# Patient Record
Sex: MALE | Race: WHITE | Employment: OTHER | URBAN - METROPOLITAN AREA
[De-identification: names, ages, dates, MRNs, and addresses within clinical notes are randomized per-mention and may not be internally consistent; named-entity substitution may affect disease eponyms.]

---

## 2017-01-19 RX ORDER — ATORVASTATIN CALCIUM 20 MG/1
TABLET, FILM COATED ORAL
Qty: 90 TABLET | Refills: 3 | Status: SHIPPED | OUTPATIENT
Start: 2017-01-19 | End: 2018-05-16 | Stop reason: SDUPTHER

## 2017-01-23 RX ORDER — METOPROLOL SUCCINATE 100 MG/1
100 TABLET, EXTENDED RELEASE ORAL DAILY
Qty: 90 TABLET | Refills: 3 | Status: SHIPPED | OUTPATIENT
Start: 2017-01-23 | End: 2017-02-16 | Stop reason: SDUPTHER

## 2017-01-23 RX ORDER — METOPROLOL SUCCINATE 100 MG/1
TABLET, EXTENDED RELEASE ORAL
Qty: 90 TABLET | Refills: 1 | Status: SHIPPED | OUTPATIENT
Start: 2017-01-23 | End: 2017-07-26 | Stop reason: SDUPTHER

## 2017-02-13 RX ORDER — MONTELUKAST SODIUM 10 MG/1
TABLET ORAL
Qty: 30 TABLET | Refills: 5 | Status: SHIPPED | OUTPATIENT
Start: 2017-02-13 | End: 2017-07-26 | Stop reason: SDUPTHER

## 2017-02-16 ENCOUNTER — OFFICE VISIT (OUTPATIENT)
Dept: CARDIOLOGY CLINIC | Age: 72
End: 2017-02-16

## 2017-02-16 VITALS
OXYGEN SATURATION: 92 % | BODY MASS INDEX: 28.17 KG/M2 | WEIGHT: 208 LBS | HEIGHT: 72 IN | HEART RATE: 74 BPM | SYSTOLIC BLOOD PRESSURE: 134 MMHG | DIASTOLIC BLOOD PRESSURE: 72 MMHG

## 2017-02-16 DIAGNOSIS — Z95.1 S/P CABG X 2: Primary | ICD-10-CM

## 2017-02-16 DIAGNOSIS — E78.2 MIXED HYPERLIPIDEMIA: ICD-10-CM

## 2017-02-16 DIAGNOSIS — I25.810 CORONARY ARTERY DISEASE INVOLVING CORONARY BYPASS GRAFT OF NATIVE HEART WITHOUT ANGINA PECTORIS: ICD-10-CM

## 2017-02-16 DIAGNOSIS — I10 ESSENTIAL HYPERTENSION: ICD-10-CM

## 2017-02-16 PROCEDURE — 99214 OFFICE O/P EST MOD 30 MIN: CPT | Performed by: INTERNAL MEDICINE

## 2017-02-16 RX ORDER — LOSARTAN POTASSIUM 50 MG/1
50 TABLET ORAL DAILY
Qty: 90 TABLET | Refills: 3 | Status: SHIPPED | OUTPATIENT
Start: 2017-02-16 | End: 2018-02-27 | Stop reason: SDUPTHER

## 2017-02-16 RX ORDER — ATORVASTATIN CALCIUM 20 MG/1
20 TABLET, FILM COATED ORAL DAILY
Qty: 90 TABLET | Refills: 3 | Status: SHIPPED | OUTPATIENT
Start: 2017-02-16 | End: 2017-03-24

## 2017-03-24 ENCOUNTER — OFFICE VISIT (OUTPATIENT)
Dept: FAMILY MEDICINE CLINIC | Age: 72
End: 2017-03-24

## 2017-03-24 VITALS
BODY MASS INDEX: 26.95 KG/M2 | HEART RATE: 76 BPM | DIASTOLIC BLOOD PRESSURE: 80 MMHG | WEIGHT: 199 LBS | HEIGHT: 72 IN | OXYGEN SATURATION: 96 % | RESPIRATION RATE: 16 BRPM | SYSTOLIC BLOOD PRESSURE: 122 MMHG | TEMPERATURE: 98 F

## 2017-03-24 DIAGNOSIS — R59.0 POSTERIOR CERVICAL ADENOPATHY: Primary | ICD-10-CM

## 2017-03-24 PROCEDURE — 99213 OFFICE O/P EST LOW 20 MIN: CPT | Performed by: FAMILY MEDICINE

## 2017-03-24 RX ORDER — CEPHALEXIN 500 MG/1
500 CAPSULE ORAL 4 TIMES DAILY
Qty: 40 CAPSULE | Refills: 0 | Status: SHIPPED | OUTPATIENT
Start: 2017-03-24 | End: 2017-07-26 | Stop reason: ALTCHOICE

## 2017-03-24 RX ORDER — METHYLPREDNISOLONE 4 MG/1
TABLET ORAL
Qty: 21 TABLET | Refills: 0 | Status: SHIPPED | OUTPATIENT
Start: 2017-03-24 | End: 2017-03-30

## 2017-07-26 ENCOUNTER — OFFICE VISIT (OUTPATIENT)
Dept: FAMILY MEDICINE CLINIC | Age: 72
End: 2017-07-26

## 2017-07-26 VITALS
SYSTOLIC BLOOD PRESSURE: 116 MMHG | OXYGEN SATURATION: 97 % | TEMPERATURE: 98.4 F | RESPIRATION RATE: 16 BRPM | HEIGHT: 72 IN | DIASTOLIC BLOOD PRESSURE: 74 MMHG | WEIGHT: 204 LBS | BODY MASS INDEX: 27.63 KG/M2 | HEART RATE: 74 BPM

## 2017-07-26 DIAGNOSIS — I10 ESSENTIAL HYPERTENSION: Primary | ICD-10-CM

## 2017-07-26 DIAGNOSIS — H65.492 CHRONIC MIDDLE EAR EFFUSION, LEFT: ICD-10-CM

## 2017-07-26 DIAGNOSIS — I25.10 CORONARY ARTERY DISEASE INVOLVING NATIVE CORONARY ARTERY OF NATIVE HEART, ANGINA PRESENCE UNSPECIFIED: ICD-10-CM

## 2017-07-26 DIAGNOSIS — J30.9 ALLERGIC RHINITIS, UNSPECIFIED ALLERGIC RHINITIS TRIGGER, UNSPECIFIED RHINITIS SEASONALITY: ICD-10-CM

## 2017-07-26 DIAGNOSIS — E78.2 MIXED HYPERLIPIDEMIA: ICD-10-CM

## 2017-07-26 LAB
BILIRUBIN, POC: NORMAL
BLOOD URINE, POC: NORMAL
CLARITY, POC: CLEAR
COLOR, POC: YELLOW
GLUCOSE URINE, POC: NORMAL
KETONES, POC: NORMAL
LEUKOCYTE EST, POC: NORMAL
NITRITE, POC: NORMAL
PH, POC: 6
PROTEIN, POC: NORMAL
SPECIFIC GRAVITY, POC: 1.02
UROBILINOGEN, POC: 0.2

## 2017-07-26 PROCEDURE — 93000 ELECTROCARDIOGRAM COMPLETE: CPT | Performed by: FAMILY MEDICINE

## 2017-07-26 PROCEDURE — 81002 URINALYSIS NONAUTO W/O SCOPE: CPT | Performed by: FAMILY MEDICINE

## 2017-07-26 PROCEDURE — 99214 OFFICE O/P EST MOD 30 MIN: CPT | Performed by: FAMILY MEDICINE

## 2017-07-26 RX ORDER — METOPROLOL SUCCINATE 100 MG/1
TABLET, EXTENDED RELEASE ORAL
Qty: 90 TABLET | Refills: 3 | Status: SHIPPED | OUTPATIENT
Start: 2017-07-26 | End: 2018-01-09 | Stop reason: SDUPTHER

## 2017-07-26 RX ORDER — MONTELUKAST SODIUM 10 MG/1
TABLET ORAL
Qty: 90 TABLET | Refills: 3 | Status: SHIPPED | OUTPATIENT
Start: 2017-07-26 | End: 2018-11-09 | Stop reason: SDUPTHER

## 2017-07-26 ASSESSMENT — PATIENT HEALTH QUESTIONNAIRE - PHQ9
SUM OF ALL RESPONSES TO PHQ9 QUESTIONS 1 & 2: 0
2. FEELING DOWN, DEPRESSED OR HOPELESS: 0
1. LITTLE INTEREST OR PLEASURE IN DOING THINGS: 0
SUM OF ALL RESPONSES TO PHQ QUESTIONS 1-9: 0

## 2017-09-12 ENCOUNTER — OFFICE VISIT (OUTPATIENT)
Dept: CARDIOLOGY CLINIC | Age: 72
End: 2017-09-12

## 2017-09-12 VITALS
HEIGHT: 72 IN | OXYGEN SATURATION: 97 % | BODY MASS INDEX: 27.77 KG/M2 | SYSTOLIC BLOOD PRESSURE: 126 MMHG | WEIGHT: 205 LBS | DIASTOLIC BLOOD PRESSURE: 72 MMHG | HEART RATE: 64 BPM

## 2017-09-12 DIAGNOSIS — R00.2 PALPITATIONS: ICD-10-CM

## 2017-09-12 DIAGNOSIS — I10 ESSENTIAL HYPERTENSION: ICD-10-CM

## 2017-09-12 DIAGNOSIS — E78.2 MIXED HYPERLIPIDEMIA: ICD-10-CM

## 2017-09-12 DIAGNOSIS — I25.810 CORONARY ARTERY DISEASE INVOLVING CORONARY BYPASS GRAFT OF NATIVE HEART WITHOUT ANGINA PECTORIS: Primary | ICD-10-CM

## 2017-09-12 PROCEDURE — 99214 OFFICE O/P EST MOD 30 MIN: CPT | Performed by: INTERNAL MEDICINE

## 2017-09-18 ENCOUNTER — HOSPITAL ENCOUNTER (OUTPATIENT)
Dept: OTHER | Age: 72
Discharge: HOME | End: 2017-09-18
Attending: INTERNAL MEDICINE | Admitting: INTERNAL MEDICINE

## 2017-09-18 DIAGNOSIS — R00.2 PALPITATIONS: ICD-10-CM

## 2017-09-19 LAB
ACQUISITION DURATION: NORMAL S
AVERAGE HEART RATE: 68 BPM
EKG DIAGNOSIS: NORMAL
HOLTER MAX HEART RATE: 102 BPM
HOOKUP DATE: NORMAL
HOOKUP TIME: NORMAL
Lab: NORMAL
MAX HEART RATE TIME/DATE: NORMAL
MIN HEART RATE TIME/DATE: NORMAL
MIN HEART RATE: 50 BPM
NUMBER OF QRS COMPLEXES: NORMAL
NUMBER OF SUPRAVENTRICULAR BEATS IN RUNS: 0
NUMBER OF SUPRAVENTRICULAR COUPLETS: 0
NUMBER OF SUPRAVENTRICULAR ECTOPICS: 42
NUMBER OF SUPRAVENTRICULAR ISOLATED BEATS: 42
NUMBER OF SUPRAVENTRICULAR RUNS: 0
NUMBER OF VENTRICULAR BEATS IN RUNS: 0
NUMBER OF VENTRICULAR BIGEMINAL CYCLES: 198
NUMBER OF VENTRICULAR COUPLETS: 29
NUMBER OF VENTRICULAR ECTOPICS: 2232
NUMBER OF VENTRICULAR ISOLATED BEATS: 2174
NUMBER OF VENTRICULAR RUNS: 0

## 2017-11-01 ENCOUNTER — OFFICE VISIT (OUTPATIENT)
Dept: FAMILY MEDICINE CLINIC | Age: 72
End: 2017-11-01

## 2017-11-01 VITALS
WEIGHT: 204.8 LBS | HEIGHT: 72 IN | SYSTOLIC BLOOD PRESSURE: 138 MMHG | DIASTOLIC BLOOD PRESSURE: 86 MMHG | OXYGEN SATURATION: 99 % | HEART RATE: 57 BPM | RESPIRATION RATE: 16 BRPM | BODY MASS INDEX: 27.74 KG/M2

## 2017-11-01 DIAGNOSIS — Z23 IMMUNIZATION DUE: ICD-10-CM

## 2017-11-01 DIAGNOSIS — G89.29 CHRONIC BILATERAL LOW BACK PAIN WITHOUT SCIATICA: ICD-10-CM

## 2017-11-01 DIAGNOSIS — M54.50 CHRONIC BILATERAL LOW BACK PAIN WITHOUT SCIATICA: ICD-10-CM

## 2017-11-01 DIAGNOSIS — L30.9 DERMATITIS: ICD-10-CM

## 2017-11-01 DIAGNOSIS — H66.002 ACUTE EXUDATIVE OTITIS MEDIA OF LEFT EAR: Primary | ICD-10-CM

## 2017-11-01 PROCEDURE — G0008 ADMIN INFLUENZA VIRUS VAC: HCPCS | Performed by: FAMILY MEDICINE

## 2017-11-01 PROCEDURE — 99213 OFFICE O/P EST LOW 20 MIN: CPT | Performed by: FAMILY MEDICINE

## 2017-11-01 PROCEDURE — 90688 IIV4 VACCINE SPLT 0.5 ML IM: CPT | Performed by: FAMILY MEDICINE

## 2017-11-01 RX ORDER — AMOXICILLIN 500 MG/1
500 CAPSULE ORAL 3 TIMES DAILY
Qty: 30 CAPSULE | Refills: 0 | Status: SHIPPED | OUTPATIENT
Start: 2017-11-01 | End: 2017-11-11

## 2017-11-01 RX ORDER — CALCIPOTRIENE 50 UG/G
CREAM TOPICAL
Qty: 1 TUBE | Refills: 5 | Status: SHIPPED | OUTPATIENT
Start: 2017-11-01 | End: 2018-12-10 | Stop reason: SDUPTHER

## 2017-11-01 NOTE — PROGRESS NOTES
Vaccine Information Sheet, \"Influenza - Inactivated\"  given to Mely Man, or parent/legal guardian of  Mely Man and verbalized understanding. Patient responses:    Have you ever had a reaction to a flu vaccine? No  Are you able to eat eggs without adverse effects? Yes  Do you have any current illness? No  Have you ever had Guillian [place]shoe Syndrome? No    Flu vaccine given per order. Please see immunization tab.
Plan:      Orders Placed This Encounter   Procedures    INFLUENZA, QUADV, 3 YRS AND OLDER, IM, MDV, 0.5ML (FLUZONE QUADV)       Orders Placed This Encounter   Medications    halobetasol (ULTRAVATE) 0.05 % cream     Sig: APPLY TO AFFECTED AREA(S) TWO TIMES A DAY AS NEEDED     Dispense:  1 Tube     Refill:  5    calcipotriene (DOVONEX) 0.005 % cream     Sig: APPLY TO AFFECTED AREA(S) TWO TIMES A DAY AS NEEDED     Dispense:  1 Tube     Refill:  5    amoxicillin (AMOXIL) 500 MG capsule     Sig: Take 1 capsule by mouth 3 times daily for 10 days     Dispense:  30 capsule     Refill:  0   Tylenol PRN for pain. discussed diet--avoid acidic and spicy foods, NSAID. OTC Zantac PRN.

## 2017-11-02 ENCOUNTER — TELEPHONE (OUTPATIENT)
Dept: FAMILY MEDICINE CLINIC | Age: 72
End: 2017-11-02

## 2017-11-06 ENCOUNTER — HOSPITAL ENCOUNTER (OUTPATIENT)
Dept: OTHER | Age: 72
Discharge: HOME | End: 2017-11-06
Attending: INTERNAL MEDICINE | Admitting: INTERNAL MEDICINE

## 2017-11-06 DIAGNOSIS — E78.2 MIXED HYPERLIPIDEMIA: ICD-10-CM

## 2017-11-06 DIAGNOSIS — I25.810 CORONARY ARTERY DISEASE INVOLVING CORONARY BYPASS GRAFT OF NATIVE HEART WITHOUT ANGINA PECTORIS: ICD-10-CM

## 2017-11-06 DIAGNOSIS — I10 ESSENTIAL HYPERTENSION: ICD-10-CM

## 2017-11-06 LAB
A/G RATIO: 1.4 (ref 1.1–2.2)
ALBUMIN SERPL-MCNC: 4.6 G/DL (ref 3.4–5)
ALP BLD-CCNC: 68 U/L (ref 40–129)
ALT SERPL-CCNC: 38 U/L (ref 10–40)
ANION GAP SERPL CALCULATED.3IONS-SCNC: 13 MMOL/L (ref 3–16)
AST SERPL-CCNC: 25 U/L (ref 15–37)
BILIRUB SERPL-MCNC: 0.5 MG/DL (ref 0–1)
BUN BLDV-MCNC: 13 MG/DL (ref 7–20)
CALCIUM SERPL-MCNC: 10 MG/DL (ref 8.3–10.6)
CHLORIDE BLD-SCNC: 101 MMOL/L (ref 99–110)
CHOLESTEROL, TOTAL: 123 MG/DL (ref 0–199)
CO2: 28 MMOL/L (ref 21–32)
CREAT SERPL-MCNC: 0.7 MG/DL (ref 0.8–1.3)
GFR AFRICAN AMERICAN: >60
GFR NON-AFRICAN AMERICAN: >60
GLOBULIN: 3.2 G/DL
GLUCOSE BLD-MCNC: 92 MG/DL (ref 70–99)
HDLC SERPL-MCNC: 37 MG/DL (ref 40–60)
LDL CHOLESTEROL CALCULATED: 60 MG/DL
POTASSIUM SERPL-SCNC: 4.2 MMOL/L (ref 3.5–5.1)
SODIUM BLD-SCNC: 142 MMOL/L (ref 136–145)
TOTAL PROTEIN: 7.8 G/DL (ref 6.4–8.2)
TRIGL SERPL-MCNC: 128 MG/DL (ref 0–150)
VLDLC SERPL CALC-MCNC: 26 MG/DL

## 2017-11-07 ENCOUNTER — TELEPHONE (OUTPATIENT)
Dept: CARDIOLOGY CLINIC | Age: 72
End: 2017-11-07

## 2017-11-07 NOTE — TELEPHONE ENCOUNTER
----- Message from Pillo Gomez MD sent at 11/7/2017  4:59 AM EST -----  Blood test is good please call patient.

## 2017-11-07 NOTE — TELEPHONE ENCOUNTER
Created telephone encounter. Per Pt HIPAA from can leave results on machine. LMOM relaying message per RKG regarding labs. Pt to call the office with any concerns.

## 2018-01-09 DIAGNOSIS — I10 ESSENTIAL HYPERTENSION: ICD-10-CM

## 2018-01-09 RX ORDER — METOPROLOL SUCCINATE 100 MG/1
TABLET, EXTENDED RELEASE ORAL
Qty: 90 TABLET | Refills: 3 | Status: SHIPPED | OUTPATIENT
Start: 2018-01-09 | End: 2018-12-17 | Stop reason: SDUPTHER

## 2018-02-28 RX ORDER — LOSARTAN POTASSIUM 50 MG/1
50 TABLET ORAL DAILY
Qty: 90 TABLET | Refills: 3 | Status: SHIPPED | OUTPATIENT
Start: 2018-02-28 | End: 2018-12-17 | Stop reason: SDUPTHER

## 2018-05-16 RX ORDER — ATORVASTATIN CALCIUM 20 MG/1
TABLET, FILM COATED ORAL
Qty: 90 TABLET | Refills: 0 | Status: SHIPPED | OUTPATIENT
Start: 2018-05-16 | End: 2018-06-26 | Stop reason: SDUPTHER

## 2018-06-26 ENCOUNTER — OFFICE VISIT (OUTPATIENT)
Dept: CARDIOLOGY CLINIC | Age: 73
End: 2018-06-26

## 2018-06-26 VITALS
OXYGEN SATURATION: 95 % | DIASTOLIC BLOOD PRESSURE: 82 MMHG | SYSTOLIC BLOOD PRESSURE: 134 MMHG | BODY MASS INDEX: 27.77 KG/M2 | HEART RATE: 70 BPM | WEIGHT: 205 LBS | HEIGHT: 72 IN

## 2018-06-26 DIAGNOSIS — I10 ESSENTIAL HYPERTENSION: ICD-10-CM

## 2018-06-26 DIAGNOSIS — E78.2 MIXED HYPERLIPIDEMIA: ICD-10-CM

## 2018-06-26 DIAGNOSIS — I25.810 CORONARY ARTERY DISEASE INVOLVING CORONARY BYPASS GRAFT OF NATIVE HEART WITHOUT ANGINA PECTORIS: Primary | ICD-10-CM

## 2018-06-26 PROCEDURE — 99214 OFFICE O/P EST MOD 30 MIN: CPT | Performed by: INTERNAL MEDICINE

## 2018-06-26 RX ORDER — ATORVASTATIN CALCIUM 20 MG/1
TABLET, FILM COATED ORAL
Qty: 90 TABLET | Refills: 3 | Status: SHIPPED | OUTPATIENT
Start: 2018-06-26 | End: 2019-08-26 | Stop reason: SDUPTHER

## 2018-08-19 ENCOUNTER — HOSPITAL ENCOUNTER (EMERGENCY)
Age: 73
Discharge: HOME | End: 2018-08-19
Attending: EMERGENCY MEDICINE
Payer: COMMERCIAL

## 2018-08-19 ENCOUNTER — APPOINTMENT (OUTPATIENT)
Dept: CT IMAGING | Age: 73
End: 2018-08-19
Payer: COMMERCIAL

## 2018-08-19 VITALS
RESPIRATION RATE: 18 BRPM | WEIGHT: 199 LBS | TEMPERATURE: 96.4 F | DIASTOLIC BLOOD PRESSURE: 82 MMHG | HEART RATE: 60 BPM | HEIGHT: 72 IN | OXYGEN SATURATION: 98 % | SYSTOLIC BLOOD PRESSURE: 134 MMHG | BODY MASS INDEX: 26.95 KG/M2

## 2018-08-19 DIAGNOSIS — N23 URETERAL COLIC: Primary | ICD-10-CM

## 2018-08-19 DIAGNOSIS — N20.1 LEFT URETERAL CALCULUS: ICD-10-CM

## 2018-08-19 LAB
A/G RATIO: 1.4 (ref 1.1–2.2)
ALBUMIN SERPL-MCNC: 4.2 G/DL (ref 3.4–5)
ALP BLD-CCNC: 54 U/L (ref 40–129)
ALT SERPL-CCNC: 42 U/L (ref 10–40)
ANION GAP SERPL CALCULATED.3IONS-SCNC: 16 MMOL/L (ref 3–16)
AST SERPL-CCNC: 28 U/L (ref 15–37)
BASOPHILS ABSOLUTE: 0.1 K/UL (ref 0–0.2)
BASOPHILS RELATIVE PERCENT: 2.1 %
BILIRUB SERPL-MCNC: 0.5 MG/DL (ref 0–1)
BUN BLDV-MCNC: 17 MG/DL (ref 7–20)
CALCIUM SERPL-MCNC: 9.4 MG/DL (ref 8.3–10.6)
CHLORIDE BLD-SCNC: 96 MMOL/L (ref 99–110)
CO2: 23 MMOL/L (ref 21–32)
CREAT SERPL-MCNC: 0.8 MG/DL (ref 0.8–1.3)
EOSINOPHILS ABSOLUTE: 0.2 K/UL (ref 0–0.6)
EOSINOPHILS RELATIVE PERCENT: 2.8 %
GFR AFRICAN AMERICAN: >60
GFR NON-AFRICAN AMERICAN: >60
GLOBULIN: 3.1 G/DL
GLUCOSE BLD-MCNC: 140 MG/DL (ref 70–99)
HCT VFR BLD CALC: 45.8 % (ref 40.5–52.5)
HEMOGLOBIN: 15.5 G/DL (ref 13.5–17.5)
LIPASE: 22 U/L (ref 13–60)
LYMPHOCYTES ABSOLUTE: 2.1 K/UL (ref 1–5.1)
LYMPHOCYTES RELATIVE PERCENT: 32.7 %
MCH RBC QN AUTO: 30.9 PG (ref 26–34)
MCHC RBC AUTO-ENTMCNC: 33.9 G/DL (ref 31–36)
MCV RBC AUTO: 91.1 FL (ref 80–100)
MONOCYTES ABSOLUTE: 0.8 K/UL (ref 0–1.3)
MONOCYTES RELATIVE PERCENT: 11.7 %
NEUTROPHILS ABSOLUTE: 3.3 K/UL (ref 1.7–7.7)
NEUTROPHILS RELATIVE PERCENT: 50.7 %
PDW BLD-RTO: 13.8 % (ref 12.4–15.4)
PLATELET # BLD: 152 K/UL (ref 135–450)
PMV BLD AUTO: 9.4 FL (ref 5–10.5)
POTASSIUM REFLEX MAGNESIUM: 3.7 MMOL/L (ref 3.5–5.1)
RBC # BLD: 5.03 M/UL (ref 4.2–5.9)
SODIUM BLD-SCNC: 135 MMOL/L (ref 136–145)
TOTAL PROTEIN: 7.3 G/DL (ref 6.4–8.2)
WBC # BLD: 6.5 K/UL (ref 4–11)

## 2018-08-19 PROCEDURE — 96361 HYDRATE IV INFUSION ADD-ON: CPT

## 2018-08-19 PROCEDURE — 74176 CT ABD & PELVIS W/O CONTRAST: CPT

## 2018-08-19 PROCEDURE — 96374 THER/PROPH/DIAG INJ IV PUSH: CPT

## 2018-08-19 PROCEDURE — 80053 COMPREHEN METABOLIC PANEL: CPT

## 2018-08-19 PROCEDURE — 85025 COMPLETE CBC W/AUTO DIFF WBC: CPT

## 2018-08-19 PROCEDURE — 2580000003 HC RX 258: Performed by: EMERGENCY MEDICINE

## 2018-08-19 PROCEDURE — 99284 EMERGENCY DEPT VISIT MOD MDM: CPT

## 2018-08-19 PROCEDURE — 6370000000 HC RX 637 (ALT 250 FOR IP): Performed by: EMERGENCY MEDICINE

## 2018-08-19 PROCEDURE — 83690 ASSAY OF LIPASE: CPT

## 2018-08-19 PROCEDURE — 96375 TX/PRO/DX INJ NEW DRUG ADDON: CPT

## 2018-08-19 PROCEDURE — 6360000002 HC RX W HCPCS: Performed by: EMERGENCY MEDICINE

## 2018-08-19 RX ORDER — OXYCODONE HYDROCHLORIDE AND ACETAMINOPHEN 5; 325 MG/1; MG/1
1-2 TABLET ORAL EVERY 6 HOURS PRN
Qty: 20 TABLET | Refills: 0 | Status: SHIPPED | OUTPATIENT
Start: 2018-08-19 | End: 2018-08-26

## 2018-08-19 RX ORDER — TAMSULOSIN HYDROCHLORIDE 0.4 MG/1
0.4 CAPSULE ORAL DAILY
Qty: 10 CAPSULE | Refills: 0 | Status: SHIPPED | OUTPATIENT
Start: 2018-08-19 | End: 2019-08-20 | Stop reason: ALTCHOICE

## 2018-08-19 RX ORDER — ONDANSETRON 4 MG/1
4 TABLET, ORALLY DISINTEGRATING ORAL EVERY 8 HOURS PRN
Qty: 20 TABLET | Refills: 0 | Status: ON HOLD | OUTPATIENT
Start: 2018-08-19 | End: 2019-01-04 | Stop reason: HOSPADM

## 2018-08-19 RX ORDER — FENTANYL CITRATE 50 UG/ML
25 INJECTION, SOLUTION INTRAMUSCULAR; INTRAVENOUS ONCE
Status: COMPLETED | OUTPATIENT
Start: 2018-08-19 | End: 2018-08-19

## 2018-08-19 RX ORDER — 0.9 % SODIUM CHLORIDE 0.9 %
1000 INTRAVENOUS SOLUTION INTRAVENOUS ONCE
Status: COMPLETED | OUTPATIENT
Start: 2018-08-19 | End: 2018-08-19

## 2018-08-19 RX ORDER — TAMSULOSIN HYDROCHLORIDE 0.4 MG/1
0.4 CAPSULE ORAL ONCE
Status: COMPLETED | OUTPATIENT
Start: 2018-08-19 | End: 2018-08-19

## 2018-08-19 RX ORDER — OXYCODONE HYDROCHLORIDE AND ACETAMINOPHEN 5; 325 MG/1; MG/1
2 TABLET ORAL ONCE
Status: COMPLETED | OUTPATIENT
Start: 2018-08-19 | End: 2018-08-19

## 2018-08-19 RX ORDER — NAPROXEN 500 MG/1
500 TABLET ORAL 2 TIMES DAILY
Qty: 20 TABLET | Refills: 0 | Status: ON HOLD | OUTPATIENT
Start: 2018-08-19 | End: 2019-01-04 | Stop reason: HOSPADM

## 2018-08-19 RX ORDER — ONDANSETRON 2 MG/ML
4 INJECTION INTRAMUSCULAR; INTRAVENOUS ONCE
Status: COMPLETED | OUTPATIENT
Start: 2018-08-19 | End: 2018-08-19

## 2018-08-19 RX ORDER — KETOROLAC TROMETHAMINE 30 MG/ML
30 INJECTION, SOLUTION INTRAMUSCULAR; INTRAVENOUS ONCE
Status: COMPLETED | OUTPATIENT
Start: 2018-08-19 | End: 2018-08-19

## 2018-08-19 RX ADMIN — KETOROLAC TROMETHAMINE 30 MG: 30 INJECTION, SOLUTION INTRAMUSCULAR at 09:52

## 2018-08-19 RX ADMIN — FENTANYL CITRATE 25 MCG: 50 INJECTION, SOLUTION INTRAMUSCULAR; INTRAVENOUS at 10:37

## 2018-08-19 RX ADMIN — OXYCODONE HYDROCHLORIDE AND ACETAMINOPHEN 2 TABLET: 5; 325 TABLET ORAL at 11:30

## 2018-08-19 RX ADMIN — HYDROMORPHONE HYDROCHLORIDE 1 MG: 1 INJECTION, SOLUTION INTRAMUSCULAR; INTRAVENOUS; SUBCUTANEOUS at 09:52

## 2018-08-19 RX ADMIN — ONDANSETRON 4 MG: 2 INJECTION INTRAMUSCULAR; INTRAVENOUS at 09:52

## 2018-08-19 RX ADMIN — SODIUM CHLORIDE 1000 ML: 9 INJECTION, SOLUTION INTRAVENOUS at 09:52

## 2018-08-19 RX ADMIN — TAMSULOSIN HYDROCHLORIDE 0.4 MG: 0.4 CAPSULE ORAL at 11:22

## 2018-08-19 ASSESSMENT — PAIN SCALES - GENERAL
PAINLEVEL_OUTOF10: 1
PAINLEVEL_OUTOF10: 9
PAINLEVEL_OUTOF10: 10
PAINLEVEL_OUTOF10: 9
PAINLEVEL_OUTOF10: 10
PAINLEVEL_OUTOF10: 9

## 2018-08-19 ASSESSMENT — PAIN DESCRIPTION - LOCATION: LOCATION: ABDOMEN

## 2018-08-19 NOTE — ED PROVIDER NOTES
except as marked. Positives and Pertinent negatives as per HPI. Except as noted above in the ROS, all other systems were reviewed and negative. PAST MEDICAL HISTORY     Past Medical History:   Diagnosis Date    CAD (coronary artery disease)     Hyperlipidemia     Hypertension          SURGICAL HISTORY       Past Surgical History:   Procedure Laterality Date    CORONARY ANGIOPLASTY  01/08/2011    emergency PCI: vein to RCA    CORONARY ARTERY BYPASS GRAFT      in 1990 CABG x4 and in 2001 CABG x2    VASECTOMY  April 2010         CURRENT MEDICATIONS       Previous Medications    ASCORBIC ACID (VITAMIN C) 500 MG TABLET    Take 500 mg by mouth daily. ASPIRIN 81 MG TABLET    Take 81 mg by mouth 2 times daily    ATORVASTATIN (LIPITOR) 20 MG TABLET    TAKE 1 TABLET DAILY    CALCIPOTRIENE (DOVONEX) 0.005 % CREAM    APPLY TO AFFECTED AREA(S) TWO TIMES A DAY AS NEEDED    FAMOTIDINE (PEPCID) 20 MG TABLET    Take 1 tablet by mouth 2 times daily    HALOBETASOL (ULTRAVATE) 0.05 % CREAM    APPLY TO AFFECTED AREA(S) TWO TIMES A DAY AS NEEDED    LOSARTAN (COZAAR) 50 MG TABLET    Take 1 tablet by mouth daily    METOPROLOL SUCCINATE (TOPROL XL) 100 MG EXTENDED RELEASE TABLET    TAKE 1 TABLET DAILY    MONTELUKAST (SINGULAIR) 10 MG TABLET    TAKE ONE TABLET BY MOUTH DAILY    MULTIPLE VITAMINS-MINERALS (CENTRUM SILVER) TABS    Take 1 tablet by mouth daily. NITROGLYCERIN (NITROSTAT) 0.4 MG SL TABLET    Place 0.4 mg under the tongue every 5 minutes as needed. OMEGA-3 FATTY ACIDS (FISH OIL) 1200 MG CAPS    Take 1 capsule by mouth daily. ALLERGIES     Ciprofloxacin and Plavix [clopidogrel bisulfate]    FAMILY HISTORY     History reviewed. No pertinent family history.        SOCIAL HISTORY       Social History     Social History    Marital status:      Spouse name: Denis Olivares Number of children: 2    Years of education: 8     Occupational History     Self Employed     [employer] Social History Main Topics    Smoking status: Former Smoker     Types: Cigarettes     Quit date: 1/9/1991    Smokeless tobacco: Never Used    Alcohol use No    Drug use: No    Sexual activity: Yes     Partners: Female     Other Topics Concern    None     Social History Narrative    None       SCREENINGS             PHYSICAL EXAM    (up to 7 for level 4, 8 or more for level 5)     ED Triage Vitals [08/19/18 0957]   BP Temp Temp Source Pulse Resp SpO2 Height Weight   (!) 156/97 96.4 °F (35.8 °C) Oral 78 22 100 % 6' (1.829 m) 199 lb (90.3 kg)           PHYSICAL EXAM    VITAL SIGNS: /82   Pulse 60   Temp 96.4 °F (35.8 °C) (Oral)   Resp 18   Ht 6' (1.829 m)   Wt 199 lb (90.3 kg)   SpO2 98%   BMI 26.99 kg/m²    Constitutional:  Well developed, Well nourished, No acute distress, Non-toxic appearance. HENT:  Normocephalic, Atraumatic, Bilateral external ears normal, Oropharynx moist, No oral exudates, Nose normal.   Neck: Normal range of motion, No tenderness, Supple, No stridor. Eyes:   Conjunctiva normal, No discharge. Respiratory:  Normal breath sounds, No respiratory distress, No wheezing, No chest tenderness. Cardiovascular:  Normal heart rate, Normal rhythm, No murmurs, No rubs, No gallops. GI:  Bowel sounds normal, Soft, No tenderness, No masses, No pulsatile masses. Musculoskeletal:  Intact distal pulses, No edema, No tenderness, No cyanosis, No clubbing. Good range of motion in all major joints. No tenderness to palpation or major deformities noted. Back: No tenderness. Integument:  Warm, Dry, No erythema, No rash. Lymphatic:  No lymphadenopathy noted. Neurologic:  Alert & oriented x 3, Normal motor function, Normal sensory function, No focal deficits noted.    Psychiatric:  Affect normal, Judgment normal, Mood normal.       DIAGNOSTIC RESULTS   LABS:    Results for orders placed or performed during the hospital encounter of 08/19/18   CBC Auto Differential   Result Value administration of intravenous contrast. Multiplanar reformatted images are provided for review. Dose modulation, iterative reconstruction, and/or weight based adjustment of the mA/kV was utilized to reduce the radiation dose to as low as reasonably achievable. COMPARISON: None. HISTORY: ORDERING SYSTEM PROVIDED HISTORY: LEFT FLANK PAIN TECHNOLOGIST PROVIDED HISTORY: Additional Contrast?->None Ordering Physician Provided Reason for Exam: left flank pain x's this morning; no other sx Acuity: Acute Type of Exam: Initial FINDINGS: Lung bases:  Visualized lung bases are well aerated without focal airspace consolidation, lung nodule or lung mass. No pleural or pericardial effusion. Heart size appears within normal limits. Organs: The liver has normal size and contours. No suspicious intrahepatic mass lesion identified. No extrahepatic biliary ductal dilatation. The gallbladder is present. The spleen, pancreas and adrenal glands have a normal noncontrast CT appearance. The kidneys are symmetric in size and noncontrast appearance. No suspicious renal lesions identified. There is a 2 mm distal left ureteral calculus. This causes mild proximal hydroureteronephrosis. No renal, right ureteral or intravesicular calculi are identified. No right obstructive uropathy. GI/bowel:  No dilated loops of bowel, or findings to suggest obstruction. No mural thickening or adjacent inflammatory changes identified. The appendix is normal and nondilated. Peritoneum/retroperitoneum:  No lymphadenopathy, free fluid or free air identified in the abdomen or pelvis. There are moderate calcific atherosclerotic changes of the abdominal aorta, which is ectatic, but not aneurysmal. Pelvis: Prostate and seminal vesicles have normal size and noncontrast CT appearance. . The urinary bladder is unremarkable. Bones/soft tissues: There is multilevel degenerative disc disease and hypertrophic degenerative changes of the spine and both hip joints.

## 2018-08-28 ENCOUNTER — OFFICE VISIT (OUTPATIENT)
Dept: FAMILY MEDICINE CLINIC | Age: 73
End: 2018-08-28

## 2018-08-28 VITALS
HEART RATE: 68 BPM | SYSTOLIC BLOOD PRESSURE: 138 MMHG | HEIGHT: 72 IN | BODY MASS INDEX: 27.66 KG/M2 | OXYGEN SATURATION: 97 % | WEIGHT: 204.2 LBS | DIASTOLIC BLOOD PRESSURE: 76 MMHG

## 2018-08-28 DIAGNOSIS — I25.810 CORONARY ARTERY DISEASE INVOLVING CORONARY BYPASS GRAFT OF NATIVE HEART WITHOUT ANGINA PECTORIS: ICD-10-CM

## 2018-08-28 DIAGNOSIS — N20.0 KIDNEY STONE: Primary | ICD-10-CM

## 2018-08-28 DIAGNOSIS — Z23 NEED FOR VACCINATION FOR STREP PNEUMONIAE: ICD-10-CM

## 2018-08-28 DIAGNOSIS — Z12.5 SCREENING PSA (PROSTATE SPECIFIC ANTIGEN): ICD-10-CM

## 2018-08-28 DIAGNOSIS — E78.2 MIXED HYPERLIPIDEMIA: ICD-10-CM

## 2018-08-28 DIAGNOSIS — I10 ESSENTIAL HYPERTENSION: ICD-10-CM

## 2018-08-28 LAB — PROSTATE SPECIFIC ANTIGEN: 1.13 NG/ML (ref 0–4)

## 2018-08-28 PROCEDURE — G0009 ADMIN PNEUMOCOCCAL VACCINE: HCPCS | Performed by: FAMILY MEDICINE

## 2018-08-28 PROCEDURE — 90732 PPSV23 VACC 2 YRS+ SUBQ/IM: CPT | Performed by: FAMILY MEDICINE

## 2018-08-28 PROCEDURE — 99214 OFFICE O/P EST MOD 30 MIN: CPT | Performed by: FAMILY MEDICINE

## 2018-08-28 PROCEDURE — 36415 COLL VENOUS BLD VENIPUNCTURE: CPT | Performed by: FAMILY MEDICINE

## 2018-08-28 RX ORDER — ASPIRIN 325 MG
325 TABLET ORAL DAILY
Status: ON HOLD | COMMUNITY
End: 2019-01-04 | Stop reason: HOSPADM

## 2018-08-28 ASSESSMENT — ENCOUNTER SYMPTOMS
CONSTIPATION: 0
EYE PAIN: 0
SINUS PRESSURE: 0
DIARRHEA: 0
RHINORRHEA: 0
SHORTNESS OF BREATH: 0
COLOR CHANGE: 0
CHEST TIGHTNESS: 0
EYE DISCHARGE: 0
ABDOMINAL PAIN: 0
VOMITING: 0
BLOOD IN STOOL: 0
COUGH: 0
WHEEZING: 0

## 2018-08-28 ASSESSMENT — PATIENT HEALTH QUESTIONNAIRE - PHQ9
1. LITTLE INTEREST OR PLEASURE IN DOING THINGS: 0
SUM OF ALL RESPONSES TO PHQ QUESTIONS 1-9: 0
SUM OF ALL RESPONSES TO PHQ QUESTIONS 1-9: 0
SUM OF ALL RESPONSES TO PHQ9 QUESTIONS 1 & 2: 0
2. FEELING DOWN, DEPRESSED OR HOPELESS: 0

## 2018-08-28 NOTE — PROGRESS NOTES
TABLET DAILY 90 tablet 3    losartan (COZAAR) 50 MG tablet Take 1 tablet by mouth daily 90 tablet 3    metoprolol succinate (TOPROL XL) 100 MG extended release tablet TAKE 1 TABLET DAILY 90 tablet 3    halobetasol (ULTRAVATE) 0.05 % cream APPLY TO AFFECTED AREA(S) TWO TIMES A DAY AS NEEDED 1 Tube 5    calcipotriene (DOVONEX) 0.005 % cream APPLY TO AFFECTED AREA(S) TWO TIMES A DAY AS NEEDED 1 Tube 5    montelukast (SINGULAIR) 10 MG tablet TAKE ONE TABLET BY MOUTH DAILY 90 tablet 3    Omega-3 Fatty Acids (FISH OIL) 1200 MG CAPS Take 1 capsule by mouth daily. 30 capsule 11    nitroGLYCERIN (NITROSTAT) 0.4 MG SL tablet Place 0.4 mg under the tongue every 5 minutes as needed.  Ascorbic Acid (VITAMIN C) 500 MG tablet Take 500 mg by mouth daily.  Multiple Vitamins-Minerals (CENTRUM SILVER) TABS Take 1 tablet by mouth daily. No current facility-administered medications for this visit.       Allergies: Ciprofloxacin and Plavix [clopidogrel bisulfate]  Past Medical History:   Diagnosis Date    Arthritis of hand     arthritis in hands and thumbs    CAD (coronary artery disease)     Hyperlipidemia     Hypertension     Tinnitus      Past Surgical History:   Procedure Laterality Date    CORONARY ANGIOPLASTY  01/08/2011    emergency PCI: vein to RCA    CORONARY ARTERY BYPASS GRAFT      in 1990 CABG x4 and in 2001 CABG x2    VASECTOMY  April 2010     Family History   Problem Relation Age of Onset    Cancer Mother         skin cancer    Diabetes Mother     High Blood Pressure Father     Heart Disease Father     No Known Problems Brother     Heart Disease Paternal Uncle     Heart Disease Paternal Cousin     No Known Problems Brother      Social History   Substance Use Topics    Smoking status: Former Smoker     Packs/day: 1.00     Years: 20.00     Types: Cigarettes     Quit date: 1/9/1991    Smokeless tobacco: Never Used    Alcohol use No     Vitals:    08/28/18 1016   BP: 138/76   Site: disturbance. Respiratory: Negative for cough, chest tightness, shortness of breath and wheezing. Cardiovascular: Negative for chest pain, palpitations and leg swelling. Gastrointestinal: Negative for abdominal pain, blood in stool, constipation, diarrhea and vomiting. Genitourinary: Negative for difficulty urinating, dysuria and hematuria. Musculoskeletal: Negative for arthralgias and neck pain. Skin: Negative for color change and rash. Neurological: Negative for dizziness, seizures, syncope and headaches. Hematological: Negative for adenopathy. Does not bruise/bleed easily. Psychiatric/Behavioral: Negative for dysphoric mood and sleep disturbance. The patient is not nervous/anxious. Objective:   Physical Exam   Constitutional: He is oriented to person, place, and time. He appears well-developed and well-nourished. No distress. HENT:   Head: Normocephalic. Right Ear: External ear normal.   Left Ear: External ear normal.   Nose: Nose normal.   Mouth/Throat: Oropharynx is clear and moist. No oropharyngeal exudate. Eyes: Pupils are equal, round, and reactive to light. EOM are normal.   Neck: Neck supple. No thyromegaly present. Cardiovascular: Normal rate, regular rhythm, normal heart sounds and intact distal pulses. No murmur heard. Pulmonary/Chest: Effort normal and breath sounds normal. He has no wheezes. Abdominal: Soft. Bowel sounds are normal. He exhibits no distension. There is no tenderness. There is no rebound and no guarding. Musculoskeletal: Normal range of motion. Lymphadenopathy:     He has no cervical adenopathy. Neurological: He is alert and oriented to person, place, and time. No cranial nerve deficit. Coordination normal.   Skin: Skin is warm and dry. Psychiatric: He has a normal mood and affect.  His behavior is normal. Judgment and thought content normal.       Assessment:      Kidney stone- still with pain, not passed, see urology  htn- stable  hld- on statin  CAD- s/p CABG.  Sees cardiology      Plan:      Reviewed [facility] records  reconcilled meds  Orders Placed This Encounter   Procedures    PNEUMOVAX 23 subcutaneous/IM (Pneumococcal polysaccharide vaccine 23-valent >= 3yo)    Psa screening             ALMA Mendoza [address],

## 2018-08-28 NOTE — PATIENT INSTRUCTIONS

## 2018-11-08 ENCOUNTER — HOSPITAL ENCOUNTER (OUTPATIENT)
Age: 73
Discharge: HOME | End: 2018-11-08
Payer: COMMERCIAL

## 2018-11-08 DIAGNOSIS — E78.2 MIXED HYPERLIPIDEMIA: ICD-10-CM

## 2018-11-08 LAB
CHOLESTEROL, TOTAL: 127 MG/DL (ref 0–199)
HDLC SERPL-MCNC: 36 MG/DL (ref 40–60)
LDL CHOLESTEROL CALCULATED: 62 MG/DL
TRIGL SERPL-MCNC: 144 MG/DL (ref 0–150)
VLDLC SERPL CALC-MCNC: 29 MG/DL

## 2018-11-08 PROCEDURE — 36415 COLL VENOUS BLD VENIPUNCTURE: CPT

## 2018-11-08 PROCEDURE — 80061 LIPID PANEL: CPT

## 2018-11-09 ENCOUNTER — TELEPHONE (OUTPATIENT)
Dept: CARDIOLOGY CLINIC | Age: 73
End: 2018-11-09

## 2018-11-09 NOTE — TELEPHONE ENCOUNTER
----- Message from Kris Tobar MD sent at 11/9/2018  4:55 PM EST -----  Blood cholesterol test is normal

## 2018-11-10 RX ORDER — MONTELUKAST SODIUM 10 MG/1
TABLET ORAL
Qty: 90 TABLET | Refills: 1 | Status: SHIPPED | OUTPATIENT
Start: 2018-11-10 | End: 2019-05-09 | Stop reason: SDUPTHER

## 2018-12-10 DIAGNOSIS — L30.9 DERMATITIS: ICD-10-CM

## 2018-12-11 RX ORDER — CALCIPOTRIENE 50 UG/G
CREAM TOPICAL
Qty: 1 TUBE | Refills: 0 | Status: SHIPPED | OUTPATIENT
Start: 2018-12-11 | End: 2019-08-20 | Stop reason: SDUPTHER

## 2018-12-17 ENCOUNTER — OFFICE VISIT (OUTPATIENT)
Dept: CARDIOLOGY CLINIC | Age: 73
End: 2018-12-17
Payer: COMMERCIAL

## 2018-12-17 VITALS
BODY MASS INDEX: 27.5 KG/M2 | WEIGHT: 203 LBS | OXYGEN SATURATION: 97 % | HEIGHT: 72 IN | HEART RATE: 69 BPM | DIASTOLIC BLOOD PRESSURE: 64 MMHG | SYSTOLIC BLOOD PRESSURE: 108 MMHG

## 2018-12-17 DIAGNOSIS — E78.2 MIXED HYPERLIPIDEMIA: ICD-10-CM

## 2018-12-17 DIAGNOSIS — I25.810 CORONARY ARTERY DISEASE INVOLVING CORONARY BYPASS GRAFT OF NATIVE HEART WITHOUT ANGINA PECTORIS: Primary | ICD-10-CM

## 2018-12-17 DIAGNOSIS — I10 ESSENTIAL HYPERTENSION: ICD-10-CM

## 2018-12-17 PROCEDURE — 99214 OFFICE O/P EST MOD 30 MIN: CPT | Performed by: INTERNAL MEDICINE

## 2018-12-17 RX ORDER — METOPROLOL SUCCINATE 100 MG/1
TABLET, EXTENDED RELEASE ORAL
Qty: 90 TABLET | Refills: 3 | Status: SHIPPED | OUTPATIENT
Start: 2018-12-17 | End: 2019-10-22 | Stop reason: SDUPTHER

## 2018-12-17 RX ORDER — LOSARTAN POTASSIUM 50 MG/1
50 TABLET ORAL DAILY
Qty: 90 TABLET | Refills: 3 | Status: SHIPPED | OUTPATIENT
Start: 2018-12-17 | End: 2019-12-10 | Stop reason: SDUPTHER

## 2018-12-17 NOTE — PATIENT INSTRUCTIONS
inflammation  Musculoskeletal:  negative  Skin:  Warm and dry, red rash noted around neck and upper chest dry scaly seborrheic patches noted  Neck:  Negative for JVD and Carotid Bruits. Chest:  Crackles LLL otherwise Clear to auscultation  Cardiovascular:  RRR, occasional ectopy   S1S2 normal, no murmur, no rub or thrill. Abdomen:  Soft normal liver and spleen  Extremities:   No edema, clubbing, cyanosis,old trauma left lower leg bone. Pulses:pedal pulses are normal.  Neuro: intact    Medications:   Outpatient Encounter Prescriptions as of 12/17/2018   Medication Sig Dispense Refill    metoprolol succinate (TOPROL XL) 100 MG extended release tablet TAKE 1 TABLET DAILY 90 tablet 3    losartan (COZAAR) 50 MG tablet Take 1 tablet by mouth daily 90 tablet 3    calcipotriene (DOVONEX) 0.005 % cream APPLY TO AFFECTED AREA(S) TWO TIMES A DAY AS NEEDED 1 Tube 0    montelukast (SINGULAIR) 10 MG tablet TAKE ONE TABLET BY MOUTH DAILY 90 tablet 1    aspirin 325 MG tablet Take 325 mg by mouth daily      ondansetron (ZOFRAN ODT) 4 MG disintegrating tablet Take 1 tablet by mouth every 8 hours as needed for Nausea 20 tablet 0    naproxen (NAPROSYN) 500 MG tablet Take 1 tablet by mouth 2 times daily for 20 doses (Patient taking differently: Take 500 mg by mouth daily as needed ) 20 tablet 0    tamsulosin (FLOMAX) 0.4 MG capsule Take 1 capsule by mouth daily for 10 days 10 capsule 0    atorvastatin (LIPITOR) 20 MG tablet TAKE 1 TABLET DAILY 90 tablet 3    halobetasol (ULTRAVATE) 0.05 % cream APPLY TO AFFECTED AREA(S) TWO TIMES A DAY AS NEEDED 1 Tube 5    Omega-3 Fatty Acids (FISH OIL) 1200 MG CAPS Take 1 capsule by mouth daily. 30 capsule 11    nitroGLYCERIN (NITROSTAT) 0.4 MG SL tablet Place 0.4 mg under the tongue every 5 minutes as needed.  Ascorbic Acid (VITAMIN C) 500 MG tablet Take 500 mg by mouth daily.  Multiple Vitamins-Minerals (CENTRUM SILVER) TABS Take 1 tablet by mouth daily.        

## 2018-12-21 DIAGNOSIS — L30.9 DERMATITIS: ICD-10-CM

## 2019-01-03 ENCOUNTER — APPOINTMENT (OUTPATIENT)
Dept: GENERAL RADIOLOGY | Age: 74
DRG: 246 | End: 2019-01-03
Payer: COMMERCIAL

## 2019-01-03 ENCOUNTER — HOSPITAL ENCOUNTER (INPATIENT)
Age: 74
LOS: 1 days | Discharge: HOME | DRG: 246 | End: 2019-01-04
Attending: EMERGENCY MEDICINE | Admitting: INTERNAL MEDICINE
Payer: COMMERCIAL

## 2019-01-03 DIAGNOSIS — I21.3 ST ELEVATION MYOCARDIAL INFARCTION (STEMI), UNSPECIFIED ARTERY (HCC): Primary | ICD-10-CM

## 2019-01-03 PROBLEM — I50.9 CHF (CONGESTIVE HEART FAILURE) (HCC): Status: ACTIVE | Noted: 2019-01-03

## 2019-01-03 LAB
A/G RATIO: 1.4 (ref 1.1–2.2)
ABO/RH: NORMAL
ALBUMIN SERPL-MCNC: 4 G/DL (ref 3.4–5)
ALP BLD-CCNC: 47 U/L (ref 40–129)
ALT SERPL-CCNC: 34 U/L (ref 10–40)
ANION GAP SERPL CALCULATED.3IONS-SCNC: 13 MMOL/L (ref 3–16)
ANTIBODY SCREEN: NORMAL
AST SERPL-CCNC: 27 U/L (ref 15–37)
BASE EXCESS ARTERIAL: -2 (ref -3–3)
BASOPHILS ABSOLUTE: 0.1 K/UL (ref 0–0.2)
BASOPHILS ABSOLUTE: 0.1 K/UL (ref 0–0.2)
BASOPHILS RELATIVE PERCENT: 0.7 %
BASOPHILS RELATIVE PERCENT: 1.2 %
BILIRUB SERPL-MCNC: 0.4 MG/DL (ref 0–1)
BUN BLDV-MCNC: 21 MG/DL (ref 7–20)
CALCIUM IONIZED: 1.11 MMOL/L (ref 1.12–1.32)
CALCIUM SERPL-MCNC: 9.4 MG/DL (ref 8.3–10.6)
CHLORIDE BLD-SCNC: 100 MMOL/L (ref 99–110)
CO2: 23 MMOL/L (ref 21–32)
CREAT SERPL-MCNC: 0.8 MG/DL (ref 0.8–1.3)
EOSINOPHILS ABSOLUTE: 0.1 K/UL (ref 0–0.6)
EOSINOPHILS ABSOLUTE: 0.2 K/UL (ref 0–0.6)
EOSINOPHILS RELATIVE PERCENT: 1.7 %
EOSINOPHILS RELATIVE PERCENT: 2.5 %
GFR AFRICAN AMERICAN: >60
GFR AFRICAN AMERICAN: >60
GFR NON-AFRICAN AMERICAN: >60
GFR NON-AFRICAN AMERICAN: >60
GLOBULIN: 2.8 G/DL
GLUCOSE BLD-MCNC: 129 MG/DL (ref 70–99)
GLUCOSE BLD-MCNC: 131 MG/DL (ref 70–99)
HCO3 ARTERIAL: 24.5 MMOL/L (ref 21–29)
HCT VFR BLD CALC: 38.8 % (ref 40.5–52.5)
HCT VFR BLD CALC: 44 % (ref 40.5–52.5)
HEMOGLOBIN: 12.1 GM/DL (ref 13.5–17.5)
HEMOGLOBIN: 13.4 G/DL (ref 13.5–17.5)
HEMOGLOBIN: 14.8 G/DL (ref 13.5–17.5)
INR BLD: 1.15 (ref 0.86–1.14)
LYMPHOCYTES ABSOLUTE: 1.5 K/UL (ref 1–5.1)
LYMPHOCYTES ABSOLUTE: 2.1 K/UL (ref 1–5.1)
LYMPHOCYTES RELATIVE PERCENT: 19.9 %
LYMPHOCYTES RELATIVE PERCENT: 30.1 %
MCH RBC QN AUTO: 31.6 PG (ref 26–34)
MCH RBC QN AUTO: 31.7 PG (ref 26–34)
MCHC RBC AUTO-ENTMCNC: 33.5 G/DL (ref 31–36)
MCHC RBC AUTO-ENTMCNC: 34.4 G/DL (ref 31–36)
MCV RBC AUTO: 92 FL (ref 80–100)
MCV RBC AUTO: 94.2 FL (ref 80–100)
MONOCYTES ABSOLUTE: 0.5 K/UL (ref 0–1.3)
MONOCYTES ABSOLUTE: 0.8 K/UL (ref 0–1.3)
MONOCYTES RELATIVE PERCENT: 11 %
MONOCYTES RELATIVE PERCENT: 6.3 %
NEUTROPHILS ABSOLUTE: 3.8 K/UL (ref 1.7–7.7)
NEUTROPHILS ABSOLUTE: 5.2 K/UL (ref 1.7–7.7)
NEUTROPHILS RELATIVE PERCENT: 55.2 %
NEUTROPHILS RELATIVE PERCENT: 71.4 %
O2 SAT, ARTERIAL: 95 % (ref 93–100)
PCO2 ARTERIAL: 46.3 MM HG (ref 35–45)
PDW BLD-RTO: 13.7 % (ref 12.4–15.4)
PDW BLD-RTO: 14.2 % (ref 12.4–15.4)
PERFORMED ON: ABNORMAL
PH ARTERIAL: 7.33 (ref 7.35–7.45)
PLATELET # BLD: 126 K/UL (ref 135–450)
PLATELET # BLD: 132 K/UL (ref 135–450)
PMV BLD AUTO: 9.4 FL (ref 5–10.5)
PMV BLD AUTO: 9.7 FL (ref 5–10.5)
PO2 ARTERIAL: 80.4 MM HG (ref 75–108)
POC CHLORIDE: 97 MMOL/L (ref 99–110)
POC CREATININE: 0.6 MG/DL (ref 0.8–1.3)
POC HEMATOCRIT: 36 % (ref 41–53)
POC POTASSIUM: 3.3 MMOL/L (ref 3.5–5.1)
POC SAMPLE TYPE: ABNORMAL
POC SODIUM: 129 MMOL/L (ref 136–145)
POTASSIUM SERPL-SCNC: 4.1 MMOL/L (ref 3.5–5.1)
PROTHROMBIN TIME: 13.1 SEC (ref 9.8–13)
RBC # BLD: 4.22 M/UL (ref 4.2–5.9)
RBC # BLD: 4.67 M/UL (ref 4.2–5.9)
SODIUM BLD-SCNC: 136 MMOL/L (ref 136–145)
SPECIMEN STATUS: NORMAL
TCO2 ARTERIAL: 26 MMOL/L
TOTAL PROTEIN: 6.8 G/DL (ref 6.4–8.2)
TROPONIN: <0.01 NG/ML
WBC # BLD: 6.9 K/UL (ref 4–11)
WBC # BLD: 7.3 K/UL (ref 4–11)

## 2019-01-03 PROCEDURE — 99285 EMERGENCY DEPT VISIT HI MDM: CPT

## 2019-01-03 PROCEDURE — 92941 PRQ TRLML REVSC TOT OCCL AMI: CPT | Performed by: INTERNAL MEDICINE

## 2019-01-03 PROCEDURE — 82330 ASSAY OF CALCIUM: CPT

## 2019-01-03 PROCEDURE — B2131ZZ FLUOROSCOPY OF MULTIPLE CORONARY ARTERY BYPASS GRAFTS USING LOW OSMOLAR CONTRAST: ICD-10-PCS | Performed by: INTERNAL MEDICINE

## 2019-01-03 PROCEDURE — G0378 HOSPITAL OBSERVATION PER HR: HCPCS

## 2019-01-03 PROCEDURE — 93459 L HRT ART/GRFT ANGIO: CPT | Performed by: INTERNAL MEDICINE

## 2019-01-03 PROCEDURE — B2111ZZ FLUOROSCOPY OF MULTIPLE CORONARY ARTERIES USING LOW OSMOLAR CONTRAST: ICD-10-PCS | Performed by: INTERNAL MEDICINE

## 2019-01-03 PROCEDURE — 84484 ASSAY OF TROPONIN QUANT: CPT

## 2019-01-03 PROCEDURE — 027034Z DILATION OF CORONARY ARTERY, ONE ARTERY WITH DRUG-ELUTING INTRALUMINAL DEVICE, PERCUTANEOUS APPROACH: ICD-10-PCS | Performed by: INTERNAL MEDICINE

## 2019-01-03 PROCEDURE — C1725 CATH, TRANSLUMIN NON-LASER: HCPCS

## 2019-01-03 PROCEDURE — 84132 ASSAY OF SERUM POTASSIUM: CPT

## 2019-01-03 PROCEDURE — 85347 COAGULATION TIME ACTIVATED: CPT

## 2019-01-03 PROCEDURE — 71045 X-RAY EXAM CHEST 1 VIEW: CPT

## 2019-01-03 PROCEDURE — 36415 COLL VENOUS BLD VENIPUNCTURE: CPT

## 2019-01-03 PROCEDURE — 82947 ASSAY GLUCOSE BLOOD QUANT: CPT

## 2019-01-03 PROCEDURE — 6360000002 HC RX W HCPCS

## 2019-01-03 PROCEDURE — 85025 COMPLETE CBC W/AUTO DIFF WBC: CPT

## 2019-01-03 PROCEDURE — 82565 ASSAY OF CREATININE: CPT

## 2019-01-03 PROCEDURE — 2700000000 HC OXYGEN THERAPY PER DAY

## 2019-01-03 PROCEDURE — 6370000000 HC RX 637 (ALT 250 FOR IP): Performed by: INTERNAL MEDICINE

## 2019-01-03 PROCEDURE — 85014 HEMATOCRIT: CPT

## 2019-01-03 PROCEDURE — 2500000003 HC RX 250 WO HCPCS

## 2019-01-03 PROCEDURE — 80053 COMPREHEN METABOLIC PANEL: CPT

## 2019-01-03 PROCEDURE — 99153 MOD SED SAME PHYS/QHP EA: CPT | Performed by: INTERNAL MEDICINE

## 2019-01-03 PROCEDURE — 99152 MOD SED SAME PHYS/QHP 5/>YRS: CPT | Performed by: INTERNAL MEDICINE

## 2019-01-03 PROCEDURE — 2580000003 HC RX 258

## 2019-01-03 PROCEDURE — 86901 BLOOD TYPING SEROLOGIC RH(D): CPT

## 2019-01-03 PROCEDURE — 2000000000 HC ICU R&B

## 2019-01-03 PROCEDURE — 94761 N-INVAS EAR/PLS OXIMETRY MLT: CPT

## 2019-01-03 PROCEDURE — 6370000000 HC RX 637 (ALT 250 FOR IP)

## 2019-01-03 PROCEDURE — 4A023N7 MEASUREMENT OF CARDIAC SAMPLING AND PRESSURE, LEFT HEART, PERCUTANEOUS APPROACH: ICD-10-PCS | Performed by: INTERNAL MEDICINE

## 2019-01-03 PROCEDURE — 85610 PROTHROMBIN TIME: CPT

## 2019-01-03 PROCEDURE — 84295 ASSAY OF SERUM SODIUM: CPT

## 2019-01-03 PROCEDURE — C1874 STENT, COATED/COV W/DEL SYS: HCPCS

## 2019-01-03 PROCEDURE — 6360000002 HC RX W HCPCS: Performed by: INTERNAL MEDICINE

## 2019-01-03 PROCEDURE — 93005 ELECTROCARDIOGRAM TRACING: CPT | Performed by: INTERNAL MEDICINE

## 2019-01-03 PROCEDURE — 93005 ELECTROCARDIOGRAM TRACING: CPT | Performed by: EMERGENCY MEDICINE

## 2019-01-03 PROCEDURE — 82435 ASSAY OF BLOOD CHLORIDE: CPT

## 2019-01-03 PROCEDURE — 86900 BLOOD TYPING SEROLOGIC ABO: CPT

## 2019-01-03 PROCEDURE — 99223 1ST HOSP IP/OBS HIGH 75: CPT | Performed by: INTERNAL MEDICINE

## 2019-01-03 PROCEDURE — C1769 GUIDE WIRE: HCPCS

## 2019-01-03 PROCEDURE — 2709999900 HC NON-CHARGEABLE SUPPLY

## 2019-01-03 PROCEDURE — 82803 BLOOD GASES ANY COMBINATION: CPT

## 2019-01-03 PROCEDURE — C1887 CATHETER, GUIDING: HCPCS

## 2019-01-03 PROCEDURE — 86850 RBC ANTIBODY SCREEN: CPT

## 2019-01-03 RX ORDER — MONTELUKAST SODIUM 10 MG/1
10 TABLET ORAL DAILY
Status: DISCONTINUED | OUTPATIENT
Start: 2019-01-03 | End: 2019-01-04 | Stop reason: HOSPADM

## 2019-01-03 RX ORDER — ASPIRIN 325 MG
325 TABLET ORAL DAILY
Status: DISCONTINUED | OUTPATIENT
Start: 2019-01-03 | End: 2019-01-03

## 2019-01-03 RX ORDER — EPTIFIBATIDE 0.75 MG/ML
2 INJECTION, SOLUTION INTRAVENOUS CONTINUOUS
Status: DISPENSED | OUTPATIENT
Start: 2019-01-03 | End: 2019-01-04

## 2019-01-03 RX ORDER — TAMSULOSIN HYDROCHLORIDE 0.4 MG/1
0.4 CAPSULE ORAL DAILY
Status: DISCONTINUED | OUTPATIENT
Start: 2019-01-03 | End: 2019-01-04

## 2019-01-03 RX ORDER — METOPROLOL SUCCINATE 25 MG/1
100 TABLET, EXTENDED RELEASE ORAL DAILY
Status: DISCONTINUED | OUTPATIENT
Start: 2019-01-03 | End: 2019-01-04 | Stop reason: HOSPADM

## 2019-01-03 RX ORDER — ONDANSETRON 2 MG/ML
4 INJECTION INTRAMUSCULAR; INTRAVENOUS EVERY 6 HOURS PRN
Status: DISCONTINUED | OUTPATIENT
Start: 2019-01-03 | End: 2019-01-04 | Stop reason: HOSPADM

## 2019-01-03 RX ORDER — SODIUM CHLORIDE 0.9 % (FLUSH) 0.9 %
10 SYRINGE (ML) INJECTION PRN
Status: DISCONTINUED | OUTPATIENT
Start: 2019-01-03 | End: 2019-01-04 | Stop reason: HOSPADM

## 2019-01-03 RX ORDER — SODIUM CHLORIDE 0.9 % (FLUSH) 0.9 %
10 SYRINGE (ML) INJECTION EVERY 12 HOURS SCHEDULED
Status: DISCONTINUED | OUTPATIENT
Start: 2019-01-03 | End: 2019-01-04 | Stop reason: HOSPADM

## 2019-01-03 RX ORDER — ASPIRIN 81 MG/1
81 TABLET, CHEWABLE ORAL DAILY
Status: DISCONTINUED | OUTPATIENT
Start: 2019-01-04 | End: 2019-01-04 | Stop reason: HOSPADM

## 2019-01-03 RX ORDER — ATORVASTATIN CALCIUM 80 MG/1
80 TABLET, FILM COATED ORAL NIGHTLY
Status: DISCONTINUED | OUTPATIENT
Start: 2019-01-03 | End: 2019-01-04 | Stop reason: HOSPADM

## 2019-01-03 RX ORDER — FLUTICASONE PROPIONATE 50 MCG
1 SPRAY, SUSPENSION (ML) NASAL DAILY
Status: DISCONTINUED | OUTPATIENT
Start: 2019-01-03 | End: 2019-01-04 | Stop reason: HOSPADM

## 2019-01-03 RX ORDER — LOSARTAN POTASSIUM 25 MG/1
50 TABLET ORAL DAILY
Status: DISCONTINUED | OUTPATIENT
Start: 2019-01-03 | End: 2019-01-04 | Stop reason: HOSPADM

## 2019-01-03 RX ORDER — NITROGLYCERIN 0.4 MG/1
0.4 TABLET SUBLINGUAL EVERY 5 MIN PRN
Status: DISCONTINUED | OUTPATIENT
Start: 2019-01-03 | End: 2019-01-04 | Stop reason: HOSPADM

## 2019-01-03 RX ADMIN — ATORVASTATIN CALCIUM 80 MG: 80 TABLET, FILM COATED ORAL at 20:49

## 2019-01-03 RX ADMIN — MONTELUKAST SODIUM 10 MG: 10 TABLET, FILM COATED ORAL at 20:49

## 2019-01-03 RX ADMIN — EPTIFIBATIDE 2 MCG/KG/MIN: 0.75 INJECTION, SOLUTION INTRAVENOUS at 15:54

## 2019-01-03 RX ADMIN — EPTIFIBATIDE 2 MCG/KG/MIN: 0.75 INJECTION, SOLUTION INTRAVENOUS at 23:34

## 2019-01-03 RX ADMIN — FLUTICASONE PROPIONATE 1 SPRAY: 50 SPRAY, METERED NASAL at 23:30

## 2019-01-03 ASSESSMENT — PAIN SCALES - GENERAL: PAINLEVEL_OUTOF10: 0

## 2019-01-04 VITALS
HEIGHT: 72 IN | SYSTOLIC BLOOD PRESSURE: 149 MMHG | RESPIRATION RATE: 16 BRPM | TEMPERATURE: 98 F | OXYGEN SATURATION: 98 % | WEIGHT: 201.5 LBS | BODY MASS INDEX: 27.29 KG/M2 | HEART RATE: 82 BPM | DIASTOLIC BLOOD PRESSURE: 85 MMHG

## 2019-01-04 LAB
ANION GAP SERPL CALCULATED.3IONS-SCNC: 12 MMOL/L (ref 3–16)
BUN BLDV-MCNC: 15 MG/DL (ref 7–20)
CALCIUM SERPL-MCNC: 9.3 MG/DL (ref 8.3–10.6)
CHLORIDE BLD-SCNC: 102 MMOL/L (ref 99–110)
CHOLESTEROL, TOTAL: 120 MG/DL (ref 0–199)
CO2: 24 MMOL/L (ref 21–32)
CREAT SERPL-MCNC: 0.6 MG/DL (ref 0.8–1.3)
EKG ATRIAL RATE: 66 BPM
EKG ATRIAL RATE: 87 BPM
EKG DIAGNOSIS: NORMAL
EKG DIAGNOSIS: NORMAL
EKG P AXIS: 14 DEGREES
EKG P AXIS: 55 DEGREES
EKG P-R INTERVAL: 244 MS
EKG P-R INTERVAL: 248 MS
EKG Q-T INTERVAL: 372 MS
EKG Q-T INTERVAL: 390 MS
EKG QRS DURATION: 106 MS
EKG QRS DURATION: 122 MS
EKG QTC CALCULATION (BAZETT): 408 MS
EKG QTC CALCULATION (BAZETT): 447 MS
EKG R AXIS: -17 DEGREES
EKG R AXIS: -2 DEGREES
EKG T AXIS: 104 DEGREES
EKG T AXIS: 81 DEGREES
EKG VENTRICULAR RATE: 66 BPM
EKG VENTRICULAR RATE: 87 BPM
ESTIMATED AVERAGE GLUCOSE: 119.8 MG/DL
GFR AFRICAN AMERICAN: >60
GFR NON-AFRICAN AMERICAN: >60
GLUCOSE BLD-MCNC: 105 MG/DL (ref 70–99)
HBA1C MFR BLD: 5.8 %
HCT VFR BLD CALC: 42.6 % (ref 40.5–52.5)
HDLC SERPL-MCNC: 28 MG/DL (ref 40–60)
HEMOGLOBIN: 14.7 G/DL (ref 13.5–17.5)
LDL CHOLESTEROL CALCULATED: 62 MG/DL
LV EF: 43 %
LVEF MODALITY: NORMAL
MAGNESIUM: 1.9 MG/DL (ref 1.8–2.4)
MCH RBC QN AUTO: 31.7 PG (ref 26–34)
MCHC RBC AUTO-ENTMCNC: 34.5 G/DL (ref 31–36)
MCV RBC AUTO: 92.1 FL (ref 80–100)
PDW BLD-RTO: 13.9 % (ref 12.4–15.4)
PLATELET # BLD: 126 K/UL (ref 135–450)
PMV BLD AUTO: 9.5 FL (ref 5–10.5)
POC ACT LR: 147 SEC
POC ACT LR: 220 SEC
POC ACT LR: 376 SEC
POTASSIUM SERPL-SCNC: 3.8 MMOL/L (ref 3.5–5.1)
RBC # BLD: 4.63 M/UL (ref 4.2–5.9)
SODIUM BLD-SCNC: 138 MMOL/L (ref 136–145)
TRIGL SERPL-MCNC: 152 MG/DL (ref 0–150)
TROPONIN: 0.6 NG/ML
VLDLC SERPL CALC-MCNC: 30 MG/DL
WBC # BLD: 7.8 K/UL (ref 4–11)

## 2019-01-04 PROCEDURE — 6370000000 HC RX 637 (ALT 250 FOR IP): Performed by: INTERNAL MEDICINE

## 2019-01-04 PROCEDURE — 80061 LIPID PANEL: CPT

## 2019-01-04 PROCEDURE — 36415 COLL VENOUS BLD VENIPUNCTURE: CPT

## 2019-01-04 PROCEDURE — 2580000003 HC RX 258: Performed by: INTERNAL MEDICINE

## 2019-01-04 PROCEDURE — 85027 COMPLETE CBC AUTOMATED: CPT

## 2019-01-04 PROCEDURE — C8929 TTE W OR WO FOL WCON,DOPPLER: HCPCS

## 2019-01-04 PROCEDURE — 96365 THER/PROPH/DIAG IV INF INIT: CPT

## 2019-01-04 PROCEDURE — 99233 SBSQ HOSP IP/OBS HIGH 50: CPT | Performed by: INTERNAL MEDICINE

## 2019-01-04 PROCEDURE — 93010 ELECTROCARDIOGRAM REPORT: CPT | Performed by: INTERNAL MEDICINE

## 2019-01-04 PROCEDURE — 83735 ASSAY OF MAGNESIUM: CPT

## 2019-01-04 PROCEDURE — G0378 HOSPITAL OBSERVATION PER HR: HCPCS

## 2019-01-04 PROCEDURE — 80048 BASIC METABOLIC PNL TOTAL CA: CPT

## 2019-01-04 PROCEDURE — 96372 THER/PROPH/DIAG INJ SC/IM: CPT

## 2019-01-04 PROCEDURE — 84484 ASSAY OF TROPONIN QUANT: CPT

## 2019-01-04 PROCEDURE — 83036 HEMOGLOBIN GLYCOSYLATED A1C: CPT

## 2019-01-04 PROCEDURE — 6360000002 HC RX W HCPCS: Performed by: INTERNAL MEDICINE

## 2019-01-04 RX ORDER — ASPIRIN 81 MG/1
81 TABLET, CHEWABLE ORAL DAILY
Qty: 30 TABLET | Refills: 11 | Status: SHIPPED | OUTPATIENT
Start: 2019-01-05 | End: 2021-06-04

## 2019-01-04 RX ORDER — NITROGLYCERIN 0.4 MG/1
TABLET SUBLINGUAL
Qty: 25 TABLET | Refills: 3 | Status: SHIPPED | OUTPATIENT
Start: 2019-01-04 | End: 2019-01-18 | Stop reason: SDUPTHER

## 2019-01-04 RX ADMIN — ENOXAPARIN SODIUM 40 MG: 40 INJECTION SUBCUTANEOUS at 10:35

## 2019-01-04 RX ADMIN — MONTELUKAST SODIUM 10 MG: 10 TABLET, FILM COATED ORAL at 10:35

## 2019-01-04 RX ADMIN — LOSARTAN POTASSIUM 50 MG: 25 TABLET, FILM COATED ORAL at 10:35

## 2019-01-04 RX ADMIN — Medication 10 ML: at 10:37

## 2019-01-04 RX ADMIN — METOPROLOL SUCCINATE 100 MG: 25 TABLET, EXTENDED RELEASE ORAL at 10:35

## 2019-01-04 RX ADMIN — SODIUM CHLORIDE, PRESERVATIVE FREE 10 ML: 5 INJECTION INTRAVENOUS at 10:36

## 2019-01-04 RX ADMIN — FLUTICASONE PROPIONATE 1 SPRAY: 50 SPRAY, METERED NASAL at 10:38

## 2019-01-04 RX ADMIN — TICAGRELOR 90 MG: 90 TABLET ORAL at 05:13

## 2019-01-04 RX ADMIN — ASPIRIN 81 MG 81 MG: 81 TABLET ORAL at 10:35

## 2019-01-18 ENCOUNTER — OFFICE VISIT (OUTPATIENT)
Dept: CARDIOLOGY CLINIC | Age: 74
End: 2019-01-18
Payer: COMMERCIAL

## 2019-01-18 VITALS
SYSTOLIC BLOOD PRESSURE: 136 MMHG | DIASTOLIC BLOOD PRESSURE: 84 MMHG | BODY MASS INDEX: 27.5 KG/M2 | OXYGEN SATURATION: 97 % | HEART RATE: 84 BPM | WEIGHT: 203 LBS | HEIGHT: 72 IN

## 2019-01-18 DIAGNOSIS — I10 ESSENTIAL HYPERTENSION: ICD-10-CM

## 2019-01-18 DIAGNOSIS — I25.810 CORONARY ARTERY DISEASE INVOLVING CORONARY BYPASS GRAFT OF NATIVE HEART WITHOUT ANGINA PECTORIS: Primary | ICD-10-CM

## 2019-01-18 DIAGNOSIS — Z95.1 S/P CABG X 2: ICD-10-CM

## 2019-01-18 DIAGNOSIS — E78.2 MIXED HYPERLIPIDEMIA: ICD-10-CM

## 2019-01-18 PROCEDURE — 99213 OFFICE O/P EST LOW 20 MIN: CPT | Performed by: NURSE PRACTITIONER

## 2019-03-04 ENCOUNTER — TELEPHONE (OUTPATIENT)
Dept: CARDIOLOGY CLINIC | Age: 74
End: 2019-03-04

## 2019-04-16 ENCOUNTER — OFFICE VISIT (OUTPATIENT)
Dept: CARDIOLOGY CLINIC | Age: 74
End: 2019-04-16
Payer: COMMERCIAL

## 2019-04-16 VITALS
DIASTOLIC BLOOD PRESSURE: 76 MMHG | HEIGHT: 72 IN | WEIGHT: 203 LBS | SYSTOLIC BLOOD PRESSURE: 124 MMHG | BODY MASS INDEX: 27.5 KG/M2 | OXYGEN SATURATION: 96 % | HEART RATE: 77 BPM

## 2019-04-16 DIAGNOSIS — E78.2 MIXED HYPERLIPIDEMIA: ICD-10-CM

## 2019-04-16 DIAGNOSIS — I10 ESSENTIAL HYPERTENSION: ICD-10-CM

## 2019-04-16 DIAGNOSIS — I25.10 CORONARY ARTERY DISEASE INVOLVING NATIVE CORONARY ARTERY OF NATIVE HEART WITHOUT ANGINA PECTORIS: Primary | ICD-10-CM

## 2019-04-16 PROCEDURE — 99214 OFFICE O/P EST MOD 30 MIN: CPT | Performed by: INTERNAL MEDICINE

## 2019-04-16 NOTE — PATIENT INSTRUCTIONS
[address] Office Note  4/16/2019     Subjective:  Mr. Cleo Marie is here for follow up of CAD, HTN, HLD    [place]: Today he reports occasional random  Palpitations. He states taking his  Brilinta every morning and every other evening because is causes jitteriness and inability to sleep. He denies chest pain, shortness of breath, edema, dizziness, and syncope. PMH: Mr Richard Odonnell has PMH CAD, HTN and hyperlipidemia. Recently admitted to [facility] (1/3/19) for CP EKG showed possible acute MI. Subsequently he underwent LHC 1/4/19 with  Successful PCi to distal SVG-RCA using 1 drug stent. Review of Systems:  12 point ROS negative in all areas as listed below except as in [place]  Constitutional, EENT, Cardiovascular, pulmonary, GI, , Musculoskeletal, skin, neurological, hematological, endocrine, Psychiatric      Reviewed past medical history, social, and family history. Does not smoke no alcohol, FH +ve for CAD  Past Medical History:   Diagnosis Date    Arthritis of hand     arthritis in hands and thumbs    CAD (coronary artery disease)     Hyperlipidemia     Hypertension     Ischemic cardiomyopathy 01/2019    STEMI (ST elevation myocardial infarction) ([facility] [address] 01/03/2019    Tinnitus      Past Surgical History:   Procedure Laterality Date    CORONARY ANGIOPLASTY  01/08/2011    emergency PCI: vein to RCA    CORONARY ANGIOPLASTY WITH STENT PLACEMENT  01/03/2019    PCI to distal SVG-RCA with ELIZABETH    CORONARY ARTERY BYPASS GRAFT      in 1990 CABG x4 and in 2001 CABG x2    DIAGNOSTIC CARDIAC CATH LAB PROCEDURE      VASECTOMY  April 2010       Objective:   /76   Pulse 77   Ht 6' (1.829 m)   Wt 203 lb (92.1 kg)   SpO2 96%   BMI 27.53 kg/m²      Wt Readings from Last 3 Encounters:   04/16/19 203 lb (92.1 kg)   01/18/19 203 lb (92.1 kg)   01/04/19 201 lb 8 oz (91.4 kg)       Physical Exam:  General: No Respiratory distress, appears well developed and well nourished.    Eyes:  Sclera nonicteric  Nose/Sinuses:  negative findings: nose shows no deformity, asymmetry, or inflammation, nasal mucosa normal, septum midline with no perforation or bleeding  Back:  no pain to palpation  Joint:  no active joint inflammation  Musculoskeletal:  negative  Skin:  Warm and dry,  Neck:  Negative for JVD and Carotid Bruits. Chest:  Crackles LLL otherwise Clear to auscultation  Cardiovascular:  RRR, occasional ectopy   S1S2 normal, no murmur, no rub or thrill. Abdomen:  Soft normal liver and spleen  Extremities:   No edema, clubbing, cyanosis,old trauma left lower leg bone. Pulses:pedal pulses are normal.  Neuro: intact    Medications:   Outpatient Encounter Medications as of 4/16/2019   Medication Sig Dispense Refill    ticagrelor (BRILINTA) 90 MG TABS tablet Take 1 tablet by mouth 2 times daily 180 tablet 3    aspirin 81 MG chewable tablet Take 1 tablet by mouth daily 30 tablet 11    halobetasol (ULTRAVATE) 0.05 % cream APPLY TO AFFECTED AREA(S) TWO TIMES A DAY AS NEEDED 1 Tube 0    metoprolol succinate (TOPROL XL) 100 MG extended release tablet TAKE 1 TABLET DAILY 90 tablet 3    losartan (COZAAR) 50 MG tablet Take 1 tablet by mouth daily 90 tablet 3    calcipotriene (DOVONEX) 0.005 % cream APPLY TO AFFECTED AREA(S) TWO TIMES A DAY AS NEEDED 1 Tube 0    montelukast (SINGULAIR) 10 MG tablet TAKE ONE TABLET BY MOUTH DAILY 90 tablet 1    tamsulosin (FLOMAX) 0.4 MG capsule Take 1 capsule by mouth daily for 10 days 10 capsule 0    atorvastatin (LIPITOR) 20 MG tablet TAKE 1 TABLET DAILY 90 tablet 3    Omega-3 Fatty Acids (FISH OIL) 1200 MG CAPS Take 1 capsule by mouth daily. 30 capsule 11    nitroGLYCERIN (NITROSTAT) 0.4 MG SL tablet Place 0.4 mg under the tongue every 5 minutes as needed.  Ascorbic Acid (VITAMIN C) 500 MG tablet Take 500 mg by mouth daily.  Multiple Vitamins-Minerals (CENTRUM SILVER) TABS Take 1 tablet by mouth daily.          No facility-administered encounter medications on file as of 4/16/2019. Lab Data:  CBC:   No results for input(s): WBC, HGB, HCT, MCV, PLT in the last 72 hours. BMP:   No results for input(s): NA, K, CL, CO2, PHOS, BUN, CREATININE in the last 72 hours. Invalid input(s): CA  LIVER PROFILE:   No results for input(s): AST, ALT, LIPASE, BILIDIR, BILITOT, ALKPHOS in the last 72 hours. Invalid input(s): AMYLASE,  ALB    Lab Results   Component Value Date    TRIG 152 (H) 01/04/2019    TRIG 144 11/08/2018    TRIG 128 11/06/2017     Lab Results   Component Value Date    HDL 28 (L) 01/04/2019    HDL 36 (L) 11/08/2018    HDL 37 (L) 11/06/2017     Lab Results   Component Value Date    LDLCALC 62 01/04/2019    LDLCALC 62 11/08/2018    LDLCALC 60 11/06/2017     Lab Results   Component Value Date    LABVLDL 30 01/04/2019    LABVLDL 29 11/08/2018    LABVLDL 26 11/06/2017     PT/INR: No results for input(s): PROTIME, INR in the last 72 hours. A1C:   Lab Results   Component Value Date    LABA1C 5.8 01/04/2019     BNP:  No results for input(s): BNP in the last 72 hours. IMAGING:     [facility] 1/4/19  LEFT HEART CATH  LM: luminals  LAD: proximal 90%  LCX: Luminals into small LCx                OM1- occluded  RCA: dominant, 100% proximal occlusion  1. Successful PCi to distal SVG-RCA using 1 drug stent       ECHO 1/4/19  Summary   The left ventricular systolic function is mildly reduced with an ejection   fraction of 40-45 %.   There is hypokinesis of the basal inferior and inferolateral walls.   There is mild concentric left ventricular hypertrophy.   Grade I diastolic dysfunction with normal left ventricular filling pressure.   The right ventricle is mildly enlarged.   The right atrium is mildly dilated.   Mild mitral regurgitation.   Systolic pulmonary artery pressure (SPAP) is normal estimated at 26 mmHg   (Right atrial pressure of 8 mmHg).   EKG 1/3/19   Sinus rhythm with 1st degree A-V block with Premature atrial complexesInferior infarct , possibly acuteLateral ECG   When compared with ECG of 08-DEC-2012 14:57, NM interval has increased Inferior infarct is now Present Nonspecific T wave abnormality now evident in Lateral leads Confirmed by Peter Vaca ([id number]) on 2/13/2018 10:30:13 PM     CT abdomen/pelvis 2/13/18  Air-fluid levels throughout the colon consistent with underlying diarrheal illness. Mild fusiform aneurysmal dilatation of the infrarenal abdominal aorta measuring up to 26 mm. EKG 7/26/17  Sinus  Rhythm -Nonspecific QRS widening. -Old inferolateral infarct. -  Nonspecific T-abnormality    EKG 12/12/16  SR 1st degree AV block Old inferior wall MI  EKG 11/11/15  SR old inferior wall MI    Myoview stress test 5/28/15  Summary   There is a moderate sized fixed defect within the inferior and basal lateral   walls consistent with prior infarction. There is mild hypokinesis in these   segments. There is no ischemia,   The left ventricular function is overall normal at 58%   The LV is mildly dilated. Stress Myoview 8/2013  Small-moderate fixed defect consistent with previous infarction        XR CHEST STANDARD TWO VW      COMPARISON: 12/8/2012   CLINICAL INDICATION: Cough   FINDINGS: Elevation of the right hemidiaphragm. Status post median   sternotomy. No focal pulmonary opacities to suggest acute airspace   disease. No evidence of pleural effusion or pneumothorax. Cardiac   and mediastinal silhouettes are unchanged. Calcification of aorta. IMPRESSION:    1. No evidence of acute cardiopulmonary disease. ECHO 1/10/2011  CONCLUSION- Echocardiogram with a Doppler shows slightly enlarged  right ventricle with normal contractility. There is no segmental  wall motion abnormality. Ejection fraction is 55%. No valvular  stenosis or regurgitation. There is diastolic dysfunction of left  Ventricle. Assessment:  Encounter Diagnoses   Name Primary?     Coronary artery disease involving native coronary artery of native heart without angina pectoris Yes    Mixed hyperlipidemia     Essential hypertension            Plan:  1. No med changes, refilled meds as warranted  2. Take morning dose of Brilinta when you wake. Take evening dose earlier in evening to see if this will aid in better sleep. 3. Follow up with me in 6 months  4  Healthy lifestyle education reviewed including nutrition, exercise and activity        QUALITY MEASURES  1. Tobacco Cessation Counseling: NA  2. Retake of BP if >140/90:   NA  3. Documentation to PCP/referring for new patient:  Sent to PCP at close of office visit  4. CAD patient on anti-platelet: Yes  5. CAD patient on STATIN therapy:  Yes  6. Patient with CHF and aFib on anticoagulation:  NA       This note was scribed in the presence of Sallie Leon MD by Porsha Gamble RN    I, Dr. Bandar Hilario, personally performed the services described in this documentation, as scribed by the above signed scribe in my presence. It is both accurate and complete to my knowledge. I agree with the details independently gathered by the clinical support staff, while the remaining scribed note accurately describes my personal service to the patient.     Bandar Hilario MD 4/16/2019 2:27 PM

## 2019-04-16 NOTE — PROGRESS NOTES
[facility] Office Note  4/16/2019     Subjective:  Mr. Jaxon Conklin is here for follow up of CAD, HTN, HLD    [place]: Today he reports occasional random  Palpitations. He states taking his  Brilinta every morning and every other evening because is causes jitteriness and inability to sleep. He denies chest pain, shortness of breath, edema, dizziness, and syncope. PMH: Mr Danna Saavedra has PMH CAD, HTN and hyperlipidemia. Recently admitted to [facility] (1/3/19) for CP EKG showed possible acute MI. Subsequently he underwent LHC 1/4/19 with  Successful PCi to distal SVG-RCA using 1 drug stent. Review of Systems:  12 point ROS negative in all areas as listed below except as in [place]  Constitutional, EENT, Cardiovascular, pulmonary, GI, , Musculoskeletal, skin, neurological, hematological, endocrine, Psychiatric      Reviewed past medical history, social, and family history. Does not smoke no alcohol, FH +ve for CAD  Past Medical History:   Diagnosis Date    Arthritis of hand     arthritis in hands and thumbs    CAD (coronary artery disease)     Hyperlipidemia     Hypertension     Ischemic cardiomyopathy 01/2019    STEMI (ST elevation myocardial infarction) ([address] 01/03/2019    Tinnitus      Past Surgical History:   Procedure Laterality Date    CORONARY ANGIOPLASTY  01/08/2011    emergency PCI: vein to RCA    CORONARY ANGIOPLASTY WITH STENT PLACEMENT  01/03/2019    PCI to distal SVG-RCA with ELIZABETH    CORONARY ARTERY BYPASS GRAFT      in 1990 CABG x4 and in 2001 CABG x2    DIAGNOSTIC CARDIAC CATH LAB PROCEDURE      VASECTOMY  April 2010       Objective:   /76   Pulse 77   Ht 6' (1.829 m)   Wt 203 lb (92.1 kg)   SpO2 96%   BMI 27.53 kg/m²     Wt Readings from Last 3 Encounters:   04/16/19 203 lb (92.1 kg)   01/18/19 203 lb (92.1 kg)   01/04/19 201 lb 8 oz (91.4 kg)       Physical Exam:  General: No Respiratory distress, appears well developed and well nourished.    Eyes:  Sclera nonicteric  Nose/Sinuses:  negative findings: nose shows no deformity, asymmetry, or inflammation, nasal mucosa normal, septum midline with no perforation or bleeding  Back:  no pain to palpation  Joint:  no active joint inflammation  Musculoskeletal:  negative  Skin:  Warm and dry,  Neck:  Negative for JVD and Carotid Bruits. Chest:  Crackles LLL otherwise Clear to auscultation  Cardiovascular:  RRR, occasional ectopy   S1S2 normal, no murmur, no rub or thrill. Abdomen:  Soft normal liver and spleen  Extremities:   No edema, clubbing, cyanosis,old trauma left lower leg bone. Pulses:pedal pulses are normal.  Neuro: intact    Medications:   Outpatient Encounter Medications as of 4/16/2019   Medication Sig Dispense Refill    ticagrelor (BRILINTA) 90 MG TABS tablet Take 1 tablet by mouth 2 times daily 180 tablet 3    aspirin 81 MG chewable tablet Take 1 tablet by mouth daily 30 tablet 11    halobetasol (ULTRAVATE) 0.05 % cream APPLY TO AFFECTED AREA(S) TWO TIMES A DAY AS NEEDED 1 Tube 0    metoprolol succinate (TOPROL XL) 100 MG extended release tablet TAKE 1 TABLET DAILY 90 tablet 3    losartan (COZAAR) 50 MG tablet Take 1 tablet by mouth daily 90 tablet 3    calcipotriene (DOVONEX) 0.005 % cream APPLY TO AFFECTED AREA(S) TWO TIMES A DAY AS NEEDED 1 Tube 0    montelukast (SINGULAIR) 10 MG tablet TAKE ONE TABLET BY MOUTH DAILY 90 tablet 1    tamsulosin (FLOMAX) 0.4 MG capsule Take 1 capsule by mouth daily for 10 days 10 capsule 0    atorvastatin (LIPITOR) 20 MG tablet TAKE 1 TABLET DAILY 90 tablet 3    Omega-3 Fatty Acids (FISH OIL) 1200 MG CAPS Take 1 capsule by mouth daily. 30 capsule 11    nitroGLYCERIN (NITROSTAT) 0.4 MG SL tablet Place 0.4 mg under the tongue every 5 minutes as needed.  Ascorbic Acid (VITAMIN C) 500 MG tablet Take 500 mg by mouth daily.  Multiple Vitamins-Minerals (CENTRUM SILVER) TABS Take 1 tablet by mouth daily.          No facility-administered encounter medications on file as of 4/16/2019. Lab Data:  CBC:   No results for input(s): WBC, HGB, HCT, MCV, PLT in the last 72 hours. BMP:   No results for input(s): NA, K, CL, CO2, PHOS, BUN, CREATININE in the last 72 hours. Invalid input(s): CA  LIVER PROFILE:   No results for input(s): AST, ALT, LIPASE, BILIDIR, BILITOT, ALKPHOS in the last 72 hours. Invalid input(s): AMYLASE,  ALB    Lab Results   Component Value Date    TRIG 152 (H) 01/04/2019    TRIG 144 11/08/2018    TRIG 128 11/06/2017     Lab Results   Component Value Date    HDL 28 (L) 01/04/2019    HDL 36 (L) 11/08/2018    HDL 37 (L) 11/06/2017     Lab Results   Component Value Date    LDLCALC 62 01/04/2019    LDLCALC 62 11/08/2018    LDLCALC 60 11/06/2017     Lab Results   Component Value Date    LABVLDL 30 01/04/2019    LABVLDL 29 11/08/2018    LABVLDL 26 11/06/2017     PT/INR: No results for input(s): PROTIME, INR in the last 72 hours. A1C:   Lab Results   Component Value Date    LABA1C 5.8 01/04/2019     BNP:  No results for input(s): BNP in the last 72 hours. IMAGING:     [facility] 1/4/19  LEFT HEART CATH  LM: luminals  LAD: proximal 90%  LCX: Luminals into small LCx                OM1- occluded  RCA: dominant, 100% proximal occlusion  1. Successful PCi to distal SVG-RCA using 1 drug stent       ECHO 1/4/19  Summary   The left ventricular systolic function is mildly reduced with an ejection   fraction of 40-45 %.   There is hypokinesis of the basal inferior and inferolateral walls.   There is mild concentric left ventricular hypertrophy.   Grade I diastolic dysfunction with normal left ventricular filling pressure.   The right ventricle is mildly enlarged.   The right atrium is mildly dilated.   Mild mitral regurgitation.   Systolic pulmonary artery pressure (SPAP) is normal estimated at 26 mmHg   (Right atrial pressure of 8 mmHg).   EKG 1/3/19   Sinus rhythm with 1st degree A-V block with Premature atrial complexesInferior infarct , possibly acuteLateral injury pattern ACUTE MI  Consider right ventricular involvement in acute inferior infarctAbnormal ECGWhen compared with ECG of 02-JAN-2019 18:09,Significant changes have occurredConfirmed by [facility] VERITO HENRIQUEZ, Marsha German ([id number]) on 1/4/2019 4:59:14 PM    CTA abdomen 8/19/18  FINDINGS: Lung bases:  Visualized lung bases are well aerated without focal airspace consolidation, lung nodule or lung mass. No pleural or pericardial effusion. Heart size appears within normal limits. Organs: The liver has normal size and contours. No suspicious intrahepatic mass lesion identified. No extrahepatic biliary ductal dilatation. The gallbladder is present. The spleen, pancreas and adrenal glands have a normal noncontrast CT appearance. The kidneys are symmetric in size and noncontrast appearance. No suspicious renal lesions identified. There is a 2 mm distal left ureteral calculus. This causes mild proximal hydroureteronephrosis. No renal, right ureteral or intravesicular calculi are identified. No right obstructive uropathy. GI/bowel: No dilated loops of bowel, or findings to suggest obstruction. No mural thickening or adjacent inflammatory changes identified. The appendix is normal and nondilated. Peritoneum/retroperitoneum: No lymphadenopathy, free fluid or free air identified in the abdomen or pelvis. There are moderate calcific atherosclerotic changes of the abdominal aorta, which is ectatic, but not aneurysmal.   Pelvis: Prostate and seminal vesicles have normal size and noncontrast CT appearance. . The urinary bladder is unremarkable. Bones/soft tissues: There is multilevel degenerative disc disease and hypertrophic degenerative changes of the spine and both hip joints. No suspicious osseous lesions are identified.       EKG 2/13/18  Sinus rhythm with 1st degree A-V block RSR' or QR pattern in V1 suggests right ventricular conduction delay Possible Left atrial enlargement Inferior infarct , age undetermined Abnormal Mixed hyperlipidemia     Essential hypertension            Plan:  1. No med changes, refilled meds as warranted  2. Take morning dose of Brilinta when you wake. Take evening dose earlier in evening to see if this will aid in better sleep. 3. Follow up with me in 6 months  4  Healthy lifestyle education reviewed including nutrition, exercise and activity        QUALITY MEASURES  1. Tobacco Cessation Counseling: NA  2. Retake of BP if >140/90:   NA  3. Documentation to PCP/referring for new patient:  Sent to PCP at close of office visit  4. CAD patient on anti-platelet: Yes  5. CAD patient on STATIN therapy:  Yes  6. Patient with CHF and aFib on anticoagulation:  NA       This note was scribed in the presence of Ariella Hernandez MD by Hannah Kate RN    I, Dr. Duncan Pacheco, personally performed the services described in this documentation, as scribed by the above signed scribe in my presence. It is both accurate and complete to my knowledge. I agree with the details independently gathered by the clinical support staff, while the remaining scribed note accurately describes my personal service to the patient.     Duncan Pacheco MD 4/16/2019 2:27 PM

## 2019-05-09 NOTE — TELEPHONE ENCOUNTER
LM for the pt to contact the office, pt was due for a 6 month f/u in February but did not schedule.     Kandis Mai is requesting refill(s) singulair  Last OV 8/28/18 (pertaining to medication)  LR 11/10/18 (per medication requested)  Next office visit scheduled or attempted No   If no, reason:  LM for the pt to schedule

## 2019-05-10 RX ORDER — MONTELUKAST SODIUM 10 MG/1
TABLET ORAL
Qty: 90 TABLET | Refills: 0 | Status: SHIPPED | OUTPATIENT
Start: 2019-05-10 | End: 2019-08-15 | Stop reason: SDUPTHER

## 2019-08-07 RX ORDER — MONTELUKAST SODIUM 10 MG/1
TABLET ORAL
Qty: 90 TABLET | Refills: 0 | OUTPATIENT
Start: 2019-08-07

## 2019-08-16 RX ORDER — MONTELUKAST SODIUM 10 MG/1
10 TABLET ORAL NIGHTLY
Qty: 90 TABLET | Refills: 1 | Status: SHIPPED | OUTPATIENT
Start: 2019-08-16 | End: 2020-02-11

## 2019-08-20 ENCOUNTER — OFFICE VISIT (OUTPATIENT)
Dept: FAMILY MEDICINE CLINIC | Age: 74
End: 2019-08-20
Payer: COMMERCIAL

## 2019-08-20 VITALS
DIASTOLIC BLOOD PRESSURE: 80 MMHG | WEIGHT: 202.2 LBS | HEIGHT: 72 IN | SYSTOLIC BLOOD PRESSURE: 124 MMHG | HEART RATE: 70 BPM | OXYGEN SATURATION: 99 % | BODY MASS INDEX: 27.39 KG/M2

## 2019-08-20 DIAGNOSIS — I71.43 ANEURYSM OF INFRARENAL ABDOMINAL AORTA: ICD-10-CM

## 2019-08-20 DIAGNOSIS — R04.0 EPISTAXIS: ICD-10-CM

## 2019-08-20 DIAGNOSIS — E78.2 MIXED HYPERLIPIDEMIA: ICD-10-CM

## 2019-08-20 DIAGNOSIS — D69.6 THROMBOCYTOPENIA (HCC): ICD-10-CM

## 2019-08-20 DIAGNOSIS — L40.9 PSORIASIS: ICD-10-CM

## 2019-08-20 DIAGNOSIS — R73.03 PREDIABETES: ICD-10-CM

## 2019-08-20 DIAGNOSIS — I10 ESSENTIAL HYPERTENSION: Primary | ICD-10-CM

## 2019-08-20 LAB
A/G RATIO: 2 (ref 1.1–2.2)
ALBUMIN SERPL-MCNC: 4.9 G/DL (ref 3.4–5)
ALP BLD-CCNC: 64 U/L (ref 40–129)
ALT SERPL-CCNC: 40 U/L (ref 10–40)
ANION GAP SERPL CALCULATED.3IONS-SCNC: 17 MMOL/L (ref 3–16)
AST SERPL-CCNC: 28 U/L (ref 15–37)
BASOPHILS ABSOLUTE: 0.1 K/UL (ref 0–0.2)
BASOPHILS RELATIVE PERCENT: 0.8 %
BILIRUB SERPL-MCNC: 0.7 MG/DL (ref 0–1)
BUN BLDV-MCNC: 18 MG/DL (ref 7–20)
CALCIUM SERPL-MCNC: 10 MG/DL (ref 8.3–10.6)
CHLORIDE BLD-SCNC: 101 MMOL/L (ref 99–110)
CHOLESTEROL, TOTAL: 115 MG/DL (ref 0–199)
CO2: 23 MMOL/L (ref 21–32)
CREAT SERPL-MCNC: 0.8 MG/DL (ref 0.8–1.3)
EOSINOPHILS ABSOLUTE: 0.1 K/UL (ref 0–0.6)
EOSINOPHILS RELATIVE PERCENT: 1.8 %
GFR AFRICAN AMERICAN: >60
GFR NON-AFRICAN AMERICAN: >60
GLOBULIN: 2.4 G/DL
GLUCOSE BLD-MCNC: 122 MG/DL (ref 70–99)
HCT VFR BLD CALC: 45.2 % (ref 40.5–52.5)
HDLC SERPL-MCNC: 40 MG/DL (ref 40–60)
HEMOGLOBIN: 15.5 G/DL (ref 13.5–17.5)
LDL CHOLESTEROL CALCULATED: 43 MG/DL
LYMPHOCYTES ABSOLUTE: 1.6 K/UL (ref 1–5.1)
LYMPHOCYTES RELATIVE PERCENT: 25.1 %
MCH RBC QN AUTO: 32.4 PG (ref 26–34)
MCHC RBC AUTO-ENTMCNC: 34.2 G/DL (ref 31–36)
MCV RBC AUTO: 94.6 FL (ref 80–100)
MONOCYTES ABSOLUTE: 0.5 K/UL (ref 0–1.3)
MONOCYTES RELATIVE PERCENT: 7.7 %
NEUTROPHILS ABSOLUTE: 4.1 K/UL (ref 1.7–7.7)
NEUTROPHILS RELATIVE PERCENT: 64.6 %
PDW BLD-RTO: 14 % (ref 12.4–15.4)
PLATELET # BLD: 132 K/UL (ref 135–450)
PMV BLD AUTO: 9.9 FL (ref 5–10.5)
POTASSIUM SERPL-SCNC: 4.6 MMOL/L (ref 3.5–5.1)
RBC # BLD: 4.78 M/UL (ref 4.2–5.9)
SODIUM BLD-SCNC: 141 MMOL/L (ref 136–145)
TOTAL PROTEIN: 7.3 G/DL (ref 6.4–8.2)
TRIGL SERPL-MCNC: 158 MG/DL (ref 0–150)
VLDLC SERPL CALC-MCNC: 32 MG/DL
WBC # BLD: 6.4 K/UL (ref 4–11)

## 2019-08-20 PROCEDURE — 99214 OFFICE O/P EST MOD 30 MIN: CPT | Performed by: FAMILY MEDICINE

## 2019-08-20 PROCEDURE — 36415 COLL VENOUS BLD VENIPUNCTURE: CPT | Performed by: FAMILY MEDICINE

## 2019-08-20 RX ORDER — CALCIPOTRIENE 50 UG/G
CREAM TOPICAL
Qty: 1 TUBE | Refills: 1 | Status: SHIPPED | OUTPATIENT
Start: 2019-08-20 | End: 2021-06-04

## 2019-08-20 ASSESSMENT — ENCOUNTER SYMPTOMS
CHEST TIGHTNESS: 0
RHINORRHEA: 0
DIARRHEA: 0
EYE PAIN: 0
VOMITING: 0
COLOR CHANGE: 0
BLOOD IN STOOL: 0
CONSTIPATION: 0
ABDOMINAL PAIN: 0
SINUS PRESSURE: 0
WHEEZING: 0
SHORTNESS OF BREATH: 0
EYE DISCHARGE: 0
COUGH: 0

## 2019-08-20 ASSESSMENT — PATIENT HEALTH QUESTIONNAIRE - PHQ9
1. LITTLE INTEREST OR PLEASURE IN DOING THINGS: 0
SUM OF ALL RESPONSES TO PHQ QUESTIONS 1-9: 0
SUM OF ALL RESPONSES TO PHQ QUESTIONS 1-9: 0
2. FEELING DOWN, DEPRESSED OR HOPELESS: 0
SUM OF ALL RESPONSES TO PHQ9 QUESTIONS 1 & 2: 0

## 2019-08-20 NOTE — PROGRESS NOTES
Sodium (mmol/L)   Date Value   01/04/2019 138    BUN (mg/dL)   Date Value   01/04/2019 15    Glucose (mg/dL)   Date Value   01/04/2019 105 (H)      Potassium (mmol/L)   Date Value   01/04/2019 3.8     Potassium reflex Magnesium (mmol/L)   Date Value   08/19/2018 3.7    CREATININE (mg/dL)   Date Value   01/04/2019 0.6 (L)           BP Readings from Last 2 Encounters:   04/16/19 124/76   01/18/19 136/84       Is patient currently taking any antihypertensive medications? Yes   If yes, see med list as above    Is the patient reporting any side effects of antihypertensive medications? No    Is the patient taking any over the counter medications? Yes   If yes, see med list as above    Is the patient taking a daily aspirin? Yes    No components found for: CHLPL  Lab Results   Component Value Date    TRIG 152 (H) 01/04/2019     Lab Results   Component Value Date    HDL 28 (L) 01/04/2019     Lab Results   Component Value Date    LDLCALC 62 01/04/2019     Lab Results   Component Value Date    LABVLDL 30 01/04/2019       Lab Results   Component Value Date    ALT 34 01/03/2019    AST 27 01/03/2019    ALKPHOS 47 01/03/2019    BILITOT 0.4 01/03/2019           Is patient currently taking any cholesterol medications? Yes   If yes, see med list as above    Is the patient reporting any side effects of cholesterol medications? No    Is the patient taking any over the counter medications? Yes   If yes, see med list as above    Is the patient taking a daily aspirin?     Yes

## 2019-08-20 NOTE — PROGRESS NOTES
Paternal Uncle     Heart Disease Paternal Cousin     No Known Problems Brother      Social History     Tobacco Use    Smoking status: Former Smoker     Packs/day: 1.00     Years: 20.00     Pack years: 20.00     Types: Cigarettes     Last attempt to quit: 1991     Years since quittin.6    Smokeless tobacco: Never Used   Substance Use Topics    Alcohol use: No     Vitals:    19 0836   BP: 124/80   Pulse: 70   SpO2: 99%   Weight: 202 lb 3.2 oz (91.7 kg)   Height: 6' (1.829 m)     Body mass index is 27.42 kg/m². Wt Readings from Last 3 Encounters:   19 202 lb 3.2 oz (91.7 kg)   19 203 lb (92.1 kg)   19 203 lb (92.1 kg)     BP Readings from Last 3 Encounters:   19 124/80   19 124/76   19 136/84      Air-fluid levels throughout the colon consistent with underlying diarrheal   illness.       Mild fusiform aneurysmal dilatation of the infrarenal abdominal aorta   measuring up to 26 mm.  Recommend follow-up guidelines detailed below.       RECOMMENDATIONS:   Managing Abdominal Aortic Aneurysms       2.6-2.9 cm: Every 5 years*       3.0-3.4 cm: Every 3 years.       3.5-3.9 cm: Every 1 year.       4.0-4.4 cm: Every 1 year. Recommend vascular consultation.       4.5-5.4 cm: Every 6 months. Recommend vascular consultation.       Greater than or equal to 5.5 cm: Referral to vascular surgeon.       *For abdominal aortas with maximum diameter of 2.6-2.9 cm meeting criteria   for AAA (>50% of proximal normal segment).       Reference:       J Vasc Surg. 2009 Oct;50(4 Suppl):S2-49         Lab Review- see in chart       Review of Systems   Constitutional: Negative for fatigue, fever and unexpected weight change. HENT: Positive for nosebleeds. Negative for congestion, ear discharge, ear pain, hearing loss, rhinorrhea and sinus pressure. Eyes: Negative for pain, discharge and visual disturbance.    Respiratory: Negative for cough, chest tightness, shortness of breath and wheezing. Cardiovascular: Negative for chest pain, palpitations and leg swelling. Gastrointestinal: Negative for abdominal pain, blood in stool, constipation, diarrhea and vomiting. Genitourinary: Negative for difficulty urinating, dysuria and hematuria. Musculoskeletal: Negative for arthralgias and joint swelling. Skin: Negative for color change and rash. Neurological: Negative for dizziness, seizures, syncope and headaches. Hematological: Negative for adenopathy. Does not bruise/bleed easily. Psychiatric/Behavioral: Negative for dysphoric mood and sleep disturbance. The patient is not nervous/anxious. Objective:   Physical Exam   Constitutional: He is oriented to person, place, and time. He appears well-developed and well-nourished. No distress. HENT:   Head: Normocephalic. Right Ear: External ear normal.   Left Ear: External ear normal.   Nose: Nose normal.   Mouth/Throat: Oropharynx is clear and moist. No oropharyngeal exudate. Eyes: Pupils are equal, round, and reactive to light. Conjunctivae and EOM are normal. Right eye exhibits no discharge. Left eye exhibits no discharge. No scleral icterus. Neck: Neck supple. No thyromegaly present. Cardiovascular: Normal rate, regular rhythm, normal heart sounds and intact distal pulses. No murmur heard. Pulmonary/Chest: Effort normal and breath sounds normal. He has no wheezes. Abdominal: Soft. Bowel sounds are normal. He exhibits no distension. There is no tenderness. There is no rebound and no guarding. Musculoskeletal: Normal range of motion. He exhibits no edema, tenderness or deformity. Lymphadenopathy:     He has no cervical adenopathy. Neurological: He is alert and oriented to person, place, and time. He displays normal reflexes. No cranial nerve deficit. Coordination normal.   Skin: Skin is warm. No rash noted. No erythema. No pallor. Psychiatric: He has a normal mood and affect.  His behavior is normal. Judgment and

## 2019-08-20 NOTE — PATIENT INSTRUCTIONS

## 2019-08-21 LAB
ESTIMATED AVERAGE GLUCOSE: 122.6 MG/DL
HBA1C MFR BLD: 5.9 %

## 2019-08-26 RX ORDER — ATORVASTATIN CALCIUM 20 MG/1
TABLET, FILM COATED ORAL
Qty: 90 TABLET | Refills: 3 | Status: SHIPPED | OUTPATIENT
Start: 2019-08-26 | End: 2020-08-12

## 2019-08-27 ENCOUNTER — TELEPHONE (OUTPATIENT)
Dept: PAIN MANAGEMENT | Age: 74
End: 2019-08-27

## 2019-09-10 ENCOUNTER — TELEPHONE (OUTPATIENT)
Dept: FAMILY MEDICINE CLINIC | Age: 74
End: 2019-09-10

## 2019-09-10 NOTE — TELEPHONE ENCOUNTER
Patient's wife, Ani Fernández, returned call regarding lab results. (on HIPPA). I read th physician notes, she understood. She would like to know if patient should remain on two blood thinners?

## 2019-10-21 PROBLEM — I21.3 STEMI (ST ELEVATION MYOCARDIAL INFARCTION) (HCC): Status: RESOLVED | Noted: 2019-01-03 | Resolved: 2019-10-21

## 2019-10-22 ENCOUNTER — OFFICE VISIT (OUTPATIENT)
Dept: CARDIOLOGY CLINIC | Age: 74
End: 2019-10-22
Payer: COMMERCIAL

## 2019-10-22 VITALS
HEART RATE: 81 BPM | BODY MASS INDEX: 27.77 KG/M2 | HEIGHT: 72 IN | DIASTOLIC BLOOD PRESSURE: 80 MMHG | OXYGEN SATURATION: 97 % | WEIGHT: 205 LBS | SYSTOLIC BLOOD PRESSURE: 140 MMHG

## 2019-10-22 DIAGNOSIS — I25.810 CORONARY ARTERY DISEASE INVOLVING CORONARY BYPASS GRAFT OF NATIVE HEART WITHOUT ANGINA PECTORIS: Primary | ICD-10-CM

## 2019-10-22 DIAGNOSIS — E78.2 MIXED HYPERLIPIDEMIA: ICD-10-CM

## 2019-10-22 DIAGNOSIS — I25.5 ISCHEMIC CARDIOMYOPATHY: ICD-10-CM

## 2019-10-22 DIAGNOSIS — I10 ESSENTIAL HYPERTENSION: ICD-10-CM

## 2019-10-22 PROCEDURE — 99214 OFFICE O/P EST MOD 30 MIN: CPT | Performed by: INTERNAL MEDICINE

## 2019-10-22 RX ORDER — METOPROLOL SUCCINATE 100 MG/1
TABLET, EXTENDED RELEASE ORAL
Qty: 90 TABLET | Refills: 3 | Status: SHIPPED | OUTPATIENT
Start: 2019-10-22 | End: 2020-10-28

## 2019-12-10 RX ORDER — LOSARTAN POTASSIUM 50 MG/1
TABLET ORAL
Qty: 90 TABLET | Refills: 3 | Status: SHIPPED | OUTPATIENT
Start: 2019-12-10 | End: 2020-03-02

## 2019-12-12 ENCOUNTER — TELEPHONE (OUTPATIENT)
Dept: CARDIOLOGY CLINIC | Age: 74
End: 2019-12-12

## 2019-12-20 ENCOUNTER — TELEPHONE (OUTPATIENT)
Dept: CARDIOLOGY | Age: 74
End: 2019-12-20

## 2020-02-11 RX ORDER — MONTELUKAST SODIUM 10 MG/1
TABLET ORAL
Qty: 90 TABLET | Refills: 1 | Status: SHIPPED | OUTPATIENT
Start: 2020-02-11 | End: 2020-07-30

## 2020-02-11 NOTE — TELEPHONE ENCOUNTER
[facility] [phone] (home)    is requesting refill(s) of medication Montelukast to preferred pharmacy [id number] 8/20/19 (pertaining to medication)   Last refill 8/16/19 (per medication requested)  Next office visit scheduled or attempted Yes  Date 3/2/20  If No, reason

## 2020-03-02 ENCOUNTER — OFFICE VISIT (OUTPATIENT)
Dept: FAMILY MEDICINE CLINIC | Age: 75
End: 2020-03-02
Payer: MEDICARE

## 2020-03-02 VITALS
WEIGHT: 204.4 LBS | OXYGEN SATURATION: 99 % | SYSTOLIC BLOOD PRESSURE: 150 MMHG | DIASTOLIC BLOOD PRESSURE: 76 MMHG | HEIGHT: 72 IN | BODY MASS INDEX: 27.68 KG/M2 | HEART RATE: 68 BPM

## 2020-03-02 PROBLEM — R04.0 EPISTAXIS: Status: RESOLVED | Noted: 2019-08-20 | Resolved: 2020-03-02

## 2020-03-02 LAB
A/G RATIO: 1.5 (ref 1.1–2.2)
ALBUMIN SERPL-MCNC: 4.4 G/DL (ref 3.4–5)
ALP BLD-CCNC: 66 U/L (ref 40–129)
ALT SERPL-CCNC: 42 U/L (ref 10–40)
ANION GAP SERPL CALCULATED.3IONS-SCNC: 16 MMOL/L (ref 3–16)
AST SERPL-CCNC: 27 U/L (ref 15–37)
BILIRUB SERPL-MCNC: 0.6 MG/DL (ref 0–1)
BUN BLDV-MCNC: 14 MG/DL (ref 7–20)
CALCIUM SERPL-MCNC: 9.5 MG/DL (ref 8.3–10.6)
CHLORIDE BLD-SCNC: 101 MMOL/L (ref 99–110)
CHOLESTEROL, TOTAL: 112 MG/DL (ref 0–199)
CO2: 23 MMOL/L (ref 21–32)
CREAT SERPL-MCNC: 0.7 MG/DL (ref 0.8–1.3)
GFR AFRICAN AMERICAN: >60
GFR NON-AFRICAN AMERICAN: >60
GLOBULIN: 3 G/DL
GLUCOSE BLD-MCNC: 130 MG/DL (ref 70–99)
HDLC SERPL-MCNC: 36 MG/DL (ref 40–60)
LDL CHOLESTEROL CALCULATED: 52 MG/DL
POTASSIUM SERPL-SCNC: 4.3 MMOL/L (ref 3.5–5.1)
PROSTATE SPECIFIC ANTIGEN: 0.8 NG/ML (ref 0–4)
SODIUM BLD-SCNC: 140 MMOL/L (ref 136–145)
TOTAL PROTEIN: 7.4 G/DL (ref 6.4–8.2)
TRIGL SERPL-MCNC: 120 MG/DL (ref 0–150)
VLDLC SERPL CALC-MCNC: 24 MG/DL

## 2020-03-02 PROCEDURE — 99214 OFFICE O/P EST MOD 30 MIN: CPT | Performed by: FAMILY MEDICINE

## 2020-03-02 PROCEDURE — 36415 COLL VENOUS BLD VENIPUNCTURE: CPT | Performed by: FAMILY MEDICINE

## 2020-03-02 RX ORDER — LOSARTAN POTASSIUM 100 MG/1
TABLET ORAL
Qty: 90 TABLET | Refills: 1 | Status: SHIPPED | OUTPATIENT
Start: 2020-03-02 | End: 2020-08-26

## 2020-03-02 ASSESSMENT — ENCOUNTER SYMPTOMS
VOMITING: 0
COUGH: 0
WHEEZING: 0
SINUS PRESSURE: 0
SHORTNESS OF BREATH: 0
CONSTIPATION: 0
CHEST TIGHTNESS: 0
BLOOD IN STOOL: 0
DIARRHEA: 0
EYE PAIN: 0
ABDOMINAL PAIN: 0
EYE DISCHARGE: 0
RHINORRHEA: 0

## 2020-03-02 ASSESSMENT — PATIENT HEALTH QUESTIONNAIRE - PHQ9
2. FEELING DOWN, DEPRESSED OR HOPELESS: 0
SUM OF ALL RESPONSES TO PHQ QUESTIONS 1-9: 0
SUM OF ALL RESPONSES TO PHQ QUESTIONS 1-9: 0
1. LITTLE INTEREST OR PLEASURE IN DOING THINGS: 0
SUM OF ALL RESPONSES TO PHQ9 QUESTIONS 1 & 2: 0

## 2020-03-02 NOTE — PROGRESS NOTES
Subjective:      Patient ID: Rk Webber is a 69 y.o. male. HPI  Chief Complaint   Patient presents with    Hypertension     pt states that he is here for a 6 month f/u, is fasting for bw     Hyperlipidemia     pt is due for a colonoscopy but he would like to do the Cologuard instead. Here for checkup on hypertension, hyperlipidemia and pre DM. States has been doing well since his last visit. Is compliant with all of his medications per patient. Denies any problems with his blood pressure medicine such as cough, dizziness or swelling. Denies any muscle pain or weakness or rash from his statin. Is still working. 6 days a week. Races for hobby Patient states he smoked in his youth for short period of time. Denies any chest pain, shortness of breath, dizziness or syncopal episodes  Last colonscopy 2010- had polyp.  Does not want to get another  Rk Webber is a 69 y.o. male with the following history as recorded in [facility]:  Patient Active Problem List    Diagnosis Date Noted    Acute myocardial infarction of inferior wall, initial episode of care ([facility] 01/10/2011     Priority: High    Psoriasis 08/20/2019    Aneurysm of infrarenal abdominal aorta ([facility] 08/20/2019    Epistaxis 08/20/2019    Thrombocytopenia ([facility] 08/20/2019    Prediabetes 08/20/2019    Ischemic cardiomyopathy     [facility] 01/03/2019    HTN (hypertension) 01/08/2011    Hyperlipemia 01/08/2011    CAD (coronary artery disease) 01/08/2011    S/P CABG (coronary artery bypass graft) 01/08/2011    Lumbar pain 01/08/2011     Current Outpatient Medications   Medication Sig Dispense Refill    montelukast (SINGULAIR) 10 MG tablet TAKE ONE TABLET BY MOUTH ONCE NIGHTLY 90 tablet 1    losartan (COZAAR) 50 MG tablet TAKE ONE TABLET BY MOUTH DAILY 90 tablet 3    metoprolol succinate (TOPROL XL) 100 MG extended release tablet TAKE 1 TABLET DAILY 90 tablet 3    atorvastatin (LIPITOR) 20 MG tablet TAKE 1 TABLET DAILY 90 tablet 3    halobetasol (ULTRAVATE) 0.05 % cream APPLY TO AFFECTED AREA(S) TWO TIMES A DAY AS NEEDED 1 Tube 1    calcipotriene (DOVONEX) 0.005 % cream APPLY TO AFFECTED AREA(S) TWO TIMES A DAY AS NEEDED 1 Tube 1    aspirin 81 MG chewable tablet Take 1 tablet by mouth daily 30 tablet 11    Omega-3 Fatty Acids (FISH OIL) 1200 MG CAPS Take 1 capsule by mouth daily. 30 capsule 11    nitroGLYCERIN (NITROSTAT) 0.4 MG SL tablet Place 0.4 mg under the tongue every 5 minutes as needed.  Ascorbic Acid (VITAMIN C) 500 MG tablet Take 500 mg by mouth daily.  Multiple Vitamins-Minerals (CENTRUM SILVER) TABS Take 1 tablet by mouth daily. No current facility-administered medications for this visit.       Allergies: Ciprofloxacin and Plavix [clopidogrel bisulfate]  Past Medical History:   Diagnosis Date    Arthritis of hand     arthritis in hands and thumbs    CAD (coronary artery disease)     Hyperlipidemia     Hypertension     Ischemic cardiomyopathy 01/2019    STEMI (ST elevation myocardial infarction) ([facility] 01/03/2019    Tinnitus      Past Surgical History:   Procedure Laterality Date    CORONARY ANGIOPLASTY  01/08/2011    emergency PCI: vein to RCA    CORONARY ANGIOPLASTY WITH STENT PLACEMENT  01/03/2019    PCI to distal SVG-RCA with ELIZABETH    CORONARY ARTERY BYPASS GRAFT      in 1990 CABG x4 and in 2001 CABG x2    DIAGNOSTIC CARDIAC CATH LAB PROCEDURE      VASECTOMY  April 2010     Family History   Problem Relation Age of Onset    Cancer Mother         skin cancer    Diabetes Mother     High Blood Pressure Father     Heart Disease Father     No Known Problems Brother     Heart Disease Paternal Uncle     Heart Disease Paternal Cousin     No Known Problems Brother      Social History     Tobacco Use    Smoking status: Former Smoker     Packs/day: 1.00     Years: 20.00     Pack years: 20.00     Types: Cigarettes     Last attempt to quit: 1/9/1991     Years since Normal breath sounds. No wheezing. Abdominal:      General: Bowel sounds are normal. There is no distension. Palpations: Abdomen is soft. Tenderness: There is no abdominal tenderness. There is no guarding or rebound. Musculoskeletal: Normal range of motion. Lymphadenopathy:      Cervical: No cervical adenopathy. Skin:     General: Skin is warm. Neurological:      Mental Status: He is alert and oriented to person, place, and time. Psychiatric:         Behavior: Behavior normal.         Thought Content:  Thought content normal.         Judgment: Judgment normal.         Assessment:      htn- worse, inc losartan  hld- on statin, check labs  preDM  Colon  Polyps- discussed needs colonscopy as last one showed polyps      Plan:      Orders Placed This Encounter   Procedures    LIPID PANEL     Order Specific Question:   Is Patient Fasting?/# of Hours     Answer:   12    COMPREHENSIVE METABOLIC PANEL    HEMOGLOBIN A1C    Psa screening     Orders Placed This Encounter   Medications    losartan (COZAAR) 100 MG tablet     Sig: TAKE ONE TABLET BY MOUTH DAILY     Dispense:  90 tablet     Refill:  1     rto Marcelina. Lizett Mercer [address] [facility], DO

## 2020-03-03 LAB
ESTIMATED AVERAGE GLUCOSE: 122.6 MG/DL
HBA1C MFR BLD: 5.9 %

## 2020-05-04 NOTE — PROGRESS NOTES
input(s): AST, ALT, LIPASE, BILIDIR, BILITOT, ALKPHOS in the last 72 hours. Invalid input(s): AMYLASE,  ALB    Lab Results   Component Value Date    TRIG 120 03/02/2020    TRIG 158 (H) 08/20/2019    TRIG 152 (H) 01/04/2019     Lab Results   Component Value Date    HDL 36 (L) 03/02/2020    HDL 40 08/20/2019    HDL 28 (L) 01/04/2019     Lab Results   Component Value Date    LDLCALC 52 03/02/2020    LDLCALC 43 08/20/2019    LDLCALC 62 01/04/2019     Lab Results   Component Value Date    LABVLDL 24 03/02/2020    LABVLDL 32 08/20/2019    LABVLDL 30 01/04/2019     PT/INR: No results for input(s): PROTIME, INR in the last 72 hours. A1C:   Lab Results   Component Value Date    LABA1C 5.9 03/02/2020     BNP:  No results for input(s): BNP in the last 72 hours. IMAGING:     [facility] 1/4/19  LEFT HEART CATH  LM: luminals  LAD: proximal 90%  LCX: Luminals into small LCx                OM1- occluded  RCA: dominant, 100% proximal occlusion  1. Successful PCi to distal SVG-RCA using 1 drug eluting stent   please see full report in epic  2. Jolanta graft to LAD open with excellent flow    ECHO 1/4/19  Summary   The left ventricular systolic function is mildly reduced with an ejection   fraction of 40-45 %.   There is hypokinesis of the basal inferior and inferolateral walls.   There is mild concentric left ventricular hypertrophy.   Grade I diastolic dysfunction with normal left ventricular filling pressure.   The right ventricle is mildly enlarged.   The right atrium is mildly dilated.   Mild mitral regurgitation.   Systolic pulmonary artery pressure (SPAP) is normal estimated at 26 mmHg   (Right atrial pressure of 8 mmHg).     EKG 1/3/19   Sinus rhythm with 1st degree A-V block with Premature atrial complexesInferior infarct , possibly acuteLateral injury pattern ACUTE MI  Consider right ventricular involvement in acute inferior infarctAbnormal ECGWhen compared with ECG of 02-JAN-2019 18:09,Significant changes have occurredConfirmed by [facility] VERITO HENRIQUEZ, Adam Garcia ([id number]) on 1/4/2019 4:59:14 PM    CTA abdomen 8/19/18  FINDINGS: Lung bases:  Visualized lung bases are well aerated without focal airspace consolidation, lung nodule or lung mass. No pleural or pericardial effusion. Heart size appears within normal limits. Organs: The liver has normal size and contours. No suspicious intrahepatic mass lesion identified. No extrahepatic biliary ductal dilatation. The gallbladder is present. The spleen, pancreas and adrenal glands have a normal noncontrast CT appearance. The kidneys are symmetric in size and noncontrast appearance. No suspicious renal lesions identified. There is a 2 mm distal left ureteral calculus. This causes mild proximal hydroureteronephrosis. No renal, right ureteral or intravesicular calculi are identified. No right obstructive uropathy. GI/bowel: No dilated loops of bowel, or findings to suggest obstruction. No mural thickening or adjacent inflammatory changes identified. The appendix is normal and nondilated. Peritoneum/retroperitoneum: No lymphadenopathy, free fluid or free air identified in the abdomen or pelvis. There are moderate calcific atherosclerotic changes of the abdominal aorta, which is ectatic, but not aneurysmal.   Pelvis: Prostate and seminal vesicles have normal size and noncontrast CT appearance. . The urinary bladder is unremarkable. Bones/soft tissues: There is multilevel degenerative disc disease and hypertrophic degenerative changes of the spine and both hip joints. No suspicious osseous lesions are identified.       EKG 2/13/18  Sinus rhythm with 1st degree A-V block RSR' or QR pattern in V1 suggests right ventricular conduction delay Possible Left atrial enlargement Inferior infarct , age undetermined Abnormal ECG   When compared with ECG of 08-DEC-2012 14:57, UT interval has increased Inferior infarct is now Present Nonspecific T wave abnormality now evident in Lateral leads

## 2020-05-05 ENCOUNTER — OFFICE VISIT (OUTPATIENT)
Dept: CARDIOLOGY CLINIC | Age: 75
End: 2020-05-05
Payer: MEDICARE

## 2020-05-05 VITALS
OXYGEN SATURATION: 98 % | SYSTOLIC BLOOD PRESSURE: 146 MMHG | WEIGHT: 204 LBS | DIASTOLIC BLOOD PRESSURE: 86 MMHG | HEIGHT: 72 IN | HEART RATE: 68 BPM | BODY MASS INDEX: 27.63 KG/M2

## 2020-05-05 PROCEDURE — 99214 OFFICE O/P EST MOD 30 MIN: CPT | Performed by: INTERNAL MEDICINE

## 2020-05-05 NOTE — LETTER
PCI to distal SVG-RCA with ELIZABETH    CORONARY ARTERY BYPASS GRAFT      in 1990 CABG x4 and in 2001 CABG x2    DIAGNOSTIC CARDIAC CATH LAB PROCEDURE      VASECTOMY  April 2010       Objective:   BP (!) 146/86   Pulse 68   Ht 6' (1.829 m)   Wt 204 lb (92.5 kg)   SpO2 98%   BMI 27.67 kg/m²      Wt Readings from Last 3 Encounters:   05/05/20 204 lb (92.5 kg)   03/02/20 204 lb 6.4 oz (92.7 kg)   10/22/19 205 lb (93 kg)       Physical Exam:  General: No Respiratory distress, appears well developed and well nourished. Eyes:  Sclera nonicteric  Nose/Sinuses:  negative findings: nose shows no deformity, asymmetry, or inflammation, nasal mucosa normal, septum midline with no perforation or bleeding  Back:  no pain to palpation  Joint:  no active joint inflammation  Musculoskeletal:  negative  Skin:  Warm and dry,  Neck:  Negative for JVD and Carotid Bruits. Chest:  Crackles LLL chronic otherwise Clear to auscultation  Cardiovascular:  RRR, occasional ectopy   S1S2 normal, no murmur, no rub or thrill. Extremities:   No edema, clubbing, cyanosis,old trauma left lower leg bone. Neuro: intact    Medications:   Outpatient Encounter Medications as of 5/5/2020   Medication Sig Dispense Refill    losartan (COZAAR) 100 MG tablet TAKE ONE TABLET BY MOUTH DAILY 90 tablet 1    montelukast (SINGULAIR) 10 MG tablet TAKE ONE TABLET BY MOUTH ONCE NIGHTLY 90 tablet 1    metoprolol succinate (TOPROL XL) 100 MG extended release tablet TAKE 1 TABLET DAILY 90 tablet 3    atorvastatin (LIPITOR) 20 MG tablet TAKE 1 TABLET DAILY 90 tablet 3    halobetasol (ULTRAVATE) 0.05 % cream APPLY TO AFFECTED AREA(S) TWO TIMES A DAY AS NEEDED 1 Tube 1    calcipotriene (DOVONEX) 0.005 % cream APPLY TO AFFECTED AREA(S) TWO TIMES A DAY AS NEEDED 1 Tube 1    aspirin 81 MG chewable tablet Take 1 tablet by mouth daily 30 tablet 11    Omega-3 Fatty Acids (FISH OIL) 1200 MG CAPS Take 1 capsule by mouth daily.  30 capsule 11  Grade I diastolic dysfunction with normal left ventricular filling pressure.   The right ventricle is mildly enlarged.   The right atrium is mildly dilated.   Mild mitral regurgitation.   Systolic pulmonary artery pressure (SPAP) is normal estimated at 26 mmHg   (Right atrial pressure of 8 mmHg). EKG 1/3/19   Sinus rhythm with 1st degree A-V block with Premature atrial complexesInferior infarct , possibly acuteLateral injury pattern ACUTE MI  Consider right ventricular involvement in acute inferior infarctAbnormal ECGWhen compared with ECG of 02-JAN-2019 18:09,Significant changes have occurredConfirmed by [facility] Bryce SELLERS MD ([id number]) on 1/4/2019 4:59:14 PM    CTA abdomen 8/19/18  FINDINGS: Lung bases:  Visualized lung bases are well aerated without focal airspace consolidation, lung nodule or lung mass. No pleural or pericardial effusion. Heart size appears within normal limits. Organs: The liver has normal size and contours. No suspicious intrahepatic mass lesion identified. No extrahepatic biliary ductal dilatation. The gallbladder is present. The spleen, pancreas and adrenal glands have a normal noncontrast CT appearance. The kidneys are symmetric in size and noncontrast appearance. No suspicious renal lesions identified. There is a 2 mm distal left ureteral calculus. This causes mild proximal hydroureteronephrosis. No renal, right ureteral or intravesicular calculi are identified. No right obstructive uropathy. GI/bowel: No dilated loops of bowel, or findings to suggest obstruction. No mural thickening or adjacent inflammatory changes identified. The appendix is normal and nondilated. Peritoneum/retroperitoneum: No lymphadenopathy, free fluid or free air identified in the abdomen or pelvis.  There are moderate calcific atherosclerotic changes of the abdominal aorta, which is ectatic, but not aneurysmal.   Pelvis: Prostate and seminal vesicles have normal

## 2020-06-09 RX ORDER — TICAGRELOR 90 MG/1
TABLET ORAL
Qty: 90 TABLET | Refills: 3 | OUTPATIENT
Start: 2020-06-09

## 2020-06-22 ENCOUNTER — TELEPHONE (OUTPATIENT)
Dept: CARDIOLOGY CLINIC | Age: 75
End: 2020-06-22

## 2020-06-22 NOTE — TELEPHONE ENCOUNTER
Pt wife called and asked for pt Brilinta 60mg 1 tablet once daily to be sent to [facility] (Was sent to a different pharmacy last month, wife states she doesn't get Rx from there)    [address], [address] [place] [address] Drive - F [phone]

## 2020-07-30 RX ORDER — MONTELUKAST SODIUM 10 MG/1
TABLET ORAL
Qty: 90 TABLET | Refills: 1 | Status: SHIPPED | OUTPATIENT
Start: 2020-07-30 | End: 2021-01-26

## 2020-07-30 NOTE — TELEPHONE ENCOUNTER
[employer] [facility] [employer] [phone] (home)    is requesting refill(s) of medication Montelukast to preferred [facility] 3/2/20 (pertaining to medication)   Last refill 2/11/20 (per medication requested)  Next office visit scheduled or attempted Yes  Date 9/2/20  If No, reason

## 2020-08-12 RX ORDER — ATORVASTATIN CALCIUM 20 MG/1
TABLET, FILM COATED ORAL
Qty: 90 TABLET | Refills: 2 | Status: SHIPPED | OUTPATIENT
Start: 2020-08-12 | End: 2021-06-15 | Stop reason: SDUPTHER

## 2020-08-26 RX ORDER — LOSARTAN POTASSIUM 100 MG/1
TABLET ORAL
Qty: 90 TABLET | Refills: 1 | Status: SHIPPED | OUTPATIENT
Start: 2020-08-26 | End: 2021-02-22

## 2020-08-26 NOTE — TELEPHONE ENCOUNTER
Dheeraj Gill [phone] (home)    is requesting refill(s) of medication [facility] 3/2/20 (pertaining to medication)   Last refill 3/2/20 (per medication requested)  Next office visit scheduled or attempted Yes  Date 9/2/20  If No, reason

## 2020-09-02 ENCOUNTER — OFFICE VISIT (OUTPATIENT)
Dept: FAMILY MEDICINE CLINIC | Age: 75
End: 2020-09-02
Payer: MEDICARE

## 2020-09-02 VITALS
WEIGHT: 207.8 LBS | HEIGHT: 72 IN | OXYGEN SATURATION: 100 % | SYSTOLIC BLOOD PRESSURE: 138 MMHG | TEMPERATURE: 98.1 F | BODY MASS INDEX: 28.15 KG/M2 | DIASTOLIC BLOOD PRESSURE: 70 MMHG | HEART RATE: 88 BPM

## 2020-09-02 LAB
A/G RATIO: 1.8 (ref 1.1–2.2)
ALBUMIN SERPL-MCNC: 4.7 G/DL (ref 3.4–5)
ALP BLD-CCNC: 62 U/L (ref 40–129)
ALT SERPL-CCNC: 40 U/L (ref 10–40)
ANION GAP SERPL CALCULATED.3IONS-SCNC: 10 MMOL/L (ref 3–16)
AST SERPL-CCNC: 27 U/L (ref 15–37)
BILIRUB SERPL-MCNC: 0.5 MG/DL (ref 0–1)
BUN BLDV-MCNC: 18 MG/DL (ref 7–20)
CALCIUM SERPL-MCNC: 10.1 MG/DL (ref 8.3–10.6)
CHLORIDE BLD-SCNC: 99 MMOL/L (ref 99–110)
CHOLESTEROL, TOTAL: 135 MG/DL (ref 0–199)
CO2: 29 MMOL/L (ref 21–32)
CREAT SERPL-MCNC: 0.8 MG/DL (ref 0.8–1.3)
GFR AFRICAN AMERICAN: >60
GFR NON-AFRICAN AMERICAN: >60
GLOBULIN: 2.6 G/DL
GLUCOSE BLD-MCNC: 156 MG/DL (ref 70–99)
HDLC SERPL-MCNC: 40 MG/DL (ref 40–60)
LDL CHOLESTEROL CALCULATED: 55 MG/DL
POTASSIUM SERPL-SCNC: 4.7 MMOL/L (ref 3.5–5.1)
SODIUM BLD-SCNC: 138 MMOL/L (ref 136–145)
TOTAL PROTEIN: 7.3 G/DL (ref 6.4–8.2)
TRIGL SERPL-MCNC: 198 MG/DL (ref 0–150)
VLDLC SERPL CALC-MCNC: 40 MG/DL

## 2020-09-02 PROCEDURE — G0438 PPPS, INITIAL VISIT: HCPCS | Performed by: FAMILY MEDICINE

## 2020-09-02 PROCEDURE — 36415 COLL VENOUS BLD VENIPUNCTURE: CPT | Performed by: FAMILY MEDICINE

## 2020-09-02 ASSESSMENT — PATIENT HEALTH QUESTIONNAIRE - PHQ9
SUM OF ALL RESPONSES TO PHQ QUESTIONS 1-9: 0
SUM OF ALL RESPONSES TO PHQ QUESTIONS 1-9: 0

## 2020-09-02 ASSESSMENT — LIFESTYLE VARIABLES: HOW OFTEN DO YOU HAVE A DRINK CONTAINING ALCOHOL: 0

## 2020-09-02 NOTE — PATIENT INSTRUCTIONS
Please call your pharmacy if you need any refills of your medication(s). Please call our office at [phone]  if you don't hear from us about your test results. Please be sure to call our office if your illness/problem has been treated for but has not completely resolved. Bring an accurate list of your medications with you at every appointment to ensure that we have the correct information. Our office hours are: Monday - Friday 7 am- 5 pm    Personalized Preventive Plan for Soren Pisano - 9/2/2020  Medicare offers a range of preventive health benefits. Some of the tests and screenings are paid in full while other may be subject to a deductible, co-insurance, and/or copay. Some of these benefits include a comprehensive review of your medical history including lifestyle, illnesses that may run in your family, and various assessments and screenings as appropriate. After reviewing your medical record and screening and assessments performed today your provider may have ordered immunizations, labs, imaging, and/or referrals for you. A list of these orders (if applicable) as well as your Preventive Care list are included within your After Visit Summary for your review. Other Preventive Recommendations:    · A preventive eye exam performed by an eye specialist is recommended every 1-2 years to screen for glaucoma; cataracts, macular degeneration, and other eye disorders. · A preventive dental visit is recommended every 6 months. · Try to get at least 150 minutes of exercise per week or 10,000 steps per day on a pedometer . · Order or download the FREE \"Exercise & Physical Activity: Your Everyday Guide\" from [employer]. Call 2-604.593.8543 or search [employer] online. · You need 3153-5827 mg of calcium and 9525-1450 IU of vitamin D per day.  It is possible to meet your calcium requirement with diet alone, but a vitamin D supplement is usually necessary to meet this goal.  · When exposed to the sun, use a sunscreen that protects against both UVA and UVB radiation with an SPF of 30 or greater. Reapply every 2 to 3 hours or after sweating, drying off with a towel, or swimming. · Always wear a seat belt when traveling in a car. Always wear a helmet when riding a bicycle or motorcycle.

## 2020-09-02 NOTE — PROGRESS NOTES
Medicare Annual Wellness Visit  Name: Yasmin Verma Date: 2020   MRN: <[id number]> Sex: Male   Age: 77 y.o. Ethnicity: Non-/Non    : 1945 Race: Leyda Hooper is here for Medicare AWV (Patient is here for a Medicare Annual Wellness Visit.) and Hypertension (He is fasting for blood work.)    Screenings for behavioral, psychosocial and functional/safety risks, and cognitive dysfunction are all negative except as indicated below. These results, as well as other patient data from the [address] form, are documented in Flowsheets linked to this Encounter. Is fasting for labs- no new compliants. Saw cardiology- doing well  Allergies   Allergen Reactions    Ciprofloxacin      diarrhea    Plavix [Clopidogrel Bisulfate]        Prior to Visit Medications    Medication Sig Taking? Authorizing Provider   losartan (COZAAR) 100 MG tablet TAKE ONE TABLET BY MOUTH DAILY Yes Zhane Paul DO   atorvastatin (LIPITOR) 20 MG tablet TAKE ONE TABLET BY MOUTH DAILY Yes Franco Lockhart MD   montelukast (SINGULAIR) 10 MG tablet TAKE ONE TABLET BY MOUTH ONCE NIGHTLY Yes Zhane Paul DO   ticagrelor (BRILINTA) 60 MG TABS tablet Take 1 tablet by mouth daily Yes Franco Lockhart MD   metoprolol succinate (TOPROL XL) 100 MG extended release tablet TAKE 1 TABLET DAILY Yes Franco Lockhart MD   halobetasol (ULTRAVATE) 0.05 % cream APPLY TO AFFECTED AREA(S) TWO TIMES A DAY AS NEEDED Yes Zhane Paul DO   calcipotriene (DOVONEX) 0.005 % cream APPLY TO AFFECTED AREA(S) TWO TIMES A DAY AS NEEDED Yes Zhane Paul DO   aspirin 81 MG chewable tablet Take 1 tablet by mouth daily Yes Meghana Corley MD   Omega-3 Fatty Acids (FISH OIL) 1200 MG CAPS Take 1 capsule by mouth daily. Yes Franco Lockhart MD   nitroGLYCERIN (NITROSTAT) 0.4 MG SL tablet Place 0.4 mg under the tongue every 5 minutes as needed.    Yes Historical Provider, MD   Ascorbic Acid (VITAMIN C) 500 MG tablet Take 500 mg by mouth daily. Yes Historical Provider, MD   Multiple Vitamins-Minerals (CENTRUM SILVER) TABS Take 1 tablet by mouth daily. Yes Historical Provider, MD       Past Medical History:   Diagnosis Date    Arthritis of hand     arthritis in hands and thumbs    CAD (coronary artery disease)     Hyperlipidemia     Hypertension     Ischemic cardiomyopathy 01/2019    STEMI (ST elevation myocardial infarction) ([facility] [address] 01/03/2019    Tinnitus        Past Surgical History:   Procedure Laterality Date    CORONARY ANGIOPLASTY  01/08/2011    emergency PCI: vein to RCA    CORONARY ANGIOPLASTY WITH STENT PLACEMENT  01/03/2019    PCI to distal SVG-RCA with ELIZABETH    CORONARY ARTERY BYPASS GRAFT      in 1990 CABG x4 and in 2001 CABG x2    DIAGNOSTIC CARDIAC CATH LAB PROCEDURE      VASECTOMY  April 2010       Family History   Problem Relation Age of Onset    Cancer Mother         skin cancer    Diabetes Mother     High Blood Pressure Father     Heart Disease Father     No Known Problems Brother     Heart Disease Paternal Uncle     Heart Disease Paternal Cousin     No Known Problems Brother        CareTeam (Including outside providers/suppliers regularly involved in providing care):   Patient Care Team:  Lissa Landa DO as PCP - General (Family Medicine)  Lissa Landa DO as PCP - [address] [place] [address] Dr Mclaughlinled Provider  Marisol Reynoso MD as Consulting Physician (Cardiology)    Wt Readings from Last 3 Encounters:   09/02/20 207 lb 12.8 oz (94.3 kg)   05/05/20 204 lb (92.5 kg)   03/02/20 204 lb 6.4 oz (92.7 kg)     Vitals:    09/02/20 0805   BP: 138/70   Pulse: 88   Temp: 98.1 °F (36.7 °C)   TempSrc: Temporal   SpO2: 100%   Weight: 207 lb 12.8 oz (94.3 kg)   Height: 6' (1.829 m)     Body mass index is 28.18 kg/m². Based upon direct observation of the patient, evaluation of cognition reveals recent and remote memory intact.     General Appearance: alert and oriented to person, place and time, well developed and well- nourished, in no acute distress  Skin: warm and dry, no rash or erythema  Head: normocephalic and atraumatic  Eyes: pupils equal, round, and reactive to light, extraocular eye movements intact, conjunctivae normal  ENT: tympanic membrane, external ear and ear canal normal bilaterally, nose without deformity, nasal mucosa and turbinates normal without polyps  Neck: supple and non-tender without mass, no thyromegaly or thyroid nodules, no cervical lymphadenopathy  Pulmonary/Chest: clear to auscultation bilaterally- no wheezes, rales or rhonchi, normal air movement, no respiratory distress  Cardiovascular: normal rate, regular rhythm, normal S1 and S2, no murmurs, rubs, clicks, or gallops, distal pulses intact, no carotid bruits  Abdomen: soft, non-tender, non-distended, normal bowel sounds, no masses or organomegaly  Extremities: no cyanosis, clubbing or edema  Musculoskeletal: normal range of motion, no joint swelling, deformity or tenderness  Neurologic: reflexes normal and symmetric, no cranial nerve deficit, gait, coordination and speech normal    Patient's complete Health Risk Assessment and screening values have been reviewed and are found in Flowsheets. The following problems were reviewed today and where indicated follow up appointments were made and/or referrals ordered. Positive Risk Factor Screenings with Interventions:     Health Habits/Nutrition:  Health Habits/Nutrition  Do you exercise for at least 20 minutes 2-3 times per week?: Yes  Have you lost any weight without trying in the past 3 months?: No  Do you eat fewer than 2 meals per day?: No  Have you seen a dentist within the past year?: (!) No  Body mass index is 28.18 kg/m².   Health Habits/Nutrition Interventions:  · Dental exam overdue:  patient encouraged to make appointment with his/her dentist    Safety:  Safety  Do you have working smoke detectors?: Yes  Have all throw rugs been removed or fastened?: Yes  Do you have non-slip mats or surfaces in all bathtubs/showers?: (!) No  Do all of your stairways have a railing or banister?: Yes  Are your doorways, halls and stairs free of clutter?: Yes  Do you always fasten your seatbelt when you are in a car?: Yes  Safety Interventions:  · Home safety tips provided    Personalized Preventive Plan   Current Health Maintenance Status  Immunization History   Administered Date(s) Administered    DTaP/IPV (Quadracel, Kinrix) 11/05/2014    Influenza Virus Vaccine 10/18/2011, 10/16/2014    Influenza Whole 09/27/2012    Influenza, High Dose (Fluzone 65 yrs and older) 10/27/2015, 11/26/2016, 11/03/2019    Influenza, [place], IM, (6 mo and older Fluzone, Flulaval, Fluarix and 3 yrs and older Afluria) 11/01/2017    Influenza, Triv, inactivated, subunit, adjuvanted, IM (Fluad 65 yrs and older) 10/29/2018    Pneumococcal Conjugate 13-valent ([id number]) 11/11/2015    Pneumococcal Polysaccharide ([id number]) 08/28/2018        Health Maintenance   Topic Date Due    Shingles Vaccine (1 of 2) 09/01/1996    Annual Wellness Visit (AWV)  06/19/2019    Colon cancer screen colonoscopy  02/22/2020    Flu vaccine (1) 09/01/2020    A1C test (Diabetic or Prediabetic)  03/02/2021    Lipid screen  03/02/2021    Potassium monitoring  03/02/2021    Creatinine monitoring  03/02/2021    DTaP/Tdap/Td vaccine (2 - Tdap) 11/05/2024    Pneumococcal 65+ years Vaccine  Completed    Hepatitis C screen  Completed    Hepatitis A vaccine  Aged Out    Hepatitis B vaccine  Aged Out    Hib vaccine  Aged Out    Meningococcal (ACWY) vaccine  Aged Out     Recommendations for [employer] Due: see orders and patient instructions/AVS.  . Recommended screening schedule for the next 5-10 years is provided to the patient in written form: see Patient Kiki Lopez was seen today for medicare awv and hypertension.     Diagnoses and all orders for this visit:    Screen for colon cancer  -     Cologuard (For External Results Only);  Future    Essential hypertension  -     LIPID PANEL  -     COMPREHENSIVE METABOLIC PANEL  -     HEMOGLOBIN A1C    Mixed hyperlipidemia  -     LIPID PANEL  -     COMPREHENSIVE METABOLIC PANEL  -     HEMOGLOBIN A1C    Prediabetes  -     LIPID PANEL  -     COMPREHENSIVE METABOLIC PANEL  -     HEMOGLOBIN A1C    Routine general medical examination at a health care facility

## 2020-09-03 LAB
ESTIMATED AVERAGE GLUCOSE: 157.1 MG/DL
HBA1C MFR BLD: 7.1 %

## 2020-09-17 ENCOUNTER — TELEPHONE (OUTPATIENT)
Dept: FAMILY MEDICINE CLINIC | Age: 75
End: 2020-09-17

## 2020-09-17 DIAGNOSIS — Z12.11 SCREEN FOR COLON CANCER: ICD-10-CM

## 2020-09-17 NOTE — TELEPHONE ENCOUNTER
Begin a diet that is more plant based and whole grain. Example: avoid white bread, white potatoes and instead eat whole grain ( not whole wheat) breads and sweet potato instead of white potatoes. . Avoid any cereals and deserts  made with raw sugar and  instead eat fresh fruits or those made with artificial sweetener.

## 2020-09-17 NOTE — TELEPHONE ENCOUNTER
Pt's wife called for him, wanting advice on lifestyle changes due to recent lab work. Please advise.

## 2020-10-28 RX ORDER — METOPROLOL SUCCINATE 100 MG/1
TABLET, EXTENDED RELEASE ORAL
Qty: 90 TABLET | Refills: 2 | Status: SHIPPED | OUTPATIENT
Start: 2020-10-28 | End: 2021-06-04

## 2020-10-28 NOTE — TELEPHONE ENCOUNTER
JAIRON pt. He is OOT   LOV 5/5/20   Plan:  1. No med changes,1 refill warranted  2. Will stay on  Brilinta 60 mg per day indefinitely  He can only take 1 per day at reduced dose due to excessive nose bleeds  3.  Follow up with me in 6 months  4  Healthy lifestyle education reviewed including nutrition, exercise and activity

## 2020-11-11 NOTE — PROGRESS NOTES
[address] Office Note  11/13/2020     Subjective:  Mr. Melanie Diaz is here for follow up of CAD, HTN, HLD; no complaints today    [place]: Today he reports doing great. He just came back from [employer] ride from [place]. He rode  along [employer].  He walked about 4 miles today while deer hunting. He tolerated that well. Patient denies chest pain, sob, palpitations, dizziness or syncope. He is taking his medications as prescribed. He is tolerating them well. PMH: Mr Juancarlos Cole has PMH CAD, HTN and hyperlipidemia. Admitted to [address] [place], [place] [address] (1[facility]3/19) for CP. EKG showed possible acute MI. Subsequently he underwent [facility] 1/4/19 with  Successful PCi to distal SVG-RCA using 1 drug stent. Review of Systems:  12 point ROS negative in all areas as listed below except as in [place]  Constitutional, EENT,  pulmonary, GI, , Musculoskeletal, skin, neurological, hematological, endocrine, Psychiatric    Reviewed past medical history, social, and family history.    Does not smoke no alcohol, FH +ve for CAD  Past Medical History:   Diagnosis Date    Arthritis of hand     arthritis in hands and thumbs    CAD (coronary artery disease)     Hyperlipidemia     Hypertension     Ischemic cardiomyopathy 01/2019    STEMI (ST elevation myocardial infarction) ([facility] [address] 01/03/2019    Tinnitus      Past Surgical History:   Procedure Laterality Date    CORONARY ANGIOPLASTY  01/08/2011    emergency PCI: vein to RCA    CORONARY ANGIOPLASTY WITH STENT PLACEMENT  01/03/2019    PCI to distal SVG-RCA with ELIZABETH    CORONARY ARTERY BYPASS GRAFT      in 1990 CABG x4 and in 2001 CABG x2    DIAGNOSTIC CARDIAC CATH LAB PROCEDURE      VASECTOMY  April 2010       Objective:   /74   Pulse 66   Ht 6' (1.829 m)   Wt 200 lb (90.7 kg)   SpO2 99%   BMI 27.12 kg/m²     Wt Readings from Last 3 Encounters:   11/13/20 200 lb (90.7 kg)   09/02/20 207 lb 12.8 oz (94.3 kg)   05/05/20 204 lb (92.5 kg)       Physical Exam:  General: No Respiratory distress, appears well developed and well nourished. Eyes:  Sclera nonicteric  Nose/Sinuses:  negative findings: nose shows no deformity, asymmetry, or inflammation, nasal mucosa normal, septum midline with no perforation or bleeding  Back:  no pain to palpation  Joint:  no active joint inflammation  Musculoskeletal:  negative  Skin:  Warm and dry,  Neck:  Negative for JVD and Carotid Bruits. Chest:  Crackles LLL chronic otherwise Clear to auscultation  Cardiovascular:  RRR, occasional ectopy   S1S2 normal, no murmur, no rub or thrill. Abdomen non tender no mass no abnormal pulsation   Extremities:   No edema, clubbing, cyanosis,old trauma left lower leg bone. Neuro: intact    Medications:   Outpatient Encounter Medications as of 11/13/2020   Medication Sig Dispense Refill    metoprolol succinate (TOPROL XL) 100 MG extended release tablet TAKE ONE TABLET BY MOUTH DAILY 90 tablet 2    losartan (COZAAR) 100 MG tablet TAKE ONE TABLET BY MOUTH DAILY 90 tablet 1    atorvastatin (LIPITOR) 20 MG tablet TAKE ONE TABLET BY MOUTH DAILY 90 tablet 2    montelukast (SINGULAIR) 10 MG tablet TAKE ONE TABLET BY MOUTH ONCE NIGHTLY 90 tablet 1    ticagrelor (BRILINTA) 60 MG TABS tablet Take 1 tablet by mouth daily 90 tablet 3    halobetasol (ULTRAVATE) 0.05 % cream APPLY TO AFFECTED AREA(S) TWO TIMES A DAY AS NEEDED 1 Tube 1    calcipotriene (DOVONEX) 0.005 % cream APPLY TO AFFECTED AREA(S) TWO TIMES A DAY AS NEEDED 1 Tube 1    aspirin 81 MG chewable tablet Take 1 tablet by mouth daily 30 tablet 11    Omega-3 Fatty Acids (FISH OIL) 1200 MG CAPS Take 1 capsule by mouth daily. 30 capsule 11    nitroGLYCERIN (NITROSTAT) 0.4 MG SL tablet Place 0.4 mg under the tongue every 5 minutes as needed.  Ascorbic Acid (VITAMIN C) 500 MG tablet Take 500 mg by mouth daily.  Multiple Vitamins-Minerals (CENTRUM SILVER) TABS Take 1 tablet by mouth daily.          No facility-administered encounter medications on file as of 11/13/2020. Lab Data:  CBC:   No results for input(s): WBC, HGB, HCT, MCV, PLT in the last 72 hours. BMP:   No results for input(s): NA, K, CL, CO2, PHOS, BUN, CREATININE in the last 72 hours. Invalid input(s): CA  LIVER PROFILE:   No results for input(s): AST, ALT, LIPASE, BILIDIR, BILITOT, ALKPHOS in the last 72 hours. Invalid input(s): AMYLASE,  ALB    Lab Results   Component Value Date    TRIG 198 (H) 09/02/2020    TRIG 120 03/02/2020    TRIG 158 (H) 08/20/2019     Lab Results   Component Value Date    HDL 40 09/02/2020    HDL 36 (L) 03/02/2020    HDL 40 08/20/2019     Lab Results   Component Value Date    LDLCALC 55 09/02/2020    LDLCALC 52 03/02/2020    LDLCALC 43 08/20/2019     Lab Results   Component Value Date    LABVLDL 40 09/02/2020    LABVLDL 24 03/02/2020    LABVLDL 32 08/20/2019     PT/INR: No results for input(s): PROTIME, INR in the last 72 hours. A1C:   Lab Results   Component Value Date    LABA1C 7.1 09/02/2020     BNP:  No results for input(s): BNP in the last 72 hours. IMAGING:     [facility] 1/4/19  LEFT HEART CATH  LM: luminals  LAD: proximal 90%  LCX: Luminals into small LCx                OM1- occluded  RCA: dominant, 100% proximal occlusion  1. Successful PCi to distal SVG-RCA using 1 drug eluting stent   please see full report in epic  2. Jolanta graft to LAD open with excellent flow    ECHO 1/4/19  Summary   The left ventricular systolic function is mildly reduced with an ejection   fraction of 40-45 %.   There is hypokinesis of the basal inferior and inferolateral walls.   There is mild concentric left ventricular hypertrophy.   Grade I diastolic dysfunction with normal left ventricular filling pressure.   The right ventricle is mildly enlarged.   The right atrium is mildly dilated.   Mild mitral regurgitation.   Systolic pulmonary artery pressure (SPAP) is normal estimated at 26 mmHg   (Right atrial pressure of 8 mmHg).     EKG 1/3/19   Sinus rhythm with 1st degree A-V block with Premature atrial complexesInferior infarct , possibly acuteLateral injury pattern ACUTE MI  Consider right ventricular involvement in acute inferior infarctAbnormal ECGWhen compared with ECG of 02-JAN-2019 18:09,Significant changes have occurredConfirmed by [facility] VERITO HENRIQUEZ, Destinee Tillman ([id number]) on 1/4/2019 4:59:14 PM    CTA abdomen 8/19/18  FINDINGS: Lung bases:  Visualized lung bases are well aerated without focal airspace consolidation, lung nodule or lung mass. No pleural or pericardial effusion. Heart size appears within normal limits. Organs: The liver has normal size and contours. No suspicious intrahepatic mass lesion identified. No extrahepatic biliary ductal dilatation. The gallbladder is present. The spleen, pancreas and adrenal glands have a normal noncontrast CT appearance. The kidneys are symmetric in size and noncontrast appearance. No suspicious renal lesions identified. There is a 2 mm distal left ureteral calculus. This causes mild proximal hydroureteronephrosis. No renal, right ureteral or intravesicular calculi are identified. No right obstructive uropathy. GI/bowel: No dilated loops of bowel, or findings to suggest obstruction. No mural thickening or adjacent inflammatory changes identified. The appendix is normal and nondilated. Peritoneum/retroperitoneum: No lymphadenopathy, free fluid or free air identified in the abdomen or pelvis. There are moderate calcific atherosclerotic changes of the abdominal aorta, which is ectatic, but not aneurysmal.   Pelvis: Prostate and seminal vesicles have normal size and noncontrast CT appearance. . The urinary bladder is unremarkable. Bones/soft tissues: There is multilevel degenerative disc disease and hypertrophic degenerative changes of the spine and both hip joints. No suspicious osseous lesions are identified.       EKG 2/13/18  Sinus rhythm with 1st degree A-V block RSR' or QR pattern in V1 suggests right ventricular conduction delay Possible Left atrial enlargement Inferior infarct , age undetermined Abnormal ECG   When compared with ECG of 08-DEC-2012 14:57, [place] interval has increased Inferior infarct is now Present Nonspecific T wave abnormality now evident in Lateral leads Confirmed by Annabel Murdock ([id number]) on 2/13/2018 10:30:13 PM     CT abdomen/pelvis 2/13/18  Air-fluid levels throughout the colon consistent with underlying diarrheal illness. Mild fusiform aneurysmal dilatation of the infrarenal abdominal aorta measuring up to 26 mm. EKG 7/26/17  Sinus  Rhythm -Nonspecific QRS widening. -Old inferolateral infarct. -  Nonspecific T-abnormality    EKG 12/12/16  SR 1st degree AV block Old inferior wall MI  EKG 11/11/15  SR old inferior wall MI    Myoview stress test 5/28/15  There is a moderate sized fixed defect within the inferior and basal lateral   walls consistent with prior infarction. There is mild hypokinesis in these   segments. There is no ischemia,   The left ventricular function is overall normal at 58%   The LV is mildly dilated. Stress Myoview 8/2013  Small-moderate fixed defect consistent with previous infarction    XR CHEST STANDARD TWO VW      COMPARISON: 12/8/2012   CLINICAL INDICATION: Cough   FINDINGS: Elevation of the right hemidiaphragm. Status post median   sternotomy. No focal pulmonary opacities to suggest acute airspace   disease. No evidence of pleural effusion or pneumothorax. Cardiac   and mediastinal silhouettes are unchanged. Calcification of aorta. IMPRESSION:    1. No evidence of acute cardiopulmonary disease. ECHO 1/10/2011  CONCLUSION- Echocardiogram with a Doppler shows slightly enlarged  right ventricle with normal contractility. There is no segmental  wall motion abnormality. Ejection fraction is 55%. No valvular  stenosis or regurgitation. There is diastolic dysfunction of left  Ventricle. Assessment:  Encounter Diagnoses   Name Primary?     Chronic combined systolic and diastolic congestive heart failure ([facility] [address]     Essential hypertension     Ischemic cardiomyopathy     Mixed hyperlipidemia     Coronary artery disease involving coronary bypass graft of native heart without angina pectoris Yes    S/P CABG (coronary artery bypass graft)        1. HTN  2. HLD  3. CAD~s/p CABG 2011~PCI to distal SVG RCA 1/2019  4. ICM    PLAN:  1. Medications reviewed. No refills warranted at this time  2. Continue current course  3. Follow up in 6 months    QUALITY MEASURES  1. Tobacco Cessation Counseling: NA  2. Retake of BP if >140/90:  yes  3. Documentation to PCP/referring for new patient:  Sent to PCP at close of office visit  4. CAD patient on anti-platelet: Yes  5. CAD patient on STATIN therapy:  Yes  6. Patient with CHF and aFib on anticoagulation:  NA       This note was scribed in the presence of Yolanda Rollins MD by Kyle Weinberg RN. I, Dr. Yolanda Rollins, personally performed the services described in this documentation, as scribed by the above signed scribe in my presence. It is both accurate and complete to my knowledge. I agree with the details independently gathered by the clinical support staff, while the remaining scribed note accurately describes my personal service to the patient.         Lisa Smith MD  [facility]  [phone] [facility] office  [phone] [facility]

## 2020-11-13 ENCOUNTER — OFFICE VISIT (OUTPATIENT)
Dept: CARDIOLOGY CLINIC | Age: 75
End: 2020-11-13
Payer: MEDICARE

## 2020-11-13 VITALS
SYSTOLIC BLOOD PRESSURE: 120 MMHG | OXYGEN SATURATION: 99 % | BODY MASS INDEX: 27.09 KG/M2 | DIASTOLIC BLOOD PRESSURE: 74 MMHG | WEIGHT: 200 LBS | HEART RATE: 66 BPM | HEIGHT: 72 IN

## 2020-11-13 PROCEDURE — 99214 OFFICE O/P EST MOD 30 MIN: CPT | Performed by: INTERNAL MEDICINE

## 2020-11-13 NOTE — LETTER
[address] [place] [address] [address] [address]  Phone: [phone]  Fax: [phone]     Haroldo Nicholson MD     11/13/2020     Codi Rm GuyDO yung   [address] [facility] [address]     Patient: Andree Melendrez   MR Number: <[id number]>   YOB: 1945   Date of Visit: 11/13/2020     Dear Dr. Serjio Avilez     Today I saw our mutual patient named above. Below are the relevant portions of my assessment and plan of care. If you have questions, please do not hesitate to call me. I look forward to following Francisco How along with you. [facility] Office Note  11/13/2020     Subjective:  Mr. Aby Steward is here for follow up of CAD, HTN, HLD; no complaints today    [place]: Today he reports doing great. He just came back from [employer] ride from [place]. He rode  along [employer].  He walked about 4 miles today while deer hunting. He tolerated that well. Patient denies chest pain, sob, palpitations, dizziness or syncope. He is taking his medications as prescribed. He is tolerating them well. PMH: Mr Cali Lepe has PMH CAD, HTN and hyperlipidemia. Admitted to [facility] (1/3/19) for CP. EKG showed possible acute MI. Subsequently he underwent [facility] 1/4/19 with  Successful PCi to distal SVG-RCA using 1 drug stent. Review of Systems:  12 point ROS negative in all areas as listed below except as in [place]  Constitutional, EENT,  pulmonary, GI, , Musculoskeletal, skin, neurological, hematological, endocrine, Psychiatric    Reviewed past medical history, social, and family history.    Does not smoke no alcohol, FH +ve for CAD  Past Medical History:   Diagnosis Date    Arthritis of hand     arthritis in hands and thumbs    CAD (coronary artery disease)     Hyperlipidemia     Hypertension     Ischemic cardiomyopathy 01/2019    STEMI (ST elevation myocardial infarction) ([facility] [address] 01/03/2019    Tinnitus      Past Surgical History:   Procedure Laterality Date  CORONARY ANGIOPLASTY  01/08/2011    emergency PCI: vein to RCA    CORONARY ANGIOPLASTY WITH STENT PLACEMENT  01/03/2019    PCI to distal SVG-RCA with ELIZABETH    CORONARY ARTERY BYPASS GRAFT      in 1990 CABG x4 and in 2001 CABG x2    DIAGNOSTIC CARDIAC CATH LAB PROCEDURE      VASECTOMY  April 2010       Objective:   /74   Pulse 66   Ht 6' (1.829 m)   Wt 200 lb (90.7 kg)   SpO2 99%   BMI 27.12 kg/m²     Wt Readings from Last 3 Encounters:   11/13/20 200 lb (90.7 kg)   09/02/20 207 lb 12.8 oz (94.3 kg)   05/05/20 204 lb (92.5 kg)       Physical Exam:  General: No Respiratory distress, appears well developed and well nourished. Eyes:  Sclera nonicteric  Nose/Sinuses:  negative findings: nose shows no deformity, asymmetry, or inflammation, nasal mucosa normal, septum midline with no perforation or bleeding  Back:  no pain to palpation  Joint:  no active joint inflammation  Musculoskeletal:  negative  Skin:  Warm and dry,  Neck:  Negative for JVD and Carotid Bruits. Chest:  Crackles LLL chronic otherwise Clear to auscultation  Cardiovascular:  RRR, occasional ectopy   S1S2 normal, no murmur, no rub or thrill. Abdomen non tender no mass no abnormal pulsation   Extremities:   No edema, clubbing, cyanosis,old trauma left lower leg bone.   Neuro: intact    Medications:   Outpatient Encounter Medications as of 11/13/2020   Medication Sig Dispense Refill    metoprolol succinate (TOPROL XL) 100 MG extended release tablet TAKE ONE TABLET BY MOUTH DAILY 90 tablet 2    losartan (COZAAR) 100 MG tablet TAKE ONE TABLET BY MOUTH DAILY 90 tablet 1    atorvastatin (LIPITOR) 20 MG tablet TAKE ONE TABLET BY MOUTH DAILY 90 tablet 2    montelukast (SINGULAIR) 10 MG tablet TAKE ONE TABLET BY MOUTH ONCE NIGHTLY 90 tablet 1    ticagrelor (BRILINTA) 60 MG TABS tablet Take 1 tablet by mouth daily 90 tablet 3    halobetasol (ULTRAVATE) 0.05 % cream APPLY TO AFFECTED AREA(S) TWO TIMES A DAY AS NEEDED 1 Tube 1  calcipotriene (DOVONEX) 0.005 % cream APPLY TO AFFECTED AREA(S) TWO TIMES A DAY AS NEEDED 1 Tube 1    aspirin 81 MG chewable tablet Take 1 tablet by mouth daily 30 tablet 11    Omega-3 Fatty Acids (FISH OIL) 1200 MG CAPS Take 1 capsule by mouth daily. 30 capsule 11    nitroGLYCERIN (NITROSTAT) 0.4 MG SL tablet Place 0.4 mg under the tongue every 5 minutes as needed.  Ascorbic Acid (VITAMIN C) 500 MG tablet Take 500 mg by mouth daily.  Multiple Vitamins-Minerals (CENTRUM SILVER) TABS Take 1 tablet by mouth daily. No facility-administered encounter medications on file as of 11/13/2020. Lab Data:  CBC:   No results for input(s): WBC, HGB, HCT, MCV, PLT in the last 72 hours. BMP:   No results for input(s): NA, K, CL, CO2, PHOS, BUN, CREATININE in the last 72 hours. Invalid input(s): CA  LIVER PROFILE:   No results for input(s): AST, ALT, LIPASE, BILIDIR, BILITOT, ALKPHOS in the last 72 hours. Invalid input(s): AMYLASE,  ALB    Lab Results   Component Value Date    TRIG 198 (H) 09/02/2020    TRIG 120 03/02/2020    TRIG 158 (H) 08/20/2019     Lab Results   Component Value Date    HDL 40 09/02/2020    HDL 36 (L) 03/02/2020    HDL 40 08/20/2019     Lab Results   Component Value Date    LDLCALC 55 09/02/2020    LDLCALC 52 03/02/2020    LDLCALC 43 08/20/2019     Lab Results   Component Value Date    LABVLDL 40 09/02/2020    LABVLDL 24 03/02/2020    LABVLDL 32 08/20/2019     PT/INR: No results for input(s): PROTIME, INR in the last 72 hours. A1C:   Lab Results   Component Value Date    LABA1C 7.1 09/02/2020     BNP:  No results for input(s): BNP in the last 72 hours. IMAGING:     [facility] 1/4/19  LEFT HEART CATH  LM: luminals  LAD: proximal 90%  LCX: Luminals into small LCx                OM1- occluded  RCA: dominant, 100% proximal occlusion  1. Successful PCi to distal SVG-RCA using 1 drug eluting stent   please see full report in epic  2.  Jolanta graft to LAD open with excellent flow ECHO 1/4/19  Summary   The left ventricular systolic function is mildly reduced with an ejection   fraction of 40-45 %.   There is hypokinesis of the basal inferior and inferolateral walls.   There is mild concentric left ventricular hypertrophy.   Grade I diastolic dysfunction with normal left ventricular filling pressure.   The right ventricle is mildly enlarged.   The right atrium is mildly dilated.   Mild mitral regurgitation.   Systolic pulmonary artery pressure (SPAP) is normal estimated at 26 mmHg   (Right atrial pressure of 8 mmHg). EKG 1/3/19   Sinus rhythm with 1st degree A-V block with Premature atrial complexesInferior infarct , possibly acuteLateral injury pattern ACUTE MI  Consider right ventricular involvement in acute inferior infarctAbnormal ECGWhen compared with ECG of 02-JAN-2019 18:09,Significant changes have occurredConfirmed by [facility] VERITO HENRIQUEZ, Goldy Phillips ([id number]) on 1/4/2019 4:59:14 PM    CTA abdomen 8/19/18  FINDINGS: Lung bases:  Visualized lung bases are well aerated without focal airspace consolidation, lung nodule or lung mass. No pleural or pericardial effusion. Heart size appears within normal limits. Organs: The liver has normal size and contours. No suspicious intrahepatic mass lesion identified. No extrahepatic biliary ductal dilatation. The gallbladder is present. The spleen, pancreas and adrenal glands have a normal noncontrast CT appearance. The kidneys are symmetric in size and noncontrast appearance. No suspicious renal lesions identified. There is a 2 mm distal left ureteral calculus. This causes mild proximal hydroureteronephrosis. No renal, right ureteral or intravesicular calculi are identified. No right obstructive uropathy. GI/bowel: No dilated loops of bowel, or findings to suggest obstruction. No mural thickening or adjacent inflammatory changes identified. The appendix is normal and nondilated.    Peritoneum/retroperitoneum: No lymphadenopathy, free fluid or free air identified in the abdomen or pelvis. There are moderate calcific atherosclerotic changes of the abdominal aorta, which is ectatic, but not aneurysmal.   Pelvis: Prostate and seminal vesicles have normal size and noncontrast CT appearance. . The urinary bladder is unremarkable. Bones/soft tissues: There is multilevel degenerative disc disease and hypertrophic degenerative changes of the spine and both hip joints. No suspicious osseous lesions are identified. EKG 2/13/18  Sinus rhythm with 1st degree A-V block RSR' or QR pattern in V1 suggests right ventricular conduction delay Possible Left atrial enlargement Inferior infarct , age undetermined Abnormal ECG   When compared with ECG of 08-DEC-2012 14:57, [place] interval has increased Inferior infarct is now Present Nonspecific T wave abnormality now evident in Lateral leads Confirmed by Tania Tracy ([id number]) on 2/13/2018 10:30:13 PM     CT abdomen/pelvis 2/13/18  Air-fluid levels throughout the colon consistent with underlying diarrheal illness. Mild fusiform aneurysmal dilatation of the infrarenal abdominal aorta measuring up to 26 mm. EKG 7/26/17  Sinus  Rhythm -Nonspecific QRS widening. -Old inferolateral infarct. -  Nonspecific T-abnormality    EKG 12/12/16  SR 1st degree AV block Old inferior wall MI  EKG 11/11/15  SR old inferior wall MI    Myoview stress test 5/28/15  There is a moderate sized fixed defect within the inferior and basal lateral   walls consistent with prior infarction. There is mild hypokinesis in these   segments. There is no ischemia,   The left ventricular function is overall normal at 58%   The LV is mildly dilated. Stress Myoview 8/2013  Small-moderate fixed defect consistent with previous infarction    XR CHEST STANDARD TWO VW      COMPARISON: 12/8/2012   CLINICAL INDICATION: Cough   FINDINGS: Elevation of the right hemidiaphragm.  Status post median

## 2020-11-13 NOTE — PATIENT INSTRUCTIONS
1. Medications reviewed. No refills warranted at this time  2. Continue current course  3.  Follow up in 6 months

## 2020-12-04 ENCOUNTER — OFFICE VISIT (OUTPATIENT)
Dept: FAMILY MEDICINE CLINIC | Age: 75
End: 2020-12-04
Payer: MEDICARE

## 2020-12-04 VITALS
HEIGHT: 72 IN | BODY MASS INDEX: 27.85 KG/M2 | WEIGHT: 205.6 LBS | DIASTOLIC BLOOD PRESSURE: 76 MMHG | TEMPERATURE: 98.2 F | SYSTOLIC BLOOD PRESSURE: 138 MMHG | HEART RATE: 64 BPM | OXYGEN SATURATION: 99 %

## 2020-12-04 LAB — HBA1C MFR BLD: 6.3 %

## 2020-12-04 PROCEDURE — 83036 HEMOGLOBIN GLYCOSYLATED A1C: CPT | Performed by: FAMILY MEDICINE

## 2020-12-04 PROCEDURE — 99213 OFFICE O/P EST LOW 20 MIN: CPT | Performed by: FAMILY MEDICINE

## 2020-12-04 PROCEDURE — G8510 SCR DEP NEG, NO PLAN REQD: HCPCS | Performed by: FAMILY MEDICINE

## 2020-12-04 ASSESSMENT — ENCOUNTER SYMPTOMS
SHORTNESS OF BREATH: 0
ABDOMINAL PAIN: 0
NAUSEA: 0
VOMITING: 0
CHEST TIGHTNESS: 0

## 2020-12-04 ASSESSMENT — PATIENT HEALTH QUESTIONNAIRE - PHQ9
SUM OF ALL RESPONSES TO PHQ QUESTIONS 1-9: 0
SUM OF ALL RESPONSES TO PHQ9 QUESTIONS 1 & 2: 0
1. LITTLE INTEREST OR PLEASURE IN DOING THINGS: 0
2. FEELING DOWN, DEPRESSED OR HOPELESS: 0

## 2020-12-04 NOTE — PROGRESS NOTES
Subjective:      Patient ID: Chandrika Caicedo is a 77 y.o. male. HPI    Chief Complaint   Patient presents with    Hyperglycemia     Patient is here for a 3 month follow up, he is fasting for blood work. Here for checkup on hyperglycemia and pre DM.  States has been doing well since his last visit. Stopped eating donuts. Has been hunting a lot  BPs are running good. Denies CP,SOB or syncope     Omari Carbajal is a 77 y.o. male with the following history as recorded in [facility]:  Patient Active Problem List    Diagnosis Date Noted    Psoriasis 08/20/2019    Aneurysm of infrarenal abdominal aorta (HCC) 08/20/2019    Thrombocytopenia (HCC) 08/20/2019    Prediabetes 08/20/2019    Ischemic cardiomyopathy     CHF (congestive heart failure) ([address] 01/03/2019    HTN (hypertension) 01/08/2011    Hyperlipemia 01/08/2011    CAD (coronary artery disease) 01/08/2011    S/P CABG (coronary artery bypass graft) 01/08/2011    Lumbar pain 01/08/2011     Current Outpatient Medications   Medication Sig Dispense Refill    Zinc 15 MG CAPS Take 1 capsule by mouth daily      metoprolol succinate (TOPROL XL) 100 MG extended release tablet TAKE ONE TABLET BY MOUTH DAILY 90 tablet 2    losartan (COZAAR) 100 MG tablet TAKE ONE TABLET BY MOUTH DAILY 90 tablet 1    atorvastatin (LIPITOR) 20 MG tablet TAKE ONE TABLET BY MOUTH DAILY 90 tablet 2    montelukast (SINGULAIR) 10 MG tablet TAKE ONE TABLET BY MOUTH ONCE NIGHTLY 90 tablet 1    ticagrelor (BRILINTA) 60 MG TABS tablet Take 1 tablet by mouth daily 90 tablet 3    halobetasol (ULTRAVATE) 0.05 % cream APPLY TO AFFECTED AREA(S) TWO TIMES A DAY AS NEEDED 1 Tube 1    calcipotriene (DOVONEX) 0.005 % cream APPLY TO AFFECTED AREA(S) TWO TIMES A DAY AS NEEDED 1 Tube 1    aspirin 81 MG chewable tablet Take 1 tablet by mouth daily 30 tablet 11    Omega-3 Fatty Acids (FISH OIL) 1200 MG CAPS Take 1 capsule by mouth daily.  30 capsule 11    nitroGLYCERIN (NITROSTAT) 0.4 MG SL tablet Place 0.4 mg under the tongue every 5 minutes as needed.  Ascorbic Acid (VITAMIN C) 500 MG tablet Take 500 mg by mouth daily.  Multiple Vitamins-Minerals (CENTRUM SILVER) TABS Take 1 tablet by mouth daily. No current facility-administered medications for this visit. Allergies: Ciprofloxacin and Plavix [clopidogrel bisulfate]  Past Medical History:   Diagnosis Date    Arthritis of hand     arthritis in hands and thumbs    CAD (coronary artery disease)     Hyperlipidemia     Hypertension     Ischemic cardiomyopathy 2019    STEMI (ST elevation myocardial infarction) ([address] 2019    Tinnitus      Past Surgical History:   Procedure Laterality Date    CORONARY ANGIOPLASTY  2011    emergency PCI: vein to RCA    CORONARY ANGIOPLASTY WITH STENT PLACEMENT  2019    PCI to distal SVG-RCA with ELIZABETH    CORONARY ARTERY BYPASS GRAFT      in  CABG x4 and in  CABG x2    DIAGNOSTIC CARDIAC CATH LAB PROCEDURE      VASECTOMY  2010     Family History   Problem Relation Age of Onset    Cancer Mother         skin cancer    Diabetes Mother     High Blood Pressure Father     Heart Disease Father     No Known Problems Brother     Heart Disease Paternal Uncle     Heart Disease Paternal Cousin     No Known Problems Brother      Social History     Tobacco Use    Smoking status: Former Smoker     Packs/day: 1.00     Years: 20.00     Pack years: 20.00     Types: Cigarettes     Last attempt to quit: 1991     Years since quittin.9    Smokeless tobacco: Never Used   Substance Use Topics    Alcohol use: No     Vitals:    20 0825   BP: 138/76   Pulse: 64   Temp: 98.2 °F (36.8 °C)   TempSrc: Temporal   SpO2: 99%   Weight: 205 lb 9.6 oz (93.3 kg)   Height: 6' (1.829 m)     Body mass index is 27.88 kg/m².      Wt Readings from Last 3 Encounters:   20 205 lb 9.6 oz (93.3 kg)   20 200 lb (90.7 kg)   20 207 lb 12.8 oz (94.3 kg)     BP

## 2020-12-04 NOTE — PATIENT INSTRUCTIONS

## 2021-01-26 RX ORDER — MONTELUKAST SODIUM 10 MG/1
TABLET ORAL
Qty: 90 TABLET | Refills: 1 | Status: SHIPPED | OUTPATIENT
Start: 2021-01-26 | End: 2021-06-04

## 2021-01-26 NOTE — TELEPHONE ENCOUNTER
Milli Sterling is requesting refill(s) singulair  Last OV 12/4/20 (pertaining to medication)  LR 7/30/20 (per medication requested)  Next office visit scheduled or attempted Yes   If no, reason:  3/15/21

## 2021-02-22 NOTE — TELEPHONE ENCOUNTER
Curry Jeff [phone] (home)    is requesting refill(s) of medication Losartan to preferred pharmacy [id number] 12/4/20 (pertaining to medication)   Last refill 8/26/20 (per medication requested)  Next office visit scheduled or attempted Yes  Date 3/15/21  If No, reason

## 2021-03-02 RX ORDER — LOSARTAN POTASSIUM 100 MG/1
TABLET ORAL
Qty: 90 TABLET | Refills: 0 | Status: SHIPPED | OUTPATIENT
Start: 2021-03-02 | End: 2021-06-04 | Stop reason: ALTCHOICE

## 2021-04-16 ENCOUNTER — TELEPHONE (OUTPATIENT)
Dept: FAMILY MEDICINE CLINIC | Age: 76
End: 2021-04-16

## 2021-04-16 NOTE — TELEPHONE ENCOUNTER
Ashley Gabriel from [employer] as a courtesy to inform office of the patients admission to their facility.

## 2021-06-04 ENCOUNTER — OFFICE VISIT (OUTPATIENT)
Dept: FAMILY MEDICINE CLINIC | Age: 76
End: 2021-06-04
Payer: MEDICARE

## 2021-06-04 VITALS
BODY MASS INDEX: 22.62 KG/M2 | OXYGEN SATURATION: 94 % | WEIGHT: 167 LBS | DIASTOLIC BLOOD PRESSURE: 58 MMHG | SYSTOLIC BLOOD PRESSURE: 116 MMHG | HEART RATE: 85 BPM | HEIGHT: 72 IN

## 2021-06-04 DIAGNOSIS — E78.2 MIXED HYPERLIPIDEMIA: ICD-10-CM

## 2021-06-04 DIAGNOSIS — J84.10 PULMONARY FIBROSIS (HCC): ICD-10-CM

## 2021-06-04 DIAGNOSIS — R73.03 PREDIABETES: ICD-10-CM

## 2021-06-04 DIAGNOSIS — I10 ESSENTIAL HYPERTENSION: ICD-10-CM

## 2021-06-04 DIAGNOSIS — S57.82XS CRUSHING INJURY OF LEFT FOREARM, SEQUELA: ICD-10-CM

## 2021-06-04 DIAGNOSIS — R23.8 COVID TOES: ICD-10-CM

## 2021-06-04 DIAGNOSIS — R49.0 VOICE HOARSENESS: ICD-10-CM

## 2021-06-04 DIAGNOSIS — J96.11 CHRONIC RESPIRATORY FAILURE WITH HYPOXIA (HCC): ICD-10-CM

## 2021-06-04 DIAGNOSIS — G72.81 CRITICAL ILLNESS MYOPATHY: ICD-10-CM

## 2021-06-04 DIAGNOSIS — U07.1 COVID-19: ICD-10-CM

## 2021-06-04 DIAGNOSIS — D64.9 ANEMIA, UNSPECIFIED TYPE: ICD-10-CM

## 2021-06-04 DIAGNOSIS — U07.1 COVID TOES: ICD-10-CM

## 2021-06-04 DIAGNOSIS — R53.1 GENERAL WEAKNESS: Primary | ICD-10-CM

## 2021-06-04 PROBLEM — S57.82XA: Status: ACTIVE | Noted: 2021-06-04

## 2021-06-04 PROCEDURE — 99214 OFFICE O/P EST MOD 30 MIN: CPT | Performed by: FAMILY MEDICINE

## 2021-06-04 RX ORDER — PROMETHAZINE HYDROCHLORIDE 25 MG/1
1 TABLET ORAL DAILY
COMMUNITY

## 2021-06-04 RX ORDER — CLONAZEPAM 0.5 MG/1
1 TABLET ORAL 2 TIMES DAILY
COMMUNITY
Start: 2021-05-27 | End: 2021-06-22 | Stop reason: SDUPTHER

## 2021-06-04 RX ORDER — FLUTICASONE PROPIONATE 50 MCG
1 SPRAY, SUSPENSION (ML) NASAL 2 TIMES DAILY
COMMUNITY
Start: 2021-05-27 | End: 2021-06-04 | Stop reason: ALTCHOICE

## 2021-06-04 RX ORDER — GABAPENTIN 300 MG/1
1 CAPSULE ORAL EVERY 8 HOURS
COMMUNITY
Start: 2021-05-27 | End: 2021-06-22 | Stop reason: SDUPTHER

## 2021-06-04 RX ORDER — LANOLIN ALCOHOL/MO/W.PET/CERES
100 CREAM (GRAM) TOPICAL DAILY
COMMUNITY
Start: 2021-03-13 | End: 2021-06-22 | Stop reason: SDUPTHER

## 2021-06-04 RX ORDER — FUROSEMIDE 20 MG/1
10 TABLET ORAL DAILY
COMMUNITY
Start: 2021-05-27 | End: 2021-06-15 | Stop reason: ALTCHOICE

## 2021-06-04 RX ORDER — FLUOXETINE HYDROCHLORIDE 20 MG/1
1 CAPSULE ORAL DAILY
COMMUNITY
Start: 2021-05-27 | End: 2021-06-22 | Stop reason: SDUPTHER

## 2021-06-04 RX ORDER — DOXAZOSIN MESYLATE 1 MG/1
1 TABLET ORAL NIGHTLY
COMMUNITY
Start: 2021-05-27 | End: 2021-06-22 | Stop reason: SDUPTHER

## 2021-06-04 RX ORDER — LEVOTHYROXINE SODIUM 0.03 MG/1
1 TABLET ORAL DAILY
COMMUNITY
Start: 2021-05-27 | End: 2021-06-23

## 2021-06-04 RX ORDER — PANTOPRAZOLE SODIUM 40 MG/1
1 TABLET, DELAYED RELEASE ORAL DAILY
COMMUNITY
Start: 2021-05-27 | End: 2021-06-22 | Stop reason: SDUPTHER

## 2021-06-04 RX ORDER — BENZONATATE 100 MG/1
100 CAPSULE ORAL 2 TIMES DAILY
Status: ON HOLD | COMMUNITY
End: 2021-08-12

## 2021-06-04 RX ORDER — BUDESONIDE 0.5 MG/2ML
0.5 INHALANT ORAL 2 TIMES DAILY PRN
COMMUNITY
Start: 2021-05-27 | End: 2021-10-27 | Stop reason: ALTCHOICE

## 2021-06-04 RX ORDER — IPRATROPIUM BROMIDE AND ALBUTEROL SULFATE 2.5; .5 MG/3ML; MG/3ML
1 SOLUTION RESPIRATORY (INHALATION) 4 TIMES DAILY
COMMUNITY
End: 2021-10-27 | Stop reason: ALTCHOICE

## 2021-06-04 SDOH — ECONOMIC STABILITY: FOOD INSECURITY: WITHIN THE PAST 12 MONTHS, YOU WORRIED THAT YOUR FOOD WOULD RUN OUT BEFORE YOU GOT MONEY TO BUY MORE.: NEVER TRUE

## 2021-06-04 SDOH — ECONOMIC STABILITY: FOOD INSECURITY: WITHIN THE PAST 12 MONTHS, THE FOOD YOU BOUGHT JUST DIDN'T LAST AND YOU DIDN'T HAVE MONEY TO GET MORE.: NEVER TRUE

## 2021-06-04 ASSESSMENT — ENCOUNTER SYMPTOMS
WHEEZING: 0
ABDOMINAL PAIN: 0
CONSTIPATION: 0
COUGH: 0
EYE DISCHARGE: 0
SINUS PRESSURE: 0
EYE PAIN: 0
CHEST TIGHTNESS: 0
DIARRHEA: 0
RHINORRHEA: 0
BLOOD IN STOOL: 0
VOMITING: 0

## 2021-06-04 ASSESSMENT — SOCIAL DETERMINANTS OF HEALTH (SDOH): HOW HARD IS IT FOR YOU TO PAY FOR THE VERY BASICS LIKE FOOD, HOUSING, MEDICAL CARE, AND HEATING?: NOT HARD AT ALL

## 2021-06-04 NOTE — LETTER
[address] [place] [address]  Phone: [phone]  Fax: [phone]    [facility]         June 4, 2021     Patient: Shannan Keita   YOB: 1945   Date of Visit: 6/4/2021       To Whom It May Concern: It is my medical opinion that Shannan Keita requires a disability parking placard for the following reasons:  He cannot walk without assistance from another person or the use of an assistance device (cane, crutch, prosthetic device, wheelchair, etc.), uses portable oxygen. Duration of need: 5 years    If you have any questions or concerns, please don't hesitate to call.     Sincerely,        [facility] DO GEORGETTE
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
## 2021-06-06 ASSESSMENT — ENCOUNTER SYMPTOMS
SHORTNESS OF BREATH: 1
COLOR CHANGE: 0
VOICE CHANGE: 1

## 2021-06-07 ENCOUNTER — TELEPHONE (OUTPATIENT)
Dept: FAMILY MEDICINE CLINIC | Age: 76
End: 2021-06-07

## 2021-06-08 NOTE — PATIENT INSTRUCTIONS
Please call your pharmacy if you need any refills of your medication(s). Please call our office at [phone]  if you don't hear from us about your test results. Please be sure to call our office if your illness/problem has been treated for but has not completely resolved. Bring an accurate list of your medications with you at every appointment to ensure that we have the correct information.     Our office hours are: Monday - Friday 7 am- 5 pm

## 2021-06-09 ENCOUNTER — TELEPHONE (OUTPATIENT)
Dept: FAMILY MEDICINE CLINIC | Age: 76
End: 2021-06-09

## 2021-06-09 NOTE — TELEPHONE ENCOUNTER
Fredricka Mcardle asking for voice therapy orders - 2X a week for one week and then 1X a week for 3 weeks. Hosp f/u was on 6/4/21.

## 2021-06-10 ENCOUNTER — OFFICE VISIT (OUTPATIENT)
Dept: ENT CLINIC | Age: 76
End: 2021-06-10
Payer: MEDICARE

## 2021-06-10 VITALS
DIASTOLIC BLOOD PRESSURE: 66 MMHG | BODY MASS INDEX: 22.35 KG/M2 | HEIGHT: 72 IN | HEART RATE: 84 BPM | TEMPERATURE: 97.3 F | SYSTOLIC BLOOD PRESSURE: 106 MMHG | WEIGHT: 165 LBS

## 2021-06-10 DIAGNOSIS — R49.0 DYSPHONIA: Primary | ICD-10-CM

## 2021-06-10 DIAGNOSIS — R04.0 EPISTAXIS: ICD-10-CM

## 2021-06-10 PROCEDURE — 99203 OFFICE O/P NEW LOW 30 MIN: CPT | Performed by: OTOLARYNGOLOGY

## 2021-06-10 PROCEDURE — 31575 DIAGNOSTIC LARYNGOSCOPY: CPT | Performed by: OTOLARYNGOLOGY

## 2021-06-10 RX ORDER — ECHINACEA PURPUREA EXTRACT 125 MG
1 TABLET ORAL
Qty: 2 BOTTLE | Refills: 5 | Status: SHIPPED | OUTPATIENT
Start: 2021-06-10 | End: 2021-09-07

## 2021-06-10 NOTE — PATIENT INSTRUCTIONS
Nosebleed Instructions:  -Patient instructed to gently place a dime sized amount of ointment on the nasal sill nightly.   -Nasal saline sprays 4-5 times daily for nasal moisturization  -Avoid digital nasal trauma   -Avoid nose blowing for the next 4-5 days to encourage mucosal healing   -Room humidification recommended

## 2021-06-10 NOTE — PROGRESS NOTES
[address] [facility] SURGERY  NEW PATIENT HISTORY AND PHYSICAL NOTE      Patient Name: Price Savage  Medical Record Number:  [id number]  Primary Care Physician:  Jose Celeste DO    ChiefComplaint     Chief Complaint   Patient presents with    Hoarse     Symptoms started several months ago following ventilation and trach removal. He denies any pain. History of Present Illness     Price Savage is an 77 y.o. male with history of Covid-19 infection resulting in hypoxic respiratory failure, intubation and prior tracheostomy. This occurred in [place] around 11/2020, he has since been decannulated however continues to have significant dyspnea, an oxygen requirement (on 2 L nasal cannula) and dysphonia. He denies any dysphagia/odynophagia, states postnasal drip and thick nasal secretions. Denies chest burning, waterbrash. Drinks 1 cup of coffee daily. Denies tobacco use, no EtOH use.   No prior history of pulmonary disease, however extensive history of cardiovascular disease (CAD, prior CABG and stents)    Past Medical History     Past Medical History:   Diagnosis Date    Arthritis of hand     arthritis in hands and thumbs    CAD (coronary artery disease)     Hyperlipidemia     Hypertension     Ischemic cardiomyopathy 01/2019    STEMI (ST elevation myocardial infarction) ([facility] [address] 01/03/2019    Tinnitus        Past Surgical History     Past Surgical History:   Procedure Laterality Date    CORONARY ANGIOPLASTY  01/08/2011    emergency PCI: vein to RCA    CORONARY ANGIOPLASTY WITH STENT PLACEMENT  01/03/2019    PCI to distal SVG-RCA with ELIZABETH    CORONARY ARTERY BYPASS GRAFT      in 1990 CABG x4 and in 2001 CABG x2    DIAGNOSTIC CARDIAC CATH LAB PROCEDURE      VASECTOMY  April 2010       Family History     Family History   Problem Relation Age of Onset    Cancer Mother         skin cancer    Diabetes Mother     High Blood Pressure Father     Heart Disease Father     No Known Problems Brother     Heart Disease Paternal Uncle     Heart Disease Paternal Cousin     No Known Problems Brother        Social History     Social History     Tobacco Use    Smoking status: Former Smoker     Packs/day: 1.00     Years: 20.00     Pack years: 20.00     Types: Cigarettes     Quit date: 1991     Years since quittin.4    Smokeless tobacco: Never Used   Vaping Use    Vaping Use: Never used   Substance Use Topics    Alcohol use: No    Drug use: No        Allergies     Allergies   Allergen Reactions    Ciprofloxacin      diarrhea    Plavix [Clopidogrel Bisulfate]        Medications     Current Outpatient Medications   Medication Sig Dispense Refill    sodium chloride (ALTAMIST SPRAY) 0.65 % nasal spray 1 spray by Nasal route every 2 hours (while awake) 2 Bottle 5    budesonide (PULMICORT) 0.5 MG/2ML nebulizer suspension Take 0.5 mg by nebulization 2 times daily      clonazePAM (KLONOPIN) 0.5 MG tablet Take 1 tablet by mouth 2 times daily.  doxazosin (CARDURA) 1 MG tablet Take 1 mg by mouth nightly      FLUoxetine (PROZAC) 20 MG capsule Take 1 capsule by mouth daily      furosemide (LASIX) 20 MG tablet Take 10 mg by mouth daily      gabapentin (NEURONTIN) 300 MG capsule Take 1 capsule by mouth every 8 hours.       metoprolol tartrate (LOPRESSOR) 25 MG tablet Take 12.5 mg by mouth 2 times daily      Multiple Vitamins-Iron TABS Take 1 tablet by mouth daily      levothyroxine (SYNTHROID) 25 MCG tablet Take 1 tablet by mouth daily      pantoprazole (PROTONIX) 40 MG tablet Take 1 tablet by mouth daily      thiamine 100 MG tablet Take 100 mg by mouth daily      Omega-3 Fatty Acids (OMEGA-3 FISH OIL PO) Take 2,000 mg by mouth daily      benzonatate (TESSALON) 100 MG capsule Take 100 mg by mouth 2 times daily      ipratropium-albuterol (DUONEB) 0.5-2.5 (3) MG/3ML SOLN nebulizer solution Inhale 1 vial into the lungs 4 times daily      atorvastatin (LIPITOR) 20 MG tablet TAKE ONE TABLET BY MOUTH DAILY 90 tablet 2    nitroGLYCERIN (NITROSTAT) 0.4 MG SL tablet Place 0.4 mg under the tongue every 5 minutes as needed. No current facility-administered medications for this visit. Review of Systems     REVIEW OF SYSTEMS  The following systems were reviewed and revealed the following in addition to any already discussed in the HPI:    CONSTITUTIONAL: + Weight loss, no fever, no night sweats, no chills  EYES: no vision changes, no blurry vision  EARS: no changes in hearing, no otalgia  NOSE: + Epistaxis, no rhinorrhea  RESPIRATORY: + Difficulty breathing, + shortness of breath  CV: no chest pain, no peripheral vascular disease  HEME: No coagulation disorder, no bleeding disorder  NEURO: No TIA or stroke-like symptoms  SKIN: No new rashes in the head and neck, no recent skin cancers  MOUTH: No new ulcers, no recent teeth infections  GASTROINTESTINAL: No diarrhea, + stomach pain  PSYCH: No anxiety, no depression    PhysicalExam     Vitals:    06/10/21 0926   BP: 106/66   Site: Right Upper Arm   Position: Sitting   Pulse: 84   Temp: 97.3 °F (36.3 °C)   Weight: 165 lb (74.8 kg)   Height: 6' (1.829 m)       PHYSICAL EXAM  /66 (Site: Right Upper Arm, Position: Sitting)   Pulse 84   Temp 97.3 °F (36.3 °C)   Ht 6' (1.829 m)   Wt 165 lb (74.8 kg)   BMI 22.38 kg/m²     GENERAL: No Acute Distress, Alert and Oriented, significant hoarseness with asthenia and severe strain; maximum phonatory time 7 seconds  EYES: EOMI, Anti-icteric  NOSE: On anterior rhinoscopy there is no epistaxis (however bloody crusting is noted over the left Kiesselbach's plexus), nasal mucosa within normal limits, no purulent drainage. Surgicel was placed over the left Kiesselbach's plexus.   EARS: Normal external appearance; on portable otomicroscopy:  -Right ear: External auditory canal without stenosis, tympanic membrane clear, no middle ear effusions or retractions  -Left ear: External laryngeal ventricle  Hypopharynx Mucosa: No masses or inflammation of the piriform sinuses or postcricoid area  Arytenoids: Slightly edematous interarytenoid mucosa, no dislocation appreciated     * Patient tolerated the procedure well with no complications   * Patient was instructed not to eat for 30 minutes following procedure. * Patient was instructed that they may notice minor bleeding. Attestation:   I was present for the entire viewing, including introduction and removal of the scope. Reflux Finding Score:  Subglottic edema 0?=?absent  2? =?present    Ventricular obl. 2?=?partial  4? =?complete    Erythema/hyperemia 2? =?arytenoids only  4? =?diffuse    Vocal fold edema 1? =?mild  2? =?moderate  3? =?severe  4? =?polypoid    Diffuse laryngeal  1? =?mild  edema   2? =?moderate  3? =?severe  4?=?obstructing    Posterior   1? =?mild  commissure   2? =?moderate  hypertrophy  3? =?severe  4?=?obstructing    Granuloma/  0?=?absent  granulation tissue 2? =?present    Thick endolaryngeal  0?=?absent  mucus   2? =?present  Abbreviation: RFS, Reflux Finding Score. RSF?>?7? =? Laryngo Pharyngeal Reflux (LPR). Total Score: 4      Assessment and Plan     1. Dysphonia  76-year-old male with prior history of acute hypoxic respiratory failure due to COVID-19, necessitating mechanical ventilation and tracheostomy-he has since been decannulated, however continues to have dysphonia. Laryngoscopy with signs of muscle tension dysphonia, no vocal cord paralysis, arytenoid joint subluxation or masses appreciated. We will refer him to speech therapy for video stroboscopy and voice therapy, and see him back on an as-needed basis following their recommendations  - [address], Debora Bearden, SLP - Speech Therapy [facility]    2. Epistaxis  Patient with intermittent epistaxis, especially from the left side. This is likely due to his use of nasal cannula for oxygen-he uses a humidifier at home however this does not appear to be helping.   We have asked that he use nasal saline sprays every 2-4 hours, Vaseline to the nose nightly, room humidification. We will see him back on an as-needed basis for this  - sodium chloride (ALTAMIST SPRAY) 0.65 % nasal spray; 1 spray by Nasal route every 2 hours (while awake)  Dispense: 2 Bottle; Refill: 5      Return if symptoms worsen or fail to improve. Anshul Arredondo MD  [facility]  Department of Otolaryngology/Head and Neck Surgery  6/10/21    I have performed a head and neck physical exam personally or was physically present during the key or critical portions of the service. Medical Decision Making: The following items were considered in medical decision making:  Independent review of images  Review / order clinical lab tests  Review / order radiology tests  Decision to obtain old records  Review and summation of old records as accessed through [facility] (a summary of my findings in these old records: None)     Portions of this note were dictated using Dragon.  There may be linguistic errors secondary to the use of this program.

## 2021-06-14 PROBLEM — I71.43 ANEURYSM OF INFRARENAL ABDOMINAL AORTA: Status: RESOLVED | Noted: 2019-08-20 | Resolved: 2021-06-14

## 2021-06-15 ENCOUNTER — OFFICE VISIT (OUTPATIENT)
Dept: CARDIOLOGY CLINIC | Age: 76
End: 2021-06-15
Payer: MEDICARE

## 2021-06-15 VITALS
SYSTOLIC BLOOD PRESSURE: 90 MMHG | HEIGHT: 72 IN | WEIGHT: 175 LBS | HEART RATE: 73 BPM | DIASTOLIC BLOOD PRESSURE: 60 MMHG | BODY MASS INDEX: 23.7 KG/M2 | OXYGEN SATURATION: 93 %

## 2021-06-15 DIAGNOSIS — I25.810 CORONARY ARTERY DISEASE INVOLVING CORONARY BYPASS GRAFT OF NATIVE HEART WITHOUT ANGINA PECTORIS: Primary | ICD-10-CM

## 2021-06-15 DIAGNOSIS — I10 ESSENTIAL HYPERTENSION: ICD-10-CM

## 2021-06-15 DIAGNOSIS — J84.10 PULMONARY FIBROSIS (HCC): ICD-10-CM

## 2021-06-15 DIAGNOSIS — I50.42 CHRONIC COMBINED SYSTOLIC AND DIASTOLIC CONGESTIVE HEART FAILURE (HCC): ICD-10-CM

## 2021-06-15 DIAGNOSIS — E78.2 MIXED HYPERLIPIDEMIA: ICD-10-CM

## 2021-06-15 DIAGNOSIS — I25.5 ISCHEMIC CARDIOMYOPATHY: ICD-10-CM

## 2021-06-15 PROCEDURE — 99214 OFFICE O/P EST MOD 30 MIN: CPT | Performed by: INTERNAL MEDICINE

## 2021-06-15 RX ORDER — ATORVASTATIN CALCIUM 20 MG/1
TABLET, FILM COATED ORAL
Qty: 90 TABLET | Refills: 2 | Status: SHIPPED | OUTPATIENT
Start: 2021-06-15 | End: 2022-03-14 | Stop reason: SDUPTHER

## 2021-06-15 RX ORDER — GUAIFENESIN 1200 MG/1
TABLET, EXTENDED RELEASE ORAL 2 TIMES DAILY
COMMUNITY

## 2021-06-22 DIAGNOSIS — F41.9 ANXIETY: Primary | ICD-10-CM

## 2021-06-22 NOTE — TELEPHONE ENCOUNTER
Obed Hylton [phone] (home)    is requesting refill(s) of medication clonazePAM (KLONOPIN) 0.5 MG tablet; doxazosin (CARDURA) 1 MG tablet; FLUoxetine (PROZAC) 20 MG capsule; gabapentin (NEURONTIN) 300 MG capsule; metoprolol tartrate (LOPRESSOR) 25 MG tablet; pantoprazole (PROTONIX) 40 MG tablet; thiamine 100 MG tablet   to [facility] [address] [place] [address],[address] [facility], [facility] ABHISHEK Eastman 6/4/21 (pertaining to medication)   Last refill N/A (per medication requested)  Next office visit scheduled or attempted No  Date N/A  If No, reason N/A

## 2021-06-23 ENCOUNTER — OFFICE VISIT (OUTPATIENT)
Dept: PULMONOLOGY | Age: 76
End: 2021-06-23
Payer: MEDICARE

## 2021-06-23 VITALS
DIASTOLIC BLOOD PRESSURE: 73 MMHG | HEIGHT: 72 IN | BODY MASS INDEX: 21.26 KG/M2 | OXYGEN SATURATION: 96 % | HEART RATE: 75 BPM | TEMPERATURE: 97.6 F | SYSTOLIC BLOOD PRESSURE: 119 MMHG | WEIGHT: 157 LBS

## 2021-06-23 DIAGNOSIS — Z87.891 FORMER SMOKER: ICD-10-CM

## 2021-06-23 DIAGNOSIS — J84.9 ILD (INTERSTITIAL LUNG DISEASE) (HCC): Primary | ICD-10-CM

## 2021-06-23 DIAGNOSIS — U07.1 COVID-19: ICD-10-CM

## 2021-06-23 DIAGNOSIS — Z98.890 H/O TRACHEOSTOMY: ICD-10-CM

## 2021-06-23 DIAGNOSIS — J96.01 ACUTE RESPIRATORY FAILURE WITH HYPOXIA (HCC): ICD-10-CM

## 2021-06-23 DIAGNOSIS — R49.0 HOARSE VOICE QUALITY: ICD-10-CM

## 2021-06-23 PROCEDURE — 99204 OFFICE O/P NEW MOD 45 MIN: CPT | Performed by: INTERNAL MEDICINE

## 2021-06-23 NOTE — PROGRESS NOTES
Pulmonary Outpatient Note   Helena Fowler MD       6/23/2021    1. ILD (interstitial lung disease) ([facility] [address]    2. Acute respiratory failure with hypoxia ([facility] [address]    3. COVID-19    4. Hoarse voice quality    5. H/O tracheostomy    6. Former smoker          ASSESSMENT/PLAN:  ILD, severe COVID-19 infection with likely pneumonia/ARDS. Suspect he may have Covid/ARDS related pulmonary fibrosis leading to exertional dyspnea, hypoxemia. Recommend CT chest.  Will obtain to get his discharge summary and possibly images from [facility]. Will check PFT  Acute respiratory failure with hypoxia. Continue with oxygen to keep SaO2 >90%. Will check overnight oximetry on room air  Hoarse voice, history of tracheostomy. Potential for tracheal stenosis/granulation tissue. Await results of CT. May need ENT evaluation. Former smoker. Was not a heavy smoker. COPD appears unlikely. Prophylaxis. Has received his immunizations including 1 dose of the COVID-19 vaccine. RTC with testing    Orders Placed This Encounter   Procedures    CT CHEST WO CONTRAST    Pulse oximetry, overnight    Full PFT Study With Bronchodilator       No follow-ups on file. Chief Complaint:   New Patient (Pulm fibrosis history covid R/B Dr. Gautam Roe)       HPI: Mera Caldwell is a 77y.o. year old male here for evaluation of shortness of breath. His PCP is Dr. Mikhail Simpson, cardiologist Dr. Gilda Glez. Rome Lamar is a pleasant 81-year-old male, who has had a very complicated recent medical history. The patient is accompanied by his wife Fabian Allen, who provides most of the history. The patient smoked less than a pack per day from age [age]. He worked in/owned a motorcycle shop, did painting of stripes. The patient has a history of hypertension, hyperlipidemia, CAD, CABG, CHF due to ischemic cardiomyopathy, prediabetes. The patient was returning from [place] on 1/14, was not feeling very good.   He decided to return back with a box truck carrying estimated at 26 mmHg   (Right atrial pressure of 8 mmHg).     Past Medical History:   Diagnosis Date    Arthritis of hand     arthritis in hands and thumbs    CAD (coronary artery disease)     Hyperlipidemia     Hypertension     Ischemic cardiomyopathy 2019    STEMI (ST elevation myocardial infarction) ([facility] [address] 2019    Tinnitus        Past Surgical History:   Procedure Laterality Date    BICEPS TENDON REPAIR      CORONARY ANGIOPLASTY  2011    emergency PCI: vein to RCA    CORONARY ANGIOPLASTY WITH STENT PLACEMENT  2019    PCI to distal SVG-RCA with ELIZABETH    CORONARY ARTERY BYPASS GRAFT      in  CABG x4 and in  CABG x2    DIAGNOSTIC CARDIAC CATH LAB PROCEDURE         Social History     Tobacco Use    Smoking status: Former Smoker     Packs/day: 1.00     Years: 20.00     Pack years: 20.00     Types: Cigarettes     Start date: 1961     Quit date: 1991     Years since quittin.4    Smokeless tobacco: Never Used   Substance Use Topics    Alcohol use: No       Family History   Problem Relation Age of Onset    Cancer Mother         skin cancer    Diabetes Mother     High Blood Pressure Father     Heart Disease Father     No Known Problems Brother     No Known Problems Brother     Heart Disease Paternal Uncle     Heart Disease Paternal Cousin          Current Outpatient Medications:     guaiFENesin 1200 MG TB12, Take by mouth, Disp: , Rfl:     atorvastatin (LIPITOR) 20 MG tablet, TAKE ONE TABLET BY MOUTH DAILY, Disp: 90 tablet, Rfl: 2    sodium chloride (ALTAMIST SPRAY) 0.65 % nasal spray, 1 spray by Nasal route every 2 hours (while awake), Disp: 2 Bottle, Rfl: 5    budesonide (PULMICORT) 0.5 MG/2ML nebulizer suspension, Take 0.5 mg by nebulization 2 times daily, Disp: , Rfl:     Multiple Vitamins-Iron TABS, Take 1 tablet by mouth daily, Disp: , Rfl:     Omega-3 Fatty Acids (OMEGA-3 FISH OIL PO), Take 2,000 mg by mouth daily, Disp: , Rfl:     benzonatate (TESSALON) 100 MG capsule, Take 100 mg by mouth 2 times daily, Disp: , Rfl:     ipratropium-albuterol (DUONEB) 0.5-2.5 (3) MG/3ML SOLN nebulizer solution, Inhale 1 vial into the lungs 4 times daily, Disp: , Rfl:     nitroGLYCERIN (NITROSTAT) 0.4 MG SL tablet, Place 0.4 mg under the tongue every 5 minutes as needed. , Disp: , Rfl:     clonazePAM (KLONOPIN) 0.5 MG tablet, Take 1 tablet by mouth daily for 30 days. , Disp: 30 tablet, Rfl: 2    doxazosin (CARDURA) 1 MG tablet, Take 1 tablet by mouth nightly, Disp: 90 tablet, Rfl: 1    FLUoxetine (PROZAC) 20 MG capsule, Take 1 capsule by mouth daily, Disp: 90 capsule, Rfl: 1    gabapentin (NEURONTIN) 300 MG capsule, Take 1 capsule by mouth every 8 hours for 90 days. , Disp: 270 capsule, Rfl: 2    metoprolol tartrate (LOPRESSOR) 25 MG tablet, Take 0.5 tablets by mouth 2 times daily, Disp: 180 tablet, Rfl: 1    pantoprazole (PROTONIX) 40 MG tablet, Take 1 tablet by mouth daily, Disp: 90 tablet, Rfl: 1    thiamine 100 MG tablet, Take 1 tablet by mouth daily, Disp: 90 tablet, Rfl: 1    Ciprofloxacin and Plavix [clopidogrel bisulfate]    Vitals:    06/23/21 1039   BP: 119/73   Pulse: 75   Temp: 97.6 °F (36.4 °C)   TempSrc: Oral   SpO2: 96%   Weight: 157 lb (71.2 kg)   Height: 6' (1.829 m)       Review of Systems   HENT: Positive for voice change. Respiratory: Positive for cough and shortness of breath. Neurological: Positive for weakness. Psychiatric/Behavioral: Positive for sleep disturbance. All other systems reviewed and are negative. Physical Exam  Vitals reviewed. Constitutional:       General: He is not in acute distress. Appearance: He is well-developed. He is not diaphoretic. Comments: Underweight appearing   HENT:      Head: Normocephalic and atraumatic. Nose: Nose normal. No congestion or rhinorrhea. Mouth/Throat:      Mouth: Mucous membranes are moist.      Pharynx: Oropharynx is clear.  No oropharyngeal exudate or posterior oropharyngeal erythema. Comments: Class II airway, missing lower teeth  Eyes:      General: No scleral icterus. Right eye: No discharge. Left eye: No discharge. Extraocular Movements: Extraocular movements intact. Conjunctiva/sclera: Conjunctivae normal.      Pupils: Pupils are equal, round, and reactive to light. Neck:      Thyroid: No thyromegaly. Vascular: No JVD. Trachea: No tracheal deviation. Comments: Tracheostomy scar, well-healed  Cardiovascular:      Rate and Rhythm: Normal rate and regular rhythm. Pulses: Normal pulses. Heart sounds: Normal heart sounds. No murmur heard. No friction rub. No gallop. Pulmonary:      Effort: Pulmonary effort is normal. No respiratory distress. Breath sounds: No stridor. Rales (Bilateral scattered crackles) present. No wheezing. Abdominal:      Palpations: Abdomen is soft. There is no mass. Tenderness: There is no abdominal tenderness. There is no guarding. Comments: Well-healed PEG tube scar   Musculoskeletal:      Right lower leg: No edema. Left lower leg: No edema. Comments: Reduced movement of left forearm   Lymphadenopathy:      Cervical: No cervical adenopathy. Skin:     General: Skin is warm and dry. Coloration: Skin is not jaundiced or pale. Findings: No bruising, erythema, lesion or rash. Neurological:      Mental Status: He is alert and oriented to person, place, and time. Motor: Weakness present. Gait: Gait normal.      Comments:  On left hand can only move his fingers   Psychiatric:         Mood and Affect: Mood normal.         Behavior: Behavior normal.

## 2021-06-24 RX ORDER — GABAPENTIN 300 MG/1
300 CAPSULE ORAL EVERY 8 HOURS
Qty: 270 CAPSULE | Refills: 2 | Status: SHIPPED | OUTPATIENT
Start: 2021-06-24 | End: 2022-03-21 | Stop reason: SDUPTHER

## 2021-06-24 RX ORDER — PANTOPRAZOLE SODIUM 40 MG/1
40 TABLET, DELAYED RELEASE ORAL DAILY
Qty: 90 TABLET | Refills: 1 | Status: ON HOLD | OUTPATIENT
Start: 2021-06-24 | End: 2021-08-12

## 2021-06-24 RX ORDER — DOXAZOSIN MESYLATE 1 MG/1
1 TABLET ORAL NIGHTLY
Qty: 90 TABLET | Refills: 1 | Status: SHIPPED | OUTPATIENT
Start: 2021-06-24 | End: 2021-12-07

## 2021-06-24 RX ORDER — CLONAZEPAM 0.5 MG/1
0.5 TABLET ORAL DAILY
Qty: 30 TABLET | Refills: 2 | Status: SHIPPED | OUTPATIENT
Start: 2021-06-24 | End: 2021-09-07 | Stop reason: ALTCHOICE

## 2021-06-24 RX ORDER — LANOLIN ALCOHOL/MO/W.PET/CERES
100 CREAM (GRAM) TOPICAL DAILY
Qty: 90 TABLET | Refills: 1 | Status: SHIPPED | OUTPATIENT
Start: 2021-06-24 | End: 2022-03-31 | Stop reason: DRUGHIGH

## 2021-06-24 RX ORDER — FLUOXETINE HYDROCHLORIDE 20 MG/1
20 CAPSULE ORAL DAILY
Qty: 90 CAPSULE | Refills: 1 | Status: ON HOLD | OUTPATIENT
Start: 2021-06-24 | End: 2021-08-12

## 2021-06-24 NOTE — TELEPHONE ENCOUNTER
try him on only 1 clonezapam daily instead 2 daily to start and see how his anxiety is and we will wean gradually

## 2021-06-24 NOTE — TELEPHONE ENCOUNTER
Patient's wife was advised and states she did not know anything about the clonazepam being a temporary script, they never really explained the medications to her, she is ok with weaning him off of this and would like to know how to adjust the dosing on this.

## 2021-06-24 NOTE — TELEPHONE ENCOUNTER
Patient's wife was advised and understood, she states she will need these scripts sent in as he only has enough to last one more day.

## 2021-06-25 ENCOUNTER — TELEPHONE (OUTPATIENT)
Dept: ADMINISTRATIVE | Age: 76
End: 2021-06-25

## 2021-06-27 ASSESSMENT — ENCOUNTER SYMPTOMS
SHORTNESS OF BREATH: 1
VOICE CHANGE: 1
COUGH: 1

## 2021-06-29 ENCOUNTER — TELEPHONE (OUTPATIENT)
Dept: PULMONOLOGY | Age: 76
End: 2021-06-29

## 2021-07-02 ENCOUNTER — NURSE ONLY (OUTPATIENT)
Dept: FAMILY MEDICINE CLINIC | Age: 76
End: 2021-07-02
Payer: MEDICARE

## 2021-07-02 DIAGNOSIS — R73.03 PREDIABETES: ICD-10-CM

## 2021-07-02 DIAGNOSIS — D64.9 ANEMIA, UNSPECIFIED TYPE: ICD-10-CM

## 2021-07-02 LAB
BASOPHILS ABSOLUTE: 0.1 K/UL (ref 0–0.2)
BASOPHILS RELATIVE PERCENT: 0.8 %
EOSINOPHILS ABSOLUTE: 0.2 K/UL (ref 0–0.6)
EOSINOPHILS RELATIVE PERCENT: 2.1 %
HCT VFR BLD CALC: 40.5 % (ref 40.5–52.5)
HEMOGLOBIN: 13.3 G/DL (ref 13.5–17.5)
LYMPHOCYTES ABSOLUTE: 0.7 K/UL (ref 1–5.1)
LYMPHOCYTES RELATIVE PERCENT: 9.4 %
MCH RBC QN AUTO: 27.7 PG (ref 26–34)
MCHC RBC AUTO-ENTMCNC: 32.7 G/DL (ref 31–36)
MCV RBC AUTO: 84.6 FL (ref 80–100)
MONOCYTES ABSOLUTE: 0.6 K/UL (ref 0–1.3)
MONOCYTES RELATIVE PERCENT: 8 %
NEUTROPHILS ABSOLUTE: 6.3 K/UL (ref 1.7–7.7)
NEUTROPHILS RELATIVE PERCENT: 79.7 %
PDW BLD-RTO: 18.5 % (ref 12.4–15.4)
PLATELET # BLD: 166 K/UL (ref 135–450)
PMV BLD AUTO: 9.1 FL (ref 5–10.5)
RBC # BLD: 4.79 M/UL (ref 4.2–5.9)
TSH REFLEX: 2.42 UIU/ML (ref 0.27–4.2)
WBC # BLD: 7.9 K/UL (ref 4–11)

## 2021-07-02 PROCEDURE — 36415 COLL VENOUS BLD VENIPUNCTURE: CPT | Performed by: FAMILY MEDICINE

## 2021-07-03 LAB
ESTIMATED AVERAGE GLUCOSE: 119.8 MG/DL
HBA1C MFR BLD: 5.8 %

## 2021-07-08 ENCOUNTER — TELEPHONE (OUTPATIENT)
Dept: FAMILY MEDICINE CLINIC | Age: 76
End: 2021-07-08

## 2021-07-08 NOTE — TELEPHONE ENCOUNTER
----- Message from Frank Shearer sent at 7/8/2021 10:04 AM EDT -----  Subject: Message to Provider    QUESTIONS  Information for Provider? Spouse calling to schedule a follow up visit but   before she does she wants to know why he was not scheduled to follow up   when he was there for labs on 7/2. Please call to advise  ---------------------------------------------------------------------------  --------------  CALL BACK INFO  What is the best way for the office to contact you? OK to leave message on   voicemail  Preferred Call Back Phone Number? [phone]  ---------------------------------------------------------------------------  --------------  SCRIPT ANSWERS  Relationship to Patient?  Self

## 2021-07-08 NOTE — TELEPHONE ENCOUNTER
He will need an AWV on the phone after September 3, 2021 with me. He will need his regular 6-month checkup in thinning of January 2022.   If something/ problem comes up in the interim call to schedule an appointment

## 2021-07-08 NOTE — TELEPHONE ENCOUNTER
I do not see any notes where you said when you wanted to see patient back or any where we tried to call them and schedule anything, when did you want him back?

## 2021-07-14 ENCOUNTER — HOSPITAL ENCOUNTER (OUTPATIENT)
Dept: PHYSICAL THERAPY | Age: 76
Setting detail: THERAPIES SERIES
Discharge: HOME | End: 2021-07-14
Payer: MEDICARE

## 2021-07-14 ENCOUNTER — HOSPITAL ENCOUNTER (OUTPATIENT)
Dept: OCCUPATIONAL THERAPY | Age: 76
Setting detail: THERAPIES SERIES
Discharge: HOME | End: 2021-07-14
Payer: MEDICARE

## 2021-07-14 PROCEDURE — 97166 OT EVAL MOD COMPLEX 45 MIN: CPT

## 2021-07-14 PROCEDURE — 97162 PT EVAL MOD COMPLEX 30 MIN: CPT

## 2021-07-14 PROCEDURE — 97530 THERAPEUTIC ACTIVITIES: CPT

## 2021-07-14 PROCEDURE — 97110 THERAPEUTIC EXERCISES: CPT

## 2021-07-14 NOTE — PROGRESS NOTES
WALLS BALANCE SCALE 14-Item Long Form Original Version     Patient Name: Mikhail Esposito   Date: 7/14/2021    1. SITTING TO STANDING   INSTRUCTIONS: Please stand up. Try not to use your hands for support. (4) able to stand without using hands and stabilize independently   (3) able to stand independently using hands   (2) able to stand using hands after several tries   (1) needs minimal aid to stand or to stabilize   (0) needs moderate or maximal assist to stand   Score: 3    2. STANDING UNSUPPORTED   INSTRUCTIONS: Please stand for two minutes without holding. (4) able to stand safely 2 minutes   (3) able to stand 2 minutes with supervision   (2) able to stand 30 seconds unsupported   (1) needs several tries to stand 30 seconds unsupported   (0) unable to stand 30 seconds unassisted If a subject is able to stand 2   minutes unsupported, score full points for sitting unsupported. Proceed to   item #4. Score: 4     3. SITTING WITH BACK UNSUPPORTED BUT FEET SUPPORTED   ON FLOOR OR ON A STOOL   INSTRUCTIONS: Please sit with arms folded for 2 minutes. (4) able to sit safely and securely 2 minutes   (3) able to sit 2 minutes under supervision   (2) able to sit 30 seconds   (1) able to sit 10 seconds   (0) unable to sit without support 10 seconds   Score: 4    4. STANDING TO SITTING   INSTRUCTIONS: Please sit down. (4) sits safely with minimal use of hands   (3) controls descent by using hands   (2) uses back of legs against chair to control descent   (1) sits independently but has uncontrolled descent   (0) needs assistance to sit   Score: 4    5. TRANSFERS   INSTRUCTIONS: Arrange chairs(s) for a pivot transfer. Ask subject to   transfer one way toward a seat with armrests and one way toward a seat   without armrests. You may use two chairs (one with and one without   armrests) or a bed and a chair.    (4) able to transfer safely with minor use of hands   (3) able to transfer safely definite need of hands   (2) able to transfer with verbal cueing and/or supervision   (1) needs one person to assist   (0) needs two people to assist or supervise to be safe   Score: 4    6. STANDING UNSUPPORTED WITH EYES CLOSED   INSTRUCTIONS: Please close your eyes and stand still for 10 seconds. (4) able to stand 10 seconds safely   (3) able to stand 10 seconds with supervision   (2) able to stand 3 seconds   (1) unable to keep eyes closed 3 seconds but stays steady   (0) needs help to keep from falling   Score: 4    7. STANDING UNSUPPORTED WITH FEET TOGETHER   INSTRUCTIONS: Place your feet together and stand without holding. (4) able to place feet together independently and stand 1 minute safely   (3) able to place feet together independently and stand for 1 minute with   supervision   (2) able to place feet together independently but unable to hold for 30 seconds   (1) needs help to attain position but able to stand 15 seconds feet together   (0) needs help to attain position and unable to hold for 15 seconds   Score: 4    8. REACHING FORWARD WITH OUTSTRETCHED ARM WHILE   STANDING   INSTRUCTIONS: Lift arm to 90 degrees. Stretch out your fingers and reach   forward as far as you can. (Examiner places a ruler at end of fingertips when   arm is at 90 degrees. Fingers should not touch the ruler while reaching   forward. The recorded measure is the distance forward that the finger reaches   while the subject is in the most forward lean position. When possible, ask   subject to use both arms when reaching to avoid rotation of the trunk.). (4) can reach forward confidently >25 cm (10 inches)   (3) can reach forward >12 cm safely (5 inches)   (2) can reach forward >5 cm safely (2 inches)   (1) reaches forward but needs supervision   (0) loses balance while trying/requires external support   Score: 2    9.  OBJECT FROM FLOOR FROM A STANDING POSITION   INSTRUCTIONS:  shoe/slipper which is placed in front of your feet.    (4) able to  slipper safely and easily   (3) able to  slipper but needs supervision   (2) unable to  but reaches 2-5cm (1-2 inches) from slipper and keeps   balance independently   (1) unable to  and needs supervision while trying   (0) unable to try/needs assist to keep from losing balance or falling   Score: 3    10. TURNING TO LOOK BEHIND OVER LEFT AND RIGHT   SHOULDERS WHILE STANDING   INSTRUCTIONS: Turn to look directly behind you over toward left shoulder. Repeat to the right. Examiner may pick an object to look at directly behind the   subject to encourage a better twist turn. (4) looks behind from both sides and weight shifts well   (3) looks behind one side only other side shows less weight shift   (2) turns sideways only but maintains balance   (1) needs supervision when turning   (0) needs assist to keep from losing balance or falling   Score: 4    11. TURN 360 DEGREES   INSTRUCTIONS: Turn completely around in a full Jackson. Pause. Then turn a   full Jackson in the other direction. (4) able to turn 360 degrees safely in 4 seconds or less   (3) able to turn 360 degrees safely one side only in 4 seconds or less   (2) able to turn 360 degrees safely but slowly   (1) needs close supervision or verbal cueing   (0) needs assistance while turning   Score: 1    12. PLACING ALTERNATE FOOT ON STEP OR STOOL WHILE   STANDING UNSUPPORTED   INSTRUCTIONS: Place each foot alternately on the step/stool. Continue until   each foot has touched the step/stool four times. (4) able to stand independently and safely and complete 8 steps in 20 seconds   (3) able to stand independently and complete 8 steps >20 seconds   (2) able to complete 4 steps without aid with supervision   (1) able to complete >2 steps needs minimal assist   (0) needs assistance to keep from falling/unable to try   Score: 1    13.  STANDING UNSUPPORTED ONE FOOT IN FRONT   INSTRUCTIONS: (DEMONSTRATE TO SUBJECT) Place one foot

## 2021-07-14 NOTE — PROGRESS NOTES
goals  Time Frame for Short term goals: 6 weeks  Short term goal 1: pt will be indep in HEP  Short term goal 2: pt will increase WALLS Balance score to >50 in order to decrease risk or falls  Short term goal 3: pt will ambulate without device and steady sequence in order to return to community ambulation  Short term goal 4: pt will increase LLE strength equal to that of RLE  Short term goal 5: attempt 6 min walk test nv and write goal       Therapy Time   Individual Concurrent Group Co-treatment   Time In 1405         Time Out 1445         Minutes 40         Timed Code Treatment Minutes: [address] [facility], [address] [facility] [address]
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
## 2021-07-14 NOTE — FLOWSHEET NOTE
Liz  80. and Therapy, [facility], [address] [facility], [address] [place] [address]  Phone: [phone]  Fax [phone]    Physical Therapy Daily Treatment Note    Date:  2021    Patient Name:  Ivan Heart    :  1945  MRN: [id number]  Restrictions/Precautions:    Medical/Treatment Diagnosis Information:  · Diagnosis: gait disturbance  Insurance/Certification information:  PT Insurance Information: [address]  Physician Information:  Referring Practitioner: Jaye Granados MD  Plan of care signed (Y/N):  N  Visit# / total visits:       G-Code (if applicable):          LEFS     Time in:   2:05      Timed Treatment: 5  Total Treatment Time:  40  ________________________________________________________________________________________    Pain Level:    /10  SUBJECTIVE:  See eval    OBJECTIVE:     Exercise/Equipment Resistance/Repetitions Other comments                                                                                                                                             Other Therapeutic Activities:  Pt educated no results of WALLS and TUG.     Manual Treatments:         Modalities:      Test/Measurements:  See eval       ASSESSMENT: see eval        Treatment/Activity Tolerance:   [x]Patient tolerated treatment well [] Patient limited by fatique  []Patient limited by pain [] Patient limited by other medical complications  [] Other:     Goals:    Short term goals  Time Frame for Short term goals: 6 weeks  Short term goal 1: pt will be indep in HEP  Short term goal 2: pt will increase WALLS Balance score to >50 in order to decrease risk or falls  Short term goal 3: pt will ambulate without device and steady sequence in order to return to community ambulation  Short term goal 4: pt will increase LLE strength equal to that of RLE  Short term goal 5: attempt 6 min walk test nv and write goal           Plan: [x] Continue per plan of care [] Alter current plan (see comments)   [x] Plan of care initiated [] Hold pending MD visit [] Discharge      Plan for Next Session:  Biomechanical correction of problems as they present. Facilitate correct muscle firing patterns and activation with appropriate activities. Progress patient as tolerated.      Re-Certification Due Date:         Signature:  Itz Sprague, PT

## 2021-07-16 ENCOUNTER — PROCEDURE VISIT (OUTPATIENT)
Dept: SPEECH THERAPY | Age: 76
End: 2021-07-16
Payer: MEDICARE

## 2021-07-16 ENCOUNTER — HOSPITAL ENCOUNTER (OUTPATIENT)
Dept: OCCUPATIONAL THERAPY | Age: 76
Setting detail: THERAPIES SERIES
Discharge: HOME | End: 2021-07-16
Payer: MEDICARE

## 2021-07-16 ENCOUNTER — HOSPITAL ENCOUNTER (OUTPATIENT)
Dept: PHYSICAL THERAPY | Age: 76
Setting detail: THERAPIES SERIES
Discharge: HOME | End: 2021-07-16
Payer: MEDICARE

## 2021-07-16 DIAGNOSIS — R49.0 DYSPHONIA: ICD-10-CM

## 2021-07-16 PROCEDURE — 92507 TX SP LANG VOICE COMM INDIV: CPT | Performed by: SPEECH-LANGUAGE PATHOLOGIST

## 2021-07-16 PROCEDURE — 97110 THERAPEUTIC EXERCISES: CPT

## 2021-07-16 PROCEDURE — 97112 NEUROMUSCULAR REEDUCATION: CPT

## 2021-07-16 PROCEDURE — 97140 MANUAL THERAPY 1/> REGIONS: CPT

## 2021-07-16 PROCEDURE — 97116 GAIT TRAINING THERAPY: CPT

## 2021-07-16 PROCEDURE — 92524 BEHAVRAL QUALIT ANALYS VOICE: CPT | Performed by: SPEECH-LANGUAGE PATHOLOGIST

## 2021-07-16 PROCEDURE — 31579 LARYNGOSCOPY TELESCOPIC: CPT | Performed by: SPEECH-LANGUAGE PATHOLOGIST

## 2021-07-16 PROCEDURE — 92520 LARYNGEAL FUNCTION STUDIES: CPT | Performed by: SPEECH-LANGUAGE PATHOLOGIST

## 2021-07-16 NOTE — Clinical Note
Perico Douglass! I am going to send this patient to you (they said you've met) because they would like to stay at the [facility] for all therapies! Let me know if you need anything moving forward! Thank you!

## 2021-07-16 NOTE — PROGRESS NOTES
passed along the floor of the right naris to the level of the larynx. There was no evidence of concerning masses or lesions of the base of tongue, vallecula, epiglottis, aryepiglottic folds, arytenoids, false vocal folds, true vocal folds, or pyriform sinuses. The scope was removed. The patient tolerated the procedure without difficulty. There were no complications. Pertinent findings: See Assessment and Plan of Care       Abduction             Abduction; LTVF sulcus vs scarring     Abduction; frothy secretions            Adduction    Adduction; false fold phonation    ASSESSMENT AND PLAN OF CARE:   77 y.o. male w/ hx of dysphonia post-hospitalization for COVID. VLS revealed mild edematous changes of the pharynx and larynx, including copious frothy secretions. Noted to have increased erythema of valleculae bilaterally. TVFs smooth & straight bilaterally w/ questionable sulcus vs scarring on medial edge of LTVF. 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 Unable to assess closure, mucosal wave, or periodicity d/t inability to visualize VFs during phonation; unable to be cued for reduced tension. Moderate interarytenoid pachydermia and posterior edema, erythema and thick mucus were observed, indicative of laryngopharyngeal reflux. Subglottis was patent without evidence of narrowing; unable to visualize further into proximal trachea d/t labored respirations and inability to maintain neutral positioning. No mass lesions, paresis or paralysis was noted. EDUCATION AND THERAPY:  Reviewed VLS w/ pt and spouse, including patent airway but significant supraglottic compression resulting in false fold phonation. Educated to onset d/t trauma (illness, intubation, trach, etc) and need to re-establish muscle patterns to decrease dysphonia.  Educated to importance of coordination of respiration and phonation and how progress may be limited d/t significance of dyspnea; again expressed understanding. Introduced to [employer] to begin coordinating subsystems for speech; required mod cues t/o for increased airflow to maintain bubbles. Ongoing pressed phonation, but mildly improved w/ ability to complete ascending/descending glides (though range limited). Pt endorsed feeling decrease in tension when completing, and intermittent episodes of full phonation noted. PATIENT GOALS:  Pt will adhere to vocal hygiene protocol during daily life activities w/ 80% acc given mod cues  Pt will perform SOVT techniques during various voicing tasks w/ 80% acc given mod cues  Pt will perform resistive/abdominal breathing exercises at rest and during phonation w/ 80% acc given mod cues  Pt will complete laryngeal/general relaxation stretches/exercises w/ 80% acc given mod cues    SLP recommended: Yes  Barriers to treatment: Dyspnea; generalized weakness  Potential benefits from rehab include: Improved vocal quality; improved dyspnea  Frequency: 1x/wk for 6-8 weeks  Prognosis is: Fair    RECOMMENDATIONS:   1. Dr. Beni Cano was present and reviewed recorded evaluation to assist in diagnosis and provide assessment and plan for treatment. 2. Follow good vocal hygiene behaviors and precautions, including increasing oral hydration. 3. Voice therapy to focus on re-strengthening and balancing the laryngeal musculature, to promote to open oral front focus, to promote using adequate diaphragmatic breath support to sustain conversational speech. 4. Pt is a good candidate for further medical evaluation/intervention at the discretion of the treating physician. 5. Pt to follow up with ENT/Dr. Beni Cano.       CPT Code Units Billed Time Billed Today Date of POC Start Re-Certification Date Referring Provider   [id number], [id number], 77444, 35797 4 Unit Time in: 1300  Time out: 1340  Total time: 40 min 7/16/2021 60 days Dr. Beni Cano       Thank you,    Chinyere Double) [place], [place], [address] [facility]; [phone]  Voice Specialized Speech-Language Pathologist

## 2021-07-16 NOTE — PROGRESS NOTES
Received prior authorization from [place] through [facility] for 7 OT visits from 7/14/2021-9/11/2021.  Order ID: [id number], Tracking #[id number]

## 2021-07-16 NOTE — FLOWSHEET NOTE
[address] [facility], [facility] [address] [place]   Phone: [phone]  Fax [phone]    Physical Therapy Daily Treatment Note    Date:  2021    Patient Name:  Shikha Santa    :  1945  MRN: [id number]  Restrictions/Precautions:    Medical/Treatment Diagnosis Information:  · Diagnosis: gait disturbance  Insurance/Certification information:  PT Insurance Information: [address]  Physician Information:  Referring Practitioner: Kavita Anthony MD  Plan of care signed (Y/N):  N  Visit# / total visits:       G-Code (if applicable):          LEFS     Time in:   2:05      Timed Treatment: 40  Total Treatment Time:  40  ________________________________________________________________________________________    Pain Level:    /10  SUBJECTIVE:  Pt reports that he is doing okay. Bhakti More to begin. OBJECTIVE: [id number]    Exercise/Equipment Resistance/Repetitions Other comments   NuStep 5 min    Bridge x10    LTR x10 B    SLR x10 B    Supine clams  SL hip ER with satbilization x15  x10 B    Hook-lying ball squeeze with HF x10                                                                                    6MWT 152m 2LO2, SPC, 2 seated rest breaks                   Other Therapeutic Activities:  Pt educated no results of WALLS and TUG. Manual Treatments:         Modalities:      Test/Measurements:  See eval       ASSESSMENT: Pt tolerated session fair. Limtied due to HERNANDEZ and decreased endurance. Pt completed with 6 minute walk test with a distance of 152 m, placing him below his lower limit of normal (431m). Initiated HEP this session.      Treatment/Activity Tolerance:   [x]Patient tolerated treatment well [] Patient limited by fatique  []Patient limited by pain [] Patient limited by other medical complications  [] Other:     Goals:    Short term goals  Time Frame for Short term goals: 6 weeks  Short term goal 1: pt will be indep in HEP  Short term goal 2: pt will increase WALLS Balance score to >50 in order to decrease risk or falls  Short term goal 3: pt will ambulate without device and steady sequence in order to return to community ambulation  Short term goal 4: pt will increase LLE strength equal to that of RLE  Short term goal 5: attempt 6 min walk test nv and write goal           Plan: [x] Continue per plan of care [] Alter current plan (see comments)   [] Plan of care initiated [] Hold pending MD visit [] Discharge      Plan for Next Session:  Biomechanical correction of problems as they present. Facilitate correct muscle firing patterns and activation with appropriate activities. Progress patient as tolerated.      Re-Certification Due Date:         Signature:  Leigha Pisano PT

## 2021-07-16 NOTE — FLOWSHEET NOTE
[address] [facility], [facility], [address] [place] [facility], [address]  Phone: [phone]  Fax [phone]        Outpatient Occupational Therapy     [x] Daily Treatment Note   [] Progress Note   [] Discharge Note      Date:  7/16/2021    Patient Name:  Rachel Justice                               YOB: 1945     Medical Diagnosis and ICD 10:  S49. 92XA Unspecified injury of left shoulder and upper arm, initial encounter, gait disturbance     Treatment Diagnosis:       Onset Date:  1/19/2021     Referring Physician and Referral Date: Dr. Dinorah Velazco, 7/2/21               Visits Allowed/Insurance/Certification information: [employer] Medicare Pre-Cert Required     Restrictions: cardiopulmonary limitations due to COVID-19     Precautions/Contraindications:   C-SSRS Triggered by Intake Questionnaire (Past 2 week assessment):  [x]? No, Questionnaire did not trigger screening  []? Yes, Patient intake triggered further evaluation              []?  C-SSRS Screening completed              []?  PCP notified via Plan of Care  []? Emergency services notified     Latex Allergy: [x]? No              []?  Yes   Preferred language for Healthcare:    [x]? English      []?   Other:_________________________________________________________________________________________________________________    Plan of care sent to provider:    [x]Faxed  [x]Co-signature    (attempts: 1[] 2 []3[])        Plan of care signed:    [x]Yes date: Dr. Dinorah Velazco 7/16/21 and Dr. Oracio Carl 7/15/21           []No           Next Progress Note due:   8/27/21 or at 7 visits     Visit# / total visits:  1/7 approved, 12 requested    Plan for Next Session:  Elbow flexion, supination    Home Exercise Program: holding arm in forward position at wall with dycem, elbow flexion at high table, soft tissue mobilization to upper arm    Subjective: Patient reports that he is doing well, has been working on gripping his sponge and using his finger curling brace at home. Patient states he would like to get a more constructive sling. Patient also reports that he has been having a little depression and plans to follow-up with his MD.     Pain level: 0/10 at rest     Objective:      strength LUE: 3#  Shoulder flexion LUE after tapin degrees  Shoulder extension LUE after tapin degrees  Shoulder abduction LUE after tapin degrees    Exercises:    Exercises in bold performed in department today. Items not bolded are carried forward from prior visits for continuity of the record.   Exercise/Equipment Resistance/Repetitions Skilled cues Added to HEP Comments      ADL/IADL TRAINING (ADL OR TA)        []  Tactile cues   []   Verbal cues []  Yes       []  Tactile cues   []   Verbal cues []  Yes      []  Tactile cues   []   Verbal cues []  Yes       []  Tactile cues   []   Verbal cues []  Yes       []  Tactile cues   []   Verbal cues []  Yes         []  Tactile cues   []   Verbal cues []  Yes         []  Tactile cues   []   Verbal cues []  Yes         []  Tactile cues   []   Verbal cues []  Yes         []  Tactile cues   []   Verbal cues []  Yes        NEUROMUSCULAR RE-EDU and THERAPEUTIC ACTIVITY (NEURO RE-ED or TA)      Taping to GHJ LUE for subluxation and scapular retraction Patient verbalizes understanding to remove in the event that his skin has breakdown or pain    Also provided information to patient and family on [employer] sling for home for improving patient alignment, arm swing, scapular retraction    10 minutes total []  Tactile cues   [x]   Verbal cues [x]  Yes  10 minutes with heat placed on LUE at the same time   Elbow flexion/extension LUE At tabletop with tapping and NMES for neuro re-ed for 15 minutes with parameters below for elbow flexion [x]  Tactile cues   [x]   Verbal cues [x]  Yes  Cues for pursed lip breathing   Weightbearing On LUE with elbow extension and min A - 7 minutes [x]  Tactile cues   [x]   Verbal cues []  Yes  Cues for pursed lip breathing   Shoulder flexion isometric exercise Closed chain with dycem at wall, with hold for 20 seconds, for a total of 7 minutes [x]  Tactile cues   [x]   Verbal cues [x]  Yes     Review of tasks for working toward LTG of riding a motorcycle Getting dressed, painting son's helmet, working on breathing [x]  Tactile cues   [x]   Verbal cues []  Yes         []  Tactile cues   []   Verbal cues []  Yes         []  Tactile cues   []   Verbal cues []  Yes         []  Tactile cues   []   Verbal cues []  Yes          THERAPEUTIC EXERCISE and MANUAL TECHNIQUES   (TE OR MT)      Scapular mobilizations to LUE Completed with improved ROM after mobilizations completed in all directions, also completed 40 second hold in each direction x3 - 10 minutes total [x]  Tactile cues   [x]   Verbal cues []  Yes     Soft tissue mobilization to LUE hand, forearm, upper arm 15 minutes total with education on bracing to LUE using a thumb OA brace, pictures provided to family for improving stabilization of LUE thumb [x]  Tactile cues   [x]   Verbal cues []  Yes       []  Tactile cues   []   Verbal cues []  Yes       []  Tactile cues   []   Verbal cues []  Yes       []  Tactile cues   []   Verbal cues []  Yes          []  Tactile cues   []   Verbal cues []  Yes         []  Tactile cues   []   Verbal cues []  Yes         []  Tactile cues   []   Verbal cues []  Yes        MODALITIES      Electrical stimulation parameters for wrist/finger extension with functional movement  Ramp UP/Ramp Down   2 second each  Intensity up to 17  Symmetrical Bipasic via EMPI  No marks from pads on skin after removal of ESTIM pads     []  Yes       Heat to LUE hand/forearm for 10 minutes during scap mobs   []  Yes        SPLINTING          []  Tactile cues   []   Verbal cues []  Yes   []  See separate note regarding splint precautions/contraindications      []  Tactile cues   []   Verbal cues []  Yes  []  See separate note regarding splint precautions/contraindications      Functional Outcome Measure:   [x]NA    Treatment/Activity Tolerance:    Patients response to treatment:   [x]Patient tolerated treatment well []Patient limited by fatigue   []Patient limited by pain  []Patient limited by other medical complications   []Other:     Assessment: Patient demonstrates improved shoulder ROM after taping. Patient also able to complete shoulder flexion closed chain hold at wall with minimal cueing and use of RUE and hold for 20 seconds at a time. Patient requires NMES for retraining elbow flexion and demonstrates fatigue requiring rest breaks for pursed lip breathing. Patient and spouse verbalize understanding of patient increasing participation in meaningful activity in home environment for progressing toward long term goal of being able to ride a motorcycle. Goals:     POC: 2x/week for 8 weeks  STG Goals - to be achieved in 4    weeks by date of 8/11/21 LTG Goals to be achieved in 8 weeks by date of 9/10/21   1). Patient and caregiver will be I with UE HEP 2x/day. Patient and caregiver will be I with managing LUE subluxation PRN. 2). Patient will be able to oppose thumb to pinky of LUE for picking up a small item. Patient will demonstrate improved  strength for LUE for maximizing potential to return to PLOF. 3). Patient will demonstrate ability to complete against gravity elbow movements for improving ability to incorporate LUE into ADLs. Patient will demonstrate improved LUE strength for being able to  and place an item. 4).     5).     6).        Prognosis: [x]Good   []Fair   []Poor    Patient Requires Follow-up:  [x]Yes  []No    Plan: []Plan of care initiated     [x]Continue per plan of care 2x/week for 6 weeks    [] Alter current plan (see comments)    []Hold pending MD visit []Discharge    Time in: 3486  Time out: 1050    Timed Code Treatment Minutes:  70    Total Treatment Minutes:  75    Medicare Cap total YTD:   [x]N/A    Workers Comp Time Stamp  [x]N/A   Time In:   Time Out:    Electronically signed by:  Logan Reyez OTR/L

## 2021-07-19 ENCOUNTER — HOSPITAL ENCOUNTER (OUTPATIENT)
Dept: PULMONOLOGY | Age: 76
Discharge: HOME | End: 2021-07-19
Payer: MEDICARE

## 2021-07-19 ENCOUNTER — HOSPITAL ENCOUNTER (OUTPATIENT)
Dept: CT IMAGING | Age: 76
Discharge: HOME | End: 2021-07-19
Payer: MEDICARE

## 2021-07-19 DIAGNOSIS — U07.1 COVID-19: ICD-10-CM

## 2021-07-19 DIAGNOSIS — J84.9 ILD (INTERSTITIAL LUNG DISEASE) (HCC): ICD-10-CM

## 2021-07-19 LAB
DLCO %PRED: 36 %
DLCO PRED: NORMAL
DLCO/VA %PRED: NORMAL
DLCO/VA PRED: NORMAL
DLCO/VA: NORMAL
DLCO: NORMAL
EXPIRATORY TIME-POST: NORMAL
EXPIRATORY TIME: NORMAL
FEF 25-75% %CHNG: NORMAL
FEF 25-75% %PRED-POST: NORMAL
FEF 25-75% %PRED-PRE: NORMAL
FEF 25-75% PRED: NORMAL
FEF 25-75%-POST: NORMAL
FEF 25-75%-PRE: NORMAL
FEV1 %PRED-POST: 54 %
FEV1 %PRED-PRE: 52 %
FEV1 PRED: NORMAL
FEV1-POST: NORMAL
FEV1-PRE: NORMAL
FEV1/FVC %PRED-POST: NORMAL
FEV1/FVC %PRED-PRE: NORMAL
FEV1/FVC PRED: NORMAL
FEV1/FVC-POST: 91 %
FEV1/FVC-PRE: 89 %
FVC %PRED-POST: NORMAL
FVC %PRED-PRE: NORMAL
FVC PRED: NORMAL
FVC-POST: NORMAL
FVC-PRE: NORMAL
GAW %PRED: NORMAL
GAW PRED: NORMAL
GAW: NORMAL
IC %PRED: NORMAL
IC PRED: NORMAL
IC: NORMAL
MEP: NORMAL
MIP: NORMAL
MVV %PRED-PRE: NORMAL
MVV PRED: NORMAL
MVV-PRE: NORMAL
PEF %PRED-POST: NORMAL
PEF %PRED-PRE: NORMAL
PEF PRED: NORMAL
PEF%CHNG: NORMAL
PEF-POST: NORMAL
PEF-PRE: NORMAL
RAW %PRED: NORMAL
RAW PRED: NORMAL
RAW: NORMAL
RV %PRED: NORMAL
RV PRED: NORMAL
RV: NORMAL
SVC %PRED: NORMAL
SVC PRED: NORMAL
SVC: NORMAL
TLC %PRED: 44 %
TLC PRED: NORMAL
TLC: NORMAL
VA %PRED: NORMAL
VA PRED: NORMAL
VA: NORMAL
VTG %PRED: NORMAL
VTG PRED: NORMAL
VTG: NORMAL

## 2021-07-19 PROCEDURE — 94060 EVALUATION OF WHEEZING: CPT

## 2021-07-19 PROCEDURE — 94726 PLETHYSMOGRAPHY LUNG VOLUMES: CPT

## 2021-07-19 PROCEDURE — 94729 DIFFUSING CAPACITY: CPT

## 2021-07-19 PROCEDURE — 6370000000 HC RX 637 (ALT 250 FOR IP): Performed by: INTERNAL MEDICINE

## 2021-07-19 PROCEDURE — 94760 N-INVAS EAR/PLS OXIMETRY 1: CPT

## 2021-07-19 PROCEDURE — 71250 CT THORAX DX C-: CPT

## 2021-07-19 RX ORDER — ALBUTEROL SULFATE 90 UG/1
4 AEROSOL, METERED RESPIRATORY (INHALATION) ONCE
Status: COMPLETED | OUTPATIENT
Start: 2021-07-19 | End: 2021-07-19

## 2021-07-19 RX ADMIN — Medication 4 PUFF: at 15:18

## 2021-07-19 ASSESSMENT — PULMONARY FUNCTION TESTS
FEV1/FVC_POST: 91
FEV1_PERCENT_PREDICTED_PRE: 52
FEV1_PERCENT_PREDICTED_POST: 54
FEV1/FVC_PRE: 89

## 2021-07-19 NOTE — TELEPHONE ENCOUNTER
Patient's wife states she received a message from the pharmacy indicating the risks associated with patient's medications, fluoxetine and clonazepam.  They indicated that based on his age these might cause serious side effects like dizziness which she says he does have sometimes. She says he has a lot of anxiety right now and does not know if the clonazepam and fluoxetine are helping all that much. Is there another medication that may be more beneficial for him and not a narcotic or controlled substance. She would like to know what  would recommend.

## 2021-07-19 NOTE — TELEPHONE ENCOUNTER
Can try to increase prozac for anxiety and decrease the clonazepam as we discussed before. Go to only one pill daily   Does he have enough prozac to try 2 pills daily?

## 2021-07-21 ENCOUNTER — TELEPHONE (OUTPATIENT)
Dept: ENT CLINIC | Age: 76
End: 2021-07-21

## 2021-07-21 ENCOUNTER — HOSPITAL ENCOUNTER (OUTPATIENT)
Dept: OCCUPATIONAL THERAPY | Age: 76
Setting detail: THERAPIES SERIES
Discharge: HOME | End: 2021-07-21
Payer: MEDICARE

## 2021-07-21 ENCOUNTER — HOSPITAL ENCOUNTER (OUTPATIENT)
Dept: PHYSICAL THERAPY | Age: 76
Setting detail: THERAPIES SERIES
Discharge: HOME | End: 2021-07-21
Payer: MEDICARE

## 2021-07-21 PROCEDURE — 97530 THERAPEUTIC ACTIVITIES: CPT

## 2021-07-21 PROCEDURE — 97112 NEUROMUSCULAR REEDUCATION: CPT

## 2021-07-21 PROCEDURE — 97110 THERAPEUTIC EXERCISES: CPT

## 2021-07-21 PROCEDURE — 97140 MANUAL THERAPY 1/> REGIONS: CPT

## 2021-07-21 NOTE — TELEPHONE ENCOUNTER
Patient would like a referral to voice therapy for [facility] instead of seeing Urmila Mandel in the [place].

## 2021-07-21 NOTE — FLOWSHEET NOTE
[address] [place] [address] [facility] [address] [place]   Phone: [phone]  Fax [phone]    Physical Therapy Daily Treatment Note    Date:  2021    Patient Name:  Joey Reed    :  1945  MRN: [id number]  Restrictions/Precautions:    Medical/Treatment Diagnosis Information:  · Diagnosis: gait disturbance  Insurance/Certification information:  PT Insurance Information: Kristin Garrison  Physician Information:  Referring Practitioner: Mariajose Walls MD  Plan of care signed (Y/N):  N  Visit# / total visits:  3/8     G-Code (if applicable):          LEFS     Time in:   11:00     Timed Treatment: 45  Total Treatment Time:  45  ________________________________________________________________________________________    Pain Level:    /10  SUBJECTIVE:  Pt reports that he is doing okay. Notes that he is a little fatigued after finishing OT. Has been doing the exercises at home. OBJECTIVE: [id number]    Exercise/Equipment Resistance/Repetitions Other comments   NuStep 5 min    Bridge    LTR    SLR    Supine clams  SL hip ER with satbilization    Hook-lying ball squeeze with HF           Standing marches 1 min    Squats x15    Standing abduction x10 B    Toe taps to step 1 min    FSU  LSU x10 B  x10 B    Lateral walking 3 laps // bars    Calf raises x10    Airex balance 1 min  1 min feet together, EO and EC    Forward/backward walking 4 laps With resistance          6MWT  2LO2, SPC, 2 seated rest breaks                   Other Therapeutic Activities:  Pt educated no results of WALLS and TUG. Manual Treatments:         Modalities:      Test/Measurements:  See eval       ASSESSMENT: Pt tolerated session fair. Limtied due to HERNANDEZ and decreased endurance. Multiple seated rest breaks throughout. O2 sat dipping as low as 80%, but rebounding after a few minutes rest. Pt with no supplemental O2 this session.      Treatment/Activity Tolerance:   [x]Patient tolerated treatment well [] Patient limited by fatique  []Patient limited by pain [] Patient limited by other medical complications  [] Other:     Goals:    Short term goals  Time Frame for Short term goals: 6 weeks  Short term goal 1: pt will be indep in HEP  Short term goal 2: pt will increase WALLS Balance score to >50 in order to decrease risk or falls  Short term goal 3: pt will ambulate without device and steady sequence in order to return to community ambulation  Short term goal 4: pt will increase LLE strength equal to that of RLE  Short term goal 5: attempt 6 min walk test nv and write goal           Plan: [x] Continue per plan of care [] Alter current plan (see comments)   [] Plan of care initiated [] Hold pending MD visit [] Discharge      Plan for Next Session:  Biomechanical correction of problems as they present. Facilitate correct muscle firing patterns and activation with appropriate activities. Progress patient as tolerated.      Re-Certification Due Date:         Signature:  Mitchell Tovar PT

## 2021-07-21 NOTE — TELEPHONE ENCOUNTER
Patient's wife was advised by physician to increase the prozac to two pills daily and decrease the clonazepam to one pill daily. She will call back with an update on patient's condition.

## 2021-07-21 NOTE — FLOWSHEET NOTE
[facility], [address]   Phone: [phone]  Fax [phone]        Outpatient Occupational Therapy     [x] Daily Treatment Note   [] Progress Note   [] Discharge Note      Date:  7/21/2021    Patient Name:  Yaneli Saenz                               YOB: 1945     Medical Diagnosis and ICD 10:  S49. 92XA Unspecified injury of left shoulder and upper arm, initial encounter, gait disturbance     Treatment Diagnosis:       Onset Date:  1/19/2021     Referring Physician and Referral Date: Dr. Cordelia Juarez, 7/2/21               Visits Allowed/Insurance/Certification information: [place] Medicare Pre-Cert Required     Restrictions: cardiopulmonary limitations due to COVID-19     Precautions/Contraindications:   C-SSRS Triggered by Intake Questionnaire (Past 2 week assessment):  [x]? No, Questionnaire did not trigger screening  []? Yes, Patient intake triggered further evaluation              []?  C-SSRS Screening completed              []?  PCP notified via Plan of Care  []? Emergency services notified     Latex Allergy: [x]? No              []?  Yes   Preferred language for Healthcare:    [x]? English      []?   Other:_________________________________________________________________________________________________________________    Plan of care sent to provider:    [x]Faxed  [x]Co-signature    (attempts: 1[] 2 []3[])        Plan of care signed:    [x]Yes date: Dr. Cordelia Juarez 7/16/21 and Dr. Babak Lewis 7/15/21           []No           Next Progress Note due:   8/27/21 or at 7 visits     Visit# / total visits:  2/7 approved, 12 requested    Plan for Next Session:  Elbow flexion, supination    Home Exercise Program: holding arm in forward position at wall with dycem, elbow flexion at high table, soft tissue mobilization to upper arm    Subjective: Patient reports that he has been working on his elbow flexion/extension exercise at tabletop at home. Patient reports using heat and soft tissue mobilization at home. Spouse reports patient is starting to incorporate LUE into grabbing onto pants for LE dressing. Pain level: 0/10 at rest     Objective:         Exercises:    Exercises in bold performed in department today. Items not bolded are carried forward from prior visits for continuity of the record.   Exercise/Equipment Resistance/Repetitions Skilled cues Added to HEP Comments      ADL/IADL TRAINING (ADL OR TA)        []  Tactile cues   []   Verbal cues []  Yes       []  Tactile cues   []   Verbal cues []  Yes      []  Tactile cues   []   Verbal cues []  Yes       []  Tactile cues   []   Verbal cues []  Yes       []  Tactile cues   []   Verbal cues []  Yes         []  Tactile cues   []   Verbal cues []  Yes         []  Tactile cues   []   Verbal cues []  Yes         []  Tactile cues   []   Verbal cues []  Yes         []  Tactile cues   []   Verbal cues []  Yes        NEUROMUSCULAR RE-EDU and THERAPEUTIC ACTIVITY (NEURO RE-ED or TA)      Taping to GHJ LUE for subluxation and scapular retraction Patient verbalizes understanding to remove in the event that his skin has breakdown or pain    Also provided information to patient and family on Giv Israel sling for home for improving patient alignment, arm swing, scapular retraction    10 minutes total []  Tactile cues   [x]   Verbal cues [x]  Yes  10 minutes with heat placed on LUE at the same time   Elbow flexion/extension LUE At tabletop with tapping and NMES for neuro re-ed for 15 minutes with parameters below for elbow flexion    BUE with ball 30x [x]  Tactile cues   [x]   Verbal cues [x]  Yes  Cues for pursed lip breathing   Weightbearing On LUE with elbow extension and min A - 7 minutes [x]  Tactile cues   [x]   Verbal cues []  Yes  Cues for pursed lip breathing   Shoulder flexion isometric exercise Closed chain with dycem at wall, with hold for 20 seconds, for a total of 7 minutes [x]  Tactile cues   [x]   Verbal cues [x]  Yes     Review of tasks for working toward LTG of riding a motorcycle Getting dressed, painting son's helmet, working on breathing [x]  Tactile cues   [x]   Verbal cues []  Yes     Opening/closing a pill bottle 10x with increased time, cues for activation of LUE, and to not use LUE as a stabilizer []  Tactile cues   [x]   Verbal cues []  Yes       Velcro rollers 3 jaw 10 turns up, 10 turns down, very minimal velcro resistance []  Tactile cues   [x]   Verbal cues []  Yes         []  Tactile cues   []   Verbal cues []  Yes          THERAPEUTIC EXERCISE and MANUAL TECHNIQUES   (TE OR MT)      Scapular mobilizations to LUE Completed with improved ROM after mobilizations completed in all directions, also completed 40 second hold in each direction x3 - 10 minutes total [x]  Tactile cues   [x]   Verbal cues []  Yes     Soft tissue mobilization to LUE hand, forearm, upper arm 15 minutes total with education on bracing to LUE using a thumb OA brace, pictures provided to family for improving stabilization of LUE thumb [x]  Tactile cues   [x]   Verbal cues []  Yes     Supination wheel 20x, to less than full range []  Tactile cues   []   Verbal cues []  Yes       []  Tactile cues   []   Verbal cues []  Yes       []  Tactile cues   []   Verbal cues []  Yes          []  Tactile cues   []   Verbal cues []  Yes         []  Tactile cues   []   Verbal cues []  Yes         []  Tactile cues   []   Verbal cues []  Yes        MODALITIES      Electrical stimulation parameters for wrist/finger extension with functional movement  Ramp UP/Ramp Down   2 second each  Intensity up to 17  Symmetrical Bipasic via EMPI  No marks from pads on skin after removal of ESTIM pads   5 minutes total  []  Yes       Heat to LUE hand/forearm for 10 minutes during scap mobs   []  Yes        SPLINTING          []  Tactile cues   []   Verbal cues []  Yes   []  See separate note regarding splint 55    Total Treatment Minutes:  55    Medicare Cap total YTD:   [x]N/A    Workers Comp Time Stamp  [x]N/A   Time In:   Time Out:    Electronically signed by:  LAKEISHA Wray/MARINA

## 2021-07-22 ENCOUNTER — TELEPHONE (OUTPATIENT)
Dept: FAMILY MEDICINE CLINIC | Age: 76
End: 2021-07-22

## 2021-07-22 RX ORDER — CALCIPOTRIENE 50 UG/G
CREAM TOPICAL
Qty: 1 TUBE | Refills: 1 | Status: SHIPPED | OUTPATIENT
Start: 2021-07-22 | End: 2022-07-06 | Stop reason: SDUPTHER

## 2021-07-22 NOTE — PROCEDURES
[facility]                 [facility]                               PULMONARY FUNCTION    PATIENT NAME: Alicia Swann                    :        1945  MED REC NO:   [id number]                          ROOM:  ACCOUNT NO:   [de-identified]                           ADMIT DATE: 2021  PROVIDER:     Jaci Padilla MD    DATE OF PROCEDURE:  2021    Full PFT done. FEV1 1.75, 52% predicted, FVC 1.95, 42% predicted, FEV1  to FVC ratio of 89% showing a new obstructive lung defect but suggesting  a restrictive lung defect. There is no bronchodilator effect. Lung  volumes do show a severe restrictive lung defect. Diffusion is low even  with adjustment for the ventilation. IMPRESSION:  Severe restrictive lung defect with low DLCO. Flow-volume  loops are consistent with restrictive lung disease. Effort was good.         Sally Flores MD    D: 2021 8:02:29       T: 2021 10:02:19     [email or web address]  Job#: [id number]     Doc#: [id number]    CC:

## 2021-07-22 NOTE — TELEPHONE ENCOUNTER
Patient's wife was advised of scripts and understood, she says they are really having a hard time getting in with any of the dermatologists they have called, I did give her additional options and offered her the number for [facility], she will try them
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
## 2021-07-22 NOTE — TELEPHONE ENCOUNTER
Scripts have been loaded and are pending if physician is ok with filling in the interim until patient can be seen by dermatologist

## 2021-07-23 ENCOUNTER — HOSPITAL ENCOUNTER (OUTPATIENT)
Dept: OCCUPATIONAL THERAPY | Age: 76
Setting detail: THERAPIES SERIES
Discharge: HOME | End: 2021-07-23
Payer: MEDICARE

## 2021-07-23 ENCOUNTER — HOSPITAL ENCOUNTER (OUTPATIENT)
Dept: PHYSICAL THERAPY | Age: 76
Setting detail: THERAPIES SERIES
Discharge: HOME | End: 2021-07-23
Payer: MEDICARE

## 2021-07-23 ENCOUNTER — TELEPHONE (OUTPATIENT)
Dept: ENT CLINIC | Age: 76
End: 2021-07-23

## 2021-07-23 PROCEDURE — 94729 DIFFUSING CAPACITY: CPT | Performed by: INTERNAL MEDICINE

## 2021-07-23 PROCEDURE — 97112 NEUROMUSCULAR REEDUCATION: CPT

## 2021-07-23 PROCEDURE — 94060 EVALUATION OF WHEEZING: CPT | Performed by: INTERNAL MEDICINE

## 2021-07-23 PROCEDURE — 94726 PLETHYSMOGRAPHY LUNG VOLUMES: CPT | Performed by: INTERNAL MEDICINE

## 2021-07-23 PROCEDURE — 97140 MANUAL THERAPY 1/> REGIONS: CPT

## 2021-07-23 PROCEDURE — 97110 THERAPEUTIC EXERCISES: CPT

## 2021-07-23 NOTE — TELEPHONE ENCOUNTER
Aleshia Christianson from [place] [address] called stating that Charley Dumont would like to do his speech therapy there. There is a referral to Aleshia Tomas on 06/10/2021. Would he need a new referral put in? Please advise.

## 2021-07-23 NOTE — FLOWSHEET NOTE
[facility], [facility], [address]   Phone: [phone]  Fax [phone]        Outpatient Occupational Therapy     [x] Daily Treatment Note   [] Progress Note   [] Discharge Note      Date:  7/23/2021    Patient Name:  Ana Bruno                               YOB: 1945     Medical Diagnosis and ICD 10:  S49. 92XA Unspecified injury of left shoulder and upper arm, initial encounter, gait disturbance     Treatment Diagnosis:       Onset Date:  1/19/2021     Referring Physician and Referral Date: Dr. Karyn Broussard, 7/2/21               Visits Allowed/Insurance/Certification information: [place] Medicare Pre-Cert Required, Received prior authorization from [place] through [facility] for 7 OT visits from 7/14/2021-9/11/2021. Order ID: [id number], Tracking #[id number]     Restrictions: cardiopulmonary limitations due to COVID-19     Precautions/Contraindications:   C-SSRS Triggered by Intake Questionnaire (Past 2 week assessment):  [x]? No, Questionnaire did not trigger screening  []? Yes, Patient intake triggered further evaluation              []?  C-SSRS Screening completed              []?  PCP notified via Plan of Care  []? Emergency services notified     Latex Allergy: [x]? No              []?  Yes   Preferred language for Healthcare:    [x]? English      []?   Other:_________________________________________________________________________________________________________________    Plan of care sent to provider:    [x]Faxed  [x]Co-signature    (attempts: 1[] 2 []3[])        Plan of care signed:    [x]Yes date: Dr. Karyn Broussard 7/16/21 and Dr. Larisa Demarco 7/15/21           []No           Next Progress Note due:   8/27/21 or at 7 visits      Visit# / total visits:  3/7 approved, 12 requested    Plan for Next Session:  Elbow flexion, supination    Home Exercise Program: holding arm in forward position at wall with dycem elbow from therapist or Leilani Curiel 10x, min A    With shoulder flexion/extension overhead 10x min A [x]  Tactile cues   [x]   Verbal cues [x]  Yes  Cues for pursed lip breathing    Increased education on breathing throughout exercises, O2 at 86%, but quickly improved to 96% on 3.0 L, improved exercise tolerance with O2 on this date via nasal cannula   Weightbearing On LUE with elbow extension and min A - 7 minutes [x]  Tactile cues   [x]   Verbal cues []  Yes  Cues for pursed lip breathing   Shoulder flexion isometric exercise Closed chain with dycem at wall, with hold for 20 seconds, for a total of 7 minutes [x]  Tactile cues   [x]   Verbal cues [x]  Yes     Review of tasks for working toward LTG of riding a motorcycle Getting dressed, painting son's helmet, working on breathing [x]  Tactile cues   [x]   Verbal cues []  Yes     Opening/closing a pill bottle 10x with increased time, cues for activation of LUE, and to not use LUE as a stabilizer []  Tactile cues   [x]   Verbal cues []  Yes       Velcro rollers 3 jaw 10 turns up, 10 turns down, very minimal velcro resistance []  Tactile cues   [x]   Verbal cues []  Yes         []  Tactile cues   []   Verbal cues []  Yes          THERAPEUTIC EXERCISE and MANUAL TECHNIQUES   (TE OR MT)      Scapular mobilizations to LUE Completed with improved ROM after mobilizations completed in all directions, also completed 40 second hold in each direction x3 - 10 minutes total [x]  Tactile cues   [x]   Verbal cues []  Yes     Soft tissue mobilization to LUE hand, forearm, upper arm 15 minutes total with education on bracing to LUE using a thumb OA brace, pictures provided to family for improving stabilization of LUE thumb [x]  Tactile cues   [x]   Verbal cues []  Yes     Supination wheel 20x, to less than full range []  Tactile cues   []   Verbal cues []  Yes     Supination With PROM from therapist at wrist and elbow to full range, 40 second hold x3 []  Tactile cues   []   Verbal cues []  Yes Elbow flexion mobilization With distraction at elbow joint and elbow flexion PROM to 3/4 range, 40 second hold x3 []  Tactile cues   []   Verbal cues []  Yes          []  Tactile cues   []   Verbal cues []  Yes         []  Tactile cues   []   Verbal cues []  Yes         []  Tactile cues   []   Verbal cues []  Yes        MODALITIES      Electrical stimulation parameters for wrist/finger extension with functional movement  Ramp UP/Ramp Down   2 second each  Intensity up to 17  Symmetrical Bipasic via EMPI  No marks from pads on skin after removal of ESTIM pads   5 minutes total  []  Yes       Heat to LUE hand/forearm for 10 minutes during scap mobs   []  Yes        SPLINTING          []  Tactile cues   []   Verbal cues []  Yes   []  See separate note regarding splint precautions/contraindications      []  Tactile cues   []   Verbal cues []  Yes  []  See separate note regarding splint precautions/contraindications      Functional Outcome Measure:   [x]NA    Treatment/Activity Tolerance:    Patients response to treatment:   [x]Patient tolerated treatment well []Patient limited by fatigue   []Patient limited by pain  []Patient limited by other medical complications   []Other:     Assessment: Patient demonstrating activation of against gravity elbow flexion this date. Patient progressing in HEP from last session for gravity eliminated elbow extension/flexion. Patient requires manual mobilization of elbow and forearm joint to maximize PROM to flexors/extensors. Patient demonstrates effective breathing techniques using pursed lip breathing. Goals:     POC: 2x/week for 8 weeks  STG Goals - to be achieved in 4    weeks by date of 8/11/21 LTG Goals to be achieved in 8 weeks by date of 9/10/21   1). Patient and caregiver will be I with UE HEP 2x/day. Patient and caregiver will be I with managing LUE subluxation PRN. 2). Patient will be able to oppose thumb to pinky of LUE for picking up a small item.  Patient will demonstrate improved  strength for LUE for maximizing potential to return to PLOF. 3). Patient will demonstrate ability to complete against gravity elbow movements for improving ability to incorporate LUE into ADLs. Patient will demonstrate improved LUE strength for being able to  and place an item. 4).     5).     6).        Prognosis: [x]Good   []Fair   []Poor    Patient Requires Follow-up:  [x]Yes  []No    Plan: []Plan of care initiated     [x]Continue per plan of care 2x/week for 6 weeks    [] Alter current plan (see comments)    []Hold pending MD visit []Discharge    Time in: 0740 Time out: 0840    Timed Code Treatment Minutes:  55    Total Treatment Minutes: 60    Medicare Cap total YTD:   [x]N/A    Workers Comp Time Stamp  [x]N/A   Time In:   Time Out:    Electronically signed by:  LAKEISHA Wray/L

## 2021-07-23 NOTE — FLOWSHEET NOTE
Treatment/Activity Tolerance:   [x]Patient tolerated treatment well [] Patient limited by fatique  []Patient limited by pain [] Patient limited by other medical complications  [] Other:     Goals:    Short term goals  Time Frame for Short term goals: 6 weeks  Short term goal 1: pt will be indep in HEP  Short term goal 2: pt will increase WALLS Balance score to >50 in order to decrease risk or falls  Short term goal 3: pt will ambulate without device and steady sequence in order to return to community ambulation  Short term goal 4: pt will increase LLE strength equal to that of RLE  Short term goal 5: attempt 6 min walk test nv and write goal           Plan: [x] Continue per plan of care [] Alter current plan (see comments)   [] Plan of care initiated [] Hold pending MD visit [] Discharge      Plan for Next Session:  Biomechanical correction of problems as they present. Facilitate correct muscle firing patterns and activation with appropriate activities. Progress patient as tolerated.      Re-Certification Due Date:         Signature:  Itz Sprague PT

## 2021-07-27 DIAGNOSIS — R13.10 DYSPHAGIA, UNSPECIFIED TYPE: Primary | ICD-10-CM

## 2021-07-27 NOTE — TELEPHONE ENCOUNTER
A referral for [address] [place] [address],Sixth Floor at [facility] has been placed - I think the patient can follow up with them at the outpatient location. Please call the patient and give him their number.

## 2021-07-27 NOTE — TELEPHONE ENCOUNTER
Call placed to patient to let them know that a referral has been placed for [facility]. I gave them their number ([phone]).

## 2021-07-28 ENCOUNTER — HOSPITAL ENCOUNTER (OUTPATIENT)
Dept: OCCUPATIONAL THERAPY | Age: 76
Setting detail: THERAPIES SERIES
Discharge: HOME | End: 2021-07-28
Payer: MEDICARE

## 2021-07-28 ENCOUNTER — HOSPITAL ENCOUNTER (OUTPATIENT)
Dept: PHYSICAL THERAPY | Age: 76
Setting detail: THERAPIES SERIES
Discharge: HOME | End: 2021-07-28
Payer: MEDICARE

## 2021-07-28 NOTE — PROGRESS NOTES
Physical Therapy  Cancellation/No-show Note  Patient Name:  Chava Sullivan  :  1945   Date:  2021  Cancels to date: 1  No-shows to date: 0    For today's appointment patient:  [x] Cancelled  [] Rescheduled appointment  [] No-show     Reason given by patient:  [] Patient ill  [] Conflicting appointment  [] No transportation    [] Conflict with work  [] No reason given  [x] Other:  Pt called in and stated that he had \"some infected toes\" and was having difficulty getting around.    Comments:      Electronically signed by:  Demetrio Flores, PT, PT

## 2021-07-28 NOTE — PROGRESS NOTES
[facility], [facility],  Jillian Seymour  [place], [address]   Phone: [phone]  Fax [phone]        Patient Name:  Cameron Villarreal  :  1945   Date:  2021  Cancels to Date: 1  No-shows to Date: 0    For today's appointment patient:  [x] Cancelled  [] Rescheduled appointment  [] No-show     Reason given by patient:  [] Patient ill  [] Conflicting appointment  [] No transportation    [] Conflict with work  [] No reason given  [x] Other:     Comments:  OT called patient to cancel Friday's OT appointment on 21 due to this therapist having a family health obligation and to offer Thursday appointment with LAKEISHA Burroughs/L. Patient's spouse answered and confirmed that they will work on HEP this week and return next week for OT with therapist Yon Dills, OTR/L. This OT confirmed that patient will still have Friday PT appointment at [phone].     [] Phone call from patient to clinic later  [] Phone call or communication made to patient later    Electronically signed by:  LAKEISHA Krueger/MARINA

## 2021-07-28 NOTE — PROGRESS NOTES
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     [] Phone call from patient to clinic later  [] Phone call or communication made to patient later    Electronically signed by:  LAKEISHA Johnson/MARINA

## 2021-07-29 ENCOUNTER — APPOINTMENT (OUTPATIENT)
Dept: GENERAL RADIOLOGY | Age: 76
End: 2021-07-29
Payer: MEDICARE

## 2021-07-29 ENCOUNTER — HOSPITAL ENCOUNTER (EMERGENCY)
Age: 76
Discharge: HOME | End: 2021-07-29
Attending: EMERGENCY MEDICINE
Payer: MEDICARE

## 2021-07-29 VITALS
DIASTOLIC BLOOD PRESSURE: 66 MMHG | SYSTOLIC BLOOD PRESSURE: 98 MMHG | RESPIRATION RATE: 18 BRPM | HEART RATE: 78 BPM | TEMPERATURE: 98.4 F | OXYGEN SATURATION: 94 %

## 2021-07-29 DIAGNOSIS — L08.9 TOE INFECTION: Primary | ICD-10-CM

## 2021-07-29 LAB
ANION GAP SERPL CALCULATED.3IONS-SCNC: 8 MMOL/L (ref 3–16)
BASOPHILS ABSOLUTE: 0.1 K/UL (ref 0–0.2)
BASOPHILS RELATIVE PERCENT: 0.8 %
BUN BLDV-MCNC: 19 MG/DL (ref 7–20)
C-REACTIVE PROTEIN: 9.3 MG/L (ref 0–5.1)
C-REACTIVE PROTEIN: NORMAL MG/L (ref 0–5.1)
CALCIUM SERPL-MCNC: 9.9 MG/DL (ref 8.3–10.6)
CHLORIDE BLD-SCNC: 97 MMOL/L (ref 99–110)
CO2: 29 MMOL/L (ref 21–32)
CREAT SERPL-MCNC: 0.6 MG/DL (ref 0.8–1.3)
EOSINOPHILS ABSOLUTE: 0.3 K/UL (ref 0–0.6)
EOSINOPHILS RELATIVE PERCENT: 3.3 %
GFR AFRICAN AMERICAN: >60
GFR NON-AFRICAN AMERICAN: >60
GLUCOSE BLD-MCNC: 98 MG/DL (ref 70–99)
HCT VFR BLD CALC: 38.8 % (ref 40.5–52.5)
HEMOGLOBIN: 12.9 G/DL (ref 13.5–17.5)
LYMPHOCYTES ABSOLUTE: 1.3 K/UL (ref 1–5.1)
LYMPHOCYTES RELATIVE PERCENT: 14.3 %
MCH RBC QN AUTO: 28.3 PG (ref 26–34)
MCHC RBC AUTO-ENTMCNC: 33.2 G/DL (ref 31–36)
MCV RBC AUTO: 85.2 FL (ref 80–100)
MONOCYTES ABSOLUTE: 0.8 K/UL (ref 0–1.3)
MONOCYTES RELATIVE PERCENT: 9 %
NEUTROPHILS ABSOLUTE: 6.6 K/UL (ref 1.7–7.7)
NEUTROPHILS RELATIVE PERCENT: 72.6 %
PDW BLD-RTO: 18.8 % (ref 12.4–15.4)
PLATELET # BLD: 203 K/UL (ref 135–450)
PMV BLD AUTO: 8.2 FL (ref 5–10.5)
POTASSIUM REFLEX MAGNESIUM: 4.3 MMOL/L (ref 3.5–5.1)
RBC # BLD: 4.55 M/UL (ref 4.2–5.9)
SEDIMENTATION RATE, ERYTHROCYTE: 50 MM/HR (ref 0–20)
SODIUM BLD-SCNC: 134 MMOL/L (ref 136–145)
WBC # BLD: 9.1 K/UL (ref 4–11)

## 2021-07-29 PROCEDURE — 87077 CULTURE AEROBIC IDENTIFY: CPT

## 2021-07-29 PROCEDURE — 85652 RBC SED RATE AUTOMATED: CPT

## 2021-07-29 PROCEDURE — 87205 SMEAR GRAM STAIN: CPT

## 2021-07-29 PROCEDURE — 86140 C-REACTIVE PROTEIN: CPT

## 2021-07-29 PROCEDURE — 6370000000 HC RX 637 (ALT 250 FOR IP): Performed by: PHYSICIAN ASSISTANT

## 2021-07-29 PROCEDURE — 6360000002 HC RX W HCPCS: Performed by: PODIATRIST

## 2021-07-29 PROCEDURE — 87070 CULTURE OTHR SPECIMN AEROBIC: CPT

## 2021-07-29 PROCEDURE — 99283 EMERGENCY DEPT VISIT LOW MDM: CPT

## 2021-07-29 PROCEDURE — 80048 BASIC METABOLIC PNL TOTAL CA: CPT

## 2021-07-29 PROCEDURE — 96374 THER/PROPH/DIAG INJ IV PUSH: CPT

## 2021-07-29 PROCEDURE — 73630 X-RAY EXAM OF FOOT: CPT

## 2021-07-29 PROCEDURE — 87186 SC STD MICRODIL/AGAR DIL: CPT

## 2021-07-29 PROCEDURE — 85025 COMPLETE CBC W/AUTO DIFF WBC: CPT

## 2021-07-29 RX ORDER — CIPROFLOXACIN 500 MG/1
500 TABLET, FILM COATED ORAL 2 TIMES DAILY
Qty: 14 TABLET | Refills: 0 | Status: SHIPPED | OUTPATIENT
Start: 2021-07-29 | End: 2021-08-05

## 2021-07-29 RX ORDER — MORPHINE SULFATE 2 MG/ML
2 INJECTION, SOLUTION INTRAMUSCULAR; INTRAVENOUS ONCE
Status: COMPLETED | OUTPATIENT
Start: 2021-07-29 | End: 2021-07-29

## 2021-07-29 RX ORDER — SULFAMETHOXAZOLE AND TRIMETHOPRIM 800; 160 MG/1; MG/1
1 TABLET ORAL 2 TIMES DAILY
Qty: 14 TABLET | Refills: 0 | Status: SHIPPED | OUTPATIENT
Start: 2021-07-29 | End: 2021-08-05

## 2021-07-29 RX ORDER — SULFAMETHOXAZOLE AND TRIMETHOPRIM 800; 160 MG/1; MG/1
2 TABLET ORAL ONCE
Status: COMPLETED | OUTPATIENT
Start: 2021-07-29 | End: 2021-07-29

## 2021-07-29 RX ADMIN — MORPHINE SULFATE 2 MG: 2 INJECTION, SOLUTION INTRAMUSCULAR; INTRAVENOUS at 16:58

## 2021-07-29 RX ADMIN — SULFAMETHOXAZOLE AND TRIMETHOPRIM 2 TABLET: 800; 160 TABLET ORAL at 18:31

## 2021-07-29 ASSESSMENT — ENCOUNTER SYMPTOMS
ABDOMINAL PAIN: 0
COLOR CHANGE: 1
VOMITING: 0
NAUSEA: 0
SHORTNESS OF BREATH: 0

## 2021-07-29 ASSESSMENT — PAIN SCALES - GENERAL
PAINLEVEL_OUTOF10: 9
PAINLEVEL_OUTOF10: 5

## 2021-07-29 ASSESSMENT — PAIN DESCRIPTION - PAIN TYPE: TYPE: ACUTE PAIN

## 2021-07-29 ASSESSMENT — PAIN DESCRIPTION - ORIENTATION: ORIENTATION: LEFT

## 2021-07-29 ASSESSMENT — PAIN DESCRIPTION - LOCATION: LOCATION: TOE (COMMENT WHICH ONE)

## 2021-07-29 NOTE — ED PROVIDER NOTES
[address] [facility] [address] ENCOUNTER          PHYSICIAN ASSISTANT NOTE     Date of evaluation: 7/29/2021    ADDENDUM:      Care of this patient was assumed from [facility]. The patient was seen for Other (infected toe sent by podiatrist, states he has \"covid toe\" )  . The patient's initial evaluation and plan have been discussed with the prior provider who initially evaluated the patient. Nursing Notes, Past Medical Hx, Past Surgical Hx, Social Hx, Allergies, and Family Hx were all reviewed. Diagnostic Results     RADIOLOGY:  XR FOOT LEFT (MIN 3 VIEWS)   Final Result      1. No definite evidence of acute osteomyelitis at this time. If there is high clinical concern consider follow-up progress 10 days or MRI.           LABS:   Results for orders placed or performed during the hospital encounter of 07/29/21   CBC Auto Differential   Result Value Ref Range    WBC 9.1 4.0 - 11.0 K/uL    RBC 4.55 4.20 - 5.90 M/uL    Hemoglobin 12.9 (L) 13.5 - 17.5 g/dL    Hematocrit 38.8 (L) 40.5 - 52.5 %    MCV 85.2 80.0 - 100.0 fL    MCH 28.3 26.0 - 34.0 pg    MCHC 33.2 31.0 - 36.0 g/dL    RDW 18.8 (H) 12.4 - 15.4 %    Platelets 587 457 - 275 K/uL    MPV 8.2 5.0 - 10.5 fL    Neutrophils % 72.6 %    Lymphocytes % 14.3 %    Monocytes % 9.0 %    Eosinophils % 3.3 %    Basophils % 0.8 %    Neutrophils Absolute 6.6 1.7 - 7.7 K/uL    Lymphocytes Absolute 1.3 1.0 - 5.1 K/uL    Monocytes Absolute 0.8 0.0 - 1.3 K/uL    Eosinophils Absolute 0.3 0.0 - 0.6 K/uL    Basophils Absolute 0.1 0.0 - 0.2 K/uL   CRP   Result Value Ref Range    CRP see below 0.0 - 5.1 mg/L   Sedimentation Rate   Result Value Ref Range    Sed Rate 50 (H) 0 - 20 mm/Hr   Basic Metabolic Panel w/ Reflex to MG   Result Value Ref Range    Sodium 134 (L) 136 - 145 mmol/L    Potassium reflex Magnesium 4.3 3.5 - 5.1 mmol/L    Chloride 97 (L) 99 - 110 mmol/L    CO2 29 21 - 32 mmol/L    Anion Gap 8 3 - 16    Glucose 98 70 - 99 mg/dL    BUN 19 7 - 20 mg/dL CREATININE 0.6 (L) 0.8 - 1.3 mg/dL    GFR Non-African American >60 >60    GFR African American >60 >60    Calcium 9.9 8.3 - 10.6 mg/dL   CRP   Result Value Ref Range    CRP 9.3 (H) 0.0 - 5.1 mg/L       RECENT VITALS:  BP: 118/75, Temp: 98.4 °F (36.9 °C), Pulse: 78, Resp: 18, SpO2: 95 %     Procedures       ED Course     The patient was given the following medications:  Orders Placed This Encounter   Medications    morphine (PF) injection 2 mg       CONSULTS:  IP CONSULT TO [address] / ASSESSMENT / Karlene Lee is a 77 y.o. male presenting with concern for left middle toe infection. At time of signout, all lab work has been performed and currently awaiting podiatry team recommendations. Podiatry team outlined area of erythema and spoke with their attending who will stated they could either admit patient for amputation or patient could trial another outpatient antibiotic regimen. Patient and wife opted to trial outpatient antibiotic regimen, so patient was switched from Augmentin to Bactrim and to continue Cipro. Given the time in the evening, patient will be given 1 dose of Bactrim in the emergency department as he will be unable to fill this medication until tomorrow. Patient is to follow-up with podiatrist in the next few days and outpatient amputation will be scheduled, unless symptoms worsen, patient develops fever, or erythema extends past the markings, in which case patient should call podiatrist and be evaluated immediately by podiatry or in the emergency department. Patient and wife were agreeable with this plan and patient was discharged in stable condition with normal vitals. This patient was also evaluated by the attending physician. All care plans were discussed and agreed upon. Clinical Impression     1. Toe infection        Disposition     PATIENT REFERRED TO:  No follow-up provider specified.     DISCHARGE MEDICATIONS:  New Prescriptions    No medications on file       [address] [facility] BRENDA Curry  07/30/21 0002

## 2021-07-29 NOTE — ED NOTES
Discharge instructions and prescriptions reviewed with patient. Pt verbalized understanding. IV d/c. Pt tolerated well. Pt discharged home with family in a wheelchair.          Pura Dejesus RN  07/29/21 2470

## 2021-07-29 NOTE — CONSULTS
Department of Podiatry Consult Note  Resident       Reason for Consult:  Infected toe, left foot  Requesting Physician:  Elmer Brower MD    CHIEF COMPLAINT:  Painful toe with drainage    HISTORY OF PRESENT ILLNESS:                The patient is a 77 y.o. male with significant past medical history as listed below. Podiatry was consulted for infected toe. Patient has history of Covid toes. Patient reports necrotic toes to left 2nd digit and right 3rd and 4th digits, reports this has been occurring for the last few months. Reports the left 2nd toe recently got worse. Patient saw Dr. Boy Campos, who prescribed Augmentin and Cipro. Patient reports he did not tolerate Cipro so he has not been taking it. Patient reports increased redness and pain with slight drainage even though he has been taking the antibiotic since Monday. Patient denies fever, chills, nausea, vomiting, shortness of breath, chest pain. Patient has no other pedal complaints at this time. Past Medical History:        Diagnosis Date    Arthritis of hand     arthritis in hands and thumbs    CAD (coronary artery disease)     Hyperlipidemia     Hypertension     Ischemic cardiomyopathy 01/2019    STEMI (ST elevation myocardial infarction) ([facility] [address] 01/03/2019    Tinnitus        Past Surgical History:        Procedure Laterality Date    BICEPS TENDON REPAIR      CORONARY ANGIOPLASTY  01/08/2011    emergency PCI: vein to RCA    CORONARY ANGIOPLASTY WITH STENT PLACEMENT  01/03/2019    PCI to distal SVG-RCA with ELIZABETH    CORONARY ARTERY BYPASS GRAFT      in 1990 CABG x4 and in 2001 CABG x2    DIAGNOSTIC CARDIAC CATH LAB PROCEDURE         Allergies:   Ciprofloxacin and Plavix [clopidogrel bisulfate]    Medications:   Home Meds  No current facility-administered medications on file prior to encounter.      Current Outpatient Medications on File Prior to Encounter   Medication Sig Dispense Refill    halobetasol (ULTRAVATE) 0.05 % cream APPLY TO AFFECTED AREA(S) TWO TIMES A DAY AS NEEDED 1 Tube 1    calcipotriene (DOVONEX) 0.005 % cream APPLY TO AFFECTED AREA(S) TWO TIMES A DAY AS NEEDED 1 Tube 1    clonazePAM (KLONOPIN) 0.5 MG tablet Take 1 tablet by mouth daily for 30 days. 30 tablet 2    doxazosin (CARDURA) 1 MG tablet Take 1 tablet by mouth nightly 90 tablet 1    FLUoxetine (PROZAC) 20 MG capsule Take 1 capsule by mouth daily (Patient taking differently: Take 40 mg by mouth daily Take two capsules daily) 90 capsule 1    gabapentin (NEURONTIN) 300 MG capsule Take 1 capsule by mouth every 8 hours for 90 days. 270 capsule 2    metoprolol tartrate (LOPRESSOR) 25 MG tablet Take 0.5 tablets by mouth 2 times daily 180 tablet 1    pantoprazole (PROTONIX) 40 MG tablet Take 1 tablet by mouth daily 90 tablet 1    thiamine 100 MG tablet Take 1 tablet by mouth daily 90 tablet 1    guaiFENesin 1200 MG TB12 Take by mouth      atorvastatin (LIPITOR) 20 MG tablet TAKE ONE TABLET BY MOUTH DAILY 90 tablet 2    sodium chloride (ALTAMIST SPRAY) 0.65 % nasal spray 1 spray by Nasal route every 2 hours (while awake) 2 Bottle 5    budesonide (PULMICORT) 0.5 MG/2ML nebulizer suspension Take 0.5 mg by nebulization 2 times daily      Multiple Vitamins-Iron TABS Take 1 tablet by mouth daily      Omega-3 Fatty Acids (OMEGA-3 FISH OIL PO) Take 2,000 mg by mouth daily      benzonatate (TESSALON) 100 MG capsule Take 100 mg by mouth 2 times daily      ipratropium-albuterol (DUONEB) 0.5-2.5 (3) MG/3ML SOLN nebulizer solution Inhale 1 vial into the lungs 4 times daily      nitroGLYCERIN (NITROSTAT) 0.4 MG SL tablet Place 0.4 mg under the tongue every 5 minutes as needed. Current Meds  No current facility-administered medications for this encounter.       Family History:   Family History   Problem Relation Age of Onset   [facility] Cancer Mother         skin cancer    Diabetes Mother     High Blood Pressure Father     Heart Disease Father     No Known Problems Brother     No Known Problems Brother     Heart Disease Paternal Uncle     Heart Disease Paternal Cousin        Social History:   TOBACCO:   reports that he quit smoking about 30 years ago. His smoking use included cigarettes. He started smoking about 59 years ago. He has a 20.00 pack-year smoking history. He has never used smokeless tobacco.  ETOH:   reports no history of alcohol use. DRUGS:   reports no history of drug use. REVIEW OF SYSTEMS:    As above. PHYSICAL EXAM:      Vitals:    BP 98/66   Pulse 78   Temp 98.4 °F (36.9 °C) (Oral)   Resp 18   SpO2 94%     LABS:   Recent Labs     07/29/21  1316   WBC 9.1   HGB 12.9*   HCT 38.8*        Recent Labs     07/29/21  1543   *   K 4.3   CL 97*   CO2 29   BUN 19   CREATININE 0.6*     No results for input(s): PROT, INR, APTT in the last 72 hours. VASCULAR: DP and PT pulses palpable 2/4 bilateral. CFT is brisk to digits 1,3,4,5 left foot and 2 digits 1,2,5 right foot. Skin temperature is warm to cool from proximal to distal with no focal calor noted. Focal edema noted left second digit. No pain with calf compression b/l. NEUROLOGIC: Gross and epicritic sensation is intact b/l. Protective sensation is intact at all pedal sites b/l. DERMATOLOGIC:   Left lower extremity:  Ischemic changes noted to left 2nd digit distal aspect with necrotic tissue encompassing distal 1/3 of 2nd digit. Rubor noted to 2nd digit from base of digit to necrotic distal aspect. Less than 1 cc fibrotic/caseous/purulent drainage noted. No erythema, malodor, fluctuance, or crepitus noted. Patient provided verbal consent for today's parents. Right lower extremity:  Ischemic and necrotic changes noted to distal aspects right 3rd and 4th digits. No erythema, malodor, fluctuance, crepitus, or drainage noted. Wounds do not probe to bone, tunnel, or track.   Left 2nd digit distal aspect and nail has previously auto amputated, no current wound to 2nd digit.            MUSCULOSKELETAL: Muscle strength is 5/5 for all pedal groups tested. Tenderness with palpation of the left 2nd digit. Ankle joint ROM is decreased in dorsiflexion with the knee extended. IMAGING:  Xr left foot, 7/29/2021  Narrative   LEFT FOOT RADIOGRAPHS 3 VIEWS.       INDICATION: Wound.       COMPARISON: None.       FINDINGS:        Severe narrowing of the first metatarsophalangeal joint with marginal osteophyte formation and subchondral cystic change. Degenerative change between the first metatarsal head and hallucal sesamoids. Radiolucency of the distal tibial shaft with sclerotic    rim. No acute fracture. Ulceration soft tissues superficial to the distal phalanx second digit.           Impression       1. No definite evidence of acute osteomyelitis at this time. If there is high clinical concern consider follow-up progress 10 days or MRI. IMPRESSION/RECOMMENDATIONS:      -Necrotic digits, left second digit and right third and fourth digits  -Localized infection, left second digit  -Covid toes    -Patient examined and evaluated at the bedside   -VSS. No leukocytosis noted. -ESR 50, CRP 9.3  -X-rays reviewed and impression noted above  -Wound culture taken of skin drainage of left second digit  -Betadine applied to left second digit and right third and fourth digit  -Heel weightbearing bilateral lower extremity  -Lengthy discussion with patient regarding surgical intervention versus conservative care at this time. Patient elects for continued conservative care with possible outpatient surgery in the coming weeks. -Prescriptions for Bactrim and ciprofloxacin sent to patient's pharmacy. Instructed patient to take ciprofloxacin with meal to avoid GI upset. -Instructed patient and wife to watch for signs of worsening infection including but not limited to redness extending past the marked line, purulent drainage, feeling sick, fever, chills, increased pain to digit.   Instructed

## 2021-07-29 NOTE — ED PROVIDER NOTES
[address] [facility] [address] ENCOUNTER          PHYSICIAN ASSISTANT NOTE       Date of evaluation: 7/29/2021    Chief Complaint     Other (infected toe sent by podiatrist, states he has \"covid toe\" )      History of Present Illness     Ish Bowen is a 77 y.o. male with PMH of CAD s/p CABG, CHF, HTN, HLD who presents with Toe pain. Per wife at bedside, patient was diagnosed with severe COVID-19 back in January had an extended, complicated course. For the last couple of months, she reports that he has had necrosis of several toes on his right foot and on the second toe of the left foot. In the past 10 days or so, they have noted that the second left toe has become erythematous and painful. Patient was evaluated by podiatry several days ago and placed on Augmentin and Cipro. He has been compliant with the medication but in the last few days, the toe has become more swollen and erythema has spread down the base of the toe. No fevers or chills, nausea or vomiting, other systemic symptoms. Review of Systems     Review of Systems   Constitutional: Negative for chills and fever. Respiratory: Negative for shortness of breath. Cardiovascular: Negative for chest pain. Gastrointestinal: Negative for abdominal pain, nausea and vomiting. Skin: Positive for color change and wound. Psychiatric/Behavioral: The patient is not nervous/anxious. All other systems reviewed and are negative. Past Medical, Surgical, Family, and Social History     He has a past medical history of Arthritis of hand, CAD (coronary artery disease), Hyperlipidemia, Hypertension, Ischemic cardiomyopathy, STEMI (ST elevation myocardial infarction) ([address] and Tinnitus. He has a past surgical history that includes Coronary artery bypass graft; Coronary angioplasty (01/08/2011); Diagnostic Cardiac Cath Lab Procedure; Coronary angioplasty with stent (01/03/2019); and Biceps tendon repair.   His family history includes Cancer in his mother; Diabetes in his mother; Heart Disease in his father, paternal cousin, and paternal uncle; High Blood Pressure in his father; No Known Problems in his brother and brother. He reports that he quit smoking about 30 years ago. His smoking use included cigarettes. He started smoking about 59 years ago. He has a 20.00 pack-year smoking history. He has never used smokeless tobacco. He reports that he does not drink alcohol and does not use drugs. Medications     Previous Medications    ATORVASTATIN (LIPITOR) 20 MG TABLET    TAKE ONE TABLET BY MOUTH DAILY    BENZONATATE (TESSALON) 100 MG CAPSULE    Take 100 mg by mouth 2 times daily    BUDESONIDE (PULMICORT) 0.5 MG/2ML NEBULIZER SUSPENSION    Take 0.5 mg by nebulization 2 times daily    CALCIPOTRIENE (DOVONEX) 0.005 % CREAM    APPLY TO AFFECTED AREA(S) TWO TIMES A DAY AS NEEDED    CLONAZEPAM (KLONOPIN) 0.5 MG TABLET    Take 1 tablet by mouth daily for 30 days. DOXAZOSIN (CARDURA) 1 MG TABLET    Take 1 tablet by mouth nightly    FLUOXETINE (PROZAC) 20 MG CAPSULE    Take 1 capsule by mouth daily    GABAPENTIN (NEURONTIN) 300 MG CAPSULE    Take 1 capsule by mouth every 8 hours for 90 days. GUAIFENESIN 1200 MG TB12    Take by mouth    HALOBETASOL (ULTRAVATE) 0.05 % CREAM    APPLY TO AFFECTED AREA(S) TWO TIMES A DAY AS NEEDED    IPRATROPIUM-ALBUTEROL (DUONEB) 0.5-2.5 (3) MG/3ML SOLN NEBULIZER SOLUTION    Inhale 1 vial into the lungs 4 times daily    METOPROLOL TARTRATE (LOPRESSOR) 25 MG TABLET    Take 0.5 tablets by mouth 2 times daily    MULTIPLE VITAMINS-IRON TABS    Take 1 tablet by mouth daily    NITROGLYCERIN (NITROSTAT) 0.4 MG SL TABLET    Place 0.4 mg under the tongue every 5 minutes as needed.       OMEGA-3 FATTY ACIDS (OMEGA-3 FISH OIL PO)    Take 2,000 mg by mouth daily    PANTOPRAZOLE (PROTONIX) 40 MG TABLET    Take 1 tablet by mouth daily    SODIUM CHLORIDE (ALTAMIST SPRAY) 0.65 % NASAL SPRAY    1 spray by Nasal route every 2 hours (while awake)    THIAMINE 100 MG TABLET    Take 1 tablet by mouth daily       Allergies     He is allergic to ciprofloxacin and plavix [clopidogrel bisulfate]. Physical Exam     INITIAL VITALS: BP: 118/75,Temp: 98.4 °F (36.9 °C), Pulse: 78, Resp: 18, SpO2: 95 %   Physical Exam  Vitals and nursing note reviewed. Constitutional:       General: He is not in acute distress. HENT:      Head: Normocephalic and atraumatic. Eyes:      Extraocular Movements: Extraocular movements intact. Conjunctiva/sclera: Conjunctivae normal.   Cardiovascular:      Rate and Rhythm: Normal rate and regular rhythm. Pulmonary:      Effort: Pulmonary effort is normal. No respiratory distress. Breath sounds: Normal breath sounds. No wheezing, rhonchi or rales. Abdominal:      General: Bowel sounds are normal. There is no distension. Palpations: Abdomen is soft. Tenderness: There is no abdominal tenderness. There is no guarding or rebound. Musculoskeletal:         General: No deformity. Cervical back: Neck supple. Comments: The most distal portion of the left second toe is necrotic. Proximal to this, the toe is swollen and erythematous. It is tender to palpation. Additionally the most distal portion of the right third and fourth toes are also necrotic but nonerythematous and nontender. 2+ distal pulses bilaterally. Extremities warm. Skin:     General: Skin is warm and dry. Neurological:      Mental Status: He is alert and oriented to person, place, and time. Psychiatric:         Mood and Affect: Mood normal.         Behavior: Behavior normal.                   Diagnostic Results     RADIOLOGY:  XR FOOT LEFT (MIN 3 VIEWS)   Final Result      1. No definite evidence of acute osteomyelitis at this time. If there is high clinical concern consider follow-up progress 10 days or MRI.           LABS:   Results for orders placed or performed during the hospital encounter of 07/29/21   CBC Auto Differential   Result Value Ref Range    WBC 9.1 4.0 - 11.0 K/uL    RBC 4.55 4.20 - 5.90 M/uL    Hemoglobin 12.9 (L) 13.5 - 17.5 g/dL    Hematocrit 38.8 (L) 40.5 - 52.5 %    MCV 85.2 80.0 - 100.0 fL    MCH 28.3 26.0 - 34.0 pg    MCHC 33.2 31.0 - 36.0 g/dL    RDW 18.8 (H) 12.4 - 15.4 %    Platelets 587 242 - 434 K/uL    MPV 8.2 5.0 - 10.5 fL    Neutrophils % 72.6 %    Lymphocytes % 14.3 %    Monocytes % 9.0 %    Eosinophils % 3.3 %    Basophils % 0.8 %    Neutrophils Absolute 6.6 1.7 - 7.7 K/uL    Lymphocytes Absolute 1.3 1.0 - 5.1 K/uL    Monocytes Absolute 0.8 0.0 - 1.3 K/uL    Eosinophils Absolute 0.3 0.0 - 0.6 K/uL    Basophils Absolute 0.1 0.0 - 0.2 K/uL   CRP   Result Value Ref Range    CRP see below 0.0 - 5.1 mg/L       RECENT VITALS:  BP: 118/75, Temp: 98.4 °F (36.9 °C), Pulse: 78,Resp: 18, SpO2: 95 %     Procedures         ED Course     Nursing Notes, Past Medical Hx, Past Surgical Hx, Social Hx, Allergies, and Family Hx were reviewed. The patient was given the followingmedications:  No orders of the defined types were placed in this encounter. CONSULTS:  [id number] [address] Myrick / ASSESSMENT / PLAN     Mc Abernathy is a 77 y.o. male with PMH of CAD s/p CABG, CHF, HTN, HLD who presents with Toe pain. Several of patient's toes have been necrotic for the last couple of months per his wife. In the past 10 days, the left second toe has become erythematous and painful. Despite taking antibiotics prescribed by podiatry, the erythema has spread. Patient denies any fevers or systemic symptoms. On exam, he is afebrile with normal vital signs. The most distal portion of the left second toe is necrotic with erythema and swelling proximal to the area of necrosis. There is also necrosis of the distal portion of the right third and fourth toes without erythema or tenderness. Distal pulses are palpable.     X-ray of the left foot revealed no definite evidence of acute osteomyelitis. Labs revealed white blood cell count of 9.1, hgb 12.9, creatinine 0.6, ESR 50, CRP 9.3. Podiatry was paged. At this time I am going off service and will be signing out care of the patient to my colleague MILAN Verde for further care. Podiatry recommendations is/are still pending. Oncoming provider responsibilities include following up on pending labs/tests, reassessing patient, and appropriate disposition. Please see medical record for further management and medical decision making. This patient was also evaluated by the attending physician. All care plans were discussed and agreed upon. Clinical Impression     1.  Toe infection         Pascual Dumont PA-C  07/29/21 [id number]

## 2021-07-30 ENCOUNTER — HOSPITAL ENCOUNTER (OUTPATIENT)
Dept: OCCUPATIONAL THERAPY | Age: 76
Setting detail: THERAPIES SERIES
Discharge: HOME | End: 2021-07-30
Payer: MEDICARE

## 2021-07-30 ENCOUNTER — APPOINTMENT (OUTPATIENT)
Dept: PHYSICAL THERAPY | Age: 76
End: 2021-07-30
Payer: MEDICARE

## 2021-07-31 LAB
GRAM STAIN RESULT: ABNORMAL
ORGANISM: ABNORMAL
WOUND/ABSCESS: ABNORMAL

## 2021-08-02 ENCOUNTER — HOSPITAL ENCOUNTER (OUTPATIENT)
Dept: PHYSICAL THERAPY | Age: 76
Setting detail: THERAPIES SERIES
Discharge: HOME | End: 2021-08-02
Payer: MEDICARE

## 2021-08-02 NOTE — PROGRESS NOTES
Physical Therapy  Cancellation/No-show Note  Patient Name:  Yaneli Saenz  :  1945   Date:  2021  Cancels to date: 1  No-shows to date: 0    For today's appointment patient:  [x] Cancelled  [] Rescheduled appointment  [] No-show     Reason given by patient:  [] Patient ill  [] Conflicting appointment  [] No transportation    [] Conflict with work  [] No reason given  [x] Other:  Pt has toes removed on Friday. Cancelling all visits for this week.     Comments:      Electronically signed by:  Leigha Pisano PT, PT

## 2021-08-03 ENCOUNTER — OFFICE VISIT (OUTPATIENT)
Dept: PULMONOLOGY | Age: 76
End: 2021-08-03
Payer: MEDICARE

## 2021-08-03 VITALS
DIASTOLIC BLOOD PRESSURE: 68 MMHG | SYSTOLIC BLOOD PRESSURE: 109 MMHG | HEART RATE: 76 BPM | HEIGHT: 72 IN | TEMPERATURE: 98.2 F | RESPIRATION RATE: 16 BRPM | BODY MASS INDEX: 23.3 KG/M2 | WEIGHT: 172 LBS | OXYGEN SATURATION: 94 %

## 2021-08-03 DIAGNOSIS — J84.9 ILD (INTERSTITIAL LUNG DISEASE) (HCC): Primary | ICD-10-CM

## 2021-08-03 DIAGNOSIS — U07.1 COVID-19: ICD-10-CM

## 2021-08-03 DIAGNOSIS — Z98.890 H/O TRACHEOSTOMY: ICD-10-CM

## 2021-08-03 DIAGNOSIS — R49.0 HOARSE VOICE QUALITY: ICD-10-CM

## 2021-08-03 DIAGNOSIS — J96.11 CHRONIC RESPIRATORY FAILURE WITH HYPOXIA (HCC): ICD-10-CM

## 2021-08-03 DIAGNOSIS — Z87.891 FORMER SMOKER: ICD-10-CM

## 2021-08-03 PROCEDURE — 99213 OFFICE O/P EST LOW 20 MIN: CPT | Performed by: INTERNAL MEDICINE

## 2021-08-03 RX ORDER — AMOXICILLIN AND CLAVULANATE POTASSIUM 875; 125 MG/1; MG/1
TABLET, FILM COATED ORAL
Status: ON HOLD | COMMUNITY
Start: 2021-07-27 | End: 2021-08-12

## 2021-08-03 ASSESSMENT — ENCOUNTER SYMPTOMS
VOICE CHANGE: 1
SHORTNESS OF BREATH: 1
COUGH: 1

## 2021-08-03 NOTE — PATIENT INSTRUCTIONS
Remember to bring a list of pulmonary medications and any CPAP or BiPAP machines to your next appointment with the office. Please keep all of your future appointments scheduled by [facility]. Out of respect for other patients and providers, you may be asked to reschedule your appointment if you arrive later than your scheduled appointment time. Appointments cancelled less than 24hrs in advance will be considered a no show. Patients with three missed appointments within 1 year or four missed appointments within 2 years can be dismissed from the practice. Please be aware that our physicians are required to work in the Intensive Care Unit at [facility].  Your appointment may need to be rescheduled if they are designated to work during your appointment time. You may receive a survey regarding the care you received during your visit. Your input is valuable to us. We encourage you to complete and return your survey. We hope you will choose us in the future for your healthcare needs. Pt instructed of all future appointment dates & times, including radiology, labs, procedures & referrals. If procedures were scheduled preparation instructions provided. Instructions on future appointments with [facility] were given.
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
## 2021-08-04 ENCOUNTER — TELEPHONE (OUTPATIENT)
Dept: PULMONOLOGY | Age: 76
End: 2021-08-04

## 2021-08-04 NOTE — TELEPHONE ENCOUNTER
We are waiting for paperwork from [facility]. Please call wife when we receive. It. She called and they mail it 6/30 and they will mail it again.

## 2021-08-09 ENCOUNTER — HOSPITAL ENCOUNTER (OUTPATIENT)
Dept: PHYSICAL THERAPY | Age: 76
Setting detail: THERAPIES SERIES
Discharge: HOME | End: 2021-08-09
Payer: MEDICARE

## 2021-08-09 ENCOUNTER — HOSPITAL ENCOUNTER (OUTPATIENT)
Dept: OCCUPATIONAL THERAPY | Age: 76
Setting detail: THERAPIES SERIES
Discharge: HOME | End: 2021-08-09
Payer: MEDICARE

## 2021-08-09 NOTE — PROGRESS NOTES
[facility], [facility], [address] [place] [address]  Phone: [phone]  Fax [phone]        Patient Name:  Brandi Jackson  :  1945   Date:  2021  Cancels to Date: 3  No-shows to Date: 0    For today's appointment patient:  [x] Cancelled  [] Rescheduled appointment  [] No-show     Reason given by patient:  [] Patient ill  [] Conflicting appointment  [] No transportation    [] Conflict with work  [] No reason given  [x] Other:     Comments:  Patient had infected toe removed per spouse report. Miscommunication with  that he would be off schedule until futher notice. They are in agreement with plan to call on Friday to   Confirm if he will be in attendance for next week.     [] Phone call from patient to clinic later  [] Phone call or communication made to patient later    Electronically signed by:  Everett Ormond, OTR/MARINA

## 2021-08-12 ENCOUNTER — APPOINTMENT (OUTPATIENT)
Dept: GENERAL RADIOLOGY | Age: 76
DRG: 256 | End: 2021-08-12
Payer: MEDICARE

## 2021-08-12 ENCOUNTER — HOSPITAL ENCOUNTER (INPATIENT)
Age: 76
LOS: 3 days | Discharge: HOME HEALTH | DRG: 256 | End: 2021-08-15
Attending: EMERGENCY MEDICINE | Admitting: INTERNAL MEDICINE
Payer: MEDICARE

## 2021-08-12 ENCOUNTER — APPOINTMENT (OUTPATIENT)
Dept: VASCULAR LAB | Age: 76
DRG: 256 | End: 2021-08-12
Payer: MEDICARE

## 2021-08-12 DIAGNOSIS — G89.18 POST-OP PAIN: ICD-10-CM

## 2021-08-12 DIAGNOSIS — I96 GANGRENE (HCC): ICD-10-CM

## 2021-08-12 DIAGNOSIS — G72.81 CRITICAL ILLNESS MYOPATHY: ICD-10-CM

## 2021-08-12 DIAGNOSIS — L08.9 TOE INFECTION: Primary | ICD-10-CM

## 2021-08-12 LAB
ANION GAP SERPL CALCULATED.3IONS-SCNC: 7 MMOL/L (ref 3–16)
BASOPHILS ABSOLUTE: 0.1 K/UL (ref 0–0.2)
BASOPHILS RELATIVE PERCENT: 1 %
BUN BLDV-MCNC: 18 MG/DL (ref 7–20)
C-REACTIVE PROTEIN: <3 MG/L (ref 0–5.1)
CALCIUM SERPL-MCNC: 10.2 MG/DL (ref 8.3–10.6)
CHLORIDE BLD-SCNC: 99 MMOL/L (ref 99–110)
CO2: 27 MMOL/L (ref 21–32)
CREAT SERPL-MCNC: 0.7 MG/DL (ref 0.8–1.3)
EOSINOPHILS ABSOLUTE: 0.3 K/UL (ref 0–0.6)
EOSINOPHILS RELATIVE PERCENT: 2.8 %
GFR AFRICAN AMERICAN: >60
GFR NON-AFRICAN AMERICAN: >60
GLUCOSE BLD-MCNC: 114 MG/DL (ref 70–99)
HCT VFR BLD CALC: 39.3 % (ref 40.5–52.5)
HEMOGLOBIN: 13.1 G/DL (ref 13.5–17.5)
LYMPHOCYTES ABSOLUTE: 1.5 K/UL (ref 1–5.1)
LYMPHOCYTES RELATIVE PERCENT: 16.8 %
MCH RBC QN AUTO: 28.3 PG (ref 26–34)
MCHC RBC AUTO-ENTMCNC: 33.2 G/DL (ref 31–36)
MCV RBC AUTO: 85.1 FL (ref 80–100)
MONOCYTES ABSOLUTE: 0.6 K/UL (ref 0–1.3)
MONOCYTES RELATIVE PERCENT: 6.7 %
NEUTROPHILS ABSOLUTE: 6.6 K/UL (ref 1.7–7.7)
NEUTROPHILS RELATIVE PERCENT: 72.7 %
PDW BLD-RTO: 19.1 % (ref 12.4–15.4)
PLATELET # BLD: 197 K/UL (ref 135–450)
PMV BLD AUTO: 7.9 FL (ref 5–10.5)
POTASSIUM REFLEX MAGNESIUM: 4.2 MMOL/L (ref 3.5–5.1)
RBC # BLD: 4.62 M/UL (ref 4.2–5.9)
SEDIMENTATION RATE, ERYTHROCYTE: 35 MM/HR (ref 0–20)
SODIUM BLD-SCNC: 133 MMOL/L (ref 136–145)
WBC # BLD: 9.1 K/UL (ref 4–11)

## 2021-08-12 PROCEDURE — 71046 X-RAY EXAM CHEST 2 VIEWS: CPT

## 2021-08-12 PROCEDURE — 6370000000 HC RX 637 (ALT 250 FOR IP): Performed by: INTERNAL MEDICINE

## 2021-08-12 PROCEDURE — 94640 AIRWAY INHALATION TREATMENT: CPT

## 2021-08-12 PROCEDURE — 86140 C-REACTIVE PROTEIN: CPT

## 2021-08-12 PROCEDURE — 87205 SMEAR GRAM STAIN: CPT

## 2021-08-12 PROCEDURE — 96375 TX/PRO/DX INJ NEW DRUG ADDON: CPT

## 2021-08-12 PROCEDURE — 99283 EMERGENCY DEPT VISIT LOW MDM: CPT

## 2021-08-12 PROCEDURE — 93925 LOWER EXTREMITY STUDY: CPT

## 2021-08-12 PROCEDURE — 93005 ELECTROCARDIOGRAM TRACING: CPT | Performed by: PODIATRIST

## 2021-08-12 PROCEDURE — 80048 BASIC METABOLIC PNL TOTAL CA: CPT

## 2021-08-12 PROCEDURE — 2500000003 HC RX 250 WO HCPCS: Performed by: PODIATRIST

## 2021-08-12 PROCEDURE — 6360000002 HC RX W HCPCS: Performed by: INTERNAL MEDICINE

## 2021-08-12 PROCEDURE — 94150 VITAL CAPACITY TEST: CPT

## 2021-08-12 PROCEDURE — 6360000002 HC RX W HCPCS: Performed by: NURSE PRACTITIONER

## 2021-08-12 PROCEDURE — 85652 RBC SED RATE AUTOMATED: CPT

## 2021-08-12 PROCEDURE — 1200000000 HC SEMI PRIVATE

## 2021-08-12 PROCEDURE — 96365 THER/PROPH/DIAG IV INF INIT: CPT

## 2021-08-12 PROCEDURE — 87070 CULTURE OTHR SPECIMN AEROBIC: CPT

## 2021-08-12 PROCEDURE — 2580000003 HC RX 258: Performed by: INTERNAL MEDICINE

## 2021-08-12 PROCEDURE — 73630 X-RAY EXAM OF FOOT: CPT

## 2021-08-12 PROCEDURE — 94669 MECHANICAL CHEST WALL OSCILL: CPT

## 2021-08-12 PROCEDURE — 94664 DEMO&/EVAL PT USE INHALER: CPT

## 2021-08-12 PROCEDURE — 85025 COMPLETE CBC W/AUTO DIFF WBC: CPT

## 2021-08-12 PROCEDURE — 36415 COLL VENOUS BLD VENIPUNCTURE: CPT

## 2021-08-12 RX ORDER — FLUOXETINE HYDROCHLORIDE 20 MG/1
20 CAPSULE ORAL DAILY
COMMUNITY
End: 2021-12-07

## 2021-08-12 RX ORDER — ACETAMINOPHEN 650 MG/1
650 SUPPOSITORY RECTAL EVERY 6 HOURS PRN
Status: DISCONTINUED | OUTPATIENT
Start: 2021-08-12 | End: 2021-08-15 | Stop reason: HOSPADM

## 2021-08-12 RX ORDER — HYDROCODONE BITARTRATE AND ACETAMINOPHEN 5; 325 MG/1; MG/1
2 TABLET ORAL EVERY 4 HOURS PRN
Status: DISCONTINUED | OUTPATIENT
Start: 2021-08-12 | End: 2021-08-15 | Stop reason: HOSPADM

## 2021-08-12 RX ORDER — LANOLIN ALCOHOL/MO/W.PET/CERES
100 CREAM (GRAM) TOPICAL DAILY
Status: DISCONTINUED | OUTPATIENT
Start: 2021-08-13 | End: 2021-08-15 | Stop reason: HOSPADM

## 2021-08-12 RX ORDER — ONDANSETRON 4 MG/1
4 TABLET, ORALLY DISINTEGRATING ORAL EVERY 8 HOURS PRN
Status: DISCONTINUED | OUTPATIENT
Start: 2021-08-12 | End: 2021-08-15 | Stop reason: HOSPADM

## 2021-08-12 RX ORDER — SULFAMETHOXAZOLE AND TRIMETHOPRIM 800; 160 MG/1; MG/1
1 TABLET ORAL 2 TIMES DAILY
Status: ON HOLD | COMMUNITY
End: 2021-08-15 | Stop reason: HOSPADM

## 2021-08-12 RX ORDER — SODIUM CHLORIDE 0.9 % (FLUSH) 0.9 %
10 SYRINGE (ML) INJECTION PRN
Status: DISCONTINUED | OUTPATIENT
Start: 2021-08-12 | End: 2021-08-15 | Stop reason: HOSPADM

## 2021-08-12 RX ORDER — OXYCODONE HYDROCHLORIDE AND ACETAMINOPHEN 5; 325 MG/1; MG/1
1 TABLET ORAL EVERY 4 HOURS PRN
Status: ON HOLD | COMMUNITY
End: 2021-08-14 | Stop reason: HOSPADM

## 2021-08-12 RX ORDER — MORPHINE SULFATE 2 MG/ML
1 INJECTION, SOLUTION INTRAMUSCULAR; INTRAVENOUS EVERY 4 HOURS PRN
Status: DISCONTINUED | OUTPATIENT
Start: 2021-08-12 | End: 2021-08-14

## 2021-08-12 RX ORDER — BUDESONIDE 0.5 MG/2ML
0.5 INHALANT ORAL 2 TIMES DAILY
Status: DISCONTINUED | OUTPATIENT
Start: 2021-08-12 | End: 2021-08-15 | Stop reason: HOSPADM

## 2021-08-12 RX ORDER — CLINDAMYCIN PHOSPHATE 600 MG/50ML
600 INJECTION INTRAVENOUS ONCE
Status: DISCONTINUED | OUTPATIENT
Start: 2021-08-12 | End: 2021-08-12

## 2021-08-12 RX ORDER — SODIUM CHLORIDE 9 MG/ML
25 INJECTION, SOLUTION INTRAVENOUS PRN
Status: DISCONTINUED | OUTPATIENT
Start: 2021-08-12 | End: 2021-08-15 | Stop reason: HOSPADM

## 2021-08-12 RX ORDER — IPRATROPIUM BROMIDE AND ALBUTEROL SULFATE 2.5; .5 MG/3ML; MG/3ML
1 SOLUTION RESPIRATORY (INHALATION) 4 TIMES DAILY
Status: DISCONTINUED | OUTPATIENT
Start: 2021-08-12 | End: 2021-08-13

## 2021-08-12 RX ORDER — ATORVASTATIN CALCIUM 20 MG/1
20 TABLET, FILM COATED ORAL DAILY
Status: DISCONTINUED | OUTPATIENT
Start: 2021-08-12 | End: 2021-08-13

## 2021-08-12 RX ORDER — CLINDAMYCIN PHOSPHATE 300 MG/50ML
300 INJECTION INTRAVENOUS EVERY 8 HOURS
Status: DISCONTINUED | OUTPATIENT
Start: 2021-08-12 | End: 2021-08-15

## 2021-08-12 RX ORDER — POTASSIUM CHLORIDE 7.45 MG/ML
10 INJECTION INTRAVENOUS PRN
Status: DISCONTINUED | OUTPATIENT
Start: 2021-08-12 | End: 2021-08-15 | Stop reason: HOSPADM

## 2021-08-12 RX ORDER — HYDROCODONE BITARTRATE AND ACETAMINOPHEN 5; 325 MG/1; MG/1
1 TABLET ORAL EVERY 4 HOURS PRN
Status: DISCONTINUED | OUTPATIENT
Start: 2021-08-12 | End: 2021-08-15 | Stop reason: HOSPADM

## 2021-08-12 RX ORDER — FLUOXETINE HYDROCHLORIDE 20 MG/1
40 CAPSULE ORAL DAILY
Status: DISCONTINUED | OUTPATIENT
Start: 2021-08-12 | End: 2021-08-15 | Stop reason: HOSPADM

## 2021-08-12 RX ORDER — CIPROFLOXACIN 500 MG/1
500 TABLET, FILM COATED ORAL 2 TIMES DAILY
Status: ON HOLD | COMMUNITY
End: 2021-08-15 | Stop reason: HOSPADM

## 2021-08-12 RX ORDER — MAGNESIUM SULFATE IN WATER 40 MG/ML
2000 INJECTION, SOLUTION INTRAVENOUS PRN
Status: DISCONTINUED | OUTPATIENT
Start: 2021-08-12 | End: 2021-08-15 | Stop reason: HOSPADM

## 2021-08-12 RX ORDER — ACETAMINOPHEN 325 MG/1
650 TABLET ORAL EVERY 6 HOURS PRN
Status: DISCONTINUED | OUTPATIENT
Start: 2021-08-12 | End: 2021-08-15 | Stop reason: HOSPADM

## 2021-08-12 RX ORDER — NITROGLYCERIN 0.4 MG/1
0.4 TABLET SUBLINGUAL EVERY 5 MIN PRN
Status: DISCONTINUED | OUTPATIENT
Start: 2021-08-12 | End: 2021-08-15 | Stop reason: HOSPADM

## 2021-08-12 RX ORDER — GABAPENTIN 300 MG/1
300 CAPSULE ORAL 3 TIMES DAILY
Status: DISCONTINUED | OUTPATIENT
Start: 2021-08-12 | End: 2021-08-15 | Stop reason: HOSPADM

## 2021-08-12 RX ORDER — ONDANSETRON 2 MG/ML
4 INJECTION INTRAMUSCULAR; INTRAVENOUS EVERY 6 HOURS PRN
Status: DISCONTINUED | OUTPATIENT
Start: 2021-08-12 | End: 2021-08-15 | Stop reason: HOSPADM

## 2021-08-12 RX ORDER — MORPHINE SULFATE 2 MG/ML
1 INJECTION, SOLUTION INTRAMUSCULAR; INTRAVENOUS EVERY 4 HOURS PRN
Status: DISCONTINUED | OUTPATIENT
Start: 2021-08-12 | End: 2021-08-12 | Stop reason: SDUPTHER

## 2021-08-12 RX ORDER — SODIUM CHLORIDE 0.9 % (FLUSH) 0.9 %
10 SYRINGE (ML) INJECTION EVERY 12 HOURS SCHEDULED
Status: DISCONTINUED | OUTPATIENT
Start: 2021-08-12 | End: 2021-08-15 | Stop reason: HOSPADM

## 2021-08-12 RX ORDER — DOXAZOSIN 2 MG/1
1 TABLET ORAL NIGHTLY
Status: DISCONTINUED | OUTPATIENT
Start: 2021-08-12 | End: 2021-08-15 | Stop reason: HOSPADM

## 2021-08-12 RX ORDER — PANTOPRAZOLE SODIUM 40 MG/1
40 TABLET, DELAYED RELEASE ORAL DAILY
Status: DISCONTINUED | OUTPATIENT
Start: 2021-08-12 | End: 2021-08-12

## 2021-08-12 RX ORDER — ONDANSETRON 2 MG/ML
4 INJECTION INTRAMUSCULAR; INTRAVENOUS ONCE
Status: COMPLETED | OUTPATIENT
Start: 2021-08-12 | End: 2021-08-12

## 2021-08-12 RX ORDER — BENZONATATE 100 MG/1
100 CAPSULE ORAL 2 TIMES DAILY
Status: DISCONTINUED | OUTPATIENT
Start: 2021-08-12 | End: 2021-08-12

## 2021-08-12 RX ORDER — MORPHINE SULFATE 4 MG/ML
4 INJECTION, SOLUTION INTRAMUSCULAR; INTRAVENOUS ONCE
Status: COMPLETED | OUTPATIENT
Start: 2021-08-12 | End: 2021-08-12

## 2021-08-12 RX ADMIN — BUDESONIDE 500 MCG: 0.5 SUSPENSION RESPIRATORY (INHALATION) at 21:55

## 2021-08-12 RX ADMIN — CLINDAMYCIN IN 5 PERCENT DEXTROSE 300 MG: 6 INJECTION, SOLUTION INTRAVENOUS at 16:38

## 2021-08-12 RX ADMIN — IPRATROPIUM BROMIDE AND ALBUTEROL SULFATE 3 ML: .5; 3 SOLUTION RESPIRATORY (INHALATION) at 21:52

## 2021-08-12 RX ADMIN — Medication 10 ML: at 21:40

## 2021-08-12 RX ADMIN — ONDANSETRON 4 MG: 2 INJECTION INTRAMUSCULAR; INTRAVENOUS at 16:38

## 2021-08-12 RX ADMIN — MORPHINE SULFATE 4 MG: 4 INJECTION INTRAVENOUS at 16:39

## 2021-08-12 RX ADMIN — DOXAZOSIN 1 MG: 2 TABLET ORAL at 21:39

## 2021-08-12 RX ADMIN — FLUOXETINE 40 MG: 20 CAPSULE ORAL at 21:40

## 2021-08-12 RX ADMIN — GABAPENTIN 300 MG: 300 CAPSULE ORAL at 21:39

## 2021-08-12 RX ADMIN — METOPROLOL TARTRATE 12.5 MG: 25 TABLET, FILM COATED ORAL at 21:39

## 2021-08-12 RX ADMIN — ATORVASTATIN CALCIUM 20 MG: 20 TABLET, FILM COATED ORAL at 21:39

## 2021-08-12 ASSESSMENT — PAIN SCALES - GENERAL
PAINLEVEL_OUTOF10: 8
PAINLEVEL_OUTOF10: 0
PAINLEVEL_OUTOF10: 2

## 2021-08-12 ASSESSMENT — ENCOUNTER SYMPTOMS
EYES NEGATIVE: 1
GASTROINTESTINAL NEGATIVE: 1
RESPIRATORY NEGATIVE: 1

## 2021-08-12 NOTE — ED PROVIDER NOTES
ED Attending Attestation Note     Date of evaluation: 8/12/2021    This patient was seen by the advance practice provider. I have seen and examined the patient, agree with the workup, evaluation, management and diagnosis. The care plan has been discussed. My assessment reveals chronically ill-appearing gentleman with necrotic right second and third toes. Failed outpatient antibiotics. Will be admitted with IV antibiotics and for likely surgical amputation.      Paco Wise MD  08/12/21 [id number]

## 2021-08-12 NOTE — H&P
Hospital Medicine History & Physical      PCP: Wendy PALOMINO DO    Date of Admission: 8/12/2021    Chief Complaint: Left foot toe gangrene    History Of Present Illness:  Patient is a 62-year-old male with past medical history of ischemic cardiomyopathy, COVID-19 infection January/2021, CAD hypertension hyperlipidemia who presented to hospital at the request of podiatry for left foot toe amputation. According to the patient he had been bedridden and has been having gangrene of his toes since COVID-19 infection, he had a prolonged course of hospitalization during COVID-19. Patient also mentions he feels uncomfortable due to pain in his both legs, otherwise denied chest pain shortness of breath nausea vomiting diarrhea constipation dysuria. Past Medical History:          Diagnosis Date    Arthritis of hand     arthritis in hands and thumbs    CAD (coronary artery disease)     Hyperlipidemia     Hypertension     Ischemic cardiomyopathy 01/2019    STEMI (ST elevation myocardial infarction) ([address] 01/03/2019    Tinnitus        Past Surgical History:          Procedure Laterality Date    BICEPS TENDON REPAIR      CORONARY ANGIOPLASTY  01/08/2011    emergency PCI: vein to RCA    CORONARY ANGIOPLASTY WITH STENT PLACEMENT  01/03/2019    PCI to distal SVG-RCA with ELIZABETH    CORONARY ARTERY BYPASS GRAFT      in 1990 CABG x4 and in 2001 CABG x2    DIAGNOSTIC CARDIAC CATH LAB PROCEDURE         Medications Prior to Admission:      Prior to Admission medications    Medication Sig Start Date End Date Taking?  Authorizing Provider   amoxicillin-clavulanate (AUGMENTIN) 875-125 MG per tablet  7/27/21   Historical Provider, MD   halobetasol (ULTRAVATE) 0.05 % cream APPLY TO AFFECTED AREA(S) TWO TIMES A DAY AS NEEDED 7/22/21   Zhane Palomino DO   calcipotriene (DOVONEX) 0.005 % cream APPLY TO AFFECTED AREA(S) TWO TIMES A DAY AS NEEDED 7/22/21   Zhane Palomino DO   clonazePAM (KLONOPIN) 0.5 MG tablet ago. His smoking use included cigarettes. He started smoking about 59 years ago. He has a 20.00 pack-year smoking history. He has never used smokeless tobacco.  ETOH:   reports no history of alcohol use. Family History:       Reviewed in detail and non contributory          Problem Relation Age of Onset    Cancer Mother         skin cancer    Diabetes Mother     High Blood Pressure Father     Heart Disease Father     No Known Problems Brother     No Known Problems Brother     Heart Disease Paternal Uncle     Heart Disease Paternal Cousin        REVIEW OF SYSTEMS:   Pertinent positives as noted in the HPI. All other systems reviewed and negative. PHYSICAL EXAM PERFORMED:    /67   Pulse 84   Temp 98 °F (36.7 °C) (Oral)   Resp 16   SpO2 92%     General appearance:  No apparent distress, cooperative. HEENT:  Normal cephalic, atraumatic without obvious deformity. Conjunctivae/corneas clear. Neck: Supple, with full range of motion. No cervical lymphadenopathy  Respiratory:  Normal respiratory effort. Clear to auscultation, bilaterally without Rales/Wheezes/Rhonchi. Cardiovascular:  Regular rate and rhythm with normal S1/S2 without murmurs, rubs or gallops. Abdomen: Soft, non-tender, non-distended, normal bowel sounds. Musculoskeletal: Left foot to gangrenous, no signs of surrounding cellulitis, no edema noted bilaterally. tenderness on palpation noted  Skin: no rash visible  Neurologic:  Neurologically intact without any focal sensory/motor deficits. grossly non-focal.  Psychiatric:  Alert and oriented, normal mood  Peripheral Pulses: +2 palpable, equal bilaterally       Labs:     Recent Labs     08/12/21  1428   WBC 9.1   HGB 13.1*   HCT 39.3*        Recent Labs     08/12/21  1428   *   K 4.2   CL 99   CO2 27   BUN 18   CREATININE 0.7*   CALCIUM 10.2     No results for input(s): AST, ALT, BILIDIR, BILITOT, ALKPHOS in the last 72 hours.   No results for input(s): INR in the last 72 hours. No results for input(s): Isabelle Nair in the last 72 hours. Urinalysis:      Lab Results   Component Value Date    BLOODU trace-intact 07/26/2017    SPECGRAV 1.020 07/26/2017    GLUCOSEU neg 07/26/2017       Radiology:       VL DUP LOWER EXTREMITY ARTERIES BILATERAL         XR FOOT LEFT (MIN 3 VIEWS)   Final Result      Right foot: 3 views demonstrate mild degenerative changes in the first MTP joint. There is soft tissue ulceration of the distal aspects of the third and fourth digits. There is no evidence of underlying osseous destruction. Left foot: 3 views demonstrate deformity of the distal fibula internally evaluated but likely due to old trauma and unchanged since 7/29/2021. There are severe arthritic changes at the first MTP joint. There is an erosion in the base of the first    proximal phalanx. There are findings compatible with amputation of the second distal phalanx. No convincing evidence of underlying osseous destruction. If more sensitive imaging is indicated then MRI should be obtained. XR FOOT RIGHT (MIN 3 VIEWS)   Final Result      Right foot: 3 views demonstrate mild degenerative changes in the first MTP joint. There is soft tissue ulceration of the distal aspects of the third and fourth digits. There is no evidence of underlying osseous destruction. Left foot: 3 views demonstrate deformity of the distal fibula internally evaluated but likely due to old trauma and unchanged since 7/29/2021. There are severe arthritic changes at the first MTP joint. There is an erosion in the base of the first    proximal phalanx. There are findings compatible with amputation of the second distal phalanx. No convincing evidence of underlying osseous destruction. If more sensitive imaging is indicated then MRI should be obtained.               Active Hospital Problems    Diagnosis Date Noted    [facility]       Patient is a 67-year-old male with past medical history of ischemic cardiomyopathy, COVID-19 infection January/2021, CAD hypertension hyperlipidemia who presented to hospital at the request of podiatry for left foot toe amputation. According to the patient he had been bedridden and has been having gangrene of his toes since COVID-19 infection, he had a prolonged course of hospitalization during COVID-19. Patient also mentions he feels uncomfortable due to pain in his both legs, otherwise denied chest pain shortness of breath nausea vomiting diarrhea constipation dysuria. Assessment  Dry gangrene left third and fourth digits  Bilateral foot pain  Full-thickness ulceration right second digit  CAD  Hypertension  Hyperlipidemia  Ischemic cardiomyopathy    Plan  Start IV clindamycin, arterial duplex, ESR CRP, pain control, podiatry was consulted from ED, plan for n.p.o. after midnight, possible surgery in the a.m. patient may need amputation of left second third and right fourth digit amputation per podiatry  Consult cardiology for cardiac preoperative evaluation for procedure  Resume home medications  DVT prophylaxis-SCDs only per recommendation by podiatry  Diet: No diet orders on file  Code Status: Prior    PT/OT Eval Status: ordered    Dispo - pending clinical improvement       Erik Mc MD    The note was completed using EMR and Dragon dictation system. Every effort was made to ensure accuracy; however, inadvertent computerized transcription errors may be present. Thank you Jordon Lopez DO for the opportunity to be involved in this patient's care. If you have any questions or concerns please feel free to contact me at [phone].     Erik Mc MD

## 2021-08-12 NOTE — CONSULTS
Department of Podiatry Consult Note  Resident        Reason for Consult:  infected toe  Requesting Physician:  Ed Winchester CNP    CHIEF COMPLAINT:  Necrotic toes    HISTORY OF PRESENT ILLNESS:                The patient is a 77 y.o. male with significant past medical history of CAD, HTN, HLD, ischemic cardiomyopathy, and previous complicated COVID admission who is consulted for infected toe. Patient presented to the hospital secondary to worsening left second digit ulcer and possible surgical intervention per his podiatrist. The patient follows with Dr. Maribel Spivey in his private podiatry clinic. The patient is seen frequently for local wound care as well as management of necrotic digits to bilateral lower extremities. The patient states that recently the left second digit has become wet in appearance and he and his wife became concerned for worsening infection. Patient states multiple digits began to turn black after he was hospitalized with Covid infection for 135 days. He has come to the realization that he will likely require amputation of multiple digits due to the gangrene. Patient states at this time he feels well, aside from significant pain to bilateral lower extremities. Patient denies nausea, vomiting, fever, chills, shortness of breath, chest pain, or calf pain at this time. Patient is nondiabetic with a last HbA1c of 5.8. Patient is not on anticoagulation.     Past Medical History:        Diagnosis Date    Arthritis of hand     arthritis in hands and thumbs    CAD (coronary artery disease)     Hyperlipidemia     Hypertension     Ischemic cardiomyopathy 01/2019    STEMI (ST elevation myocardial infarction) ([facility] [address] 01/03/2019    Tinnitus        Past Surgical History:        Procedure Laterality Date    BICEPS TENDON REPAIR      CORONARY ANGIOPLASTY  01/08/2011    emergency PCI: vein to RCA    CORONARY ANGIOPLASTY WITH STENT PLACEMENT  01/03/2019    PCI to distal SVG-RCA with ELIZABETH    CORONARY ARTERY BYPASS GRAFT      in 1990 CABG x4 and in 2001 CABG x2    DIAGNOSTIC CARDIAC CATH LAB PROCEDURE         Allergies:   Ciprofloxacin and Plavix [clopidogrel bisulfate]    Medications:   Home Meds  No current facility-administered medications on file prior to encounter. Current Outpatient Medications on File Prior to Encounter   Medication Sig Dispense Refill    amoxicillin-clavulanate (AUGMENTIN) 875-125 MG per tablet       halobetasol (ULTRAVATE) 0.05 % cream APPLY TO AFFECTED AREA(S) TWO TIMES A DAY AS NEEDED 1 Tube 1    calcipotriene (DOVONEX) 0.005 % cream APPLY TO AFFECTED AREA(S) TWO TIMES A DAY AS NEEDED 1 Tube 1    clonazePAM (KLONOPIN) 0.5 MG tablet Take 1 tablet by mouth daily for 30 days. 30 tablet 2    doxazosin (CARDURA) 1 MG tablet Take 1 tablet by mouth nightly 90 tablet 1    FLUoxetine (PROZAC) 20 MG capsule Take 1 capsule by mouth daily (Patient taking differently: Take 40 mg by mouth daily Take two capsules daily) 90 capsule 1    gabapentin (NEURONTIN) 300 MG capsule Take 1 capsule by mouth every 8 hours for 90 days.  270 capsule 2    metoprolol tartrate (LOPRESSOR) 25 MG tablet Take 0.5 tablets by mouth 2 times daily 180 tablet 1    pantoprazole (PROTONIX) 40 MG tablet Take 1 tablet by mouth daily 90 tablet 1    thiamine 100 MG tablet Take 1 tablet by mouth daily 90 tablet 1    guaiFENesin 1200 MG TB12 Take by mouth      atorvastatin (LIPITOR) 20 MG tablet TAKE ONE TABLET BY MOUTH DAILY 90 tablet 2    sodium chloride (ALTAMIST SPRAY) 0.65 % nasal spray 1 spray by Nasal route every 2 hours (while awake) 2 Bottle 5    budesonide (PULMICORT) 0.5 MG/2ML nebulizer suspension Take 0.5 mg by nebulization 2 times daily      Multiple Vitamins-Iron TABS Take 1 tablet by mouth daily      Omega-3 Fatty Acids (OMEGA-3 FISH OIL PO) Take 2,000 mg by mouth daily      benzonatate (TESSALON) 100 MG capsule Take 100 mg by mouth 2 times daily      ipratropium-albuterol (DUONEB) 0.5-2.5 (3) MG/3ML SOLN nebulizer solution Inhale 1 vial into the lungs 4 times daily      nitroGLYCERIN (NITROSTAT) 0.4 MG SL tablet Place 0.4 mg under the tongue every 5 minutes as needed. Current Meds  No current facility-administered medications for this encounter. Family History:   Family History   Problem Relation Age of Onset   [facility] Cancer Mother         skin cancer    Diabetes Mother     High Blood Pressure Father     Heart Disease Father     No Known Problems Brother     No Known Problems Brother     Heart Disease Paternal Uncle     Heart Disease Paternal Cousin        Social History:   TOBACCO:   reports that he quit smoking about 30 years ago. His smoking use included cigarettes. He started smoking about 59 years ago. He has a 20.00 pack-year smoking history. He has never used smokeless tobacco.  ETOH:   reports no history of alcohol use. DRUGS:   reports no history of drug use. REVIEW OF SYSTEMS:    A 10 point review of systems was conducted, significant findings as noted in HPI. All other systems negative. PHYSICAL EXAM:      Vitals:    /69   Pulse 84   Temp 98 °F (36.7 °C) (Oral)   Resp 18   SpO2 94%     LABS:   Recent Labs     08/12/21  1428   WBC 9.1   HGB 13.1*   HCT 39.3*        Recent Labs     08/12/21  1428   *   K 4.2   CL 99   CO2 27   BUN 18   CREATININE 0.7*     No results for input(s): PROT, INR, APTT in the last 72 hours. GENERAL: Patient is alert and oriented x3. No signs of acute distress noted. LOWER EXTREMITY EXAMINATION:  VASCULAR: DP and PT pulses palpable 2/4 bilateral LE. CFT is brisk to digits 1,3,4,5 left foot and digits 1,2,5 right foot. Skin temperature is cool to cool from proximal to distal with no focal calor noted. Focal edema noted left second digit. No pain with calf compression b/l. NEUROLOGIC: Gross and epicritic sensation is intact b/l.  Protective sensation is intact at all pedal sites b/l.    DERMATOLOGIC:   Patient provided verbal consent for photos to be obtained today, 8/12/21. Full thickness ulceration noted to the left second distal most aspect of the digit, measuring approximately 1 cm in diameter. The wound base is fibrotic in nature with a macerated and xerotic periwound. The wound does probe to bone. No purulent drainage, tracking, tunneling, crepitus, or fluctuance noted. Focal erythema noted to the left second digit and right third and fourth digits, otherwise no erythema noted to the b/l LE. Blackened and necrotic in appearance tissue noted to the right third and fourth digits. Betadine stains noted to the left 2nd digit and right 3rd and 4th digit. Webspaces 1-4 b/l are clean, dry, and intact. No hyperkeratosis noted. No subcutaneous nodules, rashes, or other skin lesions noted. MUSCULOSKELETAL: Muscle strength is 5/5 for all pedal groups tested. Moderate pain with palpation of the foot or ankle b/l. Ankle joint ROM is decreased in dorsiflexion with the knee extended. No obvious biomechanical abnormalities. Partial 2nd digit amputation noted to the left LE. IMAGING:  Impression       Right foot: 3 views demonstrate mild degenerative changes in the first MTP joint. There is soft tissue ulceration of the distal aspects of the third and fourth digits. There is no evidence of underlying osseous destruction. Left foot: 3 views demonstrate deformity of the distal fibula internally evaluated but likely due to old trauma and unchanged since 7/29/2021. There are severe arthritic changes at the first MTP joint. There is an erosion in the base of the first    proximal phalanx. There are findings compatible with amputation of the second distal phalanx. No convincing evidence of underlying osseous destruction. If more sensitive imaging is indicated then MRI should be obtained.      Arterial duplex  Tech Comments   Right   Brachial blood pressure taken only on the right arm due to fracture on the   left. The ankle/brachial index is 1.02 (,  mmHg). Normal multiphasic flow in the common femoral artery indicates no significant   inflow disease. Atherosclerotic plaque in the femoral-popliteal segment is consistent with   <50% stenosis. The tibial arteries are patent. Left   The ankle/brachial index is 1.15 (,  mmHg). Normal multiphasic flow in the common femoral artery indicates no significant   inflow disease. Atherosclerotic plaque in the femoral-popliteal segment is consistent with   <50% stenosis. The anterior tibial artery is poorly seen at the ankle, possibly occluded more   proximally, and appears to have retrograde flow just distal to the ankle. There are no previous studies at this facility for comparison. (Patient had a   limited arterial duplex at an outside facility as an inpatient with Covid   protocol that was within normal limits bilaterally). ASSESSMENT/PLAN:  1. Full thickness ulceration, right 2nd digit (POA)  2. Dry gangrene, left 3rd and 4th digits (POA)  3.   Foot pain b/l LE      -Patient seen and examined at the bedside this PM  -VSS, no leukocytosis noted (WBC 9.1)  -ESR and CRP pending  -XR images reviewed, impression noted above  -Arterial duplex reviewed, preliminary impression noted above  -Wound culture obtained, will f/u results  -Recommend IV Clindamycin based on previous sensitivities  -b/l LE dressed with betadine, dsd, and tetranet  -Weightbearing as tolerated to b/l LE  -Recommend admission to the hospital for IV antibiotics and possible surgical intervention  -Lengthy discussion with patient regarding gangrenous digits and the need for surgical intervention, will plan for Left 2nd digit, Right 3rd digit, and Right partial 4th digit amputation tomorrow with Dr. Sahara Espinoza, exact time tbd   -NPO at midnight   -Consent obtained   -Hold anticoagulation   -PT/INR pending   -Clearance per IM      DISPO: Full thickness ulceration Left 2nd digit with gangrenous Right 3rd and 4th digits; recommend admission to the hospital, IV antibiotics, and surgical intervention tomorrow      Thank you for the opportunity to take part in the patient's care.  The patient assessment and plan was discussed with Dr. Ladonna Biswas, DPM  Podiatric Resident PGY3  Pager [phone] or [place]  8/12/2021, 3:56 PM

## 2021-08-12 NOTE — ED PROVIDER NOTES
[facility] ENCOUNTER          NURSE PRACTITIONER NOTE       Date of evaluation: 8/12/2021    Chief Complaint     Other (pt states, \"I have Covid toe. Pt states mt doctor told me to come here to get my toe off. \" )      History of Present Illness     Marin Escalera is a 77 y.o. male with a significant past medical history of Covid with  Covid toes, CAD, HTN, HLD, cardiomyopathy, STEMI; who presents to the ED with a complaint of \"covid toes. \" Patient states he was instructed by his podiatrist to come to the emergency department for admission for removal of his left second toe. Patient has been fighting an infection in this toe for quite a while now, states antibiotics are not helping, and that he was instructed by his podiatrist that this will need to be amputated. He did have a small necrotic portion of the distal aspect recently amputated in the office, this did not improve his swelling and pain. Patient currently rates his pain a 6 out of 10, worse with ambulation, has been heel walking to prevent putting pressure on the toes. Also has 2 necrotic toes on his right foot which he states have not become infected. He denies systemic signs of infection, denies other issues currently. Review of Systems     Review of Systems   Constitutional: Negative. Eyes: Negative. Respiratory: Negative. Cardiovascular: Negative. Gastrointestinal: Negative. Musculoskeletal:        Left toe/foot pain   Skin: Positive for wound. Allergic/Immunologic: Negative for immunocompromised state. Neurological: Negative. Hematological: Does not bruise/bleed easily. Psychiatric/Behavioral: Negative. Past Medical, Surgical, Family, and Social History     He has a past medical history of Arthritis of hand, CAD (coronary artery disease), Hyperlipidemia, Hypertension, Ischemic cardiomyopathy, STEMI (ST elevation myocardial infarction) ([facility] [address] and Tinnitus.   He has a past surgical history that includes Coronary artery bypass graft; Coronary angioplasty (01/08/2011); Diagnostic Cardiac Cath Lab Procedure; Coronary angioplasty with stent (01/03/2019); and Biceps tendon repair. His family history includes Cancer in his mother; Diabetes in his mother; Heart Disease in his father, paternal cousin, and paternal uncle; High Blood Pressure in his father; No Known Problems in his brother and brother. He reports that he quit smoking about 30 years ago. His smoking use included cigarettes. He started smoking about 59 years ago. He has a 20.00 pack-year smoking history. He has never used smokeless tobacco. He reports that he does not drink alcohol and does not use drugs. Medications     Previous Medications    AMOXICILLIN-CLAVULANATE (AUGMENTIN) 875-125 MG PER TABLET        ATORVASTATIN (LIPITOR) 20 MG TABLET    TAKE ONE TABLET BY MOUTH DAILY    BENZONATATE (TESSALON) 100 MG CAPSULE    Take 100 mg by mouth 2 times daily    BUDESONIDE (PULMICORT) 0.5 MG/2ML NEBULIZER SUSPENSION    Take 0.5 mg by nebulization 2 times daily    CALCIPOTRIENE (DOVONEX) 0.005 % CREAM    APPLY TO AFFECTED AREA(S) TWO TIMES A DAY AS NEEDED    CLONAZEPAM (KLONOPIN) 0.5 MG TABLET    Take 1 tablet by mouth daily for 30 days. DOXAZOSIN (CARDURA) 1 MG TABLET    Take 1 tablet by mouth nightly    FLUOXETINE (PROZAC) 20 MG CAPSULE    Take 1 capsule by mouth daily    GABAPENTIN (NEURONTIN) 300 MG CAPSULE    Take 1 capsule by mouth every 8 hours for 90 days.     GUAIFENESIN 1200 MG TB12    Take by mouth    HALOBETASOL (ULTRAVATE) 0.05 % CREAM    APPLY TO AFFECTED AREA(S) TWO TIMES A DAY AS NEEDED    IPRATROPIUM-ALBUTEROL (DUONEB) 0.5-2.5 (3) MG/3ML SOLN NEBULIZER SOLUTION    Inhale 1 vial into the lungs 4 times daily    METOPROLOL TARTRATE (LOPRESSOR) 25 MG TABLET    Take 0.5 tablets by mouth 2 times daily    MULTIPLE VITAMINS-IRON TABS    Take 1 tablet by mouth daily    NITROGLYCERIN (NITROSTAT) 0.4 MG SL TABLET    Place 0.4 mg trauma and unchanged since 7/29/2021. There are severe arthritic changes at the first MTP joint. There is an erosion in the base of the first    proximal phalanx. There are findings compatible with amputation of the second distal phalanx. No convincing evidence of underlying osseous destruction. If more sensitive imaging is indicated then MRI should be obtained. XR FOOT RIGHT (MIN 3 VIEWS)   Final Result      Right foot: 3 views demonstrate mild degenerative changes in the first MTP joint. There is soft tissue ulceration of the distal aspects of the third and fourth digits. There is no evidence of underlying osseous destruction. Left foot: 3 views demonstrate deformity of the distal fibula internally evaluated but likely due to old trauma and unchanged since 7/29/2021. There are severe arthritic changes at the first MTP joint. There is an erosion in the base of the first    proximal phalanx. There are findings compatible with amputation of the second distal phalanx. No convincing evidence of underlying osseous destruction. If more sensitive imaging is indicated then MRI should be obtained.       VL DUP LOWER EXTREMITY ARTERIES BILATERAL    (Results Pending)       LABS:   Results for orders placed or performed during the hospital encounter of 08/12/21   CBC Auto Differential   Result Value Ref Range    WBC 9.1 4.0 - 11.0 K/uL    RBC 4.62 4.20 - 5.90 M/uL    Hemoglobin 13.1 (L) 13.5 - 17.5 g/dL    Hematocrit 39.3 (L) 40.5 - 52.5 %    MCV 85.1 80.0 - 100.0 fL    MCH 28.3 26.0 - 34.0 pg    MCHC 33.2 31.0 - 36.0 g/dL    RDW 19.1 (H) 12.4 - 15.4 %    Platelets 622 595 - 932 K/uL    MPV 7.9 5.0 - 10.5 fL    Neutrophils % 72.7 %    Lymphocytes % 16.8 %    Monocytes % 6.7 %    Eosinophils % 2.8 %    Basophils % 1.0 %    Neutrophils Absolute 6.6 1.7 - 7.7 K/uL    Lymphocytes Absolute 1.5 1.0 - 5.1 K/uL    Monocytes Absolute 0.6 0.0 - 1.3 K/uL    Eosinophils Absolute 0.3 0.0 - 0.6 K/uL    Basophils Absolute 0.1 0.0 - 0.2 K/uL   Basic Metabolic Panel w/ Reflex to MG   Result Value Ref Range    Sodium 133 (L) 136 - 145 mmol/L    Potassium reflex Magnesium 4.2 3.5 - 5.1 mmol/L    Chloride 99 99 - 110 mmol/L    CO2 27 21 - 32 mmol/L    Anion Gap 7 3 - 16    Glucose 114 (H) 70 - 99 mg/dL    BUN 18 7 - 20 mg/dL    CREATININE 0.7 (L) 0.8 - 1.3 mg/dL    GFR Non-African American >60 >60    GFR African American >60 >60    Calcium 10.2 8.3 - 10.6 mg/dL   Sedimentation Rate   Result Value Ref Range    Sed Rate 35 (H) 0 - 20 mm/Hr   C-Reactive Protein   Result Value Ref Range    CRP <3.0 0.0 - 5.1 mg/L         RECENT VITALS:  BP: 117/69, Temp: 98 °F (36.7 °C), Pulse: 84, Resp: 18, SpO2: 94 %       ED Course     Nursing Notes, Past Medical Hx, Past Surgical Hx, Social Hx, Allergies, and Family Hx were reviewed. The patient was given the following medications:  Orders Placed This Encounter   Medications    morphine injection 4 mg    ondansetron (ZOFRAN) injection 4 mg    clindamycin (CLEOCIN) IVPB 300 mg     Order Specific Question:   Antimicrobial Indications     Answer:   Skin and Soft Tissue Infection    DISCONTD: clindamycin (CLEOCIN) 600 mg in dextrose 5 % 50 mL IVPB     Order Specific Question:   Antimicrobial Indications     Answer:   Skin and Soft Tissue Infection    OR Linked Order Group     HYDROcodone-acetaminophen (NORCO) 5-325 MG per tablet 1 tablet     HYDROcodone-acetaminophen (NORCO) 5-325 MG per tablet 2 tablet            CONSULTS:  IP CONSULT TO PODIATRY  IP CONSULT TO HOSPITALIST    MEDICAL DECISION MAKING / ASSESSMENT / Neeraj Olivier is a 77 y.o. male who presents with complaints as noted in HPI. Patient sent to the hospital by his podiatrist for evaluation and admission for a left toe amputation due to recurrent infections. On presentation patient is complaining mainly of pain in his left second toe/foot, and recurrent infections in this toe.   He also has noted necrotic distal portions of his right third and fourth toes. He is otherwise systemically well, afebrile and overall well-appearing. Laboratory evaluation including CBC, BMP, ESR, CRP was ordered. CBC without leukocytosis, H&H 13.1/39.3. BMP stable. ESR and CRP downtrending from last month; 35 and <3.0. X-rays of the right foot show soft tissue ulceration at the distal aspect of the third and fourth digits without evidence of underlying osseous destruction. Left foot shows severe arthritic changes of the first MTP joint with erosion in the base of the first proximal phalanx and findings compatible with amputation of the second distal phalanx without findings for underlying osseous destruction. Venous duplex of the lower extremities pending. Patient started on clindamycin IV for treatment of left toe infection. Patient admitted to the hospital as per podiatry recommendation for ongoing evaluation and management of recurrent toe infection and need for amputation. Case discussed with the hospitalist who accepted the patient for admission. This patient was also evaluated by the attending physician. All care plans were discussed and agreed upon. Clinical Impression     1.  Toe infection        Disposition       DISPOSITION  Admitted in stable condition        MARCELL Garcia CNP  08/13/21 0900

## 2021-08-13 ENCOUNTER — APPOINTMENT (OUTPATIENT)
Dept: GENERAL RADIOLOGY | Age: 76
DRG: 256 | End: 2021-08-13
Payer: MEDICARE

## 2021-08-13 ENCOUNTER — ANESTHESIA (OUTPATIENT)
Dept: OPERATING ROOM | Age: 76
DRG: 256 | End: 2021-08-13
Payer: MEDICARE

## 2021-08-13 ENCOUNTER — ANESTHESIA EVENT (OUTPATIENT)
Dept: OPERATING ROOM | Age: 76
DRG: 256 | End: 2021-08-13
Payer: MEDICARE

## 2021-08-13 VITALS — DIASTOLIC BLOOD PRESSURE: 53 MMHG | OXYGEN SATURATION: 99 % | TEMPERATURE: 97.5 F | SYSTOLIC BLOOD PRESSURE: 92 MMHG

## 2021-08-13 LAB
ANION GAP SERPL CALCULATED.3IONS-SCNC: 10 MMOL/L (ref 3–16)
BUN BLDV-MCNC: 17 MG/DL (ref 7–20)
CALCIUM SERPL-MCNC: 9.2 MG/DL (ref 8.3–10.6)
CHLORIDE BLD-SCNC: 100 MMOL/L (ref 99–110)
CO2: 25 MMOL/L (ref 21–32)
CREAT SERPL-MCNC: 0.7 MG/DL (ref 0.8–1.3)
GFR AFRICAN AMERICAN: >60
GFR NON-AFRICAN AMERICAN: >60
GLUCOSE BLD-MCNC: 102 MG/DL (ref 70–99)
HCT VFR BLD CALC: 37.9 % (ref 40.5–52.5)
HEMOGLOBIN: 12.6 G/DL (ref 13.5–17.5)
INR BLD: 1 (ref 0.88–1.12)
MCH RBC QN AUTO: 28.3 PG (ref 26–34)
MCHC RBC AUTO-ENTMCNC: 33.2 G/DL (ref 31–36)
MCV RBC AUTO: 85.4 FL (ref 80–100)
PDW BLD-RTO: 18.7 % (ref 12.4–15.4)
PLATELET # BLD: 172 K/UL (ref 135–450)
PMV BLD AUTO: 7.8 FL (ref 5–10.5)
POTASSIUM REFLEX MAGNESIUM: 4.2 MMOL/L (ref 3.5–5.1)
PROTHROMBIN TIME: 11.3 SEC (ref 9.9–12.7)
RBC # BLD: 4.44 M/UL (ref 4.2–5.9)
SODIUM BLD-SCNC: 135 MMOL/L (ref 136–145)
WBC # BLD: 7.4 K/UL (ref 4–11)

## 2021-08-13 PROCEDURE — 2580000003 HC RX 258: Performed by: PODIATRIST

## 2021-08-13 PROCEDURE — 6370000000 HC RX 637 (ALT 250 FOR IP): Performed by: PODIATRIST

## 2021-08-13 PROCEDURE — 73630 X-RAY EXAM OF FOOT: CPT

## 2021-08-13 PROCEDURE — 80048 BASIC METABOLIC PNL TOTAL CA: CPT

## 2021-08-13 PROCEDURE — 85027 COMPLETE CBC AUTOMATED: CPT

## 2021-08-13 PROCEDURE — 3600000004 HC SURGERY LEVEL 4 BASE: Performed by: PODIATRIST

## 2021-08-13 PROCEDURE — 36415 COLL VENOUS BLD VENIPUNCTURE: CPT

## 2021-08-13 PROCEDURE — 6370000000 HC RX 637 (ALT 250 FOR IP): Performed by: INTERNAL MEDICINE

## 2021-08-13 PROCEDURE — 2500000003 HC RX 250 WO HCPCS: Performed by: PODIATRIST

## 2021-08-13 PROCEDURE — 2580000003 HC RX 258: Performed by: INTERNAL MEDICINE

## 2021-08-13 PROCEDURE — 2580000003 HC RX 258: Performed by: NURSE ANESTHETIST, CERTIFIED REGISTERED

## 2021-08-13 PROCEDURE — 6360000002 HC RX W HCPCS: Performed by: PODIATRIST

## 2021-08-13 PROCEDURE — 2500000003 HC RX 250 WO HCPCS: Performed by: NURSE ANESTHETIST, CERTIFIED REGISTERED

## 2021-08-13 PROCEDURE — 3600000014 HC SURGERY LEVEL 4 ADDTL 15MIN: Performed by: PODIATRIST

## 2021-08-13 PROCEDURE — 3700000001 HC ADD 15 MINUTES (ANESTHESIA): Performed by: PODIATRIST

## 2021-08-13 PROCEDURE — 85610 PROTHROMBIN TIME: CPT

## 2021-08-13 PROCEDURE — 3700000000 HC ANESTHESIA ATTENDED CARE: Performed by: PODIATRIST

## 2021-08-13 PROCEDURE — 7100000001 HC PACU RECOVERY - ADDTL 15 MIN: Performed by: PODIATRIST

## 2021-08-13 PROCEDURE — 0Y6S0Z0 DETACHMENT AT LEFT 2ND TOE, COMPLETE, OPEN APPROACH: ICD-10-PCS | Performed by: PODIATRIST

## 2021-08-13 PROCEDURE — 99222 1ST HOSP IP/OBS MODERATE 55: CPT | Performed by: INTERNAL MEDICINE

## 2021-08-13 PROCEDURE — 2709999900 HC NON-CHARGEABLE SUPPLY: Performed by: PODIATRIST

## 2021-08-13 PROCEDURE — 88305 TISSUE EXAM BY PATHOLOGIST: CPT

## 2021-08-13 PROCEDURE — 1200000000 HC SEMI PRIVATE

## 2021-08-13 PROCEDURE — 0Y6V0Z0 DETACHMENT AT RIGHT 4TH TOE, COMPLETE, OPEN APPROACH: ICD-10-PCS | Performed by: PODIATRIST

## 2021-08-13 PROCEDURE — 94761 N-INVAS EAR/PLS OXIMETRY MLT: CPT

## 2021-08-13 PROCEDURE — 94640 AIRWAY INHALATION TREATMENT: CPT

## 2021-08-13 PROCEDURE — 7100000000 HC PACU RECOVERY - FIRST 15 MIN: Performed by: PODIATRIST

## 2021-08-13 PROCEDURE — 6360000002 HC RX W HCPCS: Performed by: NURSE ANESTHETIST, CERTIFIED REGISTERED

## 2021-08-13 PROCEDURE — 0Y6T0Z0 DETACHMENT AT RIGHT 3RD TOE, COMPLETE, OPEN APPROACH: ICD-10-PCS | Performed by: PODIATRIST

## 2021-08-13 RX ORDER — DIPHENHYDRAMINE HYDROCHLORIDE 50 MG/ML
12.5 INJECTION INTRAMUSCULAR; INTRAVENOUS
Status: DISCONTINUED | OUTPATIENT
Start: 2021-08-13 | End: 2021-08-13 | Stop reason: HOSPADM

## 2021-08-13 RX ORDER — PROPOFOL 10 MG/ML
INJECTION, EMULSION INTRAVENOUS PRN
Status: DISCONTINUED | OUTPATIENT
Start: 2021-08-13 | End: 2021-08-13 | Stop reason: SDUPTHER

## 2021-08-13 RX ORDER — LANOLIN ALCOHOL/MO/W.PET/CERES
3 CREAM (GRAM) TOPICAL NIGHTLY PRN
Status: DISCONTINUED | OUTPATIENT
Start: 2021-08-13 | End: 2021-08-15 | Stop reason: HOSPADM

## 2021-08-13 RX ORDER — FENTANYL CITRATE 50 UG/ML
50 INJECTION, SOLUTION INTRAMUSCULAR; INTRAVENOUS EVERY 5 MIN PRN
Status: DISCONTINUED | OUTPATIENT
Start: 2021-08-13 | End: 2021-08-13 | Stop reason: HOSPADM

## 2021-08-13 RX ORDER — SODIUM CHLORIDE, SODIUM LACTATE, POTASSIUM CHLORIDE, CALCIUM CHLORIDE 600; 310; 30; 20 MG/100ML; MG/100ML; MG/100ML; MG/100ML
INJECTION, SOLUTION INTRAVENOUS CONTINUOUS PRN
Status: DISCONTINUED | OUTPATIENT
Start: 2021-08-13 | End: 2021-08-13 | Stop reason: SDUPTHER

## 2021-08-13 RX ORDER — ATORVASTATIN CALCIUM 40 MG/1
40 TABLET, FILM COATED ORAL DAILY
Status: DISCONTINUED | OUTPATIENT
Start: 2021-08-14 | End: 2021-08-15 | Stop reason: HOSPADM

## 2021-08-13 RX ORDER — IPRATROPIUM BROMIDE AND ALBUTEROL SULFATE 2.5; .5 MG/3ML; MG/3ML
1 SOLUTION RESPIRATORY (INHALATION) 2 TIMES DAILY
Status: DISCONTINUED | OUTPATIENT
Start: 2021-08-13 | End: 2021-08-15 | Stop reason: HOSPADM

## 2021-08-13 RX ORDER — ASPIRIN 81 MG/1
81 TABLET ORAL ONCE
Status: COMPLETED | OUTPATIENT
Start: 2021-08-14 | End: 2021-08-14

## 2021-08-13 RX ORDER — FENTANYL CITRATE 50 UG/ML
25 INJECTION, SOLUTION INTRAMUSCULAR; INTRAVENOUS EVERY 5 MIN PRN
Status: DISCONTINUED | OUTPATIENT
Start: 2021-08-13 | End: 2021-08-13 | Stop reason: HOSPADM

## 2021-08-13 RX ORDER — BUPIVACAINE HYDROCHLORIDE 5 MG/ML
INJECTION, SOLUTION EPIDURAL; INTRACAUDAL PRN
Status: DISCONTINUED | OUTPATIENT
Start: 2021-08-13 | End: 2021-08-13 | Stop reason: HOSPADM

## 2021-08-13 RX ORDER — CLOPIDOGREL BISULFATE 75 MG/1
75 TABLET ORAL DAILY
Status: DISCONTINUED | OUTPATIENT
Start: 2021-08-14 | End: 2021-08-14

## 2021-08-13 RX ORDER — ONDANSETRON 2 MG/ML
4 INJECTION INTRAMUSCULAR; INTRAVENOUS
Status: DISCONTINUED | OUTPATIENT
Start: 2021-08-13 | End: 2021-08-13 | Stop reason: HOSPADM

## 2021-08-13 RX ORDER — LABETALOL HYDROCHLORIDE 5 MG/ML
5 INJECTION, SOLUTION INTRAVENOUS EVERY 10 MIN PRN
Status: DISCONTINUED | OUTPATIENT
Start: 2021-08-13 | End: 2021-08-13 | Stop reason: HOSPADM

## 2021-08-13 RX ORDER — MEPERIDINE HYDROCHLORIDE 25 MG/ML
12.5 INJECTION INTRAMUSCULAR; INTRAVENOUS; SUBCUTANEOUS EVERY 5 MIN PRN
Status: DISCONTINUED | OUTPATIENT
Start: 2021-08-13 | End: 2021-08-13 | Stop reason: HOSPADM

## 2021-08-13 RX ORDER — HYDRALAZINE HYDROCHLORIDE 20 MG/ML
5 INJECTION INTRAMUSCULAR; INTRAVENOUS EVERY 10 MIN PRN
Status: DISCONTINUED | OUTPATIENT
Start: 2021-08-13 | End: 2021-08-13 | Stop reason: HOSPADM

## 2021-08-13 RX ORDER — ALBUTEROL SULFATE 2.5 MG/3ML
2.5 SOLUTION RESPIRATORY (INHALATION) PRN
Status: DISCONTINUED | OUTPATIENT
Start: 2021-08-13 | End: 2021-08-15 | Stop reason: HOSPADM

## 2021-08-13 RX ORDER — LIDOCAINE HYDROCHLORIDE 10 MG/ML
INJECTION, SOLUTION EPIDURAL; INFILTRATION; INTRACAUDAL; PERINEURAL PRN
Status: DISCONTINUED | OUTPATIENT
Start: 2021-08-13 | End: 2021-08-13 | Stop reason: HOSPADM

## 2021-08-13 RX ORDER — OXYCODONE HYDROCHLORIDE AND ACETAMINOPHEN 5; 325 MG/1; MG/1
2 TABLET ORAL PRN
Status: DISCONTINUED | OUTPATIENT
Start: 2021-08-13 | End: 2021-08-13 | Stop reason: HOSPADM

## 2021-08-13 RX ORDER — LIDOCAINE HCL/PF 100 MG/5ML
SYRINGE (ML) INJECTION PRN
Status: DISCONTINUED | OUTPATIENT
Start: 2021-08-13 | End: 2021-08-13 | Stop reason: SDUPTHER

## 2021-08-13 RX ORDER — PROCHLORPERAZINE EDISYLATE 5 MG/ML
5 INJECTION INTRAMUSCULAR; INTRAVENOUS
Status: DISCONTINUED | OUTPATIENT
Start: 2021-08-13 | End: 2021-08-13 | Stop reason: HOSPADM

## 2021-08-13 RX ORDER — OXYCODONE HYDROCHLORIDE AND ACETAMINOPHEN 5; 325 MG/1; MG/1
1 TABLET ORAL PRN
Status: DISCONTINUED | OUTPATIENT
Start: 2021-08-13 | End: 2021-08-13 | Stop reason: HOSPADM

## 2021-08-13 RX ORDER — MORPHINE SULFATE 2 MG/ML
2 INJECTION, SOLUTION INTRAMUSCULAR; INTRAVENOUS ONCE
Status: DISCONTINUED | OUTPATIENT
Start: 2021-08-13 | End: 2021-08-14

## 2021-08-13 RX ADMIN — GABAPENTIN 300 MG: 300 CAPSULE ORAL at 20:28

## 2021-08-13 RX ADMIN — METOPROLOL TARTRATE 12.5 MG: 25 TABLET, FILM COATED ORAL at 09:07

## 2021-08-13 RX ADMIN — ATORVASTATIN CALCIUM 20 MG: 20 TABLET, FILM COATED ORAL at 08:38

## 2021-08-13 RX ADMIN — GABAPENTIN 300 MG: 300 CAPSULE ORAL at 08:38

## 2021-08-13 RX ADMIN — FLUOXETINE 40 MG: 20 CAPSULE ORAL at 08:37

## 2021-08-13 RX ADMIN — CLINDAMYCIN IN 5 PERCENT DEXTROSE 300 MG: 6 INJECTION, SOLUTION INTRAVENOUS at 00:15

## 2021-08-13 RX ADMIN — ENOXAPARIN SODIUM 40 MG: 40 INJECTION SUBCUTANEOUS at 16:44

## 2021-08-13 RX ADMIN — DOXAZOSIN 1 MG: 2 TABLET ORAL at 20:28

## 2021-08-13 RX ADMIN — Medication 10 ML: at 20:28

## 2021-08-13 RX ADMIN — Medication 10 ML: at 09:33

## 2021-08-13 RX ADMIN — GABAPENTIN 300 MG: 300 CAPSULE ORAL at 14:31

## 2021-08-13 RX ADMIN — MORPHINE SULFATE 1 MG: 2 INJECTION, SOLUTION INTRAMUSCULAR; INTRAVENOUS at 21:26

## 2021-08-13 RX ADMIN — SODIUM CHLORIDE 25 ML: 9 INJECTION, SOLUTION INTRAVENOUS at 00:18

## 2021-08-13 RX ADMIN — Medication 50 MG: at 09:57

## 2021-08-13 RX ADMIN — PROPOFOL 120 MG: 10 INJECTION, EMULSION INTRAVENOUS at 09:57

## 2021-08-13 RX ADMIN — SODIUM CHLORIDE, SODIUM LACTATE, POTASSIUM CHLORIDE, AND CALCIUM CHLORIDE: .6; .31; .03; .02 INJECTION, SOLUTION INTRAVENOUS at 09:29

## 2021-08-13 RX ADMIN — CLINDAMYCIN IN 5 PERCENT DEXTROSE 300 MG: 6 INJECTION, SOLUTION INTRAVENOUS at 16:44

## 2021-08-13 RX ADMIN — CLINDAMYCIN IN 5 PERCENT DEXTROSE 300 MG: 6 INJECTION, SOLUTION INTRAVENOUS at 08:39

## 2021-08-13 RX ADMIN — HYDROCODONE BITARTRATE AND ACETAMINOPHEN 2 TABLET: 5; 325 TABLET ORAL at 19:03

## 2021-08-13 ASSESSMENT — PULMONARY FUNCTION TESTS
PIF_VALUE: 9
PIF_VALUE: 15
PIF_VALUE: 14
PIF_VALUE: 5
PIF_VALUE: 11
PIF_VALUE: 14
PIF_VALUE: 9
PIF_VALUE: 9
PIF_VALUE: 14
PIF_VALUE: 9
PIF_VALUE: 3
PIF_VALUE: 10
PIF_VALUE: 5
PIF_VALUE: 10
PIF_VALUE: 15
PIF_VALUE: 14
PIF_VALUE: 9
PIF_VALUE: 13
PIF_VALUE: 9
PIF_VALUE: 11
PIF_VALUE: 14
PIF_VALUE: 14
PIF_VALUE: 5
PIF_VALUE: 9
PIF_VALUE: 15
PIF_VALUE: 14
PIF_VALUE: 1
PIF_VALUE: 1
PIF_VALUE: 9
PIF_VALUE: 9
PIF_VALUE: 1
PIF_VALUE: 11
PIF_VALUE: 0
PIF_VALUE: 9
PIF_VALUE: 1
PIF_VALUE: 15
PIF_VALUE: 20
PIF_VALUE: 9
PIF_VALUE: 15
PIF_VALUE: 9
PIF_VALUE: 9

## 2021-08-13 ASSESSMENT — ENCOUNTER SYMPTOMS: SHORTNESS OF BREATH: 1

## 2021-08-13 ASSESSMENT — PAIN SCALES - GENERAL
PAINLEVEL_OUTOF10: 6
PAINLEVEL_OUTOF10: 0
PAINLEVEL_OUTOF10: 4
PAINLEVEL_OUTOF10: 9
PAINLEVEL_OUTOF10: 8
PAINLEVEL_OUTOF10: 4

## 2021-08-13 ASSESSMENT — LIFESTYLE VARIABLES: SMOKING_STATUS: 0

## 2021-08-13 ASSESSMENT — PAIN DESCRIPTION - LOCATION: LOCATION: FOOT

## 2021-08-13 ASSESSMENT — PAIN DESCRIPTION - PAIN TYPE: TYPE: ACUTE PAIN

## 2021-08-13 NOTE — PROGRESS NOTES
Patient alert and oriented. Dressing site clean dry intact. Pt refused to take pain medication. IV fluid running. Patient instructed to practice Deep breathing, coughing and using IS, Pt has occasional moist coughs with moderate clear sputum. O2 saturation goes up after deep breathing. Pt tolerating diet. No nausea noted. Patient using the urinal with adequate urine output. Bladder palpated. No sign of distension noted. Bowel sounds present. No BM noted today. Patient in bed. Call light within reach. Patient using the call light properly. Will continue to monitor.      BP: 96/59   Pulse: 73   Resp: 18   Temp: 98.2 (36.8)   SpO2: 91   On RA

## 2021-08-13 NOTE — PROGRESS NOTES
Patient to pacu 11 pre op. Verified NPO status with working IV. Consent signed and on chart. 2L applied to patient.  spo2 down to 88%

## 2021-08-13 NOTE — RT PROTOCOL NOTE
RT Nebulizer Bronchodilator Protocol Note    There is a bronchodilator order in the chart from a provider indicating to follow the RT Bronchodilator Protocol and there is an Initiate RT Bronchodilator Protocol order as well (see protocol at bottom of note). The findings from the last RT Protocol Assessment were as follows:  Smoking: >15 Pack years  Surgical Status: No surgery  Xray: One lobe infiltrates/atelectasis/pleural effusion/edema  Respiratory Pattern: RR 12-20  Mental Status: Alert and Oriented  Breath Sounds: Diminished and/or crackles  Cough: Strong, spontaneous, non-productive  Activity Level: Mostly sedentary, minimal walking  Oxygen Requirement: Room Air - 2LNC/28% or home setting  Indication for Bronchodilator Therapy: On home bronchodilators  Bronchodilator Assessment Score: 5    Aerosolized bronchodilator medication orders have been revised according to the RT Bronchodilator Protocol. Note:Pt uses Pulmicort and Duoneb BID at home; will continue these and add albuterol PRN/dyspnea. RT Bronchodilator Protocol:    Respiratory Therapist to perform RT Therapy Protocol Assessment then follow the protocol. No Indications - adjust the frequency to every 6 hours PRN wheezing or bronchospasm, if no treatments needed after 48 hours then discontinue using Per Protocol order mode. If indication present, adjust the RT bronchodilator orders based on the Bronchodilator Assessment Score as follows:    0-6 - enter or revise RT Bronchodilator order to Albuterol Nebulizer order with frequency of every 2 hours PRN for wheezing or increased work of breathing using Per Protocol order mode. Repeat RT Therapy Protocol Assessment as needed.     7-10 - discontinue any other Inpatient aerosolized bronchodilator medication orders and enter or revise two Albuterol Nebulizer orders, one with BID frequency and one with frequency of every 2 hours PRN wheezing or increased work of breathing using Per Protocol order mode.  Repeat RT Therapy Protocol Assessment with second treatment then BID and as needed. 11-13 - discontinue any other Inpatient aerosolized bronchodilator medication orders and enter DuoNeb Nebulizer order with QID frequency and an Albuterol Nebulizer order with frequency of every 2 hours PRN wheezing or increased work of breathing using Per Protocol order mode. Repeat RT Therapy Protocol Assessment with second treatment then QID and as needed. Greater than 13 - discontinue any other Inpatient aerosolized bronchodilator medication orders and enter a DuoNeb Nebulizer order with every 4 hours frequency and an Albuterol Nebulizer order with frequency of every 2 hours PRN wheezing or increased work of breathing using Per Protocol order mode. Repeat RT Therapy Protocol Assessment with second treatment then every 4 hours and as needed. RT to enter RT Home Evaluation for COPD & MDI Assessment order using Per Protocol order mode.     Electronically signed by Gabino West RCP on 8/13/2021 at 1:53 AM

## 2021-08-13 NOTE — CONSULTS
[facility]  Cardiology Inpatient Consult Service                                                                                          Pt Name: Marin Escalera  Age: 77 y.o. Sex: male  : 1945  Location: OR/NONE    Referring Physician: Trina Oropeza MD      Reason for Consult:       Reason for Consultation/Chief Complaint: Pre-op clearance    HPI:      Marin Escalera is a 77 y.o. male with a past medical history of ischemic cardiomyopathy, CAD s/p PCI to distal SVG-RCA using 1 drug stent, HTN, complicated [id number] admission who presented to the hospital due to the request of podiatry for toe amputation. He denies chest pain, chest pressure/discomfort, fatigue, lower extremity edema, near-syncope, palpitations, syncope and tachypnea. Histories     Past Medical History:   has a past medical history of Arthritis of hand, CAD (coronary artery disease), Hyperlipidemia, Hypertension, Ischemic cardiomyopathy, STEMI (ST elevation myocardial infarction) ([facility] [address] and Tinnitus. Surgical History:   has a past surgical history that includes Coronary artery bypass graft; Coronary angioplasty (2011); Diagnostic Cardiac Cath Lab Procedure; Coronary angioplasty with stent (2019); and Biceps tendon repair. Social History:   reports that he quit smoking about 30 years ago. His smoking use included cigarettes. He started smoking about 59 years ago. He has a 20.00 pack-year smoking history. He has never used smokeless tobacco. He reports that he does not drink alcohol and does not use drugs. Family History:  No evidence for sudden cardiac death or premature CAD      Medications:       Home Medications  Were reviewed and are listed in nursing record. and/or listed below  Prior to Admission medications    Medication Sig Start Date End Date Taking?  Authorizing Provider   FLUoxetine (PROZAC) 20 MG capsule Take 20 mg by mouth daily   Yes Historical Provider, MD ciprofloxacin (CIPRO) 500 MG tablet Take 500 mg by mouth 2 times daily   Yes Historical Provider, MD   sulfamethoxazole-trimethoprim (BACTRIM DS;SEPTRA DS) 800-160 MG per tablet Take 1 tablet by mouth 2 times daily   Yes Historical Provider, MD   oxyCODONE-acetaminophen (PERCOCET) 5-325 MG per tablet Take 1 tablet by mouth every 4 hours as needed for Pain. Yes Historical Provider, MD   halobetasol (ULTRAVATE) 0.05 % cream APPLY TO AFFECTED AREA(S) TWO TIMES A DAY AS NEEDED 7/22/21  Yes Zhane Paul DO   calcipotriene (DOVONEX) 0.005 % cream APPLY TO AFFECTED AREA(S) TWO TIMES A DAY AS NEEDED 7/22/21  Yes Zhane Paul DO   clonazePAM (KLONOPIN) 0.5 MG tablet Take 1 tablet by mouth daily for 30 days. 6/24/21 8/12/21 Yes Zhane [facility], DO   doxazosin (CARDURA) 1 MG tablet Take 1 tablet by mouth nightly 6/24/21  Yes Zhane Paul DO   gabapentin (NEURONTIN) 300 MG capsule Take 1 capsule by mouth every 8 hours for 90 days.  6/24/21 9/22/21 Yes Zhane [facility], DO   metoprolol tartrate (LOPRESSOR) 25 MG tablet Take 0.5 tablets by mouth 2 times daily 6/24/21  Yes Zhane [facility] DO   thiamine 100 MG tablet Take 1 tablet by mouth daily 6/24/21  Yes Zhane Paul DO   guaiFENesin 1200 MG TB12 Take by mouth 2 times daily    Yes Historical Provider, MD   atorvastatin (LIPITOR) 20 MG tablet TAKE ONE TABLET BY MOUTH DAILY 6/15/21  Yes Car Hairston MD   sodium chloride (ALTAMIST SPRAY) 0.65 % nasal spray 1 spray by Nasal route every 2 hours (while awake) 6/10/21 8/12/21 Yes Pk Mccain MD   budesonide (PULMICORT) 0.5 MG/2ML nebulizer suspension Take 0.5 mg by nebulization 2 times daily 5/27/21  Yes Historical Provider, MD   Multiple Vitamins-Iron TABS Take 1 tablet by mouth daily   Yes Historical Provider, MD   Omega-3 Fatty Acids (OMEGA-3 FISH OIL PO) Take 1,000 mg by mouth 2 times daily    Yes Historical Provider, MD   ipratropium-albuterol (DUONEB) 0.5-2.5 (3) MG/3ML SOLN nebulizer solution Inhale 1 vial into the lungs 4 times daily   Yes Historical Provider, MD   nitroGLYCERIN (NITROSTAT) 0.4 MG SL tablet Place 0.4 mg under the tongue every 5 minutes as needed. Historical Provider, MD          Inpatient Medications:   ipratropium-albuterol  1 vial Inhalation BID    clindamycin (CLEOCIN) IV  300 mg Intravenous Q8H    sodium chloride flush  10 mL Intravenous 2 times per day    atorvastatin  20 mg Oral Daily    budesonide  0.5 mg Nebulization BID    doxazosin  1 mg Oral Nightly    FLUoxetine  40 mg Oral Daily    gabapentin  300 mg Oral TID    metoprolol tartrate  12.5 mg Oral BID    thiamine  100 mg Oral Daily       IV drips:   sodium chloride 25 mL (08/13/21 0018)       PRN:  albuterol, meperidine, fentanNYL, fentanNYL, HYDROmorphone, HYDROmorphone, oxyCODONE-acetaminophen **OR** oxyCODONE-acetaminophen, prochlorperazine, ondansetron, diphenhydrAMINE, labetalol, hydrALAZINE, HYDROcodone 5 mg - acetaminophen **OR** HYDROcodone 5 mg - acetaminophen, sodium chloride flush, sodium chloride, potassium chloride, magnesium sulfate, ondansetron **OR** ondansetron, magnesium hydroxide, acetaminophen **OR** acetaminophen, morphine, nitroGLYCERIN    Allergy:     Ciprofloxacin and Plavix [clopidogrel bisulfate]       Review of Systems:     All 12 point review of symptoms completed. Pertinent positives identified in the HPI, all other review of symptoms negative as below. CONSTITUTIONAL: Nounanticipated weight loss. No change in energy level, sleep pattern, or activity level. SKIN: No rash or pruritis. EYES: No visual changes or diplopia. No scleral icterus. ENT: No Headaches, hearing loss or vertigo. No mouth sores or sore throat. CARDIOVASCULAR: No chest pain/chest pressure/chest discomfort. No palpitations. RESPIRATORY: No cough or wheezing, no sputum production. No hematemesis. GASTROINTESTINAL: No N/V/D.  No abdominal pain, appetite loss, blood in stools. GENITOURINARY: No dysuria, trouble voiding, or hematuria. MUSCULOSKELETAL:  No gait disturbance, weakness or joint complaints. NEUROLOGICAL: No headache, diplopia, change in muscle strength, numbness or tingling. No change in gait, balance, coordination, mood, affect, memory, mentation, behavior. PSHYCH: No anxiety, loss of interest, change in sexual behavior, feelings of self-harm, or confusion. ENDOCRINE: No malaise, fatigue or temperature intolerance. No excessive thirst, fluid intake, or urination. No tremor. HEMATOLOGIC: No abnormal bruising or bleeding. ALLERGY: No nasal congestion or hives.       Physical Examination:     Vitals:    08/13/21 0850 08/13/21 0906 08/13/21 0921 08/13/21 0938   BP:  138/75 (!) 112/49    Pulse:  77 78    Resp: 16  18    Temp:   98.5 °F (36.9 °C)    TempSrc:   Temporal    SpO2: 91%  92% (!) 88%   Weight:       Height:           Wt Readings from Last 3 Encounters:   08/13/21 165 lb 5.5 oz (75 kg)   08/03/21 172 lb (78 kg)   06/23/21 157 lb (71.2 kg)       Objective      General Appearance:  Alert, cooperative, no distress, appears stated age Appropriate weight   Head:  Normocephalic, without obvious abnormality, atraumatic   Eyes:  PERRL, conjunctiva/corneas clear EOM intact  Ears normal   Throat no lesions       Nose: Nares normal, no drainage or sinus tenderness   Throat: Lips, mucosa, and tongue normal   Neck: Supple, symmetrical, trachea midline, no adenopathy, thyroid: not enlarged, symmetric, no tenderness/mass/nodules, no carotid bruit or JVD       Lungs:   Clear to auscultation bilaterally, respirations unlabored   Chest Wall:  No tenderness or deformity   Heart:  Regular rate and rhythm, S1, S2 normal, no murmur, rub or gallop PMI intact   Abdomen:   Soft, non-tender, bowel sounds active all four quadrants,  no masses, no organomegaly       Extremities: Extremities normal, atraumatic, no cyanosis or edema   Pulses: 2+ and symmetric   Skin: Skin color, texture, turgor normal, no rashes or lesions   Pysch: Normal mood and affect   Neurologic: Normal gross motor and sensory exam.  Cranial nerves intact        Labs:     Recent Labs     21  1428 21  0601   * 135*   K 4.2 4.2   BUN 18 17   CREATININE 0.7* 0.7*   CL 99 100   CO2 27 25   GLUCOSE 114* 102*   CALCIUM 10.2 9.2     Recent Labs     21  1428 21  1428 21  0600   WBC 9.1  --  7.4   HGB 13.1*  --  12.6*   HCT 39.3*  --  37.9*     --  172   MCV 85.1   < > 85.4    < > = values in this interval not displayed. No results for input(s): CHOLTOT, TRIG, HDL in the last 72 hours. Invalid input(s): LIPIDCOMM, CHOLHDL, VLDCHOL, LDL  Recent Labs     21  0600   INR 1.00     No results for input(s): CKTOTAL, CKMB, CKMBINDEX, TROPONINI in the last 72 hours. No results for input(s): BNP in the last 72 hours. No results for input(s): TSH in the last 72 hours. No results for input(s): CHOL, HDL, LDLCALC, TRIG in the last 72 hours.]    Lab Results   Component Value Date    TROPONINI 0.60 ([facility]) 2019         Imaging:     I personally reviewed imaging studies including CXR, Stress test, TTE/MALATHI. Last ECG (if available) - EKG:  I have reviewed EKG with the following interpretation  21 Sinus rhythm with 1st degree A-V block. Possible Left atrial enlargement  Telemetry:  NSR  Last Stress (if available):   Last TTE/MALATHI(if available): 19 EF:40-45% There is hypokinesis of the basal inferior and inferolateral walls. There is mild concentric left ventricular hypertrophy. Last Cath (if available):  PCi to distal SVG-RCA using 1 drug stent    Last CMR  (if available):N/A      Assessment / Plan:   -Patient denies chest pain,palpitations, dizziness. 2019 STEMI s/p PCi to distal SVG-RCA using 1 drug stent  -EK21 Sinus rhythm with 1st degree A-V block.  Possible Left atrial enlargement.  -Echo:EF:40-45% There is hypokinesis of the basal inferior and inferolateral walls. There is mild concentric left ventricular hypertrophy. Given patient being asymptomatic (No CP, palpitation), EKG result. He is cleared from the cardiology standpoint and could proceed with surgery. Tobacco use was discussed with the patient and educated on the negative effects. I have asked the patient to not utilize these agents. This patient was staffed with the attending physician, Dr. Stevenson Mccarthy MD.    [facility] you for allowing to us to participate in the care of Fernando Lundy. Please call our service with questions.     Linwood Comment MS4  Deb Russell MD  PGY-2

## 2021-08-13 NOTE — ANESTHESIA PRE PROCEDURE
Department of Anesthesiology  Preprocedure Note       Name:  Mary Johnson   Age:  77 y.o.  :  1945                                          MRN:  [id number]         Date:  2021      Surgeon: Uche Hagan):  Manny Comer DPM    Procedure: Procedure(s):  AMPUTATION OF LEFT 2ND DIGIT, RIGHT 3RD DIGIT,  RIGHT PARTIAL 4TH DIGIT    Medications prior to admission:   Prior to Admission medications    Medication Sig Start Date End Date Taking? Authorizing Provider   FLUoxetine (PROZAC) 20 MG capsule Take 20 mg by mouth daily   Yes Historical Provider, MD   ciprofloxacin (CIPRO) 500 MG tablet Take 500 mg by mouth 2 times daily   Yes Historical Provider, MD   sulfamethoxazole-trimethoprim (BACTRIM DS;SEPTRA DS) 800-160 MG per tablet Take 1 tablet by mouth 2 times daily   Yes Historical Provider, MD   oxyCODONE-acetaminophen (PERCOCET) 5-325 MG per tablet Take 1 tablet by mouth every 4 hours as needed for Pain. Yes Historical Provider, MD   halobetasol (ULTRAVATE) 0.05 % cream APPLY TO AFFECTED AREA(S) TWO TIMES A DAY AS NEEDED 21  Yes Zhane Paul DO   calcipotriene (DOVONEX) 0.005 % cream APPLY TO AFFECTED AREA(S) TWO TIMES A DAY AS NEEDED 21  Yes Zhane Paul DO   clonazePAM (KLONOPIN) 0.5 MG tablet Take 1 tablet by mouth daily for 30 days. 21 Yes Zhane Fernandez DO   doxazosin (CARDURA) 1 MG tablet Take 1 tablet by mouth nightly 21  Yes Zhane Paul DO   gabapentin (NEURONTIN) 300 MG capsule Take 1 capsule by mouth every 8 hours for 90 days.  21 Yes Zhane Fernandez DO   metoprolol tartrate (LOPRESSOR) 25 MG tablet Take 0.5 tablets by mouth 2 times daily 21  Yes Zhane Fernandez DO   thiamine 100 MG tablet Take 1 tablet by mouth daily 21  Yes Zhane Paul DO   guaiFENesin 1200 MG TB12 Take by mouth 2 times daily    Yes Historical Provider, MD   atorvastatin (LIPITOR) 20 MG tablet TAKE ONE TABLET BY MOUTH DAILY 6/15/21  Yes Tyrone Hernandez MD   sodium chloride (ALTAMIST SPRAY) 0.65 % nasal spray 1 spray by Nasal route every 2 hours (while awake) 6/10/21 8/12/21 Yes Raymond Spencer MD   budesonide (PULMICORT) 0.5 MG/2ML nebulizer suspension Take 0.5 mg by nebulization 2 times daily 5/27/21  Yes Historical Provider, MD   Multiple Vitamins-Iron TABS Take 1 tablet by mouth daily   Yes Historical Provider, MD   Omega-3 Fatty Acids (OMEGA-3 FISH OIL PO) Take 1,000 mg by mouth 2 times daily    Yes Historical Provider, MD   ipratropium-albuterol (DUONEB) 0.5-2.5 (3) MG/3ML SOLN nebulizer solution Inhale 1 vial into the lungs 4 times daily   Yes Historical Provider, MD   nitroGLYCERIN (NITROSTAT) 0.4 MG SL tablet Place 0.4 mg under the tongue every 5 minutes as needed.       Historical Provider, MD       Current medications:    Current Facility-Administered Medications   Medication Dose Route Frequency Provider Last Rate Last Admin    ipratropium-albuterol (DUONEB) nebulizer solution 3 mL  1 vial Inhalation BID John Shields MD        albuterol (PROVENTIL) nebulizer solution 2.5 mg  2.5 mg Nebulization PRN John Shields MD        clindamycin (CLEOCIN) IVPB 300 mg  300 mg Intravenous Q8H [place],  mL/hr at 08/13/21 0839 300 mg at 08/13/21 0839    HYDROcodone-acetaminophen (NORCO) 5-325 MG per tablet 1 tablet  1 tablet Oral Q4H PRN [place], DPM        Or    HYDROcodone-acetaminophen (NORCO) 5-325 MG per tablet 2 tablet  2 tablet Oral Q4H PRN [place], DPM        sodium chloride flush 0.9 % injection 10 mL  10 mL Intravenous 2 times per day John Shields MD   10 mL at 08/12/21 2140    sodium chloride flush 0.9 % injection 10 mL  10 mL Intravenous PRN John Shields MD        0.9 % sodium chloride infusion  25 mL Intravenous PRN John Shields  mL/hr at 08/13/21 0018 25 mL at 08/13/21 0018    potassium chloride 10 mEq/100 mL IVPB (Peripheral Line)  10 mEq Intravenous PRN John Shields MD       [facility] Prediabetes R73.03    Crushing injury of left forearm S57. 80XA    Critical illness myopathy G72.81    Toe gangrene (HCC) [facility].Starring       Past Medical History:        Diagnosis Date    Arthritis of hand     arthritis in hands and thumbs    CAD (coronary artery disease)     Hyperlipidemia     Hypertension     Ischemic cardiomyopathy 2019    STEMI (ST elevation myocardial infarction) ([facility] [address] 2019    Tinnitus        Past Surgical History:        Procedure Laterality Date    BICEPS TENDON REPAIR      CORONARY ANGIOPLASTY  2011    emergency PCI: vein to RCA    CORONARY ANGIOPLASTY WITH STENT PLACEMENT  2019    PCI to distal SVG-RCA with ELIZABETH    CORONARY ARTERY BYPASS GRAFT      in  CABG x4 and in  CABG x2    DIAGNOSTIC CARDIAC CATH LAB PROCEDURE         Social History:    Social History     Tobacco Use    Smoking status: Former Smoker     Packs/day: 1.00     Years: 20.00     Pack years: 20.00     Types: Cigarettes     Start date: 1961     Quit date: 1991     Years since quittin.6    Smokeless tobacco: Never Used   Substance Use Topics    Alcohol use: No                                Counseling given: No      Vital Signs (Current):   Vitals:    21 0445 21 0810 21 0850 21 0906   BP:  (!) 106/57  138/75   Pulse:  77  77   Resp:  16 16    Temp:  98 °F (36.7 °C)     TempSrc:  Oral     SpO2:  91% 91%    Weight: 165 lb 5.5 oz (75 kg)      Height: 6' (1.829 m)                                                 BP Readings from Last 3 Encounters:   21 138/75   21 109/68   21 98/66       NPO Status:                                                                                 BMI:   Wt Readings from Last 3 Encounters:   21 165 lb 5.5 oz (75 kg)   21 172 lb (78 kg)   21 157 lb (71.2 kg)     Body mass index is 22.42 kg/m².     CBC:   Lab Results   Component Value Date    WBC 7.4 2021    RBC 4.44 2021    HGB 12.6 08/13/2021    HCT 37.9 08/13/2021    MCV 85.4 08/13/2021    RDW 18.7 08/13/2021     08/13/2021       CMP:   Lab Results   Component Value Date     08/13/2021    K 4.2 08/13/2021     08/13/2021    CO2 25 08/13/2021    BUN 17 08/13/2021    CREATININE 0.7 08/13/2021    GFRAA >60 08/13/2021    GFRAA >60 12/08/2012    AGRATIO 1.8 09/02/2020    LABGLOM >60 08/13/2021    GLUCOSE 102 08/13/2021    PROT 7.3 09/02/2020    PROT 6.7 12/08/2012    CALCIUM 9.2 08/13/2021    BILITOT 0.5 09/02/2020    ALKPHOS 62 09/02/2020    AST 27 09/02/2020    ALT 40 09/02/2020       POC Tests: No results for input(s): POCGLU, POCNA, POCK, POCCL, POCBUN, POCHEMO, POCHCT in the last 72 hours. Coags:   Lab Results   Component Value Date    PROTIME 11.3 08/13/2021    INR 1.00 08/13/2021    APTT 23.7 01/10/2011       HCG (If Applicable): No results found for: PREGTESTUR, PREGSERUM, HCG, HCGQUANT     ABGs:   Lab Results   Component Value Date    PHART 7.331 01/03/2019    PO2ART 80.4 01/03/2019    PNE0CWF 46.3 01/03/2019    LEY2ZIS 24.5 01/03/2019    BEART -2 01/03/2019    B8ORNKKO 95 01/03/2019        Type & Screen (If Applicable):  No results found for: LABABO, LABRH    Drug/Infectious Status (If Applicable):  No results found for: HIV, HEPCAB    COVID-19 Screening (If Applicable): No results found for: COVID19        Anesthesia Evaluation    Airway: Mallampati: II  TM distance: >3 FB   Neck ROM: full  Mouth opening: > = 3 FB Dental:    (+) partials and upper dentures      Pulmonary:   (+) shortness of breath:      (-) asthma and not a current smoker                           Cardiovascular:  Exercise tolerance: poor (<4 METS),   (+) hypertension:, past MI:, CAD:, CABG/stent:, CHF:,             Echocardiogram reviewed                  Neuro/Psych:      (-) seizures and CVA           GI/Hepatic/Renal:   (+) GERD:,           Endo/Other:        (-) diabetes mellitus               Abdominal:             Vascular:           Other

## 2021-08-13 NOTE — PROGRESS NOTES
PACU Transfer Note    Vitals:    08/13/21 1130   BP: 101/61   Pulse: 70   Resp: 24   Temp: 97.1 °F (36.2 °C)   SpO2: 98   On RA       In: 400 [I.V.:400]  Out: 5     Pain assessment:   Pain Level: 0    Report given to Receiving unit RN.    8/13/2021 11:42 AM

## 2021-08-13 NOTE — OP NOTE
Operative Note      Patient: Kennedi Jhaveri  YOB: 1945  MRN: [id number]    Date of Procedure: 8/13/2021    Pre-Op Diagnosis: Gangrene ([address] Efrnando Ponds    Post-Op Diagnosis: Same       Procedure(s):  AMPUTATION OF LEFT 2ND DIGIT LEFT FOOT, RIGHT 3RD DIGIT,  RIGHT PARTIAL 4TH DIGIT RIGHT FOOT    Surgeon(s):  John Islas DPM    Assistant:  Resident: Meryle Christen, DPM  Student: Fili Bermudez MS4    Anesthesia: Choice    Injectables: Preop 20 mill 1% lidocaine plain, postop 10 mL 0.5% Marcaine plain    Hemostasis: Application of bilateral pneumatic ankle tourniquets-never inflated    Materials: 3-0 Vicryl, 3-0 Nylon    Estimated Blood Loss: minimal    Complications: None    Specimens:   ID Type Source Tests Collected by Time Destination   A : LEFT 2ND TOE Tissue Tissue SURGICAL PATHOLOGY oJhn Islas DPM 8/13/2021 1016    B : RIGHT 3RD TOE Tissue Tissue SURGICAL PATHOLOGY John Islas DPM 8/13/2021 1017    C : RIGHT 4TH TOE Tissue Tissue SURGICAL PATHOLOGY John Islas DPM 8/13/2021 1017        Implants:  * No implants in log *      Drains: * No LDAs found *    Findings: Intra-Op left lower extremity second digit full-thickness ulceration at the distal tuft and after removal of necrotic tissue noted scant bleeding down to bone. Right lower extremity third and fourth digit necrotic tissue at the distal tips afterward full-thickness incision down to bone scant bleeding encountered. INDICATIONS FOR PROCEDURE: This patient has signs and symptoms clinically  consistent with the above mentioned preoperative diagnosis. Having failed conservative treatment, it was determined that the patient would benefit from surgical intervention. All potential risks, benefits, and complications were discussed with the patient prior to the scheduling of surgery. All the patient's questions were answered and no guarantees were given.  The patient wished to proceed with surgery, and informed written consent was obtained. Detail of Procedure: The patient was brought from the preoperative area and placed on the operating table in the supine position. Following induction of IV anesthesia a local block consisting of a total of 20 mL of a 1:1 mixture of 1% lidocaine plain to anesthetize the patients surgical limb. Next, applied a well padded pneumatic ankle tourniquet to the patients B/L lower extremity. The patients bilateral lower extremity was then scrubbed, prepped and draped in the usual sterile manner. A time-out was performed. The patient, procedure and operative site were confirmed and the following procedure was then performed. Detail of procedure #1 second digit amputation, left foot: Details of Procedure #1: Attention was then directed to the second digit on the right foot. At the distal tuft full-thickness ulceration with exposed middle phalanx measuring 1.0 cm circumferentially. Next, a towel clamp was then clamped around the distal aspect of the second digit and used to stabilize the digit. A #15 surgical blade was used to make a full thickness incision through the skin to have a more plantar skin to flap over. The incision was then deepened through the subcutaneous tissue to the level of bone. Next, the proximal interphalangeal joint was identified and attention was then directed to this location. A #15 blade was then used to release the extensor tendon as well as the medial and lateral collateral ligaments present at the PIP joint. The flexor tendon was then severed planarly at the level of the PIP joint and digit removed distally and passed from the operative field and placed on the back table to be sent off to pathology for sensitivities. Throughout the entire case scant bleeding noted with no pulsatile bleeding noted.     Attention was then directed to the head of the second metatarsal. The cartilage was noted to white, shiny, pearly and hard, all which are consistent with healthy cartilage. It was determined that no further resection was necessary. At this time, the incision site was flushed using copious amounts of normal saline. The subcutaneous tissue was reapproximated using 3-0 Vicryl. The incision site was closed using 3 -0 nylon. Details of Procedure #2 third digit amputation, right foot: Attention was then directed to the third digit on the right foot. Where noted at the distal tuft of the third digit necrotic tissue. A towel clamp was then clamped around the distal aspect of the third digit and used to stabilize the digit. A #15 surgical blade was used to make a full thickness incision through the skin to have a more plantar skin flap. The incision was then deepened through the subcutaneous tissue to the level of bone. Next, the proximal interphalangeal joint was identified and attention was then directed to this location. A #15 blade was then used to release the extensor tendon as well as the medial and lateral collateral ligaments present at the PIP joint. The flexor tendon was then severed planarly at the level of the PIP joint and digit removed distally and passed from the operative field and placed on the back table to be sent off to pathology for further cultures and sensitivities. Throughout the entire case scant bleeding noted with no pulsatile bleeding noted. Attention was then directed to the head of the third metatarsal. The cartilage was noted to white, shiny, pearly and hard, all which are consistent with healthy cartilage. It was determined that no further resection was necessary. At this time, the incision site was flushed using copious amounts of normal saline. The incision site was closed using 3 -0 nylon. Details of Procedure #3 partial fourth digit amputation, right foot: Attention was then directed to the distal aspect of the fourth digit on the right foot.   Next, using a curved hemostat to remove the necrotic tissue at the distal tip noted bone exposure of the distal phalanx. Next, a towel clamp was then clamped around the distal aspect of the fourth digit and used to stabilize the digit. A #15 surgical blade was used to make a modified [place] full thickness incision through the skin. The incision was then deepened through the subcutaneous tissue to the level of bone. Next, the distal interphalangeal joint was identified and attention was then directed to this location. A #15 blade was then used to release the extensor tendon as well as the medial and lateral collateral ligaments present at the DIP joint. The flexor tendon was then severed planarly at the level of the DIP joint and digit removed distally and passed from the operative field and placed on the back table to be sent off to pathology for further culture and sensitivities. Attention was then directed to the head of the middle phalanx. The cartilage was noted to white, shiny, pearly and hard, all which are consistent with healthy cartilage. It was determined that no further resection was necessary. At this time, the incision site was flushed using copious amounts of normal saline. The incision site was closed using 3 -0 nylon. End of Procedure: At this time, a local anesthetic was injected about the incision sites consisting of  10 mL of 0.5% Marcaine plain, for the patient's postoperative comfort. A soft sterile dressing was applied consisting of  Betadine soaked Adaptic, gauze, cast padding, and Ace bandage. The bilateral pneumatic ankle tourniquet were never inflated throughout entire case and still noted hyperemic response on all remaining aspects of the distal digits to bilateral lower extremity. The patient tolerated the procedure and anesthesia well. The patient left the OR with vital signs stable and vascular status intact to all remaining digits of the bilateral foot.  Following a period of postoperative monitoring the patient will be sent back to his in-house room where he will continue to receive IV antibiotic therapy. Procedure was felt to be definitive. Consult PT/OT, and Dr. Danny Harris at attendings request. Will follow-up on bone biopsy and Dr. Danny Harris recommendation. Will follow patient closely while in house.        Dictated on behalf of Dr. Jess Purcell, [address] [place] [address]  Podiatry resident, PGY 3  Perfect serve      Electronically signed by Mahendra Kenney DPM on 8/13/2021 at 10:57 AM

## 2021-08-13 NOTE — PROGRESS NOTES
Alert, oriented. Slept between care during the shift. Denied pain. Dressings on feet dry/intact. NPO after midnight. Patient changed into gown, removed dentures, and glasses.

## 2021-08-13 NOTE — PROGRESS NOTES
Pt admitted to pacu s/p AMPUTATION OF LEFT 2ND DIGIT LEFT FOOT, RIGHT 3RD DIGIT,  RIGHT PARTIAL 4TH DIGIT RIGHT FOOT, pt alert- PO 98% on 2 L , paramjit foot drsg CDI- ice applied, + sensation both feet, no c/o pain

## 2021-08-13 NOTE — PROGRESS NOTES
4 Eyes Admission Assessment     I agree as the admission nurse that 2 RN's have performed a thorough Head to Toe Skin Assessment on the patient. ALL assessment sites listed below have been assessed on admission. Areas assessed by both nurses:   [x]   Head, Face, and Ears   [x]   Shoulders, Back, and Chest  [x]   Arms, Elbows, and Hands   [x]   Coccyx, Sacrum, and Ischium  [x]   Legs, Feet, and Heels        Does the Patient have Skin Breakdown?   Yes a wound was noted on the Admission Assessment and an LDA was Initiated documentation include the Reena-wound, Wound Assessment, Measurements, Dressing Treatment, Drainage, and Color\",         Ranjit Prevention initiated:  Yes   Wound Care Orders initiated:  No      WOC nurse consulted for Pressure Injury (Stage 3,4, Unstageable, DTI, NWPT, and Complex wounds) or Rajnit score 18 or lower:  No      Nurse 1 eSignature: Electronically signed by Thor Valle RN on 8/13/21 at 4:27 AM EDT    **SHARE this note so that the co-signing nurse is able to place an eSignature**    Nurse 2 eSignature: Electronically signed by Dinh Valles RN on 8/13/21 at 4:31 AM EDT

## 2021-08-13 NOTE — PROGRESS NOTES
Occupational/Physical Therapy  Hold    OT/PT orders received. Chart reviewed. Noted pt is to have foot surgery on BLE today. Spoke with RN about surgery schedule and post-op shoe order. Pt did not have post-op shoes. Spoke with [facility] to order b/l post-op shoes. Will evaluate pt after surgery.     Monica Carreon S/OT  Anastasia Hill, OTR/L [address], [place], DPT  [phone]

## 2021-08-13 NOTE — BRIEF OP NOTE
Brief Postoperative Note      Patient: Mary Johnson  YOB: 1945  MRN: [id number]    Date of Procedure: 8/13/2021    Pre-Op Diagnosis: Gangrene ([address] [place] Notice    Post-Op Diagnosis: Same       Procedure(s):  AMPUTATION OF LEFT 2ND DIGIT LEFT FOOT, RIGHT 3RD DIGIT,  RIGHT PARTIAL 4TH DIGIT RIGHT FOOT    Surgeon(s):  Manny Comer DPM    Assistant:  Resident: Live Covington DPM  Student: SOFI Aldrich    Anesthesia: Choice    Injectables: Preop 20 mill 1% lidocaine plain, postop 10 mL 0.5% Marcaine plain    Hemostasis: Application of bilateral pneumatic ankle tourniquets-never inflated    Materials: 3-0 Vicryl, 3-0 Nylon    Estimated Blood Loss: minimal    Complications: None    Specimens:   ID Type Source Tests Collected by Time Destination   A : LEFT 2ND TOE Tissue Tissue SURGICAL PATHOLOGY Manny Comer DPM 8/13/2021 1016    B : RIGHT 3RD TOE Tissue Tissue SURGICAL PATHOLOGY Manny Comer DPM 8/13/2021 1017    C : RIGHT 4TH TOE Tissue Tissue SURGICAL PATHOLOGY Manny Comer DPM 8/13/2021 1017        Implants:  * No implants in log *      Drains: * No LDAs found *    Findings: Intra-Op left lower extremity second digit full-thickness ulceration at the distal tuft and after removal of necrotic tissue noted scant bleeding down to bone. Right lower extremity third and fourth digit necrotic tissue at the distal tips afterward full-thickness incision down to bone scant bleeding encountered. DISPO: S/P left foot second digit amputation, right foot third digit amputation, and partial fourth digit amputation. Procedure was felt to be definitive. Consult PT/OT, and Dr. Hermes Ferguson at attendings request. Will follow-up on bone biopsy and Dr. Hermes Ferguson recommendation. Will follow closely while in house.       Electronically signed by Live Covington DPM on 8/13/2021 at 10:52 AM

## 2021-08-13 NOTE — PROGRESS NOTES
Pre-Operative Note  Resident Note     Patient: Stephens Dandy     Procedure: Incision and drainage with debridement of all necrotic tissue and bone with left second digit amputation, right third digit amputation, right partial fourth digit amputation    Clearance for surgery: per Internal Medicine    Consent: Procedure was discussed at length with patient and all questions were answered to completion, consent obtained and placed in chart     Labs:      Recent Labs     08/12/21  1428 08/13/21  0600   WBC 9.1 7.4   HGB 13.1* 12.6*   HCT 39.3* 37.9*   MCV 85.1 85.4    172        Recent Labs     08/12/21  1428   *   K 4.2   CL 99   CO2 27   BUN 18   CREATININE 0.7*      No results for input(s): AST, ALT, ALB, BILIDIR, BILITOT, ALKPHOS in the last 72 hours. No results for input(s): LIPASE, AMYLASE in the last 72 hours. Recent Labs     08/13/21  0600   INR 1.00      No results for input(s): CKTOTAL, CKMB, CKMBINDEX, TROPONINI in the last 72 hours.     Pre-op Medications:   Current Facility-Administered Medications   Medication Dose Route Frequency Provider Last Rate Last Admin    ipratropium-albuterol (DUONEB) nebulizer solution 3 mL  1 vial Inhalation BID Nidia Marsh MD        albuterol (PROVENTIL) nebulizer solution 2.5 mg  2.5 mg Nebulization PRN Nidia Marsh MD        clindamycin (CLEOCIN) IVPB 300 mg  300 mg Intravenous Q8H Jose Luis Wright DPM   Stopped at 08/13/21 0120    HYDROcodone-acetaminophen (NORCO) 5-325 MG per tablet 1 tablet  1 tablet Oral Q4H PRN Jose Luis Wright DPM        Or    HYDROcodone-acetaminophen (NORCO) 5-325 MG per tablet 2 tablet  2 tablet Oral Q4H PRN Jose Luis Wright DPM        sodium chloride flush 0.9 % injection 10 mL  10 mL Intravenous 2 times per day Nidia Marsh MD   10 mL at 08/12/21 2140    sodium chloride flush 0.9 % injection 10 mL  10 mL Intravenous PRN Nidia Marsh MD        0.9 % sodium chloride infusion  25 mL Intravenous PRN Nidia Marsh  mL/hr at 08/13/21 0018 25 mL at 08/13/21 0018    potassium chloride 10 mEq/100 mL IVPB (Peripheral Line)  10 mEq Intravenous PRN Swapna Moss MD        magnesium sulfate 2000 mg in 50 mL IVPB premix  2,000 mg Intravenous PRN Swapna Moss MD        ondansetron (ZOFRAN-ODT) disintegrating tablet 4 mg  4 mg Oral Q8H PRN Swapna Moss MD        Or    ondansetron (ZOFRAN) injection 4 mg  4 mg Intravenous Q6H PRN Swapna Moss MD        magnesium hydroxide (MILK OF MAGNESIA) 400 MG/5ML suspension 30 mL  30 mL Oral Daily PRN Swapna Moss MD        acetaminophen (TYLENOL) tablet 650 mg  650 mg Oral Q6H PRN Swapna Moss MD        Or    acetaminophen (TYLENOL) suppository 650 mg  650 mg Rectal Q6H PRN Swapna Moss MD        morphine (PF) injection 1 mg  1 mg Intravenous Q4H PRN Swapna Moss MD        atorvastatin (LIPITOR) tablet 20 mg  20 mg Oral Daily Swapna Moss MD   20 mg at 08/12/21 2139    budesonide (PULMICORT) nebulizer suspension 500 mcg  0.5 mg Nebulization BID Swapna Moss MD   500 mcg at 08/12/21 2155    doxazosin (CARDURA) tablet 1 mg  1 mg Oral Nightly Swapna Moss MD   1 mg at 08/12/21 2139    FLUoxetine (PROZAC) capsule 40 mg  40 mg Oral Daily Swapna Moss MD   40 mg at 08/12/21 2140    gabapentin (NEURONTIN) capsule 300 mg  300 mg Oral TID Swapna Moss MD   300 mg at 08/12/21 2139    metoprolol tartrate (LOPRESSOR) tablet 12.5 mg  12.5 mg Oral BID Swapna Moss MD   12.5 mg at 08/12/21 2139    nitroGLYCERIN (NITROSTAT) SL tablet 0.4 mg  0.4 mg Sublingual Q5 Min PRN Swapna Moss MD        thiamine tablet 100 mg  100 mg Oral Daily Nima Carey MD           Antibiotics: Clindamycin    Diet: Diet NPO    CXR:   Impression       Mild left mid and basilar airspace disease, similar to the prior CT in consistent with underlying chronic interstitial lung disease.        EKG:   Sinus rhythm with 1st degree A-V blockPossible Left atrial enlargementCannot rule out Anterior infarct , age undetermined        Echocardiogram: not done    IV access/ saline lock: Yes    PT/INR: 11.3/1    Type and Screen: Not indicated    Pregnancy test: Not indicated    COVID-19: Vaccinated    Known risk factors for perioperative complications: Coronary disease  Hypertension        Understanding all of the risks, benefit, and alternatives, the patient made an informed decision to proceed with surgical intervention. Difficulty with intubation will not be anticipated. Anesthesia to see patient.          Paula Huerta DPM  Podiatric Resident PGY3  Pager [phone] or [place]  8/13/2021, 6:57 AM

## 2021-08-13 NOTE — CONSULTS
Comprehensive Nutrition Assessment    Type and Reason for Visit:  Initial, Positive Nutrition Screen    NUTRITION RECOMMENDATIONS:   1. PO Diet: Continue regular diet. 2. ONS: Start Ensure Enlive BID. NUTRITION ASSESSMENT:  Nutritional summary & status: Pt +screen for wt loss. 40.3 lb (20%) wt loss noted in the last 8 months. Unable to see pt as pt in procedure - amputation of 3 toes. RD to send standard/high calorie oral nutrition supplements to increase po intakes d/t increased needs with wound healing and wt loss. Will monitor per [facility]. Patient admitted d/t L foot toe gangrene    PMH significant for: Covid 1/21, CAD, HTN, HLD, cardiomyopathy, STEMI    MALNUTRITION ASSESSMENT  Context of Malnutrition: Acute Illness   Malnutrition Status: Insufficient data  Findings of the 6 clinical characteristics of malnutrition (Minimum of 2 out of 6 clinical characteristics is required to make the diagnosis of moderate or severe Protein Calorie Malnutrition based on AND/ASPEN Guidelines):  Energy Intake: Unable to Assess    Energy Intake Time: Unable to assess   Weight Loss %: 20% loss or greater    Weight loss Time: Greater than or equal to 6 months   Body Fat Loss: Unable to Assess   Body Fat Location: Unable to assess    Body Muscle Loss: Unable to Assess   Body Muscle Loss Location: Unable to assess    Fluid Accumulation: Unable to assess    Fluid Accumulation Location: Unable to assess     Strength: Not Performed; Not Measured       OBJECTIVE DATA: Significant to nutrition assessment  · Nutrition-Focused Physical Findings: Nutrition Related Findings: covid toes; lbm 8/12   · Labs: Reviewed; Na 135  · Meds: Reviewed; thiamin  · Wounds: Wound Type: Surgical Incision     CURRENT NUTRITION THERAPIES  ADULT DIET;  Regular  Adult Oral Nutrition Supplement; Standard High Calorie/High Protein Oral Supplement     PO Intake: None   PO Supplement Intake: None   IVF: n/a     ANTHROPOMETRICS  Current Height: 6' (182.9 cm)  Current Weight: 165 lb 5.5 oz (75 kg)    Admission weight: 165 lb 5.5 oz (75 kg)  Ideal Body Weight (lbs) (Calculated): 178 lbs (Ideal Body Weight (Kg) (Calculated): 81 kg)  Usual Bodyweight 200-207 lbs x 1 year ago per EMR   Weight Changes 40.3 lb wt loss in 8 months       BMI BMI (Calculated): 22.5    Wt Readings from Last 50 Encounters:   08/13/21 165 lb 5.5 oz (75 kg)   08/03/21 172 lb (78 kg)   06/23/21 157 lb (71.2 kg)   06/15/21 175 lb (79.4 kg)   06/10/21 165 lb (74.8 kg)   06/04/21 167 lb (75.8 kg)   12/04/20 205 lb 9.6 oz (93.3 kg)   11/13/20 200 lb (90.7 kg)   09/02/20 207 lb 12.8 oz (94.3 kg)     Nutrition Diagnosis:   · Unintended weight loss related to inadequate protein-energy intake as evidenced by weight loss    Nutrition Interventions:   Food and/or Nutrient Delivery:  Continue Current Diet, Start Oral Nutrition Supplement  Nutrition Education/Counseling:  No recommendation at this time   Coordination of Nutrition Care:  Continue to monitor while inpatient    Goals:  Pt will maintain adequate nutrition throughout admission by consuming >50% of meals and supplements.        Nutrition Monitoring and Evaluation:   Behavioral-Environmental Outcomes:  None Identified   Food/Nutrient Intake Outcomes:  Food and Nutrient Intake  Physical Signs/Symptoms Outcomes:  Biochemical Data, Weight     Winter Saxena, [address], [place] Kramer: [phone]  Office:  [phone]

## 2021-08-13 NOTE — PLAN OF CARE
Problem: Nutrition  Goal: Optimal nutrition therapy  Note: Nutrition Problem #1: Unintended weight loss  Intervention: Food and/or Nutrient Delivery: Continue Current Diet, Start Oral Nutrition Supplement  Nutritional Goals: Pt will maintain adequate nutrition throughout admission by consuming >50% of meals and supplements.

## 2021-08-14 LAB
ANION GAP SERPL CALCULATED.3IONS-SCNC: 5 MMOL/L (ref 3–16)
BUN BLDV-MCNC: 17 MG/DL (ref 7–20)
CALCIUM SERPL-MCNC: 9.4 MG/DL (ref 8.3–10.6)
CHLORIDE BLD-SCNC: 99 MMOL/L (ref 99–110)
CO2: 30 MMOL/L (ref 21–32)
CREAT SERPL-MCNC: 0.6 MG/DL (ref 0.8–1.3)
GFR AFRICAN AMERICAN: >60
GFR NON-AFRICAN AMERICAN: >60
GLUCOSE BLD-MCNC: 92 MG/DL (ref 70–99)
HCT VFR BLD CALC: 37.3 % (ref 40.5–52.5)
HEMOGLOBIN: 12.4 G/DL (ref 13.5–17.5)
MCH RBC QN AUTO: 28.3 PG (ref 26–34)
MCHC RBC AUTO-ENTMCNC: 33.2 G/DL (ref 31–36)
MCV RBC AUTO: 85.5 FL (ref 80–100)
PDW BLD-RTO: 19 % (ref 12.4–15.4)
PLATELET # BLD: 157 K/UL (ref 135–450)
PMV BLD AUTO: 8 FL (ref 5–10.5)
POTASSIUM REFLEX MAGNESIUM: 4.4 MMOL/L (ref 3.5–5.1)
RBC # BLD: 4.36 M/UL (ref 4.2–5.9)
SODIUM BLD-SCNC: 134 MMOL/L (ref 136–145)
WBC # BLD: 6.6 K/UL (ref 4–11)

## 2021-08-14 PROCEDURE — 6370000000 HC RX 637 (ALT 250 FOR IP): Performed by: INTERNAL MEDICINE

## 2021-08-14 PROCEDURE — 6360000002 HC RX W HCPCS: Performed by: INTERNAL MEDICINE

## 2021-08-14 PROCEDURE — 36415 COLL VENOUS BLD VENIPUNCTURE: CPT

## 2021-08-14 PROCEDURE — 6370000000 HC RX 637 (ALT 250 FOR IP): Performed by: PODIATRIST

## 2021-08-14 PROCEDURE — 6360000002 HC RX W HCPCS: Performed by: PODIATRIST

## 2021-08-14 PROCEDURE — 1200000000 HC SEMI PRIVATE

## 2021-08-14 PROCEDURE — 97530 THERAPEUTIC ACTIVITIES: CPT

## 2021-08-14 PROCEDURE — 2500000003 HC RX 250 WO HCPCS: Performed by: PODIATRIST

## 2021-08-14 PROCEDURE — 85027 COMPLETE CBC AUTOMATED: CPT

## 2021-08-14 PROCEDURE — 2580000003 HC RX 258: Performed by: PODIATRIST

## 2021-08-14 PROCEDURE — 94640 AIRWAY INHALATION TREATMENT: CPT

## 2021-08-14 PROCEDURE — 97161 PT EVAL LOW COMPLEX 20 MIN: CPT

## 2021-08-14 PROCEDURE — 2700000000 HC OXYGEN THERAPY PER DAY

## 2021-08-14 PROCEDURE — 97166 OT EVAL MOD COMPLEX 45 MIN: CPT

## 2021-08-14 PROCEDURE — 97535 SELF CARE MNGMENT TRAINING: CPT

## 2021-08-14 PROCEDURE — 94761 N-INVAS EAR/PLS OXIMETRY MLT: CPT

## 2021-08-14 PROCEDURE — 80048 BASIC METABOLIC PNL TOTAL CA: CPT

## 2021-08-14 RX ORDER — MORPHINE SULFATE 2 MG/ML
4 INJECTION, SOLUTION INTRAMUSCULAR; INTRAVENOUS
Status: DISCONTINUED | OUTPATIENT
Start: 2021-08-14 | End: 2021-08-15 | Stop reason: HOSPADM

## 2021-08-14 RX ORDER — OXYCODONE HYDROCHLORIDE AND ACETAMINOPHEN 5; 325 MG/1; MG/1
1 TABLET ORAL EVERY 4 HOURS PRN
Qty: 30 TABLET | Refills: 0 | Status: SHIPPED | OUTPATIENT
Start: 2021-08-14 | End: 2021-08-19

## 2021-08-14 RX ORDER — MORPHINE SULFATE 2 MG/ML
2 INJECTION, SOLUTION INTRAMUSCULAR; INTRAVENOUS
Status: DISCONTINUED | OUTPATIENT
Start: 2021-08-14 | End: 2021-08-15 | Stop reason: HOSPADM

## 2021-08-14 RX ADMIN — IPRATROPIUM BROMIDE AND ALBUTEROL SULFATE 3 ML: .5; 2.5 SOLUTION RESPIRATORY (INHALATION) at 21:29

## 2021-08-14 RX ADMIN — METOPROLOL TARTRATE 12.5 MG: 25 TABLET, FILM COATED ORAL at 09:15

## 2021-08-14 RX ADMIN — ASPIRIN 81 MG: 81 TABLET, COATED ORAL at 01:18

## 2021-08-14 RX ADMIN — CLINDAMYCIN IN 5 PERCENT DEXTROSE 300 MG: 6 INJECTION, SOLUTION INTRAVENOUS at 09:24

## 2021-08-14 RX ADMIN — MORPHINE SULFATE 1 MG: 2 INJECTION, SOLUTION INTRAMUSCULAR; INTRAVENOUS at 06:04

## 2021-08-14 RX ADMIN — CLINDAMYCIN IN 5 PERCENT DEXTROSE 300 MG: 6 INJECTION, SOLUTION INTRAVENOUS at 17:21

## 2021-08-14 RX ADMIN — HYDROCODONE BITARTRATE AND ACETAMINOPHEN 2 TABLET: 5; 325 TABLET ORAL at 03:32

## 2021-08-14 RX ADMIN — GABAPENTIN 300 MG: 300 CAPSULE ORAL at 13:33

## 2021-08-14 RX ADMIN — Medication 10 ML: at 09:21

## 2021-08-14 RX ADMIN — BUDESONIDE 500 MCG: 0.5 SUSPENSION RESPIRATORY (INHALATION) at 08:31

## 2021-08-14 RX ADMIN — DOXAZOSIN 1 MG: 2 TABLET ORAL at 20:11

## 2021-08-14 RX ADMIN — METOPROLOL TARTRATE 12.5 MG: 25 TABLET, FILM COATED ORAL at 20:12

## 2021-08-14 RX ADMIN — Medication 100 MG: at 09:21

## 2021-08-14 RX ADMIN — MORPHINE SULFATE 4 MG: 2 INJECTION, SOLUTION INTRAMUSCULAR; INTRAVENOUS at 17:15

## 2021-08-14 RX ADMIN — Medication 10 ML: at 21:00

## 2021-08-14 RX ADMIN — ATORVASTATIN CALCIUM 40 MG: 40 TABLET, FILM COATED ORAL at 09:14

## 2021-08-14 RX ADMIN — BUDESONIDE 500 MCG: 0.5 SUSPENSION RESPIRATORY (INHALATION) at 21:28

## 2021-08-14 RX ADMIN — MORPHINE SULFATE 2 MG: 2 INJECTION, SOLUTION INTRAMUSCULAR; INTRAVENOUS at 14:18

## 2021-08-14 RX ADMIN — CLOPIDOGREL BISULFATE 75 MG: 75 TABLET ORAL at 09:14

## 2021-08-14 RX ADMIN — MORPHINE SULFATE 1 MG: 2 INJECTION, SOLUTION INTRAMUSCULAR; INTRAVENOUS at 01:17

## 2021-08-14 RX ADMIN — HYDROCODONE BITARTRATE AND ACETAMINOPHEN 2 TABLET: 5; 325 TABLET ORAL at 09:14

## 2021-08-14 RX ADMIN — MORPHINE SULFATE 4 MG: 2 INJECTION, SOLUTION INTRAMUSCULAR; INTRAVENOUS at 20:13

## 2021-08-14 RX ADMIN — CLINDAMYCIN IN 5 PERCENT DEXTROSE 300 MG: 6 INJECTION, SOLUTION INTRAVENOUS at 01:23

## 2021-08-14 RX ADMIN — HYDROCODONE BITARTRATE AND ACETAMINOPHEN 2 TABLET: 5; 325 TABLET ORAL at 13:33

## 2021-08-14 RX ADMIN — FLUOXETINE 40 MG: 20 CAPSULE ORAL at 09:14

## 2021-08-14 RX ADMIN — ENOXAPARIN SODIUM 40 MG: 40 INJECTION SUBCUTANEOUS at 09:20

## 2021-08-14 RX ADMIN — IPRATROPIUM BROMIDE AND ALBUTEROL SULFATE 3 ML: .5; 2.5 SOLUTION RESPIRATORY (INHALATION) at 08:31

## 2021-08-14 RX ADMIN — GABAPENTIN 300 MG: 300 CAPSULE ORAL at 20:12

## 2021-08-14 RX ADMIN — GABAPENTIN 300 MG: 300 CAPSULE ORAL at 09:16

## 2021-08-14 ASSESSMENT — PAIN SCALES - GENERAL
PAINLEVEL_OUTOF10: 10
PAINLEVEL_OUTOF10: 8
PAINLEVEL_OUTOF10: 10
PAINLEVEL_OUTOF10: 9
PAINLEVEL_OUTOF10: 8
PAINLEVEL_OUTOF10: 8
PAINLEVEL_OUTOF10: 4
PAINLEVEL_OUTOF10: 9
PAINLEVEL_OUTOF10: 8
PAINLEVEL_OUTOF10: 9

## 2021-08-14 NOTE — PROGRESS NOTES
Patient received prn pain meds for toe amputation pain. Pt was educated about pain medicines. Pt stated concern for constipation with narcotics.

## 2021-08-14 NOTE — PLAN OF CARE
Hold DC as patient in 10/10 pain  Received plavix this am, he has hx of hives with plavix  Change plavix to brilinta, start IV pain meds  Will monitor today and dc tomorrow likely  Full PN to follow

## 2021-08-14 NOTE — CONSULTS
Interventional Cardiology Consultation     Ivan Heart  1945      Referring Physician: Dr. Merlene Miller  Reason for Referral: CLI   Chief Complaint:   Chief Complaint   Patient presents with    Other     pt states, \"I have Covid toe. Pt states mt doctor told me to come here to get my toe off. \"        Subjective:     History of Present Illness: The patient is 77 y.o. male with a past medical history significant for CAD s/p CABG ( post CABG PCI of SVG graft), Ischemic cardiomyopathy, HTN, HL, and prior tobacco abuse who presents with the above complaint. Severe days of black toes, evaluated at the [facility] ER and required urgent to amputations. Interventional cardiology consulted for critical limb ischemia. Arterial dopplers with outflow disease. Not very active on his feet.          Past Medical History:   Diagnosis Date    Arthritis of hand     arthritis in hands and thumbs    CAD (coronary artery disease)     Hyperlipidemia     Hypertension     Ischemic cardiomyopathy 01/2019    STEMI (ST elevation myocardial infarction) ([address] 01/03/2019    Tinnitus      Past Surgical History:   Procedure Laterality Date    BICEPS TENDON REPAIR      CORONARY ANGIOPLASTY  01/08/2011    emergency PCI: vein to RCA    CORONARY ANGIOPLASTY WITH STENT PLACEMENT  01/03/2019    PCI to distal SVG-RCA with ELIZABETH    CORONARY ARTERY BYPASS GRAFT      in 1990 CABG x4 and in 2001 CABG x2    DIAGNOSTIC CARDIAC CATH LAB PROCEDURE      TOE AMPUTATION Bilateral 8/13/2021    AMPUTATION OF LEFT 2ND DIGIT LEFT FOOT, RIGHT 3RD DIGIT,  RIGHT PARTIAL 4TH DIGIT RIGHT FOOT performed by Florina Gant DPM at [facility] OR     Family History   Problem Relation Age of Onset    Cancer Mother         skin cancer    Diabetes Mother     High Blood Pressure Father     Heart Disease Father     No Known Problems Brother     No Known Problems Brother     Heart Disease Paternal Uncle     Heart Disease Paternal Cousin      Social History     Tobacco Use    Smoking status: Former Smoker     Packs/day: 1.00     Years: 20.00     Pack years: 20.00     Types: Cigarettes     Start date: 1961     Quit date: 1991     Years since quittin.6    Smokeless tobacco: Never Used   Vaping Use    Vaping Use: Never used   Substance Use Topics    Alcohol use: No    Drug use: No       Allergies   Allergen Reactions    Ciprofloxacin      diarrhea    Plavix [Clopidogrel Bisulfate]      Current Facility-Administered Medications   Medication Dose Route Frequency Provider Last Rate Last Admin    ipratropium-albuterol (DUONEB) nebulizer solution 3 mL  1 vial Inhalation BID Beverley Connors DPM        albuterol (PROVENTIL) nebulizer solution 2.5 mg  2.5 mg Nebulization PRN Beverley Connors DPM        enoxaparin (LOVENOX) injection 40 mg  40 mg Subcutaneous Daily JIN LeachM   40 mg at 21 1644    melatonin tablet 3 mg  3 mg Oral Nightly PRN Martha Chester MD        morphine (PF) injection 2 mg  2 mg Intravenous Once Edith Gonzalez MD        clindamycin (CLEOCIN) IVPB 300 mg  300 mg Intravenous Q8H Beverley Connors DPM   Stopped at 21 1714    HYDROcodone-acetaminophen (NORCO) 5-325 MG per tablet 1 tablet  1 tablet Oral Q4H PRN Beverley Connors DPM        Or    HYDROcodone-acetaminophen (NORCO) 5-325 MG per tablet 2 tablet  2 tablet Oral Q4H PRN Beverley Connors DPM   2 tablet at 21 1903    sodium chloride flush 0.9 % injection 10 mL  10 mL Intravenous 2 times per day Beverley Connors DPM   10 mL at 218    sodium chloride flush 0.9 % injection 10 mL  10 mL Intravenous PRN Beverley Connors DPM        0.9 % sodium chloride infusion  25 mL Intravenous PRN Beverley Connors  mL/hr at 21 0018 25 mL at 21 0018    potassium chloride 10 mEq/100 mL IVPB (Peripheral Line)  10 mEq Intravenous PRN Beverley Connors DPM        magnesium sulfate 2000 mg in 50 mL IVPB premix  2,000 mg Intravenous PRN Creig Nichol Lynott, DPM        ondansetron (ZOFRAN-ODT) disintegrating tablet 4 mg  4 mg Oral Q8H PRN Clayborne Tariq, DPM        Or    ondansetron (ZOFRAN) injection 4 mg  4 mg Intravenous Q6H PRN Clayborne Hanston, DPM        magnesium hydroxide (MILK OF MAGNESIA) 400 MG/5ML suspension 30 mL  30 mL Oral Daily PRN Clayborne Hanston, DPM        acetaminophen (TYLENOL) tablet 650 mg  650 mg Oral Q6H PRN Clayborne Hanston, DPM        Or    acetaminophen (TYLENOL) suppository 650 mg  650 mg Rectal Q6H PRN Clayborne Tariq, DPM        morphine (PF) injection 1 mg  1 mg Intravenous Q4H PRN Clayborne Hanston, DPM   1 mg at 08/13/21 2126    atorvastatin (LIPITOR) tablet 20 mg  20 mg Oral Daily Creig Nichol Lynott, DPM   20 mg at 08/13/21 0838    budesonide (PULMICORT) nebulizer suspension 500 mcg  0.5 mg Nebulization BID Creig Nichol Lynott, DPM   500 mcg at 08/12/21 2155    doxazosin (CARDURA) tablet 1 mg  1 mg Oral Nightly Clayborne Hanston, DPM   1 mg at 08/13/21 2028    FLUoxetine (PROZAC) capsule 40 mg  40 mg Oral Daily Creig Nichol Lynott, DPM   40 mg at 08/13/21 4599    gabapentin (NEURONTIN) capsule 300 mg  300 mg Oral TID Clayborne Hanston, DPM   300 mg at 08/13/21 2028    metoprolol tartrate (LOPRESSOR) tablet 12.5 mg  12.5 mg Oral BID Creig Nichol Lynott, DPM   12.5 mg at 08/13/21 1191    nitroGLYCERIN (NITROSTAT) SL tablet 0.4 mg  0.4 mg Sublingual Q5 Min PRN Clayishmael Tariq, DPM        thiamine tablet 100 mg  100 mg Oral Daily Clayborne Hanston, DPM           Review of Systems:  · Constitutional: No unanticipated weight loss. There's been no change in energy level, sleep pattern, or activity level. No fevers, chills. · Eyes: No visual changes or diplopia. No scleral icterus. · ENT: No Headaches, hearing loss or vertigo. No mouth sores or sore throat. · Cardiovascular: as reviewed in HPI  · Respiratory: No cough or wheezing, no sputum production. No hemoptysis.      · Gastrointestinal: No abdominal pain, appetite loss, blood in stools. No change in bowel or bladder habits. · Genitourinary: No dysuria, trouble voiding, or hematuria. · Musculoskeletal:  No gait disturbance, no joint complaints. · Integumentary: No rash or pruritis. · Neurological: No headache, diplopia, change in muscle strength, numbness or tingling. · Psychiatric: No anxiety or depression. · Endocrine: No temperature intolerance. No excessive thirst, fluid intake, or urination. No tremor. · Hematologic/Lymphatic: No abnormal bruising or bleeding, blood clots or swollen lymph nodes. · Allergic/Immunologic: No nasal congestion or hives. Physical Exam:   BP 95/63   Pulse 82   Temp 97.5 °F (36.4 °C) (Oral)   Resp 18   Ht 6' (1.829 m)   Wt 165 lb 5.5 oz (75 kg)   SpO2 96%   BMI 22.42 kg/m²   Wt Readings from Last 3 Encounters:   08/13/21 165 lb 5.5 oz (75 kg)   08/03/21 172 lb (78 kg)   06/23/21 157 lb (71.2 kg)     Constitutional: He is oriented to person, place, and time. He appears well-developed and well-nourished. In no acute distress. Head: Normocephalic and atraumatic. Pupils equal and round. Neck: Neck supple. No JVP or carotid bruit appreciated. No mass and no thyromegaly present. No lymphadenopathy present. Cardiovascular: Normal rate. Normal heart sounds. Exam reveals no gallop and no friction rub. No murmur heard. Pulmonary/Chest: Effort normal and breath sounds normal. No respiratory distress. He has no wheezes, rhonchi or rales. Abdominal: Soft, non-tender. Bowel sounds are normal. He exhibits no organomegaly, mass or bruit. Extremities: No edema. No cyanosis or clubbing. Pulses are 2+ radial/carotid bilaterally, bilateral femoral and popliteal arteries palpable   Neurological: No gross cranial nerve deficit. Coordination normal.   Skin: both feet wrapped   Psychiatric: He has a normal mood and affect.  His speech is normal and behavior is normal.     Lab Review:   FLP:    Lab Results   Component Value Date    TRIG 198 09/02/2020    HDL 40 09/02/2020    HDL 33 06/01/2012    LDLCALC 55 09/02/2020    LABVLDL 40 09/02/2020     BUN/Creatinine:    Lab Results   Component Value Date    BUN 17 08/13/2021    CREATININE 0.7 08/13/2021     PT/INR, TNI, HGB A1C:   Lab Results   Component Value Date/Time    TROPONINI 0.60 (HH) 01/04/2019 12:03 AM    LABA1C 5.8 07/02/2021 08:18 AM      No results found for: CBCAUTODIF    EKG Interpretation:     Echo:     Stress Test:     Cath:     CT:    Doppler:       Summary        The ankle-brachial indices are within normal limits bilaterally.    Mild femoral-popliteal disease is noted bilaterally.    On the left, there appears to be occlusion of the anterior tibial artery.             All above diagnostic testing and laboratory data was independently visualized and reviewed by me (not simply review of report)       Assessment and Plan   1) critical limb ischemia   - RC6   - discussed with podiatry and poor blood flow appreciated intra-op  - likely significant distal tibial and pedal arch disease   - discussed with patient at length and recommend peripheral angiogram to define anatomy and revascularize to maximize wound healing  - will arrange as an outpatient middle of next week. - recommmend high intensity statin   - asa 81 / plavix 75 , will start tomorrow     2) CAD   - as per primary cardiology team     2) ischemic cardiomyopathy   - as per primary cardiology team       Thank you very much for allowing me to participate in the care of your patient. Please do not hesitate to contact me if you have any questions.       Shannan Gates MD [address] [facility], Interventional Cardiology, and Peripheral Vascular Disease   [facility]   Ph: [phone]  Fax: [phone]

## 2021-08-14 NOTE — PROGRESS NOTES
Physical Therapy    Facility/Department: [facility] [address]  Initial Assessment and Treatment    NAME: Marin Escalera  : 1945  MRN: [id number]    Date of Service: 2021    Discharge Recommendations:    Marin Escalera scored a 13/24 on the AM-PAC short mobility form. Current research shows that an AM-PAC score of 18 or greater is typically associated with a discharge to the patient's home setting. Based on the patient's AM-PAC score and their current functional mobility deficits, it is recommended that the patient have 2-3 sessions per week of Physical Therapy at d/c to increase the patient's independence. At this time, this patient demonstrates the endurance and safety to discharge home with  (home vs OP services) and a follow up treatment frequency of 2-3x/wk. Please see assessment section for further patient specific details. If patient discharges prior to next session this note will serve as a discharge summary. Please see below for the latest assessment towards goals. PT Equipment Recommendations  Equipment Needed: No    Assessment   Assessment: Pt is normally independent with mobility and ADL,, currently needs min to mod assist for functional mobility. Plans to use WC at home which is handicapped accessible, and transfer with wife's assist. Pt states wife is available to assist  and plans to return home with HHPT  Treatment Diagnosis: Decreased functional mobility following amputations of L second digit and right 3rd, partial 4th digit  PT Education: Precautions;Weight-bearing Education;General Safety; Functional Mobility Training;PT Role;Transfer Training  Patient Education: pt demonstrates understanding  Barriers to Learning: none noted  REQUIRES PT FOLLOW UP: Yes  Activity Tolerance  Activity Tolerance: Patient limited by pain       Patient Diagnosis(es): The primary encounter diagnosis was Toe infection.  Diagnoses of Gangrene ([address] and Post-op pain were also pertinent to this visit.     has a past medical history of Arthritis of hand, CAD (coronary artery disease), Hyperlipidemia, Hypertension, Ischemic cardiomyopathy, STEMI (ST elevation myocardial infarction) ([facility] [address] and Tinnitus. has a past surgical history that includes Coronary artery bypass graft; Coronary angioplasty (01/08/2011); Diagnostic Cardiac Cath Lab Procedure; Coronary angioplasty with stent (01/03/2019); Biceps tendon repair; and Toe amputation (Bilateral, 8/13/2021). Restrictions  Position Activity Restriction  Other position/activity restrictions: Partial Weight Bearing of heel to both leg. up as tolerated, post op shoe  Vision/Hearing  Vision: Impaired  Vision Exceptions: Wears glasses at all times  Hearing: Within functional limits     Subjective  General  Chart Reviewed: Yes  Additional Pertinent Hx: arthritis, CAD, HTN, HLD, ischemic cardiomyopathy, STEMI. COVID with 135 day hospitalization. Diagnosis: Adm 8/12 with recurrent toe infections . X-rays of the right foot show soft tissue ulceration at the distal aspect of the third and fourth digits without evidence of underlying osseous destruction. Left foot shows severe arthritic changes of the first MTP joint with erosion in the base of the first proximal phalanx and findings compatible with amputation of the second distal phalanx without findings for underlying osseous destruction.  To OR 8/13 forAMPUTATION OF LEFT 2ND DIGIT LEFT FOOT, RIGHT 3RD DIGIT,  RIGHT PARTIAL 4TH DIGIT RIGHT FOOT  Subjective  Subjective: Pt supine in bed upon PT entry, agreeable to working with PT  Pain Screening  Patient Currently in Pain: Yes (845 am  8-9 bilateral feet, RN notified)  Vital Signs  Patient Currently in Pain: Yes (845 am  8-9 bilateral feet, RN notified)       Orientation  Orientation  Overall Orientation Status: Within Functional Limits  Social/Functional History  Social/Functional History  Lives With: Spouse  Type of Home: House  Home Layout: One level (doesnt go to the basement)  Home Access: Ramped entrance  Bathroom Shower/Tub: Walk-in shower  Bathroom Toilet: Standard  Bathroom Equipment: Shower chair, Grab bars in shower, Toilet raiser (toilet raiser with arms over toilet)  Home Equipment: Wheelchair-electric, [employer], [employer], 4 wheeled walker  ADL Assistance: Independent (wife helps him get into the shower but he is able to dress and bathe independently)  Homemaking Responsibilities: No (wife)  Ambulation Assistance: Independent (with and without cane)  Transfer Assistance: Independent  Active : Yes  Additional Comments: pt denies falls; reporting wife retired  Cognition        Objective     Observation/Palpation  Observation: bilateral feet wrapped     ROM - WFL except bilateral ankles/feet NT  Strength - WFL except bilateral ankles/feet NT     Bed mobility  Supine to Sit: Minimal assistance (hob elevated)  Transfers  Sit to Stand:  Moderate Assistance  Stand to sit: Moderate Assistance (to control descent)  Bed to Chair: Moderate assistance  Ambulation  Ambulation?: No     Balance  Comments: good stability with functional mobility and transfer with min to mod assist        Plan   Plan  Times per week: 2-5  Times per day: Daily  Current Treatment Recommendations: Gait Training, Safety Education & Training, Functional Mobility Training, Transfer Training  Safety Devices  Type of devices: Chair alarm in place, Nurse notified, Call light within reach, Left in chair      AM-PAC Score  AM-PAC Inpatient Mobility Raw Score : 13 (08/14/21 1406)  AM-PAC Inpatient T-Scale Score : 36.74 (08/14/21 1406)  Mobility Inpatient CMS 0-100% Score: 64.91 (08/14/21 1406)  Mobility Inpatient CMS G-Code Modifier : CL (08/14/21 1406)          Goals  Short term goals  Time Frame for Short term goals: Discharge  Short term goal 1: Mod I bed mobility  Short term goal 2: Mod I transfers  Short term goal 3: Ambulate 20 ft with RW, paramjit heel PWB  Patient Goals   Patient goals : to return home       Therapy Time   Individual Concurrent Group Co-treatment   Time In 0845         Time Out 2580         Minutes 40               Timed Code Treatment Minutes:   25    Total Treatment Minutes:  Derrek Harp, [id number]

## 2021-08-14 NOTE — PROGRESS NOTES
Occupational Therapy   Occupational Therapy Initial Assessment/ Treatment  Date: 2021   Patient Name: Reshma Taylor  MRN: [id number]     : 1945    Date of Service: 2021    Discharge Recommendations:     OT Equipment Recommendations  Equipment Needed: No  Other: pt reporting having necessary equipment at home  Reshma Taylor scored a 18/24 on the AM-PAC ADL Inpatient form. Current research shows that an AM-PAC score of 18 or greater is typically associated with a discharge to the patient's home setting. Based on the patient's AM-PAC score, and their current ADL deficits, it is recommended that the patient have 2-3 sessions per week of Occupational Therapy at d/c to increase the patient's independence. At this time, this patient demonstrates the endurance and safety to discharge home with home services and a follow up treatment frequency of 2-3x/wk. Please see assessment section for further patient specific details. If patient discharges prior to next session this note will serve as a discharge summary. Please see below for the latest assessment towards goals. Assessment   Performance deficits / Impairments: Decreased functional mobility ; Decreased endurance;Decreased coordination;Decreased ADL status; Decreased balance;Decreased strength;Decreased high-level IADLs  Assessment: Prior to admission pt was living at home with his wife, was independent with ADLs and IADLs. Pt now presents below his baseline, requiring mod A for transfers and max A for LB dressing. Pt now B LE heel WB post surgery. Pt reporting he has wc and RW at home, wc ramp in to his home and has history of using his wc prior to admission (post COVID). Pt reporting planning to dc home with assistance from wife, as they are very familiar with transfers and wc bound ADLs. Anticipate pt will be safe to discharge home with family to provide 24 hr. Would benefit from/ pt agreeable to continue OT POC.   Prognosis: Good  Decision HLD, ischemic cardiomegaly, STEMI, and complications with COVID. Surgical Hx= Coronary angioplasty with stent (1/3/19), CABG (1990), and diagnostic cardiac cath lab. Family / Caregiver Present: No  Referring Practitioner: DEMI Lopez  Diagnosis: Toe Gangrene  Subjective  Subjective: Pt semi supine in bed upon arrival, agreeable to OT eval and treat. Patient Currently in Pain: Yes (845 am  8-9 bilateral feet, RN notified)  Pain Assessment  Pain Level: 9  Vital Signs  Patient Currently in Pain: Yes (845 am  8-9 bilateral feet, RN notified)  Social/Functional History  Social/Functional History  Lives With: Spouse  Type of Home: House  Home Layout: One level (doesnt go to the basement)  Home Access: Ramped entrance  Bathroom Shower/Tub: Walk-in shower  Bathroom Toilet: Standard  Bathroom Equipment: Shower chair, Grab bars in shower, Toilet raiser (toilet raiser with arms over toilet)  Home Equipment: Wheelchair-electric, [employer], Henretta Servando, 4 wheeled walker  ADL Assistance: Independent (wife helps him get into the shower but he is able to dress and bathe independently)  Homemaking Responsibilities: No (wife)  Ambulation Assistance: Independent (with and without cane)  Transfer Assistance: Independent  Active : Yes  Additional Comments: pt denies falls; reporting wife retired       Objective   Vision: Impaired  Vision Exceptions: Wears glasses at all times  Hearing: Within functional limits    Orientation  Overall Orientation Status: Within Functional Limits  Observation/Palpation  Observation: bilateral feet wrapped  Balance  Sitting Balance: Stand by assistance (seated EOB)  Standing Balance: Moderate assistance (standing at RW)  Standing Balance  Time: 5 mins total  Activity: mobility from bed to chair  Comment: pt maintained heel WB with surg shoes  Functional Mobility  Functional - Mobility Device: Rolling Walker  Activity: Other (bed to chair)  Assist Level:  Moderate assistance  Functional Mobility Comments: with surgical shoes/ heel WB  ADL  Feeding: Setup; Beverage management  Grooming: Setup (washing face seated in bedside chair)  LE Dressing: Dependent/Total (dependent to don surgical shoes)        Bed mobility  Supine to Sit: Minimal assistance  Transfers  Sit to stand: Moderate assistance  Stand to sit: Minimal assistance (for eccentric control)     Cognition  Overall Cognitive Status: WFL                 LUE AROM (degrees)  LUE AROM : WFL  Left Hand AROM (degrees)  Left Hand AROM: WFL  RUE AROM (degrees)  RUE AROM : WFL  Right Hand AROM (degrees)  Right Hand AROM: WFL              Treatment included functional transfer training, ADLs, and patient education. Plan   Plan  Times per week: 2-5  Times per day: Daily  Current Treatment Recommendations: Strengthening, Balance Training, Functional Mobility Training, Endurance Training, Self-Care / ADL, Neuromuscular Re-education, Equipment Evaluation, Education, & procurement           AM-PAC Score        AM-[facility] Inpatient Daily Activity Raw Score: 18 (08/14/21 1405)  AM-PAC Inpatient ADL T-Scale Score : 38.66 (08/14/21 1405)  ADL Inpatient CMS 0-100% Score: 46.65 (08/14/21 1405)  ADL Inpatient CMS G-Code Modifier : CK (08/14/21 1405)    Goals  Short term goals  Time Frame for Short term goals: by dc  Short term goal 1: Pt will complete BSC transfer with min A  Short term goal 2: Pt will complete LB dressing with mod A  Short term goal 3: Pt will complete B UE HEP x 20 reps to improve activity tolerance for ADLs. Patient Goals   Patient goals : to go home       Therapy Time   Individual Concurrent Group Co-treatment   Time In 0845         Time Out 0925         Minutes 40         Timed Code Treatment Minutes: 25 Minutes (+15 min eval)     Joana JUÁREZ  [address] [facility], OTR/L [id number]

## 2021-08-14 NOTE — PROGRESS NOTES
Podiatric Surgery Daily Progress Note  Jaquan Benito      Subjective :   Patient seen and examined this am at the bedside. Patient denies any acute overnight events. Patient denies N/V/F/C/SOB. Patient denies calf pain, thigh pain, chest pain. Review of Systems: A 12 point review of symptoms is unremarkable with the exception of the chief complaint. Patient specifically denies nausea, fever, vomiting, chills, shortness of breath, chest pain, abdominal pain, constipation or difficulty urinating. Objective     /69   Pulse 88   Temp 96.7 °F (35.9 °C) (Oral)   Resp 18   Ht 6' (1.829 m)   Wt 165 lb 5.5 oz (75 kg)   SpO2 91%   BMI 22.42 kg/m²      I/O:    Intake/Output Summary (Last 24 hours) at 8/14/2021 1014  Last data filed at 8/14/2021 1010  Gross per 24 hour   Intake 1250 ml   Output 1905 ml   Net -655 ml              Wt Readings from Last 3 Encounters:   08/13/21 165 lb 5.5 oz (75 kg)   08/03/21 172 lb (78 kg)   06/23/21 157 lb (71.2 kg)       LABS:    Recent Labs     08/13/21  0600 08/14/21  0550   WBC 7.4 6.6   HGB 12.6* 12.4*   HCT 37.9* 37.3*    157        Recent Labs     08/14/21  0550   *   K 4.4   CL 99   CO2 30   BUN 17   CREATININE 0.6*        Recent Labs     08/13/21  0600   INR 1.00           LOWER EXTREMITY EXAMINATION    Dressing to b/l LE left clean, dry, and intact. Scant dried strikethrough noted to the exposed gauze left foot, no strikethrough noted to right foot external dressing. CFT is brisk to accessible digits bilateral.  Sensation intact to digits bilateral.  No pain with calf compression bilateral.  Patient able to perform active range of motion to digits bilateral.    IMAGING:  X-ray bilateral foot 8/13/2021  Narrative   Left foot 3 views       HISTORY: Amputation       Comparison 8/12/2021.        Second digit amputation has been extended to the metatarsophalangeal joint.  Severe degenerative changes at the first metatarsophalangeal joint include joint space narrowing with bone-on-bone configuration, kissing articular surface erosions and    hypertrophic spurring.       Deformity of the distal fibula relates to old trauma           Impression       Second digit amputation               Right foot 3 views       HISTORY: Amputation       Comparison with 8/12/2021       Interval amputation of the third digit at the metatarsophalangeal joint. Air within the amputation stump relates to surgery.       Degenerative changes at the first metatarsophalangeal joint, including joint space narrowing, and mild spurring.       No acute fracture or dislocation.       IMPRESSION:       Third digit amputation. Arterial duplex 8/12/2021  Impressions   Right Impression   Brachial blood pressure taken only on the right arm due to fracture on the   left. The ankle/brachial index is 1.02 (,  mmHg). Normal multiphasic flow in the common femoral artery indicates no significant   inflow disease. Atherosclerotic plaque in the femoral-popliteal segment is consistent with   <50% stenosis. The tibial arteries are patent. Left Impression   The ankle/brachial index is 1.15 (,  mmHg). Normal multiphasic flow in the common femoral artery indicates no significant   inflow disease. Atherosclerotic plaque in the femoral-popliteal segment is consistent with   <50% stenosis. The anterior tibial artery is poorly seen at the ankle, possibly occluded more   proximally, and appears to have retrograde flow just distal to the ankle. There are no previous studies at this facility for comparison.  (Patient had a   limited arterial duplex at an outside facility as an inpatient with Covid   protocol that was within normal limits bilaterally).       Conclusions        Summary        The ankle-brachial indices are within normal limits bilaterally.    Mild femoral-popliteal disease is noted bilaterally.    On the left, there appears to be occlusion of the anterior tibial artery. ASSESSMENT/PLAN  -S/P left foot second digit amputation, right foot third digit amputation and partial fourth digit amputation 8/13/2021, 2/2 gangrene  -Peripheral arterial disease, bilateral lower extremity  -Foot pain b/l LE    -Patient seen and examined at the bedside this a.m.  -VSS, no leukocytosis noted. -ESR 35, CRP <3  -X-ray images reviewed, impression noted above  -Arterial duplex reviewed, impression above  -Wound culture no growth to date  -Dressing to bilateral lower extremity left clean, dry, and intact  -No antibiotics needed from podiatric standpoint  -Prescription for Percocet, signed and placed on patient chart  -Heel weightbearing bilateral lower extremity  -Patient will follow up outpatient with Dr. Christianne Bradford    DISPO: S/P left foot second digit amputation, right foot third digit amputation and partial fourth digit amputation 8/13/2021. Procedure felt to be definitive, no further surgical intervention or antibiotics needed from podiatric standpoint at this time. Consulted arabella Lamb outpatient angiogram, high intensity statin, ASA, Plavix. Patient okay for discharge from podiatric standpoint pending PT OT and medical clearance.     Discussed assessment and plan with Dr. Marie Madera DPM.    Ishaan Strickland DPM  08/14/21  10:14 AM

## 2021-08-14 NOTE — PROGRESS NOTES
Hospitalist Progress Note      PCP: Gregorio PALOMINO DO    Date of Admission: 8/12/2021    Chief Complaint: gangrene of toes    Hospital Course:    73-year-old male with past medical history of ischemic cardiomyopathy, COVID-19 infection January/2021 extended stay (Jan 14th to May 27th) in hospital in [place] and then [address] and then rehab, CAD hypertension hyperlipidemia who presented to hospital at the request of podiatry for management of full thickness ulceration, right 2nd digit, Dry gangrene, left 3rd and 4th digits. According to the patient he had been bedridden and is heel weight bearing.     Subjective:   Still with 10/10 pain, worse in the right foot  Otherwise feels ok    Medications:  Reviewed    Infusion Medications    sodium chloride 25 mL (08/13/21 0018)     Scheduled Medications    [START ON 8/15/2021] ticagrelor  90 mg Oral BID    ipratropium-albuterol  1 vial Inhalation BID    enoxaparin  40 mg Subcutaneous Daily    atorvastatin  40 mg Oral Daily    clindamycin (CLEOCIN) IV  300 mg Intravenous Q8H    sodium chloride flush  10 mL Intravenous 2 times per day    budesonide  0.5 mg Nebulization BID    doxazosin  1 mg Oral Nightly    FLUoxetine  40 mg Oral Daily    gabapentin  300 mg Oral TID    metoprolol tartrate  12.5 mg Oral BID    thiamine  100 mg Oral Daily     PRN Meds: morphine **OR** morphine, albuterol, melatonin, HYDROcodone 5 mg - acetaminophen **OR** HYDROcodone 5 mg - acetaminophen, sodium chloride flush, sodium chloride, potassium chloride, magnesium sulfate, ondansetron **OR** ondansetron, magnesium hydroxide, acetaminophen **OR** acetaminophen, nitroGLYCERIN      Intake/Output Summary (Last 24 hours) at 8/14/2021 1233  Last data filed at 8/14/2021 1010  Gross per 24 hour   Intake 850 ml   Output 1900 ml   Net -1050 ml       Physical Exam Performed:    /62   Pulse 63   Temp 96.9 °F (36.1 °C) (Oral)   Resp 18   Ht 6' (1.829 m)   Wt 165 lb 5.5 oz (75 kg)   SpO2 93%   BMI 22.42 kg/m²     General appearance: No apparent distress, appears stated age and cooperative. HEENT: Pupils equal, round, and reactive to light. Conjunctivae/corneas clear. Neck: Supple, with full range of motion. No jugular venous distention. Trachea midline. Respiratory:  Normal respiratory effort. Clear to auscultation, bilaterally without Rales/Wheezes/Rhonchi. Cardiovascular: Regular rate and rhythm with normal S1/S2 without murmurs, rubs or gallops. Abdomen: Soft, non-tender, non-distended with normal bowel sounds. Musculoskeletal:   Bilateral foot with dressing present  S/p Right 3rd toe amputation, 4th toe partial amputation  S/ Left 2nd toe amputation  Skin: Skin color, texture, turgor normal.  No rashes or lesions. Neurologic:  Neurovascularly intact without any focal sensory/motor deficits. Cranial nerves: II-XII intact, grossly non-focal.  Psychiatric: Alert and oriented, thought content appropriate, normal insight  Capillary Refill: Brisk,< 3 seconds   Peripheral Pulses: +2 palpable, equal bilaterally       Labs:   Recent Labs     08/12/21  1428 08/13/21  0600 08/14/21  0550   WBC 9.1 7.4 6.6   HGB 13.1* 12.6* 12.4*   HCT 39.3* 37.9* 37.3*    172 157     Recent Labs     08/12/21  1428 08/13/21  0601 08/14/21  0550   * 135* 134*   K 4.2 4.2 4.4   CL 99 100 99   CO2 27 25 30   BUN 18 17 17   CREATININE 0.7* 0.7* 0.6*   CALCIUM 10.2 9.2 9.4     No results for input(s): AST, ALT, BILIDIR, BILITOT, ALKPHOS in the last 72 hours. Recent Labs     08/13/21  0600   INR 1.00     No results for input(s): Connee Matar in the last 72 hours.     Urinalysis:      Lab Results   Component Value Date    BLOODU trace-intact 07/26/2017    SPECGRAV 1.020 07/26/2017    GLUCOSEU neg 07/26/2017       Radiology:  XR FOOT LEFT (MIN 3 VIEWS)   Final Result      Second digit amputation            Right foot 3 views      HISTORY: Amputation      Comparison with 8/12/2021      Interval amputation of the third digit at the metatarsophalangeal joint. Air within the amputation stump relates to surgery. Degenerative changes at the first metatarsophalangeal joint, including joint space narrowing, and mild spurring. No acute fracture or dislocation. IMPRESSION:      Third digit amputation. XR FOOT RIGHT (MIN 3 VIEWS)   Final Result      Second digit amputation            Right foot 3 views      HISTORY: Amputation      Comparison with 8/12/2021      Interval amputation of the third digit at the metatarsophalangeal joint. Air within the amputation stump relates to surgery. Degenerative changes at the first metatarsophalangeal joint, including joint space narrowing, and mild spurring. No acute fracture or dislocation. IMPRESSION:      Third digit amputation. XR CHEST (2 VW)   Final Result      Mild left mid and basilar airspace disease, similar to the prior CT in consistent with underlying chronic interstitial lung disease. VL DUP LOWER EXTREMITY ARTERIES BILATERAL   Final Result      XR FOOT LEFT (MIN 3 VIEWS)   Final Result      Right foot: 3 views demonstrate mild degenerative changes in the first MTP joint. There is soft tissue ulceration of the distal aspects of the third and fourth digits. There is no evidence of underlying osseous destruction. Left foot: 3 views demonstrate deformity of the distal fibula internally evaluated but likely due to old trauma and unchanged since 7/29/2021. There are severe arthritic changes at the first MTP joint. There is an erosion in the base of the first    proximal phalanx. There are findings compatible with amputation of the second distal phalanx. No convincing evidence of underlying osseous destruction. If more sensitive imaging is indicated then MRI should be obtained. XR FOOT RIGHT (MIN 3 VIEWS)   Final Result      Right foot: 3 views demonstrate mild degenerative changes in the first MTP joint.  There is soft tissue ulceration of the distal aspects of the third and fourth digits. There is no evidence of underlying osseous destruction. Left foot: 3 views demonstrate deformity of the distal fibula internally evaluated but likely due to old trauma and unchanged since 7/29/2021. There are severe arthritic changes at the first MTP joint. There is an erosion in the base of the first    proximal phalanx. There are findings compatible with amputation of the second distal phalanx. No convincing evidence of underlying osseous destruction. If more sensitive imaging is indicated then MRI should be obtained. Assessment/Plan:    Active Hospital Problems    Diagnosis Date Noted    Toe gangrene ([address] Michelle Poag 08/12/2021     1. Bilateral lower extremity toe gangrene, Dry gangrene left third and fourth digits  2. Critical limb ischemia  3. Coronary artery disease  4. Ischemic cardiomyopathy  5. Hx of COVID19 with pulmonary fibrosis as complication of significant inflammatory cascade from [address] [place] [address] abx clindamycin  Per podiatary surgery is definative  Change norco to percocet  Morphine IV for breakthrough pain  Received plavix and hx of hives with it  DC plavix start brilinta from tomorrow  Monitor for any allergic symptoms  PT/OT recs reviewed    DVT Prophylaxis: Lovenox  Diet: ADULT DIET;  Regular  Adult Oral Nutrition Supplement; Standard High Calorie/High Protein Oral Supplement  Code Status: Full Code    PT/OT Eval Status: 13/24 on the AM-PAC short mobility form    Dispo - continue inpatient care, likely dc Sunday    Alejandro Ruiz MD  Hospitalist

## 2021-08-14 NOTE — DISCHARGE SUMMARY
Hospital Medicine Discharge Summary    Patient ID: Kate Shields      Patient's PCP: Anya Riojas, DO    Admit Date: 2021     Discharge Date:   08/15/21    Admitting Physician: Tree Adair MD     Discharge Physician: Rosalinda Her MD     Discharge Diagnoses:  1. Bilateral lower extremity toe gangrene, Dry gangrene left third and fourth digits  2. Critical limb ischemia  3. Coronary artery disease  4. Ischemic cardiomyopathy  5. Hx of COVID19 with pulmonary fibrosis as complication of significant inflammatory cascade from COVID      The patient was seen and examined on day of discharge and this discharge summary is in conjunction with any daily progress note from day of discharge. Hospital Course:    71-year-old male with past medical history of ischemic cardiomyopathy, COVID-19 infection 2021, CAD hypertension hyperlipidemia who presented to hospital at the request of podiatry for worsening left second digit ulcer and possible surgical intervention per his podiatrist.  According to the patient he had been bedridden and has been having gangrene of his toes since COVID-19 infection where he was hospitalized for 135 days. Noted to have following changes on admission  Right LE      Left LE      With his prior cardiac hx 2019 STEMI s/p PCi to distal SVG-RCA using 1 drug stent, seen by cardiology, EK21 Sinus rhythm with 1st degree A-V block. Possible Left atrial enlargement, Echo was EF:40-45% There is hypokinesis of the basal inferior and inferolateral walls. There is mild concentric left ventricular hypertrophy. Given patient did not have No CP, palpitation, he was cleared by cardiology for surgery,   Underwent AMPUTATION OF LEFT 2ND DIGIT LEFT FOOT, RIGHT 3RD DIGIT, RIGHT PARTIAL 4TH DIGIT RIGHT FOOT, Procedure was felt to be definitive. Patient will need follow up on bone biopsy. No antibiotics indicated per podiatry.    Dr. Odalis Hamilton intervention cardiology was consulted by podiatry for evaluation and management of critical limb ischemia, likely significant distal tibial and pedal arch disease, will need peripheral angiogram to define anatomy and revascularize to maximize wound healing and this will be arranged as an outpatient middle of next week. He was recommended high intensity statin, DAPT    Monitor closely in the hospital pain control with IV morphine, discharged on Percocet. He was initially started on Plavix which he is allergic to with history of hives, after giving Plavix he had redness of the face, otherwise remained hemodynamically stable. He had discharged home on aspirin ticagrelor. Home health care has been arranged for home physical and Occupational Therapy. Patient being discharged in stable condition. Physical Exam Performed:     /62   Pulse 71   Temp 97.3 °F (36.3 °C) (Oral)   Resp 16   Ht 6' (1.829 m)   Wt 165 lb 5.5 oz (75 kg)   SpO2 92%   BMI 22.42 kg/m²       General appearance: No apparent distress, appears stated age and cooperative. HEENT: Pupils equal, round, and reactive to light. Conjunctivae/corneas clear. Neck: Supple, with full range of motion. No jugular venous distention. Trachea midline. Respiratory:  Normal respiratory effort. Clear to auscultation, bilaterally without Rales/Wheezes/Rhonchi. Cardiovascular: Regular rate and rhythm with normal S1/S2 without murmurs, rubs or gallops. Abdomen: Soft, non-tender, non-distended with normal bowel sounds. Musculoskeletal:   Bilateral foot with dressing present  S/p Right 3rd toe amputation, 4th toe partial amputation  S/ Left 2nd toe amputation  Skin: Skin color, texture, turgor normal.  No rashes or lesions. Neurologic:  Neurovascularly intact without any focal sensory/motor deficits.  Cranial nerves: II-XII intact, grossly non-focal.  Psychiatric: Alert and oriented, thought content appropriate, normal insight  Capillary Refill: Brisk,< 3 seconds   Peripheral Pulses: +2 palpable, equal bilaterally       Labs: For convenience and continuity at follow-up the following most recent labs are provided:      CBC:    Lab Results   Component Value Date    WBC 6.8 08/15/2021    HGB 12.2 08/15/2021    HCT 36.5 08/15/2021     08/15/2021       Renal:    Lab Results   Component Value Date     08/15/2021    K 4.0 08/15/2021     08/15/2021    CO2 26 08/15/2021    BUN 18 08/15/2021    CREATININE 0.6 08/15/2021    CALCIUM 9.3 08/15/2021         Significant Diagnostic Studies    Radiology:   XR FOOT LEFT (MIN 3 VIEWS)   Final Result      Second digit amputation            Right foot 3 views      HISTORY: Amputation      Comparison with 8/12/2021      Interval amputation of the third digit at the metatarsophalangeal joint. Air within the amputation stump relates to surgery. Degenerative changes at the first metatarsophalangeal joint, including joint space narrowing, and mild spurring. No acute fracture or dislocation. IMPRESSION:      Third digit amputation. XR FOOT RIGHT (MIN 3 VIEWS)   Final Result      Second digit amputation            Right foot 3 views      HISTORY: Amputation      Comparison with 8/12/2021      Interval amputation of the third digit at the metatarsophalangeal joint. Air within the amputation stump relates to surgery. Degenerative changes at the first metatarsophalangeal joint, including joint space narrowing, and mild spurring. No acute fracture or dislocation. IMPRESSION:      Third digit amputation. XR CHEST (2 VW)   Final Result      Mild left mid and basilar airspace disease, similar to the prior CT in consistent with underlying chronic interstitial lung disease. VL DUP LOWER EXTREMITY ARTERIES BILATERAL   Final Result      XR FOOT LEFT (MIN 3 VIEWS)   Final Result      Right foot: 3 views demonstrate mild degenerative changes in the first MTP joint.  There is soft tissue ulceration of the distal aspects of the third and fourth digits. There is no evidence of underlying osseous destruction. Left foot: 3 views demonstrate deformity of the distal fibula internally evaluated but likely due to old trauma and unchanged since 7/29/2021. There are severe arthritic changes at the first MTP joint. There is an erosion in the base of the first    proximal phalanx. There are findings compatible with amputation of the second distal phalanx. No convincing evidence of underlying osseous destruction. If more sensitive imaging is indicated then MRI should be obtained. XR FOOT RIGHT (MIN 3 VIEWS)   Final Result      Right foot: 3 views demonstrate mild degenerative changes in the first MTP joint. There is soft tissue ulceration of the distal aspects of the third and fourth digits. There is no evidence of underlying osseous destruction. Left foot: 3 views demonstrate deformity of the distal fibula internally evaluated but likely due to old trauma and unchanged since 7/29/2021. There are severe arthritic changes at the first MTP joint. There is an erosion in the base of the first    proximal phalanx. There are findings compatible with amputation of the second distal phalanx. No convincing evidence of underlying osseous destruction. If more sensitive imaging is indicated then MRI should be obtained. Consults:     IP CONSULT TO PODIATRY  IP CONSULT TO HOSPITALIST  IP CONSULT TO PHARMACY  IP CONSULT TO CARDIOLOGY  IP CONSULT TO HOME CARE NEEDS    Disposition:  Home with [facility]     Condition at Discharge: Stable    Discharge Instructions/Follow-up:      Post Operative Instructions    - Have Prescriptions filled and take as directed. All medications should be taken with food or milk. - Keep foot elevated six inches above the level of the heart. Support feet, legs, and knees with pillows.     - KEEP FOOT ELEVATED AS MUCH AS POSSIBLE UNTIL YOUR NEXT VISIT    - Place an ice pack on bandaged site for 15 minutes every hour while awake    - Keep dressing clean, dry, and intact. DO NOT REMOVE DRESSING. CALL THE OFFICE IF BANDAGE COMES OFF.    - For the first 7 days after surgery, take temperature by mouth three times a day. Call the office if greater than 101F.    - Ambulate with HEEL-weightbearing to the bilateral lower extremity with assistive walking devices. - All instructions are to be followed until otherwise instructed by your surgeon.    - Call our office if you have any concerns or questions which arise. Our phones are answered 24 hours a day. [phone]    - Call the office to schedule your first post-operative appointment. Your 1st post-op appointment will be on Tuesday 8/17/2021 with Dr. Reshma Briones. Your 2nd post-op visit will be the following week with Dr. Trevor Warren. Code Status:  Full Code    Activity: activity as tolerated    Diet: regular diet      Discharge Medications:     Current Discharge Medication List           Details   ticagrelor (BRILINTA) 90 MG TABS tablet Take 1 tablet by mouth 2 times daily  Qty: 60 tablet, Refills: 2              Details   oxyCODONE-acetaminophen (PERCOCET) 5-325 MG per tablet Take 1 tablet by mouth every 4 hours as needed for Pain for up to 5 days. Intended supply: 5 days. Take lowest dose possible to manage pain  Qty: 30 tablet, Refills: 0    Comments: Reduce doses taken as pain becomes manageable  Associated Diagnoses: Post-op pain              Details   FLUoxetine (PROZAC) 20 MG capsule Take 20 mg by mouth daily      halobetasol (ULTRAVATE) 0.05 % cream APPLY TO AFFECTED AREA(S) TWO TIMES A DAY AS NEEDED  Qty: 1 Tube, Refills: 1      calcipotriene (DOVONEX) 0.005 % cream APPLY TO AFFECTED AREA(S) TWO TIMES A DAY AS NEEDED  Qty: 1 Tube, Refills: 1      clonazePAM (KLONOPIN) 0.5 MG tablet Take 1 tablet by mouth daily for 30 days.   Qty: 30 tablet, Refills: 2    Associated Diagnoses: Anxiety      doxazosin (CARDURA) 1 MG tablet Take 1 tablet by mouth nightly  Qty: 90 tablet, Refills: 1      gabapentin (NEURONTIN) 300 MG capsule Take 1 capsule by mouth every 8 hours for 90 days. Qty: 270 capsule, Refills: 2      metoprolol tartrate (LOPRESSOR) 25 MG tablet Take 0.5 tablets by mouth 2 times daily  Qty: 180 tablet, Refills: 1      thiamine 100 MG tablet Take 1 tablet by mouth daily  Qty: 90 tablet, Refills: 1      guaiFENesin 1200 MG TB12 Take by mouth 2 times daily       atorvastatin (LIPITOR) 20 MG tablet TAKE ONE TABLET BY MOUTH DAILY  Qty: 90 tablet, Refills: 2      sodium chloride (ALTAMIST SPRAY) 0.65 % nasal spray 1 spray by Nasal route every 2 hours (while awake)  Qty: 2 Bottle, Refills: 5    Associated Diagnoses: Epistaxis      budesonide (PULMICORT) 0.5 MG/2ML nebulizer suspension Take 0.5 mg by nebulization 2 times daily      Multiple Vitamins-Iron TABS Take 1 tablet by mouth daily      Omega-3 Fatty Acids (OMEGA-3 FISH OIL PO) Take 1,000 mg by mouth 2 times daily       ipratropium-albuterol (DUONEB) 0.5-2.5 (3) MG/3ML SOLN nebulizer solution Inhale 1 vial into the lungs 4 times daily      nitroGLYCERIN (NITROSTAT) 0.4 MG SL tablet Place 0.4 mg under the tongue every 5 minutes as needed. Time Spent on discharge is more than 30 minutes in the examination, evaluation, counseling and review of medications and discharge plan. Signed:    Clyde Diez MD   8/15/2021      Thank you Monet PALOMINO DO for the opportunity to be involved in this patient's care. If you have any questions or concerns please feel free to contact me at [phone].

## 2021-08-14 NOTE — DISCHARGE INSTR - COC
Continuity of Care Form    Patient Name: Gricelda Armando   :  1945  MRN:  [id number]    Admit date:  2021  Discharge date:  ***    Code Status Order: Full Code   Advance Directives:      Admitting Physician:  Verl Klinefelter, MD  PCP: Stephani Beltran DO    Discharging Nurse: [facility] Unit/Room#: 5514/4545-97  Discharging Unit Phone Number: ***    Emergency Contact:   Extended Emergency Contact Information  Primary Emergency Contact: Elva Elaine  Address: [address] Phone: [phone]  Mobile Phone: [phone]  Relation: Spouse  Secondary Emergency Contact: Dahiana Coe [address] Phone: [phone]  Relation: Child    Past Surgical History:  Past Surgical History:   Procedure Laterality Date    BICEPS TENDON REPAIR      CORONARY ANGIOPLASTY  2011    emergency PCI: vein to RCA    [address]  2019    PCI to distal SVG-RCA with ELIZABETH    CORONARY ARTERY BYPASS GRAFT      in  CABG x4 and in  CABG x2    DIAGNOSTIC CARDIAC CATH LAB PROCEDURE      TOE AMPUTATION Bilateral 2021    AMPUTATION OF LEFT 2ND DIGIT LEFT FOOT, RIGHT 3RD DIGIT,  RIGHT PARTIAL 4TH DIGIT RIGHT FOOT performed by Nasra Ulloa DPM at [address]       Immunization History:   Immunization History   Administered Date(s) Administered    COVID-19, Pfizer, PF, 30mcg/0.3mL 06/10/2021, 2021    DTaP/IPV (Beena Wise, Kinrix) 2014    Influenza Virus Vaccine 10/18/2011, 10/16/2014    Influenza Whole 2012    Influenza, High Dose (Fluzone 65 yrs and older) 10/27/2015, 2016, 2019, 10/29/2020    Influenza, Harlen Kilts, IM, (6 mo and older Fluzone, Flulaval, Fluarix and 3 yrs and older Afluria) 2017    Influenza, Triv, inactivated, subunit, adjuvanted, IM (Fluad 65 yrs and older) 10/29/2018    Pneumococcal Conjugate 13-valent (Idella العلي) 2015 Colostomy/Ileostomy/Ileal Conduit: {YES / MACIAS:16763}       Date of Last BM: ***    Intake/Output Summary (Last 24 hours) at 2021 1017  Last data filed at 2021 1010  Gross per 24 hour   Intake 1250 ml   Output 1905 ml   Net -655 ml     I/O last 3 completed shifts:   In: 56 [P.O.:480; I.V.:410]  Out: 2155 [Urine:2150; Blood:5]    Safety Concerns:     [address] Safety Concerns:603946865:::0}    Impairments/Disabilities:      [address] Impairments/Disabilities:436756444:::0}    Nutrition Therapy:  Current Nutrition Therapy:   [address] Diet List:265987615:::0}    Routes of Feeding: {CHP DME Other Feedings:130395363:::0}  Liquids: {Slp liquid thickness:18579}  Daily Fluid Restriction: {CHP DME Yes amt example:875299929:::0}  Last Modified Barium Swallow with Video (Video Swallowing Test): {Done Not Done [facility]:010944716:::8}    Treatments at the Time of Hospital Discharge:   Respiratory Treatments: ***  Oxygen Therapy:  {Therapy; copd oxygen:81528:::0}  Ventilator:    {[facility] Vent List:629200526:::0}    Rehab Therapies: {THERAPEUTIC INTERVENTION:1308176127}  Weight Bearing Status/Restrictions: {[facility] Weight Bearin:::0}  Other Medical Equipment (for information only, NOT a DME order):  {EQUIPMENT:745580462}  Other Treatments: ***    Patient's personal belongings (please select all that are sent with patient):  {CHP DME Belongings:584638431:::0}    RN SIGNATURE:  {Esignature:622220172:::0}    CASE MANAGEMENT/SOCIAL WORK SECTION    Inpatient Status Date: ***    Readmission Risk Assessment Score:  Readmission Risk              Risk of Unplanned Readmission:  15           Discharging to Facility/ Agency   Name:   Address:  Phone:  Fax:    Dialysis Facility (if applicable)   Name:  Address:  Dialysis Schedule:  Phone:  Fax:    / signature: {Esignature:920230452:::0}    PHYSICIAN SECTION    Prognosis: Fair    Condition at Discharge: Stable    Rehab Potential (if transferring to Rehab): Fair    Recommended Labs or Other Treatments After Discharge:   **** per podiatary***    Physician Certification: I certify the above information and transfer of Yaneli Saenz  is necessary for the continuing treatment of the diagnosis listed and that he requires {Admit to Appropriate Level of Care:72785:::0} for {GREATER/LESS:057430398} 30 days.      Update Admission H&P: {CHP DME Changes in HandP:835488843:::0}    PHYSICIAN SIGNATURE:  {Esignature:686367690:::0}

## 2021-08-15 VITALS
RESPIRATION RATE: 16 BRPM | TEMPERATURE: 97.3 F | HEART RATE: 71 BPM | SYSTOLIC BLOOD PRESSURE: 111 MMHG | OXYGEN SATURATION: 92 % | HEIGHT: 72 IN | DIASTOLIC BLOOD PRESSURE: 62 MMHG | BODY MASS INDEX: 22.4 KG/M2 | WEIGHT: 165.34 LBS

## 2021-08-15 LAB
ANION GAP SERPL CALCULATED.3IONS-SCNC: 10 MMOL/L (ref 3–16)
BUN BLDV-MCNC: 18 MG/DL (ref 7–20)
CALCIUM SERPL-MCNC: 9.3 MG/DL (ref 8.3–10.6)
CHLORIDE BLD-SCNC: 102 MMOL/L (ref 99–110)
CO2: 26 MMOL/L (ref 21–32)
CREAT SERPL-MCNC: 0.6 MG/DL (ref 0.8–1.3)
EKG ATRIAL RATE: 84 BPM
EKG DIAGNOSIS: NORMAL
EKG P AXIS: 36 DEGREES
EKG P-R INTERVAL: 212 MS
EKG Q-T INTERVAL: 406 MS
EKG QRS DURATION: 116 MS
EKG QTC CALCULATION (BAZETT): 479 MS
EKG R AXIS: -13 DEGREES
EKG T AXIS: 55 DEGREES
EKG VENTRICULAR RATE: 84 BPM
GFR AFRICAN AMERICAN: >60
GFR NON-AFRICAN AMERICAN: >60
GLUCOSE BLD-MCNC: 98 MG/DL (ref 70–99)
GRAM STAIN RESULT: NORMAL
HCT VFR BLD CALC: 36.5 % (ref 40.5–52.5)
HEMOGLOBIN: 12.2 G/DL (ref 13.5–17.5)
MCH RBC QN AUTO: 28.4 PG (ref 26–34)
MCHC RBC AUTO-ENTMCNC: 33.5 G/DL (ref 31–36)
MCV RBC AUTO: 84.8 FL (ref 80–100)
PDW BLD-RTO: 18.8 % (ref 12.4–15.4)
PLATELET # BLD: 154 K/UL (ref 135–450)
PMV BLD AUTO: 7.9 FL (ref 5–10.5)
POTASSIUM REFLEX MAGNESIUM: 4 MMOL/L (ref 3.5–5.1)
RBC # BLD: 4.3 M/UL (ref 4.2–5.9)
SODIUM BLD-SCNC: 138 MMOL/L (ref 136–145)
WBC # BLD: 6.8 K/UL (ref 4–11)
WOUND/ABSCESS: NORMAL

## 2021-08-15 PROCEDURE — 93010 ELECTROCARDIOGRAM REPORT: CPT | Performed by: INTERNAL MEDICINE

## 2021-08-15 PROCEDURE — 6360000002 HC RX W HCPCS: Performed by: PODIATRIST

## 2021-08-15 PROCEDURE — 6370000000 HC RX 637 (ALT 250 FOR IP): Performed by: PODIATRIST

## 2021-08-15 PROCEDURE — 6370000000 HC RX 637 (ALT 250 FOR IP): Performed by: INTERNAL MEDICINE

## 2021-08-15 PROCEDURE — 85027 COMPLETE CBC AUTOMATED: CPT

## 2021-08-15 PROCEDURE — 6360000002 HC RX W HCPCS: Performed by: INTERNAL MEDICINE

## 2021-08-15 PROCEDURE — 2500000003 HC RX 250 WO HCPCS: Performed by: PODIATRIST

## 2021-08-15 PROCEDURE — 36415 COLL VENOUS BLD VENIPUNCTURE: CPT

## 2021-08-15 PROCEDURE — 80048 BASIC METABOLIC PNL TOTAL CA: CPT

## 2021-08-15 PROCEDURE — 2580000003 HC RX 258: Performed by: PODIATRIST

## 2021-08-15 PROCEDURE — 94640 AIRWAY INHALATION TREATMENT: CPT

## 2021-08-15 RX ORDER — LACTOBACILLUS RHAMNOSUS GG 10B CELL
1 CAPSULE ORAL DAILY
Status: DISCONTINUED | OUTPATIENT
Start: 2021-08-15 | End: 2021-08-15 | Stop reason: HOSPADM

## 2021-08-15 RX ADMIN — Medication 1 CAPSULE: at 11:09

## 2021-08-15 RX ADMIN — GABAPENTIN 300 MG: 300 CAPSULE ORAL at 08:20

## 2021-08-15 RX ADMIN — CLINDAMYCIN IN 5 PERCENT DEXTROSE 300 MG: 6 INJECTION, SOLUTION INTRAVENOUS at 08:31

## 2021-08-15 RX ADMIN — Medication 100 MG: at 09:40

## 2021-08-15 RX ADMIN — FLUOXETINE 40 MG: 20 CAPSULE ORAL at 08:20

## 2021-08-15 RX ADMIN — MORPHINE SULFATE 4 MG: 2 INJECTION, SOLUTION INTRAMUSCULAR; INTRAVENOUS at 09:36

## 2021-08-15 RX ADMIN — METOPROLOL TARTRATE 12.5 MG: 25 TABLET, FILM COATED ORAL at 08:21

## 2021-08-15 RX ADMIN — Medication 10 ML: at 09:41

## 2021-08-15 RX ADMIN — ATORVASTATIN CALCIUM 40 MG: 40 TABLET, FILM COATED ORAL at 08:20

## 2021-08-15 RX ADMIN — GABAPENTIN 300 MG: 300 CAPSULE ORAL at 14:56

## 2021-08-15 RX ADMIN — IPRATROPIUM BROMIDE AND ALBUTEROL SULFATE 3 ML: .5; 2.5 SOLUTION RESPIRATORY (INHALATION) at 07:57

## 2021-08-15 RX ADMIN — BUDESONIDE 500 MCG: 0.5 SUSPENSION RESPIRATORY (INHALATION) at 07:57

## 2021-08-15 RX ADMIN — TICAGRELOR 90 MG: 90 TABLET ORAL at 11:05

## 2021-08-15 RX ADMIN — HYDROCODONE BITARTRATE AND ACETAMINOPHEN 2 TABLET: 5; 325 TABLET ORAL at 16:03

## 2021-08-15 RX ADMIN — CLINDAMYCIN IN 5 PERCENT DEXTROSE 300 MG: 6 INJECTION, SOLUTION INTRAVENOUS at 00:05

## 2021-08-15 RX ADMIN — ENOXAPARIN SODIUM 40 MG: 40 INJECTION SUBCUTANEOUS at 08:20

## 2021-08-15 RX ADMIN — MORPHINE SULFATE 4 MG: 2 INJECTION, SOLUTION INTRAMUSCULAR; INTRAVENOUS at 14:56

## 2021-08-15 ASSESSMENT — PAIN SCALES - GENERAL
PAINLEVEL_OUTOF10: 9
PAINLEVEL_OUTOF10: 7
PAINLEVEL_OUTOF10: 10

## 2021-08-15 NOTE — PROGRESS NOTES
Alert, oriented. Dressings to feet have some drainage present. Elevated on pillow. PRN for pain given with good results. Slept between care.

## 2021-08-15 NOTE — PROGRESS NOTES
Patient alert and oriented. Vss. Patient moaning and compalining of pain. PRN morphine and Narco administered per order. Patient satisfied. Discharge instructions provided. Patient verbalized understanding. IV line out. No complication noted. Pt followed to the car with wheelchair.

## 2021-08-15 NOTE — PROGRESS NOTES
Podiatric Surgery Daily Progress Note  Reji Day      Subjective :   Patient seen and examined this am at the bedside. Patient denies any acute overnight events, but does report more strike-through to left foot dressing. Patient reports decreased pain today. Patient denies N/V/F/C/SOB. Patient denies calf pain, thigh pain, chest pain. Review of Systems: A 12 point review of symptoms is unremarkable with the exception of the chief complaint. Patient specifically denies nausea, fever, vomiting, chills, shortness of breath, chest pain, abdominal pain, constipation or difficulty urinating. Objective     /69   Pulse 76   Temp 97.5 °F (36.4 °C) (Oral)   Resp 16   Ht 6' (1.829 m)   Wt 165 lb 5.5 oz (75 kg)   SpO2 94%   BMI 22.42 kg/m²      I/O:    Intake/Output Summary (Last 24 hours) at 8/15/2021 1006  Last data filed at 8/14/2021 2306  Gross per 24 hour   Intake 840 ml   Output 525 ml   Net 315 ml              Wt Readings from Last 3 Encounters:   08/13/21 165 lb 5.5 oz (75 kg)   08/03/21 172 lb (78 kg)   06/23/21 157 lb (71.2 kg)       LABS:    Recent Labs     08/14/21  0550 08/15/21  0544   WBC 6.6 6.8   HGB 12.4* 12.2*   HCT 37.3* 36.5*    154        Recent Labs     08/15/21  0544      K 4.0      CO2 26   BUN 18   CREATININE 0.6*        Recent Labs     08/13/21  0600   INR 1.00           LOWER EXTREMITY EXAMINATION    Dressing to right LE left clean, dry, and intact. No strikethrough noted to the external dressing right lower extremity. Dressing to left lower extremity with strikethrough but intact. Sanguinous drainage noted to the internal layers of the dressing left lower extremity. VASCULAR: DP and PT pulses are palpable 1/4. CFT is brisk to the remainingdigits of the foot b/l. Skin temperature is warm to cool from proximal to distal with no focal calor noted. No edema noted. No pain with calf compression b/l.     NEUROLOGIC: Gross and epicritic sensation is intact b/l. Protective sensation is intact at all pedal sites b/l. DERMATOLOGIC:   Left lower extremity:  Sutures intact with skin edges well approximated at site of second digit amputation. No active drainage noted. No dehiscence of skin noted. No ecchymosis, fluctuance, crepitus, malodor, or erythema noted. Patient provided verbal consent for today's photo. MUSCULOSKELETAL: Muscle strength is 5/5 for all pedal groups tested. No pain with palpation of the foot or ankle b/l. Ankle joint ROM is decreased in dorsiflexion with the knee extended. S/p left foot second digit amputation and right foot third digit and partial fourth digit amputations. IMAGING:  X-ray bilateral foot 8/13/2021  Narrative   Left foot 3 views       HISTORY: Amputation       Comparison 8/12/2021.        Second digit amputation has been extended to the metatarsophalangeal joint. Severe degenerative changes at the first metatarsophalangeal joint include joint space narrowing with bone-on-bone configuration, kissing articular surface erosions and    hypertrophic spurring.       Deformity of the distal fibula relates to old trauma           Impression       Second digit amputation               Right foot 3 views       HISTORY: Amputation       Comparison with 8/12/2021       Interval amputation of the third digit at the metatarsophalangeal joint. Air within the amputation stump relates to surgery.       Degenerative changes at the first metatarsophalangeal joint, including joint space narrowing, and mild spurring.       No acute fracture or dislocation.       IMPRESSION:       Third digit amputation.      Arterial duplex 8/12/2021  Impressions   Right Impression   Brachial blood pressure taken only on the right arm due to fracture on the   left. The ankle/brachial index is 1.02 (,  mmHg). Normal multiphasic flow in the common femoral artery indicates no significant   inflow disease.    Atherosclerotic plaque in the femoral-popliteal segment is consistent with   <50% stenosis. The tibial arteries are patent. Left Impression   The ankle/brachial index is 1.15 (,  mmHg). Normal multiphasic flow in the common femoral artery indicates no significant   inflow disease. Atherosclerotic plaque in the femoral-popliteal segment is consistent with   <50% stenosis. The anterior tibial artery is poorly seen at the ankle, possibly occluded more   proximally, and appears to have retrograde flow just distal to the ankle. There are no previous studies at this facility for comparison. (Patient had a   limited arterial duplex at an outside facility as an inpatient with Covid   protocol that was within normal limits bilaterally).       Conclusions        Summary        The ankle-brachial indices are within normal limits bilaterally.    Mild femoral-popliteal disease is noted bilaterally.    On the left, there appears to be occlusion of the anterior tibial artery.          ASSESSMENT/PLAN  -S/P left foot second digit amputation, right foot third digit amputation and partial fourth digit amputation 8/13/2021, 2/2 gangrene  -Peripheral arterial disease, bilateral lower extremity  -Foot pain b/l LE    -Patient seen and examined at the bedside this a.m.  -VSS, no leukocytosis noted.   -ESR 35, CRP <3  -X-ray images reviewed, impression noted above  -Arterial duplex reviewed, impression above  -Wound culture no growth to date  -Dressing to right lower extremity left clean, dry, and intact  -Dressing applied to the left lower extremity consisting of Betadine, Adaptic, gauze, cast padding, Ace bandage.  -No antibiotics needed from podiatric standpoint  -Prescription for Percocet, signed and placed on patient chart  -Heel weightbearing bilateral lower extremity  -Patient will follow up outpatient with Dr. Magnus Champagne     DISPO: S/P left foot second digit amputation, right foot third digit amputation and partial fourth digit amputation 8/13/2021. Procedure felt to be definitive, no further surgical intervention or antibiotics needed from podiatric standpoint at this time.    Consulted Dr. Adi Kamara outpatient angiogram.  Patient okay for discharge from podiatric standpoint pending medical clearance.     Discussed assessment and plan with Dr. Elif Cruz DPM.    Kathy Gallego DPM  08/15/21  10:06 AM

## 2021-08-15 NOTE — CARE COORDINATION
Case Management Assessment            Discharge Note                    Date / Time of Note: 8/15/2021 1:01 PM                  Discharge Note Completed by: Cornelia Smith RN    Patient Name: Harmony Moore   YOB: 1945  Diagnosis: Toe gangrene ([address] Mirna Mccann  Toe infection [L08.9]   Date / Time: 8/12/2021  2:41 PM    Current PCP: Tal PALOMINO DO  Clinic patient: No    Hospitalization in the last 30 days: No    Advance Directives:  Code Status: Full Code  [place] DNR form completed and on chart: Not Indicated    Financial:  Payor: Ivana Jamison / Plan: Priscella Jonathon ESSENTIAL/PLUS / Product Type: *No Product type* /      Pharmacy:    [facility], [facility] [address] [facility] Cris Irvin [phone]  [address] [place] [address] ANASTASIYA Romeo [address]  Phone: [phone] Fax: [address] [place] [address] 250 [facility], [address] [place] [address] [phone] - f [phone]  [address] [place] [address]  [address] [facility] [address]  Phone: [phone] Fax: [phone]      Assistance purchasing medications?: Potential Assistance Purchasing Medications: No  Assistance provided by Case Management: None at this time    Does patient want to participate in local refill/ meds to beds program?: No    Meds To Beds General Rules:  1. Can ONLY be done Monday- Friday between 8:30am-5pm  2. Prescription(s) must be in pharmacy by 3pm to be filled same day  3. Copy of patient's insurance/ prescription drug card and patient face sheet must be sent along with the prescription(s)  4. Cost of Rx cannot be added to hospital bill. If financial assistance is needed, please contact unit  or ;  or  CANNOT provide pharmacy voucher for patients co-pays  5.  Patients can then  the prescription on their way out of the hospital at discharge, or pharmacy can deliver to the bedside if staff is available. (payment due at time of pick-up or delivery - cash, check, or card accepted)     Able to afford home medications/ co-pay costs: Yes    ADLS:  Current PT AM-PAC Score: 13 /24  Current OT AM-PAC Score: 18 /24      DISCHARGE Disposition: Home with [address] [place] [address] Way: [address] [place] [address]     LOC at discharge: Not Applicable  CATIE Completed: Not Indicated    Notification completed in HENS/PAS?:  Not Applicable    IMM Completed:   Yes, Case management has presented and reviewed IMM letter #2 to the patient and/or family/ POA. Patient and/or family/POA verbalized understanding of their medicare rights and appeal process if needed. Patient and/or family/POA has signed, initialed and placed today's date (8/15/21) and time (1300) on IMM letter #2 on the the appropriate lines. Patient and/or family/POA, copy of letter offered and they are aware that this original copy of IMM letter #2 is available prior to discharge from the paper chart on the unit. Electronic documentation has been entered into epic for IMM letter #2 and original paper copy has been added to the paper chart at the nurses station.      Transportation:  Transportation PLAN for discharge: family   Mode of Transport: [address] ordered at discharge: Yes  [address]: [address]  Phone: [phone]  Fax: [phone]  Orders faxed: Maynor duke will follow at [facility]:  DME Provider: none  Equipment obtained during hospitalization:     Home Oxygen and Respiratory Equipment:  Oxygen needed at discharge?: Not [address]: Not Applicable  Portable tank available for discharge?: Not Indicated    Dialysis:  Dialysis patient: No    Dialysis Center:  Not Applicable      Additional CM Notes: Pt from home with wife will Dc home with wife and [address] [place] [address] for Dressing changed and PT OT     The Plan for Transition of Care is related to the following treatment goals of Toe gangrene ([address] [I96]  Toe infection [L08.9]    The Patient and/or patient representative Bebeto Weems and his family were provided with a choice of provider and agrees with the discharge plan Yes    Freedom of choice list was provided with basic dialogue that supports the patient's individualized plan of care/goals and shares the quality data associated with the providers.  Yes    Care Transitions patient: Yes    Brittany Stack RN  [facility] [employer]  Case Management Department  Ph: [phone]  Fax: [phone]

## 2021-08-16 ENCOUNTER — TELEPHONE (OUTPATIENT)
Dept: CARDIOLOGY CLINIC | Age: 76
End: 2021-08-16

## 2021-08-16 ENCOUNTER — TELEPHONE (OUTPATIENT)
Dept: FAMILY MEDICINE CLINIC | Age: 76
End: 2021-08-16

## 2021-08-16 ENCOUNTER — CARE COORDINATION (OUTPATIENT)
Dept: CASE MANAGEMENT | Age: 76
End: 2021-08-16

## 2021-08-16 DIAGNOSIS — I70.229 CRITICAL LIMB ISCHEMIA WITH HISTORY OF REVASCULARIZATION OF SAME EXTREMITY (HCC): Primary | ICD-10-CM

## 2021-08-16 DIAGNOSIS — Z98.890 CRITICAL LIMB ISCHEMIA WITH HISTORY OF REVASCULARIZATION OF SAME EXTREMITY (HCC): Primary | ICD-10-CM

## 2021-08-16 DIAGNOSIS — I73.9 PAD (PERIPHERAL ARTERY DISEASE) (HCC): ICD-10-CM

## 2021-08-16 DIAGNOSIS — I70.229 CRITICAL LOWER LIMB ISCHEMIA (HCC): ICD-10-CM

## 2021-08-16 NOTE — TELEPHONE ENCOUNTER
Steve Davidson called from [facility] regarding orders for Skilled Nursing, PT, OT and Wound Care. I advised physician will not sign off on orders until patient is seen for his Hospital Follow up appointment from 8-12-21 through 8-15-21. She understood. She will let patient know to schedule hosp f/u.

## 2021-08-16 NOTE — CARE COORDINATION
[address] Transitions Initial Follow Up Call    Call within 2 business days of discharge: Yes    Patient: Jaquan Oliver Patient : 1945   MRN: [id number]   Reason for Admission:  Gangrene toes  Discharge Date: 8/15/21 RARS: Readmission Risk Score: 15      Last Discharge [address] [address]       Complaint Diagnosis Description Type Department Provider    21 Other Toe infection . .. ED to Hosp-Admission (Discharged) (ADMITTED) 062 Maria Del Rosario Curry MD; Balaji Cook,... Spoke with: Jaquan Oliver and Angel Lazaro / spouse    Facility: [facility]      Non-face-to-face services provided:  Obtained and reviewed discharge summary and/or continuity of care documents  Communication with home health agencies or other community services the patient is currently using-   Education of patient/family/caregiver/guardian to support self-management-   Assessment and support for treatment adherence and medication management-        Care Transitions 24 Hour Call    Do you have any ongoing symptoms?: Yes  Patient-reported symptoms: Pain  Interventions for patient-reported symptoms: Other  Do you have a copy of your discharge instructions?: Yes  Do you have all of your prescriptions and are they filled?: Yes  Have you been contacted by a [address] [employer] Pharmacist?: No  Have you scheduled your follow up appointment?: Yes  Were you discharged with any Home Care or Post Acute Services: Yes  Post Acute Services: Home Health (Comment: [address])  Do you feel like you have everything you need to keep you well at home?: Yes  Care Transitions Interventions       Challenges to be reviewed by the provider   Additional needs identified to be addressed with provider:   NO           Method of communication with provider :   phone      Was this a readmission? no    Care Transition Nurse (CTN) contacted the patient by telephone to perform post hospital discharge assessment. Verified name with patient as identifier.  Provided introduction to self, and explanation of the CTN role. CTN reviewed discharge instructions, medical action plan and red flags with patient who verbalized understanding. Patient given an opportunity to ask questions and does not have any further questions or concerns at this time. Were discharge instructions available to patient? Yes       Reviewed appropriate site of care based on symptoms and resources available to patient including:      PCP   Specialist   After hour contact number   Urgent [address]    When to call 911       The patient agrees to contact the PCP office for questions related to their healthcare. Medication reconciliation was performed with Patient, who verbalizes understanding of administration of home medications. Advised obtaining a 90-day supply of all daily and as-needed medications. Patient's preferred e-mail:  ON FILE   Patient's preferred phone number: ON FILE     Discussed follow-up appointments. If no appointment was previously scheduled, appointment scheduling offered:  Appt with surgeon on 8/17 and PCP 9/7    Is follow up appointment scheduled within 7 days of discharge? Yes       Non-[facility] follow up appointment(s): no    Plan for f/u call in 7-10 days based on severity of symptoms and risk factors. CTN provided contact information for future needs. SUMMARY  CTN spoke with Florina with [facility] who confirms [facility]. order was received. CTC spoke to the Pt briefly who gave CTN permission to speak to his spouse, Milly Blanton. Milly Blanton reports that Pt is still having pain in his feet and unable to use a walker at this time. She states he is unable to use his left arm d/t injury earlier this year. She states he does have a wheelchair and their son has been lifting the Pt to assist with moving and repositioning. She states Pt will also be back a [facility]. tomorrow to have stent placement.   She confirms that [facility] saw the Pt today and declined to review meds with CTN. Pt has appt with Dr Daniel Landry on 8/17. Pt will take all meds as prescribed and schedule / keep doctors appt. CTC provided education on s/s that require medical attention and when to seek medical attention. CTC advised Pt of use urgent care or physicians 24 hr access line if assistance is needed after hours or on the weekend. Pt denies any needs or concerns and is agreeable with additional calls.     Follow Up  Future Appointments   Date Time Provider Dexter Munguia   8/17/2021  7:30 AM [facility] SPECIALS HYBRID OR [facility] SP PROC [facility]   9/7/2021 10:30 AM DO BELLA Darden - DYSHARIF   9/21/2021  9:15 AM MD Nata Bartlett   9/22/2021 10:30 AM SCHEDULE, [facility] EVALUATION [facility]   1/10/2022  8:00 AM DO BELLA Dardenci - DYSHARIF   2/9/2022  9:00 AM Marko Atwood MD AND LIZ Ramirez, PER[place]

## 2021-08-17 ENCOUNTER — TELEPHONE (OUTPATIENT)
Dept: CARDIOLOGY CLINIC | Age: 76
End: 2021-08-17

## 2021-08-17 ENCOUNTER — HOSPITAL ENCOUNTER (OUTPATIENT)
Dept: INTERVENTIONAL RADIOLOGY/VASCULAR | Age: 76
Discharge: HOME | End: 2021-08-17
Payer: MEDICARE

## 2021-08-17 VITALS — TEMPERATURE: 97.4 F | BODY MASS INDEX: 20.72 KG/M2 | WEIGHT: 153 LBS | HEIGHT: 72 IN

## 2021-08-17 DIAGNOSIS — I73.9 PAD (PERIPHERAL ARTERY DISEASE) (HCC): Primary | ICD-10-CM

## 2021-08-17 DIAGNOSIS — I73.9 PAD (PERIPHERAL ARTERY DISEASE) (HCC): ICD-10-CM

## 2021-08-17 LAB — INR BLD: 1.2 (ref 0.86–1.14)

## 2021-08-17 PROCEDURE — C1725 CATH, TRANSLUMIN NON-LASER: HCPCS

## 2021-08-17 PROCEDURE — 6360000004 HC RX CONTRAST MEDICATION: Performed by: INTERNAL MEDICINE

## 2021-08-17 PROCEDURE — C1769 GUIDE WIRE: HCPCS

## 2021-08-17 PROCEDURE — 75625 CONTRAST EXAM ABDOMINL AORTA: CPT

## 2021-08-17 PROCEDURE — 37228 HC TIB PER TERRITORY PLASTY: CPT

## 2021-08-17 PROCEDURE — 75774 ARTERY X-RAY EACH VESSEL: CPT

## 2021-08-17 PROCEDURE — C1894 INTRO/SHEATH, NON-LASER: HCPCS

## 2021-08-17 PROCEDURE — 85610 PROTHROMBIN TIME: CPT

## 2021-08-17 PROCEDURE — C1887 CATHETER, GUIDING: HCPCS

## 2021-08-17 PROCEDURE — 99152 MOD SED SAME PHYS/QHP 5/>YRS: CPT

## 2021-08-17 PROCEDURE — 99153 MOD SED SAME PHYS/QHP EA: CPT

## 2021-08-17 PROCEDURE — 75710 ARTERY X-RAYS ARM/LEG: CPT

## 2021-08-17 PROCEDURE — C1760 CLOSURE DEV, VASC: HCPCS

## 2021-08-17 PROCEDURE — 2709999900 HC NON-CHARGEABLE SUPPLY

## 2021-08-17 RX ORDER — SODIUM CHLORIDE 0.9 % (FLUSH) 0.9 %
5-40 SYRINGE (ML) INJECTION EVERY 12 HOURS SCHEDULED
Status: DISCONTINUED | OUTPATIENT
Start: 2021-08-17 | End: 2021-08-18 | Stop reason: HOSPADM

## 2021-08-17 RX ORDER — IODIXANOL 320 MG/ML
250 INJECTION, SOLUTION INTRAVASCULAR
Status: COMPLETED | OUTPATIENT
Start: 2021-08-17 | End: 2021-08-17

## 2021-08-17 RX ORDER — ACETAMINOPHEN 325 MG/1
650 TABLET ORAL EVERY 4 HOURS PRN
Status: DISCONTINUED | OUTPATIENT
Start: 2021-08-17 | End: 2021-08-18 | Stop reason: HOSPADM

## 2021-08-17 RX ORDER — SODIUM CHLORIDE 0.9 % (FLUSH) 0.9 %
5-40 SYRINGE (ML) INJECTION PRN
Status: DISCONTINUED | OUTPATIENT
Start: 2021-08-17 | End: 2021-08-18 | Stop reason: HOSPADM

## 2021-08-17 RX ORDER — SODIUM CHLORIDE 9 MG/ML
INJECTION, SOLUTION INTRAVENOUS CONTINUOUS
Status: DISCONTINUED | OUTPATIENT
Start: 2021-08-17 | End: 2021-08-18 | Stop reason: HOSPADM

## 2021-08-17 RX ORDER — SODIUM CHLORIDE 9 MG/ML
25 INJECTION, SOLUTION INTRAVENOUS PRN
Status: DISCONTINUED | OUTPATIENT
Start: 2021-08-17 | End: 2021-08-18 | Stop reason: HOSPADM

## 2021-08-17 RX ADMIN — IODIXANOL 250 ML: 320 INJECTION, SOLUTION INTRAVASCULAR at 09:41

## 2021-08-17 ASSESSMENT — PAIN DESCRIPTION - LOCATION: LOCATION: FOOT

## 2021-08-17 ASSESSMENT — PAIN DESCRIPTION - ORIENTATION: ORIENTATION: RIGHT;LEFT

## 2021-08-17 ASSESSMENT — PAIN SCALES - GENERAL: PAINLEVEL_OUTOF10: 8

## 2021-08-17 ASSESSMENT — PAIN DESCRIPTION - PAIN TYPE: TYPE: SURGICAL PAIN

## 2021-08-17 NOTE — TELEPHONE ENCOUNTER
Patient underwent peripheral angiogram today at [facility] with Dr. Fanny Nicole. Instructed by  patient will need tissue dopplers in 2-3 weeks at [facility]. He will review the results the determine if the patient will need another procedure. Orders placed, please call to facilitate schedule Nehemiah for the tissue doppler at [facility].

## 2021-08-17 NOTE — TELEPHONE ENCOUNTER
I have Roselia Mohan scheduled for Wednesday 9/8 at 9:00 am at [facility], [facility]..     I called and spoke to Brit (spouse) and went over date/time and instructions with her, she v/u

## 2021-08-19 NOTE — TELEPHONE ENCOUNTER
Records from [place] received and scanned. Disk of imaging placed in Dr. Sonia Rodriguez folder to review.

## 2021-08-23 ENCOUNTER — CARE COORDINATION (OUTPATIENT)
Dept: CASE MANAGEMENT | Age: 76
End: 2021-08-23

## 2021-08-23 NOTE — CARE COORDINATION
Gamaliel 45 Transitions Follow Up Call    2021    Patient: Elicai Shannon  Patient : 1945   MRN: [id number]   Reason for Admission:  Gangrene toes  Discharge Date: 8/15/21 RARS: Readmission Risk Score: 13         Spoke with: Bisi Avendaño - spouse    Care Transitions Subsequent and Final Call    Subsequent and Final Calls  Do you have any ongoing symptoms?: Yes  Patient-reported symptoms: Pain  Interventions for patient-reported symptoms: Other  Have your medications changed?: No  Do you have any questions related to your medications?: No  Do you currently have any active services?: Yes  Are you currently active with any services?: Home Health  Care Transitions Interventions  Other Interventions:         summaryr  CTN spoke to the Pt and spouse. Bisi Avendaño, who states pt is doing pretty good. They reports pt is having pain in his feet and rates pain 5/10. Pt states he just took Percocet before the call. Pt denies issues with appetite, urination, and bowel habits. Pt confirms they have all meds and taking as prescribed. Pt has appt with podiatry on Thursday [facility]. From [facility]: Are you at higher risk for severe illness?  Wash your hands often.  Avoid close contact (6 feet, which is about two arm lengths) with people who are sick.  Put distance between yourself and other people if COVID-19 is spreading in your community.  Clean and disinfect frequently touched surfaces.  Avoid all cruise travel and non-essential air travel.  Call your healthcare professional if you have concerns about COVID-19 and your underlying condition or if you are sick. Pt will take all meds as prescribed and schedule / keep doctors appt. [facility] provided education on s/s that require medical attention and when to seek medical attention. [facility] advised Pt of use urgent care or physicians 24 hr access line if assistance is needed after hours or on the weekend.   Pt denies any needs or concerns and is agreeable with additional calls.     Follow Up  Future Appointments   Date Time Provider Dexter Munguia   9/7/2021 10:30 AM Zhane Vergia Crimes, DO BELLA VIZCARRA [place] - DYD   9/8/2021  9:00 AM SCHEDULE, TJHZ VASCULAR RM 1 TJ[facility]Z VAS [facility] H[facility]   9/21/2021  9:15 AM MD Jazmin Gardner [facility]   9/22/2021 10:30 AM SCHEDULE, [facility]   1/10/2022  8:00 AM Zhane Vergia Crimes, DO BELLA VIZCARRA [place] - DYD   2/9/2022  9:00 AM [facility]       Priti Jorgensen RN

## 2021-08-30 ENCOUNTER — CARE COORDINATION (OUTPATIENT)
Dept: CASE MANAGEMENT | Age: 76
End: 2021-08-30

## 2021-08-30 ENCOUNTER — TELEPHONE (OUTPATIENT)
Dept: PHARMACY | Facility: CLINIC | Age: 76
End: 2021-08-30

## 2021-08-30 NOTE — CARE COORDINATION
Gamaliel 45 Transitions Follow Up Call    2021    Patient: Gricelda Armando  Patient : 1945   MRN: [id number]   Reason for Admission:  GANGRENE TOE  Discharge Date: 8/15/21 RARS: Readmission Risk Score: 13         Spoke with: Bautista Davis - spouse     Care Transitions Subsequent and Final Call    Subsequent and Final Calls  Do you have any ongoing symptoms?: No  Have your medications changed?: No  Do you have any questions related to your medications?: No  Do you currently have any active services?: Yes  Are you currently active with any services?: Home Health  Do you have any needs or concerns that I can assist you with?: No  Identified Barriers: None  Care Transitions Interventions  Other Interventions:         SUMMARY  CTN spoke to the Pt's spouse, Bautista Davis, who states Pt is doing better. She denies c/o foot pain today and reports Pt has stitches removed from right foot last Thursday and hopes to have stiches removed from the left one this Thursday when he sees podiatry again. Wife confirms that surgical wounds look health and no s/s of infection. She denies issues with appetite, urination, and bowel habits and confirms they have all meds and taking as prescribed. From CDC: Are you at higher risk for severe illness?  Wash your hands often.  Avoid close contact (6 feet, which is about two arm lengths) with people who are sick.  Put distance between yourself and other people if COVID-19 is spreading in your community.  Clean and disinfect frequently touched surfaces.  Avoid all cruise travel and non-essential air travel.  Call your healthcare professional if you have concerns about COVID-19 and your underlying condition or if you are sick. Pt will take all meds as prescribed and schedule / keep doctors appt. [facility] provided education on s/s that require medical attention and when to seek medical attention.   [facility] advised Pt of use urgent care or physicians 24 hr access line if assistance is needed after hours or on the weekend. Wife denies any needs or concerns and is agreeable with additional calls.     Follow Up  Future Appointments   Date Time Provider Dexter Munguia   9/7/2021 10:30 AM DO BELLA Jimenez FP [place] - DYD   9/8/2021  9:00 AM SCHEDULE, TJHZ VASCULAR RM 1 TJ[facility]Z VAS [facility] HOD   9/21/2021  9:15 AM Janee Palacios MD [facility]   9/22/2021 10:30 AM SCHEDULE, MHAZ EVALUATION RM MHAZ CARDALICIAU [facility] [place]   1/10/2022  8:00 AM DO BELLA Jimenez FP [place] - DYD   2/9/2022  9:00 AM Teresa Mederos MD AND [facility]       Tiffany Christina RN

## 2021-08-30 NOTE — TELEPHONE ENCOUNTER
CLINICAL PHARMACY NOTE  Post-Discharge Transitions of Care [facility])    Patient appears to have been discharged from [address] [place] [address] on 8/15/21. Patient not found in Outcomes MTM. Please follow-up with patient to review medications.       30 days since discharge = 9/14/21     Ryder Mendiola, Pharmacy    [address]   Department, toll free [phone], option 1

## 2021-08-30 NOTE — LETTER
[place]  [address], [address]        Yaneli Saenz   [facility] [address]           09/01/21     Dear Yaneli Saenz,    We tried to reach you recently, after your hospital stay, to review your medications. I was unable to reach you on the telephone. We understand that medications can be confusing, and it is important to discuss your medications after a hospital stay. Please call us at [phone], option 1 to discuss your medications.        Sincerely,   Shahida Maldonado, PharmD, [place], [address] [place] [address], toll free: [phone], option 1

## 2021-08-31 NOTE — TELEPHONE ENCOUNTER
[facility] HEALTH CLINICAL PHARMACY REVIEW: Post-Discharge Transitions of Care (MIRELLA)    Attempted to reach patient/family to review medications. Left message asking for return call. Will continue to attempt to contact as appropriate.       Linette CamarilloD, BCACP, [facility], toll free: [phone], option 1

## 2021-09-01 ENCOUNTER — APPOINTMENT (OUTPATIENT)
Dept: OCCUPATIONAL THERAPY | Age: 76
End: 2021-09-01
Payer: MEDICARE

## 2021-09-01 ENCOUNTER — APPOINTMENT (OUTPATIENT)
Dept: SPEECH THERAPY | Age: 76
End: 2021-09-01
Payer: MEDICARE

## 2021-09-01 ENCOUNTER — APPOINTMENT (OUTPATIENT)
Dept: PHYSICAL THERAPY | Age: 76
End: 2021-09-01
Payer: MEDICARE

## 2021-09-01 NOTE — TELEPHONE ENCOUNTER
POPULATION HEALTH CLINICAL PHARMACY REVIEW: Post-Discharge Transitions of Care (MIRELLA)    Attempted to reach patient again to review medications. Left second message asking for return call. Will send letter.      Bobby Gutierrez PharmD, BCACP, [address]   Department, toll free: [phone], option 1    =======================================================     For Pharmacy Admin Tracking Only   Gap Closed?: No    Time Spent (min): 10

## 2021-09-07 ENCOUNTER — TELEPHONE (OUTPATIENT)
Dept: PULMONOLOGY | Age: 76
End: 2021-09-07

## 2021-09-07 ENCOUNTER — VIRTUAL VISIT (OUTPATIENT)
Dept: FAMILY MEDICINE CLINIC | Age: 76
End: 2021-09-07
Payer: MEDICARE

## 2021-09-07 ENCOUNTER — CARE COORDINATION (OUTPATIENT)
Dept: CASE MANAGEMENT | Age: 76
End: 2021-09-07

## 2021-09-07 DIAGNOSIS — Z00.00 ROUTINE GENERAL MEDICAL EXAMINATION AT A HEALTH CARE FACILITY: Primary | ICD-10-CM

## 2021-09-07 DIAGNOSIS — U07.1 COVID-19: ICD-10-CM

## 2021-09-07 DIAGNOSIS — J96.11 CHRONIC RESPIRATORY FAILURE WITH HYPOXIA (HCC): ICD-10-CM

## 2021-09-07 DIAGNOSIS — J84.9 ILD (INTERSTITIAL LUNG DISEASE) (HCC): Primary | ICD-10-CM

## 2021-09-07 PROCEDURE — G0439 PPPS, SUBSEQ VISIT: HCPCS | Performed by: FAMILY MEDICINE

## 2021-09-07 ASSESSMENT — PATIENT HEALTH QUESTIONNAIRE - PHQ9
2. FEELING DOWN, DEPRESSED OR HOPELESS: 0
SUM OF ALL RESPONSES TO PHQ QUESTIONS 1-9: 0
1. LITTLE INTEREST OR PLEASURE IN DOING THINGS: 0
SUM OF ALL RESPONSES TO PHQ QUESTIONS 1-9: 0
SUM OF ALL RESPONSES TO PHQ9 QUESTIONS 1 & 2: 0
SUM OF ALL RESPONSES TO PHQ QUESTIONS 1-9: 0

## 2021-09-07 ASSESSMENT — LIFESTYLE VARIABLES: HOW OFTEN DO YOU HAVE A DRINK CONTAINING ALCOHOL: 0

## 2021-09-07 NOTE — TELEPHONE ENCOUNTER
Patient requesting to return oxygen to [facility]. Wife states that patient hasn't used in 1 month but was told by DME that he needed a letter to return. Please advise.

## 2021-09-07 NOTE — CARE COORDINATION
Gamaliel 45 Transitions Follow Up Call    2021    Patient: Anthony Carbajal  Patient : 1945   MRN: [id number]   Reason for Admission:  Gangrene toe  Discharge Date: 8/15/21 RARS: Readmission Risk Score: 13         Spoke with: Vinny Gaona - spouse     Care Transitions Subsequent and Final Call    Subsequent and Final Calls  Do you have any ongoing symptoms?: No  Have your medications changed?: No  Do you have any questions related to your medications?: No  Do you currently have any active services?: No  Are you currently active with any services?: Home Health  Do you have any needs or concerns that I can assist you with?: No  Identified Barriers: None  Care Transitions Interventions  Other Interventions:         SUMMARY  CTN spoke to the Pt's spouse, Vinny Gaona, who states Pt is doing good. She has no complaints at this time and Pt will be seen at hospital tomorrow for f/u. Wife denies issues with appetite, urination, and bowel habits. Pt confirms they have all meds and taking as prescribed. From CDC: Are you at higher risk for severe illness?  Wash your hands often.  Avoid close contact (6 feet, which is about two arm lengths) with people who are sick.  Put distance between yourself and other people if COVID-19 is spreading in your community.  Clean and disinfect frequently touched surfaces.  Avoid all cruise travel and non-essential air travel.  Call your healthcare professional if you have concerns about COVID-19 and your underlying condition or if you are sick. Pt will take all meds as prescribed and schedule / keep doctors appt. [facility] provided education on s/s that require medical attention and when to seek medical attention. [facility] advised Pt of use urgent care or physicians 24 hr access line if assistance is needed after hours or on the weekend. Pt denies any needs or concerns and is agreeable with additional calls.     Follow Up  Future Appointments   Date Time Provider Department [place]   9/8/2021  9:00 AM SCHEDULE, TJHZ VASCULAR  1 TJ[facility]Z VAS [facility]   9/21/2021  9:15 AM MD Paola Mcdermott [facility]   9/22/2021 10:30 AM SCHEDULE, MHAZ EVALUATION  MHAZ CARDALICIAU Skyler Martinez [facility]   1/10/2022  8:00 AM DO BELLA Barbosa FP [place] - DYD   2/9/2022  9:00 AM [facility]       Alana Kimbrough RN

## 2021-09-07 NOTE — PROGRESS NOTES
Medicare Annual Wellness Visit  Are Name: Tommy Veronica Date: 2021   MRN: [id number] Sex: Male   Age: 77 y.o. Ethnicity: Non- / Non    : 1945 Race: White (non-)      Joey Reed is here for Medicare AWV (Patient is completing a Medicare Annual Wellness Visit by phone.)    Screenings for behavioral, psychosocial and functional/safety risks, and cognitive dysfunction are all negative except as indicated below. These results, as well as other patient data from the [address] form, are documented in Flowsheets linked to this Encounter. Allergies   Allergen Reactions    Plavix [Clopidogrel Bisulfate] Hives     Hives    Ciprofloxacin      diarrhea       Prior to Visit Medications    Medication Sig Taking? Authorizing Provider   nitroglycerin (NITRO-BID) 2 % ointment Place 0.5 inches onto the skin 2 times daily Place on the dorsum of the left foot Yes Mary Leong MD   ticagrelor (BRILINTA) 90 MG TABS tablet Take 1 tablet by mouth 2 times daily Yes Heather Ortega MD   FLUoxetine (PROZAC) 20 MG capsule Take 20 mg by mouth daily Yes Historical Provider, MD   halobetasol (ULTRAVATE) 0.05 % cream APPLY TO AFFECTED AREA(S) TWO TIMES A DAY AS NEEDED Yes Zhane Paul DO   calcipotriene (DOVONEX) 0.005 % cream APPLY TO AFFECTED AREA(S) TWO TIMES A DAY AS NEEDED Yes Zhane Paul DO   doxazosin (CARDURA) 1 MG tablet Take 1 tablet by mouth nightly Yes Zhane Paul DO   gabapentin (NEURONTIN) 300 MG capsule Take 1 capsule by mouth every 8 hours for 90 days.  Yes Zhane Paul DO   metoprolol tartrate (LOPRESSOR) 25 MG tablet Take 0.5 tablets by mouth 2 times daily Yes Zhane Paul DO   thiamine 100 MG tablet Take 1 tablet by mouth daily Yes Zhane Paul DO   guaiFENesin 1200 MG TB12 Take by mouth 2 times daily  Yes Historical Provider, MD   atorvastatin (LIPITOR) 20 MG tablet TAKE ONE TABLET BY MOUTH DAILY Yes Jacqueline Willingham, MD   budesonide (PULMICORT) 0.5 MG/2ML nebulizer suspension Take 0.5 mg by nebulization 2 times daily as needed  Yes Historical Provider, MD   Multiple Vitamins-Iron TABS Take 1 tablet by mouth daily Yes Historical Provider, MD   Omega-3 Fatty Acids (OMEGA-3 FISH OIL PO) Take 1,000 mg by mouth 2 times daily  Yes Historical Provider, MD   ipratropium-albuterol (DUONEB) 0.5-2.5 (3) MG/3ML SOLN nebulizer solution Inhale 1 vial into the lungs 4 times daily Yes Historical Provider, MD   nitroGLYCERIN (NITROSTAT) 0.4 MG SL tablet Place 0.4 mg under the tongue every 5 minutes as needed.    Yes Historical Provider, MD       Past Medical History:   Diagnosis Date    Arthritis of hand     arthritis in hands and thumbs    CAD (coronary artery disease)     Hyperlipidemia     Hypertension     Ischemic cardiomyopathy 01/2019    STEMI (ST elevation myocardial infarction) ([facility] [address] 01/03/2019    Tinnitus        Past Surgical History:   Procedure Laterality Date    BICEPS TENDON REPAIR      CORONARY ANGIOPLASTY  01/08/2011    emergency PCI: vein to RCA    CORONARY ANGIOPLASTY WITH STENT PLACEMENT  01/03/2019    PCI to distal SVG-RCA with ELIZABETH    CORONARY ARTERY BYPASS GRAFT      in 1990 CABG x4 and in 2001 CABG x2    DIAGNOSTIC CARDIAC CATH LAB PROCEDURE      TOE AMPUTATION Bilateral 8/13/2021    AMPUTATION OF LEFT 2ND DIGIT LEFT FOOT, RIGHT 3RD DIGIT,  RIGHT PARTIAL 4TH DIGIT RIGHT FOOT performed by Zeke Luu DPM at [facility] OR       Family History   Problem Relation Age of Onset    Cancer Mother         skin cancer    Diabetes Mother     High Blood Pressure Father     Heart Disease Father     No Known Problems Brother     No Known Problems Brother     Heart Disease Paternal Uncle     Heart Disease Paternal Cousin        CareTeam (Including outside providers/suppliers regularly involved in providing care):   Patient Care Team:  Haresh Pacheco DO as PCP - General (Family Medicine)  Haresh Pacheco DO as PCP - [facility] Empaneled Provider  Lucillie Simmonds, MD as Consulting Physician (Cardiology)  Chapito Renae RN as Care Transitions Nurse    Wt Readings from Last 3 Encounters:   08/17/21 153 lb (69.4 kg)   08/13/21 165 lb 5.5 oz (75 kg)   08/03/21 172 lb (78 kg)      No flowsheet data found. There is no height or weight on file to calculate BMI. Based upon direct observation of the patient, evaluation of cognition reveals recent and remote memory intact. Patient's complete Health Risk Assessment and screening values have been reviewed and are found in Flowsheets. The following problems were reviewed today and where indicated follow up appointments were made and/or referrals ordered. Positive Risk Factor Screenings with Interventions:          General Health and ACP:  General  In general, how would you say your health is?: Good  In the past 7 days, have you experienced any of the following? New or Increased Pain, New or Increased Fatigue, Loneliness, Social Isolation, Stress or Anger?: None of These  Do you get the social and emotional support that you need?: Yes  Do you have a Living Will?: (!) No  Advance Directives     Power of [facility] Will ACP-Advance Directive ACP-Power of     Not on File Not on File Not on File Not on File      General Health Risk Interventions:  · No Living Will: Patient referred to ACP Clinical Specialist       ADL:  ADLs  In the past 7 days, did you need help from others to perform any of the following everyday activities? Eating, dressing, grooming, bathing, toileting, or walking/balance?: None  In the past 7 days, did you need help from others to take care of any of the following?  Laundry, housekeeping, banking/finances, shopping, telephone use, food preparation, transportation, or taking medications?: (!) Laundry, Housekeeping  ADL Interventions:  · wife helping     Personalized Preventive Plan   Current Health Maintenance Status  Immunization History Administered Date(s) Administered    COVID-19, Pfizer, PF, 30mcg/0.3mL 06/10/2021, 07/01/2021    DTaP/IPV (Quadracel, Kinrix) 11/05/2014    Influenza Virus Vaccine 10/18/2011, 10/16/2014    Influenza Whole 09/27/2012    Influenza, High Dose (Fluzone 65 yrs and older) 10/27/2015, 11/26/2016, 11/03/2019, 10/29/2020    Influenza, [place], IM, (6 mo and older Fluzone, Flulaval, Fluarix and 3 yrs and older Afluria) 11/01/2017    Influenza, Triv, inactivated, subunit, adjuvanted, IM (Fluad 65 yrs and older) 10/29/2018    Pneumococcal Conjugate 13-valent ([id number]) 11/11/2015    Pneumococcal Polysaccharide ([id number]) 08/28/2018    Tdap (Boostrix, Adacel) 01/14/2021        Health Maintenance   Topic Date Due    Annual Wellness Visit (AWV)  Never done    Flu vaccine (1) 09/01/2021    Lipid screen  09/02/2021    Shingles Vaccine (1 of 2) 12/04/2021 (Originally 9/1/1996)    A1C test (Diabetic or Prediabetic)  07/02/2022    Colon cancer screen fecal DNA test (Cologuard)  09/09/2023    DTaP/Tdap/Td vaccine (3 - Td or Tdap) 01/14/2031    Pneumococcal 65+ years Vaccine  Completed    COVID-19 Vaccine  Completed    AAA screen  Completed    Hepatitis C screen  Completed    Hepatitis A vaccine  Aged Out    Hepatitis B vaccine  Aged Out    Hib vaccine  Aged Out    Meningococcal (ACWY) vaccine  Aged Out     Recommendations for [employer] Due: see orders and patient instructions/AVS.  . Recommended screening schedule for the next 5-10 years is provided to the patient in written form: see Patient Maeve Kearns was seen today for medicare awv. Diagnoses and all orders for this visit:    Routine general medical examination at a health care facility               Ana Bruno is a 77 y.o. male being evaluated by a Virtual Visit (phone) encounter to address concerns as mentioned above. A caregiver was present when appropriate.  Due to this being a TeleHealth encounter (During COVID-19 public health emergency), evaluation of the following organ systems was limited: Vitals/Constitutional/EENT/Resp/CV/GI//MS/Neuro/Skin/Heme-Lymph-Imm. Pursuant to the emergency declaration under the [address] [place] [address], [address] [place] [address] authority and the [employer] Act, this Virtual Visit was conducted with patient's (and/or legal guardian's) consent, to reduce the patient's risk of exposure to COVID-19 and provide necessary medical care. The patient (and/or legal guardian) has also been advised to contact this office for worsening conditions or problems, and seek emergency medical treatment and/or call 911 if deemed necessary. Patient identification was verified at the start of the visit: Yes    Services were provided through phone to substitute for in-person clinic visit. Patient and provider were located at their individual homes. --ALMA PALOMINO DO on 9/7/2021 at 9:51 AM    An electronic signature was used to authenticate this note.

## 2021-09-07 NOTE — PATIENT INSTRUCTIONS
Personalized Preventive Plan for Joey Reed - 9/7/2021  Medicare offers a range of preventive health benefits. Some of the tests and screenings are paid in full while other may be subject to a deductible, co-insurance, and/or copay. Some of these benefits include a comprehensive review of your medical history including lifestyle, illnesses that may run in your family, and various assessments and screenings as appropriate. After reviewing your medical record and screening and assessments performed today your provider may have ordered immunizations, labs, imaging, and/or referrals for you. A list of these orders (if applicable) as well as your Preventive Care list are included within your After Visit Summary for your review. Other Preventive Recommendations:    · A preventive eye exam performed by an eye specialist is recommended every 1-2 years to screen for glaucoma; cataracts, macular degeneration, and other eye disorders. · A preventive dental visit is recommended every 6 months. · Try to get at least 150 minutes of exercise per week or 10,000 steps per day on a pedometer . · Order or download the FREE \"Exercise & Physical Activity: Your Everyday Guide\" from [employer]. Call 6-774.941.3017 or search [employer] online. · You need 3941-8073 mg of calcium and 6207-7752 IU of vitamin D per day. It is possible to meet your calcium requirement with diet alone, but a vitamin D supplement is usually necessary to meet this goal.  · When exposed to the sun, use a sunscreen that protects against both UVA and UVB radiation with an SPF of 30 or greater. Reapply every 2 to 3 hours or after sweating, drying off with a towel, or swimming. · Always wear a seat belt when traveling in a car. Always wear a helmet when riding a bicycle or motorcycle.

## 2021-09-08 ENCOUNTER — HOSPITAL ENCOUNTER (OUTPATIENT)
Dept: VASCULAR LAB | Age: 76
Discharge: HOME | End: 2021-09-08
Payer: MEDICARE

## 2021-09-08 PROCEDURE — 93971 EXTREMITY STUDY: CPT

## 2021-09-08 PROCEDURE — 93923 UPR/LXTR ART STDY 3+ LVLS: CPT

## 2021-09-13 NOTE — TELEPHONE ENCOUNTER
Would recommend overnight oximetry before discontinuing oxygen, order placed. If he wishes to discontinue it on his own anyway, that would be at his own risk.

## 2021-09-14 RX ORDER — DOXYCYCLINE HYCLATE 100 MG
100 TABLET ORAL 2 TIMES DAILY
Qty: 10 TABLET | Refills: 0 | Status: SHIPPED | OUTPATIENT
Start: 2021-09-14 | End: 2021-09-19

## 2021-09-14 NOTE — TELEPHONE ENCOUNTER
SW wife. She agreed to Anya Garcia doing an ONPO before returning his oxygen. Order faxed to Puneet Garcia.

## 2021-09-15 ENCOUNTER — CARE COORDINATION (OUTPATIENT)
Dept: CASE MANAGEMENT | Age: 76
End: 2021-09-15

## 2021-09-15 NOTE — CARE COORDINATION
Gamaliel 45 Transitions Initial Follow Up Call    9/15/21    Patient:  Claudio Busch Patient :  1945  MRN:  [id number]   Reason for Admission:  Gangrene toe  Discharge Date:  8/15/21  RARS: 15    CTC attempt to reach Pt regarding recent hospital discharge. CTC left voice recording with call back number requesting a call back. Follow up appointments:    Future Appointments   Date Time Provider Dexter Munguia   2021  1:15 PM Adrianne Vega MD [facility]   2021 10:30 AM SCHEDULE, [facility] EVALUATION [facility]   1/10/2022  8:00 AM DO BELLA Moore [place] - DYSHARIF   2022  9:00 AM Soraida Winston MD AND [facility]       KELLIE RobertsN, RN  Care Transition Coordinator  Contact Number:  [phone]

## 2021-09-15 NOTE — TELEPHONE ENCOUNTER
Pt. Wife made aware that the antibiotic was called in if no better to call us back. Order faxed to [facility] for [address]. Will watch for results.

## 2021-09-21 ENCOUNTER — TELEPHONE (OUTPATIENT)
Dept: CARDIOLOGY CLINIC | Age: 76
End: 2021-09-21

## 2021-09-21 ENCOUNTER — OFFICE VISIT (OUTPATIENT)
Dept: CARDIOLOGY CLINIC | Age: 76
End: 2021-09-21
Payer: MEDICARE

## 2021-09-21 VITALS
HEART RATE: 58 BPM | OXYGEN SATURATION: 99 % | HEIGHT: 72 IN | SYSTOLIC BLOOD PRESSURE: 120 MMHG | BODY MASS INDEX: 21.54 KG/M2 | WEIGHT: 159 LBS | DIASTOLIC BLOOD PRESSURE: 62 MMHG

## 2021-09-21 DIAGNOSIS — E78.2 MIXED HYPERLIPIDEMIA: ICD-10-CM

## 2021-09-21 DIAGNOSIS — I73.9 PAD (PERIPHERAL ARTERY DISEASE) (HCC): ICD-10-CM

## 2021-09-21 DIAGNOSIS — I25.810 CORONARY ARTERY DISEASE INVOLVING CORONARY BYPASS GRAFT OF NATIVE HEART WITHOUT ANGINA PECTORIS: Primary | ICD-10-CM

## 2021-09-21 DIAGNOSIS — Z95.1 S/P CABG (CORONARY ARTERY BYPASS GRAFT): ICD-10-CM

## 2021-09-21 DIAGNOSIS — I10 ESSENTIAL HYPERTENSION: ICD-10-CM

## 2021-09-21 PROCEDURE — 99214 OFFICE O/P EST MOD 30 MIN: CPT | Performed by: INTERNAL MEDICINE

## 2021-09-21 RX ORDER — NITROGLYCERIN 0.4 MG/1
0.4 TABLET SUBLINGUAL EVERY 5 MIN PRN
Qty: 25 TABLET | Refills: 4 | Status: SHIPPED | OUTPATIENT
Start: 2021-09-21 | End: 2022-07-14 | Stop reason: SDUPTHER

## 2021-09-21 NOTE — PATIENT INSTRUCTIONS
PLAN:  1. Medications reviewed. Refills warranted at this time  2. Continue Brilinta 90 mg twice a day   3. Will contact [facility] regarding bicep procedure  4.  Follow up with me in 6 months
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
## 2021-09-21 NOTE — TELEPHONE ENCOUNTER
Patient was seen today with Dr. Dayo Ruiz for follow up. Dr. Dayo Ruiz is inquiring how long patient will need to remain on the higher dose of Bilinta.

## 2021-09-22 ENCOUNTER — HOSPITAL ENCOUNTER (OUTPATIENT)
Dept: CARDIAC REHAB | Age: 76
Setting detail: THERAPIES SERIES
Discharge: HOME | End: 2021-09-22
Payer: MEDICARE

## 2021-09-24 ENCOUNTER — HOSPITAL ENCOUNTER (OUTPATIENT)
Dept: CARDIAC REHAB | Age: 76
Setting detail: THERAPIES SERIES
Discharge: HOME | End: 2021-09-24
Payer: MEDICARE

## 2021-09-24 PROCEDURE — 93798 PHYS/QHP OP CAR RHAB W/ECG: CPT

## 2021-09-24 PROCEDURE — G0239 OTH RESP PROC, GROUP: HCPCS

## 2021-09-27 ENCOUNTER — HOSPITAL ENCOUNTER (OUTPATIENT)
Dept: CARDIAC REHAB | Age: 76
Setting detail: THERAPIES SERIES
Discharge: HOME | End: 2021-09-27
Payer: MEDICARE

## 2021-09-27 PROCEDURE — G0239 OTH RESP PROC, GROUP: HCPCS

## 2021-09-29 ENCOUNTER — HOSPITAL ENCOUNTER (OUTPATIENT)
Dept: OCCUPATIONAL THERAPY | Age: 76
Setting detail: THERAPIES SERIES
Discharge: HOME | End: 2021-09-29
Payer: MEDICARE

## 2021-09-29 ENCOUNTER — HOSPITAL ENCOUNTER (OUTPATIENT)
Dept: CARDIAC REHAB | Age: 76
Setting detail: THERAPIES SERIES
Discharge: HOME | End: 2021-09-29
Payer: MEDICARE

## 2021-09-29 PROCEDURE — 97110 THERAPEUTIC EXERCISES: CPT

## 2021-09-29 PROCEDURE — G0239 OTH RESP PROC, GROUP: HCPCS

## 2021-09-29 PROCEDURE — 97166 OT EVAL MOD COMPLEX 45 MIN: CPT

## 2021-09-29 PROCEDURE — 97530 THERAPEUTIC ACTIVITIES: CPT
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
## 2021-10-01 ENCOUNTER — HOSPITAL ENCOUNTER (OUTPATIENT)
Dept: OCCUPATIONAL THERAPY | Age: 76
Setting detail: THERAPIES SERIES
Discharge: HOME | End: 2021-10-01
Payer: MEDICARE

## 2021-10-01 ENCOUNTER — HOSPITAL ENCOUNTER (OUTPATIENT)
Dept: CARDIAC REHAB | Age: 76
Setting detail: THERAPIES SERIES
Discharge: HOME | End: 2021-10-01
Payer: MEDICARE

## 2021-10-01 PROCEDURE — G0239 OTH RESP PROC, GROUP: HCPCS

## 2021-10-01 PROCEDURE — 97110 THERAPEUTIC EXERCISES: CPT

## 2021-10-01 PROCEDURE — 97112 NEUROMUSCULAR REEDUCATION: CPT

## 2021-10-01 PROCEDURE — 97140 MANUAL THERAPY 1/> REGIONS: CPT

## 2021-10-01 NOTE — FLOWSHEET NOTE
[facility], [address]   Phone: [phone]  Fax [phone]        Outpatient Occupational Therapy     [x] Daily Treatment Note   [] Progress Note   [] Discharge Note      Date:  10/1/2021    Patient: Eulalio Purcell                              : 1945                                    MRN: [id number]                                         Evaluation Date: 2021                                            Medical Diagnosis/ICD 10:  S49. 92XA (ICD-10-CM) - Unspecified injury of left shoulder and upper arm, initial encounter     Treatment Diagnosis:  Decreased strength, decrease ROM, decreased ADL/IADL independence     Onset Date:  2021     Referral Date:  21     Referring Physician: Dr. Jace De Jesus, 21                                                                       Insurance information: Anthem Medicare Pre-Cert Required     Precautions/ Contraindications:   Red Flag Symptoms: none  Safety awareness: intact  Other: Restrictions: cardiopulmonary limitations due to COVID-19     Adhesive allergy: NO  Latex Allergy:  [x]? NO      []?YES      Preferred Language for Healthcare:   [x]? English       []? other:     C-SSRS Triggered by Intake questionnaire (Past 2 wk assessment):   [x]? No, Questionnaire did not trigger screening. []? Yes, Patient intake triggered further evaluation      []? C-SSRS Screening completed  []? PCP notified via Plan of Care  []? Emergency services notified     Plan of care sent to provider:      []? Faxed 21  [x]? Co-signature    (attempts: 1[x]?  2 []?3[]?)        __________________________________________________________________________________________________________________    Plan of care sent to provider:    []Faxed  [x]Co-signature    (attempts: 1[] 2 []3[])        Plan of care signed:    []Yes date:            [x]No           Next Progress Note due: 10/29/21    Visit# / total visits:  2/8    Plan for Next Session:  Review HEP, progress HEP    HEP instruction: Written and verbal HEP instructions provided and reviewed:  · Access Code: [email or web address]  · URL: [email or web address]  · Date: 09/29/2021  · Prepared by: Charles Del Rosario  · Exercises  · Finger Key  with Putty - 2-3 x daily - 7 x weekly - 3 sets - 10 reps  · Putty Squeezes - 2-3 x daily - 7 x weekly - 3 sets - 10 reps  · Seated Three Finger Pinch with Putty - 2-3 x daily - 7 x weekly - 3 sets - 10 reps  · Tip Pinch with Putty - 2-3 x daily - 7 x weekly - 3 sets - 10 reps  · Shoulder Flexion AAROM - 2-3 x daily - 7 x weekly - 3 sets - 10 reps  · Supine Shoulder Horizontal Abduction Adduction AAROM with Dowel - 2-3 x daily - 7 x weekly - 3 sets - 10 reps     Subjective: Patient reports he is doing well with his putty gripping and shoulder exercises but having difficulty with his elbow being sore. Patient also reports that he is progressing well with using his LUE as much as possible, such as opening doors. Pain level: 2-3/10     Objective:       Exercises:    Exercises in bold performed in department today. Items not bolded are carried forward from prior visits for continuity of the record.   Exercise/Equipment Resistance/Repetitions Skilled cues Added to HEP Comments      ADL/IADL TRAINING (ADL OR TA)        []  Tactile cues   []   Verbal cues []  Yes       []  Tactile cues   []   Verbal cues []  Yes      []  Tactile cues   []   Verbal cues []  Yes       []  Tactile cues   []   Verbal cues []  Yes       []  Tactile cues   []   Verbal cues []  Yes         []  Tactile cues   []   Verbal cues []  Yes         []  Tactile cues   []   Verbal cues []  Yes         []  Tactile cues   []   Verbal cues []  Yes         []  Tactile cues   []   Verbal cues []  Yes        NEUROMUSCULAR RE-EDU and THERAPEUTIC ACTIVITY (NEURO RE-ED or TA)      Taping for LUE GHJ Reviewed [place] Tri-Pull taping fit []  Tactile Tactile cues   []   Verbal cues []  Yes  []  See separate note regarding splint precautions/contraindications      Functional Outcome Measure:   [x]NA    Treatment/Activity Tolerance:    Patients response to treatment:   [x]Patient tolerated treatment well []Patient limited by fatigue   []Patient limited by pain  []Patient limited by other medical complications   []Other:     Assessment: Patient verbalizes understanding of HEP. Patient progressing with HEP, requires soft tissue mobilization to elbow due to pain and trigger points. Patient requires skilled cueing of HEP to maximize potential for strength improvements for elbow flexion against gravity. GOALS: Goals established 9/29/21   Patient stated goal: \"to get this left arm going\"  []? Progressing: []? Met: []? Not Met: []? Adjusted     STG TO BE MET IN 2 WEEKS      1. Pt will report a QuickDASH Symptom Severity Scale score of 10% or less indicating increased safety and functional independence in desired occupational pursuits. []? Progressing: []? Met: []? Not Met: []? Adjusted     2. Patient will be I with UE HEP.     3. Patient will verbalize increased use of LUE in ADL/IADL tasks.     LTG TO BE MET IN 4 WEEKS     1. Patient will demonstrate improved  strength for improved return to ADLs/IADLs.     2. Patient will demonstrate improved coordination with LUE for improved return to ADLs/IADLs.     3.  Patient will verbalize mod I with all ADLs with incorporation of LUE.     Prognosis: [x]Good   []Fair   []Poor    Patient Requires Follow-up:  [x]Yes  []No    Plan: []Plan of care initiated     [x]Continue per plan of care 2x/week for 4 weeks   [] Alter current plan (see comments)    []Hold pending MD visit []Discharge    Time in: [id number]  Time out: 5750    Timed Code Treatment Minutes:  60    Total Treatment Minutes: 65    Medicare Cap total YTD:   [x]N/A    Workers Comp Time Stamp  [x]N/A   Time In:   Time Out:    Electronically signed by:  Charles Del Rosario OTR/L

## 2021-10-04 ENCOUNTER — HOSPITAL ENCOUNTER (OUTPATIENT)
Dept: CARDIAC REHAB | Age: 76
Setting detail: THERAPIES SERIES
Discharge: HOME | End: 2021-10-04
Payer: MEDICARE

## 2021-10-04 ENCOUNTER — HOSPITAL ENCOUNTER (OUTPATIENT)
Dept: OCCUPATIONAL THERAPY | Age: 76
Setting detail: THERAPIES SERIES
Discharge: HOME | End: 2021-10-04
Payer: MEDICARE

## 2021-10-04 PROCEDURE — G0239 OTH RESP PROC, GROUP: HCPCS

## 2021-10-04 NOTE — PROGRESS NOTES
[facility], [facility], [address]  [place], [address]  Phone: [phone]  Fax [phone]        Patient Name:  Eulalio Purcell  :  1945   Date:  2021  Cancels to Date: 0  No-shows to Date: 1    For today's appointment patient:  [] Cancelled  [] Rescheduled appointment  [x] No-show     Reason given by patient:  [] Patient ill  [] Conflicting appointment  [] No transportation    [] Conflict with work  [] No reason given  [x] Other:     Comments:  Patient had asked for appointment to be moved to 12:00 last week, but then did not show, thinking that the appointment was still at the [place] [address] time.       [] Phone call from patient to clinic later  [] Phone call or communication made to patient later    Electronically signed by:  LAKEISHA Lau/MARINA

## 2021-10-06 ENCOUNTER — APPOINTMENT (OUTPATIENT)
Dept: OCCUPATIONAL THERAPY | Age: 76
End: 2021-10-06
Payer: MEDICARE

## 2021-10-06 ENCOUNTER — HOSPITAL ENCOUNTER (OUTPATIENT)
Dept: CARDIAC REHAB | Age: 76
Setting detail: THERAPIES SERIES
Discharge: HOME | End: 2021-10-06
Payer: MEDICARE

## 2021-10-06 PROCEDURE — G0239 OTH RESP PROC, GROUP: HCPCS

## 2021-10-07 NOTE — TELEPHONE ENCOUNTER
I s/w Rosina at [address] to check on status of ONPO. She states they did not receive an order for it. Order re-faxed.

## 2021-10-08 ENCOUNTER — HOSPITAL ENCOUNTER (OUTPATIENT)
Dept: OCCUPATIONAL THERAPY | Age: 76
Setting detail: THERAPIES SERIES
Discharge: HOME | End: 2021-10-08
Payer: MEDICARE

## 2021-10-08 ENCOUNTER — HOSPITAL ENCOUNTER (OUTPATIENT)
Dept: CARDIAC REHAB | Age: 76
Setting detail: THERAPIES SERIES
Discharge: HOME | End: 2021-10-08
Payer: MEDICARE

## 2021-10-08 PROCEDURE — G0239 OTH RESP PROC, GROUP: HCPCS

## 2021-10-08 PROCEDURE — 97110 THERAPEUTIC EXERCISES: CPT

## 2021-10-08 PROCEDURE — 97140 MANUAL THERAPY 1/> REGIONS: CPT

## 2021-10-08 PROCEDURE — 97112 NEUROMUSCULAR REEDUCATION: CPT

## 2021-10-08 NOTE — FLOWSHEET NOTE
[facility], [facility], [address] [facility], [address]   Phone: [phone]  Fax [phone]        Outpatient Occupational Therapy     [x] Daily Treatment Note   [] Progress Note   [] Discharge Note      Date:  10/8/2021    Patient: Mathew Park                              : 1945                                    MRN: [id number]                                         Evaluation Date: 2021                                            Medical Diagnosis/ICD 10:  S49. 92XA (ICD-10-CM) - Unspecified injury of left shoulder and upper arm, initial encounter     Treatment Diagnosis:  Decreased strength, decrease ROM, decreased ADL/IADL independence     Onset Date:  2021     Referral Date:  21     Referring Physician: Dr. Richard Enrique, 21                                                                       Insurance information: Anthem Medicare Pre-Cert Required     Precautions/ Contraindications:   Red Flag Symptoms: none  Safety awareness: intact  Other: Restrictions: cardiopulmonary limitations due to COVID-19     Adhesive allergy: NO  Latex Allergy:  [x]? NO      []?YES      Preferred Language for Healthcare:   [x]? English       []? other:     C-SSRS Triggered by Intake questionnaire (Past 2 wk assessment):   [x]? No, Questionnaire did not trigger screening. []? Yes, Patient intake triggered further evaluation      []? C-SSRS Screening completed  []? PCP notified via Plan of Care  []? Emergency services notified     Plan of care sent to provider:      []? Faxed 21  [x]? Co-signature    (attempts: 1[x]?  2 []?3[]?)        __________________________________________________________________________________________________________________    Plan of care sent to provider:    []Faxed  [x]Co-signature    (attempts: 1[] 2 []3[])        Plan of care signed:    []Yes date:            [x]No           Next Progress Note due: 10/29/21    Visit# / total visits:  3/8    Plan for Next Session:  Review HEP, progress HEP    HEP instruction: Written and verbal HEP instructions provided and reviewed:  · Access Code: [email or web address]  · URL: [email or web address]  · Date: 09/29/2021  · Prepared by: Duncan Mukherjee  · Exercises  · Finger Key  with Putty - 2-3 x daily - 7 x weekly - 3 sets - 10 reps  · Putty Squeezes - 2-3 x daily - 7 x weekly - 3 sets - 10 reps  · Seated Three Finger Pinch with Putty - 2-3 x daily - 7 x weekly - 3 sets - 10 reps  · Tip Pinch with Putty - 2-3 x daily - 7 x weekly - 3 sets - 10 reps  · Shoulder Flexion AAROM - 2-3 x daily - 7 x weekly - 3 sets - 10 reps  · Supine Shoulder Horizontal Abduction Adduction AAROM with Dowel - 2-3 x daily - 7 x weekly - 3 sets - 10 reps     Subjective: Patient reports he is doing well with his exercises such as his table glides at a high counter and reports he is able to lift his elbow higher. Pain level: 0/10     Objective:      95%  HR 67  125/88    Assessment Left  Right  Comments     Strength (lbs) 20.8# 70.9#              Exercises:    Exercises in bold performed in department today. Items not bolded are carried forward from prior visits for continuity of the record.   Exercise/Equipment Resistance/Repetitions Skilled cues Added to HEP Comments      ADL/IADL TRAINING (ADL OR TA)        []  Tactile cues   []   Verbal cues []  Yes       []  Tactile cues   []   Verbal cues []  Yes      []  Tactile cues   []   Verbal cues []  Yes       []  Tactile cues   []   Verbal cues []  Yes       []  Tactile cues   []   Verbal cues []  Yes         []  Tactile cues   []   Verbal cues []  Yes         []  Tactile cues   []   Verbal cues []  Yes         []  Tactile cues   []   Verbal cues []  Yes         []  Tactile cues   []   Verbal cues []  Yes        NEUROMUSCULAR RE-EDU and THERAPEUTIC ACTIVITY (NEURO RE-ED or TA)      Taping for LUE GHJ Reviewed [place] Tri-Pull taping fit []  Tactile cues   [x]   Verbal cues [x]  Yes     Weightbearing At side of mat, min A and max cues for elbow extension, 2 minutes x2 [x]  Tactile cues   [x]   Verbal cues [x]  Yes  Max cues   Scapular protraction strengthening with elbow extension With pushing dowel forward out into scaption with RUE reaching to pole and back out - 30x [x]  Tactile cues   [x]   Verbal cues [x]  Yes  Max cues   Neuro Re-Ed in Sidelying With facilitation of scapula for horizontal abduction, shoulder abduction, and inhibition of upper trap with trigger point release at upper trap []  Tactile cues   []   Verbal cues []  Yes       []  Tactile cues   []   Verbal cues []  Yes         []  Tactile cues   []   Verbal cues []  Yes         []  Tactile cues   []   Verbal cues []  Yes         []  Tactile cues   []   Verbal cues []  Yes          THERAPEUTIC EXERCISE and MANUAL TECHNIQUES   (TE OR MT)      AAROM with BUE Shoulder flexion with dowel 10x  Elbow flexion/extension with dowel 10x  Horizontal abduction with dowel 10x [x]  Tactile cues   [x]   Verbal cues [x]  Yes  Mod cues   Putty exercises Green  Gripping 10x  Three jaw pinch 10x with table  Tip pinch 10x with table  Lateral pinch 10x    Verbal review of finger extension with putty (added to HEP) []  Tactile cues   [x]   Verbal cues [x]  Yes      strengthening 25# gripper brown 10x, cues for full range []  Tactile cues   [x]   Verbal cues [x]  Yes     Scapular mobilizations sidelying 40 second hold x3 for upward rotation and retraction [x]  Tactile cues   [x]   Verbal cues [x]  Yes     Elbow flexion/extension With 3# high table gravity eliminated 10x each [x]  Tactile cues   [x]   Verbal cues [x]  Yes     Therabar green  stick 6x frowns, 6x smiles, 6x twists,  [x]  Tactile cues   [x]   Verbal cues [x]  Yes  Max cues for ulnar side of hand staying on bar   Soft tissue mobilization To tricep with trigger point reported but improved after soft tissue mobilization [x]  Tactile cues   [x] Verbal cues [x]  Yes     Weight with AAROM with BUE 1# shoulder flexion BUE  1# horizontal ab/adduction BUE  1# chest press BUE []  Tactile cues   []   Verbal cues []  Yes        MODALITIES           []  Yes          []  Yes        SPLINTING         []  Tactile cues   []   Verbal cues []  Yes   []  See separate note regarding splint precautions/contraindications      []  Tactile cues   []   Verbal cues []  Yes  []  See separate note regarding splint precautions/contraindications      Functional Outcome Measure:   [x]NA    Treatment/Activity Tolerance:    Patients response to treatment:   [x]Patient tolerated treatment well []Patient limited by fatigue   []Patient limited by pain  []Patient limited by other medical complications   []Other:     Assessment: Patient progressing in use of weighted tools and against gravity exercises for HEP. Continue POC 2x/week for progressing HEP. GOALS: Goals established 9/29/21   Patient stated goal: \"to get this left arm going\"  []? Progressing: []? Met: []? Not Met: []? Adjusted     STG TO BE MET IN 2 WEEKS      1. Pt will report a QuickDASH Symptom Severity Scale score of 10% or less indicating increased safety and functional independence in desired occupational pursuits. []? Progressing: []? Met: []? Not Met: []? Adjusted     2. Patient will be I with UE HEP.     3. Patient will verbalize increased use of LUE in ADL/IADL tasks.     LTG TO BE MET IN 4 WEEKS     1. Patient will demonstrate improved  strength for improved return to ADLs/IADLs.     2. Patient will demonstrate improved coordination with LUE for improved return to ADLs/IADLs.     3.  Patient will verbalize mod I with all ADLs with incorporation of LUE.     Prognosis: [x]Good   []Fair   []Poor    Patient Requires Follow-up:  [x]Yes  []No    Plan: []Plan of care initiated     [x]Continue per plan of care 2x/week for 4 weeks   [] Alter current plan (see comments)    []Hold pending MD visit []Discharge    Time in: 0945  Time out: 1045    Timed Code Treatment Minutes:  60    Total Treatment Minutes: 60    Medicare Cap total YTD:   [x]N/A    Workers Comp Time Stamp  [x]N/A   Time In:   Time Out:    Electronically signed by:  LAKEISHA Rodrigues/L

## 2021-10-11 ENCOUNTER — HOSPITAL ENCOUNTER (OUTPATIENT)
Dept: OCCUPATIONAL THERAPY | Age: 76
Setting detail: THERAPIES SERIES
Discharge: HOME | End: 2021-10-11
Payer: MEDICARE

## 2021-10-11 ENCOUNTER — APPOINTMENT (OUTPATIENT)
Dept: PHYSICAL THERAPY | Age: 76
End: 2021-10-11
Payer: MEDICARE

## 2021-10-11 ENCOUNTER — HOSPITAL ENCOUNTER (OUTPATIENT)
Dept: CARDIAC REHAB | Age: 76
Setting detail: THERAPIES SERIES
Discharge: HOME | End: 2021-10-11
Payer: MEDICARE

## 2021-10-11 ENCOUNTER — APPOINTMENT (OUTPATIENT)
Dept: OCCUPATIONAL THERAPY | Age: 76
End: 2021-10-11
Payer: MEDICARE

## 2021-10-11 PROCEDURE — G0239 OTH RESP PROC, GROUP: HCPCS

## 2021-10-11 PROCEDURE — 97530 THERAPEUTIC ACTIVITIES: CPT

## 2021-10-11 PROCEDURE — 97110 THERAPEUTIC EXERCISES: CPT

## 2021-10-11 PROCEDURE — 97112 NEUROMUSCULAR REEDUCATION: CPT

## 2021-10-11 NOTE — FLOWSHEET NOTE
[facility], [facility], [address] [facility], [address] [place] [address]   Phone: [phone]  Fax [phone]        Outpatient Occupational Therapy     [x] Daily Treatment Note   [] Progress Note   [] Discharge Note      Date:  10/11/2021    Patient: Sharri Meza                              : 1945                                    MRN: [id number]                                         Evaluation Date: 2021                                            Medical Diagnosis/ICD 10:  S49. 92XA (ICD-10-CM) - Unspecified injury of left shoulder and upper arm, initial encounter     Treatment Diagnosis:  Decreased strength, decrease ROM, decreased ADL/IADL independence     Onset Date:  2021     Referral Date:  21     Referring Physician: Dr. Ly Brown, 21                                                                       Insurance information: Anthem Medicare Pre-Cert Required     Precautions/ Contraindications:   Red Flag Symptoms: none  Safety awareness: intact  Other: Restrictions: cardiopulmonary limitations due to COVID-19     Adhesive allergy: NO  Latex Allergy:  [x]? NO      []?YES      Preferred Language for Healthcare:   [x]? English       []? other:     C-SSRS Triggered by Intake questionnaire (Past 2 wk assessment):   [x]? No, Questionnaire did not trigger screening. []? Yes, Patient intake triggered further evaluation      []? C-SSRS Screening completed  []? PCP notified via Plan of Care  []? Emergency services notified     Plan of care sent to provider:      []? Faxed 21  [x]? Co-signature    (attempts: 1[x]?  2 []?3[]?)        __________________________________________________________________________________________________________________    Plan of care sent to provider:    []Faxed  [x]Co-signature    (attempts: 1[] 2 []3[])        Plan of care signed:    []Yes date:            [x]No           Next Progress Note due: 10/29/21    Visit# / total visits:  4/8    Plan for Next Session:  Review HEP, progress HEP    HEP instruction: Written and verbal HEP instructions provided and reviewed:  · Access Code: [email or web address]  · URL: [email or web address]  · Date: 09/29/2021  · Prepared by: Wan Rahman  · Exercises  · Finger Key  with Putty - 2-3 x daily - 7 x weekly - 3 sets - 10 reps  · Putty Squeezes - 2-3 x daily - 7 x weekly - 3 sets - 10 reps  · Seated Three Finger Pinch with Putty - 2-3 x daily - 7 x weekly - 3 sets - 10 reps  · Tip Pinch with Putty - 2-3 x daily - 7 x weekly - 3 sets - 10 reps  · Shoulder Flexion AAROM - 2-3 x daily - 7 x weekly - 3 sets - 10 reps  · Supine Shoulder Horizontal Abduction Adduction AAROM with Dowel - 2-3 x daily - 7 x weekly - 3 sets - 10 reps     Subjective: Patient reports improved participation in exercises and improved participation in gripping but states he is having difficulty with finger extension exercise with putty at home. Pain level: 0/10     Objective:      93% O2 on room air   HR 75  129/72    O2 89% at end of session on room air    Exercises:    Exercises in bold performed in department today. Items not bolded are carried forward from prior visits for continuity of the record.   Exercise/Equipment Resistance/Repetitions Skilled cues Added to HEP Comments      ADL/IADL TRAINING (ADL OR TA)        []  Tactile cues   []   Verbal cues []  Yes       []  Tactile cues   []   Verbal cues []  Yes      []  Tactile cues   []   Verbal cues []  Yes       []  Tactile cues   []   Verbal cues []  Yes       []  Tactile cues   []   Verbal cues []  Yes         []  Tactile cues   []   Verbal cues []  Yes         []  Tactile cues   []   Verbal cues []  Yes         []  Tactile cues   []   Verbal cues []  Yes         []  Tactile cues   []   Verbal cues []  Yes        NEUROMUSCULAR RE-EDU and THERAPEUTIC ACTIVITY (NEURO RE-ED or TA)      Taping for LUE GHJ Reviewed [place] Tri-Pull taping fit [] Tactile cues   [x]   Verbal cues [x]  Yes     Weightbearing At side of mat, min A and max cues for elbow extension, 2 minutes x2 [x]  Tactile cues   [x]   Verbal cues [x]  Yes  Max cues   Scapular protraction strengthening with elbow extension With holding dowel and LUE reaching to dowel and out, also with addition of theraband 3x [x]  Tactile cues   [x]   Verbal cues [x]  Yes  Max cues   Neuro Re-Ed in Sidelying With facilitation of scapula for horizontal abduction, shoulder abduction, and inhibition of upper trap with trigger point release at upper trap []  Tactile cues   []   Verbal cues []  Yes     Short arc propellers Elbow flexion BUE 8x, min A when completing only with LUE  External rotation LUE, assist for setup []  Tactile cues   [x]   Verbal cues [x]  Yes  Cues for positioning       []  Tactile cues   []   Verbal cues []  Yes         []  Tactile cues   []   Verbal cues []  Yes         []  Tactile cues   []   Verbal cues []  Yes          THERAPEUTIC EXERCISE and MANUAL TECHNIQUES   (TE OR MT)      AAROM with BUE Shoulder flexion with dowel 10x supine  Elbow flexion/extension with dowel 10x seated  Horizontal abduction with dowel 10x supine [x]  Tactile cues   [x]   Verbal cues [x]  Yes  Mod cues   Hand strengthening Intrinsic Plus position  DIP flexion position  Finger/ab/adduction Tactile/verbal cues Yes Mod cues/Max A   Shoulder strengthening Scapular retraction with red band  10x BUE      Putty exercises Green  Gripping 10x  Three jaw pinch 10x with table  Tip pinch 10x with table  Lateral pinch 10x    Verbal review of finger extension with putty (added to HEP) []  Tactile cues   [x]   Verbal cues [x]  Yes      strengthening 25# gripper brown 10x, cues for full range []  Tactile cues   [x]   Verbal cues [x]  Yes     Scapular mobilizations sidelying 40 second hold x3 for upward rotation and retraction [x]  Tactile cues   [x]   Verbal cues [x]  Yes     Elbow flexion/extension With 3# high table gravity MET IN 4 WEEKS     1. Patient will demonstrate improved  strength for improved return to ADLs/IADLs.     2. Patient will demonstrate improved coordination with LUE for improved return to ADLs/IADLs.     3.  Patient will verbalize mod I with all ADLs with incorporation of LUE.     Prognosis: [x]Good   []Fair   []Poor    Patient Requires Follow-up:  [x]Yes  []No    Plan: []Plan of care initiated     [x]Continue per plan of care 2x/week for 4 weeks   [] Alter current plan (see comments)    []Hold pending MD visit []Discharge    Time in: 1235 Time out: 1330    Timed Code Treatment Minutes:  55    Total Treatment Minutes: 55    Medicare Cap total YTD:   [x]N/A    Workers Comp Time Stamp  [x]N/A   Time In:   Time Out:    Electronically signed by:  LAKEISHA Lau/L

## 2021-10-13 ENCOUNTER — HOSPITAL ENCOUNTER (OUTPATIENT)
Dept: OCCUPATIONAL THERAPY | Age: 76
Setting detail: THERAPIES SERIES
Discharge: HOME | End: 2021-10-13
Payer: MEDICARE

## 2021-10-13 ENCOUNTER — APPOINTMENT (OUTPATIENT)
Dept: OCCUPATIONAL THERAPY | Age: 76
End: 2021-10-13
Payer: MEDICARE

## 2021-10-13 ENCOUNTER — HOSPITAL ENCOUNTER (OUTPATIENT)
Dept: CARDIAC REHAB | Age: 76
Setting detail: THERAPIES SERIES
Discharge: HOME | End: 2021-10-13
Payer: MEDICARE

## 2021-10-13 ENCOUNTER — APPOINTMENT (OUTPATIENT)
Dept: PHYSICAL THERAPY | Age: 76
End: 2021-10-13
Payer: MEDICARE

## 2021-10-13 PROCEDURE — 97110 THERAPEUTIC EXERCISES: CPT

## 2021-10-13 PROCEDURE — 97530 THERAPEUTIC ACTIVITIES: CPT

## 2021-10-13 PROCEDURE — G0239 OTH RESP PROC, GROUP: HCPCS

## 2021-10-13 NOTE — FLOWSHEET NOTE
Liz  [facility]. and Therapy, [facility], [address] [facility] [facility] [address]   Phone: [phone]  Fax [phone]        Outpatient Occupational Therapy     [x] Daily Treatment Note   [] Progress Note   [] Discharge Note      Date:  10/13/2021    Patient: Jeanna Hutchison                              : 1945                                    MRN: [id number]                                         Evaluation Date: 2021                                            Medical Diagnosis/ICD 10:  S49. 92XA (ICD-10-CM) - Unspecified injury of left shoulder and upper arm, initial encounter     Treatment Diagnosis:  Decreased strength, decrease ROM, decreased ADL/IADL independence     Onset Date:  2021     Referral Date:  21     Referring Physician: Dr. Cortez Lopes, 21                                                                       Insurance information: Anthem Medicare Pre-Cert Required     Precautions/ Contraindications:   Red Flag Symptoms: none  Safety awareness: intact  Other: Restrictions: cardiopulmonary limitations due to COVID-19     Adhesive allergy: NO  Latex Allergy:  [x]? NO      []?YES      Preferred Language for Healthcare:   [x]? English       []? other:     C-SSRS Triggered by Intake questionnaire (Past 2 wk assessment):   [x]? No, Questionnaire did not trigger screening. []? Yes, Patient intake triggered further evaluation      []? C-SSRS Screening completed  []? PCP notified via Plan of Care  []? Emergency services notified     Plan of care sent to provider:      []? Faxed 21  [x]? Co-signature    (attempts: 1[x]?  2 []?3[]?)        __________________________________________________________________________________________________________________    Plan of care sent to provider:    []Faxed  [x]Co-signature    (attempts: 1[] 2 []3[])        Plan of care signed:    []Yes date:            [x]No           Next Progress Note due: 10/29/21    Visit# / total visits:  5/8    Plan for Next Session:  Review HEP, progress HEP    HEP instruction: Written and verbal HEP instructions provided and reviewed:  · Access Code: [email or web address]  · URL: [email or web address]  · Date: 09/29/2021  · Prepared by: Delores Rg  · Exercises  · Finger Key  with Putty - 2-3 x daily - 7 x weekly - 3 sets - 10 reps  · Putty Squeezes - 2-3 x daily - 7 x weekly - 3 sets - 10 reps  · Seated Three Finger Pinch with Putty - 2-3 x daily - 7 x weekly - 3 sets - 10 reps  · Tip Pinch with Putty - 2-3 x daily - 7 x weekly - 3 sets - 10 reps  · Shoulder Flexion AAROM - 2-3 x daily - 7 x weekly - 3 sets - 10 reps  · Supine Shoulder Horizontal Abduction Adduction AAROM with Dowel - 2-3 x daily - 7 x weekly - 3 sets - 10 reps     Subjective: Patient reports improved participation in exercises and bending his elbow. Just finished with cardiac rehab. Pain level: 0/10     Objective:        Exercises:    Exercises in bold performed in department today. Items not bolded are carried forward from prior visits for continuity of the record.   Exercise/Equipment Resistance/Repetitions Skilled cues Added to HEP Comments      ADL/IADL TRAINING (ADL OR TA)        []  Tactile cues   []   Verbal cues []  Yes       []  Tactile cues   []   Verbal cues []  Yes      []  Tactile cues   []   Verbal cues []  Yes       []  Tactile cues   []   Verbal cues []  Yes       []  Tactile cues   []   Verbal cues []  Yes         []  Tactile cues   []   Verbal cues []  Yes         []  Tactile cues   []   Verbal cues []  Yes         []  Tactile cues   []   Verbal cues []  Yes         []  Tactile cues   []   Verbal cues []  Yes        NEUROMUSCULAR RE-EDU and THERAPEUTIC ACTIVITY (NEURO RE-ED or TA)      Taping for LUE [place] [place] Tri-Pull and Scapular retraction []  Tactile cues   [x]   Verbal cues [x]  Yes     Weightbearing At side of mat,  max cues for elbow extension, 2 minutes x2 [x]  Tactile cues [x]   Verbal cues [x]  Yes  Max cues   Scapular protraction strengthening with elbow extension With holding dowel and LUE reaching to dowel and out 8x also with addition of theraband 8x [x]  Tactile cues   [x]   Verbal cues [x]  Yes  Mod cues   Neuro Re-Ed in Sidelying With facilitation of scapula for horizontal abduction, shoulder abduction, and inhibition of upper trap with trigger point release at upper trap []  Tactile cues   []   Verbal cues []  Yes     Short arc propellers Elbow flexion BUE 8x, min A when completing only with LUE  External rotation LUE, assist for setup []  Tactile cues   [x]   Verbal cues [x]  Yes  Cues for positioning     Balloon toss and catch 15x with incorporation of BUE, mirror for visual cueing, elbow flexion/shoulder flexion, external rotation []  Tactile cues   []   Verbal cues []  Yes         []  Tactile cues   []   Verbal cues []  Yes         []  Tactile cues   []   Verbal cues []  Yes          THERAPEUTIC EXERCISE and MANUAL TECHNIQUES   (TE OR MT)      AAROM with BUE Shoulder flexion supine  Elbow flexion/extension supine  Horizontal abduction supine  With 2# [x]  Tactile cues   [x]   Verbal cues [x]  Yes  Mod cues   Hand strengthening Intrinsic Plus position  DIP flexion position  Finger/ab/adduction Tactile/verbal cues Yes Mod cues/Max A   Shoulder strengthening Scapular retraction with red band  10x BUE      Putty exercises Green  Gripping 10x  Three jaw pinch 10x with table  Tip pinch 10x with table  Lateral pinch 10x    Verbal review of finger extension with putty (added to HEP) []  Tactile cues   [x]   Verbal cues [x]  Yes      strengthening 25# gripper brown 10x, cues for full range []  Tactile cues   [x]   Verbal cues [x]  Yes     Scapular mobilizations sidelying 40 second hold x3 for upward rotation and retraction [x]  Tactile cues   [x]   Verbal cues [x]  Yes     Elbow flexion/extension With 3# high table gravity eliminated 10x each [x]  Tactile cues   [x]   Verbal cues [x]  Yes     Therabar green  stick 8x frowns, 8x smiles, 8x twists [x]  Tactile cues   [x]   Verbal cues [x]  Yes  Min cues for ulnar side of hand staying on bar   Soft tissue mobilization To tricep with trigger point reported but improved after soft tissue mobilization [x]  Tactile cues   [x]   Verbal cues [x]  Yes     Weight with AAROM with BUE 1# shoulder flexion BUE supine  1# horizontal ab/adduction BUE  1# chest press BUE supine []  Tactile cues   []   Verbal cues []  Yes        MODALITIES           []  Yes          []  Yes        SPLINTING         []  Tactile cues   []   Verbal cues []  Yes   []  See separate note regarding splint precautions/contraindications      []  Tactile cues   []   Verbal cues []  Yes  []  See separate note regarding splint precautions/contraindications      Functional Outcome Measure:   [x]NA    Treatment/Activity Tolerance:    Patients response to treatment:   [x]Patient tolerated treatment well []Patient limited by fatigue   []Patient limited by pain  []Patient limited by other medical complications   []Other:     Assessment: Patient able to hold arm up overhead in supine this date without assist and without resistance for about 5-10 seconds before fatigue. Use of mirror and verbal cues for breathing and form required throughout session for safety and effective training. Patient progressing with against gravity elbow flexion but with compensatory shoulder flexion and internal rotation. Continue POC 2x/week for progressing HEP. GOALS: Goals established 9/29/21   Patient stated goal: \"to get this left arm going\"  []? Progressing: []? Met: []? Not Met: []? Adjusted     STG TO BE MET IN 2 WEEKS      1. Pt will report a QuickDASH Symptom Severity Scale score of 10% or less indicating increased safety and functional independence in desired occupational pursuits. []? Progressing: []? Met: []? Not Met: []? Adjusted     2. Patient will be I with UE HEP.     3.  Patient will verbalize increased use of LUE in ADL/IADL tasks.     LTG TO BE MET IN 4 WEEKS     1. Patient will demonstrate improved  strength for improved return to ADLs/IADLs.     2. Patient will demonstrate improved coordination with LUE for improved return to ADLs/IADLs.     3.  Patient will verbalize mod I with all ADLs with incorporation of LUE.     Prognosis: [x]Good   []Fair   []Poor    Patient Requires Follow-up:  [x]Yes  []No    Plan: []Plan of care initiated     [x]Continue per plan of care 2x/week for 4 weeks   [] Alter current plan (see comments)    []Hold pending MD visit []Discharge    Time in: [id number] Time out: 1340    Timed Code Treatment Minutes:  55    Total Treatment Minutes: 55    Medicare Cap total YTD:   [x]N/A    Workers Comp Time Stamp  [x]N/A   Time In:   Time Out:    Electronically signed by:  LAKEISHA Swan/MARINA

## 2021-10-15 ENCOUNTER — HOSPITAL ENCOUNTER (OUTPATIENT)
Dept: CARDIAC REHAB | Age: 76
Setting detail: THERAPIES SERIES
Discharge: HOME | End: 2021-10-15
Payer: MEDICARE

## 2021-10-15 PROCEDURE — G0239 OTH RESP PROC, GROUP: HCPCS

## 2021-10-18 ENCOUNTER — HOSPITAL ENCOUNTER (OUTPATIENT)
Dept: CARDIAC REHAB | Age: 76
Setting detail: THERAPIES SERIES
Discharge: HOME | End: 2021-10-18
Payer: MEDICARE

## 2021-10-18 PROCEDURE — G0239 OTH RESP PROC, GROUP: HCPCS

## 2021-10-20 ENCOUNTER — APPOINTMENT (OUTPATIENT)
Dept: OCCUPATIONAL THERAPY | Age: 76
End: 2021-10-20
Payer: MEDICARE

## 2021-10-20 ENCOUNTER — APPOINTMENT (OUTPATIENT)
Dept: PHYSICAL THERAPY | Age: 76
End: 2021-10-20
Payer: MEDICARE

## 2021-10-20 ENCOUNTER — HOSPITAL ENCOUNTER (OUTPATIENT)
Dept: OCCUPATIONAL THERAPY | Age: 76
Setting detail: THERAPIES SERIES
Discharge: HOME | End: 2021-10-20
Payer: MEDICARE

## 2021-10-20 ENCOUNTER — HOSPITAL ENCOUNTER (OUTPATIENT)
Dept: CARDIAC REHAB | Age: 76
Setting detail: THERAPIES SERIES
Discharge: HOME | End: 2021-10-20
Payer: MEDICARE

## 2021-10-20 PROCEDURE — 97112 NEUROMUSCULAR REEDUCATION: CPT

## 2021-10-20 PROCEDURE — 97110 THERAPEUTIC EXERCISES: CPT

## 2021-10-20 PROCEDURE — 97140 MANUAL THERAPY 1/> REGIONS: CPT

## 2021-10-20 PROCEDURE — G0239 OTH RESP PROC, GROUP: HCPCS

## 2021-10-20 PROCEDURE — 97530 THERAPEUTIC ACTIVITIES: CPT

## 2021-10-20 NOTE — FLOWSHEET NOTE
Liz  80. and Therapy, [facility], [address] [place] [facility] [address]   Phone: [phone]  Fax [phone]        Outpatient Occupational Therapy     [x] Daily Treatment Note   [] Progress Note   [] Discharge Note      Date:  10/20/2021    Patient: Frederick Torres                              : 1945                                    MRN: [id number]                                         Evaluation Date: 2021                                            Medical Diagnosis/ICD 10:  S49. 92XA (ICD-10-CM) - Unspecified injury of left shoulder and upper arm, initial encounter     Treatment Diagnosis:  Decreased strength, decrease ROM, decreased ADL/IADL independence     Onset Date:  2021     Referral Date:  21     Referring Physician: Dr. Mor Allen, 21                                                                       Insurance information: Anthem Medicare Pre-Cert Required     Precautions/ Contraindications:   Red Flag Symptoms: none  Safety awareness: intact  Other: Restrictions: cardiopulmonary limitations due to COVID-19     Adhesive allergy: NO  Latex Allergy:  [x]? NO      []?YES      Preferred Language for Healthcare:   [x]? English       []? other:     C-SSRS Triggered by Intake questionnaire (Past 2 wk assessment):   [x]? No, Questionnaire did not trigger screening. []? Yes, Patient intake triggered further evaluation      []? C-SSRS Screening completed  []? PCP notified via Plan of Care  []? Emergency services notified     Plan of care sent to provider:      []? Faxed 21  [x]? Co-signature    (attempts: 1[x]?  2 []?3[]?)        __________________________________________________________________________________________________________________    Plan of care sent to provider:    []Faxed  [x]Co-signature    (attempts: 1[] 2 []3[])        Plan of care signed:    [x]Yes date:            []No           Next Progress Note due: 10/29/21    Visit# / total visits:  6/8    Plan for Next Session:  Review HEP, progress HEP    HEP instruction: Written and verbal HEP instructions provided and reviewed:  · Access Code: [email or web address]  · URL: [email or web address]  · Date: 09/29/2021  · Prepared by: Wan Rahman  · Exercises  · Finger Key  with Putty - 2-3 x daily - 7 x weekly - 3 sets - 10 reps  · Putty Squeezes - 2-3 x daily - 7 x weekly - 3 sets - 10 reps  · Seated Three Finger Pinch with Putty - 2-3 x daily - 7 x weekly - 3 sets - 10 reps  · Tip Pinch with Putty - 2-3 x daily - 7 x weekly - 3 sets - 10 reps  · Shoulder Flexion AAROM - 2-3 x daily - 7 x weekly - 3 sets - 10 reps  · Supine Shoulder Horizontal Abduction Adduction AAROM with Dowel - 2-3 x daily - 7 x weekly - 3 sets - 10 reps     Subjective: Patient reports improved participation in exercises and using his hand. Patient also reports that he returned to riding a three-wheeled motorcycle with his wife this weekend. Pain level: 0/10     Objective:        Exercises:    Exercises in bold performed in department today. Items not bolded are carried forward from prior visits for continuity of the record.   Exercise/Equipment Resistance/Repetitions Skilled cues Added to HEP Comments      ADL/IADL TRAINING (ADL OR TA)        []  Tactile cues   []   Verbal cues []  Yes       []  Tactile cues   []   Verbal cues []  Yes      []  Tactile cues   []   Verbal cues []  Yes       []  Tactile cues   []   Verbal cues []  Yes       []  Tactile cues   []   Verbal cues []  Yes         []  Tactile cues   []   Verbal cues []  Yes         []  Tactile cues   []   Verbal cues []  Yes         []  Tactile cues   []   Verbal cues []  Yes         []  Tactile cues   []   Verbal cues []  Yes        NEUROMUSCULAR RE-EDU and THERAPEUTIC ACTIVITY (NEURO RE-ED or TA)      Taping for LUE GH[employer] [place] Tri-Pull and Scapular retraction []  Tactile cues   [x]   Verbal cues [x]  Yes     Weightbearing At side of mat,  min cues for elbow extension, initially max A for scapular stabilization, 40 seconds x3 [x]  Tactile cues   [x]   Verbal cues [x]  Yes  Max to min cues   Scapular protraction strengthening with elbow extension With holding dowel and LUE reaching to dowel and out 8x also with addition of theraband 8x but unable to maintain [x]  Tactile cues   [x]   Verbal cues [x]  Yes  Mod cues   Pushing shopping cart forward With elbow extension and shoulder flexion and posterior pelvic tilt Verbal cues  Max cues for full range, keeping shoulder down, head up   Neuro Re-Ed in Sidelying With facilitation of scapula for horizontal abduction, shoulder abduction, and inhibition of upper trap with trigger point release at upper trap []  Tactile cues   []   Verbal cues []  Yes     Short arc propellers Elbow flexion BUE 8x, min A when completing only with LUE  External rotation LUE, assist for setup []  Tactile cues   [x]   Verbal cues [x]  Yes  Cues for positioning     Balloon toss and catch 15x with incorporation of BUE, mirror for visual cueing, elbow flexion/shoulder flexion, external rotation []  Tactile cues   []   Verbal cues []  Yes         []  Tactile cues   []   Verbal cues []  Yes         []  Tactile cues   []   Verbal cues []  Yes          THERAPEUTIC EXERCISE and MANUAL TECHNIQUES   (TE OR MT)      Prone posterior shoulder strengthening Retraction 6x  Ts 6x   Max cues for breathing   AAROM with BUE Shoulder flexion supine 1# LUE  Elbow flexion/extension supine  Horizontal abduction supine/sidelying 0-1#   [x]  Tactile cues   [x]   Verbal cues [x]  Yes  Mod cues    Max cues for breathing   Hand strengthening Intrinsic Plus position  DIP flexion position  Finger/ab/adduction Tactile/verbal cues Yes Mod cues/Max A   Shoulder strengthening Scapular retraction with red band  10x BUE      Putty exercises Green  Gripping 10x  Three jaw pinch 10x with table  Tip pinch 10x with table  Lateral pinch 10x    Verbal review of finger extension with putty (added to HEP) []  Tactile cues   [x]   Verbal cues [x]  Yes      strengthening 25# gripper brown 10x, cues for full range []  Tactile cues   [x]   Verbal cues [x]  Yes     Scapular mobilizations sidelying 40 second hold x3 for upward rotation and retraction [x]  Tactile cues   [x]   Verbal cues [x]  Yes     Elbow flexion/extension With 3# high table gravity eliminated 10x each [x]  Tactile cues   [x]   Verbal cues [x]  Yes     Therabar green  stick 8x frowns, 8x smiles, 8x twists in supine [x]  Tactile cues   [x]   Verbal cues [x]  Yes  Cues for breathing   Soft tissue mobilization To tricep with trigger point reported but improved after soft tissue mobilization [x]  Tactile cues   [x]   Verbal cues [x]  Yes     PROM RUE Therapist assisted RUE to overhead shoulder flexion 10x each  Therapist assisted RUE to overhead scaption 10x each  Therapist assisted RUE external rotation 10x each        Weight with AAROM with BUE 1# shoulder flexion BUE supine  1# horizontal ab/adduction BUE  1# chest press BUE supine []  Tactile cues   []   Verbal cues []  Yes        MODALITIES           []  Yes          []  Yes        SPLINTING         []  Tactile cues   []   Verbal cues []  Yes   []  See separate note regarding splint precautions/contraindications      []  Tactile cues   []   Verbal cues []  Yes  []  See separate note regarding splint precautions/contraindications             Functional Outcome Measure:   []NA   10/20/21 Nicole Herd Score of 34, DSI 52.3%    Treatment/Activity Tolerance:    Patients response to treatment:   [x]Patient tolerated treatment well []Patient limited by fatigue   []Patient limited by pain  []Patient limited by other medical complications   []Other:     Assessment: Patient able to hold arm up overhead in supine this date without assist for over 15 seconds and to do punches with 1# weight.   Use of mirror and verbal cues for breathing and form required throughout session for safety and effective training. Patient progressing with independence with weightbearing. Patient also able to complete elbow extension strengthening with forward push needed for increased endurance for return to driving. Continue POC 2x/week for progressing HEP. GOALS: Goals established 9/29/21   Patient stated goal: \"to get this left arm going\"  []? Progressing: []? Met: []? Not Met: []? Adjusted     STG TO BE MET IN 2 WEEKS      1. Pt will report a QuickDASH Symptom Severity Scale score of 10% or less indicating increased safety and functional independence in desired occupational pursuits. []? Progressing: []? Met: []? Not Met: []? Adjusted     2. Patient will be I with UE HEP. - GOAL MET 10/20/21     3. Patient will verbalize increased use of LUE in ADL/IADL tasks. - GOAL MET 10/20/21 for opening doors     LTG TO BE MET IN 4 WEEKS     1. Patient will demonstrate improved  strength for improved return to ADLs/IADLs.     2. Patient will demonstrate improved coordination with LUE for improved return to ADLs/IADLs.     3.  Patient will verbalize mod I with all ADLs with incorporation of LUE.     Prognosis: [x]Good   []Fair   []Poor    Patient Requires Follow-up:  [x]Yes  []No    Plan: []Plan of care initiated     [x]Continue per plan of care 2x/week for 4 weeks   [] Alter current plan (see comments)    []Hold pending MD visit []Discharge    Time in: 96 [id number] Time out: 1345    Timed Code Treatment Minutes:  55    Total Treatment Minutes: 60    Medicare Cap total YTD:   [x]N/A    Workers Comp Time Stamp  [x]N/A   Time In:   Time Out:    Electronically signed by:  LAKEISHA Eduardo/MARINA

## 2021-10-21 NOTE — TELEPHONE ENCOUNTER
I called and s/w Griselda Canton at [facility]. She said they still did not have the order. I faxed it a 3rd time and put it to her attention. She said she will call our office once she receives it.

## 2021-10-22 ENCOUNTER — APPOINTMENT (OUTPATIENT)
Dept: OCCUPATIONAL THERAPY | Age: 76
End: 2021-10-22
Payer: MEDICARE

## 2021-10-22 ENCOUNTER — APPOINTMENT (OUTPATIENT)
Dept: PHYSICAL THERAPY | Age: 76
End: 2021-10-22
Payer: MEDICARE

## 2021-10-22 ENCOUNTER — HOSPITAL ENCOUNTER (OUTPATIENT)
Dept: OCCUPATIONAL THERAPY | Age: 76
Setting detail: THERAPIES SERIES
Discharge: HOME | End: 2021-10-22
Payer: MEDICARE

## 2021-10-22 NOTE — TELEPHONE ENCOUNTER
Magda Mathis called back and confirmed they did receive the order this time. She will send it to their RT to start processing. Will watch for results.

## 2021-10-22 NOTE — PROGRESS NOTES
Liz  [address]. and Therapy, [facility], [address] [place] [address]  Phone: [phone]  Fax [phone]        Patient Name:  Yessi Harden  :  1945   Date:  2021  Cancels to Date: 1  No-shows to Date: 1    For today's appointment patient:  [x] Cancelled  [] Rescheduled appointment  [] No-show     Reason given by patient:  [x] Patient ill  [] Conflicting appointment  [] No transportation    [] Conflict with work  [] No reason given  [x] Other:     Comments:  Not feeling well     [] Phone call from patient to clinic later  [] Phone call or communication made to patient later    Electronically signed by:  LAKEISHA Harris/MARINA

## 2021-10-25 ENCOUNTER — HOSPITAL ENCOUNTER (OUTPATIENT)
Dept: CARDIAC REHAB | Age: 76
Setting detail: THERAPIES SERIES
Discharge: HOME | End: 2021-10-25
Payer: MEDICARE

## 2021-10-25 ENCOUNTER — HOSPITAL ENCOUNTER (OUTPATIENT)
Dept: OCCUPATIONAL THERAPY | Age: 76
Setting detail: THERAPIES SERIES
Discharge: HOME | End: 2021-10-25
Payer: MEDICARE

## 2021-10-25 PROCEDURE — 97110 THERAPEUTIC EXERCISES: CPT

## 2021-10-25 PROCEDURE — 97032 APPL MODALITY 1+ESTIM EA 15: CPT

## 2021-10-25 PROCEDURE — 97112 NEUROMUSCULAR REEDUCATION: CPT

## 2021-10-25 PROCEDURE — G0239 OTH RESP PROC, GROUP: HCPCS

## 2021-10-25 PROCEDURE — 97140 MANUAL THERAPY 1/> REGIONS: CPT

## 2021-10-25 NOTE — FLOWSHEET NOTE
[facility], [facility], [address] [facility], [address] [place] [address]   Phone: [phone]  Fax [phone]        Outpatient Occupational Therapy     [x] Daily Treatment Note   [] Progress Note   [] Discharge Note      Date:  10/25/2021    Patient: Mechelle Auguste                              : 1945                                    MRN: [id number]                                         Evaluation Date: 2021                                            Medical Diagnosis/ICD 10:  S49. 92XA (ICD-10-CM) - Unspecified injury of left shoulder and upper arm, initial encounter     Treatment Diagnosis:  Decreased strength, decrease ROM, decreased ADL/IADL independence     Onset Date:  2021     Referral Date:  21     Referring Physician: Dr. Serjio Brar, 21                                                                       Insurance information: [employer] Medicare Pre-Cert Required     Precautions/ Contraindications:   Red Flag Symptoms: none  Safety awareness: intact  Other: Restrictions: cardiopulmonary limitations due to COVID-19     Adhesive allergy: NO  Latex Allergy:  [x]? NO      []?YES      Preferred Language for Healthcare:   [x]? English       []? other:     C-SSRS Triggered by Intake questionnaire (Past 2 wk assessment):   [x]? No, Questionnaire did not trigger screening. []? Yes, Patient intake triggered further evaluation      []? C-SSRS Screening completed  []? PCP notified via Plan of Care  []? Emergency services notified     Plan of care sent to provider:      []? Faxed 21  [x]? Co-signature    (attempts: 1[x]?  2 []?3[]?)        __________________________________________________________________________________________________________________    Plan of care sent to provider:    []Faxed  [x]Co-signature    (attempts: 1[] 2 []3[])        Plan of care signed:    [x]Yes date:            []No           Next Progress Note due: 10/29/21    Visit# / total visits:  7/8    Plan for Next Session:  Review HEP, progress HEP    HEP instruction: Written and verbal HEP instructions provided and reviewed:  · Access Code: [email or web address]  · URL: ilab/  · Date: 09/29/2021  · Prepared by: Ary Plasencia  · Exercises  · Finger Key  with Putty - 2-3 x daily - 7 x weekly - 3 sets - 10 reps  · Putty Squeezes - 2-3 x daily - 7 x weekly - 3 sets - 10 reps  · Seated Three Finger Pinch with Putty - 2-3 x daily - 7 x weekly - 3 sets - 10 reps  · Tip Pinch with Putty - 2-3 x daily - 7 x weekly - 3 sets - 10 reps  · Shoulder Flexion AAROM - 2-3 x daily - 7 x weekly - 3 sets - 10 reps  · Supine Shoulder Horizontal Abduction Adduction AAROM with Dowel - 2-3 x daily - 7 x weekly - 3 sets - 10 reps     Subjective: Patient reports he was unable to come to therapy on Friday due to having loose stools. He states that he continues to improve everyday. Pain level: 0/10     Objective:        Exercises:    Exercises in bold performed in department today. Items not bolded are carried forward from prior visits for continuity of the record.   Exercise/Equipment Resistance/Repetitions Skilled cues Added to HEP Comments      ADL/IADL TRAINING (ADL OR TA)        []  Tactile cues   []   Verbal cues []  Yes       []  Tactile cues   []   Verbal cues []  Yes      []  Tactile cues   []   Verbal cues []  Yes       []  Tactile cues   []   Verbal cues []  Yes       []  Tactile cues   []   Verbal cues []  Yes         []  Tactile cues   []   Verbal cues []  Yes         []  Tactile cues   []   Verbal cues []  Yes         []  Tactile cues   []   Verbal cues []  Yes         []  Tactile cues   []   Verbal cues []  Yes        NEUROMUSCULAR RE-EDU and THERAPEUTIC ACTIVITY (NEURO RE-ED or TA)      Taping for LUE [employer] [place] Tri-Pull and Scapular retraction []  Tactile cues   [x]   Verbal cues [x]  Yes     Weightbearing At side of mat,  min cues for elbow extension, initially max A for scapular stabilization, 40 seconds x3 [x]  Tactile cues   [x]   Verbal cues [x]  Yes  Max to min cues   Scapular protraction strengthening with elbow extension With holding dowel and LUE reaching to dowel and out 8x also with addition of theraband 8x but unable to maintain [x]  Tactile cues   [x]   Verbal cues [x]  Yes  Mod cues   Pushing shopping cart forward With elbow extension and shoulder flexion and posterior pelvic tilt Verbal cues  Max cues for full range, keeping shoulder down, head up   Neuro Re-Ed in Sidelying With facilitation of scapula for horizontal abduction, shoulder abduction, and inhibition of upper trap with trigger point release at upper trap []  Tactile cues   []   Verbal cues []  Yes     Short arc propellers Elbow flexion BUE 8x, min A when completing only with LUE  External rotation LUE, assist for setup []  Tactile cues   [x]   Verbal cues [x]  Yes  Cues for positioning     Balloon toss and catch 15x with incorporation of BUE, mirror for visual cueing, elbow flexion/shoulder flexion, external rotation []  Tactile cues   []   Verbal cues []  Yes       Elbow flexion/extension training Elbow flexion/extension supine with cone, with cup, with ball, with NMES for neuro re-ed 3-8x each []  Tactile cues   []   Verbal cues []  Yes         []  Tactile cues   []   Verbal cues []  Yes          THERAPEUTIC EXERCISE and MANUAL TECHNIQUES   (TE OR MT)      Prone posterior shoulder strengthening Retraction 6x  Ts 6x   Max cues for breathing   AROM with BUE Chest Press BUE 2# each, min A with LUE 8x  Shoulder flexion supine 1# LUE 8x    Horizontal abduction supine/sidelying 0-1# LUE 8x each   [x]  Tactile cues   [x]   Verbal cues [x]  Yes  Mod cues    Mod to Max cues for breathing    Reviewed elbow flexion/extension in supine for HEP   Hand strengthening Intrinsic Plus position  DIP flexion position  Finger/ab/adduction  Velcro rollers - 5 finger, three-jaw Tactile/verbal cues Yes Mod cues/Max A   Shoulder strengthening Scapular retraction with red band  10x BUE      Putty exercises Green  Gripping 10x  Three jaw pinch 10x with table  Tip pinch 10x with table  Lateral pinch 10x    Verbal review of finger extension with putty (added to HEP) []  Tactile cues   [x]   Verbal cues [x]  Yes      strengthening 25# gripper brown 10x, cues for full range []  Tactile cues   [x]   Verbal cues [x]  Yes     Scapular mobilizations sidelying 40 second hold x3 for upward rotation and retraction [x]  Tactile cues   [x]   Verbal cues [x]  Yes     Elbow flexion/extension With 3# high table gravity eliminated 10x each [x]  Tactile cues   [x]   Verbal cues [x]  Yes     Therabar green  stick 8x frowns, 8x smiles, 8x twists in supine [x]  Tactile cues   [x]   Verbal cues [x]  Yes  Cues for breathing   Soft tissue mobilization To tricep with trigger point reported but improved after soft tissue mobilization [x]  Tactile cues   [x]   Verbal cues [x]  Yes     PROM RUE Therapist assisted RUE to overhead shoulder flexion 10x each  Therapist assisted RUE to overhead scaption 10x each  Therapist assisted RUE external rotation 10x each        Seated exercises Elbow flexion/extension - unable to complete without compensatory shoulder flexion  Supination/pronation - with 5# weight 8x      Weight with AAROM with BUE 1# shoulder flexion BUE supine  1# horizontal ab/adduction BUE  1# chest press BUE supine []  Tactile cues   []   Verbal cues []  Yes        MODALITIES      NMES 15 minutes total Electrical stimulation parameters for elbow flexion with functional movement  Ramp UP/Ramp Down   2 second each  Intensity up to 25  Symmetrical Bipasic via EMPI  No marks from pads on skin after removal of ESTIM pads  [x]  Yes          []  Yes        SPLINTING         []  Tactile cues   []   Verbal cues []  Yes   []  See separate note regarding splint precautions/contraindications      []  Tactile cues   []   Verbal cues []  Yes  [] See separate note regarding splint precautions/contraindications             Functional Outcome Measure:   []NA   10/20/21 Kendrick Carmichael Score of 34, DSI 52.3%    Treatment/Activity Tolerance:    Patients response to treatment:   [x]Patient tolerated treatment well []Patient limited by fatigue   []Patient limited by pain  []Patient limited by other medical complications   []Other:     Assessment: Patient demonstrates significant deficits in LUE shoulder strength, and elbow flexion and supination strength. He improves participation in tasks with verbal and tactile cues from therapist but requires repetition. Continue POC 2x/week for progressing HEP. GOALS: Goals established 9/29/21   Patient stated goal: \"to get this left arm going\"  []? Progressing: []? Met: []? Not Met: []? Adjusted     STG TO BE MET IN 2 WEEKS      1. Pt will report a QuickDASH Symptom Severity Scale score of 10% or less indicating increased safety and functional independence in desired occupational pursuits. []? Progressing: []? Met: []? Not Met: []? Adjusted     2. Patient will be I with UE HEP. - GOAL MET 10/20/21     3. Patient will verbalize increased use of LUE in ADL/IADL tasks. - GOAL MET 10/20/21 for opening doors     LTG TO BE MET IN 4 WEEKS     1. Patient will demonstrate improved  strength for improved return to ADLs/IADLs.     2. Patient will demonstrate improved coordination with LUE for improved return to ADLs/IADLs.     3.  Patient will verbalize mod I with all ADLs with incorporation of LUE.     Prognosis: [x]Good   []Fair   []Poor    Patient Requires Follow-up:  [x]Yes  []No    Plan: []Plan of care initiated     [x]Continue per plan of care 2x/week for 4 weeks   [] Alter current plan (see comments)    []Hold pending MD visit []Discharge    Time in: 0945 Time out: 1044    Timed Code Treatment Minutes:  57    Total Treatment Minutes: 59    Medicare Cap total YTD:   [x]N/A    Workers Comp Time Stamp  [x]N/A   Time In:   Time Out:    Electronically signed by:  Conchita Kearney, OTR/L

## 2021-10-26 ENCOUNTER — OFFICE VISIT (OUTPATIENT)
Dept: PULMONOLOGY | Age: 76
End: 2021-10-26
Payer: MEDICARE

## 2021-10-26 VITALS
HEIGHT: 72 IN | DIASTOLIC BLOOD PRESSURE: 71 MMHG | SYSTOLIC BLOOD PRESSURE: 118 MMHG | TEMPERATURE: 97.8 F | BODY MASS INDEX: 21.45 KG/M2 | HEART RATE: 74 BPM | WEIGHT: 158.4 LBS | OXYGEN SATURATION: 95 % | RESPIRATION RATE: 16 BRPM

## 2021-10-26 DIAGNOSIS — Z98.890 H/O TRACHEOSTOMY: ICD-10-CM

## 2021-10-26 DIAGNOSIS — Z86.16 PERSONAL HISTORY OF COVID-19: ICD-10-CM

## 2021-10-26 DIAGNOSIS — R06.02 SOB (SHORTNESS OF BREATH): ICD-10-CM

## 2021-10-26 DIAGNOSIS — J84.10 PULMONARY FIBROSIS (HCC): Primary | ICD-10-CM

## 2021-10-26 DIAGNOSIS — J98.4 RESTRICTIVE LUNG DISEASE: ICD-10-CM

## 2021-10-26 DIAGNOSIS — I51.89 GRADE I DIASTOLIC DYSFUNCTION: ICD-10-CM

## 2021-10-26 PROCEDURE — 99214 OFFICE O/P EST MOD 30 MIN: CPT | Performed by: INTERNAL MEDICINE

## 2021-10-26 RX ORDER — ALBUTEROL SULFATE 90 UG/1
2 AEROSOL, METERED RESPIRATORY (INHALATION) EVERY 6 HOURS PRN
Qty: 1 EACH | Refills: 5 | Status: SHIPPED | OUTPATIENT
Start: 2021-10-26

## 2021-10-26 NOTE — PATIENT INSTRUCTIONS
Remember to bring a list of pulmonary medications and any CPAP or BiPAP machines to your next appointment with the office. Please keep all of your future appointments scheduled by [address], [facility] office. Out of respect for other patients and providers, you may be asked to reschedule your appointment if you arrive later than your scheduled appointment time. Appointments cancelled less than 24hrs in advance will be considered a no show. Patients with three missed appointments within 1 year or four missed appointments within 2 years can be dismissed from the practice. Please be aware that our physicians are required to work in the Intensive Care Unit at [facility].  Your appointment may need to be rescheduled if they are designated to work during your appointment time. You may receive a survey regarding the care you received during your visit. Your input is valuable to us. We encourage you to complete and return your survey. We hope you will choose us in the future for your healthcare needs. Pt instructed of all future appointment dates & times, including radiology, labs, procedures & referrals. If procedures were scheduled preparation instructions provided. Instructions on future appointments with [facility] were given.

## 2021-10-26 NOTE — PROGRESS NOTES
MA Communication:   The following orders are received by verbal communication from Guillermina Jacob MD    Orders include:    6MW: 10/28/2021 7:30 am  ONPO faxed to vincent  3 month follow up: 01/26/2022 2:20 pm CP

## 2021-10-27 ENCOUNTER — HOSPITAL ENCOUNTER (OUTPATIENT)
Dept: CARDIAC REHAB | Age: 76
Setting detail: THERAPIES SERIES
Discharge: HOME | End: 2021-10-27
Payer: MEDICARE

## 2021-10-27 ENCOUNTER — APPOINTMENT (OUTPATIENT)
Dept: PHYSICAL THERAPY | Age: 76
End: 2021-10-27
Payer: MEDICARE

## 2021-10-27 ENCOUNTER — APPOINTMENT (OUTPATIENT)
Dept: OCCUPATIONAL THERAPY | Age: 76
End: 2021-10-27
Payer: MEDICARE

## 2021-10-27 ENCOUNTER — HOSPITAL ENCOUNTER (OUTPATIENT)
Dept: OCCUPATIONAL THERAPY | Age: 76
Setting detail: THERAPIES SERIES
Discharge: HOME | End: 2021-10-27
Payer: MEDICARE

## 2021-10-27 PROBLEM — J98.4 RESTRICTIVE LUNG DISEASE: Status: ACTIVE | Noted: 2021-10-27

## 2021-10-27 PROBLEM — I51.89 GRADE I DIASTOLIC DYSFUNCTION: Status: ACTIVE | Noted: 2021-10-27

## 2021-10-27 PROBLEM — Z98.890 H/O TRACHEOSTOMY: Status: ACTIVE | Noted: 2021-10-27

## 2021-10-27 PROBLEM — Z86.16 PERSONAL HISTORY OF COVID-19: Status: ACTIVE | Noted: 2021-10-27

## 2021-10-27 PROCEDURE — 97112 NEUROMUSCULAR REEDUCATION: CPT

## 2021-10-27 PROCEDURE — 97140 MANUAL THERAPY 1/> REGIONS: CPT

## 2021-10-27 PROCEDURE — G0239 OTH RESP PROC, GROUP: HCPCS

## 2021-10-27 PROCEDURE — 97110 THERAPEUTIC EXERCISES: CPT

## 2021-10-27 NOTE — PROGRESS NOTES
it was, patient's quality of life has gone down because of the shortness of breath which was not the case prior to this episode in January of this year, and patient states that he has been having a gradual decline in his overall clinical status and quality of life and wanted to see if there is any options available to help him out more, no change in them environment any sick contacts, no other pertinent review of system of concern              Review of Systems same as above     Physical Exam:  Blood pressure 118/71, pulse 74, temperature 97.8 °F (36.6 °C), temperature source Temporal, resp. rate 16, height 6' (1.829 m), weight 158 lb 6.4 oz (71.8 kg), SpO2 95 %.'  Constitutional:  No acute distress. hoarseness of voice   HENT:  Oropharynx is clear and moist. Nothyromegaly. Eyes:  Conjunctivae are normal. Pupils equal, round, and reactive to light. No scleral icterus. Neck: . No tracheal deviation present. No obviousthyroid mass. Cardiovascular: Normal rate, regular rhythm, normal heart sounds. No right ventricular heave. No lower extremity edema. Pulmonary/Chest: No wheezes. B/l coarse  rales. Chest wall is not dull to percussion. No accessory muscle usage or stridor. Decreased BSI   Abdominal: Soft. Bowel sounds present. No distension or hernia. No tenderness. Musculoskeletal : No cyanosis. No clubbing. amputated toes   Lymphadenopathy: No cervical or supraclavicularadenopathy. Skin: Skin is warm and dry. No rash or nodules on the exposed extremities. Psychiatric: Normal mood and affect. Behavior is normal.  No anxiety. Neurologic : Alert, awake and oriented. PERRL. Speechfluent            Data:     Imaging:  I have reviewed radiology images personally. No orders to display       CT OF THE CHEST WITHOUT CONTRAST 7/19/2021 2:42 pm       TECHNIQUE:   CT of the chest was performed without the administration of intravenous   contrast. Multiplanar reformatted images are provided for review.  Dose modulation, iterative reconstruction, and/or weight based adjustment of the   mA/kV was utilized to reduce the radiation dose to as low as reasonably   achievable.       COMPARISON:   None.       HISTORY:   ORDERING SYSTEM PROVIDED HISTORY: ILD (interstitial lung disease) ([facility] [address]   TECHNOLOGIST PROVIDED HISTORY:   Reason for exam:->Severe Covid 19, pulmonary fibrosis   Reason for Exam: f/u COVID   Acuity: Acute   Type of Exam: Subsequent/Follow-up   Relevant Medical/Surgical History: prev CABG       FINDINGS:   Mediastinum: Thyroid gland appears normal.  There is evidence of prior   sternotomy and coronary artery bypass graft surgery.  Heart is enlarged.  No   pericardial effusion.  Small hiatal hernia seen.  Small mediastinal and hilar   nodes are noted.  No pulmonary artery enlargement       Lungs/pleura: On the right, scattered bronchiectatic changes are seen, with   scattered subpleural reticular opacities, greatest in the right upper lobe   and right lower lobe and to a lesser extent the right middle lobe.       On the left, scattered subpleural reticular opacities are seen with   bronchiectatic changes, in the left upper and left lower lobe.  Subtle   subpleural honeycombing is suggested.  Visualized changes in lungs appear   increased compared to 2011       Upper Abdomen: Adrenal glands appear normal.  Gallstones are seen.       Soft Tissues/Bones: Spurring is seen in the spine.  Spurring is seen in the   shoulder joints.           Impression   Fibrotic changes in the lungs, new and increased compared to 2011. Subpleural reticulation, bronchiectatic changes and honeycombing suggest   moderate IPF. DATE OF PROCEDURE:  07/19/2021     Full PFT done. FEV1 1.75, 52% predicted, FVC 1.95, 42% predicted, FEV1  to FVC ratio of 89% showing a new obstructive lung defect but suggesting  a restrictive lung defect. There is no bronchodilator effect. Lung  volumes do show a severe restrictive lung defect. Diffusion is low even  with adjustment for the ventilation.     IMPRESSION:  Severe restrictive lung defect with low DLCO. Flow-volume  loops are consistent with restrictive lung disease    ECHO in 2019-    Summary   The left ventricular systolic function is mildly reduced with an ejection   fraction of 40-45 %. There is hypokinesis of the basal inferior and inferolateral walls. There is mild concentric left ventricular hypertrophy. Grade I diastolic dysfunction with normal left ventricular filling pressure. The right ventricle is mildly enlarged. The right atrium is mildly dilated. Mild mitral regurgitation. Systolic pulmonary artery pressure (SPAP) is normal estimated at 26 mmHg   (Right atrial pressure of 8 mmHg). Assessment:    1. Pulmonary fibrosis (HCC)    - 6 Minute Walk Test; Future  - Pulse oximetry, overnight; Future  - albuterol sulfate HFA (PROAIR HFA) 108 (90 Base) MCG/ACT inhaler; Inhale 2 puffs into the lungs every 6 hours as needed for Wheezing or Shortness of Breath  Dispense: 1 each; Refill: 5    2. SOB (shortness of breath)    - albuterol sulfate HFA (PROAIR HFA) 108 (90 Base) MCG/ACT inhaler; Inhale 2 puffs into the lungs every 6 hours as needed for Wheezing or Shortness of Breath  Dispense: 1 each; Refill: 5    3. Personal history of COVID-19      4. Restrictive lung disease      5. H/O tracheostomy      6.  Grade I diastolic dysfunction          Plan:   · Patient's review of system were discussed  · Patient and family were told about the clinical finding including auscultation and implications  · Patient and family were shown the CT of the chest done in July of this year along with findings/interpretation and implications  · Patient has significant postinflammatory pulmonary fibrosis on the imaging and also patient continues to have auscultatory findings suggestive of pulmonary fibrosis  · Patient's PFT results were discussed along with interpretation and implications along with options  · Patient does have increased shortness of breath and wheezing intermittently and is not able to do his ADLs  · Patient was told to use some saline nasal spray over-the-counter for nasal congestion  · Patient was given a sample of Cherylin Beady and was told how to use it properly and to do it once a day  · Patient was told to rinse his mouth with water after using the Cherylin Beady will order to avoid any hoarseness of voice or oral thrush  · Patient's pharmacy was called in a prescription for albuterol inhaler and was told to take 2 puffs every 6 on whenever necessary basis and see if that makes a difference or not  · Will perform any steroids systemically  · No need for any antibiotics from pulmonary standpoint of view  · Any hard candy to be set up on to decrease any dryness of the mouth which may help  · Patient has some hoarseness of voice which can be partly because of tracheal stenosis but is not supported by the flow volume loop on the PFT  · Patient wants to proceed with pulmonary rehab and other PT and OT at home  · Patient will benefit from 6-minute walk test and overnight pulse oximetry to assess for any persistent hypoxemia  · Diet and lifestyle modifications discussed  · Patient is up-to-date on the flu shot  · Patient further management depending on patient clinical status and the follow-up on above recommendations along with the test results

## 2021-10-27 NOTE — PROGRESS NOTES
Liz  80. and Therapy, [facility],  Jillian Jerry, [address]  Phone: [phone]  Fax [phone]      Ewa Garay  Dear Dr. Raj Conde,    The following patient has been assessed for therapy services. Please review the attached summary of the patient's plan of care, and verify that you agree with plan for additional therapy services at this time. Plan of Care/Treatment to date:  [x] Therapeutic Exercise  [x]  Modalities:  [x] Therapeutic Activity   [] Ultrasound [x] Electrical Stimulation   [] Total Motion Release   [] Fluidotherapy [] Kinesiotaping  [x]  Neuromuscular Re-education  [] Iontophoresis [] Coldpack/hotpack   [x]  Instruction in HEP    Other:  [x]  Manual Therapy     []   Dry needling             [x] ADL/Self Care  [x] IADL Training  [] LSVT BIG  [] Saebo  [] Splinting  [] Wheelchair Mobility                       Frequency/Duration:  # Days per week: [] 1 day # Weeks: [] 1 week [] 5 weeks      [x] 2 days   [] 2 weeks [] 6 weeks     [] 3 days   [] 3 weeks [] 7 weeks     [] 4 days   [x] 4 weeks [] 8 weeks     [] 5 days   [] 10 weeks [] 12 weeks    Rehab Potential: [] Excellent [x] Good [] Fair  [] Poor     Thank you for the referral of this patient. Please sign. Physician signature_______________________ Date________________  By signing above, therapists plan is approved by physician     Fax to: [facility] [phone]     Electronically signed by:  Letitia Harrison          Outpatient Occupational Therapy     [] Daily Treatment Note   [x] Progress Note   [] Discharge Note      Date:  10/27/2021    Patient: Diane Starr                              : 1945                                    MRN: [id number]                                         Evaluation Date: 2021                                            Medical Diagnosis/ICD 10:  S49. 92XA (ICD-10-CM) - Unspecified injury of left shoulder and upper arm, initial encounter      Treatment Diagnosis:  Decreased strength, decrease ROM, decreased ADL/IADL independence     Onset Date:  1/19/2021     Referral Date:  09/22/21     Referring Physician: Dr. Rickey Almaraz, 7/2/21                                                                       Insurance information: [place] Medicare Pre-Cert Required     Precautions/ Contraindications:   Red Flag Symptoms: none  Safety awareness: intact  Other: Restrictions: cardiopulmonary limitations due to COVID-19     Adhesive allergy: NO  Latex Allergy:  [x]? NO      []?YES      Preferred Language for Healthcare:   [x]? English       []? other:     C-SSRS Triggered by Intake questionnaire (Past 2 wk assessment):   [x]? No, Questionnaire did not trigger screening. []? Yes, Patient intake triggered further evaluation      []? C-SSRS Screening completed  []? PCP notified via Plan of Care  []? Emergency services notified     Plan of care sent to provider:      []? Faxed 9/29/21  [x]? Co-signature    (attempts: 1[x]? 2 []?3[]?)        __________________________________________________________________________________________________________________    Plan of care sent to provider:    []Faxed  [x]Co-signature    (attempts: 1[] 2 []3[])        Plan of care signed:    [x]Yes date: Dr. Rickey Almaraz 9/30/21           []No           Next Progress Note due: Today, 11/26/21    Visit# / total visits:  8/8    Plan for Next Session:  Review HEP, progress HEP    HEP instruction: Written and verbal HEP instructions provided and reviewed:  · Access Code: [email or web address]  · URL: [email or web address]  · Date: 09/29/2021  · Prepared by: Naima Restrepo  · Exercises  · Finger Key  with Putty - 2-3 x daily - 7 x weekly - 3 sets - 10 reps  · Putty Squeezes - 2-3 x daily - 7 x weekly - 3 sets - 10 reps  · Seated Three Finger Pinch with Putty - 2-3 x daily - 7 x weekly - 3 sets - 10 reps  · Tip Pinch with Putty - 2-3 x daily - 7 x weekly - 3 sets - 10 reps  · Shoulder Flexion AAROM - 2-3 x daily - 7 x weekly - 3 sets - 10 reps  · Supine Shoulder Horizontal Abduction Adduction AAROM with Dowel - 2-3 x daily - 7 x weekly - 3 sets - 10 reps  · Elbow flexion with weighted items in supine     Subjective: Patient reports he is progressing well in bending his elbow, and almost able to reach his nose. Pain level: 0/10     Objective:            Elbow extension: to 0 degrees PROM, to 5 degrees from 0 AROM  Elbow flexion: 135 degrees PROM, 115 degrees AROM  Shoulder flexion: 76 degrees PROM, 35 degrees AROM  87% SpO2, improved to 95% after 30 seconds on room air  LUE 25.2 #  RUE 67.6#  LUE 9 hole peg test 54 seconds  RUE 9 hole peg test 18 seconds    Able to tolerate against gravity shoulder activity in all directions with 0-1#        Functional Outcome Measure:   []NA   10/20/21 Quickdash Score of 34, DSI 52.3%  10/27/21 Quickdash Score of 37, DSI 59.09%    Treatment/Activity Tolerance:    Patients response to treatment:   [x]Patient tolerated treatment well []Patient limited by fatigue   []Patient limited by pain  []Patient limited by other medical complications   []Other:     Assessment: Patient has participated in 8 OT visits since initiating OT 4 weeks ago for diagnosis of injury to left arm due to partial bicep removal and repair and with I and D due to infection and now with decreased function. He is progressing in goals but continue to demonstrate limitations in LUE shoulder and elbow ROM that limit his ability to incorporate his LUE into meaningful work activities and return to driving. He requires manual assist for PROM and also neuro re-ed to retrain his RUE which had part of his bicep removed. He is consistently improving in his progress with his HEP which necessitates therapist skill and education for improving patient participation in these tasks at home.  Patient will require additional OT 2x/week for 4 weeks for the following treatments: ADL, Therapeutic Exercise, Neuromuscular Re-Education, Electrical Stimulation attended for neuro re-ed, manual therapy, and therapeutic activity. GOALS: Goals established 9/29/21   Patient stated goal: \"to get this left arm going\"  [x]? Progressing: []? Met: []? Not Met: []? Adjusted     STG TO BE MET IN 2 WEEKS      1. Pt will report a QuickDASH Symptom Severity Scale score of 10% or less indicating increased safety and functional independence in desired occupational pursuits. [x]? Progressing: []? Met: []? Not Met: []? Adjusted     2. Patient will be I with UE HEP. - GOAL MET 10/20/21     3. Patient will verbalize increased use of LUE in ADL/IADL tasks. - GOAL MET 10/20/21 for opening doors     LTG TO BE MET IN 4 WEEKS     1. Patient will demonstrate improved  strength for improved return to ADLs/IADLs. GOAL MET 10/27     2. Patient will demonstrate improved coordination with LUE for improved return to ADLs/IADLs. - GOAL MET 10/27     3. Patient will verbalize mod I with all ADLs with incorporation of LUE. - REQUIRES OCCASIONAL MIN A, BUT  PROGRESSING FOR HOLDING PLATE AND BOWL WITH LUE, PROGRESSING WITH TYING SHOES WITH LUE, PROGRESSING WITH WASHING BODY WITH LUE, LIMITED FOR ACTIVITIES THAT REQUIRE RESISTANCE 10/27    NEW STG TO BE MET IN 2 WEEKS Established 10/27/21 to be met by 11/12/21  1. Patient will be I with UE strengthening HEP. 2. Patient will verbalize increased use of LUE in activities that require him to  weighted items for ADLs/IADLs with LUE. (I.e. moving mug for coffee)    LTG TO BE MET IN 4 WEEKS Established 10/27/21 to be met by 11/26/21    1. Patient will demonstrate improved pinch strength for improving LUE participation in home maintenance and work-based tasks.     2. Patient will demonstrate improved coordination with LUE as evidenced by increased speed on 9 hole peg test for improving LUE participation and independence with ADL/IADL tasks.     Prognosis: [x]Good   []Fair   []Poor    Patient Requires Follow-up:  [x]Yes  []No    Plan: []Plan of care initiated     []Continue per plan of care 2x/week for 4 weeks   [x] Alter current plan (additional OT 2x/week for 4 weeks)    []Hold pending MD visit []Discharge    Time in: 0945 Time out: 1220    Timed Code Treatment Minutes: 55, 7610-3671 and break for cardiac rehab, then [id number] for taping    Total Treatment Minutes: 55    Medicare Cap total YTD:   [x]N/A    Workers Comp Time Stamp  [x]N/A   Time In:   Time Out:    Electronically signed by:  LAKEISHA Jones/MARINA

## 2021-10-27 NOTE — FLOWSHEET NOTE
[address] [place] [address] and [address]   Phone: [phone]  Fax [phone]        Outpatient Occupational Therapy     [x] Daily Treatment Note   [] Progress Note   [] Discharge Note      Date:  10/27/2021    Patient: Loco Harkins                              : 1945                                    MRN: [id number]                                         Evaluation Date: 2021                                            Medical Diagnosis/ICD 10:  S49. 92XA (ICD-10-CM) - Unspecified injury of left shoulder and upper arm, initial encounter      Treatment Diagnosis:  Decreased strength, decrease ROM, decreased ADL/IADL independence     Onset Date:  2021     Referral Date:  21     Referring Physician: Dr. Franky Prajapati, 21                                                                       Insurance information: [employer] Medicare Pre-Cert Required     Precautions/ Contraindications:   Red Flag Symptoms: none  Safety awareness: intact  Other: Restrictions: cardiopulmonary limitations due to COVID-19     Adhesive allergy: NO  Latex Allergy:  [x]? NO      []?YES      Preferred Language for Healthcare:   [x]? English       []? other:     C-SSRS Triggered by Intake questionnaire (Past 2 wk assessment):   [x]? No, Questionnaire did not trigger screening. []? Yes, Patient intake triggered further evaluation      []? C-SSRS Screening completed  []? PCP notified via Plan of Care  []? Emergency services notified     Plan of care sent to provider:      []? Faxed 21  [x]? Co-signature    (attempts: 1[x]?  2 []?3[]?)        __________________________________________________________________________________________________________________    Plan of care sent to provider:    []Faxed  [x]Co-signature    (attempts: 1[] 2 []3[])        Plan of care signed:    [x]Yes date: Dr. Franky Prajapati 21           []No           Next Progress Note due: Today, 11/26/21    Visit# / total visits:  8/8    Plan for Next Session:  Review HEP, progress HEP    HEP instruction: Written and verbal HEP instructions provided and reviewed:  · Access Code: [email or web address]  · URL: [email or web address]  · Date: 09/29/2021  · Prepared by: Iris Dang  · Exercises  · Finger Key  with Putty - 2-3 x daily - 7 x weekly - 3 sets - 10 reps  · Putty Squeezes - 2-3 x daily - 7 x weekly - 3 sets - 10 reps  · Seated Three Finger Pinch with Putty - 2-3 x daily - 7 x weekly - 3 sets - 10 reps  · Tip Pinch with Putty - 2-3 x daily - 7 x weekly - 3 sets - 10 reps  · Shoulder Flexion AAROM - 2-3 x daily - 7 x weekly - 3 sets - 10 reps  · Supine Shoulder Horizontal Abduction Adduction AAROM with Dowel - 2-3 x daily - 7 x weekly - 3 sets - 10 reps  · Elbow flexion with weighted items in supine     Subjective: Patient reports he is progressing well in bending his elbow, and almost able to reach his nose. Pain level: 0/10     Objective:            Elbow extension: to 0 degrees PROM, to 5 degrees from 0 AROM  Elbow flexion: 135 degrees PROM, 115 degrees AROM  Shoulder flexion: 76 degrees PROM, 35 degrees AROM  87% SpO2, improved to 95% after 30 seconds on room air  LUE 25.2 #  RUE 67.6#  LUE 9 hole peg test 54 seconds  RUE 9 hole peg test 18 seconds    Able to tolerate against gravity shoulder activity in all directions with 0-1#    Exercises:    Exercises in bold performed in department today. Items not bolded are carried forward from prior visits for continuity of the record.   Exercise/Equipment Resistance/Repetitions Skilled cues Added to HEP Comments      ADL/IADL TRAINING (ADL OR TA)        []  Tactile cues   []   Verbal cues []  Yes       []  Tactile cues   []   Verbal cues []  Yes      []  Tactile cues   []   Verbal cues []  Yes       []  Tactile cues   []   Verbal cues []  Yes       []  Tactile cues   []   Verbal cues []  Yes         []  Tactile cues   [] Verbal cues []  Yes         []  Tactile cues   []   Verbal cues []  Yes         []  Tactile cues   []   Verbal cues []  Yes         []  Tactile cues   []   Verbal cues []  Yes        NEUROMUSCULAR RE-EDU and THERAPEUTIC ACTIVITY (NEURO RE-ED or TA)      Kinesiotaping for LUE [employer] [place] Tri-Pull and Scapular retraction []  Tactile cues   [x]   Verbal cues [x]  Yes     Weightbearing At side of mat,  min cues for elbow extension, initially max A for scapular stabilization, 40 seconds x3 [x]  Tactile cues   [x]   Verbal cues [x]  Yes  Max to min cues   Scapular protraction strengthening with elbow extension With holding dowel and LUE reaching to dowel and out 8x also with addition of theraband 8x but unable to maintain [x]  Tactile cues   [x]   Verbal cues [x]  Yes  Mod cues   Pushing shopping cart forward With elbow extension and shoulder flexion and posterior pelvic tilt Verbal cues  Max cues for full range, keeping shoulder down, head up   Neuro Re-Ed in Sidelying With facilitation of scapula for horizontal abduction, shoulder abduction, and inhibition of upper trap with trigger point release at upper trap []  Tactile cues   []   Verbal cues []  Yes     Short arc propellers Elbow flexion BUE 8x, min A when completing only with LUE  External rotation LUE, assist for setup []  Tactile cues   [x]   Verbal cues [x]  Yes  Cues for positioning     Balloon toss and catch 15x with incorporation of BUE, mirror for visual cueing, elbow flexion/shoulder flexion, external rotation []  Tactile cues   []   Verbal cues []  Yes       Elbow flexion/extension training Elbow flexion/extension supine with ball and with .5 lb.  Beanbag, with NMES for neuro re-ed 3-8x each []  Tactile cues   []   Verbal cues []  Yes         []  Tactile cues   []   Verbal cues []  Yes          THERAPEUTIC EXERCISE and MANUAL TECHNIQUES   (TE OR MT)      Prone posterior shoulder strengthening Retraction 6x  Ts 6x   Max cues for breathing   AROM with BUE Chest Press BUE 2# each, min A with LUE 8x  Shoulder flexion supine 1# LUE 8x  Shoulder abduction sidelying 0-1# LUE 6x  Horizontal abduction supine/sidelying 0-1# LUE 8x each   [x]  Tactile cues   [x]   Verbal cues [x]  Yes  Mod cues    Mod to Max cues for breathing    Reviewed elbow flexion/extension in supine for HEP   Hand strengthening Intrinsic Plus position  DIP flexion position  Finger/ab/adduction  Velcro rollers - 5 finger, three-jaw Tactile/verbal cues Yes Mod cues/Max A   Shoulder strengthening Scapular retraction with red band  10x BUE      Putty exercises Green  Gripping 10x  Three jaw pinch 10x with table  Tip pinch 10x with table  Lateral pinch 10x    Verbal review of finger extension with putty (added to HEP) []  Tactile cues   [x]   Verbal cues [x]  Yes      strengthening 25# gripper brown 10x, cues for full range []  Tactile cues   [x]   Verbal cues [x]  Yes     Scapular mobilizations sidelying 40 second hold x3 for upward rotation and retraction [x]  Tactile cues   [x]   Verbal cues [x]  Yes     Elbow flexion/extension With 3# high table gravity eliminated 10x each [x]  Tactile cues   [x]   Verbal cues [x]  Yes     Therabar green  stick 8x frowns, 8x smiles, 8x twists in supine [x]  Tactile cues   [x]   Verbal cues [x]  Yes  Cues for breathing   Soft tissue mobilization To tricep with trigger point reported but improved after soft tissue mobilization [x]  Tactile cues   [x]   Verbal cues [x]  Yes     PROM RUE Therapist assisted LUE to overhead shoulder flexion 10x each  Therapist assisted LUE to overhead scaption 10x each  Therapist assisted LUE external rotation 10x each  Therapist assisted LUE elbow flexion        Seated exercises Elbow flexion/extension - unable to complete without compensatory shoulder flexion  Supination/pronation - with 5# weight 8x      Weight with AAROM with BUE 1# shoulder flexion BUE supine  1# horizontal ab/adduction BUE  1# chest press BUE supine []  Tactile cues   []   Verbal cues []  Yes        MODALITIES      NMES 15 minutes total Electrical stimulation parameters for elbow flexion with functional movement  Ramp UP/Ramp Down   2 second each  Intensity up to 25  Symmetrical Bipasic via EMPI  No marks from pads on skin after removal of ESTIM pads  [x]  Yes          []  Yes        SPLINTING         []  Tactile cues   []   Verbal cues []  Yes   []  See separate note regarding splint precautions/contraindications      []  Tactile cues   []   Verbal cues []  Yes  []  See separate note regarding splint precautions/contraindications             Functional Outcome Measure:   []NA   10/20/21 Quickdash Score of 34, DSI 52.3%  10/27/21 Quickdash Score of 37, DSI 59.09%    Treatment/Activity Tolerance:    Patients response to treatment:   [x]Patient tolerated treatment well []Patient limited by fatigue   []Patient limited by pain  []Patient limited by other medical complications   []Other:     Assessment: Patient is progressing in goals but continue to demonstrate limitations in LUE shoulder and elbow ROM that limit his ability to incorporate his LUE into meaningful work activities and return to driving. He requires manual assist for PROM and also neuro re-ed to retrain his RUE which had part of his bicep removed. He is consistently improving in his progress with his HEP which necessitates therapist skill and education for improving patient participation in these tasks at home. Patient will require additional OT 2x/week for 4 weeks for the following treatments: ADL, Therapeutic Exercise, Neuromuscular Re-Education, Electrical Stimulation attended for neuro re-ed, manual therapy, and therapeutic activity. GOALS: Goals established 9/29/21   Patient stated goal: \"to get this left arm going\"  [x]? Progressing: []? Met: []? Not Met: []? Adjusted     STG TO BE MET IN 2 WEEKS      1.  Pt will report a QuickDASH Symptom Severity Scale score of 10% or less indicating increased safety and functional independence in desired occupational pursuits. [x]? Progressing: []? Met: []? Not Met: []? Adjusted     2. Patient will be I with UE HEP. - GOAL MET 10/20/21     3. Patient will verbalize increased use of LUE in ADL/IADL tasks. - GOAL MET 10/20/21 for opening doors     LTG TO BE MET IN 4 WEEKS     1. Patient will demonstrate improved  strength for improved return to ADLs/IADLs. GOAL MET 10/27     2. Patient will demonstrate improved coordination with LUE for improved return to ADLs/IADLs. - GOAL MET 10/27     3. Patient will verbalize mod I with all ADLs with incorporation of LUE. - REQUIRES OCCASIONAL MIN A, BUT  PROGRESSING FOR HOLDING PLATE AND BOWL WITH LUE, PROGRESSING WITH TYING SHOES WITH LUE, PROGRESSING WITH WASHING BODY WITH LUE, LIMITED FOR ACTIVITIES THAT REQUIRE RESISTANCE 10/27    NEW STG TO BE MET IN 2 WEEKS Established 10/27/21 to be met by 11/12/21  1. Patient will be I with UE strengthening HEP. 2. Patient will verbalize increased use of LUE in activities that require him to  weighted items for ADLs/IADLs with LUE. (I.e. moving mug for coffee)    LTG TO BE MET IN 4 WEEKS Established 10/27/21 to be met by 11/26/21    1. Patient will demonstrate improved pinch strength for improving LUE participation in home maintenance and work-based tasks.     2. Patient will demonstrate improved coordination with LUE as evidenced by increased speed on 9 hole peg test for improving LUE participation and independence with ADL/IADL tasks.     Prognosis: [x]Good   []Fair   []Poor    Patient Requires Follow-up:  [x]Yes  []No    Plan: []Plan of care initiated     []Continue per plan of care 2x/week for 4 weeks   [x] Alter current plan (additional OT 2x/week for 4 weeks)    []Hold pending MD visit []Discharge    Time in: 0945 Time out: 1220    Timed Code Treatment Minutes: 55, 6403-3282 and break for cardiac rehab, then 5060-1207 for taping    Total Treatment Minutes: 55    Medicare Cap total YTD:   [x]N/A    Workers Comp Time Stamp  [x]N/A   Time In:   Time Out:    Electronically signed by:  LAKEISHA Cordero/MARINA

## 2021-10-28 ENCOUNTER — HOSPITAL ENCOUNTER (OUTPATIENT)
Dept: PULMONOLOGY | Age: 76
Discharge: HOME | End: 2021-10-28
Payer: MEDICARE

## 2021-10-28 DIAGNOSIS — J84.10 PULMONARY FIBROSIS (HCC): ICD-10-CM

## 2021-10-28 PROCEDURE — 94618 PULMONARY STRESS TESTING: CPT

## 2021-10-28 NOTE — PROCEDURES
[address] [facility] Pulmonary Function Lab - Six Minute Walk      Test Performed on:   Room Air__X____   Oxygen at __1___ lpm via N/C- continuous Oxygen at _____ lpm via N/C- OCD  Assist Device Used During Test:  None___X___ Cane________ Walker_________    Modified Radha's Scale  0 Nothing at all 5 Strong    0.5 Extremely Weak 6 Stronger (Hard)    1 Very weak 7 Very Strong   2 Weak (light) 8 Very Very Strong   3 Moderate 9 Extremely Strong   4 Somewhat Strong 10 Maximum All out      Time SpO2 Heart Rate Respiratory Rate Dyspnea-  Modified Radhas Scale Fatigue- Modified Radhas Scale Other Symptoms   Baseline                     96% room air @rest 62 18 0.5 0.5      1 minute                     93% 85 18 0.5 0.5    2 minutes                     91% 94 20 1 1      3 minutes                     91% 96 20 1 1    4 minutes                     88% 100 22 2 2    5 minutes                     87%  24 3 3 Pt stopped, placed on 1L O2 to keep sat 88% and above   6 minutes                     93% 1L 94 26 5 5    Recovery x 1 minute                     95% 1L 69 22 4 4    Recovery x 2 Minute                     97% RA 62 20 3 3     Number of Laps___8___ X 120 feet + _________ additional feet = Total Distance __960___feet   Stopped or paused before 6 minutes? No_______ Yes __X______  Total expected 6 MW distance is _1879___feet. Patient achieved __51__% of expected distance.    Pre Blood Pressure: 125/77  Post Blood Pressure: 111/51  Other symptoms at the end of exercise: SOB, some chest pressure, little dizziness

## 2021-10-29 ENCOUNTER — HOSPITAL ENCOUNTER (OUTPATIENT)
Dept: CARDIAC REHAB | Age: 76
Setting detail: THERAPIES SERIES
Discharge: HOME | End: 2021-10-29
Payer: MEDICARE

## 2021-10-29 ENCOUNTER — TELEPHONE (OUTPATIENT)
Dept: PULMONOLOGY | Age: 76
End: 2021-10-29

## 2021-10-29 ENCOUNTER — APPOINTMENT (OUTPATIENT)
Dept: PHYSICAL THERAPY | Age: 76
End: 2021-10-29
Payer: MEDICARE

## 2021-10-29 ENCOUNTER — APPOINTMENT (OUTPATIENT)
Dept: OCCUPATIONAL THERAPY | Age: 76
End: 2021-10-29
Payer: MEDICARE

## 2021-10-29 DIAGNOSIS — J84.9 ILD (INTERSTITIAL LUNG DISEASE) (HCC): Primary | ICD-10-CM

## 2021-10-29 DIAGNOSIS — J96.11 CHRONIC RESPIRATORY FAILURE WITH HYPOXIA (HCC): ICD-10-CM

## 2021-10-29 PROCEDURE — G0239 OTH RESP PROC, GROUP: HCPCS

## 2021-10-30 NOTE — PROCEDURES
[address] [facility]                               PULMONARY FUNCTION    PATIENT NAME: Mathew Yang                    :        1945  MED REC NO:   [id number]                          ROOM:  ACCOUNT NO:   [de-identified]                           ADMIT DATE: 10/29/2021  PROVIDER:     Arturo Mills MD    DATE OF PROCEDURE:  10/29/2021    SIX-MINUTE WALK TEST  The patient is a 36-year-old male who underwent a six-minute walk test  which shows baseline oxygen saturation of 96% at room air at rest, heart  rate of 62, respiratory rate of 18, dyspnea-modified Radha scale of 0.5,  fatigue-modified Radha scale of 0.5. After 5 minutes of walking, the  patient's saturation dropped to 87% requiring 1L per minute of oxygen  via nasal cannula and the patient's two-minute recovery parameters were  97% at room air with a heart rate of 62, respiratory rate of 20,  dyspnea-modified Radha scale of 3, fatigue-modified Radha scale of 3. The  patient walked 960 feet and total expected distance was 1879 feet. The  patient achieved 51% of the expected distance. The patient also had  some shortness of breath, chest pressure and dizziness at the end of  exercise. On the basis of this PFT, the patient has exertional  hypoxemia and will benefit from 1L of nasal cannula oxygen with  exercise. Please correlate clinically.         Laura Otto MD    D: 10/29/2021 12:04:26       T: 10/29/2021 12:06:52     [email or web address]  Job#: [id number]     Doc#: [id number]    CC:

## 2021-11-01 ENCOUNTER — HOSPITAL ENCOUNTER (OUTPATIENT)
Dept: OCCUPATIONAL THERAPY | Age: 76
Setting detail: THERAPIES SERIES
Discharge: HOME | End: 2021-11-01
Payer: MEDICARE

## 2021-11-01 ENCOUNTER — TELEPHONE (OUTPATIENT)
Dept: PULMONOLOGY | Age: 76
End: 2021-11-01

## 2021-11-01 ENCOUNTER — HOSPITAL ENCOUNTER (OUTPATIENT)
Dept: CARDIAC REHAB | Age: 76
Setting detail: THERAPIES SERIES
Discharge: HOME | End: 2021-11-01
Payer: MEDICARE

## 2021-11-01 PROCEDURE — 97140 MANUAL THERAPY 1/> REGIONS: CPT

## 2021-11-01 PROCEDURE — G0239 OTH RESP PROC, GROUP: HCPCS

## 2021-11-01 PROCEDURE — 97110 THERAPEUTIC EXERCISES: CPT

## 2021-11-01 PROCEDURE — 97112 NEUROMUSCULAR REEDUCATION: CPT

## 2021-11-01 NOTE — FLOWSHEET NOTE
Liz  80. [facility], [facility], [address] Raiza Tsang [facility], [address] [place] [address]   Phone: [phone]  Fax [phone]        Outpatient Occupational Therapy     [x] Daily Treatment Note   [] Progress Note   [] Discharge Note      Date:  2021    Patient: Mathew Park                              : 1945                                    MRN: [id number]                                         Evaluation Date: 2021                                            Medical Diagnosis/ICD 10:  S49. 92XA (ICD-10-CM) - Unspecified injury of left shoulder and upper arm, initial encounter      Treatment Diagnosis:  Decreased strength, decrease ROM, decreased ADL/IADL independence     Onset Date:  2021     Referral Date:  21     Referring Physician: Dr. Richard Enrique, 21                                                                       Insurance information: [employer] Medicare Pre-cert required, 6 and 2 visits authorized prior, 6 visits authorized from 10/27-     Precautions/ Contraindications:   Red Flag Symptoms: none  Safety awareness: intact  Other: Restrictions: cardiopulmonary limitations due to COVID-19     Adhesive allergy: NO  Latex Allergy:  [x]? NO      []?YES      Preferred Language for Healthcare:   [x]? English       []? other:     C-SSRS Triggered by Intake questionnaire (Past 2 wk assessment):   [x]? No, Questionnaire did not trigger screening. []? Yes, Patient intake triggered further evaluation      []? C-SSRS Screening completed  []? PCP notified via Plan of Care  []? Emergency services notified     Plan of care sent to provider:      []? Faxed 21  [x]? Co-signature    (attempts: 1[x]?  2 []?3[]?)        __________________________________________________________________________________________________________________    Plan of care sent to provider:    []Faxed  [x]Co-signature    (attempts: 1[] 2 []3[])        Plan of care signed:    [x]Yes date: Dr. Josie Lundy 9/30/21, Dr. Josie Lundy 10/29/21          []No           Next Progress Note due:   11/26/21    Visit# / total visits:  1/8, 8/8    Plan for Next Session:  Review HEP, progress HEP    HEP instruction: Written and verbal HEP instructions provided and reviewed:  · Access Code: [email or web address]  · URL: [employer]/  · Date: 09/29/2021  · Prepared by: Judith Velásquez  · Exercises  · Finger Key  with Putty - 2-3 x daily - 7 x weekly - 3 sets - 10 reps  · Putty Squeezes - 2-3 x daily - 7 x weekly - 3 sets - 10 reps  · Seated Three Finger Pinch with Putty - 2-3 x daily - 7 x weekly - 3 sets - 10 reps  · Tip Pinch with Putty - 2-3 x daily - 7 x weekly - 3 sets - 10 reps  · Shoulder Flexion AAROM - 2-3 x daily - 7 x weekly - 3 sets - 10 reps  · Supine Shoulder Horizontal Abduction Adduction AAROM with Dowel - 2-3 x daily - 7 x weekly - 3 sets - 10 reps  · Elbow flexion with weighted items in supine  · Hose bending     Subjective: Patient reports he is not doing much with weight at home, but is working on bending and moving his elbow and shoulder. Pain level: 0/10     Objective:      Elbow flexion: 135 degrees PROM, 115 degrees AROM    Exercises:    Exercises in bold performed in department today. Items not bolded are carried forward from prior visits for continuity of the record.   Exercise/Equipment Resistance/Repetitions Skilled cues Added to HEP Comments      ADL/IADL TRAINING (ADL OR TA)        []  Tactile cues   []   Verbal cues []  Yes       []  Tactile cues   []   Verbal cues []  Yes      []  Tactile cues   []   Verbal cues []  Yes       []  Tactile cues   []   Verbal cues []  Yes       []  Tactile cues   []   Verbal cues []  Yes         []  Tactile cues   []   Verbal cues []  Yes         []  Tactile cues   []   Verbal cues []  Yes         []  Tactile cues   []   Verbal cues []  Yes         []  Tactile cues   []   Verbal cues []  Yes        NEUROMUSCULAR RE-EDU and THERAPEUTIC ACTIVITY (NEURO RE-ED or TA)      Kinesiotaping for LUE [facility] [employer] Tri-Pull and Scapular retraction []  Tactile cues   [x]   Verbal cues [x]  Yes     Weightbearing At side of mat,  min cues for elbow extension, initially max A for scapular stabilization, 40 seconds x3 [x]  Tactile cues   [x]   Verbal cues [x]  Yes  Max to min cues   Scapular protraction strengthening with elbow extension With holding dowel and LUE reaching to dowel and out 8x also with addition of theraband 8x but unable to maintain [x]  Tactile cues   [x]   Verbal cues [x]  Yes  Mod cues   Pushing shopping cart forward With elbow extension and shoulder flexion and posterior pelvic tilt Verbal cues  Max cues for full range, keeping shoulder down, head up   Neuro Re-Ed in Sidelying With facilitation of scapula for horizontal abduction, shoulder abduction, and inhibition of upper trap with trigger point release at upper trap []  Tactile cues   []   Verbal cues []  Yes     Short arc propellers Elbow flexion BUE 8x, min A when completing only with LUE  External rotation LUE, assist for setup []  Tactile cues   [x]   Verbal cues [x]  Yes  Cues for positioning     Balloon toss and catch 15x with incorporation of BUE, mirror for visual cueing, elbow flexion/shoulder flexion, external rotation []  Tactile cues   []   Verbal cues []  Yes      Elbow flexion/extension training Elbow flexion/extension supine with .5 lb.  Beanbag 15x []  Tactile cues   []   Verbal cues []  Yes         []  Tactile cues   []   Verbal cues []  Yes          THERAPEUTIC EXERCISE and MANUAL TECHNIQUES   (TE OR MT)      Prone posterior shoulder strengthening Retraction 6x  Ts 6x   Max cues for breathing   AROM with BUE Chest Press BUE 1-2# each, min A with LUE 8x  Shoulder flexion supine 1# LUE 10x, min A  Shoulder abduction sidelying 1# LUE 10x, min A  Horizontal abduction supine/sidelying 1# LUE 10x each   [x]  Tactile cues   [x]   Verbal cues [x]  Yes  Mod cues    Mod to Max cues for breathing     Hand strengthening Intrinsic Plus position  DIP flexion position  Finger/ab/adduction  Velcro rollers - 5 finger, three-jaw Tactile/verbal cues Yes Mod cues/Max A   Shoulder strengthening Scapular retraction with red band  10x BUE      Putty exercises Green  Gripping 10x  Three jaw pinch 10x with table  Tip pinch 10x with table  Lateral pinch 10x    Verbal review of finger extension with putty (added to HEP) []  Tactile cues   [x]   Verbal cues [x]  Yes      strengthening 25# gripper brown 10x, cues for full range []  Tactile cues   [x]   Verbal cues [x]  Yes     Scapular mobilizations sidelying 40 second hold x3 for upward rotation and retraction [x]  Tactile cues   [x]   Verbal cues [x]  Yes     Elbow flexion/extension With 3# high table gravity eliminated 10x each [x]  Tactile cues   [x]   Verbal cues [x]  Yes     Therabar green  stick 10x smiles and frowns, cues for control, breathing, while talking/naming, relaxation of upper neck musculature [x]  Tactile cues   [x]   Verbal cues [x]  Yes  Cues for breathing   Soft tissue mobilization To tricep with trigger point reported but improved after soft tissue mobilization [x]  Tactile cues   [x]   Verbal cues [x]  Yes     PROM RUE Therapist assisted LUE to overhead shoulder flexion 10x each  Therapist assisted LUE to overhead scaption 10x each  Therapist assisted LUE external rotation 10x each  Therapist assisted LUE elbow flexion        Seated exercises Elbow flexion/extension - unable to complete without compensatory shoulder flexion  Supination/pronation - with 5# weight 8x      Weight with AAROM with BUE 1# shoulder flexion BUE supine  1# horizontal ab/adduction BUE  1# chest press BUE supine []  Tactile cues   []   Verbal cues []  Yes        MODALITIES      NMES 15 minutes total Electrical stimulation parameters for elbow flexion with functional movement  Ramp UP/Ramp Down   2 second each  Intensity up to 25  Symmetrical Bipasic via EMPI  No marks from pads on skin after removal of ESTIM pads  [x]  Yes          []  Yes        SPLINTING         []  Tactile cues   []   Verbal cues []  Yes   []  See separate note regarding splint precautions/contraindications      []  Tactile cues   []   Verbal cues []  Yes  []  See separate note regarding splint precautions/contraindications             Functional Outcome Measure:   []NA   10/20/21 Quickdash Score of 34, DSI 52.3%  10/27/21 Quickdash Score of 37, DSI 59.09%    Treatment/Activity Tolerance:    Patients response to treatment:   [x]Patient tolerated treatment well []Patient limited by fatigue   []Patient limited by pain  []Patient limited by other medical complications   []Other:     Assessment: Patient is progressing in goals but continue to demonstrate limitations in LUE shoulder and elbow ROM that limit his ability to incorporate his LUE into meaningful work activities and return to driving. He requires manual assist for PROM and also neuro re-ed to retrain his LUE which had part of his bicep removed. He is consistently improving in his progress with his HEP which necessitates therapist skill and education for improving patient participation in these tasks at home. Patient will require additional OT 2x/week for 4 weeks for the following treatments: ADL, Therapeutic Exercise, Neuromuscular Re-Education, Electrical Stimulation attended for neuro re-ed, manual therapy, and therapeutic activity. GOALS: Goals established 9/29/21   Patient stated goal: \"to get this left arm going\"  [x]? Progressing: []? Met: []? Not Met: []? Adjusted     STG TO BE MET IN 2 WEEKS      1. Pt will report a QuickDASH Symptom Severity Scale score of 10% or less indicating increased safety and functional independence in desired occupational pursuits. [x]? Progressing: []? Met: []? Not Met: []? Adjusted     2. Patient will be I with UE HEP. - GOAL MET 10/20/21     3.  Patient will verbalize increased use of LUE in ADL/IADL tasks. - GOAL MET 10/20/21 for opening doors     LTG TO BE MET IN 4 WEEKS     1. Patient will demonstrate improved  strength for improved return to ADLs/IADLs. GOAL MET 10/27     2. Patient will demonstrate improved coordination with LUE for improved return to ADLs/IADLs. - GOAL MET 10/27     3. Patient will verbalize mod I with all ADLs with incorporation of LUE. - REQUIRES OCCASIONAL MIN A, BUT  PROGRESSING FOR HOLDING PLATE AND BOWL WITH LUE, PROGRESSING WITH TYING SHOES WITH LUE, PROGRESSING WITH WASHING BODY WITH LUE, LIMITED FOR ACTIVITIES THAT REQUIRE RESISTANCE 10/27    NEW STG TO BE MET IN 2 WEEKS Established 10/27/21 to be met by 11/12/21  1. Patient will be I with UE strengthening HEP. 2. Patient will verbalize increased use of LUE in activities that require him to  weighted items for ADLs/IADLs with LUE. (I.e. moving mug for coffee)    LTG TO BE MET IN 4 WEEKS Established 10/27/21 to be met by 11/26/21    1. Patient will demonstrate improved pinch strength for improving LUE participation in home maintenance and work-based tasks.     2. Patient will demonstrate improved coordination with LUE as evidenced by increased speed on 9 hole peg test for improving LUE participation and independence with ADL/IADL tasks.     Prognosis: [x]Good   []Fair   []Poor    Patient Requires Follow-up:  [x]Yes  []No    Plan: []Plan of care initiated     [x]Continue per plan of care 2x/week for 4 weeks   [] Alter current plan (additional OT 2x/week for 4 weeks)    []Hold pending MD visit []Discharge    Time in: [phone] Time out: 1045    Timed Code Treatment Minutes: 58    Total Treatment Minutes: 58    Medicare Cap total YTD:   [x]N/A    Workers Comp Time Stamp  [x]N/A   Time In:   Time Out:    Electronically signed by:  LAKEISHA Pond/MARINA

## 2021-11-01 NOTE — TELEPHONE ENCOUNTER
Document  RE: Huber Morin MD   Sent: Mon November 01, 2021  2:56 PM   To: Olena López LPN; [facility] Clinical Staff          Message    Patient qualifies for oxygen at night time at 2 LPM-please arrange if patient does not have

## 2021-11-03 ENCOUNTER — APPOINTMENT (OUTPATIENT)
Dept: OCCUPATIONAL THERAPY | Age: 76
End: 2021-11-03
Payer: MEDICARE

## 2021-11-03 ENCOUNTER — APPOINTMENT (OUTPATIENT)
Dept: PHYSICAL THERAPY | Age: 76
End: 2021-11-03
Payer: MEDICARE

## 2021-11-03 ENCOUNTER — HOSPITAL ENCOUNTER (OUTPATIENT)
Dept: CARDIAC REHAB | Age: 76
Setting detail: THERAPIES SERIES
Discharge: HOME | End: 2021-11-03
Payer: MEDICARE

## 2021-11-03 PROCEDURE — G0239 OTH RESP PROC, GROUP: HCPCS

## 2021-11-05 ENCOUNTER — HOSPITAL ENCOUNTER (OUTPATIENT)
Dept: OCCUPATIONAL THERAPY | Age: 76
Setting detail: THERAPIES SERIES
Discharge: HOME | End: 2021-11-05
Payer: MEDICARE

## 2021-11-05 ENCOUNTER — HOSPITAL ENCOUNTER (OUTPATIENT)
Dept: CARDIAC REHAB | Age: 76
Setting detail: THERAPIES SERIES
Discharge: HOME | End: 2021-11-05
Payer: MEDICARE

## 2021-11-05 ENCOUNTER — APPOINTMENT (OUTPATIENT)
Dept: OCCUPATIONAL THERAPY | Age: 76
End: 2021-11-05
Payer: MEDICARE

## 2021-11-05 ENCOUNTER — APPOINTMENT (OUTPATIENT)
Dept: PHYSICAL THERAPY | Age: 76
End: 2021-11-05
Payer: MEDICARE

## 2021-11-05 PROCEDURE — G0239 OTH RESP PROC, GROUP: HCPCS

## 2021-11-05 PROCEDURE — 97110 THERAPEUTIC EXERCISES: CPT

## 2021-11-05 PROCEDURE — 97530 THERAPEUTIC ACTIVITIES: CPT

## 2021-11-05 PROCEDURE — 97140 MANUAL THERAPY 1/> REGIONS: CPT

## 2021-11-05 NOTE — FLOWSHEET NOTE
Liz  80. and Therapy, [facility], [address], [address], [address]   Phone: [phone]  Fax [phone]        Outpatient Occupational Therapy     [x] Daily Treatment Note   [] Progress Note   [] Discharge Note      Date:  2021    Patient: Gabrielle Wolfe                              : 1945                                    MRN: [id number]                                         Evaluation Date: 2021                                            Medical Diagnosis/ICD 10:  S49. 92XA (ICD-10-CM) - Unspecified injury of left shoulder and upper arm, initial encounter      Treatment Diagnosis:  Decreased strength, decrease ROM, decreased ADL/IADL independence     Onset Date:  2021     Referral Date:  21     Referring Physician: Dr. Kosta Theodore, 21                                                                       Insurance information: [employer] Medicare Pre-cert required, 6 and 2 visits authorized prior, 6 visits authorized from 10/27-     Precautions/ Contraindications:   Red Flag Symptoms: none  Safety awareness: intact  Other: Restrictions: cardiopulmonary limitations due to COVID-19     Adhesive allergy: NO  Latex Allergy:  [x]? NO      []?YES      Preferred Language for Healthcare:   [x]? English       []? other:     C-SSRS Triggered by Intake questionnaire (Past 2 wk assessment):   [x]? No, Questionnaire did not trigger screening. []? Yes, Patient intake triggered further evaluation      []? C-SSRS Screening completed  []? PCP notified via Plan of Care  []? Emergency services notified     Plan of care sent to provider:      []? Faxed 21  [x]? Co-signature    (attempts: 1[x]?  2 []?3[]?)        __________________________________________________________________________________________________________________    Plan of care sent to provider:    []Faxed  [x]Co-signature    (attempts: 1[] 2 []3[])        Plan of care signed:    [x]Yes date: Dr. Susy Amaya 9/30/21, Dr. Susy Amaya 10/29/21          []No           Next Progress Note due:   11/26/21    Visit# / total visits:  2/8, 8/8    Plan for Next Session:  Review HEP, progress HEP    HEP instruction: Written and verbal HEP instructions provided and reviewed:  · Access Code: [email or web address]  · URL: [employer]/  · Date: 09/29/2021  · Prepared by: Lalit Mine  · Exercises  · Finger Key  with Putty - 2-3 x daily - 7 x weekly - 3 sets - 10 reps  · Putty Squeezes - 2-3 x daily - 7 x weekly - 3 sets - 10 reps  · Seated Three Finger Pinch with Putty - 2-3 x daily - 7 x weekly - 3 sets - 10 reps  · Tip Pinch with Putty - 2-3 x daily - 7 x weekly - 3 sets - 10 reps  · Shoulder Flexion AAROM - 2-3 x daily - 7 x weekly - 3 sets - 10 reps  · Supine Shoulder Horizontal Abduction Adduction AAROM with Dowel - 2-3 x daily - 7 x weekly - 3 sets - 10 reps  · Elbow flexion with weighted items in supine  · Hose bending     Subjective: Patient reports he is using his 1# hammer at home for his exercises. Pain level: 0/10     Objective:      VSS at end of session  B.P. 111/65  O2 92% on room air  HR 77    Exercises:    Exercises in bold performed in department today. Items not bolded are carried forward from prior visits for continuity of the record.   Exercise/Equipment Resistance/Repetitions Skilled cues Added to HEP Comments      ADL/IADL TRAINING (ADL OR TA)        []  Tactile cues   []   Verbal cues []  Yes       []  Tactile cues   []   Verbal cues []  Yes      []  Tactile cues   []   Verbal cues []  Yes       []  Tactile cues   []   Verbal cues []  Yes       []  Tactile cues   []   Verbal cues []  Yes         []  Tactile cues   []   Verbal cues []  Yes         []  Tactile cues   []   Verbal cues []  Yes         []  Tactile cues   []   Verbal cues []  Yes         []  Tactile cues   []   Verbal cues []  Yes        NEUROMUSCULAR RE-EDU and THERAPEUTIC ACTIVITY (NEURO RE-ED or TA) Kinesiotaping for LUE [facility] Tri-Pull and Scapular retraction []  Tactile cues   [x]   Verbal cues [x]  Yes     Weightbearing At side of mat,  min cues for elbow extension, initially max A for scapular stabilization, 40 seconds x3 [x]  Tactile cues   [x]   Verbal cues [x]  Yes  Max to min cues   Scapular protraction strengthening with elbow extension With holding dowel and LUE reaching to dowel and out 8x also with addition of theraband 8x but unable to maintain [x]  Tactile cues   [x]   Verbal cues [x]  Yes  Mod cues   Pushing shopping cart  Forward: With elbow extension and shoulder flexion and posterior pelvic tilt 15x    To right with BUE, cues for lean    To left with BUE, cues for lean Verbal cues  Improved ability to keep shoulder down, head up with cues   Neuro Re-Ed in Sidelying With facilitation of scapula for horizontal abduction, shoulder abduction, and inhibition of upper trap with trigger point release at upper trap []  Tactile cues   []   Verbal cues []  Yes     Short arc propellers Short arc, left to center, 15x, max cues for breathing and upper trap inhibition []  Tactile cues   [x]   Verbal cues [x]  Yes  Cues for positioning     Balloon toss and catch 15x with incorporation of BUE, mirror for visual cueing, elbow flexion/shoulder flexion, external rotation []  Tactile cues   []   Verbal cues []  Yes     Elbow flexion/extension training Elbow flexion/extension supine with 1#, 15x []  Tactile cues   []   Verbal cues []  Yes  Mod cues for breathing       []  Tactile cues   []   Verbal cues []  Yes          THERAPEUTIC EXERCISE and MANUAL TECHNIQUES   (TE OR MT)      Prone posterior shoulder strengthening Retraction 6x  Ts 6x   Max cues for breathing   AROM with BUE Chest Press BUE 1# each, min A with LUE 10x  Shoulder flexion supine 1# LUE 10x, min A  Shoulder abduction sidelying 1# LUE 10x, min A  Horizontal abduction supine/sidelying 1# LUE 10x each   [x]  Tactile cues   [x]   Verbal cues [x]  Yes  Mod cues    Mod cues for breathing     Hand strengthening Intrinsic Plus position  DIP flexion position  Finger/ab/adduction  Velcro rollers - 5 finger, three-jaw with 75% velcro friction Tactile/verbal cues Yes Mod cues for breathing   Shoulder strengthening Scapular retraction with red band  10x BUE      Putty exercises Green  Gripping 10x  Three jaw pinch 10x with table  Tip pinch 10x with table  Lateral pinch 10x    Verbal review of finger extension with putty (added to HEP) []  Tactile cues   [x]   Verbal cues [x]  Yes      strengthening 25# gripper brown 10x, cues for full range []  Tactile cues   [x]   Verbal cues [x]  Yes     Scapular mobilizations sidelying 40 second hold x3 for upward rotation and retraction [x]  Tactile cues   [x]   Verbal cues [x]  Yes     Elbow flexion/extension With 3# high table gravity eliminated 10x each [x]  Tactile cues   [x]   Verbal cues [x]  Yes     Therabar green  stick 10x smiles and frowns, cues for control, breathing, relaxation of upper neck musculature [x]  Tactile cues   [x]   Verbal cues [x]  Yes  Max Cues for breathing   Soft tissue mobilization To tricep with trigger point reported but improved after soft tissue mobilization [x]  Tactile cues   [x]   Verbal cues [x]  Yes     PROM RUE Therapist assisted LUE to overhead shoulder flexion 10x each  Therapist assisted LUE to overhead scaption 10x each  Therapist assisted LUE external rotation 10x each  Therapist assisted LUE elbow flexion        Seated exercises Elbow flexion/extension - unable to complete without compensatory shoulder flexion  Supination/pronation - with 5# weight 8x      Weight with AAROM with BUE 1# shoulder flexion BUE supine  1# horizontal ab/adduction BUE  1# chest press BUE supine []  Tactile cues   []   Verbal cues []  Yes        MODALITIES      NMES 15 minutes total Electrical stimulation parameters for elbow flexion with functional movement  Ramp UP/Ramp Down   2 second each  Intensity up to 25  Symmetrical Bipasic via EMPI  No marks from pads on skin after removal of ESTIM pads  [x]  Yes          []  Yes        SPLINTING         []  Tactile cues   []   Verbal cues []  Yes   []  See separate note regarding splint precautions/contraindications      []  Tactile cues   []   Verbal cues []  Yes  []  See separate note regarding splint precautions/contraindications             Functional Outcome Measure:   []NA   10/20/21 Quickdash Score of 34, DSI 52.3%  10/27/21 Quickdash Score of 37, DSI 59.09%    Treatment/Activity Tolerance:    Patients response to treatment:   [x]Patient tolerated treatment well []Patient limited by fatigue   []Patient limited by pain  []Patient limited by other medical complications   []Other:     Assessment: Patient is progressing in his goals but is limited in his ability to complete exercises without high level of cueing for breathing. Overall he is less intent on using upper trap muscles, but still requires manual assist to improve flexibility into increased overhead shoulder ranges with stabilization. Patient continues to improve his repetitions and/or resistance from one session to the next and is highly participatory in his HEP which allows increased progression each session. GOALS: Goals established 9/29/21   Patient stated goal: \"to get this left arm going\"  [x]? Progressing: []? Met: []? Not Met: []? Adjusted     STG TO BE MET IN 2 WEEKS      1. Pt will report a QuickDASH Symptom Severity Scale score of 10% or less indicating increased safety and functional independence in desired occupational pursuits. [x]? Progressing: []? Met: []? Not Met: []? Adjusted     2. Patient will be I with UE HEP. - GOAL MET 10/20/21     3. Patient will verbalize increased use of LUE in ADL/IADL tasks. - GOAL MET 10/20/21 for opening doors     LTG TO BE MET IN 4 WEEKS     1. Patient will demonstrate improved  strength for improved return to ADLs/IADLs.  GOAL MET 10/27     2. Patient will demonstrate improved coordination with LUE for improved return to ADLs/IADLs. - GOAL MET 10/27     3. Patient will verbalize mod I with all ADLs with incorporation of LUE. - REQUIRES OCCASIONAL MIN A, BUT  PROGRESSING FOR HOLDING PLATE AND BOWL WITH LUE, PROGRESSING WITH TYING SHOES WITH LUE, PROGRESSING WITH WASHING BODY WITH LUE, LIMITED FOR ACTIVITIES THAT REQUIRE RESISTANCE 10/27    NEW STG TO BE MET IN 2 WEEKS Established 10/27/21 to be met by 11/12/21  1. Patient will be I with UE strengthening HEP. 2. Patient will verbalize increased use of LUE in activities that require him to  weighted items for ADLs/IADLs with LUE. (I.e. moving mug for coffee)    LTG TO BE MET IN 4 WEEKS Established 10/27/21 to be met by 11/26/21    1. Patient will demonstrate improved pinch strength for improving LUE participation in home maintenance and work-based tasks.     2. Patient will demonstrate improved coordination with LUE as evidenced by increased speed on 9 hole peg test for improving LUE participation and independence with ADL/IADL tasks.     Prognosis: [x]Good   []Fair   []Poor    Patient Requires Follow-up:  [x]Yes  []No    Plan: []Plan of care initiated     [x]Continue per plan of care 2x/week for 4 weeks   [] Alter current plan (additional OT 2x/week for 4 weeks)    []Hold pending MD visit []Discharge    Time in: 0945 Time out: 1045    Timed Code Treatment Minutes: 55    Total Treatment Minutes: 58    Medicare Cap total YTD:   [x]N/A    Workers Comp Time Stamp  [x]N/A   Time In:   Time Out:    Electronically signed by:  Wan Rahman OTR/L

## 2021-11-08 ENCOUNTER — HOSPITAL ENCOUNTER (OUTPATIENT)
Dept: OCCUPATIONAL THERAPY | Age: 76
Setting detail: THERAPIES SERIES
Discharge: HOME | End: 2021-11-08
Payer: MEDICARE

## 2021-11-08 ENCOUNTER — HOSPITAL ENCOUNTER (OUTPATIENT)
Dept: CARDIAC REHAB | Age: 76
Setting detail: THERAPIES SERIES
Discharge: HOME | End: 2021-11-08
Payer: MEDICARE

## 2021-11-08 PROCEDURE — 97110 THERAPEUTIC EXERCISES: CPT

## 2021-11-08 PROCEDURE — G0239 OTH RESP PROC, GROUP: HCPCS

## 2021-11-08 PROCEDURE — 97140 MANUAL THERAPY 1/> REGIONS: CPT

## 2021-11-08 PROCEDURE — 97530 THERAPEUTIC ACTIVITIES: CPT

## 2021-11-08 NOTE — FLOWSHEET NOTE
[facility], [address]   Phone: [phone]  Fax [phone]        Outpatient Occupational Therapy     [x] Daily Treatment Note   [] Progress Note   [] Discharge Note      Date:  2021    Patient: Navneet Littlejohn                              : 1945                                    MRN: [id number]                                         Evaluation Date: 2021                                            Medical Diagnosis/ICD 10:  S49. 92XA (ICD-10-CM) - Unspecified injury of left shoulder and upper arm, initial encounter      Treatment Diagnosis:  Decreased strength, decrease ROM, decreased ADL/IADL independence     Onset Date:  2021     Referral Date:  21     Referring Physician: Dr. Josie Lundy, 21                                                                       Insurance information: [employer] Medicare Pre-cert required, 6 and 2 visits authorized prior, 6 visits authorized from 10/27-     Precautions/ Contraindications:   Red Flag Symptoms: none  Safety awareness: intact  Other: Restrictions: cardiopulmonary limitations due to COVID-19     Adhesive allergy: NO  Latex Allergy:  [x]? NO      []?YES      Preferred Language for Healthcare:   [x]? English       []? other:     C-SSRS Triggered by Intake questionnaire (Past 2 wk assessment):   [x]? No, Questionnaire did not trigger screening. []? Yes, Patient intake triggered further evaluation      []? C-SSRS Screening completed  []? PCP notified via Plan of Care  []? Emergency services notified     Plan of care sent to provider:      []? Faxed 21  [x]? Co-signature    (attempts: 1[x]?  2 []?3[]?)        __________________________________________________________________________________________________________________    Plan of care sent to provider:    []Faxed  [x]Co-signature    (attempts: 1[] 2 []3[])        Plan of care signed:    [x]Yes date: Dr. Bard Webber 9/30/21, Dr. Bard Webber 10/29/21          []No           Next Progress Note due:   11/26/21    Visit# / total visits:  3/8, 8/8    Plan for Next Session:  Review HEP, progress HEP    HEP instruction: Written and verbal HEP instructions provided and reviewed:  · Access Code: [email or web address]  · URL: [employer]/  · Date: 09/29/2021  · Prepared by: Iris Dang  · Exercises  · Finger Key  with Putty - 2-3 x daily - 7 x weekly - 3 sets - 10 reps  · Putty Squeezes - 2-3 x daily - 7 x weekly - 3 sets - 10 reps  · Seated Three Finger Pinch with Putty - 2-3 x daily - 7 x weekly - 3 sets - 10 reps  · Tip Pinch with Putty - 2-3 x daily - 7 x weekly - 3 sets - 10 reps  · Shoulder Flexion AAROM - 2-3 x daily - 7 x weekly - 3 sets - 10 reps  · Supine Shoulder Horizontal Abduction Adduction AAROM with Dowel - 2-3 x daily - 7 x weekly - 3 sets - 10 reps  · Elbow flexion with weighted items in supine  · Hose bending     Subjective: Patient reports he is using his 1# hammer at home for his exercises. He states he is doing them seated. Pain level: 0/10     Objective:          Exercises:    Exercises in bold performed in department today. Items not bolded are carried forward from prior visits for continuity of the record.   Exercise/Equipment Resistance/Repetitions Skilled cues Added to HEP Comments      ADL/IADL TRAINING (ADL OR TA)        []  Tactile cues   []   Verbal cues []  Yes       []  Tactile cues   []   Verbal cues []  Yes      []  Tactile cues   []   Verbal cues []  Yes       []  Tactile cues   []   Verbal cues []  Yes       []  Tactile cues   []   Verbal cues []  Yes         []  Tactile cues   []   Verbal cues []  Yes         []  Tactile cues   []   Verbal cues []  Yes         []  Tactile cues   []   Verbal cues []  Yes         []  Tactile cues   []   Verbal cues []  Yes        NEUROMUSCULAR RE-EDU and THERAPEUTIC ACTIVITY (NEURO RE-ED or TA)      Kinesiotaping for LUYOAV ALTAMIRANO [place] Tri-Pull and Scapular retraction []  Tactile cues   [x]   Verbal cues [x]  Yes     Weightbearing At side of mat,  min cues for elbow extension, initially max A for scapular stabilization, 40 seconds x3 [x]  Tactile cues   [x]   Verbal cues [x]  Yes  Max to min cues   Scapular protraction strengthening with elbow extension With holding dowel and LUE reaching to dowel and out 8x also with addition of theraband 8x but unable to maintain [x]  Tactile cues   [x]   Verbal cues [x]  Yes  Mod cues   Pushing shopping cart  Forward: With elbow extension and shoulder flexion and posterior pelvic tilt 15x    To right with BUE, cues for lean 10x    To left with BUE, cues for lean 10x Verbal cues  Improved ability to keep shoulder down, head up with cues   Neuro Re-Ed in Sidelying With facilitation of scapula for horizontal abduction, shoulder abduction, and inhibition of upper trap with trigger point release at upper trap []  Tactile cues   []   Verbal cues []  Yes     Short arc propellers Short arc, left to center, 15x, max cues for breathing and upper trap inhibition []  Tactile cues   [x]   Verbal cues [x]  Yes  Cues for positioning     Balloon toss and catch 15x with incorporation of BUE, mirror for visual cueing, elbow flexion/shoulder flexion, external rotation []  Tactile cues   []   Verbal cues []  Yes     Elbow flexion/extension training Elbow flexion/extension supine with beanbag and 1#, 15x, with NMES []  Tactile cues   []   Verbal cues []  Yes  Mod cues for breathing       []  Tactile cues   []   Verbal cues []  Yes          THERAPEUTIC EXERCISE and MANUAL TECHNIQUES   (TE OR MT)      Standing with arms stretching up into wall 5x BUE with active assisted push up into full range    Attempted stretch with straight elbow (unable to sustain)      Prone posterior shoulder strengthening Retraction 6x  Ts 6x   Max cues for breathing   AROM with BUE Chest Press BUE 1# each, min A with LUE 10x  Shoulder flexion Weight with AAROM with BUE 1# shoulder flexion BUE supine  1# horizontal ab/adduction BUE  1# chest press BUE supine []  Tactile cues   []   Verbal cues []  Yes        MODALITIES      NMES 15 minutes total Electrical stimulation parameters for elbow flexion with functional movement  Ramp UP/Ramp Down   2 second each  Intensity up to 25  Symmetrical Bipasic via EMPI  No marks from pads on skin after removal of ESTIM pads  [x]  Yes          []  Yes        SPLINTING         []  Tactile cues   []   Verbal cues []  Yes   []  See separate note regarding splint precautions/contraindications      []  Tactile cues   []   Verbal cues []  Yes  []  See separate note regarding splint precautions/contraindications             Functional Outcome Measure:   []NA   10/20/21 Quickdash Score of 34, DSI 52.3%  10/27/21 Quickdash Score of 37, DSI 59.09%    Treatment/Activity Tolerance:    Patients response to treatment:   [x]Patient tolerated treatment well []Patient limited by fatigue   []Patient limited by pain  []Patient limited by other medical complications   []Other:     Assessment: Patient requires increased support and facilitation compared to prior sessions and in order to progress his POC. High level of cueing needed to facilitate effective breathing techniques. GOALS: Goals established 9/29/21   Patient stated goal: \"to get this left arm going\"  [x]? Progressing: []? Met: []? Not Met: []? Adjusted     STG TO BE MET IN 2 WEEKS      1. Pt will report a QuickDASH Symptom Severity Scale score of 10% or less indicating increased safety and functional independence in desired occupational pursuits. [x]? Progressing: []? Met: []? Not Met: []? Adjusted     2. Patient will be I with UE HEP. - GOAL MET 10/20/21     3. Patient will verbalize increased use of LUE in ADL/IADL tasks. - GOAL MET 10/20/21 for opening doors     LTG TO BE MET IN 4 WEEKS     1.  Patient will demonstrate improved  strength for improved return to

## 2021-11-10 ENCOUNTER — HOSPITAL ENCOUNTER (OUTPATIENT)
Dept: CARDIAC REHAB | Age: 76
Setting detail: THERAPIES SERIES
Discharge: HOME | End: 2021-11-10
Payer: MEDICARE

## 2021-11-10 ENCOUNTER — HOSPITAL ENCOUNTER (OUTPATIENT)
Dept: OCCUPATIONAL THERAPY | Age: 76
Setting detail: THERAPIES SERIES
Discharge: HOME | End: 2021-11-10
Payer: MEDICARE

## 2021-11-10 PROCEDURE — G0239 OTH RESP PROC, GROUP: HCPCS

## 2021-11-10 PROCEDURE — 97112 NEUROMUSCULAR REEDUCATION: CPT

## 2021-11-10 PROCEDURE — 97110 THERAPEUTIC EXERCISES: CPT

## 2021-11-10 NOTE — FLOWSHEET NOTE
[facility], [address]   Phone: [phone]  Fax [phone]        Outpatient Occupational Therapy     [x] Daily Treatment Note   [] Progress Note   [] Discharge Note      Date:  11/10/2021    Patient: Gabrielle Wolfe                              : 1945                                    MRN: [id number]                                         Evaluation Date: 2021                                            Medical Diagnosis/ICD 10:  S49. 92XA (ICD-10-CM) - Unspecified injury of left shoulder and upper arm, initial encounter      Treatment Diagnosis:  Decreased strength, decrease ROM, decreased ADL/IADL independence     Onset Date:  2021     Referral Date:  21     Referring Physician: Dr. Kosta Theodore, 21                                                                       Insurance information: [employer] Pre-cert required, 6 and 2 visits authorized prior, 6 visits authorized from 10/27-     Precautions/ Contraindications:   Red Flag Symptoms: none  Safety awareness: intact  Other: Restrictions: cardiopulmonary limitations due to COVID-19     Adhesive allergy: NO  Latex Allergy:  [x]? NO      []?YES      Preferred Language for Healthcare:   [x]? English       []? other:     C-SSRS Triggered by Intake questionnaire (Past 2 wk assessment):   [x]? No, Questionnaire did not trigger screening. []? Yes, Patient intake triggered further evaluation      []? C-SSRS Screening completed  []? PCP notified via Plan of Care  []? Emergency services notified     Plan of care sent to provider:      []? Faxed 21  [x]? Co-signature    (attempts: 1[x]?  2 []?3[]?)        __________________________________________________________________________________________________________________    Plan of care sent to provider:    []Faxed  [x]Co-signature    (attempts: 1[] 2 []3[])        Plan of care signed:    [x]Yes date: Dr. Kosta Theodore 9/30/21, Dr. Kosta Theodore 10/29/21          []No           Next Progress Note due:   11/26/21    Visit# / total visits:  4/8, 8/8    Plan for Next Session:  Review HEP, progress HEP    HEP instruction: Written and verbal HEP instructions provided and reviewed:  · Access Code: [email or web address]  · URL: [employer]/  · Date: 09/29/2021  · Prepared by: Conchita Kearney  · Exercises  · Finger Key  with Putty - 2-3 x daily - 7 x weekly - 3 sets - 10 reps  · Putty Squeezes - 2-3 x daily - 7 x weekly - 3 sets - 10 reps  · Seated Three Finger Pinch with Putty - 2-3 x daily - 7 x weekly - 3 sets - 10 reps  · Tip Pinch with Putty - 2-3 x daily - 7 x weekly - 3 sets - 10 reps  · Shoulder Flexion AAROM - 2-3 x daily - 7 x weekly - 3 sets - 10 reps  · Supine Shoulder Horizontal Abduction Adduction AAROM with Dowel - 2-3 x daily - 7 x weekly - 3 sets - 10 reps  · Elbow flexion with weighted items in supine  · Hose bending     Subjective: Patient able to demonstrate touching his nose with LUE upon entry. Pain level: 0/10     Objective:        Exercises:    Exercises in bold performed in department today. Items not bolded are carried forward from prior visits for continuity of the record.   Exercise/Equipment Resistance/Repetitions Skilled cues Added to HEP Comments      ADL/IADL TRAINING (ADL OR TA)        []  Tactile cues   []   Verbal cues []  Yes       []  Tactile cues   []   Verbal cues []  Yes      []  Tactile cues   []   Verbal cues []  Yes       []  Tactile cues   []   Verbal cues []  Yes       []  Tactile cues   []   Verbal cues []  Yes         []  Tactile cues   []   Verbal cues []  Yes         []  Tactile cues   []   Verbal cues []  Yes         []  Tactile cues   []   Verbal cues []  Yes         []  Tactile cues   []   Verbal cues []  Yes        NEUROMUSCULAR RE-EDU and THERAPEUTIC ACTIVITY (NEURO RE-ED or TA)      Kinesiotaping for LUE [employer] [place] Tri-Pull and Scapular retraction []  Tactile cues   [x]   Verbal cues [x]  Yes     Weightbearing At side of mat,  min cues for elbow extension, initially max A for scapular stabilization, 40 seconds x3 [x]  Tactile cues   [x]   Verbal cues [x]  Yes  Max to min cues   Scapular protraction strengthening with elbow extension With holding dowel and LUE reaching to dowel and out 8x also with addition of theraband 8x but unable to maintain [x]  Tactile cues   [x]   Verbal cues [x]  Yes  Mod cues   Pushing shopping cart  Forward: With elbow extension and shoulder flexion and posterior pelvic tilt 15x    To right with BUE, cues for lean 10x    To left with BUE, cues for lean 10x Verbal cues  Improved ability to keep shoulder down, head up with cues   Neuro Re-Ed in Sidelying With facilitation of scapula for horizontal abduction, shoulder abduction, and inhibition of upper trap with trigger point release at upper trap []  Tactile cues   []   Verbal cues []  Yes     Short arc propellers Short arc, left to center, 15x, max cues for breathing and upper trap inhibition []  Tactile cues   [x]   Verbal cues [x]  Yes  Cues for positioning   Clothespins 20x LUE with cues for tip pinch and three-jaw pinch    Also yellow, red, green, blue to short distance release        Balloon toss and catch 15x with incorporation of BUE, mirror for visual cueing, elbow flexion/shoulder flexion, external rotation []  Tactile cues   []   Verbal cues []  Yes     Elbow flexion/extension training Elbow flexion/extension supine with 1#, 2# with mod A, 20x total []  Tactile cues   []   Verbal cues []  Yes  Min cues for breathing    Cues for control   Sit to stand and stand to sit Without arms from high mat 5x  Without arms From low chair, 5x []  Tactile cues   []   Verbal cues []  Yes  Initial cues for form and glute activation    Improved to no cues for form    Initial cues for controlled descent    Improved to no cues for controlled descent    Increased education given regarding importance of having safe items to hold onto during sit to stand and stand to sit        THERAPEUTIC EXERCISE and MANUAL TECHNIQUES   (TE OR MT)      Standing with arms stretching up into wall 5x BUE with active assisted push up into full range    Attempted stretch with straight elbow (able to reach 50% full range before fatigue), max cues for postural alignment and glute activation      Prone posterior shoulder strengthening Retraction 6x  Ts 6x   Max cues for breathing   AROM with BUE Chest Press BUE 1# each, min A with LUE 10x  Shoulder flexion supine 2# LUE 10x, min A  Shoulder abduction supine 2# LUE 10x to 1/4 range, min A  Horizontal abduction supine/sidelying 0# LUE 4x2, activation  Shoulder abduction sidelying 0# LUE, 10x to 1/4 range, min A initially, max cues initially, improved to min cues [x]  Tactile cues   [x]   Verbal cues [x]  Yes  Min cues for breathing     Hand strengthening Intrinsic Plus position  DIP flexion position  Finger/ab/adduction  Velcro rollers - 5 finger, three-jaw with 75% velcro friction Tactile/verbal cues Yes Mod cues for breathing   Shoulder strengthening Scapular retraction with red band  10x BUE      Putty exercises Green  Gripping 10x  Three jaw pinch 10x with table  Tip pinch 10x with table  Lateral pinch 10x    Verbal review of finger extension with putty (added to HEP) []  Tactile cues   [x]   Verbal cues [x]  Yes      strengthening 25# gripper brown 10x, cues for full range []  Tactile cues   [x]   Verbal cues [x]  Yes     Scapular mobilizations sidelying 40 second hold x3 for upward rotation and retraction [x]  Tactile cues   [x]   Verbal cues [x]  Yes     Elbow flexion/extension With 3# high table gravity eliminated 10x each [x]  Tactile cues   [x]   Verbal cues [x]  Yes     Therabar green  stick 10x smiles and frowns, cues for control, breathing, relaxation of upper neck musculature [x]  Tactile cues   [x]   Verbal cues [x]  Yes  Max Cues for breathing Soft tissue mobilization To tricep with trigger point reported but improved after soft tissue mobilization [x]  Tactile cues   [x]   Verbal cues [x]  Yes     PROM RUE Therapist assisted LUE to overhead shoulder flexion 10x each  Therapist assisted LUE to overhead scaption 10x each  Therapist assisted LUE external rotation 10x each  Therapist assisted LUE elbow flexion with patient assisted for education for HEP        Seated exercises Elbow flexion/extension - unable to complete without compensatory shoulder flexion  Supination/pronation - with 5# weight 8x      Weight with AAROM with BUE 1# shoulder flexion BUE supine  1# horizontal ab/adduction BUE  1# chest press BUE supine []  Tactile cues   []   Verbal cues []  Yes        MODALITIES      NMES 15 minutes total Electrical stimulation parameters for elbow flexion with functional movement  Ramp UP/Ramp Down   2 second each  Intensity up to 25  Symmetrical Bipasic via EMPI  No marks from pads on skin after removal of ESTIM pads  [x]  Yes          []  Yes        SPLINTING         []  Tactile cues   []   Verbal cues []  Yes   []  See separate note regarding splint precautions/contraindications      []  Tactile cues   []   Verbal cues []  Yes  []  See separate note regarding splint precautions/contraindications             Functional Outcome Measure:   []NA   10/20/21 Quickdash Score of 34, DSI 52.3%  10/27/21 Quickdash Score of 37, DSI 59.09%    Treatment/Activity Tolerance:    Patients response to treatment:   [x]Patient tolerated treatment well []Patient limited by fatigue   []Patient limited by pain  []Patient limited by other medical complications   []Other:     Assessment: Patient progressing in increased resistance for shoulder exercises and progressing with elbow ROM. Patient requires review of importance of completing elbow exercises in supine for progressing strength.   Patient demonstrates improved posture after sit to stand exercises for activating posterior chain. Continue outpatient OT 2x/week for 4 weeks. GOALS: Goals established 9/29/21   Patient stated goal: \"to get this left arm going\"  [x]? Progressing: []? Met: []? Not Met: []? Adjusted     STG TO BE MET IN 2 WEEKS      1. Pt will report a QuickDASH Symptom Severity Scale score of 10% or less indicating increased safety and functional independence in desired occupational pursuits. [x]? Progressing: []? Met: []? Not Met: []? Adjusted     2. Patient will be I with UE HEP. - GOAL MET 10/20/21     3. Patient will verbalize increased use of LUE in ADL/IADL tasks. - GOAL MET 10/20/21 for opening doors     LTG TO BE MET IN 4 WEEKS     1. Patient will demonstrate improved  strength for improved return to ADLs/IADLs. GOAL MET 10/27     2. Patient will demonstrate improved coordination with LUE for improved return to ADLs/IADLs. - GOAL MET 10/27     3. Patient will verbalize mod I with all ADLs with incorporation of LUE. - REQUIRES OCCASIONAL MIN A, BUT  PROGRESSING FOR HOLDING PLATE AND BOWL WITH LUE, PROGRESSING WITH TYING SHOES WITH LUE, PROGRESSING WITH WASHING BODY WITH LUE, LIMITED FOR ACTIVITIES THAT REQUIRE RESISTANCE 10/27    NEW STG TO BE MET IN 2 WEEKS Established 10/27/21 to be met by 11/12/21  1. Patient will be I with UE strengthening HEP. 2. Patient will verbalize increased use of LUE in activities that require him to  weighted items for ADLs/IADLs with LUE. (I.e. moving mug for coffee)    LTG TO BE MET IN 4 WEEKS Established 10/27/21 to be met by 11/26/21    1. Patient will demonstrate improved pinch strength for improving LUE participation in home maintenance and work-based tasks.     2. Patient will demonstrate improved coordination with LUE as evidenced by increased speed on 9 hole peg test for improving LUE participation and independence with ADL/IADL tasks.     Prognosis: [x]Good   []Fair   []Poor    Patient Requires Follow-up:  [x]Yes  []No    Plan: []Plan of care initiated [x]Continue per plan of care 2x/week for 4 weeks   [] Alter current plan (additional OT 2x/week for 4 weeks)    []Hold pending MD visit []Discharge    Time in: 0945 Time out: 1040    Timed Code Treatment Minutes: 55    Total Treatment Minutes: 55    Medicare Cap total YTD:   [x]N/A    Workers Comp Time Stamp  [x]N/A   Time In:   Time Out:    Electronically signed by:  Jarett Riojas OTR/L

## 2021-11-12 ENCOUNTER — HOSPITAL ENCOUNTER (OUTPATIENT)
Dept: CARDIAC REHAB | Age: 76
Setting detail: THERAPIES SERIES
Discharge: HOME | End: 2021-11-12
Payer: MEDICARE

## 2021-11-12 PROCEDURE — G0239 OTH RESP PROC, GROUP: HCPCS

## 2021-11-15 ENCOUNTER — HOSPITAL ENCOUNTER (OUTPATIENT)
Dept: CARDIAC REHAB | Age: 76
Setting detail: THERAPIES SERIES
Discharge: HOME | End: 2021-11-15
Payer: MEDICARE

## 2021-11-15 ENCOUNTER — HOSPITAL ENCOUNTER (OUTPATIENT)
Dept: OCCUPATIONAL THERAPY | Age: 76
Setting detail: THERAPIES SERIES
Discharge: HOME | End: 2021-11-15
Payer: MEDICARE

## 2021-11-15 PROCEDURE — 97530 THERAPEUTIC ACTIVITIES: CPT

## 2021-11-15 PROCEDURE — 97140 MANUAL THERAPY 1/> REGIONS: CPT

## 2021-11-15 PROCEDURE — 97110 THERAPEUTIC EXERCISES: CPT

## 2021-11-15 PROCEDURE — G0239 OTH RESP PROC, GROUP: HCPCS

## 2021-11-15 NOTE — FLOWSHEET NOTE
[address] [place] [address] and [address]   Phone: [phone]  Fax [phone]        Outpatient Occupational Therapy     [x] Daily Treatment Note   [] Progress Note   [] Discharge Note      Date:  11/15/2021    Patient: Diane Starr                              : 1945                                    MRN: [id number]                                         Evaluation Date: 2021                                            Medical Diagnosis/ICD 10:  S49. 92XA (ICD-10-CM) - Unspecified injury of left shoulder and upper arm, initial encounter      Treatment Diagnosis:  Decreased strength, decrease ROM, decreased ADL/IADL independence     Onset Date:  2021     Referral Date:  21     Referring Physician: Dr. Raj Conde, 21                                                                       Insurance information: [employer] Pre-cert required, 6 and 2 visits authorized prior, 6 visits authorized from 10/27-     Precautions/ Contraindications:   Red Flag Symptoms: none  Safety awareness: intact  Other: Restrictions: cardiopulmonary limitations due to COVID-19     Adhesive allergy: NO  Latex Allergy:  [x]? NO      []?YES      Preferred Language for Healthcare:   [x]? English       []? other:     C-SSRS Triggered by Intake questionnaire (Past 2 wk assessment):   [x]? No, Questionnaire did not trigger screening. []? Yes, Patient intake triggered further evaluation      []? C-SSRS Screening completed  []? PCP notified via Plan of Care  []? Emergency services notified     Plan of care sent to provider:      []? Faxed 21  [x]? Co-signature    (attempts: 1[x]?  2 []?3[]?)        __________________________________________________________________________________________________________________    Plan of care sent to provider:    []Faxed  [x]Co-signature    (attempts: 1[] 2 []3[])        Plan of care signed:    [x]Yes date: Dr. Franky Prajapati 9/30/21, Dr. Franky Prajapati 10/29/21          []No           Next Progress Note due:   11/26/21    Visit# / total visits:  5/8, 8/8    Plan for Next Session:  Review HEP, progress HEP    HEP instruction: Written and verbal HEP instructions provided and reviewed:  · Access Code: [email or web address]  · URL: Evi/  · Date: 09/29/2021  · Prepared by: Skyler Moots  · Exercises  · Finger Key  with Putty - 2-3 x daily - 7 x weekly - 3 sets - 10 reps  · Putty Squeezes - 2-3 x daily - 7 x weekly - 3 sets - 10 reps  · Seated Three Finger Pinch with Putty - 2-3 x daily - 7 x weekly - 3 sets - 10 reps  · Tip Pinch with Putty - 2-3 x daily - 7 x weekly - 3 sets - 10 reps  · Shoulder Flexion AAROM - 2-3 x daily - 7 x weekly - 3 sets - 10 reps  · Supine Shoulder Horizontal Abduction Adduction AAROM with Dowel - 2-3 x daily - 7 x weekly - 3 sets - 10 reps  · Elbow flexion with weighted items in supine  · Hose bending     Subjective: Patient reports that he is doing well with his exercises. Pain level: 0/10     Objective:        Exercises:    Exercises in bold performed in department today. Items not bolded are carried forward from prior visits for continuity of the record.   Exercise/Equipment Resistance/Repetitions Skilled cues Added to HEP Comments      ADL/IADL TRAINING (ADL OR TA)        []  Tactile cues   []   Verbal cues []  Yes       []  Tactile cues   []   Verbal cues []  Yes      []  Tactile cues   []   Verbal cues []  Yes       []  Tactile cues   []   Verbal cues []  Yes       []  Tactile cues   []   Verbal cues []  Yes         []  Tactile cues   []   Verbal cues []  Yes         []  Tactile cues   []   Verbal cues []  Yes         []  Tactile cues   []   Verbal cues []  Yes         []  Tactile cues   []   Verbal cues []  Yes        NEUROMUSCULAR RE-EDU and THERAPEUTIC ACTIVITY (NEURO RE-ED or TA)      Kinesiotaping for LUE [facility] [place] Tri-Pull and Scapular retraction [] Tactile cues   [x]   Verbal cues [x]  Yes     Weightbearing At side of mat,  min cues for elbow extension, initially max A for scapular stabilization, 40 seconds x3 [x]  Tactile cues   [x]   Verbal cues [x]  Yes  Max to min cues   Scapular protraction strengthening with elbow extension With holding dowel and LUE reaching to dowel and out 8x also with addition of theraband 8x but unable to maintain [x]  Tactile cues   [x]   Verbal cues [x]  Yes  Mod cues   Pushing shopping cart  Forward: With elbow extension and shoulder flexion and posterior pelvic tilt 15x    To right with BUE, cues for lean 10x    To left with BUE, cues for lean 10x Verbal cues  Improved ability to keep shoulder down, head up with cues   Neuro Re-Ed in Sidelying With facilitation of scapula for horizontal abduction, shoulder abduction, and inhibition of upper trap with trigger point release at upper trap []  Tactile cues   []   Verbal cues []  Yes     Short arc propellers Short arc, left to center, 15x, max cues for breathing and upper trap inhibition []  Tactile cues   [x]   Verbal cues [x]  Yes  Cues for positioning   Clothespins 20x LUE with cues for tip pinch and three-jaw pinch    Also yellow, red, green, blue to short distance release        Balloon toss and catch 15x with incorporation of BUE, mirror for visual cueing, elbow flexion/shoulder flexion, external rotation []  Tactile cues   []   Verbal cues []  Yes     Elbow flexion/extension training Elbow flexion/extension supine with 1#, 2# with mod A, 20x total []  Tactile cues   []   Verbal cues []  Yes  Min cues for breathing    Cues for control   Sit to stand and stand to sit   Without arms From low chair, 5x  Standing on stool: one foot max A  Standing on wedge: one foot mod A  Standing on pillow: one foot min A/CGA []  Tactile cues   []   Verbal cues [x]  Yes  Initial cues for form and glute activation    Increased education given regarding importance of having safe items to hold onto during sit to stand and stand to sit        THERAPEUTIC EXERCISE and MANUAL TECHNIQUES   (TE OR MT)      Prone mat mobility Prone with external rotation stretch : 30 seconds    Prone with push up into tall kneel: mod A  Prone with quadruped: min A  Prone with quadruped with lifting up LUE   Cues for breathing   Standing with arms stretching up into wall 5x BUE with active assisted push up into full range with dycem to maintain 20 seconds    Attempted stretch with straight elbow (able to reach 50% full range before fatigue), max cues for postural alignment and glute activation    With dycem, able to maintain close to straight elbow   Cues for breathing   Prone posterior shoulder strengthening Retraction 6x  Ts 6x   Max cues for breathing   AROM with BUE Chest Press BUE 3# each, min A with LUE 10x  Shoulder flexion supine 2# LUE 10x, min A  Shoulder abduction supine 2# LUE 10x to 1/4 range, min A  Horizontal abduction supine/sidelying 0# LUE 4x2, activation  Shoulder abduction sidelying 0# LUE, 10x to 1/4 range, min A initially, max cues initially, improved to min cues [x]  Tactile cues   [x]   Verbal cues [x]  Yes  Min cues for breathing     Hand strengthening Intrinsic Plus position  DIP flexion position  Finger/ab/adduction  Velcro rollers - 5 finger, three-jaw with 75% velcro friction Tactile/verbal cues Yes Mod cues for breathing   Shoulder strengthening Scapular retraction with red band  10x BUE      Putty exercises Green  Gripping 10x  Three jaw pinch 10x with table  Tip pinch 10x with table  Lateral pinch 10x    Verbal review of finger extension with putty (added to HEP) []  Tactile cues   [x]   Verbal cues [x]  Yes      strengthening 35-55# gripper brown 10x, cues for full range  XTENSOR 5 second hold x5 []  Tactile cues   [x]   Verbal cues [x]  Yes     Scapular mobilizations sidelying 40 second hold x3 for upward rotation and retraction [x]  Tactile cues   [x]   Verbal cues [x]  Yes     Elbow flexion/extension With 3# high table gravity eliminated 10x each [x]  Tactile cues   [x]   Verbal cues [x]  Yes     Therabar green  stick 10x smiles and frowns, cues for control, breathing, relaxation of upper neck musculature [x]  Tactile cues   [x]   Verbal cues [x]  Yes  Max Cues for breathing   Soft tissue mobilization To tricep with trigger point reported but improved after soft tissue mobilization [x]  Tactile cues   [x]   Verbal cues [x]  Yes     PROM RUE Therapist assisted LUE to overhead shoulder flexion 10x each  Therapist assisted LUE to overhead scaption 10x each  Therapist assisted LUE external rotation 10x each  Therapist assisted LUE elbow flexion with patient assisted for education for HEP        Seated exercises Elbow flexion/extension - unable to complete without compensatory shoulder flexion  Supination/pronation - with 5# weight 8x      Weight with AAROM with BUE 1# shoulder flexion BUE supine  1# horizontal ab/adduction BUE  1# chest press BUE supine []  Tactile cues   []   Verbal cues []  Yes        MODALITIES      NMES 15 minutes total Electrical stimulation parameters for elbow flexion with functional movement  Ramp UP/Ramp Down   2 second each  Intensity up to 25  Symmetrical Bipasic via EMPI  No marks from pads on skin after removal of ESTIM pads  [x]  Yes          []  Yes        SPLINTING         []  Tactile cues   []   Verbal cues []  Yes   []  See separate note regarding splint precautions/contraindications      []  Tactile cues   []   Verbal cues []  Yes  []  See separate note regarding splint precautions/contraindications             Functional Outcome Measure:   []NA   10/20/21 Quickdash Score of 34, DSI 52.3%  10/27/21 Quickdash Score of 37, DSI 59.09%    Treatment/Activity Tolerance:    Patients response to treatment:   [x]Patient tolerated treatment well []Patient limited by fatigue   []Patient limited by pain  []Patient limited by other medical complications   []Other: Assessment: Patient progressing in whole body strengthening for completing stretch and strengthening exercises with BUE. Patient continues to require manual assist to push into full range and manual assist for safety and joint protection during effective exercises. Patient also progressing in ability to complete chest press with 3# weight and CGA this date. GOALS: Goals established 9/29/21   Patient stated goal: \"to get this left arm going\"  [x]? Progressing: []? Met: []? Not Met: []? Adjusted     STG TO BE MET IN 2 WEEKS      1. Pt will report a QuickDASH Symptom Severity Scale score of 10% or less indicating increased safety and functional independence in desired occupational pursuits. [x]? Progressing: []? Met: []? Not Met: []? Adjusted     2. Patient will be I with UE HEP. - GOAL MET 10/20/21     3. Patient will verbalize increased use of LUE in ADL/IADL tasks. - GOAL MET 10/20/21 for opening doors     LTG TO BE MET IN 4 WEEKS     1. Patient will demonstrate improved  strength for improved return to ADLs/IADLs. GOAL MET 10/27     2. Patient will demonstrate improved coordination with LUE for improved return to ADLs/IADLs. - GOAL MET 10/27     3. Patient will verbalize mod I with all ADLs with incorporation of LUE. - REQUIRES OCCASIONAL MIN A, BUT  PROGRESSING FOR HOLDING PLATE AND BOWL WITH LUE, PROGRESSING WITH TYING SHOES WITH LUE, PROGRESSING WITH WASHING BODY WITH LUE, LIMITED FOR ACTIVITIES THAT REQUIRE RESISTANCE 10/27    NEW STG TO BE MET IN 2 WEEKS Established 10/27/21 to be met by 11/12/21  1. Patient will be I with UE strengthening HEP. 2. Patient will verbalize increased use of LUE in activities that require him to  weighted items for ADLs/IADLs with LUE. (I.e. moving mug for coffee)    LTG TO BE MET IN 4 WEEKS Established 10/27/21 to be met by 11/26/21    1.  Patient will demonstrate improved pinch strength for improving LUE participation in home maintenance and work-based tasks.    2. Patient will demonstrate improved coordination with LUE as evidenced by increased speed on 9 hole peg test for improving LUE participation and independence with ADL/IADL tasks.     Prognosis: [x]Good   []Fair   []Poor    Patient Requires Follow-up:  [x]Yes  []No    Plan: []Plan of care initiated     [x]Continue per plan of care 2x/week for 4 weeks   [] Alter current plan (additional OT 2x/week for 4 weeks)    []Hold pending MD visit []Discharge    Time in: [id number] Time out: 1341    Timed Code Treatment Minutes: 54    Total Treatment Minutes: 56    Medicare Cap total YTD:   [x]N/A    Workers Comp Time Stamp  [x]N/A   Time In:   Time Out:    Electronically signed by:  LAKEISHA Auguste/MARINA

## 2021-11-17 ENCOUNTER — HOSPITAL ENCOUNTER (OUTPATIENT)
Dept: OCCUPATIONAL THERAPY | Age: 76
Setting detail: THERAPIES SERIES
Discharge: HOME | End: 2021-11-17
Payer: MEDICARE

## 2021-11-17 ENCOUNTER — HOSPITAL ENCOUNTER (OUTPATIENT)
Dept: CARDIAC REHAB | Age: 76
Setting detail: THERAPIES SERIES
Discharge: HOME | End: 2021-11-17
Payer: MEDICARE

## 2021-11-17 PROCEDURE — G0239 OTH RESP PROC, GROUP: HCPCS

## 2021-11-17 PROCEDURE — 97110 THERAPEUTIC EXERCISES: CPT

## 2021-11-17 PROCEDURE — 97140 MANUAL THERAPY 1/> REGIONS: CPT

## 2021-11-17 NOTE — FLOWSHEET NOTE
Liz  [facility], [facility], [address] [place] [facility], [address]   Phone: [phone]  Fax [phone]        Outpatient Occupational Therapy     [x] Daily Treatment Note   [] Progress Note   [] Discharge Note      Date:  2021    Patient: Mark Joyner                              : 1945                                    MRN: [id number]                                         Evaluation Date: 2021                                            Medical Diagnosis/ICD 10:  S49. 92XA (ICD-10-CM) - Unspecified injury of left shoulder and upper arm, initial encounter      Treatment Diagnosis:  Decreased strength, decrease ROM, decreased ADL/IADL independence     Onset Date:  2021     Referral Date:  21     Referring Physician: Dr. Mayito Oviedo, 21                                                                       Insurance information: [employer] Pre-cert required, 6 and 2 visits authorized prior, 6 visits authorized from 10/27-     Precautions/ Contraindications:   Red Flag Symptoms: none  Safety awareness: intact  Other: Restrictions: cardiopulmonary limitations due to COVID-19     Adhesive allergy: NO  Latex Allergy:  [x]? NO      []?YES      Preferred Language for Healthcare:   [x]? English       []? other:     C-SSRS Triggered by Intake questionnaire (Past 2 wk assessment):   [x]? No, Questionnaire did not trigger screening. []? Yes, Patient intake triggered further evaluation      []? C-SSRS Screening completed  []? PCP notified via Plan of Care  []? Emergency services notified     Plan of care sent to provider:      []? Faxed 21  [x]? Co-signature    (attempts: 1[x]?  2 []?3[]?)        __________________________________________________________________________________________________________________    Plan of care sent to provider:    []Faxed  [x]Co-signature    (attempts: 1[] 2 []3[])        Plan of care signed:    [x]Yes date: Dr. Mayito Oviedo 9/30/21, Dr. Mayito Oviedo 10/29/21          []No           Next Progress Note due:   11/26/21    Visit# / total visits:  6/8, 8/8    Plan for Next Session:  Review HEP, progress HEP    HEP instruction: Written and verbal HEP instructions provided and reviewed:  · Access Code: [email or web address]  · URL: [employer]  · Date: 09/29/2021  · Prepared by: Titi Huerta  · Exercises  · Finger Key  with Putty - 2-3 x daily - 7 x weekly - 3 sets - 10 reps  · Putty Squeezes - 2-3 x daily - 7 x weekly - 3 sets - 10 reps  · Seated Three Finger Pinch with Putty - 2-3 x daily - 7 x weekly - 3 sets - 10 reps  · Tip Pinch with Putty - 2-3 x daily - 7 x weekly - 3 sets - 10 reps  · Shoulder Flexion AAROM - 2-3 x daily - 7 x weekly - 3 sets - 10 reps  · Supine Shoulder Horizontal Abduction Adduction AAROM with Dowel - 2-3 x daily - 7 x weekly - 3 sets - 10 reps  · Elbow flexion with weighted items in supine  · Hose bending     Subjective: Patient reports that he is doing well with his exercises. Patient states he was sore in his neck and shoulders after last session. Pain level: 0/10     Objective:        Exercises:    Exercises in bold performed in department today. Items not bolded are carried forward from prior visits for continuity of the record.   Exercise/Equipment Resistance/Repetitions Skilled cues Added to HEP Comments      ADL/IADL TRAINING (ADL OR TA)        []  Tactile cues   []   Verbal cues []  Yes       []  Tactile cues   []   Verbal cues []  Yes      []  Tactile cues   []   Verbal cues []  Yes       []  Tactile cues   []   Verbal cues []  Yes       []  Tactile cues   []   Verbal cues []  Yes         []  Tactile cues   []   Verbal cues []  Yes         []  Tactile cues   []   Verbal cues []  Yes         []  Tactile cues   []   Verbal cues []  Yes         []  Tactile cues   []   Verbal cues []  Yes        NEUROMUSCULAR RE-EDU and THERAPEUTIC ACTIVITY (NEURO RE-ED or TA) Kinesiotaping for LUE [facility] Tri-Pull and Scapular retraction []  Tactile cues   [x]   Verbal cues [x]  Yes     Weightbearing At side of mat,  min cues for elbow extension, initially max A for scapular stabilization, 40 seconds x3 [x]  Tactile cues   [x]   Verbal cues [x]  Yes  Max to min cues   Scapular protraction strengthening with elbow extension With holding dowel and LUE reaching to dowel and out 8x also with addition of theraband 8x but unable to maintain [x]  Tactile cues   [x]   Verbal cues [x]  Yes  Mod cues   Pushing shopping cart  Forward: With elbow extension and shoulder flexion and posterior pelvic tilt 15x    To right with BUE, cues for lean 10x    To left with BUE, cues for lean 10x Verbal cues  Improved ability to keep shoulder down, head up with cues   Neuro Re-Ed in Sidelying With facilitation of scapula for horizontal abduction, shoulder abduction, and inhibition of upper trap with trigger point release at upper trap []  Tactile cues   []   Verbal cues []  Yes     Short arc propellers Short arc, left to center, 15x, max cues for breathing and upper trap inhibition []  Tactile cues   [x]   Verbal cues [x]  Yes  Cues for positioning   Clothespins 20x LUE with cues for tip pinch and three-jaw pinch    Also yellow, red, green, blue to short distance release        Balloon toss and catch 15x with incorporation of BUE, mirror for visual cueing, elbow flexion/shoulder flexion, external rotation []  Tactile cues   []   Verbal cues []  Yes     Elbow flexion/extension training Elbow flexion/extension supine with 1#, 2# with mod A, 20x total []  Tactile cues   []   Verbal cues []  Yes  Min cues for breathing    Cues for control   Sit to stand and stand to sit   Without arms From low chair, 5x  Standing on stool: one foot max A  Standing on wedge: one foot mod A  Standing on pillow: one foot min A/CGA []  Tactile cues   []   Verbal cues [x]  Yes  Initial cues for form and glute activation    Increased education given regarding importance of having safe items to hold onto during sit to stand and stand to sit        THERAPEUTIC EXERCISE and MANUAL TECHNIQUES   (TE OR MT)      Prone mat mobility Prone with external rotation stretch : 30 seconds    Prone with push up into tall kneel: min A  Tall kneel with horizontal ab/adduction BUE less than full height: 6x with max cues for pursed lip breathing  Prone with quadruped: min A   Prone with quadruped with lifting up LUE   Cues for breathing   Standing with arms stretching up into wall 5x BUE with active assisted push up into full range with dycem to maintain 20 seconds    Attempted stretch with straight elbow (able to reach 50% full range before fatigue), max cues for postural alignment and glute activation    With dycem, push ups 3x with mod A and max cues   Cues for breathing   Prone posterior shoulder strengthening Retraction 6x  Ts 6x   Max cues for breathing   AROM with BUE Chest Press BUE 3# each, min A with LUE 10x  Shoulder flexion supine 2# LUE 10x, min A  Shoulder abduction supine 2# LUE 10x to 1/4 range, min A  Horizontal abduction supine/sidelying 0# LUE 4x2, activation  Shoulder abduction sidelying 0# LUE, 10x to 1/4 range, min A initially, max cues initially, improved to min cues [x]  Tactile cues   [x]   Verbal cues [x]  Yes  Min cues for breathing     Hand strengthening Intrinsic Plus position  DIP flexion position  Finger/ab/adduction  Velcro rollers - 5 finger, three-jaw with 75% velcro friction  Gripper 35# 10x with cues for pursed lip breathing  Pinky finger abduction with tactile cue and visual cue of paper - 8x to 3/4 range and cues for pursed lip breathing Tactile/verbal cues Yes Mod cues for breathing   Shoulder strengthening Scapular retraction with red band  10x BUE      Putty exercises Green  Gripping 10x  Three jaw pinch 10x with table  Tip pinch 10x with table  Lateral pinch 10x    Verbal review of finger extension with putty (added to HEP) []  Tactile cues   [x]   Verbal cues [x]  Yes      strengthening 35-55# gripper brown 10x, cues for full range  XTENSOR 5 second hold x5 []  Tactile cues   [x]   Verbal cues [x]  Yes     Scapular mobilizations sidelying 40 second hold x3 for upward rotation and retraction [x]  Tactile cues   [x]   Verbal cues [x]  Yes     Elbow flexion/extension With 3# high table gravity eliminated 10x each [x]  Tactile cues   [x]   Verbal cues [x]  Yes     Therabar green  stick 10x smiles and frowns, cues for control, breathing, relaxation of upper neck musculature [x]  Tactile cues   [x]   Verbal cues [x]  Yes  Max Cues for breathing   Soft tissue mobilization To tricep with trigger point reported but improved after soft tissue mobilization [x]  Tactile cues   [x]   Verbal cues [x]  Yes     PROM RUE Therapist assisted LUE to overhead shoulder flexion 10x each  Therapist assisted LUE to overhead scaption 10x each  Therapist assisted LUE external rotation 10x each  Therapist assisted LUE elbow flexion with patient assisted for education for HEP        Seated exercises Elbow flexion/extension - unable to complete without compensatory shoulder flexion  Supination/pronation - with 5# weight 8x      Weight with AAROM with BUE 1# shoulder flexion BUE supine  1# horizontal ab/adduction BUE  1# chest press BUE supine []  Tactile cues   []   Verbal cues []  Yes        MODALITIES      NMES 15 minutes total Electrical stimulation parameters for elbow flexion with functional movement  Ramp UP/Ramp Down   2 second each  Intensity up to 25  Symmetrical Bipasic via EMPI  No marks from pads on skin after removal of ESTIM pads  [x]  Yes          []  Yes        SPLINTING         []  Tactile cues   []   Verbal cues []  Yes   []  See separate note regarding splint precautions/contraindications      []  Tactile cues   []   Verbal cues []  Yes  []  See separate note regarding splint precautions/contraindications Functional Outcome Measure:   []NA   10/20/21 Quickdash Score of 34, DSI 52.3%  10/27/21 Quickdash Score of 37, DSI 59.09%    Treatment/Activity Tolerance:    Patients response to treatment:   [x]Patient tolerated treatment well []Patient limited by fatigue   []Patient limited by pain  []Patient limited by other medical complications   []Other:     Assessment: Patient able to complete quadruped bed mobility this date with less assist and three push-ups with mod/max A. Patient continues to require manual assist for PROM due to shortened muscle groups of lateral and anterior shoulder. Patient also continues to require high level of cueing for pursed lip breathing due to cardiopulmonary limitations. GOALS: Goals established 9/29/21   Patient stated goal: \"to get this left arm going\"  [x]? Progressing: []? Met: []? Not Met: []? Adjusted     STG TO BE MET IN 2 WEEKS      1. Pt will report a QuickDASH Symptom Severity Scale score of 10% or less indicating increased safety and functional independence in desired occupational pursuits. [x]? Progressing: []? Met: []? Not Met: []? Adjusted     2. Patient will be I with UE HEP. - GOAL MET 10/20/21     3. Patient will verbalize increased use of LUE in ADL/IADL tasks. - GOAL MET 10/20/21 for opening doors     LTG TO BE MET IN 4 WEEKS     1. Patient will demonstrate improved  strength for improved return to ADLs/IADLs. GOAL MET 10/27     2. Patient will demonstrate improved coordination with LUE for improved return to ADLs/IADLs. - GOAL MET 10/27     3. Patient will verbalize mod I with all ADLs with incorporation of LUE. - REQUIRES OCCASIONAL MIN A, BUT  PROGRESSING FOR HOLDING PLATE AND BOWL WITH LUE, PROGRESSING WITH TYING SHOES WITH LUE, PROGRESSING WITH WASHING BODY WITH LUE, LIMITED FOR ACTIVITIES THAT REQUIRE RESISTANCE 10/27    NEW STG TO BE MET IN 2 WEEKS Established 10/27/21 to be met by 11/12/21  1.  Patient will be I with UE strengthening HEP. - GOAL MET 11/17    2. Patient will verbalize increased use of LUE in activities that require him to  weighted items for ADLs/IADLs with LUE. (I.e. moving mug for coffee)    LTG TO BE MET IN 4 WEEKS Established 10/27/21 to be met by 11/26/21    1. Patient will demonstrate improved pinch strength for improving LUE participation in home maintenance and work-based tasks.     2. Patient will demonstrate improved coordination with LUE as evidenced by increased speed on 9 hole peg test for improving LUE participation and independence with ADL/IADL tasks.     Prognosis: [x]Good   []Fair   []Poor    Patient Requires Follow-up:  [x]Yes  []No    Plan: []Plan of care initiated     [x]Continue per plan of care 2x/week for 4 weeks   [] Alter current plan (additional OT 2x/week for 4 weeks)    []Hold pending MD visit []Discharge    Time in: 7931 Time out: 1345    Timed Code Treatment Minutes: 59    Total Treatment Minutes: 59    Medicare Cap total YTD:   [x]N/A    Workers Comp Time Stamp  [x]N/A   Time In:   Time Out:    Electronically signed by:  Lo Weaver, OTR/L

## 2021-11-19 ENCOUNTER — HOSPITAL ENCOUNTER (OUTPATIENT)
Dept: CARDIAC REHAB | Age: 76
Setting detail: THERAPIES SERIES
Discharge: HOME | End: 2021-11-19
Payer: MEDICARE

## 2021-11-19 PROCEDURE — G0239 OTH RESP PROC, GROUP: HCPCS

## 2021-11-22 ENCOUNTER — HOSPITAL ENCOUNTER (OUTPATIENT)
Dept: OCCUPATIONAL THERAPY | Age: 76
Setting detail: THERAPIES SERIES
Discharge: HOME | End: 2021-11-22
Payer: MEDICARE

## 2021-11-22 ENCOUNTER — HOSPITAL ENCOUNTER (OUTPATIENT)
Dept: CARDIAC REHAB | Age: 76
Setting detail: THERAPIES SERIES
Discharge: HOME | End: 2021-11-22
Payer: MEDICARE

## 2021-11-22 PROCEDURE — G0239 OTH RESP PROC, GROUP: HCPCS

## 2021-11-22 PROCEDURE — 97110 THERAPEUTIC EXERCISES: CPT

## 2021-11-22 NOTE — PROGRESS NOTES
Liz  80[facility], [facility],  [place], [address]  Phone: [phone]  Fax [phone]      Jeanne Murguia  Dear Dr. Shankar Campos,    The following patient has been assessed for therapy services. Please review the attached summary of the patient's plan of care, and verify that you agree with plan for additional therapy services at this time. Plan of Care/Treatment to date:  [x] Therapeutic Exercise  [x]  Modalities:  [x] Therapeutic Activity   [] Ultrasound [] Electrical Stimulation   [] Total Motion Release   [] Fluidotherapy [] Kinesiotaping  [x]  Neuromuscular Re-education  [] Iontophoresis [] Coldpack/hotpack   [x]  Instruction in HEP    Other:  [x]  Manual Therapy     []   Dry needling             [x] ADL/Self Care  [x] IADL Training  [] LSVT BIG  [] Saebo  [] Splinting  [] Wheelchair Mobility                       Frequency/Duration:  # Days per week: [] 1 day # Weeks: [] 1 week [] 5 weeks      [x] 2 days   [x] 2 weeks [x] 6 weeks     [] 3 days   [] 3 weeks [] 7 weeks     [] 4 days   [x] 4 weeks [] 8 weeks     [] 5 days   [] 10 weeks [] 12 weeks    Rehab Potential: [] Excellent [x] Good [] Fair  [] Poor     Thank you for the referral of this patient. Please sign. Physician signature_______________________ Date________________  By signing above, therapists plan is approved by physician     Fax to: [facility] [phone]     Electronically signed by:  Robert Polk          Outpatient Occupational Therapy     [] Daily Treatment Note   [x] Progress Note   [] Discharge Note      Date:  2021    Patient: Moises Wang                              : 1945                                    MRN: [id number]                                         Evaluation Date: 2021                                            Medical Diagnosis/ICD 10:  S49. 92XA (ICD-10-CM) - Unspecified injury of left shoulder and upper arm, initial encounter      Treatment Diagnosis:  Decreased strength, decrease ROM, decreased ADL/IADL independence     Onset Date:  1/19/2021     Referral Date:  09/22/21     Referring Physician: Dr. Serjio Brar, 7/2/21                                                                       Insurance information: [employer] Medicare Pre-cert required, 6 and 2 visits authorized prior, 6 visits authorized from 10/27-12/25, 13 visits authorized from 11/22 - 3/21/21     Precautions/ Contraindications:   Red Flag Symptoms: none  Safety awareness: intact  Other: Restrictions: cardiopulmonary limitations due to COVID-19     Adhesive allergy: NO  Latex Allergy:  [x]? NO      []?YES      Preferred Language for Healthcare:   [x]? English       []? other:     C-SSRS Triggered by Intake questionnaire (Past 2 wk assessment):   [x]? No, Questionnaire did not trigger screening. []? Yes, Patient intake triggered further evaluation      []? C-SSRS Screening completed  []? PCP notified via Plan of Care  []? Emergency services notified     Plan of care sent to provider:      []? Faxed 9/29/21  [x]? Co-signature    (attempts: 1[x]? 2 []?3[]?)        __________________________________________________________________________________________________________________    Plan of care sent to provider:    []Faxed  [x]Co-signature    (attempts: 1[] 2 []3[])        Plan of care signed:    [x]Yes date: Dr. Serjio Brar 9/30/21, Dr. Serjio Brar 10/29/21          []No           Next Progress Note due:   11/26/21    Visit# / total visits:  7/8, 8/8    Plan for Next Session:  Review HEP, progress HEP    HEP instruction: Written and verbal HEP instructions provided and reviewed:  · Access Code: [email or web address]  · URL: [email or web address]  · Date: 09/29/2021  · Prepared by: Saintclair Ferns  · Exercises  · Finger Key  with Putty - 2-3 x daily - 7 x weekly - 3 sets - 10 reps  · Putty Squeezes - 2-3 x daily - 7 x weekly - 3 sets - 10 reps  · Seated Three Finger Pinch with Putty - 2-3 x daily - 7 x weekly - 3 sets - 10 reps  · Tip Pinch with Putty - 2-3 x daily - 7 x weekly - 3 sets - 10 reps  · Shoulder Flexion AAROM - 2-3 x daily - 7 x weekly - 3 sets - 10 reps  · Supine Shoulder Horizontal Abduction Adduction AAROM with Dowel - 2-3 x daily - 7 x weekly - 3 sets - 10 reps  · Elbow flexion with weighted items in supine  · Hose bending     Subjective: Patient reports that he is doing well with his exercises, tries to do them as much as he can each day.      Pain level: 0/10     Objective:       ROM  LUE Elbow extension: to 0 degrees PROM, to 5 degrees from 0 AROM  LUE Elbow flexion: 135 degrees PROM, 115 degrees AROM  LUE Shoulder flexion: 110 degrees PROM, 48 degrees AROM  91% SpO2  B.P 125/67    Hand Strength and Cooordination  LUE 30.9#  RUE 77.8#  LUE lateral pinch: 6.3# average  RUE lateral pinch: 19.3# average  LUE three jaw pinch: 2.3# average  RUE three jaw pinch: 18# average  LUE 9 hole peg test 44 seconds  RUE 9 hole peg test 19 seconds      MMT  LUE Shoulder flexion: 2-/5  LUE Shoulder Abduction: 2-/5  LUE IR: 3+/5  LUE ER: 2-/5  LUE Horizontal abduction: 3+/5  LUE Horizontal adduction: 4/5    Functional Outcome Measure:   []NA    10/20/21 Quickdash Score of 34, DSI 52.3%  10/27/21 Quickdash Score of 37, DSI 59.09%  11/22/21 Quickdash Score of 28, DSI 38.6%    Treatment/Activity Tolerance:    Patients response to treatment:   [x]Patient tolerated treatment well []Patient limited by fatigue   []Patient limited by pain  []Patient limited by other medical complications   []Other:     Assessment: Patient has participated in 7 OT visits since his last progress note 4 weeks ago and is improving in strength, ROM and coordination for arms and hands but requires further outpatient skilled OT in this hospital-based outpatient setting due to need for large high/low mat, access to vital machines of sphygnanometer and pulse oximeter, neuro equipment such as aircast and mirror. He also requires constant cueing due to cardiopulmonary limitations s/p COVID. GOALS: Goals established 9/29/21   Patient stated goal: \"to get this left arm going, I'm doing so much better though\"  [x]? Progressing: []? Met: []? Not Met: []? Adjusted     STG TO BE MET IN 2 WEEKS      1. Pt will report a QuickDASH Symptom Severity Scale score of 10% or less indicating increased safety and functional independence in desired occupational pursuits. [x]? Progressing: []? Met: []? Not Met: []? Adjusted 11/22 PROGRESSING     2. Patient will be I with UE HEP. - GOAL MET 10/20/21     3. Patient will verbalize increased use of LUE in ADL/IADL tasks. - GOAL MET 10/20/21 for opening doors     LTG TO BE MET IN 4 WEEKS     1. Patient will demonstrate improved  strength for improved return to ADLs/IADLs. GOAL MET 10/27     2. Patient will demonstrate improved coordination with LUE for improved return to ADLs/IADLs. - GOAL MET 10/27     3. Patient will verbalize mod I with all ADLs with incorporation of LUE. - REQUIRES OCCASIONAL MIN A, BUT  PROGRESSING FOR HOLDING PLATE AND BOWL WITH LUE, PROGRESSING WITH TYING SHOES WITH LUE, PROGRESSING WITH WASHING BODY WITH LUE, LIMITED FOR ACTIVITIES THAT REQUIRE RESISTANCE 10/27, PROGRESSING 11/22 BUT REQUIRES MANUAL ASSIST TO ADDRESS LUE ROM AND STRENGTH LIMITATIONS    NEW STG TO BE MET IN 2 WEEKS Established 10/27/21 to be met by 11/12/21, CONTINUE GOAL 2 UNTIL 12/6/21  1. Patient will be I with UE strengthening HEP. - GOAL MET 11/17     2. Patient will verbalize increased use of LUE in activities that require him to  weighted items for ADLs/IADLs with LUE. (I.e. moving mug for coffee) - PROGRESS BUT REQUIRES ADDITIONAL INTERVENTION DUE TO LIMITATIONS IN SHOULDER STRENGTH 11/22     LTG TO BE MET IN 4 WEEKS Established 10/27/21 to be met by 11/26/21, CONTINUE GOALS FOR ADDITIONAL 6 WEEKS UNTIL 12/22/21  1.  Patient will demonstrate improved pinch strength for improving LUE participation in home maintenance and work-based tasks. - PROGRESSING 11/22 TO 2-7# BUT REQUIRES ADDITIONAL SKILLED INTERVENTION IN ORDER TO PROGRESS FURTHER     2. Patient will demonstrate improved coordination with LUE as evidenced by increased speed on 9 hole peg test for improving LUE participation and independence with ADL/IADL tasks.  - PROGRESSING 11/22, improved by 10 seconds, but requires additional skilled intervention in order to progress further     Prognosis: [x]Good   []Fair   []Poor    Patient Requires Follow-up:  [x]Yes  []No    Plan: []Plan of care initiated     []Continue per plan of care 2x/week for 4 weeks   [x] Alter current plan (additional OT 2x/week for 6 weeks)    []Hold pending MD visit []Discharge    Time in: 0945 Time out: 1045    Timed Code Treatment Minutes: 60    Total Treatment Minutes: 60    Medicare Cap total YTD:   [x]N/A    Workers Comp Time Stamp  [x]N/A   Time In:   Time Out:    Electronically signed by:  Saintclair Ferns, OTR/MARINA

## 2021-11-22 NOTE — FLOWSHEET NOTE
[facility], [address]   Phone: [phone]  Fax [phone]        Outpatient Occupational Therapy     [x] Daily Treatment Note   [] Progress Note   [] Discharge Note      Date:  2021    Patient: Dolores Roman                              : 1945                                    MRN: [id number]                                         Evaluation Date: 2021                                            Medical Diagnosis/ICD 10:  S49. 92XA (ICD-10-CM) - Unspecified injury of left shoulder and upper arm, initial encounter      Treatment Diagnosis:  Decreased strength, decrease ROM, decreased ADL/IADL independence     Onset Date:  2021     Referral Date:  21     Referring Physician: Dr. La Nena Eduardo, 21                                                                       Insurance information: [employer] Pre-cert required, 6 and 2 visits authorized prior, 6 visits authorized from 10/27-, 13 visits authorized from  - 3/21/21     Precautions/ Contraindications:   Red Flag Symptoms: none  Safety awareness: intact  Other: Restrictions: cardiopulmonary limitations due to COVID-19     Adhesive allergy: NO  Latex Allergy:  [x]? NO      []?YES      Preferred Language for Healthcare:   [x]? English       []? other:     C-SSRS Triggered by Intake questionnaire (Past 2 wk assessment):   [x]? No, Questionnaire did not trigger screening. []? Yes, Patient intake triggered further evaluation      []? C-SSRS Screening completed  []? PCP notified via Plan of Care  []? Emergency services notified     Plan of care sent to provider:      []? Faxed 21  [x]? Co-signature    (attempts: 1[x]?  2 []?3[]?)        __________________________________________________________________________________________________________________    Plan of care sent to provider:    []Faxed  [x]Co-signature (attempts: 1[] 2 []3[])        Plan of care signed:    [x]Yes date: Dr. Misha Hays 9/30/21, Dr. Misha Hays 10/29/21          []No           Next Progress Note due:   11/26/21    Visit# / total visits:  7/8, 8/8    Plan for Next Session:  Review HEP, progress HEP    HEP instruction: Written and verbal HEP instructions provided and reviewed:  · Access Code: [email or web address]  · URL: [employer]/  · Date: 09/29/2021  · Prepared by: Dave Munoz  · Exercises  · Finger Key  with Putty - 2-3 x daily - 7 x weekly - 3 sets - 10 reps  · Putty Squeezes - 2-3 x daily - 7 x weekly - 3 sets - 10 reps  · Seated Three Finger Pinch with Putty - 2-3 x daily - 7 x weekly - 3 sets - 10 reps  · Tip Pinch with Putty - 2-3 x daily - 7 x weekly - 3 sets - 10 reps  · Shoulder Flexion AAROM - 2-3 x daily - 7 x weekly - 3 sets - 10 reps  · Supine Shoulder Horizontal Abduction Adduction AAROM with Dowel - 2-3 x daily - 7 x weekly - 3 sets - 10 reps  · Elbow flexion with weighted items in supine  · Hose bending     Subjective: Patient reports that he is doing well with his exercises, tries to do them as much as he can each day. Pain level: 0/10     Objective:       ROM  LUE Elbow extension: to 0 degrees PROM, to 5 degrees from 0 AROM  LUE Elbow flexion: 135 degrees PROM, 115 degrees AROM  LUE Shoulder flexion: 110 degrees PROM, 48 degrees AROM  91% SpO2  B.P 125/67    Hand Strength and Cooordination  LUE 30.9#  RUE 77.8#  LUE lateral pinch: 6.3# average  RUE lateral pinch: 19.3# average  LUE three jaw pinch: 2.3# average  RUE three jaw pinch: 18# average  LUE 9 hole peg test 44 seconds  RUE 9 hole peg test 19 seconds      MMT  LUE Shoulder flexion: 2-/5  LUE Shoulder Abduction: 2-/5  LUE IR: 3+/5  LUE ER: 2-/5  LUE Horizontal abduction: 3+/5  LUE Horizontal adduction: 4/5    Exercises:    Exercises in bold performed in department today.   Items not bolded are carried forward from prior visits for continuity of the record.   Exercise/Equipment Resistance/Repetitions Skilled cues Added to HEP Comments      ADL/IADL TRAINING (ADL OR TA)        []  Tactile cues   []   Verbal cues []  Yes       []  Tactile cues   []   Verbal cues []  Yes      []  Tactile cues   []   Verbal cues []  Yes       []  Tactile cues   []   Verbal cues []  Yes       []  Tactile cues   []   Verbal cues []  Yes         []  Tactile cues   []   Verbal cues []  Yes         []  Tactile cues   []   Verbal cues []  Yes         []  Tactile cues   []   Verbal cues []  Yes         []  Tactile cues   []   Verbal cues []  Yes        NEUROMUSCULAR RE-EDU and THERAPEUTIC ACTIVITY (NEURO RE-ED or TA)      Kinesiotaping for LUE [facility] [place] Tri-Pull and Scapular retraction []  Tactile cues   [x]   Verbal cues [x]  Yes     Weightbearing At side of mat,  min cues for elbow extension, initially max A for scapular stabilization, 40 seconds x3 [x]  Tactile cues   [x]   Verbal cues [x]  Yes  Max to min cues   Scapular protraction strengthening with elbow extension With holding dowel and LUE reaching to dowel and out 8x also with addition of theraband 8x but unable to maintain [x]  Tactile cues   [x]   Verbal cues [x]  Yes  Mod cues   Pushing shopping cart  Forward: With elbow extension and shoulder flexion and posterior pelvic tilt 15x    To right with BUE, cues for lean 10x    To left with BUE, cues for lean 10x Verbal cues  Improved ability to keep shoulder down, head up with cues   Neuro Re-Ed in Sidelying With facilitation of scapula for horizontal abduction, shoulder abduction, and inhibition of upper trap with trigger point release at upper trap []  Tactile cues   []   Verbal cues []  Yes     Short arc propellers Short arc, left to center, 15x, max cues for breathing and upper trap inhibition []  Tactile cues   [x]   Verbal cues [x]  Yes  Cues for positioning   Clothespins 20x LUE with cues for tip pinch and three-jaw pinch    Also yellow, red, green, blue to short distance release      Balloon toss and catch 15x with incorporation of BUE, mirror for visual cueing, elbow flexion/shoulder flexion, external rotation []  Tactile cues   []   Verbal cues []  Yes     Elbow flexion/extension training Elbow flexion/extension supine with 1#, 2# with mod A, 20x total []  Tactile cues   []   Verbal cues []  Yes  Min cues for breathing    Cues for control   Sit to stand and stand to sit Without arms From low chair, 5x  Standing on stool: one foot max A  Standing on wedge: one foot mod A  Standing on pillow: one foot min A/CGA []  Tactile cues   []   Verbal cues [x]  Yes  Initial cues for form and glute activation    Increased education given regarding importance of having safe items to hold onto during sit to stand and stand to sit        THERAPEUTIC EXERCISE and MANUAL TECHNIQUES   (TE OR MT)      Prone mat mobility Prone with external rotation stretch : 30 seconds    Prone with push up into tall kneel: min A  Tall kneel with horizontal ab/adduction BUE less than full height: 6x with max cues for pursed lip breathing  Prone with quadruped: min A   Prone with quadruped with lifting up LUE   Cues for breathing   Standing with arms stretching up into wall 5x with LUE on wall, with fingers lift off, with bringing RUE up and down    With turning right side away from wall 5x   Cues for breathing   Prone posterior shoulder strengthening Retraction 6x  Ts 6x   Max cues for breathing   AROM with BUE Chest Press BUE 3# each, min A with LUE 10x  Shoulder flexion supine 2# LUE 10x, min A  Shoulder abduction supine 2# LUE 10x to 1/4 range, min A  Horizontal abduction supine/sidelying 0# LUE 4x2, activation  Shoulder abduction sidelying 0# LUE, 10x to 1/4 range, min A initially, max cues initially, improved to min cues [x]  Tactile cues   [x]   Verbal cues [x]  Yes  Min cues for breathing     Hand strengthening Intrinsic Plus position  DIP flexion position  Finger/ab/adduction  Velcro rollers - 5 finger, three-jaw with 75% velcro friction  Gripper 35# 10x with cues for pursed lip breathing  Pinky finger abduction with tactile cue and visual cue of paper - 8x to 3/4 range and cues for pursed lip breathing Tactile/verbal cues Yes Mod cues for breathing   Shoulder strengthening Scapular retraction with red band  10x BUE      Putty exercises Green  Gripping 10x  Three jaw pinch 10x with table  Tip pinch 10x with table  Lateral pinch 10x    Verbal review of finger extension with putty (added to HEP) []  Tactile cues   [x]   Verbal cues [x]  Yes      strengthening 35-55# gripper brown 10x, cues for full range  XTENSOR 5 second hold x5 []  Tactile cues   [x]   Verbal cues [x]  Yes     Scapular mobilizations sidelying 40 second hold x3 for upward rotation and retraction [x]  Tactile cues   [x]   Verbal cues [x]  Yes     Elbow flexion/extension With 3# high table gravity eliminated 10x each [x]  Tactile cues   [x]   Verbal cues [x]  Yes     Therabar green  stick 10x smiles and frowns, cues for control, breathing, relaxation of upper neck musculature [x]  Tactile cues   [x]   Verbal cues [x]  Yes  Max Cues for breathing   Soft tissue mobilization To tricep with trigger point reported but improved after soft tissue mobilization [x]  Tactile cues   [x]   Verbal cues [x]  Yes     PROM RUE Therapist assisted LUE to overhead shoulder flexion 10x each  Therapist assisted LUE to overhead scaption 10x each  Therapist assisted LUE external rotation 10x each  Therapist assisted LUE elbow flexion with patient assisted for education for HEP        Seated exercises Elbow flexion/extension - unable to complete without compensatory shoulder flexion  Supination/pronation - with 5# weight 8x      Weight with AAROM with BUE 1# shoulder flexion BUE supine  1# horizontal ab/adduction BUE  1# chest press BUE supine []  Tactile cues   []   Verbal cues []  Yes        MODALITIES      NMES 15 minutes total Electrical stimulation parameters for elbow flexion with functional movement  Ramp UP/Ramp Down   2 second each  Intensity up to 25  Symmetrical Bipasic via EMPI  No marks from pads on skin after removal of ESTIM pads  [x]  Yes          []  Yes        SPLINTING         []  Tactile cues   []   Verbal cues []  Yes   []  See separate note regarding splint precautions/contraindications      []  Tactile cues   []   Verbal cues []  Yes  []  See separate note regarding splint precautions/contraindications             Functional Outcome Measure:   []NA    10/20/21 Quickdash Score of 34, DSI 52.3%  10/27/21 Quickdash Score of 37, DSI 59.09%  11/22/21 Quickdash Score of 28, DSI 38.6%    Treatment/Activity Tolerance:    Patients response to treatment:   [x]Patient tolerated treatment well []Patient limited by fatigue   []Patient limited by pain  []Patient limited by other medical complications   []Other:     Assessment: Patient has participated in 7 OT visits since his last progress note 4 weeks ago and is improving in strength, ROM and coordination for arms and hands but requires further outpatient skilled OT in this hospital-based outpatient setting due to need for large high/low mat, access to vital machines of sphygnanometer and pulse oximeter, neuro equipment such as aircast and mirror. He also requires constant cueing due to cardiopulmonary limitations s/p COVID. GOALS: Goals established 9/29/21   Patient stated goal: \"to get this left arm going, I'm doing so much better though\"  [x]? Progressing: []? Met: []? Not Met: []? Adjusted     STG TO BE MET IN 2 WEEKS      1. Pt will report a QuickDASH Symptom Severity Scale score of 10% or less indicating increased safety and functional independence in desired occupational pursuits. [x]? Progressing: []? Met: []? Not Met: []? Adjusted 11/22 PROGRESSING     2. Patient will be I with UE HEP. - GOAL MET 10/20/21     3. Patient will verbalize increased use of LUE in ADL/IADL tasks. - GOAL MET 10/20/21 for opening doors     LTG TO BE MET IN 4 WEEKS     1. Patient will demonstrate improved  strength for improved return to ADLs/IADLs. GOAL MET 10/27     2. Patient will demonstrate improved coordination with LUE for improved return to ADLs/IADLs. - GOAL MET 10/27     3. Patient will verbalize mod I with all ADLs with incorporation of LUE. - REQUIRES OCCASIONAL MIN A, BUT  PROGRESSING FOR HOLDING PLATE AND BOWL WITH LUE, PROGRESSING WITH TYING SHOES WITH LUE, PROGRESSING WITH WASHING BODY WITH LUE, LIMITED FOR ACTIVITIES THAT REQUIRE RESISTANCE 10/27, PROGRESSING 11/22 BUT REQUIRES MANUAL ASSIST TO ADDRESS LUE ROM AND STRENGTH LIMITATIONS    NEW STG TO BE MET IN 2 WEEKS Established 10/27/21 to be met by 11/12/21, CONTINUE GOAL 2 UNTIL 12/6/21  1. Patient will be I with UE strengthening HEP. - GOAL MET 11/17     2. Patient will verbalize increased use of LUE in activities that require him to  weighted items for ADLs/IADLs with LUE. (I.e. moving mug for coffee) - PROGRESS BUT REQUIRES ADDITIONAL INTERVENTION DUE TO LIMITATIONS IN SHOULDER STRENGTH 11/22     LTG TO BE MET IN 4 WEEKS Established 10/27/21 to be met by 11/26/21, CONTINUE GOALS UNTIL 12/22/21  1. Patient will demonstrate improved pinch strength for improving LUE participation in home maintenance and work-based tasks. - PROGRESSING 11/22 TO 2-7# BUT REQUIRES ADDITIONAL SKILLED INTERVENTION IN ORDER TO PROGRESS FURTHER     2. Patient will demonstrate improved coordination with LUE as evidenced by increased speed on 9 hole peg test for improving LUE participation and independence with ADL/IADL tasks.  - PROGRESSING 11/22, improved by 10 seconds, but requires additional skilled intervention in order to progress further     Prognosis: [x]Good   []Fair   []Poor    Patient Requires Follow-up:  [x]Yes  []No    Plan: []Plan of care initiated     []Continue per plan of care 2x/week for 4 weeks   [x] Alter current plan (additional OT 2x/week for 4 weeks)    []Hold pending MD visit []Discharge    Time in: 0945 Time out: 1045    Timed Code Treatment Minutes: 60    Total Treatment Minutes: 60    Medicare Cap total YTD:   [x]N/A    Workers Comp Time Stamp  [x]N/A   Time In:   Time Out:    Electronically signed by:  LAKEISHA Hinojosa/L

## 2021-11-24 ENCOUNTER — HOSPITAL ENCOUNTER (OUTPATIENT)
Dept: OCCUPATIONAL THERAPY | Age: 76
Setting detail: THERAPIES SERIES
Discharge: HOME | End: 2021-11-24
Payer: MEDICARE

## 2021-11-24 ENCOUNTER — HOSPITAL ENCOUNTER (OUTPATIENT)
Dept: CARDIAC REHAB | Age: 76
Setting detail: THERAPIES SERIES
Discharge: HOME | End: 2021-11-24
Payer: MEDICARE

## 2021-11-24 PROCEDURE — G0239 OTH RESP PROC, GROUP: HCPCS

## 2021-11-26 ENCOUNTER — APPOINTMENT (OUTPATIENT)
Dept: CARDIAC REHAB | Age: 76
End: 2021-11-26
Payer: MEDICARE

## 2021-11-27 NOTE — TELEPHONE ENCOUNTER
Call his wife. I received medication refills for him that he was getting from the to local hospitals he had been in. I have a question about the clonazepam.  I know they started him on that here locally due to heavy breathing and anxiety. He was also put on fluoxetine or Prozac anxiety depression. The clonazepam is not meant to be a long-term medication. is this something that he is trying to decrease in usage now that he is on the Prozac? I am okay with refilling this for the short-term due to all that is happened to him but eventually it would be something to come off of.   I just want them to understand this normal...

## 2021-11-29 ENCOUNTER — HOSPITAL ENCOUNTER (OUTPATIENT)
Dept: CARDIAC REHAB | Age: 76
Setting detail: THERAPIES SERIES
Discharge: HOME | End: 2021-11-29
Payer: MEDICARE

## 2021-11-29 ENCOUNTER — HOSPITAL ENCOUNTER (OUTPATIENT)
Dept: OCCUPATIONAL THERAPY | Age: 76
Setting detail: THERAPIES SERIES
Discharge: HOME | End: 2021-11-29
Payer: MEDICARE

## 2021-11-29 PROCEDURE — 97112 NEUROMUSCULAR REEDUCATION: CPT

## 2021-11-29 PROCEDURE — 97140 MANUAL THERAPY 1/> REGIONS: CPT

## 2021-11-29 PROCEDURE — G0239 OTH RESP PROC, GROUP: HCPCS

## 2021-11-29 PROCEDURE — 97110 THERAPEUTIC EXERCISES: CPT

## 2021-11-29 NOTE — FLOWSHEET NOTE
[facility], [facility], [address] [facility], [address] [place] [address]  Phone: [phone]  Fax [phone]        Outpatient Occupational Therapy     [x] Daily Treatment Note   [] Progress Note   [] Discharge Note      Date:  2021    Patient: Lew Jean Baptiste                              : 1945                                    MRN: [id number]                                         Evaluation Date: 2021                                            Medical Diagnosis/ICD 10:  S49. 92XA (ICD-10-CM) - Unspecified injury of left shoulder and upper arm, initial encounter      Treatment Diagnosis:  Decreased strength, decrease ROM, decreased ADL/IADL independence     Onset Date:  2021     Referral Date:  21     Referring Physician: Dr. Taylor Pro, 21                                                                       Insurance information: [employer] Medicare Pre-cert required, 6 and 2 visits authorized prior, 6 visits authorized from 10/27-, 13 visits authorized from  - 3/21/21     Precautions/ Contraindications:   Red Flag Symptoms: none  Safety awareness: intact  Other: Restrictions: cardiopulmonary limitations due to COVID-19     Adhesive allergy: NO  Latex Allergy:  [x]? NO      []?YES      Preferred Language for Healthcare:   [x]? English       []? other:     C-SSRS Triggered by Intake questionnaire (Past 2 wk assessment):   [x]? No, Questionnaire did not trigger screening. []? Yes, Patient intake triggered further evaluation      []? C-SSRS Screening completed  []? PCP notified via Plan of Care  []? Emergency services notified     Plan of care sent to provider:      []? Faxed 21  [x]? Co-signature    (attempts: 1[x]?  2 []?3[]?)        __________________________________________________________________________________________________________________    Plan of care sent to provider:    []Faxed  [x]Co-signature pillow: one foot min A/CGA []  Tactile cues   []   Verbal cues [x]  Yes  Initial cues for form and glute activation    Increased education given regarding importance of having safe items to hold onto during sit to stand and stand to sit        THERAPEUTIC EXERCISE and MANUAL TECHNIQUES   (TE OR MT)      Prone mat mobility Prone with external rotation stretch : 30 seconds    Prone with push up into tall kneel: min A  Tall kneel with horizontal ab/adduction BUE less than full height: 6x with max cues for pursed lip breathing  Prone with quadruped: min A   Prone with quadruped with lifting up LUE   Cues for breathing   Standing with arms stretching up into wall 5x with LUE on wall, with fingers lift off, with bringing RUE up and down    With turning right side away from wall 4x Reviewed for HEP  Cues for breathing   Prone posterior shoulder strengthening Retraction 6x  Ts 6x   Max cues for breathing   AROM with BUE Chest Press BUE 3# each, min A with LUE 10x  Shoulder flexion supine 2# LUE 10x, min A  Shoulder abduction supine 2# LUE 10x to 1/4 range, min A  Horizontal abduction supine/sidelying 0# LUE 4x2, activation  Shoulder abduction sidelying 0# LUE, 10x to 1/4 range, min A initially, max cues initially, improved to min cues [x]  Tactile cues   [x]   Verbal cues [x]  Yes  Min cues for breathing     Hand strengthening Intrinsic Plus position  DIP flexion position  Finger/ab/adduction  Velcro rollers - 5 finger, three-jaw with 75% velcro friction  Gripper 35# 10x with cues for pursed lip breathing  Pinky finger abduction with tactile cue and visual cue of paper - 8x to 3/4 range and cues for pursed lip breathing Tactile/verbal cues Yes Mod cues for breathing   Shoulder strengthening Scapular retraction with red band  10x BUE      Putty exercises Green  Gripping 10x  Three jaw pinch 10x with table  Tip pinch 10x with table  Lateral pinch 10x    Verbal review of finger extension with putty (added to HEP) []  Tactile cues   [x]   Verbal cues [x]  Yes      strengthening 35-55# gripper brown 10x, cues for full range  XTENSOR 5 second hold x5 []  Tactile cues   [x]   Verbal cues [x]  Yes     Scapular mobilizations sidelying 40 second hold x3 for upward rotation and retraction [x]  Tactile cues   [x]   Verbal cues [x]  Yes     Elbow flexion/extension With 3# high table gravity eliminated 10x each [x]  Tactile cues   [x]   Verbal cues [x]  Yes     Therabar green  stick 10x smiles and frowns, cues for control, breathing, relaxation of upper neck musculature [x]  Tactile cues   [x]   Verbal cues [x]  Yes  Max Cues for breathing   Soft tissue mobilization To bicep with manual stretch for elbow flexion [x]  Tactile cues   [x]   Verbal cues [x]  Yes     PROM RUE Therapist assisted LUE to overhead shoulder flexion 10x each  Therapist assisted LUE to overhead scaption 10x each   Therapist assisted LUE external rotation 10x each  Therapist assisted LUE elbow flexion with patient assisted  for education for HEP   Therapist assisted LUE to overhead scapular abduction/scaption with gentle lowering        Seated exercises Elbow flexion/extension - unable to complete without compensatory shoulder flexion  Supination/pronation - with 5# weight 8x      Weight with AAROM with BUE 1# shoulder flexion LUE supine to 1/2 range  1# horizontal ab/adduction LUE in sidelying 8x  3# chest press BUE supine 5x, 2# 5x, 1# 10x []  Tactile cues   [x]   Verbal cues [x]  Yes  Max cues for breathing      MODALITIES      NMES 15 minutes total Electrical stimulation parameters for elbow flexion with functional movement  Ramp UP/Ramp Down   2 second each  Intensity up to 25  Symmetrical Bipasic via EMPI  No marks from pads on skin after removal of ESTIM pads  [x]  Yes          []  Yes        SPLINTING         []  Tactile cues   []   Verbal cues []  Yes   []  See separate note regarding splint precautions/contraindications      []  Tactile cues   []   Verbal cues []  Yes  []  See separate note regarding splint precautions/contraindications             Functional Outcome Measure:   []NA    10/20/21 Quickdash Score of 34, DSI 52.3%  10/27/21 Quickdash Score of 37, DSI 59.09%  11/22/21 Quickdash Score of 28, DSI 38.6%    Treatment/Activity Tolerance:    Patients response to treatment:   [x]Patient tolerated treatment well []Patient limited by fatigue   []Patient limited by pain  []Patient limited by other medical complications   []Other:     Assessment: Patient has participated in 7 OT visits since his last progress note 4 weeks ago and is improving in strength, ROM and coordination for arms and hands but requires further outpatient skilled OT in this hospital-based outpatient setting due to need for large high/low mat, access to vital machines of sphygnanometer and pulse oximeter, neuro equipment such as aircast and mirror. He also requires constant cueing due to cardiopulmonary limitations s/p COVID. GOALS: Goals established 9/29/21   Patient stated goal: \"to get this left arm going, I'm doing so much better though\"  [x]? Progressing: []? Met: []? Not Met: []? Adjusted     STG TO BE MET IN 2 WEEKS      1. Pt will report a QuickDASH Symptom Severity Scale score of 10% or less indicating increased safety and functional independence in desired occupational pursuits. [x]? Progressing: []? Met: []? Not Met: []? Adjusted 11/22 PROGRESSING     2. Patient will be I with UE HEP. - GOAL MET 10/20/21     3. Patient will verbalize increased use of LUE in ADL/IADL tasks. - GOAL MET 10/20/21 for opening doors     LTG TO BE MET IN 4 WEEKS     1. Patient will demonstrate improved  strength for improved return to ADLs/IADLs. GOAL MET 10/27     2. Patient will demonstrate improved coordination with LUE for improved return to ADLs/IADLs. - GOAL MET 10/27     3.  Patient will verbalize mod I with all ADLs with incorporation of LUE. - REQUIRES OCCASIONAL MIN A, BUT  PROGRESSING

## 2021-12-01 ENCOUNTER — HOSPITAL ENCOUNTER (OUTPATIENT)
Dept: OCCUPATIONAL THERAPY | Age: 76
Setting detail: THERAPIES SERIES
Discharge: HOME | End: 2021-12-01
Payer: MEDICARE

## 2021-12-01 ENCOUNTER — HOSPITAL ENCOUNTER (OUTPATIENT)
Dept: CARDIAC REHAB | Age: 76
Setting detail: THERAPIES SERIES
Discharge: HOME | End: 2021-12-01
Payer: MEDICARE

## 2021-12-01 PROCEDURE — 97110 THERAPEUTIC EXERCISES: CPT

## 2021-12-01 PROCEDURE — 97140 MANUAL THERAPY 1/> REGIONS: CPT

## 2021-12-01 PROCEDURE — G0239 OTH RESP PROC, GROUP: HCPCS

## 2021-12-01 PROCEDURE — 97530 THERAPEUTIC ACTIVITIES: CPT

## 2021-12-01 NOTE — FLOWSHEET NOTE
[facility], [facility], [address] Raiza Easley [facility], [address]   Phone: [phone]  Fax [phone]        Outpatient Occupational Therapy     [x] Daily Treatment Note   [] Progress Note   [] Discharge Note      Date:  2021    Patient: Ruthy Cochran                              : 1945                                    MRN: [id number]                                         Evaluation Date: 2021                                            Medical Diagnosis/ICD 10:  S49. 92XA (ICD-10-CM) - Unspecified injury of left shoulder and upper arm, initial encounter      Treatment Diagnosis:  Decreased strength, decrease ROM, decreased ADL/IADL independence     Onset Date:  2021     Referral Date:  21     Referring Physician: Dr. Hima Duran, 21                                                                       Insurance information: [employer] Pre-cert required, 6 and 2 visits authorized prior, 6 visits authorized from 10/27-, 13 visits authorized from  - 3/21/21     Precautions/ Contraindications:   Red Flag Symptoms: none  Safety awareness: intact  Other: Restrictions: cardiopulmonary limitations due to COVID-19     Adhesive allergy: NO  Latex Allergy:  [x]? NO      []?YES      Preferred Language for Healthcare:   [x]? English       []? other:     C-SSRS Triggered by Intake questionnaire (Past 2 wk assessment):   [x]? No, Questionnaire did not trigger screening. []? Yes, Patient intake triggered further evaluation      []? C-SSRS Screening completed  []? PCP notified via Plan of Care  []? Emergency services notified     Plan of care sent to provider:      []? Faxed 21  [x]? Co-signature    (attempts: 1[x]?  2 []?3[]?)        __________________________________________________________________________________________________________________    Plan of care sent to provider:    []Faxed  [x]Co-signature (attempts: 1[] 2 []3[])        Plan of care signed:    [x]Yes date: Dr. Guilherme Ly 9/30/21, Dr. Guilherme Ly 10/29/21, Dr. Guilherme Ly 11/30/21          []No           Next Progress Note due:   12/22/21    Visit# / total visits:  2/8, 7/8, 8/8    Plan for Next Session:  Review HEP, progress HEP    HEP instruction: Written and verbal HEP instructions provided and reviewed:  · Access Code: [email or web address]  · URL: [email or web address]  · Date: 09/29/2021  · Prepared by: Joey Adair  · Exercises  · Finger Key  with Putty - 2-3 x daily - 7 x weekly - 3 sets - 10 reps  · Putty Squeezes - 2-3 x daily - 7 x weekly - 3 sets - 10 reps  · Seated Three Finger Pinch with Putty - 2-3 x daily - 7 x weekly - 3 sets - 10 reps  · Tip Pinch with Putty - 2-3 x daily - 7 x weekly - 3 sets - 10 reps  · Shoulder Flexion AAROM - 2-3 x daily - 7 x weekly - 3 sets - 10 reps  · Supine Shoulder Horizontal Abduction Adduction AAROM with Dowel - 2-3 x daily - 7 x weekly - 3 sets - 10 reps  · Elbow flexion with weighted items in supine  · Hose bending     Subjective: Patient reports that he is doing well with his exercises, especially the wall. Pain level: 0/10     Objective:       Exercises:    Exercises in bold performed in department today. Items not bolded are carried forward from prior visits for continuity of the record.   Exercise/Equipment Resistance/Repetitions Skilled cues Added to HEP Comments      ADL/IADL TRAINING (ADL OR TA)        []  Tactile cues   []   Verbal cues []  Yes       []  Tactile cues   []   Verbal cues []  Yes      []  Tactile cues   []   Verbal cues []  Yes       []  Tactile cues   []   Verbal cues []  Yes       []  Tactile cues   []   Verbal cues []  Yes         []  Tactile cues   []   Verbal cues []  Yes         []  Tactile cues   []   Verbal cues []  Yes         []  Tactile cues   []   Verbal cues []  Yes         []  Tactile cues   []   Verbal cues []  Yes        NEUROMUSCULAR RE-EDU and THERAPEUTIC ACTIVITY (NEURO RE-ED or TA)      Kinesiotaping for LUE [facility] Tri-Pull and Scapular retraction []  Tactile cues   [x]   Verbal cues [x]  Yes     Weightbearing At side of mat,  min cues for elbow extension, initially max A for scapular stabilization, 40 seconds x3 [x]  Tactile cues   [x]   Verbal cues [x]  Yes  Max to min cues   Scapular protraction strengthening with elbow extension With holding dowel and LUE reaching to dowel and out 8x also with addition of theraband 8x but unable to maintain [x]  Tactile cues   [x]   Verbal cues [x]  Yes  Mod cues   Pushing shopping cart  Forward: With elbow extension and shoulder flexion and posterior pelvic tilt 15x    To right with BUE, cues for lean 10x    To left with BUE, cues for lean 10x Verbal cues  Improved ability to keep shoulder down, head up with cues   Neuro Re-Ed in Sidelying With facilitation of scapula for horizontal abduction, shoulder abduction, and inhibition of upper trap with trigger point release at upper trap []  Tactile cues   []   Verbal cues []  Yes     Short arc propellers Short arc, left to center, 15x, max cues for breathing and upper trap inhibition []  Tactile cues   [x]   Verbal cues [x]  Yes  Cues for positioning   Clothespins 20x LUE with cues for tip pinch and three-jaw pinch    Also yellow, red, green, blue to short distance release      Balloon toss and catch 15x with incorporation of BUE, mirror for visual cueing, elbow flexion/shoulder flexion, external rotation []  Tactile cues   []   Verbal cues []  Yes     Elbow flexion/extension training Elbow flexion/extension supine with 1#, 2# with mod A, 20x total []  Tactile cues   []   Verbal cues []  Yes  Min cues for breathing    Cues for control   Sit to stand and stand to sit Without arms From low chair, 5x, no issue with pillow under left foot or right foot  Standing on pillow with two feet, CGASBA, 5x []  Tactile cues   []   Verbal cues [x]  Yes          THERAPEUTIC EXERCISE and MANUAL TECHNIQUES   (TE OR MT)      Prone mat mobility Prone with external rotation stretch : 30 seconds  Prone with lateral core stretch: 30 seconds    Prone with push up into tall kneel: min A  Tall kneel with horizontal ab/adduction BUE less than full height: 6x with max cues for pursed lip breathing  Prone with quadruped: min A    Prone with quadruped with lifting up RUE   Cues for breathing   Standing with arms stretching up into wall 5x with LUE on wall, with fingers lift off, with bringing RUE up and down    With turning right side away from wall 4x Reviewed for HEP  Cues for breathing   Prone posterior shoulder strengthening Retraction 6x  Ts 6x   Max cues for breathing   AROM with BUE Chest Press BUE 3# each, min A with LUE 10x  Shoulder flexion supine 2# LUE 10x, min A  Shoulder abduction supine 2# LUE 10x to 1/4 range, min A  Horizontal abduction supine/sidelying 0# LUE 4x2, activation  Shoulder abduction sidelying 0# LUE, 10x to 1/4 range, min A initially, max cues initially, improved to min cues [x]  Tactile cues   [x]   Verbal cues [x]  Yes  Min cues for breathing     Hand strengthening Intrinsic Plus position  DIP flexion position  Finger/ab/adduction  Velcro rollers - 5 finger, three-jaw with 75% velcro friction  Gripper 35# 10x with cues for pursed lip breathing  Pinky finger abduction with tactile cue and visual cue of paper - 8x to 3/4 range and cues for pursed lip breathing Tactile/verbal cues Yes Mod cues for breathing   Shoulder strengthening Scapular retraction with red band  10x BUE      Putty exercises Green  Gripping 10x  Three jaw pinch 10x with table  Tip pinch 10x with table  Lateral pinch 10x    Verbal review of finger extension with putty (added to HEP) []  Tactile cues   [x]   Verbal cues [x]  Yes      strengthening 35-55# gripper brown 10x, cues for full range  XTENSOR 5 second hold x5 []  Tactile cues   [x]   Verbal cues [x]  Yes     Scapular mobilizations sidelying 40 second hold x3 for upward rotation and retraction [x]  Tactile cues   [x]   Verbal cues [x]  Yes     Elbow flexion/extension With 3# high table gravity eliminated 10x each [x]  Tactile cues   [x]   Verbal cues [x]  Yes     Therabar green  stick 10x smiles and frowns, cues for control, breathing, relaxation of upper neck musculature [x]  Tactile cues   [x]   Verbal cues [x]  Yes  Max Cues for breathing   Soft tissue mobilization To bicep with manual stretch for elbow flexion [x]  Tactile cues   [x]   Verbal cues [x]  Yes     PROM RUE Therapist assisted LUE to overhead shoulder flexion 10x each  Therapist assisted LUE to overhead scaption 10x each   Therapist assisted LUE external rotation 10x each  Therapist assisted LUE elbow flexion  Supination/pronation/finger and thumb flexion/extension  Therapist assisted LUE to overhead scapular abduction/scaption with gentle lowering        Seated exercises Elbow flexion/extension - unable to complete without compensatory shoulder flexion  Supination/pronation - with 5# weight 8x      Weight with AAROM with RUE 0-1# shoulder flexion LUE supine to 1/2 range  0-1# horizontal ab/adduction LUE in sidelying 8x  0-1# shoulder abduction LUE in sidelying 8x  2# chest press BUE supine 10x    With aircast on RUE   []  Tactile cues   [x]   Verbal cues [x]  Yes  Max cues for breathing      MODALITIES      NMES 15 minutes total Electrical stimulation parameters for elbow flexion with functional movement  Ramp UP/Ramp Down   2 second each  Intensity up to 25  Symmetrical Bipasic via EMPI  No marks from pads on skin after removal of ESTIM pads  [x]  Yes          []  Yes        SPLINTING         []  Tactile cues   []   Verbal cues []  Yes   []  See separate note regarding splint precautions/contraindications      []  Tactile cues   []   Verbal cues []  Yes  []  See separate note regarding splint precautions/contraindications             Functional Outcome Measure:   []NA    10/20/21 Quickdash Score of 34, DSI 52.3%  10/27/21 Quickdash Score of 37, DSI 59.09%  11/22/21 Quickdash Score of 28, DSI 38.6%    Treatment/Activity Tolerance:    Patients response to treatment:   [x]Patient tolerated treatment well []Patient limited by fatigue   []Patient limited by pain  []Patient limited by other medical complications   []Other:     Assessment: Patient is progressing in endurance and strength for shoulder exercises. He requires high level of cueing for breathing or else he becomes short of breath. O2 is 95% at end of session on room air. GOALS: Goals established 9/29/21   Patient stated goal: \"to get this left arm going, I'm doing so much better though\"  [x]? Progressing: []? Met: []? Not Met: []? Adjusted     STG TO BE MET IN 2 WEEKS      1. Pt will report a QuickDASH Symptom Severity Scale score of 10% or less indicating increased safety and functional independence in desired occupational pursuits. [x]? Progressing: []? Met: []? Not Met: []? Adjusted 11/22 PROGRESSING     2. Patient will be I with UE HEP. - GOAL MET 10/20/21     3. Patient will verbalize increased use of LUE in ADL/IADL tasks. - GOAL MET 10/20/21 for opening doors     LTG TO BE MET IN 4 WEEKS     1. Patient will demonstrate improved  strength for improved return to ADLs/IADLs. GOAL MET 10/27     2. Patient will demonstrate improved coordination with LUE for improved return to ADLs/IADLs. - GOAL MET 10/27     3. Patient will verbalize mod I with all ADLs with incorporation of LUE. - REQUIRES OCCASIONAL MIN A, BUT  PROGRESSING FOR HOLDING PLATE AND BOWL WITH LUE, PROGRESSING WITH TYING SHOES WITH LUE, PROGRESSING WITH WASHING BODY WITH LUE, LIMITED FOR ACTIVITIES THAT REQUIRE RESISTANCE 10/27, PROGRESSING 11/22 BUT REQUIRES MANUAL ASSIST TO ADDRESS LUE ROM AND STRENGTH LIMITATIONS    NEW STG TO BE MET IN 2 WEEKS Established 10/27/21 to be met by 11/12/21, CONTINUE GOAL 2 UNTIL 12/6/21  1.  Patient will be I with UE strengthening HEP. - GOAL MET 11/17 2. Patient will verbalize increased use of LUE in activities that require him to  weighted items for ADLs/IADLs with LUE. (I.e. moving mug for coffee) - PROGRESS BUT REQUIRES ADDITIONAL INTERVENTION DUE TO LIMITATIONS IN SHOULDER STRENGTH 11/22     LTG TO BE MET IN 4 WEEKS Established 10/27/21 to be met by 11/26/21, CONTINUE GOALS UNTIL 12/22/21  1. Patient will demonstrate improved pinch strength for improving LUE participation in home maintenance and work-based tasks. - PROGRESSING 11/22 TO 2-7# BUT REQUIRES ADDITIONAL SKILLED INTERVENTION IN ORDER TO PROGRESS FURTHER     2. Patient will demonstrate improved coordination with LUE as evidenced by increased speed on 9 hole peg test for improving LUE participation and independence with ADL/IADL tasks.  - PROGRESSING 11/22, improved by 10 seconds, but requires additional skilled intervention in order to progress further     Prognosis: [x]Good   []Fair   []Poor    Patient Requires Follow-up:  [x]Yes  []No    Plan: []Plan of care initiated     [x]Continue per plan of care 2x/week for 4 weeks   [] Alter current plan (additional OT 2x/week for 4 weeks)    []Hold pending MD visit []Discharge    Time in: 1150 Time out: 1245    Timed Code Treatment Minutes: 55    Total Treatment Minutes: 55    Medicare Cap total YTD:   [x]N/A    Workers Comp Time Stamp  [x]N/A   Time In:   Time Out:    Electronically signed by:  Yg Augustine OTR/L

## 2021-12-03 ENCOUNTER — HOSPITAL ENCOUNTER (OUTPATIENT)
Dept: CARDIAC REHAB | Age: 76
Setting detail: THERAPIES SERIES
Discharge: HOME | End: 2021-12-03
Payer: MEDICARE

## 2021-12-03 PROCEDURE — G0239 OTH RESP PROC, GROUP: HCPCS

## 2021-12-06 ENCOUNTER — HOSPITAL ENCOUNTER (OUTPATIENT)
Dept: CARDIAC REHAB | Age: 76
Setting detail: THERAPIES SERIES
Discharge: HOME | End: 2021-12-06
Payer: MEDICARE

## 2021-12-06 ENCOUNTER — HOSPITAL ENCOUNTER (OUTPATIENT)
Dept: OCCUPATIONAL THERAPY | Age: 76
Setting detail: THERAPIES SERIES
Discharge: HOME | End: 2021-12-06
Payer: MEDICARE

## 2021-12-06 PROCEDURE — G0239 OTH RESP PROC, GROUP: HCPCS

## 2021-12-06 PROCEDURE — 97110 THERAPEUTIC EXERCISES: CPT

## 2021-12-06 PROCEDURE — 97140 MANUAL THERAPY 1/> REGIONS: CPT

## 2021-12-06 NOTE — FLOWSHEET NOTE
[facility], [facility], [facility] [address], [address]   Phone: [phone]  Fax [phone]        Outpatient Occupational Therapy     [x] Daily Treatment Note   [] Progress Note   [] Discharge Note      Date:  2021    Patient: Jay Bright                              : 1945                                    MRN: [id number]                                         Evaluation Date: 2021                                            Medical Diagnosis/ICD 10:  S49. 92XA (ICD-10-CM) - Unspecified injury of left shoulder and upper arm, initial encounter      Treatment Diagnosis:  Decreased strength, decreased ROM, decreased ADL/IADL independence     Onset Date:  2021     Referral Date:  21     Referring Physician: Dr. Bard Webber, 21                                                                       Insurance information: [employer] Pre-cert required, 6 and 2 visits authorized prior, 6 visits authorized from 10/27-, 13 visits authorized from  - 3/21/21     Precautions/ Contraindications:   Red Flag Symptoms: none  Safety awareness: intact  Other: Restrictions: cardiopulmonary limitations due to COVID-19     Adhesive allergy: NO  Latex Allergy:  [x]? NO      []?YES      Preferred Language for Healthcare:   [x]? English       []? other:     C-SSRS Triggered by Intake questionnaire (Past 2 wk assessment):   [x]? No, Questionnaire did not trigger screening. []? Yes, Patient intake triggered further evaluation      []? C-SSRS Screening completed  []? PCP notified via Plan of Care  []? Emergency services notified     Plan of care sent to provider:      []? Faxed 21  [x]? Co-signature    (attempts: 1[x]?  2 []?3[]?)        __________________________________________________________________________________________________________________    Plan of care sent to provider:    []Faxed  [x]Co-signature (attempts: 1[] 2 []3[])        Plan of care signed:    [x]Yes date: Dr. Taylor Pro 9/30/21, Dr. Taylor Pro 10/29/21, Dr. Taylor Pro 11/30/21          []No           Next Progress Note due:   12/22/21    Visit# / total visits:  3/8, 7/8, 8/8 (4/13 visits used as of 12/6/2021)    Plan for Next Session:  Review HEP, progress HEP    HEP instruction: Written and verbal HEP instructions provided and reviewed:  · Access Code: [email or web address]  · URL: [employer]/  · Date: 09/29/2021  · Prepared by: Wan Rahman  · Exercises  · Finger Key  with Putty - 2-3 x daily - 7 x weekly - 3 sets - 10 reps  · Putty Squeezes - 2-3 x daily - 7 x weekly - 3 sets - 10 reps  · Seated Three Finger Pinch with Putty - 2-3 x daily - 7 x weekly - 3 sets - 10 reps  · Tip Pinch with Putty - 2-3 x daily - 7 x weekly - 3 sets - 10 reps  · Shoulder Flexion AAROM - 2-3 x daily - 7 x weekly - 3 sets - 10 reps  · Supine Shoulder Horizontal Abduction Adduction AAROM with Dowel - 2-3 x daily - 7 x weekly - 3 sets - 10 reps  · Elbow flexion with weighted items in supine  · Hose bending     Subjective: Patient reports that he is doing well with his exercises and was able to use his left arm to reach up and open the microwave the other day. Pain level: 0/10     Objective:     Elbow flexion to 125 degrees PROM    Exercises:    Exercises in bold performed in department today. Items not bolded are carried forward from prior visits for continuity of the record.   Exercise/Equipment Resistance/Repetitions Skilled cues Added to HEP Comments      ADL/IADL TRAINING (ADL OR TA)        []  Tactile cues   []   Verbal cues []  Yes       []  Tactile cues   []   Verbal cues []  Yes      []  Tactile cues   []   Verbal cues []  Yes       []  Tactile cues   []   Verbal cues []  Yes       []  Tactile cues   []   Verbal cues []  Yes         []  Tactile cues   []   Verbal cues []  Yes         []  Tactile cues   []   Verbal cues []  Yes         []  Tactile cues   []   Verbal cues []  Yes         []  Tactile cues   []   Verbal cues []  Yes        NEUROMUSCULAR RE-EDU and THERAPEUTIC ACTIVITY (NEURO RE-ED or TA)      Kinesiotaping for LUE [employer] [place] Tri-Pull and Scapular retraction []  Tactile cues   [x]   Verbal cues [x]  Yes     Weightbearing At side of mat,  min cues for elbow extension, initially max A for scapular stabilization, 40 seconds x3 [x]  Tactile cues   [x]   Verbal cues [x]  Yes  Max to min cues   Scapular protraction strengthening with elbow extension With holding dowel and LUE reaching to dowel and out 8x also with addition of theraband 8x but unable to maintain [x]  Tactile cues   [x]   Verbal cues [x]  Yes  Mod cues   Pushing shopping cart  Forward: With elbow extension and shoulder flexion and posterior pelvic tilt 15x    To right with BUE, cues for lean 10x    To left with BUE, cues for lean 10x Verbal cues  Improved ability to keep shoulder down, head up with cues   Neuro Re-Ed in Sidelying With facilitation of scapula for horizontal abduction, shoulder abduction, and inhibition of upper trap with trigger point release at upper trap []  Tactile cues   []   Verbal cues []  Yes     Short arc propellers Short arc, left to center, 15x, max cues for breathing and upper trap inhibition []  Tactile cues   [x]   Verbal cues [x]  Yes  Cues for positioning   Clothespins 20x LUE with cues for tip pinch and three-jaw pinch    Also yellow, red, green, blue to short distance release      Balloon toss and catch 15x with incorporation of BUE, mirror for visual cueing, elbow flexion/shoulder flexion, external rotation []  Tactile cues   []   Verbal cues []  Yes     Elbow flexion/extension training Elbow flexion/extension supine with 1#, 2# with mod A, 20x total []  Tactile cues   []   Verbal cues []  Yes  Min cues for breathing    Cues for control   Sit to stand and stand to sit Without arms From low chair, 5x, no issue with pillow under left foot or right foot  Standing on pillow with two feet, CGASBA, 5x []  Tactile cues   []   Verbal cues [x]  Yes          THERAPEUTIC EXERCISE and MANUAL TECHNIQUES   (TE OR MT)      Prone mat mobility Prone with external rotation stretch : 30 seconds  Prone with lateral core stretch: 30 seconds     Prone with push up into tall kneel: CGA  Tall kneel 30 seconds with mod cues for pursed lip breathing  Prone with quadruped: CGA    Prone with quadruped with lifting up RUE 5x with mod cues for breathing   Cues for breathing   Standing with arms stretching up into wall 5x with LUE on wall, with fingers lift off, with bringing RUE up and down    With turning right side away from wall 4x Reviewed for HEP  Cues for breathing   Prone posterior shoulder strengthening Retraction 6x  Ts 6x   Max cues for breathing   AROM with BUE Chest Press BUE 3# each, min A with LUE 10x  Shoulder flexion supine 2# LUE 10x, min A  Shoulder abduction supine 2# LUE 10x to 1/4 range, min A  Horizontal abduction supine/sidelying 0# LUE 4x2, activation  Shoulder abduction sidelying 0# LUE, 10x to 1/4 range, min A initially, max cues initially, improved to min cues [x]  Tactile cues   [x]   Verbal cues [x]  Yes  Min cues for breathing     Hand strengthening Intrinsic Plus position  DIP flexion position  Finger/ab/adduction  Velcro rollers - 5 finger, three-jaw with 75% velcro friction  Gripper 35# 10x with cues for pursed lip breathing  Pinky finger abduction with tactile cue and visual cue of paper - 8x to 3/4 range and cues for pursed lip breathing Tactile/verbal cues Yes Mod cues for breathing   Shoulder strengthening Scapular retraction with red band  10x BUE      Putty exercises Green  Gripping 10x  Three jaw pinch 10x with table  Tip pinch 10x with table  Lateral pinch 10x    Verbal review of finger extension with putty (added to HEP) []  Tactile cues   [x]   Verbal cues [x]  Yes      strengthening 35-55# gripper brown 10x, cues for full range  XTENSOR 5 second hold x5 [] Tactile cues   [x]   Verbal cues [x]  Yes     Scapular mobilizations sidelying 40 second hold x3 for upward rotation and retraction [x]  Tactile cues   [x]   Verbal cues [x]  Yes     Elbow flexion/extension With 3# high table gravity eliminated 10x each [x]  Tactile cues   [x]   Verbal cues [x]  Yes     Therabar green  stick 10x smiles and frowns, cues for control, breathing, relaxation of upper neck musculature [x]  Tactile cues   [x]   Verbal cues [x]  Yes  Max Cues for breathing   Soft tissue mobilization To bicep with manual stretch for elbow flexion [x]  Tactile cues   [x]   Verbal cues [x]  Yes     PROM LUE Therapist assisted LUE to overhead shoulder flexion 10x each  Therapist assisted LUE to overhead scaption 10x each   Therapist assisted LUE external rotation 10x each  Therapist assisted LUE elbow flexion  Supination/pronation/finger and thumb flexion/extension  Therapist assisted LUE to overhead scapular abduction/scaption with gentle lowering        Seated exercises Elbow flexion/extension - unable to complete without compensatory shoulder flexion  Supination/pronation - with 5# weight 8x      Weight with AAROM with LUE 0-1# shoulder flexion LUE supine to 1/2 range - 5x  0-1# horizontal ab/adduction LUE in sidelying 5x  0-1# shoulder abduction LUE in sidelying 5x  2# chest press BUE supine 10x  Elbow flexion 1# 8x      With aircast on RUE   []  Tactile cues   [x]   Verbal cues [x]  Yes  Max cues for breathing      MODALITIES      NMES 15 minutes total Electrical stimulation parameters for elbow flexion with functional movement  Ramp UP/Ramp Down   2 second each  Intensity up to 25  Symmetrical Bipasic via EMPI  No marks from pads on skin after removal of ESTIM pads  [x]  Yes          []  Yes        SPLINTING         []  Tactile cues   []   Verbal cues []  Yes   []  See separate note regarding splint precautions/contraindications      []  Tactile cues   []   Verbal cues []  Yes  []  See separate note regarding splint precautions/contraindications             Functional Outcome Measure:   []NA    10/20/21 Quickdash Score of 34, DSI 52.3%  10/27/21 Quickdash Score of 37, DSI 59.09%  11/22/21 Quickdash Score of 28, DSI 38.6%    Treatment/Activity Tolerance:    Patients response to treatment:   [x]Patient tolerated treatment well []Patient limited by fatigue   []Patient limited by pain  []Patient limited by other medical complications   []Other:     Assessment: Patient is progressing in endurance and strength for shoulder exercises, requires manual assist for PROM and CGA/SBA for safety with exercises incorporating LUE due to significant weakness. GOALS: Goals established 9/29/21   Patient stated goal: \"to get this left arm going, I'm doing so much better though\"  [x]? Progressing: []? Met: []? Not Met: []? Adjusted     STG TO BE MET IN 2 WEEKS      1. Pt will report a QuickDASH Symptom Severity Scale score of 10% or less indicating increased safety and functional independence in desired occupational pursuits. [x]? Progressing: []? Met: []? Not Met: []? Adjusted 11/22 PROGRESSING     2. Patient will be I with UE HEP. - GOAL MET 10/20/21     3. Patient will verbalize increased use of LUE in ADL/IADL tasks. - GOAL MET 10/20/21 for opening doors     LTG TO BE MET IN 4 WEEKS     1. Patient will demonstrate improved  strength for improved return to ADLs/IADLs. GOAL MET 10/27     2. Patient will demonstrate improved coordination with LUE for improved return to ADLs/IADLs. - GOAL MET 10/27     3.  Patient will verbalize mod I with all ADLs with incorporation of LUE. - REQUIRES OCCASIONAL MIN A, BUT  PROGRESSING FOR HOLDING PLATE AND BOWL WITH LUE, PROGRESSING WITH TYING SHOES WITH LUE, PROGRESSING WITH WASHING BODY WITH LUE, LIMITED FOR ACTIVITIES THAT REQUIRE RESISTANCE 10/27, PROGRESSING 11/22 BUT REQUIRES MANUAL ASSIST TO ADDRESS LUE ROM AND STRENGTH LIMITATIONS    NEW STG TO BE MET IN 2 WEEKS Established 10/27/21 to be met by 11/12/21, CONTINUE GOAL 2 UNTIL 12/6/21  1. Patient will be I with UE strengthening HEP. - GOAL MET 11/17     2. Patient will verbalize increased use of LUE in activities that require him to  weighted items for ADLs/IADLs with LUE. (I.e. moving mug for coffee) - PROGRESS BUT REQUIRES ADDITIONAL INTERVENTION DUE TO LIMITATIONS IN SHOULDER STRENGTH 11/22     LTG TO BE MET IN 4 WEEKS Established 10/27/21 to be met by 11/26/21, CONTINUE GOALS UNTIL 12/22/21  1. Patient will demonstrate improved pinch strength for improving LUE participation in home maintenance and work-based tasks. - PROGRESSING 11/22 TO 2-7# BUT REQUIRES ADDITIONAL SKILLED INTERVENTION IN ORDER TO PROGRESS FURTHER     2. Patient will demonstrate improved coordination with LUE as evidenced by increased speed on 9 hole peg test for improving LUE participation and independence with ADL/IADL tasks.  - PROGRESSING 11/22, improved by 10 seconds, but requires additional skilled intervention in order to progress further     Prognosis: [x]Good   []Fair   []Poor    Patient Requires Follow-up:  [x]Yes  []No    Plan: []Plan of care initiated     [x]Continue per plan of care 2x/week for 4 weeks   [] Alter current plan (additional OT 2x/week for 4 weeks)    []Hold pending MD visit []Discharge    Time in: 1150 Time out: 1230    Timed Code Treatment Minutes: 40    Total Treatment Minutes: 40    Medicare Cap total YTD:   [x]N/A    Workers Comp Time Stamp  [x]N/A   Time In:   Time Out:    Electronically signed by:  Lo Weaver OTR/L

## 2021-12-07 RX ORDER — DOXAZOSIN MESYLATE 1 MG/1
TABLET ORAL
Qty: 90 TABLET | Refills: 1 | Status: SHIPPED | OUTPATIENT
Start: 2021-12-07 | End: 2022-06-07

## 2021-12-07 RX ORDER — FLUOXETINE HYDROCHLORIDE 20 MG/1
CAPSULE ORAL
Qty: 90 CAPSULE | Refills: 1 | Status: SHIPPED | OUTPATIENT
Start: 2021-12-07 | End: 2022-06-07

## 2021-12-07 NOTE — TELEPHONE ENCOUNTER
Mechelle Auguste is requesting refill(s) prozac, cardura  Last OV 9/7/21 (pertaining to medication)  LR 6/24/21 (per medication requested)  Next office visit scheduled or attempted Yes   If no, reason:  1/10/22

## 2021-12-08 ENCOUNTER — HOSPITAL ENCOUNTER (OUTPATIENT)
Dept: CARDIAC REHAB | Age: 76
Setting detail: THERAPIES SERIES
Discharge: HOME | End: 2021-12-08
Payer: MEDICARE

## 2021-12-08 ENCOUNTER — HOSPITAL ENCOUNTER (OUTPATIENT)
Dept: OCCUPATIONAL THERAPY | Age: 76
Setting detail: THERAPIES SERIES
Discharge: HOME | End: 2021-12-08
Payer: MEDICARE

## 2021-12-08 PROCEDURE — 97110 THERAPEUTIC EXERCISES: CPT

## 2021-12-08 PROCEDURE — G0239 OTH RESP PROC, GROUP: HCPCS

## 2021-12-08 PROCEDURE — 97140 MANUAL THERAPY 1/> REGIONS: CPT

## 2021-12-08 NOTE — FLOWSHEET NOTE
Liz  80. [facility], [facility], [address] [place] [facility], [address] [place] [address]   Phone: [phone]  Fax [phone]        Outpatient Occupational Therapy     [x] Daily Treatment Note   [] Progress Note   [] Discharge Note      Date:  2021    Patient: Igor العلي                              : 1945                                    MRN: [id number]                                         Evaluation Date: 2021                                            Medical Diagnosis/ICD 10:  S49. 92XA (ICD-10-CM) - Unspecified injury of left shoulder and upper arm, initial encounter      Treatment Diagnosis:  Decreased strength, decreased ROM, decreased ADL/IADL independence     Onset Date:  2021     Referral Date:  21     Referring Physician: Dr. Jessica Chopra, 21                                                                       Insurance information: [employer] Pre-cert required, 6 and 2 visits authorized prior, 6 visits authorized from 10/27-, 13 visits authorized from  - 3/21/21     Precautions/ Contraindications:   Red Flag Symptoms: none  Safety awareness: intact  Other: Restrictions: cardiopulmonary limitations due to COVID-19     Adhesive allergy: NO  Latex Allergy:  [x]? NO      []?YES      Preferred Language for Healthcare:   [x]? English       []? other:     C-SSRS Triggered by Intake questionnaire (Past 2 wk assessment):   [x]? No, Questionnaire did not trigger screening. []? Yes, Patient intake triggered further evaluation      []? C-SSRS Screening completed  []? PCP notified via Plan of Care  []? Emergency services notified     Plan of care sent to provider:      []? Faxed 21  [x]? Co-signature    (attempts: 1[x]?  2 []?3[]?)        __________________________________________________________________________________________________________________    Plan of care sent to provider:    []Faxed  [x]Co-signature (attempts: 1[] 2 []3[])        Plan of care signed:    [x]Yes date: Dr. Rickey Almaraz 9/30/21, Dr. Rickey Almaraz 10/29/21, Dr. Rickey Almaraz 11/30/21          []No           Next Progress Note due:   12/22/21    Visit# / total visits:  4/8, 7/8, 8/8 (5/13 visits used as of 12/8/2021)    Plan for Next Session:  Review HEP, progress HEP    HEP instruction: Written and verbal HEP instructions provided and reviewed:  · Access Code: [email or web address]  · URL: [email or web address]  · Date: 09/29/2021  · Prepared by: Naima Restrepo  · Exercises  · Finger Key  with Putty - 2-3 x daily - 7 x weekly - 3 sets - 10 reps  · Putty Squeezes - 2-3 x daily - 7 x weekly - 3 sets - 10 reps  · Seated Three Finger Pinch with Putty - 2-3 x daily - 7 x weekly - 3 sets - 10 reps  · Tip Pinch with Putty - 2-3 x daily - 7 x weekly - 3 sets - 10 reps  · Shoulder Flexion AAROM - 2-3 x daily - 7 x weekly - 3 sets - 10 reps  · Supine Shoulder Horizontal Abduction Adduction AAROM with Dowel - 2-3 x daily - 7 x weekly - 3 sets - 10 reps  · Elbow flexion with weighted items in supine  · Hose bending     Subjective: Patient states he forgot about OT today, started 10 minutes late. He states he did 23 minutes of cardiac rehab today on 1.0 L. Pain level: 0/10     Objective:     Elbow flexion to 145 degrees PROM  Shoulder flexion to 135 degrees PROM  O2 dropped to 88% with SOB this date during exercise even with cues for pursed lip breathing on room air    Exercises:    Exercises in bold performed in department today. Items not bolded are carried forward from prior visits for continuity of the record.   Exercise/Equipment Resistance/Repetitions Skilled cues Added to HEP Comments      ADL/IADL TRAINING (ADL OR TA)        []  Tactile cues   []   Verbal cues []  Yes       []  Tactile cues   []   Verbal cues []  Yes      []  Tactile cues   []   Verbal cues []  Yes       []  Tactile cues   []   Verbal cues []  Yes       []  Tactile cues   []   Verbal cues []  Yes         [] Sit to stand and stand to sit Without arms From low chair, 5x, no issue with pillow under left foot or right foot  Standing on pillow with two feet, CGASBA, 5x []  Tactile cues   []   Verbal cues [x]  Yes          THERAPEUTIC EXERCISE and MANUAL TECHNIQUES   (TE OR MT)      Prone mat mobility Prone with external rotation stretch : 30 seconds  Prone with lateral core stretch: 30 seconds     Prone with push up into tall kneel: CGA   Tall kneel 30 seconds with mod cues for pursed lip breathing, with horizontal abduction, shoulder flexion streth  Prone with quadruped: CGA    Prone with quadruped with lifting up RUE 5x with mod cues for breathing   Max Cues for breathing   Standing with arms stretching up into wall 5x with LUE on wall, with fingers lift off, with bringing RUE up and down     With turning right side away from wall 4x Reviewed for HEP  Cues for breathing   Prone posterior shoulder strengthening Retraction 6x  Ts 6x    Min A due to limited range   Max cues for breathing   AROM with BUE Chest Press BUE 3# each, min A with LUE 10x  Shoulder flexion supine 2# LUE 10x, min A  Shoulder abduction supine 2# LUE 10x to 1/4 range, min A  Horizontal abduction supine/sidelying 0# LUE 4x2, activation  Shoulder abduction sidelying 0# LUE, 10x to 1/4 range, min A initially, max cues initially, improved to min cues [x]  Tactile cues   [x]   Verbal cues [x]  Yes  Min cues for breathing     Hand strengthening Intrinsic Plus position  DIP flexion position  Finger/ab/adduction  Velcro rollers - 5 finger, three-jaw with 75% velcro friction  Gripper 35# 10x with cues for pursed lip breathing  Pinky finger abduction with tactile cue and visual cue of paper - 8x to 3/4 range and cues for pursed lip breathing Tactile/verbal cues Yes Mod cues for breathing   Shoulder strengthening Scapular retraction with red band  10x BUE      Putty exercises Green  Gripping 10x  Three jaw pinch 10x with table  Tip pinch 10x with table  Lateral pinch 10x    Verbal review of finger extension with putty (added to HEP) []  Tactile cues   [x]   Verbal cues [x]  Yes      strengthening 35-55# gripper brown 10x, cues for full range  XTENSOR 5 second hold x5 []  Tactile cues   [x]   Verbal cues [x]  Yes     Scapular mobilizations sidelying 40 second hold x3 for upward rotation and retraction [x]  Tactile cues   [x]   Verbal cues [x]  Yes     Elbow flexion/extension With 3# high table gravity eliminated 10x each [x]  Tactile cues   [x]   Verbal cues [x]  Yes     Therabar green  stick 10x smiles and frowns, cues for control, breathing, relaxation of upper neck musculature [x]  Tactile cues   [x]   Verbal cues [x]  Yes  Max Cues for breathing   Soft tissue mobilization To bicep with manual stretch for elbow flexion [x]  Tactile cues   [x]   Verbal cues [x]  Yes     PROM LUE Therapist assisted LUE to overhead shoulder flexion 10x each  Therapist assisted LUE to overhead scaption 10x each   Therapist assisted LUE external rotation 10x each  Therapist assisted LUE elbow flexion  Supination/pronation/finger and thumb flexion/extension  Therapist assisted LUE to overhead scapular abduction/scaption with gentle lowering        Seated exercises Elbow flexion/extension - unable to complete without compensatory shoulder flexion  Supination/pronation - with 5# weight 8x      Weight with AAROM with LUE 0-1# shoulder flexion LUE supine to 1/2 range - 5x  0-1# horizontal ab/adduction LUE in sidelying 5x  0-1# shoulder abduction LUE in sidelying 5x  2# chest press BUE supine 10x  Elbow flexion 1# 8x      With aircast on RUE   []  Tactile cues   [x]   Verbal cues [x]  Yes  Max cues for breathing      MODALITIES      NMES 15 minutes total Electrical stimulation parameters for elbow flexion with functional movement  Ramp UP/Ramp Down   2 second each  Intensity up to 25  Symmetrical Bipasic via EMPI  No marks from pads on skin after removal of ESTIM pads  [x]  Yes          [] Yes        SPLINTING         []  Tactile cues   []   Verbal cues []  Yes   []  See separate note regarding splint precautions/contraindications      []  Tactile cues   []   Verbal cues []  Yes  []  See separate note regarding splint precautions/contraindications             Functional Outcome Measure:   []NA    10/20/21 Quickdash Score of 34, DSI 52.3%  10/27/21 Quickdash Score of 37, DSI 59.09%  11/22/21 Quickdash Score of 28, DSI 38.6%    Treatment/Activity Tolerance:    Patients response to treatment:   [x]Patient tolerated treatment well []Patient limited by fatigue   []Patient limited by pain  []Patient limited by other medical complications   []Other:     Assessment: Patient easily fatigued this date even with max cues for pursed lip breathing. Patient improving in elbow/shoulder ROM. Patient requires high level of cueing and assist for posterior chain strengthening. Close supervision needed for monitoring of vital signs this date due to SOB. Increased breaks needed throughout session. GOALS: Goals established 9/29/21   Patient stated goal: \"to get this left arm going, I'm doing so much better though\"  [x]? Progressing: []? Met: []? Not Met: []? Adjusted     STG TO BE MET IN 2 WEEKS      1. Pt will report a QuickDASH Symptom Severity Scale score of 10% or less indicating increased safety and functional independence in desired occupational pursuits. [x]? Progressing: []? Met: []? Not Met: []? Adjusted 11/22 PROGRESSING     2. Patient will be I with UE HEP. - GOAL MET 10/20/21     3. Patient will verbalize increased use of LUE in ADL/IADL tasks. - GOAL MET 10/20/21 for opening doors     LTG TO BE MET IN 4 WEEKS     1. Patient will demonstrate improved  strength for improved return to ADLs/IADLs. GOAL MET 10/27     2. Patient will demonstrate improved coordination with LUE for improved return to ADLs/IADLs. - GOAL MET 10/27     3. Patient will verbalize mod I with all ADLs with incorporation of LUE.  - REQUIRES OCCASIONAL MIN A, BUT  PROGRESSING FOR HOLDING PLATE AND BOWL WITH LUE, PROGRESSING WITH TYING SHOES WITH LUE, PROGRESSING WITH WASHING BODY WITH LUE, LIMITED FOR ACTIVITIES THAT REQUIRE RESISTANCE 10/27, PROGRESSING 11/22 BUT REQUIRES MANUAL ASSIST TO ADDRESS LUE ROM AND STRENGTH LIMITATIONS    NEW STG TO BE MET IN 2 WEEKS Established 10/27/21 to be met by 11/12/21, CONTINUE GOAL 2 UNTIL 12/6/21  1. Patient will be I with UE strengthening HEP. - GOAL MET 11/17     2. Patient will verbalize increased use of LUE in activities that require him to  weighted items for ADLs/IADLs with LUE. (I.e. moving mug for coffee) - PROGRESS BUT REQUIRES ADDITIONAL INTERVENTION DUE TO LIMITATIONS IN SHOULDER STRENGTH 11/22     LTG TO BE MET IN 4 WEEKS Established 10/27/21 to be met by 11/26/21, CONTINUE GOALS UNTIL 12/22/21  1. Patient will demonstrate improved pinch strength for improving LUE participation in home maintenance and work-based tasks. - PROGRESSING 11/22 TO 2-7# BUT REQUIRES ADDITIONAL SKILLED INTERVENTION IN ORDER TO PROGRESS FURTHER     2. Patient will demonstrate improved coordination with LUE as evidenced by increased speed on 9 hole peg test for improving LUE participation and independence with ADL/IADL tasks.  - PROGRESSING 11/22, improved by 10 seconds, but requires additional skilled intervention in order to progress further     Prognosis: [x]Good   []Fair   []Poor    Patient Requires Follow-up:  [x]Yes  []No    Plan: []Plan of care initiated     [x]Continue per plan of care 2x/week for 4 weeks   [] Alter current plan (additional OT 2x/week for 4 weeks)    []Hold pending MD visit []Discharge    Time in: 1150 Time out: 1250     Timed Code Treatment Minutes: 53    Total Treatment Minutes: 60    Medicare Cap total YTD:   [x]N/A    Workers Comp Time Stamp  [x]N/A   Time In:   Time Out:    Electronically signed by:  LAKEISHA Blakely/MARINA

## 2021-12-10 ENCOUNTER — APPOINTMENT (OUTPATIENT)
Dept: OCCUPATIONAL THERAPY | Age: 76
End: 2021-12-10
Payer: MEDICARE

## 2021-12-10 ENCOUNTER — HOSPITAL ENCOUNTER (OUTPATIENT)
Dept: CARDIAC REHAB | Age: 76
Setting detail: THERAPIES SERIES
Discharge: HOME | End: 2021-12-10
Payer: MEDICARE

## 2021-12-10 PROCEDURE — G0239 OTH RESP PROC, GROUP: HCPCS

## 2021-12-10 NOTE — TELEPHONE ENCOUNTER
HILARY, JAIRON OOT.  Please advise or would you like to wait for JAIRON to return to advise, thank you
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
## 2021-12-10 NOTE — TELEPHONE ENCOUNTER
If > 1 year after stent then brilinta should be 60mg po BID. You can prescribe if he needs it and I can sign.

## 2021-12-13 ENCOUNTER — HOSPITAL ENCOUNTER (OUTPATIENT)
Dept: OCCUPATIONAL THERAPY | Age: 76
Setting detail: THERAPIES SERIES
Discharge: HOME | End: 2021-12-13
Payer: MEDICARE

## 2021-12-13 ENCOUNTER — HOSPITAL ENCOUNTER (OUTPATIENT)
Dept: CARDIAC REHAB | Age: 76
Setting detail: THERAPIES SERIES
Discharge: HOME | End: 2021-12-13
Payer: MEDICARE

## 2021-12-13 PROCEDURE — G0239 OTH RESP PROC, GROUP: HCPCS

## 2021-12-13 PROCEDURE — 97530 THERAPEUTIC ACTIVITIES: CPT

## 2021-12-13 PROCEDURE — 97140 MANUAL THERAPY 1/> REGIONS: CPT

## 2021-12-13 PROCEDURE — 97110 THERAPEUTIC EXERCISES: CPT

## 2021-12-13 NOTE — FLOWSHEET NOTE
(attempts: 1[] 2 []3[])        Plan of care signed:    [x]Yes date: Dr. Gerardo Garduno 9/30/21, Dr. Gerardo Garduno 10/29/21, Dr. Gerardo Garduno 11/30/21          []No           Next Progress Note due:   12/22/21    Visit# / total visits:  5/8, 7/8, 8/8 (5/13 visits used as of 12/13/2021)    Plan for Next Session:  Review HEP, progress HEP    HEP instruction: Written and verbal HEP instructions provided and reviewed:  · Access Code: [email or web address]  · URL: [facility]/  · Date: 09/29/2021  · Prepared by: Delores Rg  · Exercises  · Finger Key  with Putty - 2-3 x daily - 7 x weekly - 3 sets - 10 reps  · Putty Squeezes - 2-3 x daily - 7 x weekly - 3 sets - 10 reps  · Seated Three Finger Pinch with Putty - 2-3 x daily - 7 x weekly - 3 sets - 10 reps  · Tip Pinch with Putty - 2-3 x daily - 7 x weekly - 3 sets - 10 reps  · Shoulder Flexion AAROM - 2-3 x daily - 7 x weekly - 3 sets - 10 reps  · Supine Shoulder Horizontal Abduction Adduction AAROM with Dowel - 2-3 x daily - 7 x weekly - 3 sets - 10 reps  · Elbow flexion with weighted items in supine  · Hose bending     Subjective: Patient arrived to therapy a few minutes late, states he got a good workout today. Patient also states he did 23 minutes of cardiac rehab today on 2.0 L. Pain level: 0/10     Objective:     O2 dropped to 88% with SOB this date on room air  LUE 33.5#  RUE 76.7#    Exercises:    Exercises in bold performed in department today. Items not bolded are carried forward from prior visits for continuity of the record.   Exercise/Equipment Resistance/Repetitions Skilled cues Added to HEP Comments      ADL/IADL TRAINING (ADL OR TA)        []  Tactile cues   []   Verbal cues []  Yes       []  Tactile cues   []   Verbal cues []  Yes      []  Tactile cues   []   Verbal cues []  Yes       []  Tactile cues   []   Verbal cues []  Yes       []  Tactile cues   []   Verbal cues []  Yes         []  Tactile cues   []   Verbal cues []  Yes         []  Tactile cues   []   Verbal cues []  Yes         []  Tactile cues   []   Verbal cues []  Yes         []  Tactile cues   []   Verbal cues []  Yes        NEUROMUSCULAR RE-EDU and THERAPEUTIC ACTIVITY (NEURO RE-ED or TA)      Sitting on exercise bike With BUE on handles - 5 minutes with cues for holding arms up on bike    In preparation for motorcycle ride      Kinesiotaping for LUE [facility] [place] Tri-Pull and Scapular retraction []  Tactile cues   [x]   Verbal cues [x]  Yes     Weightbearing At side of mat,  min cues for elbow extension, initially max A for scapular stabilization, 40 seconds x3 [x]  Tactile cues   [x]   Verbal cues [x]  Yes  Max to min cues   Scapular protraction strengthening with elbow extension With holding dowel and LUE reaching to dowel and out 8x also with addition of theraband 8x but unable to maintain [x]  Tactile cues   [x]   Verbal cues [x]  Yes  Mod cues   Pushing shopping cart  Forward: With elbow extension and shoulder flexion and posterior pelvic tilt 15x    To right with BUE, cues for lean 10x    To left with BUE, cues for lean 10x Verbal cues  Improved ability to keep shoulder down, head up with cues   Neuro Re-Ed in Sidelying With facilitation of scapula for horizontal abduction, shoulder abduction, and inhibition of upper trap with trigger point release at upper trap []  Tactile cues   []   Verbal cues []  Yes     Short arc propellers Short arc, left to center, 15x, max cues for breathing and upper trap inhibition []  Tactile cues   [x]   Verbal cues [x]  Yes  Cues for positioning   Clothespins 20x LUE with cues for tip pinch and three-jaw pinch    Also yellow, red, green, blue to short distance release      Balloon toss and catch 15x with incorporation of BUE, mirror for visual cueing, elbow flexion/shoulder flexion, external rotation []  Tactile cues   []   Verbal cues []  Yes     Elbow flexion/extension training Elbow flexion/extension supine with 1#, 2#, 3# with mod A, 20x total []  Tactile cues   [] Verbal cues []  Yes  Min cues for breathing    Cues for control   Sit to stand and stand to sit Without arms From low chair, 5x, no issue with pillow under left foot or right foot  Standing on pillow with two feet, CGASBA, 5x []  Tactile cues   []   Verbal cues [x]  Yes          THERAPEUTIC EXERCISE and MANUAL TECHNIQUES   (TE OR MT)      TRX With BUE on TRX handles and ropes - stretch into shoulder flexion and horizontal abduction 5x      Prone mat mobility Prone with external rotation stretch : 30 seconds  Prone with lateral core stretch: 30 seconds     Prone with push up into tall kneel: CGA   Tall kneel 30 seconds with mod cues for pursed lip breathing, with horizontal abduction, shoulder flexion streth  Prone with quadruped: CGA    Prone with quadruped with lifting up RUE 5x with mod cues for breathing   Max Cues for breathing   Standing with arms stretching up into wall 5x with LUE on wall, with fingers lift off, with bringing RUE up and down     With turning right side away from wall 4x Reviewed for HEP  Cues for breathing   Prone posterior shoulder strengthening Retraction 6x  Ts 6x    Min A due to limited range   Max cues for breathing   AROM with BUE Chest Press BUE 3# each, min A with LUE 10x  Shoulder flexion supine 2# LUE 10x, min A  Shoulder abduction supine 2# LUE 10x to 1/4 range, min A  Horizontal abduction supine/sidelying 0# LUE 4x2, activation  Shoulder abduction sidelying 0# LUE, 10x to 1/4 range, min A initially, max cues initially, improved to min cues [x]  Tactile cues   [x]   Verbal cues [x]  Yes  Min cues for breathing     Hand strengthening Intrinsic Plus position  DIP flexion position  Finger/ab/adduction  Velcro rollers - 5 finger, three-jaw with 75% velcro friction  Gripper 35# 10x with cues for pursed lip breathing  Pinky finger abduction with tactile cue and visual cue of paper - 8x to 3/4 range and cues for pursed lip breathing Tactile/verbal cues Yes Mod cues for breathing Shoulder strengthening Scapular retraction with red band  10x BUE      Putty exercises Green  Gripping 10x  Three jaw pinch 10x with table  Tip pinch 10x with table  Lateral pinch 10x    Verbal review of finger extension with putty (added to HEP) []  Tactile cues   [x]   Verbal cues [x]  Yes      strengthening 35-55# gripper brown 10x, cues for full range  XTENSOR 5 second hold x5 []  Tactile cues   [x]   Verbal cues [x]  Yes     Scapular mobilizations sidelying 40 second hold x3 for upward rotation and retraction [x]  Tactile cues   [x]   Verbal cues [x]  Yes     Elbow flexion/extension With 3# high table gravity eliminated 10x each [x]  Tactile cues   [x]   Verbal cues [x]  Yes     Therabar green  stick 10x smiles and frowns, cues for control, breathing, relaxation of upper neck musculature [x]  Tactile cues   [x]   Verbal cues [x]  Yes  Max Cues for breathing   Soft tissue mobilization To bicep with manual stretch for elbow flexion [x]  Tactile cues   [x]   Verbal cues [x]  Yes     PROM LUE Therapist assisted LUE to overhead shoulder flexion 10x each  Therapist assisted LUE to overhead scaption 10x each   Therapist assisted LUE external rotation 10x each  Therapist assisted LUE elbow flexion  Supination/pronation/finger and thumb flexion/extension  Therapist assisted LUE to overhead scapular abduction/scaption with gentle lowering        Seated exercises Elbow flexion/extension - unable to complete without compensatory shoulder flexion  Supination/pronation - with 5# weight 8x      Weight with AAROM with LUE 0-1# shoulder flexion LUE supine to 1/2 range - 5x  1-2# horizontal ab/adduction LUE in sidelying 5x  0# shoulder abduction LUE in sidelying 5x  2-3# chest press BUE supine 10x  Elbow flexion 1-3# 8x      With aircast on RUE   []  Tactile cues   [x]   Verbal cues [x]  Yes  Max cues for breathing      MODALITIES      NMES 15 minutes total Electrical stimulation parameters for elbow flexion with functional movement  Ramp UP/Ramp Down   2 second each  Intensity up to 25  Symmetrical Bipasic via EMPI  No marks from pads on skin after removal of ESTIM pads  [x]  Yes          []  Yes        SPLINTING         []  Tactile cues   []   Verbal cues []  Yes   []  See separate note regarding splint precautions/contraindications      []  Tactile cues   []   Verbal cues []  Yes  []  See separate note regarding splint precautions/contraindications             Functional Outcome Measure:   []NA    10/20/21 Quickdash Score of 34, DSI 52.3%  10/27/21 Quickdash Score of 37, DSI 59.09%  11/22/21 Quickdash Score of 28, DSI 38.6%    Treatment/Activity Tolerance:    Patients response to treatment:   [x]Patient tolerated treatment well []Patient limited by fatigue   []Patient limited by pain  []Patient limited by other medical complications   []Other:     Assessment: Patient progressing with weight resistance for overhead lifting with shoulder and elbow. Patient easily fatigues, requires close vital sign monitoring and requires additional rehab 2x/week to progress POC. GOALS: Goals established 9/29/21   Patient stated goal: \"to get this left arm going, I'm doing so much better though\"  [x]? Progressing: []? Met: []? Not Met: []? Adjusted     STG TO BE MET IN 2 WEEKS      1. Pt will report a QuickDASH Symptom Severity Scale score of 10% or less indicating increased safety and functional independence in desired occupational pursuits. [x]? Progressing: []? Met: []? Not Met: []? Adjusted 11/22 PROGRESSING     2. Patient will be I with UE HEP. - GOAL MET 10/20/21     3. Patient will verbalize increased use of LUE in ADL/IADL tasks. - GOAL MET 10/20/21 for opening doors     LTG TO BE MET IN 4 WEEKS     1. Patient will demonstrate improved  strength for improved return to ADLs/IADLs. GOAL MET 10/27     2. Patient will demonstrate improved coordination with LUE for improved return to ADLs/IADLs. - GOAL MET 10/27     3.  Patient will verbalize mod I with all ADLs with incorporation of LUE. - REQUIRES OCCASIONAL MIN A, BUT  PROGRESSING FOR HOLDING PLATE AND BOWL WITH LUE, PROGRESSING WITH TYING SHOES WITH LUE, PROGRESSING WITH WASHING BODY WITH LUE, LIMITED FOR ACTIVITIES THAT REQUIRE RESISTANCE 10/27, PROGRESSING 11/22 BUT REQUIRES MANUAL ASSIST TO ADDRESS LUE ROM AND STRENGTH LIMITATIONS    NEW STG TO BE MET IN 2 WEEKS Established 10/27/21 to be met by 11/12/21, CONTINUE GOAL 2 UNTIL 12/6/21  1. Patient will be I with UE strengthening HEP. - GOAL MET 11/17     2. Patient will verbalize increased use of LUE in activities that require him to  weighted items for ADLs/IADLs with LUE. (I.e. moving mug for coffee) - PROGRESS BUT REQUIRES ADDITIONAL INTERVENTION DUE TO LIMITATIONS IN SHOULDER STRENGTH 11/22     LTG TO BE MET IN 4 WEEKS Established 10/27/21 to be met by 11/26/21, CONTINUE GOALS UNTIL 12/22/21  1. Patient will demonstrate improved pinch strength for improving LUE participation in home maintenance and work-based tasks. - PROGRESSING 11/22 TO 2-7# BUT REQUIRES ADDITIONAL SKILLED INTERVENTION IN ORDER TO PROGRESS FURTHER     2. Patient will demonstrate improved coordination with LUE as evidenced by increased speed on 9 hole peg test for improving LUE participation and independence with ADL/IADL tasks.  - PROGRESSING 11/22, improved by 10 seconds, but requires additional skilled intervention in order to progress further     Prognosis: [x]Good   []Fair   []Poor    Patient Requires Follow-up:  [x]Yes  []No    Plan: []Plan of care initiated     [x]Continue per plan of care 2x/week for 4 weeks   [] Alter current plan (additional OT 2x/week for 4 weeks)    []Hold pending MD visit []Discharge    Time in: 1152 Time out: 1250     Timed Code Treatment Minutes: 53    Total Treatment Minutes: 58    Medicare Cap total YTD:   [x]N/A    Workers Comp Time Stamp  [x]N/A   Time In:   Time Out:    Electronically signed by:  Wan Rahman OTR/L

## 2021-12-15 ENCOUNTER — HOSPITAL ENCOUNTER (OUTPATIENT)
Dept: CARDIAC REHAB | Age: 76
Setting detail: THERAPIES SERIES
Discharge: HOME | End: 2021-12-15
Payer: MEDICARE

## 2021-12-15 ENCOUNTER — HOSPITAL ENCOUNTER (OUTPATIENT)
Dept: OCCUPATIONAL THERAPY | Age: 76
Setting detail: THERAPIES SERIES
Discharge: HOME | End: 2021-12-15
Payer: MEDICARE

## 2021-12-15 PROCEDURE — G0239 OTH RESP PROC, GROUP: HCPCS

## 2021-12-15 PROCEDURE — 97110 THERAPEUTIC EXERCISES: CPT

## 2021-12-15 NOTE — FLOWSHEET NOTE
[facility], [facility], [address] [facility], [address] [place] [address]  Phone: [phone]  Fax [phone]        Outpatient Occupational Therapy     [x] Daily Treatment Note   [] Progress Note   [] Discharge Note      Date:  12/15/2021    Patient: Yessi Harden                              : 1945                                    MRN: [id number]                                         Evaluation Date: 2021                                            Medical Diagnosis/ICD 10:  S49. 92XA (ICD-10-CM) - Unspecified injury of left shoulder and upper arm, initial encounter      Treatment Diagnosis:  Decreased strength, decreased ROM, decreased ADL/IADL independence     Onset Date:  2021     Referral Date:  21     Referring Physician: Dr. Caron Leyva, 21                                                                       Insurance information: [employer] Pre-cert required, 6 and 2 visits authorized prior, 6 visits authorized from 10/27-, 13 visits authorized from  - 3/21/21     Precautions/ Contraindications:   Red Flag Symptoms: none  Safety awareness: intact  Other: Restrictions: cardiopulmonary limitations due to COVID-19     Adhesive allergy: NO  Latex Allergy:  [x]? NO      []?YES      Preferred Language for Healthcare:   [x]? English       []? other:     C-SSRS Triggered by Intake questionnaire (Past 2 wk assessment):   [x]? No, Questionnaire did not trigger screening. []? Yes, Patient intake triggered further evaluation      []? C-SSRS Screening completed  []? PCP notified via Plan of Care  []? Emergency services notified     Plan of care sent to provider:      []? Faxed 21  [x]? Co-signature    (attempts: 1[x]?  2 []?3[]?)        __________________________________________________________________________________________________________________    Plan of care sent to provider:    []Faxed  [x]Co-signature (attempts: 1[] 2 []3[])        Plan of care signed:    [x]Yes date: Dr. Mor Allen 9/30/21, Dr. Mor Allen 10/29/21, Dr. Mor Allen 11/30/21          []No           Next Progress Note due: Today, 1/14/22    Visit# / total visits:  6/8, 7/8, 8/8 (7/13 visits used as of 12/15/2021)    Plan for Next Session:  Review HEP, progress HEP    HEP instruction: Written and verbal HEP instructions provided and reviewed:  · Access Code: [email or web address]  · URL: [email or web address]  · Date: 09/29/2021  · Prepared by: Leial Castro  · Exercises  · Finger Key  with Putty - 2-3 x daily - 7 x weekly - 3 sets - 10 reps  · Putty Squeezes - 2-3 x daily - 7 x weekly - 3 sets - 10 reps  · Seated Three Finger Pinch with Putty - 2-3 x daily - 7 x weekly - 3 sets - 10 reps  · Tip Pinch with Putty - 2-3 x daily - 7 x weekly - 3 sets - 10 reps  · Shoulder Flexion AAROM - 2-3 x daily - 7 x weekly - 3 sets - 10 reps  · Supine Shoulder Horizontal Abduction Adduction AAROM with Dowel - 2-3 x daily - 7 x weekly - 3 sets - 10 reps  · Elbow flexion with weighted items in supine  · Hose bending     Subjective: Patient arrived to therapy a few minutes late, states he continues to see improvements in his LUE shoulder strength and ROM and overall participation in activities and would like to continue participation in outpatient OT.      Pain level: 0/10     Objective:     O2 dropped to 88% with SOB this date on room air  LUE 33.5#  RUE 76.7#    Objective:        ROM  LUE Elbow extension: to 0 degrees PROM, to 3 degrees from 0 AROM  LUE Elbow flexion: 136 degrees PROM, 116 degrees AROM  LUE Shoulder flexion: 140 degrees PROM, 40 degrees AROM  LUE Shoulder Abduction: 65 degrees     Hand Strength and Cooordination  LUE 33.2#  RUE 76.6#  LUE lateral pinch: 8.3# average  RUE lateral pinch: 21.3# average  LUE three jaw pinch: 3.7# average  RUE three jaw pinch: 18.6# average  LUE 9 hole peg test 35 seconds  RUE 9 hole peg test 22 seconds       MMT  LUE Shoulder flexion: 2-/5  LUE Shoulder Abduction: 2-/5  LUE IR: 4+/5  LUE ER: 2/5  LUE Horizontal abduction: 3+/5  LUE Horizontal adduction: 4+/5  LUE elbow flexion: 3+/5  LUE elbow extension: 3+/5  LUE supination: 2-/5  LUE pronation: 3+/5    Exercises:    Exercises in bold performed in department today. Items not bolded are carried forward from prior visits for continuity of the record.   Exercise/Equipment Resistance/Repetitions Skilled cues Added to HEP Comments      ADL/IADL TRAINING (ADL OR TA)        []  Tactile cues   []   Verbal cues []  Yes       []  Tactile cues   []   Verbal cues []  Yes      []  Tactile cues   []   Verbal cues []  Yes       []  Tactile cues   []   Verbal cues []  Yes       []  Tactile cues   []   Verbal cues []  Yes         []  Tactile cues   []   Verbal cues []  Yes         []  Tactile cues   []   Verbal cues []  Yes         []  Tactile cues   []   Verbal cues []  Yes         []  Tactile cues   []   Verbal cues []  Yes        NEUROMUSCULAR RE-EDU and THERAPEUTIC ACTIVITY (NEURO RE-ED or TA)      Sitting on exercise bike With BUE on handles - 5 minutes with cues for holding arms up on bike    In preparation for motorcycle ride      Kinesiotaping for LUE [employer] [place] Tri-Pull and Scapular retraction []  Tactile cues   [x]   Verbal cues [x]  Yes     Weightbearing At side of mat,  min cues for elbow extension, initially max A for scapular stabilization, 40 seconds x3 [x]  Tactile cues   [x]   Verbal cues [x]  Yes  Max to min cues   Scapular protraction strengthening with elbow extension With holding dowel and LUE reaching to dowel and out 8x also with addition of theraband 8x but unable to maintain [x]  Tactile cues   [x]   Verbal cues [x]  Yes  Mod cues   Pushing shopping cart  Forward: With elbow extension and shoulder flexion and posterior pelvic tilt 15x    To right with BUE, cues for lean 10x    To left with BUE, cues for lean 10x Verbal cues  Improved ability to keep shoulder down, head up with cues Neuro Re-Ed in Sidelying With facilitation of scapula for horizontal abduction, shoulder abduction, and inhibition of upper trap with trigger point release at upper trap []  Tactile cues   []   Verbal cues []  Yes     Short arc propellers Short arc, left to center, 15x, max cues for breathing and upper trap inhibition []  Tactile cues   [x]   Verbal cues [x]  Yes  Cues for positioning   Clothespins 20x LUE with cues for tip pinch and three-jaw pinch    Also yellow, red, green, blue to short distance release      Balloon toss and catch 15x with incorporation of BUE, mirror for visual cueing, elbow flexion/shoulder flexion, external rotation []  Tactile cues   []   Verbal cues []  Yes     Elbow flexion/extension training Elbow flexion/extension supine with 1#, 2#, 3# with mod A, 20x total []  Tactile cues   []   Verbal cues []  Yes  Min cues for breathing    Cues for control   Sit to stand and stand to sit Without arms From low chair, 5x, no issue with pillow under left foot or right foot  Standing on pillow with two feet, CGASBA, 5x []  Tactile cues   []   Verbal cues [x]  Yes          THERAPEUTIC EXERCISE and MANUAL TECHNIQUES   (TE OR MT)      TRX With BUE on TRX handles and ropes - stretch into shoulder flexion and horizontal abduction 5x      Prone mat mobility Prone with external rotation stretch : 30 seconds  Prone with lateral core stretch: 30 seconds     Prone with push up into tall kneel: CGA   Tall kneel 30 seconds with mod cues for pursed lip breathing, with horizontal abduction, shoulder flexion streth  Prone with quadruped: CGA    Prone with quadruped with lifting up RUE 5x with mod cues for breathing   Max Cues for breathing   Standing with arms stretching up into wall 5x with LUE on wall, with fingers lift off, with bringing RUE up and down     With turning right side away from wall 4x Reviewed for HEP  Cues for breathing   Prone posterior shoulder strengthening Retraction 6x  Ts 6x    Min A due to limited range   Max cues for breathing   AROM with BUE Chest Press BUE 3# each, min A with LUE 10x  Shoulder flexion supine 2# LUE 10x, min A  Shoulder abduction supine 2# LUE 10x to 1/4 range, min A  Horizontal abduction supine/sidelying 0# LUE 4x2, activation  Shoulder abduction sidelying 0# LUE, 10x to 1/4 range, min A initially, max cues initially, improved to min cues [x]  Tactile cues   [x]   Verbal cues [x]  Yes  Min cues for breathing     Hand strengthening Intrinsic Plus position  DIP flexion position  Finger/ab/adduction  Velcro rollers - 5 finger, three-jaw with 75% velcro friction  Gripper 35# 10x with cues for pursed lip breathing  Pinky finger abduction with tactile cue and visual cue of paper - 8x to 3/4 range and cues for pursed lip breathing Tactile/verbal cues Yes Mod cues for breathing   Shoulder strengthening Scapular retraction with red band  10x BUE      Putty exercises Green/yellow  Gripping 10x  Three jaw pinch 10x with table  Tip pinch 10x with table  Lateral pinch 10x   []  Tactile cues   [x]   Verbal cues [x]  Yes      strengthening 35-55# gripper brown 10x, cues for full range  XTENSOR 5 second hold x5 []  Tactile cues   [x]   Verbal cues [x]  Yes     Scapular mobilizations sidelying 40 second hold x3 for upward rotation and retraction [x]  Tactile cues   [x]   Verbal cues [x]  Yes     Elbow flexion/extension With 3# high table gravity eliminated 10x each [x]  Tactile cues   [x]   Verbal cues [x]  Yes     Therabar green  stick 10x smiles and frowns, cues for control, breathing, relaxation of upper neck musculature [x]  Tactile cues   [x]   Verbal cues [x]  Yes  Max Cues for breathing   Soft tissue mobilization To bicep with manual stretch for elbow flexion [x]  Tactile cues   [x]   Verbal cues [x]  Yes     PROM LUE Therapist assisted LUE to overhead shoulder flexion 10x each  Therapist assisted LUE to overhead scaption 10x each   Therapist assisted LUE external rotation 10x each  Therapist assisted LUE elbow flexion  Supination/pronation/finger and thumb flexion/extension  Therapist assisted LUE to overhead scapular abduction/scaption with gentle lowering        Seated exercises Elbow flexion/extension - unable to complete without compensatory shoulder flexion  Supination/pronation - with 5# weight 8x      Weight with AAROM with LUE 0-1# shoulder flexion LUE supine to 1/2 range - 5x  1-2# horizontal ab/adduction LUE in sidelying 5x  0# shoulder abduction LUE in sidelying 5x  2-3# chest press BUE supine 10x  Elbow flexion 1-3# 8x      With aircast on RUE   []  Tactile cues   [x]   Verbal cues [x]  Yes  Max cues for breathing      MODALITIES      NMES 15 minutes total Electrical stimulation parameters for elbow flexion with functional movement  Ramp UP/Ramp Down   2 second each  Intensity up to 25  Symmetrical Bipasic via EMPI  No marks from pads on skin after removal of ESTIM pads  [x]  Yes          []  Yes        SPLINTING         []  Tactile cues   []   Verbal cues []  Yes   []  See separate note regarding splint precautions/contraindications      []  Tactile cues   []   Verbal cues []  Yes  []  See separate note regarding splint precautions/contraindications             Functional Outcome Measure:   []NA    10/20/21 Quickdash Score of 34, DSI 52.3%  10/27/21 Quickdash Score of 37, DSI 59.09%  11/22/21 Quickdash Score of 28, DSI 38.6%  12/15/21 Quickdash Score of 32, DSI 47.7%    Treatment/Activity Tolerance:    Patients response to treatment:   [x]Patient tolerated treatment well []Patient limited by fatigue   []Patient limited by pain  []Patient limited by other medical complications   []Other:     Assessment: Patient has participated in 6 OT visits since his last progress note on 11/22/21 and is progressing with strength and ROM of RUE, but still requires close intervention due to fatigue and shortness of breath.   He has made functional  Improvements in his ability to incorporate LUE into meaningful activities such as driving, opening doors, opening containers, and opening the microwave. Given his progress made and need for OT to advance HEP, he is an excellent rehab candidate for continuing with outpatient OT 2x/week for the next 4 weeks. Patient is taking a break from therapy next week for the holiday and has requested to continue with HEP and re-initiate OT services the week after when his primary therapist is in the clinic. Patient verbalizes understanding to utilize theraputty and supine shoulder strengthening exercises until his next OT session. GOALS: Goals established 9/29/21   Patient stated goal: \"to get this left arm going, I'm doing so much better though\"  [x]? Progressing: []? Met: []? Not Met: []? Adjusted     STG TO BE MET IN 2 WEEKS      1. Pt will report a QuickDASH Symptom Severity Scale score of 10% or less indicating increased safety and functional independence in desired occupational pursuits. [x]? Progressing: []? Met: []? Not Met: []? Adjusted 11/22 PROGRESSING     2. Patient will be I with UE HEP. - GOAL MET 10/20/21     3. Patient will verbalize increased use of LUE in ADL/IADL tasks. - GOAL MET 10/20/21 for opening doors, GOAL MET 12/15 for touching head, opening the microwave, opening the creamer container, opening the honey     LTG TO BE MET IN 4 WEEKS     1. Patient will demonstrate improved  strength for improved return to ADLs/IADLs. GOAL MET 10/27     2. Patient will demonstrate improved coordination with LUE for improved return to ADLs/IADLs. - GOAL MET 10/27     3.  Patient will verbalize mod I with all ADLs with incorporation of LUE. - REQUIRES OCCASIONAL MIN A, BUT  PROGRESSING FOR HOLDING PLATE AND BOWL WITH LUE, PROGRESSING WITH TYING SHOES WITH LUE, PROGRESSING WITH WASHING BODY WITH LUE, LIMITED FOR ACTIVITIES THAT REQUIRE RESISTANCE 10/27, PROGRESSING 11/22 BUT REQUIRES MANUAL ASSIST TO ADDRESS LUE ROM AND STRENGTH LIMITATIONS    NEW STG TO BE MET IN 2 WEEKS Established 10/27/21 to be met by 11/12/21, CONTINUE GOAL 2 UNTIL 12/6/21  1. Patient will be I with UE strengthening HEP. - GOAL MET 11/17     2. Patient will verbalize increased use of LUE in activities that require him to  weighted items for ADLs/IADLs with LUE. (I.e. moving mug for coffee) - PROGRESSING BUT REQUIRES ADDITIONAL INTERVENTION DUE TO LIMITATIONS IN SHOULDER STRENGTH 11/22, HAS DIFFICULTY WITH ITEMS THAT HAVE WEIGHT 12/15 BUT IS ABLE TO CARRY COFFEE CUP WITH LUE    LTG TO BE MET IN 4 WEEKS Established 10/27/21 to be met by 11/26/21, CONTINUE GOALS UNTIL 12/22/21  1. Patient will demonstrate improved pinch strength for improving LUE participation in home maintenance and work-based tasks. - PROGRESSING 11/22 TO 2-7# BUT REQUIRES ADDITIONAL SKILLED INTERVENTION IN ORDER TO PROGRESS FURTHER , GOAL MET 12/15    2. Patient will demonstrate improved coordination with LUE as evidenced by increased speed on 9 hole peg test for improving LUE participation and independence with ADL/IADL tasks. - PROGRESSING 11/22, improved by 10 seconds, but requires additional skilled intervention in order to progress further, GOAL MET 12/15 to 35 SECONDS    NEW STG: ESTABLISHED 12/15/21, TO BE MET BY 12/29/21  1. Patient will be I with UE resistance HEP for hand strengthening. 2. Patient will demonstrate improved hand strength since initiation of theraputty HEP. NEW LTG: ESTABLISHED 12/15/21, TO BE MET BY 1/14/22  1. Patient will verbalize ability to pull in the hydraulic clutch with LUE on motorcycle.     2. Patient will verbalize ability to  and pour creamer with LUE.     Prognosis: [x]Good   []Fair   []Poor    Patient Requires Follow-up:  [x]Yes  []No    Plan: []Plan of care initiated     []Continue per plan of care 2x/week for 4 weeks   [x] Alter current plan (additional OT 2x/week for 4 weeks)    []Hold pending MD visit []Discharge    Time in: 1152 Time out: 1250     Timed Code Treatment Minutes: 58    Total Treatment Minutes: 58    Medicare Cap total YTD:   [x]N/A    Workers Comp Time Stamp  [x]N/A   Time In:   Time Out:    Electronically signed by:  LAKEISHA Rodrigues/MARINA

## 2021-12-15 NOTE — PROGRESS NOTES
[facility], [facility], [address], [address]  Phone: [phone]  Fax [phone]      Dear Dr. Neeta Kohler, Please also sign here that you are in agreement with plan for patient to re-initiate PT services for James Zuniga,  1945 now that he is healed s/p surgery for COVID toe and finishing up with cardiac rehab in order to address balance, strengthening for functional mobility and endurance training. Physician Signature and Date______________________________     Rosmery Bring  Dear Dr. Neeta Kohler,    The following patient has been assessed for therapy services. Please review the attached summary of the patient's plan of care, and verify that you agree with plan for additional therapy services at this time. Plan of Care/Treatment to date:  [x] Therapeutic Exercise  [x]  Modalities:  [x] Therapeutic Activity   [] Ultrasound [] Electrical Stimulation   [] Total Motion Release   [] Fluidotherapy [] Kinesiotaping  [x]  Neuromuscular Re-education  [] Iontophoresis [] Coldpack/hotpack   [x]  Instruction in HEP    Other:  [x]  Manual Therapy     []   Dry needling             [x] ADL/Self Care  [x] IADL Training  [] LSVT BIG  [] Saebo  [] Splinting  [] Wheelchair Mobility                       Frequency/Duration:  # Days per week: [] 1 day # Weeks: [] 1 week [] 5 weeks      [x] 2 days   [] 2 weeks [] 6 weeks     [] 3 days   [] 3 weeks [] 7 weeks     [] 4 days   [x] 4 weeks [] 8 weeks     [] 5 days   [] 10 weeks [] 12 weeks    Rehab Potential: [] Excellent [x] Good [] Fair  [] Poor     Thank you for the referral of this patient. Please sign.      Physician signature_______________________ Date________________  By signing above, therapists plan is approved by physician     Fax to: [facility] [phone]     Electronically signed by:  Sirena Linares [facility]     [] Daily Treatment Note   [x] Progress Note   [] Discharge Note      Date:  12/15/2021    Patient: Rayna Flores                              : 1945                                    MRN: [id number]                                         Evaluation Date: 2021                                            Medical Diagnosis/ICD 10:  S49. 92XA (ICD-10-CM) - Unspecified injury of left shoulder and upper arm, initial encounter      Treatment Diagnosis:  Decreased strength, decreased ROM, decreased ADL/IADL independence     Onset Date:  2021     Referral Date:  21     Referring Physician: Dr. Gerardo Garduno, 21                                                                       Insurance information: [employer] Medicare Pre-cert required, 6 and 2 visits authorized prior, 6 visits authorized from 10/27-, 13 visits authorized from  - 3/21/21     Precautions/ Contraindications:   Red Flag Symptoms: none  Safety awareness: intact  Other: Restrictions: cardiopulmonary limitations due to COVID-19     Adhesive allergy: NO  Latex Allergy:  [x]? NO      []?YES      Preferred Language for Healthcare:   [x]? English       []? other:     C-SSRS Triggered by Intake questionnaire (Past 2 wk assessment):   [x]? No, Questionnaire did not trigger screening. []? Yes, Patient intake triggered further evaluation      []? C-SSRS Screening completed  []? PCP notified via Plan of Care  []? Emergency services notified     Plan of care sent to provider:      []? Faxed 21  [x]? Co-signature    (attempts: 1[x]? 2 []?3[]?)        __________________________________________________________________________________________________________________    Plan of care sent to provider:    []Faxed  [x]Co-signature    (attempts: 1[] 2 []3[])        Plan of care signed:    [x]Yes date: Dr. Gerardo Garduno 21, Dr. Gerardo Garduno 10/29/21, Dr. Gerardo Garduno 21          []No           Next Progress Note due:    Today, 1/14/22    Visit# / total visits:  6/8, 7/8, 8/8 (7/13 visits used as of 12/15/2021)    Plan for Next Session:  Review HEP, progress HEP    HEP instruction: Written and verbal HEP instructions provided and reviewed:  · Access Code: [email or web address]  · URL: [email or web address]  · Date: 09/29/2021  · Prepared by: Suzie Clifford  · Exercises  · Finger Key  with Putty - 2-3 x daily - 7 x weekly - 3 sets - 10 reps  · Putty Squeezes - 2-3 x daily - 7 x weekly - 3 sets - 10 reps  · Seated Three Finger Pinch with Putty - 2-3 x daily - 7 x weekly - 3 sets - 10 reps  · Tip Pinch with Putty - 2-3 x daily - 7 x weekly - 3 sets - 10 reps  · Shoulder Flexion AAROM - 2-3 x daily - 7 x weekly - 3 sets - 10 reps  · Supine Shoulder Horizontal Abduction Adduction AAROM with Dowel - 2-3 x daily - 7 x weekly - 3 sets - 10 reps  · Elbow flexion with weighted items in supine  · Hose bending     Subjective: Patient arrived to therapy a few minutes late, states he continues to see improvements in his LUE shoulder strength and ROM and overall participation in activities and would like to continue participation in outpatient OT.      Pain level: 0/10     Objective:     O2 dropped to 88% with SOB this date on room air  LUE 33.5#  RUE 76.7#    Objective:        ROM  LUE Elbow extension: to 0 degrees PROM, to 3 degrees from 0 AROM  LUE Elbow flexion: 136 degrees PROM, 116 degrees AROM  LUE Shoulder flexion: 140 degrees PROM, 40 degrees AROM  LUE Shoulder Abduction: 65 degrees     Hand Strength and Cooordination  LUE 33.2#  RUE 76.6#  LUE lateral pinch: 8.3# average  RUE lateral pinch: 21.3# average  LUE three jaw pinch: 3.7# average  RUE three jaw pinch: 18.6# average  LUE 9 hole peg test 35 seconds  RUE 9 hole peg test 22 seconds       MMT  LUE Shoulder flexion: 2-/5  LUE Shoulder Abduction: 2-/5  LUE IR: 4+/5  LUE ER: 2/5  LUE Horizontal abduction: 3+/5  LUE Horizontal adduction: 4+/5  LUE elbow flexion: 3+/5  LUE elbow extension: 3+/5  LUE []? Not Met: []? Adjusted 11/22 PROGRESSING     2. Patient will be I with UE HEP. - GOAL MET 10/20/21     3. Patient will verbalize increased use of LUE in ADL/IADL tasks. - GOAL MET 10/20/21 for opening doors, GOAL MET 12/15 for touching head, opening the microwave, opening the creamer container, opening the honey     LTG TO BE MET IN 4 WEEKS     1. Patient will demonstrate improved  strength for improved return to ADLs/IADLs. GOAL MET 10/27     2. Patient will demonstrate improved coordination with LUE for improved return to ADLs/IADLs. - GOAL MET 10/27     3. Patient will verbalize mod I with all ADLs with incorporation of LUE. - REQUIRES OCCASIONAL MIN A, BUT  PROGRESSING FOR HOLDING PLATE AND BOWL WITH LUE, PROGRESSING WITH TYING SHOES WITH LUE, PROGRESSING WITH WASHING BODY WITH LUE, LIMITED FOR ACTIVITIES THAT REQUIRE RESISTANCE 10/27, PROGRESSING 11/22 BUT REQUIRES MANUAL ASSIST TO ADDRESS LUE ROM AND STRENGTH LIMITATIONS    NEW STG TO BE MET IN 2 WEEKS Established 10/27/21 to be met by 11/12/21, CONTINUE GOAL 2 UNTIL 12/6/21  1. Patient will be I with UE strengthening HEP. - GOAL MET 11/17     2. Patient will verbalize increased use of LUE in activities that require him to  weighted items for ADLs/IADLs with LUE. (I.e. moving mug for coffee) - PROGRESSING BUT REQUIRES ADDITIONAL INTERVENTION DUE TO LIMITATIONS IN SHOULDER STRENGTH 11/22, HAS DIFFICULTY WITH ITEMS THAT HAVE WEIGHT 12/15 BUT IS ABLE TO CARRY COFFEE CUP WITH LUE    LTG TO BE MET IN 4 WEEKS Established 10/27/21 to be met by 11/26/21, CONTINUE GOALS UNTIL 12/22/21  1. Patient will demonstrate improved pinch strength for improving LUE participation in home maintenance and work-based tasks. - PROGRESSING 11/22 TO 2-7# BUT REQUIRES ADDITIONAL SKILLED INTERVENTION IN ORDER TO PROGRESS FURTHER , GOAL MET 12/15    2.  Patient will demonstrate improved coordination with LUE as evidenced by increased speed on 9 hole peg test for improving LUE participation and independence with ADL/IADL tasks. - PROGRESSING 11/22, improved by 10 seconds, but requires additional skilled intervention in order to progress further, GOAL MET 12/15 to 35 SECONDS    NEW STG: ESTABLISHED 12/15/21, TO BE MET BY 12/29/21  1. Patient will be I with UE resistance HEP for hand strengthening. 2. Patient will demonstrate improved hand strength since initiation of theraputty HEP. NEW LTG: ESTABLISHED 12/15/21, TO BE MET BY 1/14/22  1. Patient will verbalize ability to pull in the hydraulic clutch with LUE on motorcycle.     2. Patient will verbalize ability to  and pour creamer with LUE.     Prognosis: [x]Good   []Fair   []Poor    Patient Requires Follow-up:  [x]Yes  []No    Plan: []Plan of care initiated     []Continue per plan of care 2x/week for 4 weeks   [x] Alter current plan (additional OT 2x/week for 4 weeks)    []Hold pending MD visit []Discharge    Time in: 1152 Time out: 1250     Timed Code Treatment Minutes: 58    Total Treatment Minutes: 58    Medicare Cap total YTD:   [x]N/A    Workers Comp Time Stamp  [x]N/A   Time In:   Time Out:    Electronically signed by:  LAKEISHA Black/MARINA

## 2021-12-20 ENCOUNTER — HOSPITAL ENCOUNTER (OUTPATIENT)
Dept: CARDIAC REHAB | Age: 76
Setting detail: THERAPIES SERIES
Discharge: HOME | End: 2021-12-20
Payer: MEDICARE

## 2021-12-20 PROCEDURE — G0239 OTH RESP PROC, GROUP: HCPCS

## 2021-12-22 ENCOUNTER — HOSPITAL ENCOUNTER (OUTPATIENT)
Dept: CARDIAC REHAB | Age: 76
Setting detail: THERAPIES SERIES
Discharge: HOME | End: 2021-12-22
Payer: MEDICARE

## 2021-12-22 PROCEDURE — G0239 OTH RESP PROC, GROUP: HCPCS

## 2021-12-27 ENCOUNTER — HOSPITAL ENCOUNTER (OUTPATIENT)
Dept: OCCUPATIONAL THERAPY | Age: 76
Setting detail: THERAPIES SERIES
Discharge: HOME | End: 2021-12-27
Payer: MEDICARE

## 2021-12-27 ENCOUNTER — APPOINTMENT (OUTPATIENT)
Dept: CARDIAC REHAB | Age: 76
End: 2021-12-27
Payer: MEDICARE

## 2021-12-27 PROCEDURE — 97530 THERAPEUTIC ACTIVITIES: CPT

## 2021-12-27 PROCEDURE — 97110 THERAPEUTIC EXERCISES: CPT

## 2021-12-27 PROCEDURE — 97140 MANUAL THERAPY 1/> REGIONS: CPT

## 2021-12-27 NOTE — FLOWSHEET NOTE
[facility], [address]   Phone: [phone]  Fax [phone]        Outpatient Occupational Therapy     [x] Daily Treatment Note   [] Progress Note   [] Discharge Note      Date:  2021    Patient: Salma Stevens                              : 1945                                    MRN: [id number]                                         Evaluation Date: 2021                                            Medical Diagnosis/ICD 10:  S49. 92XA (ICD-10-CM) - Unspecified injury of left shoulder and upper arm, initial encounter      Treatment Diagnosis:  Decreased strength, decreased ROM, decreased ADL/IADL independence     Onset Date:  2021     Referral Date:  21     Referring Physician: Dr. Jeff Rascon, 21                                                                       Insurance information: [employer] Medicare Pre-cert required, 6 and 2 visits authorized prior, 6 visits authorized from 10/27-, 13 visits authorized from  - 3/21/21     Precautions/ Contraindications:   Red Flag Symptoms: none  Safety awareness: intact  Other: Restrictions: cardiopulmonary limitations due to COVID-19     Adhesive allergy: NO  Latex Allergy:  [x]? NO      []?YES      Preferred Language for Healthcare:   [x]? English       []? other:     C-SSRS Triggered by Intake questionnaire (Past 2 wk assessment):   [x]? No, Questionnaire did not trigger screening. []? Yes, Patient intake triggered further evaluation      []? C-SSRS Screening completed  []? PCP notified via Plan of Care  []? Emergency services notified     Plan of care sent to provider:      []? Faxed 21  [x]? Co-signature    (attempts: 1[x]?  2 []?3[]?)        __________________________________________________________________________________________________________________    Plan of care sent to provider:    []Faxed  [x]Co-signature (attempts: 1[] 2 []3[])        Plan of care signed:    [x]Yes date: Dr. Jeromy Peraza 9/30/21, Dr. Jeromy Peraza 10/29/21, Dr. Jeromy Peraza 11/30/21, Dr. Jeromy Peraza 12/20/21         []No           Next Progress Note due:   1/14/22    Visit# / total visits: 1/8,  6/8, 7/8, 8/8 (8/13 visits used as of 12/27/2021)    Plan for Next Session:  Review HEP, progress HEP    HEP instruction: Written and verbal HEP instructions provided and reviewed:  · Access Code: [email or web address]  · URL: [email or web address]  · Date: 09/29/2021  · Prepared by: Tamara Honeycutt  · Exercises  · Finger Key  with Putty - 2-3 x daily - 7 x weekly - 3 sets - 10 reps  · Putty Squeezes - 2-3 x daily - 7 x weekly - 3 sets - 10 reps  · Seated Three Finger Pinch with Putty - 2-3 x daily - 7 x weekly - 3 sets - 10 reps  · Tip Pinch with Putty - 2-3 x daily - 7 x weekly - 3 sets - 10 reps  · Shoulder Flexion AAROM - 2-3 x daily - 7 x weekly - 3 sets - 10 reps  · Supine Shoulder Horizontal Abduction Adduction AAROM with Dowel - 2-3 x daily - 7 x weekly - 3 sets - 10 reps  · Elbow flexion with weighted items in supine  · Hose bending     Subjective: Patient states he is finished with cardiac rehab. He is not noticing any major issues or improvements with his left arm. He states he is working on his \"equilibrium\" so that he can ride his motorcycle at home. Pain level: 0/10     Objective:     LUE hand strength 38#    Exercises:    Exercises in bold performed in department today. Items not bolded are carried forward from prior visits for continuity of the record.   Exercise/Equipment Resistance/Repetitions Skilled cues Added to HEP Comments      ADL/IADL TRAINING (ADL OR TA)        []  Tactile cues   []   Verbal cues []  Yes       []  Tactile cues   []   Verbal cues []  Yes      []  Tactile cues   []   Verbal cues []  Yes       []  Tactile cues   []   Verbal cues []  Yes       []  Tactile cues   []   Verbal cues []  Yes         []  Tactile cues   []   Verbal cues []  Yes []  Tactile cues   []   Verbal cues []  Yes         []  Tactile cues   []   Verbal cues []  Yes         []  Tactile cues   []   Verbal cues []  Yes        NEUROMUSCULAR RE-EDU and THERAPEUTIC ACTIVITY (NEURO RE-ED or TA)      Sitting on exercise bike With BUE on handles - 5 minutes with cues for holding arms up on bike    In preparation for motorcycle ride      Kinesiotaping for LUE [employer] [place] Tri-Pull and Scapular retraction []  Tactile cues   [x]   Verbal cues [x]  Yes     Weightbearing At side of mat,  min cues for elbow extension, initially max A for scapular stabilization, 40 seconds x3 [x]  Tactile cues   [x]   Verbal cues [x]  Yes  Max to min cues   Scapular protraction strengthening with elbow extension With holding dowel and LUE reaching to dowel and out 8x also with addition of theraband 8x but unable to maintain [x]  Tactile cues   [x]   Verbal cues [x]  Yes  Mod cues   Pushing shopping cart  Forward: With elbow extension and shoulder flexion and posterior pelvic tilt 15x    To right with BUE, cues for lean 10x    To left with BUE, cues for lean 10x Verbal cues  Improved ability to keep shoulder down, head up with cues   Neuro Re-Ed in Sidelying With facilitation of scapula for horizontal abduction, shoulder abduction, and inhibition of upper trap with trigger point release at upper trap []  Tactile cues   []   Verbal cues []  Yes     Short arc propellers Short arc, left to center, 15x, max cues for breathing and upper trap inhibition []  Tactile cues   [x]   Verbal cues [x]  Yes  Cues for positioning   Clothespins 5x red LUE up to pole    Green/blue with short distance squeeze and release using three-jaw 4x      Balloon toss and catch 15x with incorporation of BUE, mirror for visual cueing, elbow flexion/shoulder flexion, external rotation []  Tactile cues   []   Verbal cues []  Yes     Elbow flexion/extension training Elbow flexion/extension supine with 1#, 2#, 3# with mod A, 20x total []  Tactile cues   []   Verbal cues []  Yes  Min cues for breathing    Cues for control   Sit to stand and stand to sit Without arms From low chair, 5x, no issue with pillow under left foot or right foot  Standing on pillow with two feet, CGASBA, 5x []  Tactile cues   []   Verbal cues [x]  Yes          THERAPEUTIC EXERCISE and MANUAL TECHNIQUES   (TE OR MT)      TRX With BUE on TRX handles and ropes - stretch into shoulder flexion and horizontal abduction 5x      Prone mat mobility Prone with external rotation stretch : 30 seconds  Prone with lateral core stretch: 30 seconds     Prone with push up into tall kneel: CGA   Tall kneel 30 seconds with mod cues for pursed lip breathing, with horizontal abduction, shoulder flexion streth  Prone with quadruped: CGA    Prone with quadruped with lifting up RUE 5x with mod cues for breathing   Max Cues for breathing   Standing with arms stretching up into wall 4x wall push up BUE Reviewed for HEP  Cues for breathing   Prone posterior shoulder strengthening Retraction 6x  Ts 6x    Min A due to limited range   Max cues for breathing   AROM with BUE Chest Press BUE 3# each, min A with LUE 10x  Shoulder flexion supine 2# LUE 10x, min A  Shoulder abduction supine 2# LUE 10x to 1/4 range, min A  Horizontal abduction supine/sidelying 0# LUE 4x2, activation  Shoulder abduction sidelying 0# LUE, 10x to 1/4 range, min A initially, max cues initially, improved to min cues [x]  Tactile cues   [x]   Verbal cues [x]  Yes  Min cues for breathing     Hand strengthening Intrinsic Plus position  DIP flexion position  Finger/ab/adduction  Velcro rollers - 5 finger, three-jaw with 75% velcro friction  Gripper 35# 10x with cues for pursed lip breathing  Pinky finger abduction with tactile cue and visual cue of paper - 8x to 3/4 range and cues for pursed lip breathing Tactile/verbal cues Yes Mod cues for breathing   Shoulder strengthening With LUE at wall holding onto ball 5 second hold x5 with circles Initial cues and assist for setup and positioning Added to HEP    Putty exercises Green  Lateral pinch 10x  Thumb extension 10x  Thumb radial abduction 10x []  Tactile cues   [x]   Verbal cues [x]  Yes  Initial cues for setup and form    strengthening 35-55# gripper brown 10x, cues for full range  XTENSOR 5 second hold x5 []  Tactile cues   [x]   Verbal cues [x]  Yes     Scapular mobilizations sidelying 40 second hold x3 for upward rotation and retraction [x]  Tactile cues   [x]   Verbal cues [x]  Yes     Elbow flexion/extension With 3# high table gravity eliminated 10x each [x]  Tactile cues   [x]   Verbal cues [x]  Yes     Therabar green  stick 10x smiles and frowns, cues for control, breathing, relaxation of upper neck musculature [x]  Tactile cues   [x]   Verbal cues [x]  Yes  Max Cues for breathing   Soft tissue mobilization To bicep with manual stretch for elbow flexion [x]  Tactile cues   [x]   Verbal cues [x]  Yes     PROM LUE Therapist assisted LUE to overhead shoulder flexion 10x each  Therapist assisted LUE to overhead scaption 10x each   Therapist assisted LUE external rotation 10x each  Therapist assisted LUE elbow flexion  Supination/pronation/finger and thumb flexion/extension  Therapist assisted LUE to overhead scapular abduction/scaption with gentle lowering        Seated exercises Elbow flexion/extension - unable to complete without compensatory shoulder flexion  Supination/pronation - with 5# weight 8x      Weight with AAROM with LUE 0-1# shoulder flexion LUE supine to 1/2 range - 5x  1-2# horizontal ab/adduction LUE in sidelying 5x  0# shoulder abduction LUE in sidelying 5x  2-3# chest press BUE supine 10x  Elbow flexion 1-3# 8x      With aircast on RUE   []  Tactile cues   [x]   Verbal cues [x]  Yes  Max cues for breathing      MODALITIES      NMES 15 minutes total Electrical stimulation parameters for elbow flexion with functional movement  Ramp UP/Ramp Down   2 second each  Intensity up to 25  Symmetrical Bipasic via EMPI  No marks from pads on skin after removal of ESTIM pads  [x]  Yes          []  Yes        SPLINTING         []  Tactile cues   []   Verbal cues []  Yes   []  See separate note regarding splint precautions/contraindications      []  Tactile cues   []   Verbal cues []  Yes  []  See separate note regarding splint precautions/contraindications             Functional Outcome Measure:   []NA    10/20/21 Quickdash Score of 34, DSI 52.3%  10/27/21 Quickdash Score of 37, DSI 59.09%  11/22/21 Quickdash Score of 28, DSI 38.6%  12/15/21 Quickdash Score of 32, DSI 47.7%    Treatment/Activity Tolerance:    Patients response to treatment:   [x]Patient tolerated treatment well []Patient limited by fatigue   []Patient limited by pain  []Patient limited by other medical complications   []Other:     Assessment: Patient requires increased time for scapular mobilizations and PROM for LUE due to significant tightness and need for manual assist.  Patient also fatigues easily and requires cues for recovery breathing. Patient progressing in hand strength and shoulder strength but continues to fatigue easily and requires cues for recovery breathing. GOALS: Goals established 9/29/21   Patient stated goal: \"to get this left arm going, I'm doing so much better though\"  [x]? Progressing: []? Met: []? Not Met: []? Adjusted     STG TO BE MET IN 2 WEEKS      1. Pt will report a QuickDASH Symptom Severity Scale score of 10% or less indicating increased safety and functional independence in desired occupational pursuits. [x]? Progressing: []? Met: []? Not Met: []? Adjusted 11/22 PROGRESSING     2. Patient will be I with UE HEP. - GOAL MET 10/20/21     3. Patient will verbalize increased use of LUE in ADL/IADL tasks. - GOAL MET 10/20/21 for opening doors, GOAL MET 12/15 for touching head, opening the microwave, opening the creamer container, opening the honey     LTG TO BE MET IN 4 WEEKS     1.  Patient will demonstrate improved  strength for improved return to ADLs/IADLs. GOAL MET 10/27     2. Patient will demonstrate improved coordination with LUE for improved return to ADLs/IADLs. - GOAL MET 10/27     3. Patient will verbalize mod I with all ADLs with incorporation of LUE. - REQUIRES OCCASIONAL MIN A, BUT  PROGRESSING FOR HOLDING PLATE AND BOWL WITH LUE, PROGRESSING WITH TYING SHOES WITH LUE, PROGRESSING WITH WASHING BODY WITH LUE, LIMITED FOR ACTIVITIES THAT REQUIRE RESISTANCE 10/27, PROGRESSING 11/22 BUT REQUIRES MANUAL ASSIST TO ADDRESS LUE ROM AND STRENGTH LIMITATIONS    NEW STG TO BE MET IN 2 WEEKS Established 10/27/21 to be met by 11/12/21, CONTINUE GOAL 2 UNTIL 12/6/21  1. Patient will be I with UE strengthening HEP. - GOAL MET 11/17     2. Patient will verbalize increased use of LUE in activities that require him to  weighted items for ADLs/IADLs with LUE. (I.e. moving mug for coffee) - PROGRESSING BUT REQUIRES ADDITIONAL INTERVENTION DUE TO LIMITATIONS IN SHOULDER STRENGTH 11/22, HAS DIFFICULTY WITH ITEMS THAT HAVE WEIGHT 12/15 BUT IS ABLE TO CARRY COFFEE CUP WITH LUE    LTG TO BE MET IN 4 WEEKS Established 10/27/21 to be met by 11/26/21, CONTINUE GOALS UNTIL 12/22/21  1. Patient will demonstrate improved pinch strength for improving LUE participation in home maintenance and work-based tasks. - PROGRESSING 11/22 TO 2-7# BUT REQUIRES ADDITIONAL SKILLED INTERVENTION IN ORDER TO PROGRESS FURTHER , GOAL MET 12/15    2. Patient will demonstrate improved coordination with LUE as evidenced by increased speed on 9 hole peg test for improving LUE participation and independence with ADL/IADL tasks. - PROGRESSING 11/22, improved by 10 seconds, but requires additional skilled intervention in order to progress further, GOAL MET 12/15 to 35 SECONDS    NEW STG: ESTABLISHED 12/15/21, TO BE MET BY 12/29/21  1. Patient will be I with UE resistance HEP for hand strengthening.     2. Patient will demonstrate improved hand strength since initiation of theraputty HEP. NEW LTG: ESTABLISHED 12/15/21, TO BE MET BY 1/14/22  1. Patient will verbalize ability to pull in the hydraulic clutch with LUE on motorcycle. - GOAL MET 12/27/21    2.  Patient will verbalize ability to  and pour creamer with LUE.     Prognosis: [x]Good   []Fair   []Poor    Patient Requires Follow-up:  [x]Yes  []No    Plan: []Plan of care initiated     [x]Continue per plan of care 2x/week for 4 weeks   [] Alter current plan (additional OT 2x/week for 4 weeks)    []Hold pending MD visit []Discharge    Time in: 1152 Time out: 1252     Timed Code Treatment Minutes: 60    Total Treatment Minutes: 60    Medicare Cap total YTD:   [x]N/A    Workers Comp Time Stamp  [x]N/A   Time In:   Time Out:    Electronically signed by:  LAKEISHA Bird/MARINA

## 2021-12-29 ENCOUNTER — HOSPITAL ENCOUNTER (OUTPATIENT)
Dept: OCCUPATIONAL THERAPY | Age: 76
Setting detail: THERAPIES SERIES
Discharge: HOME | End: 2021-12-29
Payer: MEDICARE

## 2021-12-29 ENCOUNTER — APPOINTMENT (OUTPATIENT)
Dept: CARDIAC REHAB | Age: 76
End: 2021-12-29
Payer: MEDICARE

## 2021-12-29 PROCEDURE — 97530 THERAPEUTIC ACTIVITIES: CPT

## 2021-12-29 PROCEDURE — 97110 THERAPEUTIC EXERCISES: CPT

## 2021-12-29 PROCEDURE — 97140 MANUAL THERAPY 1/> REGIONS: CPT

## 2021-12-31 ENCOUNTER — APPOINTMENT (OUTPATIENT)
Dept: CARDIAC REHAB | Age: 76
End: 2021-12-31
Payer: MEDICARE

## 2022-01-03 ENCOUNTER — HOSPITAL ENCOUNTER (OUTPATIENT)
Dept: OCCUPATIONAL THERAPY | Age: 77
Setting detail: THERAPIES SERIES
Discharge: HOME | End: 2022-01-03
Payer: MEDICARE

## 2022-01-03 PROCEDURE — 97110 THERAPEUTIC EXERCISES: CPT

## 2022-01-03 PROCEDURE — 97140 MANUAL THERAPY 1/> REGIONS: CPT

## 2022-01-03 NOTE — FLOWSHEET NOTE
L[facility]  80[facility], [facility], [facility] [address], [address]   Phone: [phone]  Fax [phone]        Outpatient Occupational Therapy     [x] Daily Treatment Note   [] Progress Note   [] Discharge Note      Date:  1/3/2022    Patient: Joselo Espinoza                              : 1945                                    MRN: [id number]                                         Evaluation Date: 2021                                            Medical Diagnosis/ICD 10:  S49. 92XA (ICD-10-CM) - Unspecified injury of left shoulder and upper arm, initial encounter      Treatment Diagnosis:  Decreased strength, decreased ROM, decreased ADL/IADL independence     Onset Date:  2021     Referral Date:  21     Referring Physician: Dr. Valery Grant, 21                                                                       Insurance information: [employer] Pre-cert required, 6 and 2 visits authorized prior, 6 visits authorized from 10/27-, 13 visits authorized from  - 3/21/21     Precautions/ Contraindications:   Red Flag Symptoms: none  Safety awareness: intact  Other: Restrictions: cardiopulmonary limitations due to COVID-19     Adhesive allergy: NO  Latex Allergy:  [x]? NO      []?YES      Preferred Language for Healthcare:   [x]? English       []? other:     C-SSRS Triggered by Intake questionnaire (Past 2 wk assessment):   [x]? No, Questionnaire did not trigger screening. []? Yes, Patient intake triggered further evaluation      []? C-SSRS Screening completed  []? PCP notified via Plan of Care  []? Emergency services notified     Plan of care sent to provider:      []? Faxed 21  [x]? Co-signature    (attempts: 1[x]?  2 []?3[]?)        __________________________________________________________________________________________________________________    Plan of care sent to provider:    []Faxed  [x]Co-signature (attempts: 1[] 2 []3[])        Plan of care signed:    [x]Yes date: Dr. Abhi Darnell 9/30/21, Dr. Abhi Darnell 10/29/21, Dr. Abhi Darnell 11/30/21, Dr. Abhi Darnell 12/20/21         []No           Next Progress Note due:   1/14/22    Visit# / total visits: 3/8,  6/8, 7/8, 8/8 (10/13 visits used as of 1/3/2022)    Plan for Next Session:  Review HEP, progress HEP    HEP instruction: Written and verbal HEP instructions provided and reviewed:  · Access Code: [email or web address]  · URL: [email or web address]  · Date: 09/29/2021  · Prepared by: Adina Yo  · Exercises  · Finger Key  with Putty - 2-3 x daily - 7 x weekly - 3 sets - 10 reps  · Putty Squeezes - 2-3 x daily - 7 x weekly - 3 sets - 10 reps  · Seated Three Finger Pinch with Putty - 2-3 x daily - 7 x weekly - 3 sets - 10 reps  · Tip Pinch with Putty - 2-3 x daily - 7 x weekly - 3 sets - 10 reps  · Shoulder Flexion AAROM - 2-3 x daily - 7 x weekly - 3 sets - 10 reps  · Supine Shoulder Horizontal Abduction Adduction AAROM with Dowel - 2-3 x daily - 7 x weekly - 3 sets - 10 reps  · Elbow flexion with weighted items in supine  · Hose bending     Subjective: Patient reports he did some stretching yesterday with his spouse and is a little sore with stretching. No pain at rest.     Pain level: 0/10     Objective:       Exercises:    Exercises in bold performed in department today. Items not bolded are carried forward from prior visits for continuity of the record.   Exercise/Equipment Resistance/Repetitions Skilled cues Added to HEP Comments      ADL/IADL TRAINING (ADL OR TA)        []  Tactile cues   []   Verbal cues []  Yes       []  Tactile cues   []   Verbal cues []  Yes      []  Tactile cues   []   Verbal cues []  Yes       []  Tactile cues   []   Verbal cues []  Yes       []  Tactile cues   []   Verbal cues []  Yes         []  Tactile cues   []   Verbal cues []  Yes         []  Tactile cues   []   Verbal cues []  Yes         []  Tactile cues   []   Verbal cues []  Yes         [] Without arms with pillow 5x    With stool under RLE, stool under LLE 5x []  Tactile cues   [x]   Verbal cues [x]  Yes          THERAPEUTIC EXERCISE and MANUAL TECHNIQUES   (TE OR MT)      TRX With BUE on TRX handles and ropes - stretch into shoulder flexion and horizontal abduction 5x      Prone mat mobility Prone with external rotation stretch : 30 seconds  Prone with lateral core stretch: 30 seconds     Prone with push up into tall kneel: CGA   Tall kneel 30 seconds with mod cues for pursed lip breathing, with horizontal abduction, shoulder flexion streth  Prone with quadruped: CGA    Prone with quadruped with lifting up RUE 5x with mod cues for breathing   Max Cues for breathing   Standing with arms stretching up into wall 4x wall push up BUE Reviewed for HEP  Cues for breathing   Prone posterior shoulder strengthening Retraction 4x, had to sit up due to dizziness, shortness of breath, O2 measured 87% upon sitting up    Min A due to limited range   Max cues for breathing   AROM with BUE           Chest Press LUE 3# 10x3  Elbow flexion LUE 2# 12x total  Horizontal abduction sidelying LUE 0-1#, 10x, min A  Shoulder abduction sidelying 0-1# LUE 10x, min A  Blue racket hitting balloon with LUE - 2-3 minutes [x]  Tactile cues   [x]   Verbal cues [x]  Yes  Mod cues for breathing     Hand strengthening Intrinsic Plus position  DIP flexion position  Finger/ab/adduction  Velcro rollers - 5 finger, three-jaw, cues for DIP strengthening  Gripper 35# 10x with cues for pursed lip breathing  Pinky finger abduction with tactile cue and visual cue of paper - 8x to 3/4 range and cues for pursed lip breathing Tactile/verbal cues Yes Mod cues for breathing   Shoulder strengthening With LUE at wall holding onto ball 5 second hold x5 with arm at side    Wall push ups 4x.  Max cues for breathing  Added to HEP    Putty exercises Green  Lateral pinch 10x  Thumb extension 10x  Thumb radial abduction 10x []  Tactile cues   [x]   Verbal cues [x]  Yes  Initial cues for setup and form    strengthening 35-55# gripper brown 10x3 with shoulder flexion  XTENSOR 5 second hold x5 []  Tactile cues   [x]   Verbal cues [x]  Yes  Mod cues for breathing   Scapular mobilizations sidelying 40 second hold x3 for upward rotation and retraction [x]  Tactile cues   [x]   Verbal cues [x]  Yes     Elbow flexion/extension With 3# high table gravity eliminated 10x each [x]  Tactile cues   [x]   Verbal cues [x]  Yes     Therabar green  stick 10x smiles and frowns, cues for control, breathing, relaxation of upper neck musculature [x]  Tactile cues   [x]   Verbal cues [x]  Yes  Max Cues for breathing   Soft tissue mobilization To bicep with manual stretch for elbow flexion [x]  Tactile cues   [x]   Verbal cues [x]  Yes     PROM LUE Therapist assisted LUE to overhead shoulder flexion 10x each  Therapist assisted LUE to overhead scaption 10x each   Therapist assisted LUE external rotation 10x each  Therapist assisted LUE elbow flexion  Supination/pronation/finger and thumb flexion/extension  Therapist assisted LUE to overhead scapular abduction/scaption with gentle lowering        Seated exercises Elbow flexion/extension - unable to complete without compensatory shoulder flexion  Supination/pronation - with 5# weight 8x      Weight with AAROM with LUE 0-1# shoulder flexion LUE supine to 1/2 range - 5x  1-2# horizontal ab/adduction LUE in sidelying 5x  0# shoulder abduction LUE in sidelying 5x  2-3# chest press BUE supine 10x  Elbow flexion 1-3# 8x      With aircast on RUE   []  Tactile cues   [x]   Verbal cues [x]  Yes  Max cues for breathing      MODALITIES      NMES 15 minutes total Electrical stimulation parameters for elbow flexion with functional movement  Ramp UP/Ramp Down   2 second each  Intensity up to 25  Symmetrical Bipasic via EMPI  No marks from pads on skin after removal of ESTIM pads  [x]  Yes          []  Yes        SPLINTING         []  Tactile cues []   Verbal cues []  Yes   []  See separate note regarding splint precautions/contraindications      []  Tactile cues   []   Verbal cues []  Yes  []  See separate note regarding splint precautions/contraindications             Functional Outcome Measure:   []NA    10/20/21 Quickdash Score of 34, DSI 52.3%  10/27/21 Quickdash Score of 37, DSI 59.09%  11/22/21 Quickdash Score of 28, DSI 38.6%  12/15/21 Quickdash Score of 32, DSI 47.7%    Treatment/Activity Tolerance:    Patients response to treatment:   [x]Patient tolerated treatment well []Patient limited by fatigue   []Patient limited by pain  []Patient limited by other medical complications   []Other:     Assessment: Patient progressing in strength and resistance for shoulder and hand, continues to require min to max cues for breathing. Patient also requires manual assist for shoulder AROM with stabilization of scapula due to weakness. Patient progressing in sidelying shoulder abduction and horizontal abduction again gravity with resistance. Continue outpatient OT 2x/week for 4 weeks. GOALS: Goals established 9/29/21   Patient stated goal: \"to get this left arm going, I'm doing so much better though\"  [x]? Progressing: []? Met: []? Not Met: []? Adjusted     STG TO BE MET IN 2 WEEKS      1. Pt will report a QuickDASH Symptom Severity Scale score of 10% or less indicating increased safety and functional independence in desired occupational pursuits. [x]? Progressing: []? Met: []? Not Met: []? Adjusted 11/22 PROGRESSING     2. Patient will be I with UE HEP. - GOAL MET 10/20/21     3. Patient will verbalize increased use of LUE in ADL/IADL tasks. - GOAL MET 10/20/21 for opening doors, GOAL MET 12/15 for touching head, opening the microwave, opening the creamer container, opening the honey     LTG TO BE MET IN 4 WEEKS     1. Patient will demonstrate improved  strength for improved return to ADLs/IADLs. GOAL MET 10/27     2.  Patient will demonstrate improved coordination with LUE for improved return to ADLs/IADLs. - GOAL MET 10/27     3. Patient will verbalize mod I with all ADLs with incorporation of LUE. - REQUIRES OCCASIONAL MIN A, BUT  PROGRESSING FOR HOLDING PLATE AND BOWL WITH LUE, PROGRESSING WITH TYING SHOES WITH LUE, PROGRESSING WITH WASHING BODY WITH LUE, LIMITED FOR ACTIVITIES THAT REQUIRE RESISTANCE 10/27, PROGRESSING 11/22 BUT REQUIRES MANUAL ASSIST TO ADDRESS LUE ROM AND STRENGTH LIMITATIONS    NEW STG TO BE MET IN 2 WEEKS Established 10/27/21 to be met by 11/12/21, CONTINUE GOAL 2 UNTIL 12/6/21  1. Patient will be I with UE strengthening HEP. - GOAL MET 11/17     2. Patient will verbalize increased use of LUE in activities that require him to  weighted items for ADLs/IADLs with LUE. (I.e. moving mug for coffee) - PROGRESSING BUT REQUIRES ADDITIONAL INTERVENTION DUE TO LIMITATIONS IN SHOULDER STRENGTH 11/22, HAS DIFFICULTY WITH ITEMS THAT HAVE WEIGHT 12/15 BUT IS ABLE TO CARRY COFFEE CUP WITH LUE    LTG TO BE MET IN 4 WEEKS Established 10/27/21 to be met by 11/26/21, CONTINUE GOALS UNTIL 12/22/21  1. Patient will demonstrate improved pinch strength for improving LUE participation in home maintenance and work-based tasks. - PROGRESSING 11/22 TO 2-7# BUT REQUIRES ADDITIONAL SKILLED INTERVENTION IN ORDER TO PROGRESS FURTHER , GOAL MET 12/15    2. Patient will demonstrate improved coordination with LUE as evidenced by increased speed on 9 hole peg test for improving LUE participation and independence with ADL/IADL tasks. - PROGRESSING 11/22, improved by 10 seconds, but requires additional skilled intervention in order to progress further, GOAL MET 12/15 to 35 SECONDS    NEW STG: ESTABLISHED 12/15/21, TO BE MET BY 12/29/21  1. Patient will be I with UE resistance HEP for hand strengthening. - PROGRESSING 1/3/22    2.  Patient will demonstrate improved hand strength since initiation of theraputty HEP. - GOAL MET 12/27/21    NEW LTG: ESTABLISHED 12/15/21, TO BE MET BY 1/14/22  1. Patient will verbalize ability to pull in the hydraulic clutch with LUE on motorcycle. - GOAL MET 12/27/21    2. Patient will verbalize ability to  and pour creamer with LUE.  - PROGRESSING IF ITS NOT FULL 12/29     Prognosis: [x]Good   []Fair   []Poor    Patient Requires Follow-up:  [x]Yes  []No    Plan: []Plan of care initiated     [x]Continue per plan of care 2x/week for 4 weeks   [] Alter current plan (additional OT 2x/week for 4 weeks)    []Hold pending MD visit []Discharge    Time in: 1145 Time out: 1300     Timed Code Treatment Minutes: 60    Total Treatment Minutes: 75    Medicare Cap total YTD:   [x]N/A    Workers Comp Time Stamp  [x]N/A   Time In:   Time Out:    Electronically signed by:  LAKEISHA Olsen/MARINA

## 2022-01-04 ENCOUNTER — OFFICE VISIT (OUTPATIENT)
Dept: FAMILY MEDICINE CLINIC | Age: 77
End: 2022-01-04
Payer: MEDICARE

## 2022-01-04 VITALS
SYSTOLIC BLOOD PRESSURE: 136 MMHG | HEIGHT: 72 IN | HEART RATE: 77 BPM | WEIGHT: 163 LBS | OXYGEN SATURATION: 96 % | DIASTOLIC BLOOD PRESSURE: 70 MMHG | BODY MASS INDEX: 22.08 KG/M2

## 2022-01-04 DIAGNOSIS — R73.03 PREDIABETES: ICD-10-CM

## 2022-01-04 DIAGNOSIS — J30.89 NON-SEASONAL ALLERGIC RHINITIS DUE TO OTHER ALLERGIC TRIGGER: ICD-10-CM

## 2022-01-04 DIAGNOSIS — E78.2 MIXED HYPERLIPIDEMIA: ICD-10-CM

## 2022-01-04 DIAGNOSIS — I10 PRIMARY HYPERTENSION: Primary | ICD-10-CM

## 2022-01-04 DIAGNOSIS — Z12.5 SCREENING PSA (PROSTATE SPECIFIC ANTIGEN): ICD-10-CM

## 2022-01-04 LAB
A/G RATIO: 1.4 (ref 1.1–2.2)
ALBUMIN SERPL-MCNC: 4.6 G/DL (ref 3.4–5)
ALP BLD-CCNC: 90 U/L (ref 40–129)
ALT SERPL-CCNC: 24 U/L (ref 10–40)
ANION GAP SERPL CALCULATED.3IONS-SCNC: 15 MMOL/L (ref 3–16)
AST SERPL-CCNC: 20 U/L (ref 15–37)
BILIRUB SERPL-MCNC: 0.6 MG/DL (ref 0–1)
BUN BLDV-MCNC: 20 MG/DL (ref 7–20)
CALCIUM SERPL-MCNC: 9.7 MG/DL (ref 8.3–10.6)
CHLORIDE BLD-SCNC: 101 MMOL/L (ref 99–110)
CHOLESTEROL, TOTAL: 136 MG/DL (ref 0–199)
CO2: 26 MMOL/L (ref 21–32)
CREAT SERPL-MCNC: 0.7 MG/DL (ref 0.8–1.3)
GFR AFRICAN AMERICAN: >60
GFR NON-AFRICAN AMERICAN: >60
GLUCOSE BLD-MCNC: 124 MG/DL (ref 70–99)
HDLC SERPL-MCNC: 40 MG/DL (ref 40–60)
LDL CHOLESTEROL CALCULATED: 71 MG/DL
POTASSIUM SERPL-SCNC: 4.4 MMOL/L (ref 3.5–5.1)
PROSTATE SPECIFIC ANTIGEN: 1.53 NG/ML (ref 0–4)
SODIUM BLD-SCNC: 142 MMOL/L (ref 136–145)
TOTAL PROTEIN: 7.9 G/DL (ref 6.4–8.2)
TRIGL SERPL-MCNC: 126 MG/DL (ref 0–150)
VLDLC SERPL CALC-MCNC: 25 MG/DL

## 2022-01-04 PROCEDURE — 36415 COLL VENOUS BLD VENIPUNCTURE: CPT | Performed by: FAMILY MEDICINE

## 2022-01-04 PROCEDURE — 99214 OFFICE O/P EST MOD 30 MIN: CPT | Performed by: FAMILY MEDICINE

## 2022-01-04 ASSESSMENT — PATIENT HEALTH QUESTIONNAIRE - PHQ9
2. FEELING DOWN, DEPRESSED OR HOPELESS: 0
1. LITTLE INTEREST OR PLEASURE IN DOING THINGS: 0
SUM OF ALL RESPONSES TO PHQ QUESTIONS 1-9: 0
SUM OF ALL RESPONSES TO PHQ9 QUESTIONS 1 & 2: 0
SUM OF ALL RESPONSES TO PHQ QUESTIONS 1-9: 0

## 2022-01-04 ASSESSMENT — ENCOUNTER SYMPTOMS
CHEST TIGHTNESS: 0
RHINORRHEA: 1
SHORTNESS OF BREATH: 1
COLOR CHANGE: 0
ABDOMINAL PAIN: 0
NAUSEA: 0
VOMITING: 0

## 2022-01-04 NOTE — PATIENT INSTRUCTIONS

## 2022-01-04 NOTE — PROGRESS NOTES
Subjective:      Patient ID: Mera Caldwell is a 77 y.o. male. HPI  Chief Complaint   Patient presents with    Hypertension     Patient is here for a 6 month followup, he is fasting for blood work. Here for checkup on htn,hld, preDM  Doing better. Walking on own, getting stronger. Doing PT for left arm  Worst post COVID symptom is the breathing- feels SOB all the time  Constant cough- clear phlegm. Runny nose.      Mera Caldwell is a 77 y.o. 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[facility] [address] 08/12/2021    Crushing injury of left forearm 06/04/2021    Critical illness myopathy 06/04/2021    Psoriasis 08/20/2019    Thrombocytopenia ([facility] [address] 08/20/2019    Prediabetes 08/20/2019    Ischemic cardiomyopathy     CHF (congestive heart failure) ([facility] [address] 01/03/2019    HTN (hypertension) 01/08/2011    Hyperlipemia 01/08/2011    CAD (coronary artery disease) 01/08/2011    S/P CABG (coronary artery bypass graft) 01/08/2011    Lumbar pain 01/08/2011     Current Outpatient Medications   Medication Sig Dispense Refill    ticagrelor (BRILINTA) 90 MG TABS tablet Take 1 tablet by mouth 2 times daily 60 tablet 7    FLUoxetine (PROZAC) 20 MG capsule TAKE ONE CAPSULE BY MOUTH DAILY 90 capsule 1    doxazosin (CARDURA) 1 MG tablet TAKE ONE TABLET BY MOUTH ONCE NIGHTLY 90 tablet 1    NONFORMULARY Inhale 2 mLs into the lungs daily Indications: OXIGEN      albuterol sulfate HFA (PROAIR HFA) 108 (90 Base) MCG/ACT inhaler Inhale 2 puffs into the lungs every 6 hours as needed for Wheezing or Shortness of Breath 1 each 5    fluticasone-umeclidin-vilant (TRELEGY ELLIPTA) 100-62.5-25 MCG/INH AEPB Inhale 1 puff into the lungs daily 2 each 0    nitroGLYCERIN (NITROSTAT) 0.4 MG SL tablet Place 1 tablet under the tongue every 5 minutes as needed for Chest pain 25 tablet 4    nitroglycerin (NITRO-BID) 2 % ointment Place 0.5 inches onto the skin 2 times daily Place on the dorsum of the left foot (Patient not taking: Reported on 9/21/2021) 1 each 3    halobetasol (ULTRAVATE) 0.05 % cream APPLY TO AFFECTED AREA(S) TWO TIMES A DAY AS NEEDED 1 Tube 1    calcipotriene (DOVONEX) 0.005 % cream APPLY TO AFFECTED AREA(S) TWO TIMES A DAY AS NEEDED 1 Tube 1    gabapentin (NEURONTIN) 300 MG capsule Take 1 capsule by mouth every 8 hours for 90 days. 270 capsule 2    metoprolol tartrate (LOPRESSOR) 25 MG tablet Take 0.5 tablets by mouth 2 times daily 180 tablet 1    thiamine 100 MG tablet Take 1 tablet by mouth daily 90 tablet 1    guaiFENesin 1200 MG TB12 Take by mouth 2 times daily       atorvastatin (LIPITOR) 20 MG tablet TAKE ONE TABLET BY MOUTH DAILY 90 tablet 2    Multiple Vitamins-Iron TABS Take 1 tablet by mouth daily      Omega-3 Fatty Acids (OMEGA-3 FISH OIL PO) Take 1,000 mg by mouth 2 times daily        No current facility-administered medications for this visit.      Allergies: Plavix [clopidogrel bisulfate] and Ciprofloxacin  Past Medical History:   Diagnosis Date    Arthritis of hand     arthritis in hands and thumbs    CAD (coronary artery disease)     Hyperlipidemia     Hypertension     Ischemic cardiomyopathy 01/2019    STEMI (ST elevation myocardial infarction) ([facility] [address] 01/03/2019    Tinnitus      Past Surgical History:   Procedure Laterality Date    BICEPS TENDON REPAIR      CORONARY ANGIOPLASTY  01/08/2011    emergency PCI: vein to RCA    CORONARY ANGIOPLASTY WITH STENT PLACEMENT  01/03/2019    PCI to distal SVG-RCA with ELIZABETH    CORONARY ARTERY BYPASS GRAFT      in 1990 CABG x4 and in 2001 CABG x2    DIAGNOSTIC CARDIAC CATH LAB PROCEDURE      TOE AMPUTATION Bilateral 8/13/2021    AMPUTATION OF LEFT 2ND DIGIT LEFT FOOT, RIGHT 3RD DIGIT,  RIGHT PARTIAL 4TH DIGIT RIGHT FOOT performed by Debbie Carter DPM at [address] [facility] History   Problem Relation Age of Onset    Cancer Mother         skin cancer    Diabetes Mother     High Blood Pressure Father     Heart Disease Father     No Known Problems Brother     No Known Problems Brother     Heart Disease Paternal Uncle     Heart Disease Paternal Cousin      Social History     Tobacco Use    Smoking status: Former Smoker     Packs/day: 1.00     Years: 20.00     Pack years: 20.00     Types: Cigarettes     Start date: 1961     Quit date: 1991     Years since quittin.0    Smokeless tobacco: Never Used   Substance Use Topics    Alcohol use: No       Review of Systems   Constitutional: Negative for chills and fever. HENT: Positive for congestion and rhinorrhea. Respiratory: Positive for shortness of breath. Negative for chest tightness. Cardiovascular: Negative for chest pain and palpitations. Gastrointestinal: Negative for abdominal pain, nausea and vomiting. Musculoskeletal: Negative for arthralgias and gait problem. Skin: Negative for color change and wound. Neurological: Positive for weakness. Negative for dizziness, syncope and light-headedness. Hematological: Negative for adenopathy. Does not bruise/bleed easily. Psychiatric/Behavioral: Negative for agitation and hallucinations. The patient is not hyperactive. Objective:   Physical Exam  Constitutional:       General: He is not in acute distress. Appearance: He is well-developed. He is ill-appearing. He is not toxic-appearing. HENT:      Head: Normocephalic. Right Ear: Tympanic membrane, ear canal and external ear normal.      Left Ear: Tympanic membrane, ear canal and external ear normal.      Nose: Nose normal.      Mouth/Throat:      Mouth: Mucous membranes are moist.      Pharynx: Oropharynx is clear. No oropharyngeal exudate. Eyes:      General: No scleral icterus. Right eye: No discharge.          Left eye: No discharge. Extraocular Movements: Extraocular movements intact. Conjunctiva/sclera: Conjunctivae normal.      Pupils: Pupils are equal, round, and reactive to light. Neck:      Thyroid: No thyromegaly. Cardiovascular:      Rate and Rhythm: Normal rate and regular rhythm. Heart sounds: Normal heart sounds. No murmur heard. Pulmonary:      Effort: Pulmonary effort is normal.      Breath sounds: Normal breath sounds. No wheezing. Abdominal:      General: Bowel sounds are normal. There is no distension. Palpations: Abdomen is soft. Tenderness: There is no abdominal tenderness. There is no guarding or rebound. Musculoskeletal:         General: Deformity and signs of injury present. Normal range of motion. Cervical back: Normal range of motion and neck supple. No tenderness. Right lower leg: No edema. Left lower leg: No edema. Lymphadenopathy:      Cervical: No cervical adenopathy. Skin:     General: Skin is warm. Coloration: Skin is pale. Findings: No bruising, lesion or rash. 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 Mental Status: He is alert and oriented to person, place, and time. Motor: Weakness present. Gait: Gait normal.      Deep Tendon Reflexes: Reflexes abnormal (left arm weakness). Psychiatric:         Mood and Affect: Mood normal.         Behavior: Behavior normal.         Thought Content:  Thought content normal.         Judgment: Judgment normal.         Assessment:      htn- stable  hld- on statin, check lab  preDM- check a1c  Rhinitis- try allegra samples      Plan:      Orders Placed This Encounter   Procedures    HEMOGLOBIN A1C    LIPID PANEL     Order Specific Question:   Is Patient Fasting?/# of Hours     Answer:   12    COMPREHENSIVE METABOLIC PANEL    PSA screening     Refill mt PALOMINO, DO

## 2022-01-05 ENCOUNTER — HOSPITAL ENCOUNTER (OUTPATIENT)
Dept: OCCUPATIONAL THERAPY | Age: 77
Setting detail: THERAPIES SERIES
Discharge: HOME | End: 2022-01-05
Payer: MEDICARE

## 2022-01-05 LAB
ESTIMATED AVERAGE GLUCOSE: 119.8 MG/DL
HBA1C MFR BLD: 5.8 %

## 2022-01-05 PROCEDURE — 97110 THERAPEUTIC EXERCISES: CPT

## 2022-01-05 PROCEDURE — 97140 MANUAL THERAPY 1/> REGIONS: CPT

## 2022-01-05 NOTE — FLOWSHEET NOTE
[facility], [address]   Phone: [phone]  Fax [phone]        Outpatient Occupational Therapy     [x] Daily Treatment Note   [] Progress Note   [] Discharge Note      Date:  2022    Patient: Yolanda Berry                              : 1945                                    MRN: [id number]                                         Evaluation Date: 2021                                            Medical Diagnosis/ICD 10:  S49. 92XA (ICD-10-CM) - Unspecified injury of left shoulder and upper arm, initial encounter      Treatment Diagnosis:  Decreased strength, decreased ROM, decreased ADL/IADL independence     Onset Date:  2021     Referral Date:  21     Referring Physician: Dr. Gilberto Ann, 21                                                                       Insurance information: [employer] Pre-cert required, 6 and 2 visits authorized prior, 6 visits authorized from 10/27-, 13 visits authorized from  - 3/21/21     Precautions/ Contraindications:   Red Flag Symptoms: none  Safety awareness: intact  Other: Restrictions: cardiopulmonary limitations due to COVID-19     Adhesive allergy: NO  Latex Allergy:  [x]? NO      []?YES      Preferred Language for Healthcare:   [x]? English       []? other:     C-SSRS Triggered by Intake questionnaire (Past 2 wk assessment):   [x]? No, Questionnaire did not trigger screening. []? Yes, Patient intake triggered further evaluation      []? C-SSRS Screening completed  []? PCP notified via Plan of Care  []? Emergency services notified     Plan of care sent to provider:      []? Faxed 21  [x]? Co-signature    (attempts: 1[x]?  2 []?3[]?)        __________________________________________________________________________________________________________________    Plan of care sent to provider:    []Faxed  [x]Co-signature (attempts: 1[] 2 []3[])        Plan of care signed:    [x]Yes date: Dr. Eliu Joshi 9/30/21, Dr. Eliu Joshi 10/29/21, Dr. Eliu Joshi 11/30/21, Dr. Eliu Joshi 12/20/21         []No           Next Progress Note due:   1/14/22    Visit# / total visits: 4/8,  6/8, 7/8, 8/8 (11/13 visits used as of 1/5/2022)    Plan for Next Session:  Review HEP, progress HEP    HEP instruction: Written and verbal HEP instructions provided and reviewed:  · Access Code: [email or web address]  · URL: [email or web address]  · Date: 09/29/2021  · Prepared by: Adri Carrillo  · Exercises  · Finger Key  with Putty - 2-3 x daily - 7 x weekly - 3 sets - 10 reps  · Putty Squeezes - 2-3 x daily - 7 x weekly - 3 sets - 10 reps  · Seated Three Finger Pinch with Putty - 2-3 x daily - 7 x weekly - 3 sets - 10 reps  · Tip Pinch with Putty - 2-3 x daily - 7 x weekly - 3 sets - 10 reps  · Shoulder Flexion AAROM - 2-3 x daily - 7 x weekly - 3 sets - 10 reps  · Supine Shoulder Horizontal Abduction Adduction AAROM with Dowel - 2-3 x daily - 7 x weekly - 3 sets - 10 reps  · Elbow flexion with weighted items in supine  · Hose bending     Subjective: Patient reports he can move his little finger better on his own. Pain level: 0/10     Objective:     Elbow flexion PROM 136  Elbow flexion AROM 120  Shoulder flexion AROM 110  Shoulder flexion PROM 131    Exercises:    Exercises in bold performed in department today. Items not bolded are carried forward from prior visits for continuity of the record.   Exercise/Equipment Resistance/Repetitions Skilled cues Added to HEP Comments      ADL/IADL TRAINING (ADL OR TA)        []  Tactile cues   []   Verbal cues []  Yes       []  Tactile cues   []   Verbal cues []  Yes      []  Tactile cues   []   Verbal cues []  Yes       []  Tactile cues   []   Verbal cues []  Yes       []  Tactile cues   []   Verbal cues []  Yes         []  Tactile cues   []   Verbal cues []  Yes         []  Tactile cues   []   Verbal cues []  Yes         []  Tactile cues []   Verbal cues []  Yes         []  Tactile cues   []   Verbal cues []  Yes        NEUROMUSCULAR RE-EDU and THERAPEUTIC ACTIVITY (NEURO RE-ED or TA)      Sitting on exercise bike With BUE on handles - 5 minutes with cues for holding arms up on bike    In preparation for motorcycle ride      Kinesiotaping for LUE [employer] [place] Tri-Pull and Scapular retraction []  Tactile cues   [x]   Verbal cues [x]  Yes     Weightbearing At side of mat,  min cues for elbow extension, initially max A for scapular stabilization, 40 seconds x3 [x]  Tactile cues   [x]   Verbal cues [x]  Yes  Max to min cues   Scapular protraction strengthening with elbow extension With holding dowel and LUE reaching to dowel and out 8x also with addition of theraband 8x but unable to maintain [x]  Tactile cues   [x]   Verbal cues [x]  Yes  Mod cues   Pushing shopping cart  Forward: With elbow extension and shoulder flexion and posterior pelvic tilt 15x    To right with BUE, cues for lean 10x    To left with BUE, cues for lean 10x Verbal cues  Improved ability to keep shoulder down, head up with cues   Neuro Re-Ed in Sidelying With facilitation of scapula for horizontal abduction, shoulder abduction, and inhibition of upper trap with trigger point release at upper trap []  Tactile cues   []   Verbal cues []  Yes     Short arc propellers Short arc, left to center, 15x, max cues for breathing and upper trap inhibition []  Tactile cues   [x]   Verbal cues [x]  Yes  Cues for positioning   Clothespins 5x red LUE up to pole    Green/blue with short distance squeeze and release using three-jaw 4x      Balloon toss and catch 15x with incorporation of BUE, mirror for visual cueing, elbow flexion/shoulder flexion, external rotation []  Tactile cues   []   Verbal cues []  Yes     Elbow flexion/extension training Elbow flexion/extension supine with 1#, 2#, 3# with mod A, 20x total []  Tactile cues   []   Verbal cues []  Yes  Min cues for breathing    Cues for control   Sit to stand and stand to sit Without arms with pillow 5x    With stool under RLE, stool under LLE 5x []  Tactile cues   [x]   Verbal cues [x]  Yes          THERAPEUTIC EXERCISE and MANUAL TECHNIQUES   (TE OR MT)      TRX With BUE on TRX handles and ropes - stretch into shoulder flexion and horizontal abduction 5x      Prone mat mobility Prone with external rotation stretch : 30 seconds  Prone with lateral core stretch: 30 seconds     Prone with push up into tall kneel: CGA   Tall kneel 30 seconds with mod cues for pursed lip breathing, with horizontal abduction, shoulder flexion streth  Prone with quadruped: CGA    Prone with quadruped with lifting up RUE 5x with mod cues for breathing   Max Cues for breathing   Standing with arms stretching up into wall 4x wall push up BUE Reviewed for HEP  Cues for breathing   Prone posterior shoulder strengthening Retraction 4x, had to sit up due to dizziness, shortness of breath, O2 measured 87% upon sitting up    Min A due to limited range   Max cues for breathing   AROM with BUE           Chest Press LUE 3# 10x  Elbow flexion LUE 2# 8x, 1#, 8x with elbow flexion/chest press/shoulder flexion/shoulder abduction  Horizontal abduction sidelying LUE 0-1#, 10x, min A  Shoulder abduction sidelying 0-1# LUE 10x, min A  Horizontal abduction supine 10x 1# [x]  Tactile cues   [x]   Verbal cues [x]  Yes  Mod cues for breathing     Hand strengthening Intrinsic Plus position  DIP flexion position  Finger/ab/adduction  Velcro rollers - 5 finger, three-jaw, cues for DIP strengthening  Gripper 35# 10x with cues for pursed lip breathing  Pinky finger abduction with tactile cue and visual cue of paper - 8x to 3/4 range and cues for pursed lip breathing Tactile/verbal cues Yes Mod cues for breathing   Shoulder strengthening With LUE at wall holding onto ball 5 second hold x5 with arm at side    BUE at wall with upward stretch AAROM 5x, max cues for breathing  Added to HEP    Putty exercises Green  Lateral pinch 10x  Thumb extension 10x  Thumb radial abduction 10x []  Tactile cues   [x]   Verbal cues [x]  Yes  Initial cues for setup and form    strengthening 35-55# gripper brown 10x3 with shoulder flexion  XTENSOR 5 second hold x5 []  Tactile cues   [x]   Verbal cues [x]  Yes  Mod cues for breathing   Scapular mobilizations sidelying 40 second hold x3 for upward rotation and retraction [x]  Tactile cues   [x]   Verbal cues [x]  Yes     Elbow flexion/extension With 3# high table gravity eliminated 10x each [x]  Tactile cues   [x]   Verbal cues [x]  Yes     Therabar green  stick 10x smiles and frowns, cues for control, breathing, relaxation of upper neck musculature [x]  Tactile cues   [x]   Verbal cues [x]  Yes  Max Cues for breathing   Soft tissue mobilization To bicep with manual stretch for elbow flexion [x]  Tactile cues   [x]   Verbal cues [x]  Yes     PROM LUE Therapist assisted LUE to overhead shoulder flexion 10x each  Therapist assisted LUE to overhead scaption 10x each   Therapist assisted LUE external rotation 10x each  Therapist assisted LUE elbow flexion  Supination/pronation/finger and thumb flexion/extension  Therapist assisted LUE to overhead scapular abduction/scaption with gentle lowering        Seated exercises Elbow flexion/extension - unable to complete without compensatory shoulder flexion  Supination/pronation - with 5# weight 8x      Weight with AAROM with LUE 0-1# shoulder flexion LUE supine to 1/2 range - 5x  1-2# horizontal ab/adduction LUE in sidelying 5x  0# shoulder abduction LUE in sidelying 5x  2-3# chest press BUE supine 10x  Elbow flexion 1-3# 8x      With aircast on RUE   []  Tactile cues   [x]   Verbal cues [x]  Yes  Max cues for breathing      MODALITIES      NMES 15 minutes total Electrical stimulation parameters for elbow flexion with functional movement  Ramp UP/Ramp Down   2 second each  Intensity up to 25  Symmetrical Bipasic via EMPI  No marks from pads on skin after removal of ESTIM pads  [x]  Yes          []  Yes        SPLINTING         []  Tactile cues   []   Verbal cues []  Yes   []  See separate note regarding splint precautions/contraindications      []  Tactile cues   []   Verbal cues []  Yes  []  See separate note regarding splint precautions/contraindications             Functional Outcome Measure:   []NA    10/20/21 Quickdash Score of 34, DSI 52.3%  10/27/21 Quickdash Score of 37, DSI 59.09%  11/22/21 Quickdash Score of 28, DSI 38.6%  12/15/21 Quickdash Score of 32, DSI 47.7%    Treatment/Activity Tolerance:    Patients response to treatment:   [x]Patient tolerated treatment well []Patient limited by fatigue   []Patient limited by pain  []Patient limited by other medical complications   []Other:     Assessment: Patient progressing in strength and resistance for shoulder and hand, continues to require min to max cues for breathing. Patient also requires manual assist for shoulder AROM with stabilization of scapula due to weakness. Patient progressing in shoulder flexion overhead with resistance. Continue outpatient OT 2x/week for 4 weeks. GOALS: Goals established 9/29/21   Patient stated goal: \"to get this left arm going, I'm doing so much better though\"  [x]? Progressing: []? Met: []? Not Met: []? Adjusted     STG TO BE MET IN 2 WEEKS      1. Pt will report a QuickDASH Symptom Severity Scale score of 10% or less indicating increased safety and functional independence in desired occupational pursuits. [x]? Progressing: []? Met: []? Not Met: []? Adjusted 11/22 PROGRESSING     2. Patient will be I with UE HEP. - GOAL MET 10/20/21     3. Patient will verbalize increased use of LUE in ADL/IADL tasks. - GOAL MET 10/20/21 for opening doors, GOAL MET 12/15 for touching head, opening the microwave, opening the creamer container, opening the honey     LTG TO BE MET IN 4 WEEKS     1.  Patient will demonstrate improved  strength for improved initiation of theraputty HEP. - GOAL MET 12/27/21    NEW LTG: ESTABLISHED 12/15/21, TO BE MET BY 1/14/22  1. Patient will verbalize ability to pull in the hydraulic clutch with LUE on motorcycle. - GOAL MET 12/27/21    2. Patient will verbalize ability to  and pour creamer with LUE.  - PROGRESSING IF ITS NOT FULL 12/29     Prognosis: [x]Good   []Fair   []Poor    Patient Requires Follow-up:  [x]Yes  []No    Plan: []Plan of care initiated     [x]Continue per plan of care 2x/week for 4 weeks   [] Alter current plan (additional OT 2x/week for 4 weeks)    []Hold pending MD visit []Discharge    Time in: 1045 Time out: 1130    Timed Code Treatment Minutes: 45    Total Treatment Minutes: 45    Medicare Cap total YTD:   [x]N/A    Workers Comp Time Stamp  [x]N/A   Time In:   Time Out:    Electronically signed by:  LAKEISHA Baker/MARINA

## 2022-01-10 ENCOUNTER — HOSPITAL ENCOUNTER (OUTPATIENT)
Dept: OCCUPATIONAL THERAPY | Age: 77
Setting detail: THERAPIES SERIES
Discharge: HOME | End: 2022-01-10
Payer: MEDICARE

## 2022-01-10 PROCEDURE — 97110 THERAPEUTIC EXERCISES: CPT

## 2022-01-10 PROCEDURE — 97140 MANUAL THERAPY 1/> REGIONS: CPT

## 2022-01-10 NOTE — FLOWSHEET NOTE
Liz  80. [facility], [facility], [address] Raiza M[address] [facility], [address]   Phone: [phone]  Fax [phone]        Outpatient Occupational Therapy     [x] Daily Treatment Note   [] Progress Note   [] Discharge Note      Date:  1/10/2022    Patient: Danilo Marrufo                              : 1945                                    MRN: [id number]                                         Evaluation Date: 2021                                            Medical Diagnosis/ICD 10:  S49. 92XA (ICD-10-CM) - Unspecified injury of left shoulder and upper arm, initial encounter      Treatment Diagnosis:  Decreased strength, decreased ROM, decreased ADL/IADL independence     Onset Date:  2021     Referral Date:  21     Referring Physician: Dr. Anthony Bernard, 21                                                                       Insurance information: [employer] Pre-cert required, 6 and 2 visits authorized prior, 6 visits authorized from 10/27-, 13 visits authorized from  - 3/21/21     Precautions/ Contraindications:   Red Flag Symptoms: none  Safety awareness: intact  Other: Restrictions: cardiopulmonary limitations due to COVID-19     Adhesive allergy: NO  Latex Allergy:  [x]? NO      []?YES      Preferred Language for Healthcare:   [x]? English       []? other:     C-SSRS Triggered by Intake questionnaire (Past 2 wk assessment):   [x]? No, Questionnaire did not trigger screening. []? Yes, Patient intake triggered further evaluation      []? C-SSRS Screening completed  []? PCP notified via Plan of Care  []? Emergency services notified     Plan of care sent to provider:      []? Faxed 21  [x]? Co-signature    (attempts: 1[x]?  2 []?3[]?)        __________________________________________________________________________________________________________________    Plan of care sent to provider:    []Faxed  [x]Co-signature (attempts: 1[] 2 []3[])        Plan of care signed:    [x]Yes date: Dr. Sarah Hayes 9/30/21, Dr. Sarah Hayes 10/29/21, Dr. Sarah Hayes 11/30/21, Dr. Sarah Hayes 12/20/21         []No           Next Progress Note due:   1/14/22    Visit# / total visits: 5/8,  6/8, 7/8, 8/8 (12/13 visits used as of 1/10/2022)    Plan for Next Session:  Review HEP, progress HEP    HEP instruction: Written and verbal HEP instructions provided and reviewed:  · Access Code: [email or web address]  · URL: [email or web address]  · Date: 09/29/2021  · Prepared by: Bryant Carrasco  · Exercises  · Finger Key  with Putty - 2-3 x daily - 7 x weekly - 3 sets - 10 reps  · Putty Squeezes - 2-3 x daily - 7 x weekly - 3 sets - 10 reps  · Seated Three Finger Pinch with Putty - 2-3 x daily - 7 x weekly - 3 sets - 10 reps  · Tip Pinch with Putty - 2-3 x daily - 7 x weekly - 3 sets - 10 reps  · Shoulder Flexion AAROM - 2-3 x daily - 7 x weekly - 3 sets - 10 reps  · Supine Shoulder Horizontal Abduction Adduction AAROM with Dowel - 2-3 x daily - 7 x weekly - 3 sets - 10 reps  · Elbow flexion with weighted items in supine  · Hose bending     Subjective: Patient reports he continues to work on his exercises at home and get stronger everyday. Patient and spouse state he is getting his booster today for COVID-19. Pain level: 0/10     Objective:       Exercises:    Exercises in bold performed in department today. Items not bolded are carried forward from prior visits for continuity of the record.   Exercise/Equipment Resistance/Repetitions Skilled cues Added to HEP Comments      ADL/IADL TRAINING (ADL OR TA)        []  Tactile cues   []   Verbal cues []  Yes       []  Tactile cues   []   Verbal cues []  Yes      []  Tactile cues   []   Verbal cues []  Yes       []  Tactile cues   []   Verbal cues []  Yes       []  Tactile cues   []   Verbal cues []  Yes         []  Tactile cues   []   Verbal cues []  Yes         []  Tactile cues   []   Verbal cues []  Yes         []  Tactile cues   [] Verbal cues []  Yes         []  Tactile cues   []   Verbal cues []  Yes        NEUROMUSCULAR RE-EDU and THERAPEUTIC ACTIVITY (NEURO RE-ED or TA)      Sitting on exercise bike With BUE on handles - 5 minutes with cues for holding arms up on bike    In preparation for motorcycle ride      Kinesiotaping for LUE [employer] [place] Tri-Pull and Scapular retraction []  Tactile cues   [x]   Verbal cues [x]  Yes     Weightbearing At side of mat,  min cues for elbow extension, initially max A for scapular stabilization, 40 seconds x3 [x]  Tactile cues   [x]   Verbal cues [x]  Yes  Max to min cues   Scapular protraction strengthening with elbow extension With holding dowel and LUE reaching to dowel and out 8x also with addition of theraband 8x but unable to maintain [x]  Tactile cues   [x]   Verbal cues [x]  Yes  Mod cues   Pushing shopping cart  Forward: With elbow extension and shoulder flexion and posterior pelvic tilt 15x    To right with BUE, cues for lean 10x    To left with BUE, cues for lean 10x Verbal cues  Improved ability to keep shoulder down, head up with cues   Neuro Re-Ed in Sidelying With facilitation of scapula for horizontal abduction, shoulder abduction, and inhibition of upper trap with trigger point release at upper trap []  Tactile cues   []   Verbal cues []  Yes     Short arc propellers Short arc, left to center, 15x, max cues for breathing and upper trap inhibition []  Tactile cues   [x]   Verbal cues [x]  Yes  Cues for positioning   Clothespins 5x red LUE up to pole    Green/blue with short distance squeeze and release using three-jaw 4x      Balloon toss and catch 15x with incorporation of BUE, mirror for visual cueing, elbow flexion/shoulder flexion, external rotation []  Tactile cues   []   Verbal cues []  Yes     Elbow flexion/extension training Elbow flexion/extension supine with 1#, 2#, 3# with mod A, 20x total []  Tactile cues   []   Verbal cues []  Yes  Min cues for breathing    Cues for control Sit to stand and stand to sit Without arms with pillow 5x    With stool under RLE, stool under LLE 5x []  Tactile cues   [x]   Verbal cues [x]  Yes          THERAPEUTIC EXERCISE and MANUAL TECHNIQUES   (TE OR MT)      TRX With BUE on TRX handles and ropes - stretch into shoulder flexion and horizontal abduction 5x      Prone mat mobility Prone with external rotation stretch : 30 seconds  Prone with lateral core stretch: 30 seconds     Prone with push up into tall kneel: CGA   Tall kneel 30 seconds with mod cues for pursed lip breathing, with horizontal abduction, shoulder flexion streth  Prone with quadruped: CGA    Prone with quadruped with lifting up RUE 5x with mod cues for breathing   Max Cues for breathing   Standing with arms stretching up into wall 4x wall push up BUE Reviewed for HEP  Cues for breathing   Prone posterior shoulder strengthening Retraction 4x, had to sit up due to dizziness, shortness of breath, O2 measured 87% upon sitting up    Min A due to limited range   Max cues for breathing   AROM with LUE           Chest Press LUE 3# 10x  Elbow flexion LUE 2-3# 8x  Horizontal abduction sidelying LUE 0-1#, 10x, min A  Shoulder abduction sidelying 1# LUE 10x, CGA  Horizontal abduction supine 10x 1-2# [x]  Tactile cues   [x]   Verbal cues [x]  Yes  Mod cues for breathing     Hand strengthening Intrinsic Plus position  DIP flexion position  Finger/ab/adduction  Velcro rollers - 5 finger, three-jaw, cues for DIP strengthening, low resistance for DIP   Gripper 35# 10x with cues for pursed lip breathing  Pinky finger abduction with tactile cue and visual cue of paper - 8x to 3/4 range and cues for pursed lip breathing Tactile/verbal cues Yes Mod cues for breathing   Shoulder strengthening With LUE at wall holding onto ball 5 second hold x5 with arm at side    6x wall push ups  Added to HEP    Putty exercises Green  Lateral pinch 10x  Thumb extension 10x  Thumb radial abduction 10x []  Tactile cues   [x] Verbal cues [x]  Yes  Initial cues for setup and form    strengthening 35-55# gripper brown 10x3 with shoulder flexion  XTENSOR 5 second hold x5 []  Tactile cues   [x]   Verbal cues [x]  Yes  Mod cues for breathing   Scapular mobilizations sidelying 40 second hold x3 for upward rotation and retraction [x]  Tactile cues   [x]   Verbal cues [x]  Yes     Elbow flexion/extension With 3# high table gravity eliminated 10x each [x]  Tactile cues   [x]   Verbal cues [x]  Yes     Therabar green  stick 10x smiles and frowns, cues for control, breathing, relaxation of upper neck musculature [x]  Tactile cues   [x]   Verbal cues [x]  Yes  Max Cues for breathing   Soft tissue mobilization To bicep with manual stretch for elbow flexion [x]  Tactile cues   [x]   Verbal cues [x]  Yes     PROM LUE Therapist assisted LUE to overhead shoulder flexion 10x each  Therapist assisted LUE to overhead scaption 10x each   Therapist assisted LUE external rotation 10x each  Therapist assisted LUE elbow flexion  Supination/pronation/finger and thumb flexion/extension  Therapist assisted LUE to overhead scapular abduction/scaption with gentle lowering        Seated exercises Elbow flexion/extension - unable to complete without compensatory shoulder flexion  Supination/pronation - with 5# weight 8x      Weight with AAROM with LUE 0-1# shoulder flexion LUE supine to 1/2 range - 5x  1-2# horizontal ab/adduction LUE in sidelying 5x  0# shoulder abduction LUE in sidelying 5x  2-3# chest press BUE supine 10x  Elbow flexion 1-3# 8x      With aircast on RUE   []  Tactile cues   [x]   Verbal cues [x]  Yes  Max cues for breathing      MODALITIES      NMES 15 minutes total Electrical stimulation parameters for elbow flexion with functional movement  Ramp UP/Ramp Down   2 second each  Intensity up to 25  Symmetrical Bipasic via EMPI  No marks from pads on skin after removal of ESTIM pads  [x]  Yes          []  Yes        SPLINTING         []  Tactile cues   []   Verbal cues []  Yes   []  See separate note regarding splint precautions/contraindications      []  Tactile cues   []   Verbal cues []  Yes  []  See separate note regarding splint precautions/contraindications             Functional Outcome Measure:   []NA    10/20/21 Quickdash Score of 34, DSI 52.3%  10/27/21 Quickdash Score of 37, DSI 59.09%  11/22/21 Quickdash Score of 28, DSI 38.6%  12/15/21 Quickdash Score of 32, DSI 47.7%    Treatment/Activity Tolerance:    Patients response to treatment:   [x]Patient tolerated treatment well []Patient limited by fatigue   []Patient limited by pain  []Patient limited by other medical complications   []Other:     Assessment: Patient progressing in strength and resistance for shoulder and hand, continues to require min to max cues for breathing. Patient also requires manual assist for shoulder AROM with stabilization of scapula due to weakness. Patient progressing in shoulder flexion overhead in supine and sidelying positions with resistance. Continue outpatient OT 2x/week for 4 weeks. GOALS: Goals established 9/29/21   Patient stated goal: \"to get this left arm going, I'm doing so much better though\"  [x]? Progressing: []? Met: []? Not Met: []? Adjusted     STG TO BE MET IN 2 WEEKS      1. Pt will report a QuickDASH Symptom Severity Scale score of 10% or less indicating increased safety and functional independence in desired occupational pursuits. [x]? Progressing: []? Met: []? Not Met: []? Adjusted 11/22 PROGRESSING     2. Patient will be I with UE HEP. - GOAL MET 10/20/21     3. Patient will verbalize increased use of LUE in ADL/IADL tasks. - GOAL MET 10/20/21 for opening doors, GOAL MET 12/15 for touching head, opening the microwave, opening the creamer container, opening the honey     LTG TO BE MET IN 4 WEEKS     1. Patient will demonstrate improved  strength for improved return to ADLs/IADLs. GOAL MET 10/27     2.  Patient will demonstrate improved coordination with LUE for improved return to ADLs/IADLs. - GOAL MET 10/27     3. Patient will verbalize mod I with all ADLs with incorporation of LUE. - REQUIRES OCCASIONAL MIN A, BUT  PROGRESSING FOR HOLDING PLATE AND BOWL WITH LUE, PROGRESSING WITH TYING SHOES WITH LUE, PROGRESSING WITH WASHING BODY WITH LUE, LIMITED FOR ACTIVITIES THAT REQUIRE RESISTANCE 10/27, PROGRESSING 11/22 BUT REQUIRES MANUAL ASSIST TO ADDRESS LUE ROM AND STRENGTH LIMITATIONS    NEW STG TO BE MET IN 2 WEEKS Established 10/27/21 to be met by 11/12/21, CONTINUE GOAL 2 UNTIL 12/6/21  1. Patient will be I with UE strengthening HEP. - GOAL MET 11/17     2. Patient will verbalize increased use of LUE in activities that require him to  weighted items for ADLs/IADLs with LUE. (I.e. moving mug for coffee) - PROGRESSING BUT REQUIRES ADDITIONAL INTERVENTION DUE TO LIMITATIONS IN SHOULDER STRENGTH 11/22, HAS DIFFICULTY WITH ITEMS THAT HAVE WEIGHT 12/15 BUT IS ABLE TO CARRY COFFEE CUP WITH LUE    LTG TO BE MET IN 4 WEEKS Established 10/27/21 to be met by 11/26/21, CONTINUE GOALS UNTIL 12/22/21  1. Patient will demonstrate improved pinch strength for improving LUE participation in home maintenance and work-based tasks. - PROGRESSING 11/22 TO 2-7# BUT REQUIRES ADDITIONAL SKILLED INTERVENTION IN ORDER TO PROGRESS FURTHER , GOAL MET 12/15    2. Patient will demonstrate improved coordination with LUE as evidenced by increased speed on 9 hole peg test for improving LUE participation and independence with ADL/IADL tasks. - PROGRESSING 11/22, improved by 10 seconds, but requires additional skilled intervention in order to progress further, GOAL MET 12/15 to 35 SECONDS    NEW STG: ESTABLISHED 12/15/21, TO BE MET BY 12/29/21  1. Patient will be I with UE resistance HEP for hand strengthening. - PROGRESSING 1/3/22    2.  Patient will demonstrate improved hand strength since initiation of theraputty HEP. - GOAL MET 12/27/21    NEW LTG: ESTABLISHED 12/15/21, TO BE MET BY 1/14/22  1. Patient will verbalize ability to pull in the hydraulic clutch with LUE on motorcycle. - GOAL MET 12/27/21    2. Patient will verbalize ability to  and pour creamer with LUE.  - PROGRESSING IF ITS NOT FULL 12/29     Prognosis: [x]Good   []Fair   []Poor    Patient Requires Follow-up:  [x]Yes  []No    Plan: []Plan of care initiated     [x]Continue per plan of care 2x/week for 4 weeks   [] Alter current plan (additional OT 2x/week for 4 weeks)    []Hold pending MD visit []Discharge    Time in: 1145 Time out: 1245    Timed Code Treatment Minutes: 60    Total Treatment Minutes: 60    Medicare Cap total YTD:   [x]N/A    Workers Comp Time Stamp  [x]N/A   Time In:   Time Out:    Electronically signed by:  LAKEISHA Beaver/MARINA

## 2022-01-12 ENCOUNTER — HOSPITAL ENCOUNTER (OUTPATIENT)
Dept: OCCUPATIONAL THERAPY | Age: 77
Setting detail: THERAPIES SERIES
Discharge: HOME | End: 2022-01-12
Payer: MEDICARE

## 2022-01-12 NOTE — PROGRESS NOTES
Liz  80. and Therapy, [facility], [address] [place]   Phone: [phone]  Fax [phone]        Patient Name:  Angela Delgado  :  1945   Date:  2021  Cancels to Date: 2  No-shows to Date: 2    For today's appointment patient:  [x] Cancelled  [] Rescheduled appointment  [] No-show     Reason given by patient:  [x] Patient ill  [] Conflicting appointment  [] No transportation    [] Conflict with work  [] No reason given  [] Other:     Comments:  Ill from getting booster today    [] Phone call from patient to clinic later  [x] Phone call or communication made to patient later    Electronically signed by:  LAKEISHA Strong/MARINA

## 2022-01-26 ENCOUNTER — TELEPHONE (OUTPATIENT)
Dept: PULMONOLOGY | Age: 77
End: 2022-01-26

## 2022-01-26 NOTE — TELEPHONE ENCOUNTER
Cancelled appointment for 6 mon f/u lungs with Dr Maureen Ramos on 2/9/22  We left a VM on 1/26/22 and informed patient appt been cancel of reason Provider no in office and would call back to R/S on a later time

## 2022-03-14 ENCOUNTER — OFFICE VISIT (OUTPATIENT)
Dept: PULMONOLOGY | Age: 77
End: 2022-03-14
Payer: MEDICARE

## 2022-03-14 VITALS
TEMPERATURE: 97 F | OXYGEN SATURATION: 92 % | RESPIRATION RATE: 20 BRPM | DIASTOLIC BLOOD PRESSURE: 73 MMHG | SYSTOLIC BLOOD PRESSURE: 124 MMHG | BODY MASS INDEX: 22.35 KG/M2 | HEIGHT: 72 IN | WEIGHT: 165 LBS | HEART RATE: 67 BPM

## 2022-03-14 DIAGNOSIS — J98.4 RESTRICTIVE LUNG DISEASE: Primary | ICD-10-CM

## 2022-03-14 DIAGNOSIS — J84.10 PULMONARY FIBROSIS (HCC): ICD-10-CM

## 2022-03-14 DIAGNOSIS — Z86.16 PERSONAL HISTORY OF COVID-19: ICD-10-CM

## 2022-03-14 DIAGNOSIS — R09.81 NASAL CONGESTION: ICD-10-CM

## 2022-03-14 PROCEDURE — 99213 OFFICE O/P EST LOW 20 MIN: CPT | Performed by: INTERNAL MEDICINE

## 2022-03-14 RX ORDER — ATORVASTATIN CALCIUM 20 MG/1
TABLET, FILM COATED ORAL
Qty: 90 TABLET | Refills: 3 | Status: SHIPPED | OUTPATIENT
Start: 2022-03-14 | End: 2022-07-06 | Stop reason: SDUPTHER

## 2022-03-14 NOTE — TELEPHONE ENCOUNTER
Pts wife Matteo Martin called to schedule pts 6m f/u with rkg. Pt is scheduled for 3/31. Pts wife also is requesting refill of   atorvastatin (LIPITOR) 20 MG tablet [[id number]]     Preferred pharmacy is [facility] [phone].

## 2022-03-14 NOTE — PATIENT INSTRUCTIONS
Remember to bring a list of pulmonary medications and any CPAP or BiPAP machines to your next appointment with the office. Please keep all of your future appointments scheduled by [facility] office. Out of respect for other patients and providers, you may be asked to reschedule your appointment if you arrive later than your scheduled appointment time. Appointments cancelled less than 24hrs in advance will be considered a no show. Patients with three missed appointments within 1 year or four missed appointments within 2 years can be dismissed from the practice. Please be aware that our physicians are required to work in the Intensive Care Unit at [facility].  Your appointment may need to be rescheduled if they are designated to work during your appointment time. You may receive a survey regarding the care you received during your visit. Your input is valuable to us. We encourage you to complete and return your survey. We hope you will choose us in the future for your healthcare needs. Pt instructed of all future appointment dates & times, including radiology, labs, procedures & referrals. If procedures were scheduled preparation instructions provided. Instructions on future appointments with [facility] were given.
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
## 2022-03-20 DIAGNOSIS — G62.9 NEUROPATHY: Primary | ICD-10-CM

## 2022-03-21 RX ORDER — GABAPENTIN 300 MG/1
CAPSULE ORAL
Qty: 270 CAPSULE | Refills: 2 | Status: SHIPPED | OUTPATIENT
Start: 2022-03-21 | End: 2022-07-06 | Stop reason: SDUPTHER

## 2022-03-21 NOTE — TELEPHONE ENCOUNTER
Drew Lantigua is requesting refill(s) gabapentin  Last OV 1/4/22 (pertaining to medication)  LR 6/24/21 (per medication requested)  Next office visit scheduled or attempted Yes   If no, reason:  7/6/22
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
## 2022-03-31 ENCOUNTER — OFFICE VISIT (OUTPATIENT)
Dept: CARDIOLOGY CLINIC | Age: 77
End: 2022-03-31
Payer: MEDICARE

## 2022-03-31 VITALS
DIASTOLIC BLOOD PRESSURE: 68 MMHG | HEIGHT: 72 IN | SYSTOLIC BLOOD PRESSURE: 120 MMHG | WEIGHT: 167 LBS | HEART RATE: 72 BPM | OXYGEN SATURATION: 99 % | BODY MASS INDEX: 22.62 KG/M2

## 2022-03-31 DIAGNOSIS — I25.10 CORONARY ARTERY DISEASE INVOLVING NATIVE CORONARY ARTERY OF NATIVE HEART WITHOUT ANGINA PECTORIS: ICD-10-CM

## 2022-03-31 DIAGNOSIS — E78.00 PURE HYPERCHOLESTEROLEMIA: ICD-10-CM

## 2022-03-31 DIAGNOSIS — J84.10 PULMONARY FIBROSIS (HCC): ICD-10-CM

## 2022-03-31 DIAGNOSIS — I10 PRIMARY HYPERTENSION: ICD-10-CM

## 2022-03-31 DIAGNOSIS — R06.02 SOB (SHORTNESS OF BREATH): Primary | ICD-10-CM

## 2022-03-31 PROCEDURE — 99214 OFFICE O/P EST MOD 30 MIN: CPT | Performed by: INTERNAL MEDICINE

## 2022-03-31 RX ORDER — LANOLIN ALCOHOL/MO/W.PET/CERES
50 CREAM (GRAM) TOPICAL DAILY
Qty: 90 TABLET | Refills: 1 | Status: SHIPPED | OUTPATIENT
Start: 2022-03-31

## 2022-03-31 NOTE — PATIENT INSTRUCTIONS
Your provider has ordered testing for further evaluation. An order/prescription has been included in your paper work.  To schedule outpatient testing, contact Central Scheduling by calling [employer] ([phone]). PLAN:  1. Order echocardiogram to assess heart function and strength  2. Stop Brilinta 90 mg   3. Start Brilinta 60 mg twice daily  4. Okay to take gabapentin once daily  5.  Follow up with me in 3 month

## 2022-04-05 ENCOUNTER — HOSPITAL ENCOUNTER (OUTPATIENT)
Dept: CARDIOLOGY | Age: 77
Discharge: HOME | End: 2022-04-05
Payer: MEDICARE

## 2022-04-05 DIAGNOSIS — R06.02 SOB (SHORTNESS OF BREATH): ICD-10-CM

## 2022-04-05 LAB
LV EF: 40 %
LVEF MODALITY: NORMAL

## 2022-04-05 PROCEDURE — 93306 TTE W/DOPPLER COMPLETE: CPT

## 2022-04-05 RX ORDER — METOPROLOL SUCCINATE 25 MG/1
25 TABLET, EXTENDED RELEASE ORAL DAILY
Qty: 90 TABLET | Refills: 3 | Status: SHIPPED | OUTPATIENT
Start: 2022-04-05 | End: 2022-07-14 | Stop reason: SDUPTHER

## 2022-04-05 RX ORDER — LOSARTAN POTASSIUM 25 MG/1
25 TABLET ORAL DAILY
Qty: 90 TABLET | Refills: 3 | Status: SHIPPED | OUTPATIENT
Start: 2022-04-05 | End: 2022-07-14 | Stop reason: SDUPTHER

## 2022-04-07 ENCOUNTER — TELEPHONE (OUTPATIENT)
Dept: CARDIOLOGY CLINIC | Age: 77
End: 2022-04-07

## 2022-04-07 NOTE — TELEPHONE ENCOUNTER
----- Message from Hua Alvarez MD sent at 4/5/2022  5:25 PM EDT -----  Heart function is slightly weak 40% compared to [id number] to 55%  I have changed his metoprolol to different type of metoprolol  And added losartan one daily sent them to his drug store
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
## 2022-05-19 ENCOUNTER — HOSPITAL ENCOUNTER (OUTPATIENT)
Dept: PULMONOLOGY | Age: 77
Discharge: HOME | End: 2022-05-19
Payer: MEDICARE

## 2022-05-19 ENCOUNTER — HOSPITAL ENCOUNTER (OUTPATIENT)
Dept: CT IMAGING | Age: 77
Discharge: HOME | End: 2022-05-19
Payer: MEDICARE

## 2022-05-19 ENCOUNTER — OFFICE VISIT (OUTPATIENT)
Dept: PULMONOLOGY | Age: 77
End: 2022-05-19
Payer: MEDICARE

## 2022-05-19 VITALS — OXYGEN SATURATION: 93 %

## 2022-05-19 VITALS
BODY MASS INDEX: 22.89 KG/M2 | OXYGEN SATURATION: 94 % | WEIGHT: 169 LBS | HEART RATE: 59 BPM | DIASTOLIC BLOOD PRESSURE: 62 MMHG | TEMPERATURE: 97.5 F | HEIGHT: 72 IN | RESPIRATION RATE: 16 BRPM | SYSTOLIC BLOOD PRESSURE: 116 MMHG

## 2022-05-19 DIAGNOSIS — Z86.16 PERSONAL HISTORY OF COVID-19: ICD-10-CM

## 2022-05-19 DIAGNOSIS — J84.10 PULMONARY FIBROSIS (HCC): ICD-10-CM

## 2022-05-19 DIAGNOSIS — J84.10 PULMONARY FIBROSIS (HCC): Primary | ICD-10-CM

## 2022-05-19 DIAGNOSIS — J98.4 RESTRICTIVE LUNG DISEASE: ICD-10-CM

## 2022-05-19 DIAGNOSIS — I51.89 GRADE I DIASTOLIC DYSFUNCTION: ICD-10-CM

## 2022-05-19 DIAGNOSIS — Z87.891 FORMER SMOKER: ICD-10-CM

## 2022-05-19 LAB
DLCO %PRED: 34 %
DLCO PRED: NORMAL
DLCO/VA %PRED: NORMAL
DLCO/VA PRED: NORMAL
DLCO/VA: NORMAL
DLCO: NORMAL
EXPIRATORY TIME-POST: NORMAL
EXPIRATORY TIME: NORMAL
FEF 25-75% %CHNG: NORMAL
FEF 25-75% %PRED-POST: NORMAL
FEF 25-75% %PRED-PRE: NORMAL
FEF 25-75% PRED: NORMAL
FEF 25-75%-POST: NORMAL
FEF 25-75%-PRE: NORMAL
FEV1 %PRED-POST: 67 %
FEV1 %PRED-PRE: 63 %
FEV1 PRED: NORMAL
FEV1-POST: NORMAL
FEV1-PRE: NORMAL
FEV1/FVC %PRED-POST: NORMAL
FEV1/FVC %PRED-PRE: NORMAL
FEV1/FVC PRED: NORMAL
FEV1/FVC-POST: 84 %
FEV1/FVC-PRE: 80 %
FVC %PRED-POST: 58 L
FVC %PRED-PRE: 57 %
FVC PRED: NORMAL
FVC-POST: NORMAL
FVC-PRE: NORMAL
GAW %PRED: NORMAL
GAW PRED: NORMAL
GAW: NORMAL
IC %PRED: NORMAL
IC PRED: NORMAL
IC: NORMAL
MEP: NORMAL
MIP: NORMAL
MVV %PRED-PRE: NORMAL
MVV PRED: NORMAL
MVV-PRE: NORMAL
PEF %PRED-POST: NORMAL
PEF %PRED-PRE: NORMAL
PEF PRED: NORMAL
PEF%CHNG: NORMAL
PEF-POST: NORMAL
PEF-PRE: NORMAL
RAW %PRED: NORMAL
RAW PRED: NORMAL
RAW: NORMAL
RV %PRED: NORMAL
RV PRED: NORMAL
RV: NORMAL
SVC %PRED: NORMAL
SVC PRED: NORMAL
SVC: NORMAL
TLC %PRED: 50 %
TLC PRED: NORMAL
TLC: NORMAL
VA %PRED: NORMAL
VA PRED: NORMAL
VA: NORMAL
VTG %PRED: NORMAL
VTG PRED: NORMAL
VTG: NORMAL

## 2022-05-19 PROCEDURE — 71250 CT THORAX DX C-: CPT

## 2022-05-19 PROCEDURE — 94729 DIFFUSING CAPACITY: CPT

## 2022-05-19 PROCEDURE — 94060 EVALUATION OF WHEEZING: CPT

## 2022-05-19 PROCEDURE — 94726 PLETHYSMOGRAPHY LUNG VOLUMES: CPT

## 2022-05-19 PROCEDURE — 6370000000 HC RX 637 (ALT 250 FOR IP): Performed by: INTERNAL MEDICINE

## 2022-05-19 PROCEDURE — 94618 PULMONARY STRESS TESTING: CPT

## 2022-05-19 PROCEDURE — 99214 OFFICE O/P EST MOD 30 MIN: CPT | Performed by: INTERNAL MEDICINE

## 2022-05-19 RX ORDER — ALBUTEROL SULFATE 90 UG/1
4 AEROSOL, METERED RESPIRATORY (INHALATION) ONCE
Status: COMPLETED | OUTPATIENT
Start: 2022-05-19 | End: 2022-05-19

## 2022-05-19 RX ADMIN — Medication 4 PUFF: at 08:34

## 2022-05-19 ASSESSMENT — PULMONARY FUNCTION TESTS
FEV1/FVC_POST: 84
FVC_PERCENT_PREDICTED_PRE: 57
FEV1_PERCENT_PREDICTED_PRE: 63
FVC_PERCENT_PREDICTED_POST: 58
FEV1/FVC_PRE: 80
FEV1_PERCENT_PREDICTED_POST: 67

## 2022-05-19 NOTE — PROCEDURES
[facility] Pulmonary Function Lab - Six Minute Walk      Test Performed on:   Room Air___X___   Oxygen at _____ lpm via N/C- continuous Oxygen at _____ lpm via N/C- OCD  Assist Device Used During Test:  None___X___ Cane________ Walker_________    Modified Radha's Scale  0 Nothing at all 5 Strong    0.5 Extremely Weak 6 Stronger (Hard)    1 Very weak 7 Very Strong   2 Weak (light) 8 Very Very Strong   3 Moderate 9 Extremely Strong   4 Somewhat Strong 10 Maximum All out      Time SpO2 Heart Rate Respiratory Rate Dyspnea-  Modified Radhas Scale Fatigue- Modified Radhas Scale Other Symptoms   Baseline                     93% room air @rest 66 18 0.5 0.5      1 minute                     91% 86 20 0.5 0.5    2 minutes                     89% 91 21 0.5 0.5      3 minutes                     90% 92 22 0.5 0.5    4 minutes                     89% 90 23 1 1    5 minutes                     88% 92 24 1 1    6 minutes                     88% 96 25 2 2    Recovery x 1 minute                     88% 81 23 2 2    Recovery x 2 Minute                     94% 71 20 0.5 0.5     Number of Laps___9______ X 120 feet + _________ additional feet = Total Distance __1080___feet   Stopped or paused before 6 minutes? No___X____ Yes ________  Total expected 6 MW distance is _1853_____feet. Patient achieved __58____% of expected distance.    Pre Blood Pressure: 139/68  Post Blood Pressure: 127/69  Other symptoms at the end of exercise: Some SOB, little dizzy/lightheadedness

## 2022-05-19 NOTE — PROGRESS NOTES
MA Communication:   The following orders are received by verbal communication from Rossana Milian MD    Orders include:    6 month follow up: 11/22/2022 3:00 pm
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
## 2022-05-19 NOTE — PATIENT INSTRUCTIONS
Remember to bring a list of pulmonary medications and any CPAP or BiPAP machines to your next appointment with the office. Please keep all of your future appointments scheduled by [address], [facility] Pulmonary office. Out of respect for other patients and providers, you may be asked to reschedule your appointment if you arrive later than your scheduled appointment time. Appointments cancelled less than 24hrs in advance will be considered a no show. Patients with three missed appointments within 1 year or four missed appointments within 2 years can be dismissed from the practice. Please be aware that our physicians are required to work in the Intensive Care Unit at [facility].  Your appointment may need to be rescheduled if they are designated to work during your appointment time. You may receive a survey regarding the care you received during your visit. Your input is valuable to us. We encourage you to complete and return your survey. We hope you will choose us in the future for your healthcare needs. Pt instructed of all future appointment dates & times, including radiology, labs, procedures & referrals. If procedures were scheduled preparation instructions provided. Instructions on future appointments with [facility] Pulmonary were given.

## 2022-05-20 NOTE — PROCEDURES
[address] [address]                               PULMONARY FUNCTION    PATIENT NAME: Lilly Cortez                    :        1945  MED REC NO:   [id number]                          ROOM:  ACCOUNT NO:   [de-identified]                           ADMIT DATE: 2022  PROVIDER:     Amaya Bansal MD    DATE OF PROCEDURE:  2022    PFT    Spirometry showed FVC 2.6 L, 57% predicted; FEV1 2.08 L, 63% predicted  with FEV1/FVC ratio of 80%. There was no response to bronchodilator. TLC 50% predicted, diffusion capacity was 34% predicted. IMPRESSION:  Moderate-to-moderately severe restrictive disease  compatible with an interstitial process as diffusion capacity was also  reduced. Clinical correlation is recommended.         Bruce Palomares MD    D: 2022 16:05:04       T: 2022 22:12:45     [email or web address]  Job#: [id number]     Doc#: [id number]    CC:  MD Sera Curry DO

## 2022-05-20 NOTE — PROCEDURES
[address] [address]                               PULMONARY FUNCTION    PATIENT NAME: Rickie Hernandez                    :        1945  MED REC NO:   [id number]                          ROOM:  ACCOUNT NO:   [de-identified]                           ADMIT DATE: 2022  PROVIDER:     Lex Blanc MD    DATE OF PROCEDURE:  2022    SIX-MINUTE WALK TEST    A six-minute walk test was performed utilizing standard criteria. Baseline oxygen saturation was 93%. The patient walked a total distance  of 1080 feet, 58% predicted. At 5 minutes, she dropped down to 88%,  recovered back to 94% at 2 minutes. The Radha scales were 0.5 at baseline, peaked at 2 each. IMPRESSION:  Low normal oxygen saturation as baseline with desaturation  to 88% with ambulation. Clinical correlation is recommended.         Brenna Leal MD    D: 2022 16:05:04       T: 2022 22:14:26     [email or web address]  Job#: [id number]     Doc#: [id number]    CC:  MD Hamzah Mayer DO

## 2022-06-07 RX ORDER — FLUOXETINE HYDROCHLORIDE 20 MG/1
CAPSULE ORAL
Qty: 90 CAPSULE | Refills: 1 | Status: SHIPPED | OUTPATIENT
Start: 2022-06-07

## 2022-06-07 RX ORDER — DOXAZOSIN MESYLATE 1 MG/1
TABLET ORAL
Qty: 90 TABLET | Refills: 1 | Status: SHIPPED | OUTPATIENT
Start: 2022-06-07 | End: 2022-07-14 | Stop reason: SDUPTHER

## 2022-06-07 NOTE — TELEPHONE ENCOUNTER
Anthony Carbajal [phone] (home)    is requesting refill(s) of medication [facility]pertaining to medication)   Last refill 12/7/21 (per medication requested)  Next office visit scheduled or attempted Yes  Date 7/6/22  If No, reason     Doxazosin- 12/7/21

## 2022-07-06 ENCOUNTER — OFFICE VISIT (OUTPATIENT)
Dept: FAMILY MEDICINE CLINIC | Age: 77
End: 2022-07-06
Payer: MEDICARE

## 2022-07-06 VITALS
DIASTOLIC BLOOD PRESSURE: 78 MMHG | OXYGEN SATURATION: 96 % | HEART RATE: 61 BPM | SYSTOLIC BLOOD PRESSURE: 130 MMHG | HEIGHT: 72 IN | WEIGHT: 166 LBS | BODY MASS INDEX: 22.48 KG/M2

## 2022-07-06 DIAGNOSIS — G62.9 NEUROPATHY: ICD-10-CM

## 2022-07-06 DIAGNOSIS — E78.00 PURE HYPERCHOLESTEROLEMIA: Primary | ICD-10-CM

## 2022-07-06 DIAGNOSIS — R73.03 PREDIABETES: ICD-10-CM

## 2022-07-06 DIAGNOSIS — I10 PRIMARY HYPERTENSION: ICD-10-CM

## 2022-07-06 LAB
A/G RATIO: 1.5 (ref 1.1–2.2)
ALBUMIN SERPL-MCNC: 4.6 G/DL (ref 3.4–5)
ALP BLD-CCNC: 71 U/L (ref 40–129)
ALT SERPL-CCNC: 25 U/L (ref 10–40)
ANION GAP SERPL CALCULATED.3IONS-SCNC: 12 MMOL/L (ref 3–16)
AST SERPL-CCNC: 25 U/L (ref 15–37)
BILIRUB SERPL-MCNC: 0.8 MG/DL (ref 0–1)
BUN BLDV-MCNC: 16 MG/DL (ref 7–20)
CALCIUM SERPL-MCNC: 10.3 MG/DL (ref 8.3–10.6)
CHLORIDE BLD-SCNC: 99 MMOL/L (ref 99–110)
CHOLESTEROL, TOTAL: 127 MG/DL (ref 0–199)
CO2: 27 MMOL/L (ref 21–32)
CREAT SERPL-MCNC: 0.7 MG/DL (ref 0.8–1.3)
GFR AFRICAN AMERICAN: >60
GFR NON-AFRICAN AMERICAN: >60
GLUCOSE BLD-MCNC: 113 MG/DL (ref 70–99)
HDLC SERPL-MCNC: 42 MG/DL (ref 40–60)
LDL CHOLESTEROL CALCULATED: 66 MG/DL
POTASSIUM SERPL-SCNC: 5.5 MMOL/L (ref 3.5–5.1)
SODIUM BLD-SCNC: 138 MMOL/L (ref 136–145)
TOTAL PROTEIN: 7.6 G/DL (ref 6.4–8.2)
TRIGL SERPL-MCNC: 93 MG/DL (ref 0–150)
VLDLC SERPL CALC-MCNC: 19 MG/DL

## 2022-07-06 PROCEDURE — 36415 COLL VENOUS BLD VENIPUNCTURE: CPT | Performed by: FAMILY MEDICINE

## 2022-07-06 PROCEDURE — 1124F ACP DISCUSS-NO DSCNMKR DOCD: CPT | Performed by: FAMILY MEDICINE

## 2022-07-06 PROCEDURE — 99213 OFFICE O/P EST LOW 20 MIN: CPT | Performed by: FAMILY MEDICINE

## 2022-07-06 RX ORDER — ATORVASTATIN CALCIUM 20 MG/1
TABLET, FILM COATED ORAL
Qty: 90 TABLET | Refills: 3 | Status: SHIPPED | OUTPATIENT
Start: 2022-07-06

## 2022-07-06 RX ORDER — GABAPENTIN 300 MG/1
CAPSULE ORAL
Qty: 270 CAPSULE | Refills: 1 | Status: SHIPPED | OUTPATIENT
Start: 2022-07-06 | End: 2022-12-06

## 2022-07-06 RX ORDER — CALCIPOTRIENE 50 UG/G
CREAM TOPICAL
Qty: 1 EACH | Refills: 5 | Status: SHIPPED | OUTPATIENT
Start: 2022-07-06

## 2022-07-06 RX ORDER — TICAGRELOR 90 MG/1
1 TABLET ORAL 2 TIMES DAILY
COMMUNITY
Start: 2022-06-10 | End: 2022-07-14 | Stop reason: ALTCHOICE

## 2022-07-06 SDOH — ECONOMIC STABILITY: FOOD INSECURITY: WITHIN THE PAST 12 MONTHS, YOU WORRIED THAT YOUR FOOD WOULD RUN OUT BEFORE YOU GOT MONEY TO BUY MORE.: NEVER TRUE

## 2022-07-06 SDOH — ECONOMIC STABILITY: FOOD INSECURITY: WITHIN THE PAST 12 MONTHS, THE FOOD YOU BOUGHT JUST DIDN'T LAST AND YOU DIDN'T HAVE MONEY TO GET MORE.: NEVER TRUE

## 2022-07-06 ASSESSMENT — ENCOUNTER SYMPTOMS
COUGH: 0
VOMITING: 0
ABDOMINAL PAIN: 0
NAUSEA: 0
WHEEZING: 0
SHORTNESS OF BREATH: 0
COLOR CHANGE: 0
CHEST TIGHTNESS: 0

## 2022-07-06 ASSESSMENT — SOCIAL DETERMINANTS OF HEALTH (SDOH): HOW HARD IS IT FOR YOU TO PAY FOR THE VERY BASICS LIKE FOOD, HOUSING, MEDICAL CARE, AND HEATING?: NOT HARD AT ALL

## 2022-07-06 NOTE — PATIENT INSTRUCTIONS

## 2022-07-06 NOTE — PROGRESS NOTES
Subjective:      Patient ID: Mark Joyner is a 77 y.o. male. HPI  Chief Complaint   Patient presents with    Hypertension     Patient is here for a 6 month follow up, he is     Hyperlipidemia     F/u On hypertension hyperlipidemia and prediabetes. Doing well following with pulmonology. Compliant with all medications. Took long motorcycle ride down [facility]. Is any problems with his medication. Denies any cough or swelling or dizziness from blood pressure medicines. No muscle pain or weakness from statin.   Still doing physical therapy  Mark Joyner is a 77 y.o. male with the following history as recorded in [facility]:  Patient Active Problem List    Diagnosis Date Noted    Former smoker 05/19/2022    Pulmonary fibrosis ([facility] [address] 03/14/2022    Nasal congestion 03/14/2022    Personal history of COVID-19 10/27/2021    Restrictive lung disease 10/27/2021    H/O tracheostomy 10/27/2021    Grade I diastolic dysfunction 58/16/8062    Toe gangrene ([facility] [address] 08/12/2021    Crushing injury of left forearm 06/04/2021    Critical illness myopathy 06/04/2021    Psoriasis 08/20/2019    Thrombocytopenia (HCC) 08/20/2019    Prediabetes 08/20/2019    Ischemic cardiomyopathy     CHF (congestive heart failure) (HCC) 01/03/2019    HTN (hypertension) 01/08/2011    Hyperlipemia 01/08/2011    CAD (coronary artery disease) 01/08/2011    S/P CABG (coronary artery bypass graft) 01/08/2011    Lumbar pain 01/08/2011     Current Outpatient Medications   Medication Sig Dispense Refill    doxazosin (CARDURA) 1 MG tablet TAKE ONE TABLET BY MOUTH ONCE NIGHTLY 90 tablet 1    FLUoxetine (PROZAC) 20 MG capsule TAKE ONE CAPSULE BY MOUTH DAILY 90 capsule 1    losartan (COZAAR) 25 MG tablet Take 1 tablet by mouth daily 90 tablet 3    metoprolol succinate (TOPROL XL) 25 MG extended release tablet Take 1 tablet by mouth daily 90 tablet 3    ticagrelor (BRILINTA) 60 MG TABS tablet Take 1 tablet by mouth 2 times daily (Patient taking differently: Take 90 mg by mouth 2 times daily ) 90 tablet 3    thiamine 100 MG tablet Take 0.5 tablets by mouth daily 90 tablet 1    gabapentin (NEURONTIN) 300 MG capsule TAKE ONE CAPSULE BY MOUTH EVERY 8 HOURS 270 capsule 2    atorvastatin (LIPITOR) 20 MG tablet TAKE ONE TABLET BY MOUTH DAILY 90 tablet 3    albuterol sulfate HFA (PROAIR HFA) 108 (90 Base) MCG/ACT inhaler Inhale 2 puffs into the lungs every 6 hours as needed for Wheezing or Shortness of Breath (Patient taking differently: Inhale 2 puffs into the lungs every 6 hours as needed for Wheezing or Shortness of Breath ) 1 each 5    fluticasone-umeclidin-vilant (TRELEGY ELLIPTA) 100-62.5-25 MCG/INH AEPB Inhale 1 puff into the lungs daily (Patient not taking: Reported on 3/14/2022) 2 each 0    nitroGLYCERIN (NITROSTAT) 0.4 MG SL tablet Place 1 tablet under the tongue every 5 minutes as needed for Chest pain (Patient taking differently: Place 0.4 mg under the tongue every 5 minutes as needed for Chest pain ) 25 tablet 4    halobetasol (ULTRAVATE) 0.05 % cream APPLY TO AFFECTED AREA(S) TWO TIMES A DAY AS NEEDED 1 Tube 1    calcipotriene (DOVONEX) 0.005 % cream APPLY TO AFFECTED AREA(S) TWO TIMES A DAY AS NEEDED 1 Tube 1    guaiFENesin 1200 MG TB12 Take by mouth 2 times daily       Multiple Vitamins-Iron TABS Take 1 tablet by mouth daily      Omega-3 Fatty Acids (OMEGA-3 FISH OIL PO) Take 1,000 mg by mouth 2 times daily        No current facility-administered medications for this visit.      Allergies: Plavix [clopidogrel bisulfate] and Ciprofloxacin  Past Medical History:   Diagnosis Date    Arthritis of hand     arthritis in hands and thumbs    CAD (coronary artery disease)     Hyperlipidemia     Hypertension     Ischemic cardiomyopathy 01/2019    STEMI (ST elevation myocardial infarction) ([facility] [address] 01/03/2019    Tinnitus      Past Surgical History:   Procedure Laterality Date    BICEPS TENDON REPAIR      CORONARY ANGIOPLASTY  01/08/2011 emergency PCI: vein to RCA    CORONARY ANGIOPLASTY WITH STENT PLACEMENT  2019    PCI to distal SVG-RCA with ELIZABETH    CORONARY ARTERY BYPASS GRAFT      in  CABG x4 and in  CABG x2    DIAGNOSTIC CARDIAC CATH LAB PROCEDURE      TOE AMPUTATION Bilateral 2021    AMPUTATION OF LEFT 2ND DIGIT LEFT FOOT, RIGHT 3RD DIGIT,  RIGHT PARTIAL 4TH DIGIT RIGHT FOOT performed by Jean Oleary DPM at [facility] OR     Family History   Problem Relation Age of Onset    Cancer Mother         skin cancer    Diabetes Mother     High Blood Pressure Father     Heart Disease Father     No Known Problems Brother     No Known Problems Brother     Heart Disease Paternal Uncle     Heart Disease Paternal Cousin      Social History     Tobacco Use    Smoking status: Former Smoker     Packs/day: 1.00     Years: 20.00     Pack years: 20.00     Types: Cigarettes     Start date: 1961     Quit date: 1991     Years since quittin.5    Smokeless tobacco: Never Used   Substance Use Topics    Alcohol use: No     Vitals:    22 0903   BP: 130/78   Pulse: 61   SpO2: 96%   Weight: 166 lb (75.3 kg)   Height: 6' (1.829 m)     Body mass index is 22.51 kg/m². Wt Readings from Last 3 Encounters:   22 166 lb (75.3 kg)   22 169 lb (76.7 kg)   22 167 lb (75.8 kg)     BP Readings from Last 3 Encounters:   22 130/78   22 116/62   22 120/68          Review of Systems   Constitutional: Negative for chills, fatigue, fever and unexpected weight change. Respiratory: Negative for cough, chest tightness, shortness of breath and wheezing. Cardiovascular: Negative for chest pain, palpitations and leg swelling. Gastrointestinal: Negative for abdominal pain, nausea and vomiting. Genitourinary: Negative for difficulty urinating, dysuria and hematuria. Skin: Negative for color change and wound. Neurological: Positive for weakness.  Negative for dizziness, seizures, syncope, light-headedness and headaches. Objective:   Physical Exam  Constitutional:       General: He is not in acute distress. Appearance: He is well-developed. He is not toxic-appearing. HENT:      Head: Normocephalic. Right Ear: External ear normal.      Left Ear: External ear normal.   Eyes:      General: No scleral icterus. Extraocular Movements: Extraocular movements intact. Conjunctiva/sclera: Conjunctivae normal.      Pupils: Pupils are equal, round, and reactive to light. Neck:      Thyroid: No thyromegaly. Cardiovascular:      Rate and Rhythm: Normal rate and regular rhythm. Heart sounds: Normal heart sounds. No murmur heard. Pulmonary:      Effort: Pulmonary effort is normal.      Breath sounds: Normal breath sounds. No wheezing. Abdominal:      General: Bowel sounds are normal. There is no distension. Palpations: Abdomen is soft. Tenderness: There is no abdominal tenderness. There is no guarding or rebound. Musculoskeletal:         General: Deformity and signs of injury present. Normal range of motion. Cervical back: Normal range of motion and neck supple. No tenderness. Right lower leg: No edema. Left lower leg: No edema. Lymphadenopathy:      Cervical: No cervical adenopathy. Skin:     General: Skin is warm. Coloration: Skin is pale. Findings: No bruising, lesion or rash. 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 Mental Status: He is alert and oriented to person, place, and time. Motor: No weakness. Gait: Gait normal.   Psychiatric:         Mood and Affect: Mood normal.         Behavior: Behavior normal.         Thought Content:  Thought content normal.         Judgment: Judgment normal.         Assessment:      htn- stable  hld- on statin, check lab  preDM- check a1c  Neuropathy- continue gabapentin      Plan:      Orders Placed This Encounter Procedures    Hemoglobin A1C    LIPID PANEL     Order Specific Question:   Is Patient Fasting?/# of Hours     Answer:   15    Comprehensive Metabolic Panel     Orders Placed This Encounter   Medications    halobetasol (ULTRAVATE) 0.05 % cream     Sig: APPLY TO AFFECTED AREA(S) TWO TIMES A DAY AS NEEDED     Dispense:  1 each     Refill:  5    calcipotriene (DOVONEX) 0.005 % cream     Sig: APPLY TO AFFECTED AREA(S) TWO TIMES A DAY AS NEEDED     Dispense:  1 each     Refill:  5    gabapentin (NEURONTIN) 300 MG capsule     Sig: TAKE ONE CAPSULE BY MOUTH EVERY 8 HOURS     Dispense:  270 capsule     Refill:  1    atorvastatin (LIPITOR) 20 MG tablet     Sig: TAKE ONE TABLET BY MOUTH DAILY     Dispense:  90 tablet     Refill:  3             ALMA Mendoza [address], DO

## 2022-07-07 LAB
ESTIMATED AVERAGE GLUCOSE: 128.4 MG/DL
HBA1C MFR BLD: 6.1 %
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
## 2022-07-14 ENCOUNTER — OFFICE VISIT (OUTPATIENT)
Dept: CARDIOLOGY CLINIC | Age: 77
End: 2022-07-14
Payer: MEDICARE

## 2022-07-14 VITALS
HEART RATE: 62 BPM | BODY MASS INDEX: 22.89 KG/M2 | WEIGHT: 169 LBS | SYSTOLIC BLOOD PRESSURE: 130 MMHG | DIASTOLIC BLOOD PRESSURE: 78 MMHG | OXYGEN SATURATION: 94 % | HEIGHT: 72 IN

## 2022-07-14 DIAGNOSIS — I10 PRIMARY HYPERTENSION: ICD-10-CM

## 2022-07-14 DIAGNOSIS — I25.5 ISCHEMIC CARDIOMYOPATHY: ICD-10-CM

## 2022-07-14 DIAGNOSIS — I25.10 CORONARY ARTERY DISEASE INVOLVING NATIVE CORONARY ARTERY OF NATIVE HEART WITHOUT ANGINA PECTORIS: Primary | ICD-10-CM

## 2022-07-14 DIAGNOSIS — I50.42 CHRONIC COMBINED SYSTOLIC AND DIASTOLIC CONGESTIVE HEART FAILURE (HCC): ICD-10-CM

## 2022-07-14 DIAGNOSIS — E78.00 PURE HYPERCHOLESTEROLEMIA: ICD-10-CM

## 2022-07-14 PROBLEM — I51.89 GRADE I DIASTOLIC DYSFUNCTION: Status: RESOLVED | Noted: 2021-10-27 | Resolved: 2022-07-14

## 2022-07-14 PROCEDURE — 1124F ACP DISCUSS-NO DSCNMKR DOCD: CPT | Performed by: INTERNAL MEDICINE

## 2022-07-14 PROCEDURE — 99214 OFFICE O/P EST MOD 30 MIN: CPT | Performed by: INTERNAL MEDICINE

## 2022-07-14 RX ORDER — LOSARTAN POTASSIUM 25 MG/1
25 TABLET ORAL DAILY
Qty: 90 TABLET | Refills: 3 | Status: SHIPPED | OUTPATIENT
Start: 2022-07-14

## 2022-07-14 RX ORDER — NITROGLYCERIN 0.4 MG/1
0.4 TABLET SUBLINGUAL EVERY 5 MIN PRN
Qty: 25 TABLET | Refills: 4 | Status: SHIPPED | OUTPATIENT
Start: 2022-07-14

## 2022-07-14 RX ORDER — METOPROLOL SUCCINATE 25 MG/1
25 TABLET, EXTENDED RELEASE ORAL DAILY
Qty: 90 TABLET | Refills: 3 | Status: SHIPPED | OUTPATIENT
Start: 2022-07-14

## 2022-07-14 RX ORDER — DOXAZOSIN MESYLATE 1 MG/1
TABLET ORAL
Qty: 90 TABLET | Refills: 3 | Status: SHIPPED | OUTPATIENT
Start: 2022-07-14

## 2022-07-14 NOTE — PATIENT INSTRUCTIONS
PLAN:  1. TAKE Brilinta 60 mg tab 2 times daily  2. Continue taking all other medications as well  3. No cardiac testing warranted today  4. Refills warranted today  5.  Follow up with me in 6 months Call by December 2022 to schedule Jan 2023 visit

## 2022-07-19 DIAGNOSIS — E87.5 HYPERKALEMIA: Primary | ICD-10-CM

## 2022-07-21 ENCOUNTER — NURSE ONLY (OUTPATIENT)
Dept: FAMILY MEDICINE CLINIC | Age: 77
End: 2022-07-21
Payer: MEDICARE

## 2022-07-21 DIAGNOSIS — E87.5 HYPERKALEMIA: ICD-10-CM

## 2022-07-21 LAB — POTASSIUM SERPL-SCNC: 4.2 MMOL/L (ref 3.5–5.1)

## 2022-07-21 PROCEDURE — 36415 COLL VENOUS BLD VENIPUNCTURE: CPT | Performed by: FAMILY MEDICINE

## 2022-11-22 ENCOUNTER — OFFICE VISIT (OUTPATIENT)
Dept: PULMONOLOGY | Age: 77
End: 2022-11-22
Payer: MEDICARE

## 2022-11-22 VITALS
BODY MASS INDEX: 23.78 KG/M2 | HEART RATE: 67 BPM | DIASTOLIC BLOOD PRESSURE: 64 MMHG | OXYGEN SATURATION: 93 % | TEMPERATURE: 98 F | SYSTOLIC BLOOD PRESSURE: 129 MMHG | WEIGHT: 175.6 LBS | RESPIRATION RATE: 18 BRPM | HEIGHT: 72 IN

## 2022-11-22 DIAGNOSIS — J98.4 RESTRICTIVE LUNG DISEASE: Primary | ICD-10-CM

## 2022-11-22 DIAGNOSIS — J47.9 BRONCHIECTASIS WITHOUT COMPLICATION (HCC): ICD-10-CM

## 2022-11-22 DIAGNOSIS — J31.0 CHRONIC RHINITIS: ICD-10-CM

## 2022-11-22 DIAGNOSIS — Z98.890 H/O TRACHEOSTOMY: ICD-10-CM

## 2022-11-22 DIAGNOSIS — J84.10 PULMONARY FIBROSIS (HCC): ICD-10-CM

## 2022-11-22 DIAGNOSIS — R09.81 NASAL CONGESTION: ICD-10-CM

## 2022-11-22 PROCEDURE — 99214 OFFICE O/P EST MOD 30 MIN: CPT | Performed by: INTERNAL MEDICINE

## 2022-11-22 PROCEDURE — 3078F DIAST BP <80 MM HG: CPT | Performed by: INTERNAL MEDICINE

## 2022-11-22 PROCEDURE — 1124F ACP DISCUSS-NO DSCNMKR DOCD: CPT | Performed by: INTERNAL MEDICINE

## 2022-11-22 PROCEDURE — 3074F SYST BP LT 130 MM HG: CPT | Performed by: INTERNAL MEDICINE

## 2022-11-22 RX ORDER — MONTELUKAST SODIUM 10 MG/1
10 TABLET ORAL NIGHTLY
Qty: 30 TABLET | Refills: 3 | Status: SHIPPED | OUTPATIENT
Start: 2022-11-22

## 2022-11-22 RX ORDER — FLUTICASONE PROPIONATE 50 MCG
1 SPRAY, SUSPENSION (ML) NASAL DAILY
Qty: 32 G | Refills: 1 | Status: SHIPPED | OUTPATIENT
Start: 2022-11-22
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
## 2022-11-22 NOTE — LETTER
[address] [facility] Pulmonary Critical Care and Sleep  [address]  Phone: [phone]  Fax: [phone]    Abigail Henriquez MD    November 22, 2022     Shabnam Abad MD  [address] [facility] [address] [facility]    Patient: Duane Flores   MR Number: [id number]   YOB: 1945   Date of Visit: 11/22/2022       Dear Simran JEEWLL:    Thank you for referring Duane Flores to me for evaluation/treatment. Below are the relevant portions of my assessment and plan of care. If you have questions, please do not hesitate to call me. I look forward to following Ericka Saini along with you.     Sincerely,      Abigail Henriquez MD

## 2022-11-22 NOTE — PATIENT INSTRUCTIONS
Remember to bring a list of pulmonary medications and any CPAP or BiPAP machines to your next appointment with the office. Please keep all of your future appointments scheduled by [facility] Pulmonary office. Out of respect for other patients and providers, you may be asked to reschedule your appointment if you arrive later than your scheduled appointment time. Appointments cancelled less than 24hrs in advance will be considered a no show. Patients with three missed appointments within 1 year or four missed appointments within 2 years can be dismissed from the practice. Please be aware that our physicians are required to work in the Intensive Care Unit at [facility].  Your appointment may need to be rescheduled if they are designated to work during your appointment time. You may receive a survey regarding the care you received during your visit. Your input is valuable to us. We encourage you to complete and return your survey. We hope you will choose us in the future for your healthcare needs. Pt instructed of all future appointment dates & times, including radiology, labs, procedures & referrals. If procedures were scheduled preparation instructions provided. Instructions on future appointments with [facility] Pulmonary were given.

## 2022-11-22 NOTE — LETTER
[address] [facility] Pulmonary Critical Care and Sleep  [address] [facility] [address] [facility]  Phone: [phone]  Fax: [phone]    Joshua Virk MD    November 22, 2022     Jonny Aguilera MD  [address] [facility],[address]    Patient: Adalid Fagan   MR Number: [id number]   YOB: 1945   Date of Visit: 11/22/2022       Dear Nely WEISS [facility]:    Thank you for referring Adalid Fagan to me for evaluation/treatment. Below are the relevant portions of my assessment and plan of care. If you have questions, please do not hesitate to call me. I look forward to following Nathaniel Sesay along with you.     Sincerely,      Joshua Virk MD

## 2022-11-22 NOTE — PROGRESS NOTES
MA Communication:   The following orders are received by verbal communication from Kassandra Maxwell MD    Orders include:  FU 6 mo        Stiolto samples

## 2022-12-21 ENCOUNTER — OFFICE VISIT (OUTPATIENT)
Dept: INTERNAL MEDICINE CLINIC | Age: 77
End: 2022-12-21
Payer: MEDICARE

## 2022-12-21 VITALS
HEART RATE: 66 BPM | SYSTOLIC BLOOD PRESSURE: 118 MMHG | BODY MASS INDEX: 23.6 KG/M2 | HEIGHT: 72 IN | DIASTOLIC BLOOD PRESSURE: 64 MMHG | OXYGEN SATURATION: 96 % | WEIGHT: 174.2 LBS

## 2022-12-21 DIAGNOSIS — Z00.00 MEDICARE ANNUAL WELLNESS VISIT, SUBSEQUENT: Primary | ICD-10-CM

## 2022-12-21 DIAGNOSIS — J84.10 PULMONARY FIBROSIS (HCC): ICD-10-CM

## 2022-12-21 DIAGNOSIS — F32.9 REACTIVE DEPRESSION: ICD-10-CM

## 2022-12-21 DIAGNOSIS — M79.2 NEURALGIA OF LEFT UPPER EXTREMITY: ICD-10-CM

## 2022-12-21 DIAGNOSIS — E78.2 MIXED HYPERLIPIDEMIA: ICD-10-CM

## 2022-12-21 DIAGNOSIS — L40.9 PSORIASIS: ICD-10-CM

## 2022-12-21 PROBLEM — R09.81 NASAL CONGESTION: Status: RESOLVED | Noted: 2022-03-14 | Resolved: 2022-12-21

## 2022-12-21 PROBLEM — J31.0 CHRONIC RHINITIS: Status: RESOLVED | Noted: 2022-11-22 | Resolved: 2022-12-21

## 2022-12-21 PROBLEM — Z86.16 PERSONAL HISTORY OF COVID-19: Status: RESOLVED | Noted: 2021-10-27 | Resolved: 2022-12-21

## 2022-12-21 PROBLEM — I96 TOE GANGRENE (HCC): Status: RESOLVED | Noted: 2021-08-12 | Resolved: 2022-12-21

## 2022-12-21 PROCEDURE — 1124F ACP DISCUSS-NO DSCNMKR DOCD: CPT | Performed by: INTERNAL MEDICINE

## 2022-12-21 PROCEDURE — 3074F SYST BP LT 130 MM HG: CPT | Performed by: INTERNAL MEDICINE

## 2022-12-21 PROCEDURE — 99204 OFFICE O/P NEW MOD 45 MIN: CPT | Performed by: INTERNAL MEDICINE

## 2022-12-21 PROCEDURE — 3078F DIAST BP <80 MM HG: CPT | Performed by: INTERNAL MEDICINE

## 2022-12-21 PROCEDURE — G0439 PPPS, SUBSEQ VISIT: HCPCS | Performed by: INTERNAL MEDICINE

## 2022-12-21 RX ORDER — FLUOXETINE HYDROCHLORIDE 20 MG/1
20 CAPSULE ORAL DAILY
Qty: 90 CAPSULE | Refills: 1 | Status: SHIPPED | OUTPATIENT
Start: 2022-12-21

## 2022-12-21 ASSESSMENT — LIFESTYLE VARIABLES
HOW MANY STANDARD DRINKS CONTAINING ALCOHOL DO YOU HAVE ON A TYPICAL DAY: PATIENT DOES NOT DRINK
HOW OFTEN DO YOU HAVE A DRINK CONTAINING ALCOHOL: NEVER

## 2022-12-21 ASSESSMENT — PATIENT HEALTH QUESTIONNAIRE - PHQ9
SUM OF ALL RESPONSES TO PHQ QUESTIONS 1-9: 0
1. LITTLE INTEREST OR PLEASURE IN DOING THINGS: 0
SUM OF ALL RESPONSES TO PHQ QUESTIONS 1-9: 0
2. FEELING DOWN, DEPRESSED OR HOPELESS: 0
SUM OF ALL RESPONSES TO PHQ9 QUESTIONS 1 & 2: 0
SUM OF ALL RESPONSES TO PHQ QUESTIONS 1-9: 0
SUM OF ALL RESPONSES TO PHQ QUESTIONS 1-9: 0

## 2022-12-21 NOTE — LETTER
CONTROLLED SUBSTANCE MEDICATION AGREEMENT     Patient Name: Beatrice Posada  Patient YOB: 1945   I understand, that controlled substance medications may be used to help better manage my symptoms and to improve my ability to function at home, work and in social settings. However, I also understand that these medications do have risks, which have been discussed with me, including possible development of physical or psychological dependence. I understand that successful treatment requires mutual trust and honesty between me and my provider. I understand and agree that following this Medication Agreement is necessary in continuing my provider-patient relationship and the success of my treatment plan. Explanation from my Provider: Benefits and Goals of Controlled Substance Medications: There are two potential goals for your treatment: (1) decreased pain and suffering (2) improved daily life functions. There are many possible treatments for your chronic condition(s). Alternatives such as physical therapy, yoga, massage, home daily exercise, meditation, relaxation techniques, injections, chiropractic manipulations, surgery, cognitive therapy, hypnosis and many medications that are not habit-forming may be used. Use of controlled substance medications may be helpful, but they are unlikely to resolve all symptoms or restore all function. Explanation from my Provider: Risks of Controlled Substance Medications:  Opioid pain medications: These medications can lead to problems such as addiction/dependence, sedation, lightheadedness/dizziness, memory issues, falls, constipation, nausea, or vomiting. They may also impair the ability to drive or operate machinery. Additionally, these medications may lower testosterone levels, leading to loss of bone strength, stamina and sex drive.   They may cause problems with breathing, sleep apnea and reduced coughing, which is especially dangerous for patients with lung disease. Overdose or dangerous interactions with alcohol and other medications may occur, leading to death. Hyperalgesia may develop, which means patients receiving opioids for the treatment of pain may become more sensitive to certain painful stimuli, and in some cases, experience pain from ordinarily non-painful stimuli. Women between the ages of 14-53 who could become pregnant should carefully weigh the risks and benefits of opioids with their physicians, as these medications increase the risk of pregnancy complications, including miscarriage,  delivery and stillbirth. It is also possible for babies to be born addicted to opioids. Opioid dependence withdrawal symptoms may include; feelings of uneasiness, increased pain, irritability, belly pain, diarrhea, sweats and goose-flesh. Benzodiazepines and non-benzodiazepine sleep medications: These medications can lead to problems such as addiction/dependence, sedation, fatigue, lightheadedness, dizziness, incoordination, falls, depression, hallucinations, and impaired judgment, memory and concentration. The ability to drive and operate machinery may also be affected. Abnormal sleep-related behaviors have been reported, including sleepwalking, driving, making telephone calls, eating, or having sex while not fully awake. These medications can suppress breathing and worsen sleep apnea, particularly when combined with alcohol or other sedating medications, potentially leading to death. Dependence withdrawal symptoms may include tremors, anxiety, hallucinations and seizures. Stimulants:  Common adverse effects include addiction/dependence, increased blood  pressure and heart rate, decreased appetite, nausea, involuntary weight loss, insomnia,                                                                                                                     Initials:_______   irritability, and headaches.   These risks may increase when these medications are combined with other stimulants, such as caffeine pills or energy drinks, certain weight loss supplements and oral decongestants. Dependence withdrawal symptoms may include depressed mood, loss of interest, suicidal thoughts, anxiety, fatigue, appetite changes and agitation. Testosterone replacement therapy:  Potential side effects include increased risk of stroke and heart attack, blood clots, increased blood pressure, increased cholesterol, enlarged prostate, sleep apnea, irritability/aggression and other mood disorders, and decreased fertility. I agree and understand that I and my prescriber have the following rights and responsibilities regarding my treatment plan:     1. MY RIGHTS:  To be informed of my treatment and medication plan. To be an active participant in my health and wellbeing. 2. MY RESPONSIBILITY AND UNDERSTANDING FOR USE OF MEDICATIONS   I will take medications at the dose and frequency as directed. For my safety, I will not increase or change how I take my medications without the recommendation of my healthcare provider.  I will actively participate in any program recommended by my provider which may improve function, including social, physical, psychological programs.  I will not take my medications with alcohol or other drugs not prescribed to me. I understand that drinking alcohol with my medications increases the chances of side effects, including reduced breathing rate and could lead to personal injury when operating machinery.  I understand that if I have a history of substance use disorders, including alcohol or other illicit drugs, that I may be at increased risk of addiction to my medications.  I agree to notify my provider immediately if I should become pregnant so that my treatment plan can be adjusted.    I agree and understand that I shall only receive controlled substance medications from the prescriber that signed this agreement unless there is written agreement among other prescribers of controlled substances outlining the responsibility of the medications being prescribed.  I understand that the if the controlled medication is not helping to achieve goals, the dosage may be tapered and no longer prescribed. 3. MY RESPONSIBILITY FOR COMMUNICATION / PRESCRIPTION RENEWALS   I agree that all controlled substance medications that I take will be prescribed only by my provider. If another healthcare provider prescribes me medication in an emergency, I will notify my provider within seventy-two (72) hours.  I will arrange for refills at the prescribed interval ONLY during regular office hours. I will not ask for refills earlier than agreed, after-hours, on holidays or weekends. Refills may take up to 72 hours for processing and prescriptions to reach the pharmacy.  I will inform my other health care providers that I am taking these medications and of the existence of this [id number]. In the event of an emergency, I will provide the same information to the emergency department prescribers.  I will keep my provider updated on the pharmacy I am using for controlled medication prescription filling. Initials:_______  4. MY RESPONSIBILITY FOR PROTECTING MEDICATIONS   I will protect my prescriptions and medications. I understand that lost or misplaced prescriptions will not be replaced.  I will keep medications only for my own use and will not share them with others. I will keep all medications away from children.  I agree that if my medications are adjusted or discontinued, I will properly dispose of any remaining medications. I understand that I will be required to dispose of any remaining controlled medications as, directed by my prescriber, prior to being provided with any prescriptions for other controlled medications.   Medication drop box locations can be found at: [email or web address]    5. MY RESPONSIBILITY WITH ILLEGAL DRUGS    I will not use illegal or street drugs or another person's prescription medications not prescribed to me.  If there are identified addiction type symptoms, then referral to a program may be provided by my provider and I agree to follow through with this recommendation. 6. MY RESPONSIBILITY FOR COOPERATION WITH INVESTIGATIONS   I understand that my provider will comply with any applicable law and may discuss my use and/or possible misuse/abuse of controlled substances and alcohol, as appropriate, with any health care provider involved in my care, pharmacist, or legal authority.  I authorize my provider and pharmacy to cooperate fully with law enforcement agencies (as permitted by law) in the investigation of any possible misuse, sale, or other diversion of my controlled substances.  I agree to waive any applicable privilege or right of privacy or confidentiality with respect to these authorizations. 7. PROVIDERS RIGHT TO MONITOR FOR SAFETY: PRESCRIPTION MONITORING / DRUG TESTING   I consent to drug/toxicology screening and will submit to a drug screen upon my providers request to assure I am only taking the prescribed drugs for my safety monitoring. I understand that a drug screen is a laboratory test in which a sample of my urine, blood or saliva is checked to see what drugs I have been taking. This may entail an observed urine specimen, which means that a nurse or other health care provider may watch me provide urine, and I will cooperate if I am asked to provide an observed specimen.  I understand that my provider will check a copy of my State Prescription Monitoring Program () Report in order to safely prescribe medications.  Pill Counts: I consent to pill counts when requested.   I may be asked to bring all my prescribed controlled substance medications, in their original bottles, to all of my scheduled appointments. In addition, my provider may ask me to come to the practice at any time for a random pill count. 8. TERMINATION OF THIS AGREEMENT  For my safety, my prescriber has the right to stop prescribing controlled substance medications and may end this agreement. Initials:_______   Conditions that may result in termination of this agreement:  a. I do not show any improvement in pain, or my activity has not improved. b. I develop rapid tolerance or loss of improvement, as described in my treatment plan.  c. I develop significant side effects from the medication. d. My behavior is not consistent with the responsibilities outlined above, thereby causing safety concerns to continue prescribing controlled substance medications. e. I fail to follow the terms of this agreement. f. Other:____________________________       UNDERSTANDING THIS MEDICATION AGREEMENT:    I have read the above and have had all my questions answered. For chronic disease management, I know that my symptoms can be managed with many types of treatments. A chronic medication trial may be part of my treatment, but I must be an active participant in my care. Medication therapy is only one part of my symptom management plan. In some cases, there may be limited scientific evidence to support the chronic use of certain medications to improve symptoms and daily function. Furthermore, in certain circumstances, there may be scientific information that suggests that the use of chronic controlled substances may worsen my symptoms and increase my risk of unintentional death directly related to this medication therapy. I know that if my provider feels my risk from controlled medications is greater than my benefit, I will have my controlled substance medication(s) compassionately lowered or removed altogether.      I further agree to allow this office to
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
## 2022-12-21 NOTE — PROGRESS NOTES
Medicare Annual Wellness Visit    [facility] is here for New Patient, Medicare AWV, and Hyperlipidemia    Assessment & Plan   Medicare annual wellness visit, subsequent      Recommendations for Preventive Services Due: see orders and patient instructions/AVS.  Recommended screening schedule for the next 5-10 years is provided to the patient in written form: see Patient Instructions/AVS.     Return for Medicare Annual Wellness Visit in 1 year. Subjective       Patient's complete Health Risk Assessment and screening values have been reviewed and are found in Flowsheets. The following problems were reviewed today and where indicated follow up appointments were made and/or referrals ordered. Positive Risk Factor Screenings with Interventions:    Fall Risk:  Do you feel unsteady or are you worried about falling? : (!) yes (feels like equalibrium is off)  2 or more falls in past year?: no  Fall with injury in past year?: no     Interventions:    Recommend using a cane all the time                 Vision Screen:  Do you have difficulty driving, watching TV, or doing any of your daily activities because of your eyesight?: No  Have you had an eye exam within the past year?: (!) No (need to schedule, going with daughter soon)  No results found. Interventions:   Patient encouraged to make appointment with their eye specialist      Advanced Directives:  Do you have a Living Will?: (!) No (need to get one)    Intervention:                  Objective   Vitals:    12/21/22 0824   BP: 118/64   Pulse: 66   SpO2: 96%   Weight: 174 lb 3.2 oz (79 kg)   Height: 6' (1.829 m)      Body mass index is 23.63 kg/m². Allergies   Allergen Reactions    Plavix [Clopidogrel Bisulfate] Hives     Hives    Ciprofloxacin      diarrhea     Prior to Visit Medications    Medication Sig Taking?  Authorizing Provider   montelukast (SINGULAIR) 10 MG tablet Take 1 tablet by mouth nightly Yes Ila Chau MD   fluticasone (FLONASE) 50 MCG/ACT nasal spray 1 spray by Each Nostril route daily Yes Simran García MD   tiotropium-olodaterol (STIOLTO RESPIMAT) 2.5-2.5 MCG/ACT AERS Inhale 2 puffs into the lungs daily Lot [id number] ex 3/25 Yes Simran García MD   ticagrelor (BRILINTA) 60 MG TABS tablet Take 1 tablet by mouth 2 times daily Yes Florentin Haley MD   doxazosin (CARDURA) 1 MG tablet TAKE ONE TABLET BY MOUTH ONCE NIGHTLY Yes Florentin Haley MD   losartan (COZAAR) 25 MG tablet Take 1 tablet by mouth daily Yes Florentin Haley MD   metoprolol succinate (TOPROL XL) 25 MG extended release tablet Take 1 tablet by mouth daily Yes Florentin Haley MD   nitroGLYCERIN (NITROSTAT) 0.4 MG SL tablet Place 1 tablet under the tongue every 5 minutes as needed for Chest pain Yes Florentin Haley MD   halobetasol (ULTRAVATE) 0.05 % cream APPLY TO AFFECTED AREA(S) TWO TIMES A DAY AS NEEDED Yes Zhane Paul DO   calcipotriene (DOVONEX) 0.005 % cream APPLY TO AFFECTED AREA(S) TWO TIMES A DAY AS NEEDED Yes Zhane Paul DO   atorvastatin (LIPITOR) 20 MG tablet TAKE ONE TABLET BY MOUTH DAILY Yes Zhane Paul DO   FLUoxetine (PROZAC) 20 MG capsule TAKE ONE CAPSULE BY MOUTH DAILY Yes Zhane Paul DO   thiamine 100 MG tablet Take 0.5 tablets by mouth daily Yes Florentin Haley MD   albuterol sulfate HFA (PROAIR HFA) 108 (90 Base) MCG/ACT inhaler Inhale 2 puffs into the lungs every 6 hours as needed for Wheezing or Shortness of Breath Yes Simran García MD   guaiFENesin 1200 MG TB12 Take by mouth 2 times daily  Yes Historical Provider, MD   Multiple Vitamins-Iron TABS Take 1 tablet by mouth daily Yes Historical Provider, MD   Omega-3 Fatty Acids (OMEGA-3 FISH OIL PO) Take 1,000 mg by mouth 2 times daily  Yes Historical Provider, MD   gabapentin (NEURONTIN) 300 MG capsule TAKE ONE CAPSULE BY MOUTH EVERY 8 HOURS  Patient taking differently: 300 mg 2 times daily.  TAKE ONE CAPSULE BY MOUTH EVERY 8 HOURS  Zhane Brewer, DO CareTeam (Including outside providers/suppliers regularly involved in providing care):   Patient Care Team:  Shaquille Islas MD as PCP - General (Internal Medicine)  Shaquille Islas MD as PCP - [facility] Empaneled Provider  Meme Duggan MD as Consulting Physician (Cardiology)     Reviewed and updated this visit:  Tobacco  Allergies  Meds  Problems  Med Hx  Surg Hx  Soc Hx  Fam Hx

## 2022-12-21 NOTE — PATIENT INSTRUCTIONS
Preventing Falls: Care Instructions  Overview     Getting around your home safely can be a challenge if you have injuries or health problems that make it easy for you to fall. Loose rugs and furniture in walkways are among the dangers for many older people who have problems walking or who have poor eyesight. People who have conditions such as arthritis, osteoporosis, or dementia also have to be careful not to fall. You can make your home safer with a few simple measures. Follow-up care is a key part of your treatment and safety. Be sure to make and go to all appointments, and call your doctor if you are having problems. It's also a good idea to know your test results and keep a list of the medicines you take. How can you care for yourself at home? Taking care of yourself  Exercise regularly to improve your strength, muscle tone, and balance. Walk if you can. Swimming may be a good choice if you cannot walk easily. Have your vision and hearing checked each year or any time you notice a change. If you have trouble seeing and hearing, you might not be able to avoid objects and could lose your balance. Know the side effects of the medicines you take. Ask your doctor or pharmacist whether the medicines you take can affect your balance. Sleeping pills or sedatives can affect your balance. Limit the amount of alcohol you drink. Alcohol can impair your balance and other senses. Ask your doctor whether calluses or corns on your feet need to be removed. If you wear loose-fitting shoes because of calluses or corns, you can lose your balance and fall. Talk to your doctor if you have numbness in your feet. You may get dizzy if you do not drink enough water. To prevent dehydration, drink plenty of fluids. Choose water and other clear liquids. If you have kidney, heart, or liver disease and have to limit fluids, talk with your doctor before you increase the amount of fluids you drink.   Preventing falls at home  Remove raised doorway thresholds, throw rugs, and clutter. Repair loose carpet or raised areas in the floor. Move furniture and electrical cords to keep them out of walking paths. Use nonskid floor wax, and wipe up spills right away, especially on ceramic tile floors. If you use a walker or cane, put rubber tips on it. If you use crutches, clean the bottoms of them regularly with an abrasive pad, such as steel wool. Keep your house well lit, especially stairways, porches, and outside walkways. Use night-lights in areas such as hallways and bathrooms. Add extra light switches or use remote switches (such as switches that go on or off when you clap your hands) to make it easier to turn lights on if you have to get up during the night. Install sturdy handrails on stairways. Move items in your cabinets so that the things you use a lot are on the lower shelves (about waist level). Keep a cordless phone and a flashlight with new batteries by your bed. If possible, put a phone in each of the main rooms of your house, or carry a cell phone in case you fall and cannot reach a phone. Or, you can wear a device around your neck or wrist. You push a button that sends a signal for help. Wear low-heeled shoes that fit well and give your feet good support. Use footwear with nonskid soles. Check the heels and soles of your shoes for wear. Repair or replace worn heels or soles. Do not wear socks without shoes on smooth floors, such as wood. Walk on the grass when the sidewalks are slippery. If you live in an area that gets snow and ice in the winter, sprinkle salt on slippery steps and sidewalks. Or ask a family member or friend to do this for you. Preventing falls in the bath  Install grab bars and nonskid mats inside and outside your shower or tub and near the toilet and sinks. Use shower chairs and bath benches. Use a hand-held shower head that will allow you to sit while showering.   Get into a tub or shower by putting the weaker leg in first. Get out of a tub or shower with your strong side first.  Repair loose toilet seats and consider installing a raised toilet seat to make getting on and off the toilet easier. Keep your bathroom door unlocked while you are in the shower. Where can you learn more? Go to http://www.reyez.com/ and enter G117 to learn more about \"Preventing Falls: Care Instructions. \"  Current as of: May 4, 2022               Content Version: 13.5  © 8812-4632 [employer]. Care instructions adapted under license by [facility] ([facility]). If you have questions about a medical condition or this instruction, always ask your healthcare professional. [address] any warranty or liability for your use of this information. Hearing Loss: Care Instructions  Overview     Hearing loss is a sudden or slow decrease in how well you hear. It can range from mild to severe. Permanent hearing loss can occur with aging. It also can happen when you are exposed long-term to loud noise. Examples include listening to loud music, riding motorcycles, or being around other loud machines. Hearing loss can affect your work and home life. It can make you feel lonely or depressed. You may feel that you have lost your independence. But hearing aids and other devices can help you hear better and feel connected to others. Follow-up care is a key part of your treatment and safety. Be sure to make and go to all appointments, and call your doctor if you are having problems. It's also a good idea to know your test results and keep a list of the medicines you take. How can you care for yourself at home? Avoid loud noises whenever possible. This helps keep your hearing from getting worse. Always wear hearing protection around loud noises. Wear a hearing aid as directed. See a professional who can help you pick a hearing aid that fits you. Have hearing tests as your doctor suggests. They can show whether your hearing has changed. Your hearing aid may need to be adjusted. Use other devices as needed. These may include:  Telephone amplifiers and hearing aids that can connect to a television, stereo, radio, or microphone. Devices that use lights or vibrations. These alert you to the doorbell, a ringing telephone, or a baby monitor. Television closed-captioning. This shows the words at the bottom of the screen. Most new TVs can do this. TTY (text telephone). This lets you type messages back and forth on the telephone instead of talking or listening. These devices are also called TDD. When messages are typed on the keyboard, they are sent over the phone line to a receiving TTY. The message is shown on a monitor. Use text messaging, social media, and email if it is hard for you to communicate by telephone. Try to learn a listening technique called speechreading. It is not lipreading. You pay attention to people's gestures, expressions, posture, and tone of voice. These clues can help you understand what a person is saying. Face the person you are talking to, and have them face you. Make sure the lighting is good. You need to see the other person's face clearly. Think about counseling if you need help to adjust to your hearing loss. When should you call for help? Watch closely for changes in your health, and be sure to contact your doctor if:    You think your hearing is getting worse.     You have new symptoms, such as dizziness or nausea. Where can you learn more? Go to http://www.Tyfone.com/ and enter R798 to learn more about \"Hearing Loss: Care Instructions. \"  Current as of: May 4, 2022               Content Version: 13.5  © 9001-8859 Healthwise, Incorporated. Care instructions adapted under license by [facility] ([facility]).  If you have questions about a medical condition or this instruction, always ask your healthcare professional. Chau Mcclellan any warranty or liability for your use of this information. Learning About Vision Tests  What are vision tests? The four most common vision tests are visual acuity tests, refraction, visual field tests, and color vision tests. Visual acuity (sharpness) tests  These tests are used: To see if you need glasses or contact lenses. To monitor an eye problem. To check an eye injury. Visual acuity tests are done as part of routine exams. You may also have this test when you get your 's license or apply for some types of jobs. Visual field tests  These tests are used: To check for vision loss in any area of your range of vision. To screen for certain eye diseases. To look for nerve damage after a stroke, head injury, or other problem that could reduce blood flow to the brain. Refraction and color tests  A refraction test is done to find the right prescription for glasses and contact lenses. A color vision test is done to check for color blindness. Color vision is often tested as part of a routine exam. You may also have this test when you apply for a job where recognizing different colors is important, such as , electronics, or the [employer]. How are vision tests done? Visual acuity test   You cover one eye at a time. You read aloud from a wall chart across the room. You read aloud from a small card that you hold in your hand. Refraction   You look into a special device. The device puts lenses of different strengths in front of each eye to see how strong your glasses or contact lenses need to be. Visual field tests   Your doctor may have you look through special machines. Or your doctor may simply have you stare straight ahead while they move a finger into and out of your field of vision. Color vision test   You look at pieces of printed test patterns in various colors. You say what number or symbol you see.   Your doctor may have you trace the number or symbol using a pointer. How do these tests feel? There is very little chance of having a problem from this test. If dilating drops are used for a vision test, they may make the eyes sting and cause a medicine taste in the mouth. Follow-up care is a key part of your treatment and safety. Be sure to make and go to all appointments, and call your doctor if you are having problems. It's also a good idea to know your test results and keep a list of the medicines you take. Where can you learn more? Go to http://www.reyez.com/ and enter G551 to learn more about \"Learning About Vision Tests. \"  Current as of: October 12, 2022               Content Version: 13.5  © 2006-2022 [employer]. Care instructions adapted under license by [facility] ([facility]). If you have questions about a medical condition or this instruction, always ask your healthcare professional. [place] [address] any warranty or liability for your use of this information. Advance Directives: Care Instructions  Overview  An advance directive is a legal way to state your wishes at the end of your life. It tells your family and your doctor what to do if you can't say what you want. There are two main types of advance directives. You can change them any time your wishes change. Living will. This form tells your family and your doctor your wishes about life support and other treatment. The form is also called a declaration. Medical power of . This form lets you name a person to make treatment decisions for you when you can't speak for yourself. This person is called a health care agent (health care proxy, health care surrogate). The form is also called a durable power of  for health care. If you do not have an advance directive, decisions about your medical care may be made by a family member, or by a doctor or a  who doesn't know you.   It may help to think of an advance directive as a gift to the people who care for you. If you have one, they won't have to make tough decisions by themselves. For more information, including forms for your state, see the [address] website (www.caringinfo.org/planning/advance-directives/). Follow-up care is a key part of your treatment and safety. Be sure to make and go to all appointments, and call your doctor if you are having problems. It's also a good idea to know your test results and keep a list of the medicines you take. What should you include in an advance directive? Many states have a unique advance directive form. (It may ask you to address specific issues.) Or you might use a universal form that's approved by many states. If your form doesn't tell you what to address, it may be hard to know what to include in your advance directive. Use the questions below to help you get started. Who do you want to make decisions about your medical care if you are not able to? What life-support measures do you want if you have a serious illness that gets worse over time or can't be cured? What are you most afraid of that might happen? (Maybe you're afraid of having pain, losing your independence, or being kept alive by machines.)  Where would you prefer to die? (Your home? A hospital? A nursing home?)  Do you want to donate your organs when you die? Do you want certain Spiritism practices performed before you die? When should you call for help? Be sure to contact your doctor if you have any questions. Where can you learn more? Go to http://www.reyez.com/ and enter R264 to learn more about \"Advance Directives: Care Instructions. \"  Current as of: June 16, 2022               Content Version: 13.5  © 4682-4861 Healthwise, Incorporated. Care instructions adapted under license by [facility] ([facility]).  If you have questions about a medical condition or this instruction, always ask your healthcare professional. Emmett Dueñas disclaims any warranty or liability for your use of this information. Personalized Preventive Plan for Beatrice Posada - 12/21/2022  Medicare offers a range of preventive health benefits. Some of the tests and screenings are paid in full while other may be subject to a deductible, co-insurance, and/or copay. Some of these benefits include a comprehensive review of your medical history including lifestyle, illnesses that may run in your family, and various assessments and screenings as appropriate. After reviewing your medical record and screening and assessments performed today your provider may have ordered immunizations, labs, imaging, and/or referrals for you. A list of these orders (if applicable) as well as your Preventive Care list are included within your After Visit Summary for your review. Other Preventive Recommendations:    A preventive eye exam performed by an eye specialist is recommended every 1-2 years to screen for glaucoma; cataracts, macular degeneration, and other eye disorders. A preventive dental visit is recommended every 6 months. Try to get at least 150 minutes of exercise per week or 10,000 steps per day on a pedometer . Order or download the FREE \"Exercise & Physical Activity: Your Everyday Guide\" from The "[employer]. Call 5-319.889.7575 or search The [employer] online. You need 0971-1820 mg of calcium and 0667-3163 IU of vitamin D per day. It is possible to meet your calcium requirement with diet alone, but a vitamin D supplement is usually necessary to meet this goal.  When exposed to the sun, use a sunscreen that protects against both UVA and UVB radiation with an SPF of 30 or greater. Reapply every 2 to 3 hours or after sweating, drying off with a towel, or swimming. Always wear a seat belt when traveling in a car. Always wear a helmet when riding a bicycle or motorcycle.

## 2023-01-27 ENCOUNTER — TELEPHONE (OUTPATIENT)
Dept: CARDIOLOGY CLINIC | Age: 78
End: 2023-01-27

## 2023-01-27 DIAGNOSIS — I25.10 CORONARY ARTERY DISEASE INVOLVING NATIVE CORONARY ARTERY OF NATIVE HEART WITHOUT ANGINA PECTORIS: ICD-10-CM

## 2023-01-27 NOTE — TELEPHONE ENCOUNTER
Pts wife is at the pharmacy and they do not have the prescription for Brilinta. Pts wife was advised that it does not appear that we received the request. Pt is out of [address] [facility] [place] [address] and wife is currently at the pharmacy. Gerardo Chi in [address] [facility]. Last ov 07/14/2022 rkg. Upcoming 02/07/2023 robing.

## 2023-01-30 ENCOUNTER — TELEPHONE (OUTPATIENT)
Dept: PULMONOLOGY | Age: 78
End: 2023-01-30

## 2023-01-30 DIAGNOSIS — J98.4 RESTRICTIVE LUNG DISEASE: Primary | ICD-10-CM

## 2023-01-30 DIAGNOSIS — Z87.891 FORMER SMOKER: ICD-10-CM

## 2023-01-30 DIAGNOSIS — J84.10 PULMONARY FIBROSIS (HCC): ICD-10-CM

## 2023-01-30 DIAGNOSIS — J47.9 BRONCHIECTASIS WITHOUT COMPLICATION (HCC): ICD-10-CM

## 2023-01-30 NOTE — TELEPHONE ENCOUNTER
Patient states the inhaler he was given is working and would like a script sent in for the [address] please.  Thanks

## 2023-02-07 ENCOUNTER — OFFICE VISIT (OUTPATIENT)
Dept: CARDIOLOGY CLINIC | Age: 78
End: 2023-02-07
Payer: MEDICARE

## 2023-02-07 ENCOUNTER — HOSPITAL ENCOUNTER (OUTPATIENT)
Age: 78
Discharge: HOME | End: 2023-02-07
Payer: MEDICARE

## 2023-02-07 VITALS
OXYGEN SATURATION: 90 % | DIASTOLIC BLOOD PRESSURE: 68 MMHG | BODY MASS INDEX: 23.57 KG/M2 | HEIGHT: 72 IN | HEART RATE: 58 BPM | WEIGHT: 174 LBS | SYSTOLIC BLOOD PRESSURE: 114 MMHG

## 2023-02-07 DIAGNOSIS — I10 PRIMARY HYPERTENSION: ICD-10-CM

## 2023-02-07 DIAGNOSIS — I50.42 CHRONIC COMBINED SYSTOLIC AND DIASTOLIC CONGESTIVE HEART FAILURE (HCC): ICD-10-CM

## 2023-02-07 DIAGNOSIS — E78.2 MIXED HYPERLIPIDEMIA: ICD-10-CM

## 2023-02-07 DIAGNOSIS — I25.10 CORONARY ARTERY DISEASE INVOLVING NATIVE CORONARY ARTERY OF NATIVE HEART WITHOUT ANGINA PECTORIS: Primary | ICD-10-CM

## 2023-02-07 DIAGNOSIS — I25.5 ISCHEMIC CARDIOMYOPATHY: ICD-10-CM

## 2023-02-07 LAB — TSH REFLEX: 3.48 UIU/ML (ref 0.27–4.2)

## 2023-02-07 PROCEDURE — 1124F ACP DISCUSS-NO DSCNMKR DOCD: CPT | Performed by: INTERNAL MEDICINE

## 2023-02-07 PROCEDURE — 3078F DIAST BP <80 MM HG: CPT | Performed by: INTERNAL MEDICINE

## 2023-02-07 PROCEDURE — 84443 ASSAY THYROID STIM HORMONE: CPT

## 2023-02-07 PROCEDURE — 3074F SYST BP LT 130 MM HG: CPT | Performed by: INTERNAL MEDICINE

## 2023-02-07 PROCEDURE — 36415 COLL VENOUS BLD VENIPUNCTURE: CPT

## 2023-02-07 PROCEDURE — 99214 OFFICE O/P EST MOD 30 MIN: CPT | Performed by: INTERNAL MEDICINE
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
## 2023-02-07 NOTE — PATIENT INSTRUCTIONS
PLAN:  Current medications reviewed. No changes at this time. Refills given as warranted. TSH (throid today)  Lab work reviewed - Labs are due July 2023. CBC. CMP and fasting lipids at any [facility]. You must be fasting. Follow up with me in 6 months.

## 2023-02-08 ENCOUNTER — TELEPHONE (OUTPATIENT)
Dept: CARDIOLOGY CLINIC | Age: 78
End: 2023-02-08

## 2023-02-08 NOTE — TELEPHONE ENCOUNTER
Called and spoke with patient. Informed him of Dr Mateo San result message. Patient verbally understood.

## 2023-03-23 DIAGNOSIS — R09.81 NASAL CONGESTION: ICD-10-CM

## 2023-03-23 RX ORDER — MONTELUKAST SODIUM 10 MG/1
TABLET ORAL
Qty: 30 TABLET | Refills: 5 | Status: SHIPPED | OUTPATIENT
Start: 2023-03-23

## 2023-03-27 DIAGNOSIS — I25.10 CORONARY ARTERY DISEASE INVOLVING NATIVE CORONARY ARTERY OF NATIVE HEART WITHOUT ANGINA PECTORIS: ICD-10-CM

## 2023-04-03 DIAGNOSIS — I10 PRIMARY HYPERTENSION: ICD-10-CM

## 2023-04-03 RX ORDER — LOSARTAN POTASSIUM 25 MG/1
TABLET ORAL
Qty: 90 TABLET | Refills: 3 | Status: SHIPPED | OUTPATIENT
Start: 2023-04-03

## 2023-04-27 ENCOUNTER — OFFICE VISIT (OUTPATIENT)
Dept: INTERNAL MEDICINE CLINIC | Age: 78
End: 2023-04-27
Payer: MEDICARE

## 2023-04-27 VITALS
HEIGHT: 72 IN | HEART RATE: 82 BPM | SYSTOLIC BLOOD PRESSURE: 122 MMHG | DIASTOLIC BLOOD PRESSURE: 68 MMHG | OXYGEN SATURATION: 99 % | TEMPERATURE: 98.2 F | BODY MASS INDEX: 22.89 KG/M2 | WEIGHT: 169 LBS

## 2023-04-27 DIAGNOSIS — J06.9 URI WITH COUGH AND CONGESTION: Primary | ICD-10-CM

## 2023-04-27 DIAGNOSIS — J84.10 PULMONARY FIBROSIS (HCC): ICD-10-CM

## 2023-04-27 DIAGNOSIS — R06.02 SOB (SHORTNESS OF BREATH): ICD-10-CM

## 2023-04-27 PROCEDURE — 3074F SYST BP LT 130 MM HG: CPT | Performed by: INTERNAL MEDICINE

## 2023-04-27 PROCEDURE — 1124F ACP DISCUSS-NO DSCNMKR DOCD: CPT | Performed by: INTERNAL MEDICINE

## 2023-04-27 PROCEDURE — 99213 OFFICE O/P EST LOW 20 MIN: CPT | Performed by: INTERNAL MEDICINE

## 2023-04-27 PROCEDURE — 3078F DIAST BP <80 MM HG: CPT | Performed by: INTERNAL MEDICINE

## 2023-04-27 RX ORDER — AMOXICILLIN AND CLAVULANATE POTASSIUM 875; 125 MG/1; MG/1
1 TABLET, FILM COATED ORAL 2 TIMES DAILY
Qty: 20 TABLET | Refills: 0 | Status: SHIPPED | OUTPATIENT
Start: 2023-04-27 | End: 2023-05-07

## 2023-04-27 RX ORDER — ALBUTEROL SULFATE 90 UG/1
2 AEROSOL, METERED RESPIRATORY (INHALATION) EVERY 6 HOURS PRN
Qty: 1 EACH | Refills: 2 | Status: SHIPPED | OUTPATIENT
Start: 2023-04-27

## 2023-04-27 SDOH — ECONOMIC STABILITY: FOOD INSECURITY: WITHIN THE PAST 12 MONTHS, THE FOOD YOU BOUGHT JUST DIDN'T LAST AND YOU DIDN'T HAVE MONEY TO GET MORE.: NEVER TRUE

## 2023-04-27 SDOH — ECONOMIC STABILITY: INCOME INSECURITY: HOW HARD IS IT FOR YOU TO PAY FOR THE VERY BASICS LIKE FOOD, HOUSING, MEDICAL CARE, AND HEATING?: NOT HARD AT ALL

## 2023-04-27 SDOH — ECONOMIC STABILITY: HOUSING INSECURITY
IN THE LAST 12 MONTHS, WAS THERE A TIME WHEN YOU DID NOT HAVE A STEADY PLACE TO SLEEP OR SLEPT IN A SHELTER (INCLUDING NOW)?: NO

## 2023-04-27 SDOH — ECONOMIC STABILITY: FOOD INSECURITY: WITHIN THE PAST 12 MONTHS, YOU WORRIED THAT YOUR FOOD WOULD RUN OUT BEFORE YOU GOT MONEY TO BUY MORE.: NEVER TRUE

## 2023-04-27 ASSESSMENT — PATIENT HEALTH QUESTIONNAIRE - PHQ9
7. TROUBLE CONCENTRATING ON THINGS, SUCH AS READING THE NEWSPAPER OR WATCHING TELEVISION: 0
4. FEELING TIRED OR HAVING LITTLE ENERGY: 0
5. POOR APPETITE OR OVEREATING: 0
SUM OF ALL RESPONSES TO PHQ QUESTIONS 1-9: 0
1. LITTLE INTEREST OR PLEASURE IN DOING THINGS: 0
9. THOUGHTS THAT YOU WOULD BE BETTER OFF DEAD, OR OF HURTING YOURSELF: 0
3. TROUBLE FALLING OR STAYING ASLEEP: 0
2. FEELING DOWN, DEPRESSED OR HOPELESS: 0
6. FEELING BAD ABOUT YOURSELF - OR THAT YOU ARE A FAILURE OR HAVE LET YOURSELF OR YOUR FAMILY DOWN: 0
10. IF YOU CHECKED OFF ANY PROBLEMS, HOW DIFFICULT HAVE THESE PROBLEMS MADE IT FOR YOU TO DO YOUR WORK, TAKE CARE OF THINGS AT HOME, OR GET ALONG WITH OTHER PEOPLE: 0
SUM OF ALL RESPONSES TO PHQ QUESTIONS 1-9: 0
SUM OF ALL RESPONSES TO PHQ QUESTIONS 1-9: 0
8. MOVING OR SPEAKING SO SLOWLY THAT OTHER PEOPLE COULD HAVE NOTICED. OR THE OPPOSITE, BEING SO FIGETY OR RESTLESS THAT YOU HAVE BEEN MOVING AROUND A LOT MORE THAN USUAL: 0
SUM OF ALL RESPONSES TO PHQ QUESTIONS 1-9: 0
SUM OF ALL RESPONSES TO PHQ9 QUESTIONS 1 & 2: 0

## 2023-04-27 NOTE — PROGRESS NOTES
No tenderness. No rebound and no guarding. Musculoskeletal: No edema and no tenderness. Skin: No rash or erythema.

## 2023-06-09 DIAGNOSIS — F32.9 REACTIVE DEPRESSION: ICD-10-CM

## 2023-06-12 RX ORDER — FLUOXETINE HYDROCHLORIDE 20 MG/1
CAPSULE ORAL
Qty: 90 CAPSULE | Refills: 1 | OUTPATIENT
Start: 2023-06-12

## 2023-06-14 PROBLEM — I27.20 PULMONARY HTN (HCC): Status: ACTIVE | Noted: 2023-06-14

## 2023-07-14 ENCOUNTER — HOSPITAL ENCOUNTER (OUTPATIENT)
Dept: PULMONOLOGY | Age: 78
Discharge: HOME | End: 2023-07-14
Payer: MEDICARE

## 2023-07-14 VITALS — OXYGEN SATURATION: 96 %

## 2023-07-14 DIAGNOSIS — J98.4 RESTRICTIVE LUNG DISEASE: ICD-10-CM

## 2023-07-14 DIAGNOSIS — J84.10 PULMONARY FIBROSIS (HCC): ICD-10-CM

## 2023-07-14 DIAGNOSIS — Z87.891 FORMER SMOKER: ICD-10-CM

## 2023-07-14 LAB
DLCO %PRED: 32 %
DLCO PRED: NORMAL
DLCO/VA %PRED: NORMAL
DLCO/VA PRED: NORMAL
DLCO/VA: NORMAL
DLCO: NORMAL
EXPIRATORY TIME-POST: NORMAL
EXPIRATORY TIME: NORMAL
FEF 25-75% %CHNG: NORMAL
FEF 25-75% %PRED-POST: NORMAL
FEF 25-75% %PRED-PRE: NORMAL
FEF 25-75% PRED: NORMAL
FEF 25-75%-POST: NORMAL
FEF 25-75%-PRE: NORMAL
FEV1 %PRED-POST: 79 %
FEV1 %PRED-PRE: 77 %
FEV1 PRED: NORMAL
FEV1-POST: NORMAL
FEV1-PRE: NORMAL
FEV1/FVC %PRED-POST: 106 %
FEV1/FVC %PRED-PRE: 106 %
FEV1/FVC PRED: NORMAL
FEV1/FVC-POST: NORMAL
FEV1/FVC-PRE: NORMAL
FVC %PRED-POST: 73 L
FVC %PRED-PRE: 72 %
FVC PRED: NORMAL
FVC-POST: NORMAL
FVC-PRE: NORMAL
GAW %PRED: NORMAL
GAW PRED: NORMAL
GAW: NORMAL
IC %PRED: NORMAL
IC PRED: NORMAL
IC: NORMAL
MEP: NORMAL
MIP: NORMAL
MVV %PRED-PRE: NORMAL
MVV PRED: NORMAL
MVV-PRE: NORMAL
PEF %PRED-POST: NORMAL
PEF %PRED-PRE: NORMAL
PEF PRED: NORMAL
PEF%CHNG: NORMAL
PEF-POST: NORMAL
PEF-PRE: NORMAL
RAW %PRED: NORMAL
RAW PRED: NORMAL
RAW: NORMAL
RV %PRED: NORMAL
RV PRED: NORMAL
RV: NORMAL
SVC %PRED: NORMAL
SVC PRED: NORMAL
SVC: NORMAL
TLC %PRED: 49 %
TLC PRED: NORMAL
TLC: NORMAL
VA %PRED: NORMAL
VA PRED: NORMAL
VA: NORMAL
VTG %PRED: NORMAL
VTG PRED: NORMAL
VTG: NORMAL

## 2023-07-14 PROCEDURE — 94726 PLETHYSMOGRAPHY LUNG VOLUMES: CPT

## 2023-07-14 PROCEDURE — 94618 PULMONARY STRESS TESTING: CPT

## 2023-07-14 PROCEDURE — 6370000000 HC RX 637 (ALT 250 FOR IP): Performed by: INTERNAL MEDICINE

## 2023-07-14 PROCEDURE — 94729 DIFFUSING CAPACITY: CPT

## 2023-07-14 PROCEDURE — 94060 EVALUATION OF WHEEZING: CPT

## 2023-07-14 RX ORDER — ALBUTEROL SULFATE 90 UG/1
4 AEROSOL, METERED RESPIRATORY (INHALATION) ONCE
Status: COMPLETED | OUTPATIENT
Start: 2023-07-14 | End: 2023-07-14

## 2023-07-14 RX ADMIN — Medication 4 PUFF: at 09:21

## 2023-07-14 ASSESSMENT — PULMONARY FUNCTION TESTS
FVC_PERCENT_PREDICTED_POST: 73
FVC_PERCENT_PREDICTED_PRE: 72
FEV1_PERCENT_PREDICTED_PRE: 77
FEV1/FVC_PERCENT_PREDICTED_PRE: 106
FEV1/FVC_PERCENT_PREDICTED_POST: 106
FEV1_PERCENT_PREDICTED_POST: 79

## 2023-08-02 ENCOUNTER — TELEPHONE (OUTPATIENT)
Dept: PULMONOLOGY | Age: 78
End: 2023-08-02

## 2023-08-02 NOTE — TELEPHONE ENCOUNTER
I spoke with the patients wife   Explained in great detail and went over the instructions  Patients wife stated patient is having Increased Shortness of Breath  Productive cough at times  Patients wife states at times patient is having wheezing  I explained to the patients wife Dr. Hudson Half is out of the office and we can schedule her  with our CNP   She stated unsure if patient would be willing to come in and see the CNP  Informed Wife I would send a message back and see if instructions to schedule for patient be seen and then we will call her back after she checks with  and sees if he will be willing to come in and see the CNP
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
## 2023-08-07 ENCOUNTER — OFFICE VISIT (OUTPATIENT)
Dept: PULMONOLOGY | Age: 78
End: 2023-08-07

## 2023-08-07 VITALS
HEIGHT: 72 IN | HEART RATE: 63 BPM | DIASTOLIC BLOOD PRESSURE: 60 MMHG | SYSTOLIC BLOOD PRESSURE: 106 MMHG | BODY MASS INDEX: 22.75 KG/M2 | TEMPERATURE: 98.7 F | WEIGHT: 168 LBS | RESPIRATION RATE: 18 BRPM | OXYGEN SATURATION: 94 %

## 2023-08-07 DIAGNOSIS — J98.4 RESTRICTIVE LUNG DISEASE: Primary | ICD-10-CM

## 2023-08-07 DIAGNOSIS — J84.10 PULMONARY FIBROSIS (HCC): ICD-10-CM

## 2023-08-07 DIAGNOSIS — Z87.891 FORMER SMOKER: ICD-10-CM

## 2023-08-07 PROBLEM — J47.1 BRONCHIECTASIS WITH ACUTE EXACERBATION (HCC): Status: ACTIVE | Noted: 2022-11-22

## 2023-08-07 RX ORDER — CLONAZEPAM 0.5 MG/1
TABLET ORAL
COMMUNITY

## 2023-08-07 RX ORDER — FLUTICASONE FUROATE, UMECLIDINIUM BROMIDE AND VILANTEROL TRIFENATATE 200; 62.5; 25 UG/1; UG/1; UG/1
1 POWDER RESPIRATORY (INHALATION) DAILY
Qty: 60 EACH | Refills: 5 | Status: SHIPPED | OUTPATIENT
Start: 2023-08-07

## 2023-08-07 RX ORDER — PANTOPRAZOLE SODIUM 40 MG/1
TABLET, DELAYED RELEASE ORAL
COMMUNITY

## 2023-08-07 RX ORDER — PREDNISONE 10 MG/1
TABLET ORAL
Qty: 20 TABLET | Refills: 0 | Status: CANCELLED | OUTPATIENT
Start: 2023-08-07

## 2023-08-07 RX ORDER — BENZONATATE 100 MG/1
CAPSULE ORAL
COMMUNITY

## 2023-08-07 ASSESSMENT — ENCOUNTER SYMPTOMS
CHEST TIGHTNESS: 0
SHORTNESS OF BREATH: 1
VOMITING: 0
EYE PAIN: 0
NAUSEA: 0
WHEEZING: 1
SORE THROAT: 0
ABDOMINAL PAIN: 0
COUGH: 1
RHINORRHEA: 0

## 2023-08-07 NOTE — PATIENT INSTRUCTIONS
Call with worsening symptoms such as increased shortness of breath, productive cough, wheezing or symptoms not responding to treatment plan. Change to Trelegy 200 1 puff daily. Rinse mouth out after use to prevent hoarseness and thrush. -  Stop Stiolto. You can still use albuterol, red inhaler, as needed for wheezing or cough. May use Mucinex or guaifenesin 600-1200 mg twice a day to help thin secretions. Drink with plenty of water. Return to clinic in December with Dr. Maricarmen Matute as scheduled       Remember to bring a list of pulmonary medications and any CPAP or BiPAP machines to your next appointment with the office. Please keep all of your future appointments scheduled by [facility] office. Out of respect for other patients and providers, you may be asked to reschedule your appointment if you arrive later than your scheduled appointment time. Appointments cancelled less than 24hrs in advance will be considered a no show. Patients with three missed appointments within 1 year or four missed appointments within 2 years can be dismissed from the practice. Please be aware that our physicians are required to work in the Intensive Care Unit at [facility].  Your appointment may need to be rescheduled if they are designated to work during your appointment time. You may receive a survey regarding the care you received during your visit. Your input is valuable to us. We encourage you to complete and return your survey. We hope you will choose us in the future for your healthcare needs. Pt instructed of all future appointment dates & times, including radiology, labs, procedures & referrals. If procedures were scheduled preparation instructions provided. Instructions on future appointments with [facility] were given. MA Communication:   The following orders are received by verbal communication from Elray Hamman, CNP    Return to

## 2023-08-07 NOTE — PROGRESS NOTES
MA Communication:   The following orders are received by verbal communication from Iris Burns    Return to clinic in December with Dr. Niranjan Cardozo as scheduled
nodules, or masses. There are no pleural  effusions. Upper Abdomen: Unremarkable     Soft Tissues/Bones: Unremarkable     IMPRESSION:  Patchy bilateral pulmonary fibrosis, characterized by honeycomb lung and  traction bronchiectasis, with a slight upper lobe predominance. 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 Follow-up with CT in  1 year recommended. Physical Exam  Vitals reviewed. Constitutional:       General: He is not in acute distress. Appearance: Normal appearance. He is not ill-appearing, toxic-appearing or diaphoretic. HENT:      Head: Normocephalic and atraumatic. Nose: Nose normal. No congestion or rhinorrhea. Mouth/Throat:      Mouth: Mucous membranes are moist.      Pharynx: Oropharynx is clear. No oropharyngeal exudate or posterior oropharyngeal erythema. Eyes:      Pupils: Pupils are equal, round, and reactive to light. Cardiovascular:      Rate and Rhythm: Normal rate. Pulmonary:      Effort: Pulmonary effort is normal. No respiratory distress. Breath sounds: No stridor. Rales ([place] coarse) present. No wheezing or rhonchi. Chest:      Chest wall: No tenderness. Abdominal:      Palpations: Abdomen is soft. Tenderness: There is no abdominal tenderness. There is no guarding or rebound. Musculoskeletal:         General: Normal range of motion. Cervical back: Neck supple. Right lower leg: No edema. Left lower leg: No edema. Lymphadenopathy:      Cervical: No cervical adenopathy. Skin:     General: Skin is warm and dry. Capillary Refill: Capillary refill takes less than 2 seconds. Neurological:      Mental Status: He is alert and oriented to person, place, and time. Psychiatric:         Mood and Affect: Mood normal.         Behavior: Behavior normal.         Thought Content:  Thought content normal.         Judgment: Judgment normal.

## 2023-08-08 ENCOUNTER — OFFICE VISIT (OUTPATIENT)
Dept: CARDIOLOGY CLINIC | Age: 78
End: 2023-08-08
Payer: MEDICARE

## 2023-08-08 VITALS
WEIGHT: 167 LBS | SYSTOLIC BLOOD PRESSURE: 112 MMHG | DIASTOLIC BLOOD PRESSURE: 68 MMHG | BODY MASS INDEX: 22.62 KG/M2 | HEIGHT: 72 IN

## 2023-08-08 DIAGNOSIS — E78.2 MIXED HYPERLIPIDEMIA: ICD-10-CM

## 2023-08-08 DIAGNOSIS — U07.1 COVID-19 WITH PULMONARY COMORBIDITY: ICD-10-CM

## 2023-08-08 DIAGNOSIS — I25.10 CORONARY ARTERY DISEASE INVOLVING NATIVE CORONARY ARTERY OF NATIVE HEART WITHOUT ANGINA PECTORIS: Primary | ICD-10-CM

## 2023-08-08 DIAGNOSIS — J98.4 COVID-19 WITH PULMONARY COMORBIDITY: ICD-10-CM

## 2023-08-08 DIAGNOSIS — I25.5 ISCHEMIC CARDIOMYOPATHY: ICD-10-CM

## 2023-08-08 DIAGNOSIS — R06.02 SOB (SHORTNESS OF BREATH): ICD-10-CM

## 2023-08-08 PROCEDURE — 93000 ELECTROCARDIOGRAM COMPLETE: CPT | Performed by: INTERNAL MEDICINE

## 2023-08-08 PROCEDURE — 1124F ACP DISCUSS-NO DSCNMKR DOCD: CPT | Performed by: INTERNAL MEDICINE

## 2023-08-08 PROCEDURE — 99214 OFFICE O/P EST MOD 30 MIN: CPT | Performed by: INTERNAL MEDICINE

## 2023-08-08 PROCEDURE — 3078F DIAST BP <80 MM HG: CPT | Performed by: INTERNAL MEDICINE

## 2023-08-08 PROCEDURE — 3074F SYST BP LT 130 MM HG: CPT | Performed by: INTERNAL MEDICINE

## 2023-08-08 NOTE — PATIENT INSTRUCTIONS
PLAN:  Current medications reviewed. No changes at this time. Refills given as warranted. We may start Entresto depending on results of echocardiogram.   Lab work from 6/2023 reviewed   I recommend you have Echocardiogram to access heart size and strength. Call [phone] to schedule this test   Follow up with me in 6 months.

## 2023-09-06 DIAGNOSIS — I10 PRIMARY HYPERTENSION: ICD-10-CM

## 2023-09-06 RX ORDER — DOXAZOSIN MESYLATE 1 MG/1
TABLET ORAL
Qty: 90 TABLET | Refills: 3 | Status: SHIPPED | OUTPATIENT
Start: 2023-09-06

## 2023-09-21 DIAGNOSIS — I25.10 CORONARY ARTERY DISEASE INVOLVING NATIVE CORONARY ARTERY OF NATIVE HEART WITHOUT ANGINA PECTORIS: ICD-10-CM

## 2023-09-28 ENCOUNTER — HOSPITAL ENCOUNTER (OUTPATIENT)
Dept: CARDIOLOGY | Age: 78
Discharge: HOME | End: 2023-09-28
Payer: MEDICARE

## 2023-09-28 PROCEDURE — 6360000004 HC RX CONTRAST MEDICATION: Performed by: INTERNAL MEDICINE

## 2023-09-28 PROCEDURE — 93308 TTE F-UP OR LMTD: CPT

## 2023-09-28 RX ADMIN — PERFLUTREN 1.5 ML: 6.52 INJECTION, SUSPENSION INTRAVENOUS at 16:21

## 2023-09-29 ENCOUNTER — TELEPHONE (OUTPATIENT)
Dept: CARDIOLOGY CLINIC | Age: 78
End: 2023-09-29

## 2023-09-29 NOTE — TELEPHONE ENCOUNTER
----- Message from Jose Francisco Latham MD sent at 9/28/2023  8:22 PM EDT -----  Heart function on echo shows improvement continue same medications.

## 2023-11-08 DIAGNOSIS — G62.9 NEUROPATHY: ICD-10-CM

## 2023-11-08 RX ORDER — GABAPENTIN 300 MG/1
CAPSULE ORAL
Qty: 180 CAPSULE | Refills: 1 | OUTPATIENT
Start: 2023-11-08

## 2023-12-11 DIAGNOSIS — G62.9 NEUROPATHY: ICD-10-CM

## 2023-12-11 RX ORDER — GABAPENTIN 300 MG/1
300 CAPSULE ORAL 2 TIMES DAILY
Qty: 180 CAPSULE | Refills: 0 | Status: SHIPPED | OUTPATIENT
Start: 2023-12-11 | End: 2024-06-08

## 2023-12-11 NOTE — TELEPHONE ENCOUNTER
LAST REFILL 06/14/2023  AMOUNT 180     1 REFILLS  LAST VISIT 06/14/2023  NEXT VISIT 12/21/2023

## 2023-12-12 ENCOUNTER — HOSPITAL ENCOUNTER (OUTPATIENT)
Dept: PULMONOLOGY | Age: 78
Discharge: HOME | End: 2023-12-12
Payer: MEDICARE

## 2023-12-12 ENCOUNTER — TELEPHONE (OUTPATIENT)
Dept: PULMONOLOGY | Age: 78
End: 2023-12-12

## 2023-12-12 VITALS — OXYGEN SATURATION: 93 % | RESPIRATION RATE: 18 BRPM

## 2023-12-12 DIAGNOSIS — J98.4 RESTRICTIVE LUNG DISEASE: Primary | ICD-10-CM

## 2023-12-12 DIAGNOSIS — Z87.891 FORMER SMOKER: ICD-10-CM

## 2023-12-12 PROCEDURE — 94729 DIFFUSING CAPACITY: CPT

## 2023-12-12 PROCEDURE — 94760 N-INVAS EAR/PLS OXIMETRY 1: CPT

## 2023-12-12 PROCEDURE — 94010 BREATHING CAPACITY TEST: CPT

## 2023-12-12 PROCEDURE — 94726 PLETHYSMOGRAPHY LUNG VOLUMES: CPT

## 2023-12-12 NOTE — TELEPHONE ENCOUNTER
Angela Olmos from [facility] called and stated patient is coming in at 5 for a PFT/6MW and they need a new order because the one on chart is invalid. Please put in new order, thanks.

## 2023-12-13 ENCOUNTER — OFFICE VISIT (OUTPATIENT)
Dept: PULMONOLOGY | Age: 78
End: 2023-12-13
Payer: MEDICARE

## 2023-12-13 VITALS
WEIGHT: 169.38 LBS | TEMPERATURE: 98 F | RESPIRATION RATE: 16 BRPM | OXYGEN SATURATION: 92 % | HEART RATE: 65 BPM | SYSTOLIC BLOOD PRESSURE: 121 MMHG | BODY MASS INDEX: 22.94 KG/M2 | DIASTOLIC BLOOD PRESSURE: 72 MMHG | HEIGHT: 72 IN

## 2023-12-13 DIAGNOSIS — I27.20 PULMONARY HTN (HCC): ICD-10-CM

## 2023-12-13 DIAGNOSIS — Z87.891 FORMER SMOKER: ICD-10-CM

## 2023-12-13 DIAGNOSIS — I51.89 GRADE I DIASTOLIC DYSFUNCTION: ICD-10-CM

## 2023-12-13 DIAGNOSIS — J98.4 RESTRICTIVE LUNG DISEASE: Primary | ICD-10-CM

## 2023-12-13 DIAGNOSIS — J84.10 PULMONARY FIBROSIS (HCC): ICD-10-CM

## 2023-12-13 PROCEDURE — 94726 PLETHYSMOGRAPHY LUNG VOLUMES: CPT | Performed by: INTERNAL MEDICINE

## 2023-12-13 PROCEDURE — 3078F DIAST BP <80 MM HG: CPT | Performed by: INTERNAL MEDICINE

## 2023-12-13 PROCEDURE — 94375 RESPIRATORY FLOW VOLUME LOOP: CPT | Performed by: INTERNAL MEDICINE

## 2023-12-13 PROCEDURE — 1124F ACP DISCUSS-NO DSCNMKR DOCD: CPT | Performed by: INTERNAL MEDICINE

## 2023-12-13 PROCEDURE — 99213 OFFICE O/P EST LOW 20 MIN: CPT | Performed by: INTERNAL MEDICINE

## 2023-12-13 PROCEDURE — 94729 DIFFUSING CAPACITY: CPT | Performed by: INTERNAL MEDICINE

## 2023-12-13 PROCEDURE — 3074F SYST BP LT 130 MM HG: CPT | Performed by: INTERNAL MEDICINE

## 2023-12-13 NOTE — PROGRESS NOTES
MA Communication:   The following orders are received by verbal communication from Yina Moya MD    Orders include:  fu 6 mo lungs
were advised about RSV vaccine

## 2023-12-13 NOTE — PROCEDURES
[facility] [place], [place], [address] [facility]                               PULMONARY FUNCTION    PATIENT NAME: Petra Jon                    :        1945  MED REC NO:   [id number]                          ROOM:  ACCOUNT NO:   [de-identified]                           ADMIT DATE: 2023  PROVIDER:     Bambi Thomas MD    DATE OF PROCEDURE:  2023    PFT INTERPRETATION    The patient is a 55-year-old male who underwent a PFT for restrictive  lung disease. Spirometry shows FVC to be 74%, FEV1 to be 80%, FEV1 to  FVC ratio was 108%, JLS47-23% was 131%. Lung volume shows total lung  capacity was 60%. The patient has some air trapping. The patient also  has decrease in ERV. The patient's diffusion capacity when adjusted for  volume was reduced at 65%. The patient's PFT in the past had shown the  patient's FEV1 to be 77%, the patient's total lung capacity at that time  was 69% and diffusion capacity when adjusted for volume was 64%. The  patient's flow-volume loop was suggestive of restrictive pattern. On  the basis of this PFT, the patient has some moderate restrictive lung  disease. Along with that the patient has some changes in the PFT  parameters because of body habitus. The patient's total lung capacity  has improved as compared to the previous times along with that the  patient also has slight improvement in FEV1 as compared to last time. Please correlate clinically.         Bambi Thomas MD    D: 2023 18:16:19       T: 2023 18:18:47     [email or web address]  Job#: [id number]     Doc#: [id number]    CC:

## 2023-12-13 NOTE — PATIENT INSTRUCTIONS
Remember to bring a list of pulmonary medications and any CPAP or BiPAP machines to your next appointment with the office. Please keep all of your future appointments scheduled by [facility] office. Out of respect for other patients and providers, you may be asked to reschedule your appointment if you arrive later than your scheduled appointment time. Appointments cancelled less than 24hrs in advance will be considered a no show. Patients with three missed appointments within 1 year or four missed appointments within 2 years can be dismissed from the practice. Please be aware that our physicians are required to work in the Intensive Care Unit at [facility].  Your appointment may need to be rescheduled if they are designated to work during your appointment time. You may receive a survey regarding the care you received during your visit. Your input is valuable to us. We encourage you to complete and return your survey. We hope you will choose us in the future for your healthcare needs. Pt instructed of all future appointment dates & times, including radiology, labs, procedures & referrals. If procedures were scheduled preparation instructions provided. Instructions on future appointments with [facility] were given.

## 2023-12-21 PROBLEM — I20.9 ANGINA PECTORIS, UNSPECIFIED (HCC): Status: ACTIVE | Noted: 2023-12-21

## 2023-12-21 PROBLEM — J44.9 CHRONIC OBSTRUCTIVE PULMONARY DISEASE, UNSPECIFIED (HCC): Status: ACTIVE | Noted: 2023-12-21

## 2023-12-21 PROBLEM — I50.22 CHRONIC SYSTOLIC (CONGESTIVE) HEART FAILURE (HCC): Status: ACTIVE | Noted: 2023-12-21

## 2023-12-21 PROBLEM — I25.119 ATHEROSCLEROTIC HEART DISEASE OF NATIVE CORONARY ARTERY WITH UNSPECIFIED ANGINA PECTORIS (HCC): Status: ACTIVE | Noted: 2023-12-21

## 2024-01-24 ENCOUNTER — TELEPHONE (OUTPATIENT)
Dept: CARDIOLOGY CLINIC | Age: 79
End: 2024-01-24

## 2024-01-24 ENCOUNTER — TELEMEDICINE (OUTPATIENT)
Dept: INTERNAL MEDICINE CLINIC | Age: 79
End: 2024-01-24
Payer: MEDICARE

## 2024-01-24 ENCOUNTER — TELEPHONE (OUTPATIENT)
Dept: INTERNAL MEDICINE CLINIC | Age: 79
End: 2024-01-24

## 2024-01-24 DIAGNOSIS — J98.4 RESTRICTIVE LUNG DISEASE: ICD-10-CM

## 2024-01-24 DIAGNOSIS — U07.1 COVID-19 VIRUS INFECTION: Primary | ICD-10-CM

## 2024-01-24 PROCEDURE — 99213 OFFICE O/P EST LOW 20 MIN: CPT | Performed by: INTERNAL MEDICINE

## 2024-01-24 PROCEDURE — 1124F ACP DISCUSS-NO DSCNMKR DOCD: CPT | Performed by: INTERNAL MEDICINE

## 2024-01-24 RX ORDER — NIRMATRELVIR AND RITONAVIR 150-100 MG
KIT ORAL
Qty: 20 TABLET | Refills: 0 | Status: SHIPPED | OUTPATIENT
Start: 2024-01-24 | End: 2024-01-24 | Stop reason: CLARIF

## 2024-01-24 NOTE — TELEPHONE ENCOUNTER
[facility] pharmacy called to report severe drug interactions between Paxlovid, Atorvastatin, and Brilinta.     Please advise

## 2024-01-24 NOTE — TELEPHONE ENCOUNTER
Patient has been prescribed paclovid for 5 days.  Can he hold atorvastatin and brilinta during this time?

## 2024-01-25 ENCOUNTER — TELEPHONE (OUTPATIENT)
Dept: CARDIOLOGY CLINIC | Age: 79
End: 2024-01-25

## 2024-01-25 NOTE — TELEPHONE ENCOUNTER
Spoke with wife and relayed message to her.  She v/u.    I called [facility] to let them know that he could hold the meds while on Paxlovid.        Ian Tirado MD  [facility] Staff3 hours ago (5:50 AM)       Yes he may hold and follow the instructions on the Paclovid           Patient has been prescribed paclovid for 5 days.  Can he hold atorvastatin and brilinta during this time?

## 2024-03-08 DIAGNOSIS — G62.9 NEUROPATHY: ICD-10-CM

## 2024-03-11 RX ORDER — FLUTICASONE FUROATE, UMECLIDINIUM BROMIDE AND VILANTEROL TRIFENATATE 200; 62.5; 25 UG/1; UG/1; UG/1
POWDER RESPIRATORY (INHALATION)
Qty: 1 EACH | Refills: 5 | Status: SHIPPED | OUTPATIENT
Start: 2024-03-11

## 2024-03-11 RX ORDER — GABAPENTIN 300 MG/1
CAPSULE ORAL
Qty: 180 CAPSULE | Refills: 1 | Status: SHIPPED | OUTPATIENT
Start: 2024-03-11 | End: 2024-09-07

## 2024-03-27 DIAGNOSIS — I10 PRIMARY HYPERTENSION: ICD-10-CM

## 2024-03-27 RX ORDER — LOSARTAN POTASSIUM 25 MG/1
25 TABLET ORAL DAILY
Qty: 90 TABLET | Refills: 3 | Status: SHIPPED | OUTPATIENT
Start: 2024-03-27

## 2024-03-27 NOTE — TELEPHONE ENCOUNTER
CHEYANNEG RONDEYOT      LOV  08/08/2023  UPC[facility]ING  04/05/2024  [facility]  06/14/2023

## 2024-03-28 ENCOUNTER — TELEPHONE (OUTPATIENT)
Dept: PULMONOLOGY | Age: 79
End: 2024-03-28

## 2024-03-28 NOTE — TELEPHONE ENCOUNTER
We received a other for continuo oxygen for daytime from [facility] [place].  Per Dr Mcwilliams patient need a 6mw before signing order.  Call patient and relate doctor's message,patient state that he use sometime oxygen during the daytime,and that he don't want to do a test for now.  Patient was explain the [facility] [place] may  his oxygen if they don't receive order sign ,patient state that was fine with him.

## 2024-04-04 NOTE — PROGRESS NOTES
x2    DIAGNOSTIC CARDIAC CATH LAB PROCEDURE      TOE AMPUTATION Bilateral 8/13/2021    AMPUTATION OF LEFT 2ND DIGIT LEFT FOOT, RIGHT 3RD DIGIT,  RIGHT PARTIAL 4TH DIGIT RIGHT FOOT performed by Ann Kinney DPM at [facility] OR       Objective:   BP (!) 100/58   Pulse 62   Ht 1.829 m (6')   Wt 76.7 kg (169 lb)   SpO2 96%   BMI 22.92 kg/m²     Wt Readings from Last 3 Encounters:   04/05/24 76.7 kg (169 lb)   12/21/23 78 kg (172 lb)   12/13/23 76.8 kg (169 lb 6 oz)       Physical Exam:  General: No Respiratory distress, appears well developed and well nourished.   Eyes:  Sclera nonicteric  Nose/Sinuses:  negative findings: nose shows no deformity, asymmetry, or inflammation, nasal mucosa normal, septum midline with no perforation or bleeding  Back:  no pain to palpation  Joint:  no active joint inflammation  Musculoskeletal:  negative  Skin:  Warm and dry,  Neck:  Negative for JVD and Carotid Bruits.   Chest:  Course crackles Bilateral bases.    Cardiovascular:  RRR 60 bpm. no ectopy   S1S2 normal, no murmur, no rub or thrill.  Abdomen non tender no mass no abnormal pulsation   Extremities:   No edema, clubbing, cyanosis,old trauma left lower leg bone.left arm can be raised almost upto shoulder level forward.Strength left hand normal.  Interosseous muscles left arm weak but flexion extension left hand almost back to normal.  Neuro:  Shoulder movements restricted in abduction beyond 90%bilat    Medications:          Lab Data:  CBC:   Lab Results   Component Value Date    WBC 8.3 06/14/2023    HGB 14.6 06/14/2023    HCT 43.9 06/14/2023    MCV 96.3 06/14/2023     06/14/2023       BMP:   Lab Results   Component Value Date/Time     06/14/2023 09:07 AM    K 4.8 06/14/2023 09:07 AM    K 4.0 08/15/2021 05:44 AM     06/14/2023 09:07 AM    CO2 28 06/14/2023 09:07 AM    BUN 21 06/14/2023 09:07 AM    CREATININE 0.9 06/14/2023 09:07 AM    GLUCOSE 131 06/14/2023 09:07 AM    CALCIUM 9.7 06/14/2023 09:07

## 2024-04-05 ENCOUNTER — OFFICE VISIT (OUTPATIENT)
Dept: CARDIOLOGY CLINIC | Age: 79
End: 2024-04-05
Payer: MEDICARE

## 2024-04-05 ENCOUNTER — HOSPITAL ENCOUNTER (OUTPATIENT)
Age: 79
Discharge: HOME | End: 2024-04-05
Payer: MEDICARE

## 2024-04-05 ENCOUNTER — TELEPHONE (OUTPATIENT)
Dept: CARDIOLOGY CLINIC | Age: 79
End: 2024-04-05

## 2024-04-05 VITALS
HEIGHT: 72 IN | SYSTOLIC BLOOD PRESSURE: 100 MMHG | BODY MASS INDEX: 22.89 KG/M2 | DIASTOLIC BLOOD PRESSURE: 58 MMHG | WEIGHT: 169 LBS | HEART RATE: 62 BPM | OXYGEN SATURATION: 96 %

## 2024-04-05 DIAGNOSIS — I25.5 ISCHEMIC CARDIOMYOPATHY: ICD-10-CM

## 2024-04-05 DIAGNOSIS — I50.42 CHRONIC COMBINED SYSTOLIC AND DIASTOLIC CONGESTIVE HEART FAILURE (HCC): ICD-10-CM

## 2024-04-05 DIAGNOSIS — I25.10 CORONARY ARTERY DISEASE INVOLVING NATIVE CORONARY ARTERY OF NATIVE HEART WITHOUT ANGINA PECTORIS: Primary | ICD-10-CM

## 2024-04-05 DIAGNOSIS — Z95.1 S/P CABG (CORONARY ARTERY BYPASS GRAFT): ICD-10-CM

## 2024-04-05 DIAGNOSIS — Z79.899 MEDICATION MANAGEMENT: ICD-10-CM

## 2024-04-05 DIAGNOSIS — I10 PRIMARY HYPERTENSION: ICD-10-CM

## 2024-04-05 LAB
ALBUMIN SERPL-MCNC: 4.6 G/DL (ref 3.4–5)
ALBUMIN/GLOB SERPL: 1.4 {RATIO} (ref 1.1–2.2)
ALP SERPL-CCNC: 66 U/L (ref 40–129)
ALT SERPL-CCNC: 27 U/L (ref 10–40)
ANION GAP SERPL CALCULATED.3IONS-SCNC: 12 MMOL/L (ref 3–16)
AST SERPL-CCNC: 23 U/L (ref 15–37)
BILIRUB SERPL-MCNC: 0.8 MG/DL (ref 0–1)
BUN SERPL-MCNC: 16 MG/DL (ref 7–20)
CALCIUM SERPL-MCNC: 10.3 MG/DL (ref 8.3–10.6)
CHLORIDE SERPL-SCNC: 99 MMOL/L (ref 99–110)
CHOLEST SERPL-MCNC: 129 MG/DL (ref 0–199)
CO2 SERPL-SCNC: 26 MMOL/L (ref 21–32)
CREAT SERPL-MCNC: 0.8 MG/DL (ref 0.8–1.3)
DEPRECATED RDW RBC AUTO: 15.3 % (ref 12.4–15.4)
EST. AVERAGE GLUCOSE BLD GHB EST-MCNC: 125.5 MG/DL
GFR SERPLBLD CREATININE-BSD FMLA CKD-EPI: >90 ML/MIN/{1.73_M2}
GLUCOSE SERPL-MCNC: 125 MG/DL (ref 70–99)
HBA1C MFR BLD: 6 %
HCT VFR BLD AUTO: 44.3 % (ref 40.5–52.5)
HDLC SERPL-MCNC: 43 MG/DL (ref 40–60)
HGB BLD-MCNC: 15.3 G/DL (ref 13.5–17.5)
LDLC SERPL CALC-MCNC: 62 MG/DL
MCH RBC QN AUTO: 32.5 PG (ref 26–34)
MCHC RBC AUTO-ENTMCNC: 34.5 G/DL (ref 31–36)
MCV RBC AUTO: 94.1 FL (ref 80–100)
PLATELET # BLD AUTO: 152 K/UL (ref 135–450)
PMV BLD AUTO: 10.1 FL (ref 5–10.5)
POTASSIUM SERPL-SCNC: 4.3 MMOL/L (ref 3.5–5.1)
PROT SERPL-MCNC: 7.8 G/DL (ref 6.4–8.2)
RBC # BLD AUTO: 4.71 M/UL (ref 4.2–5.9)
SODIUM SERPL-SCNC: 137 MMOL/L (ref 136–145)
TRIGL SERPL-MCNC: 119 MG/DL (ref 0–150)
TSH SERPL DL<=0.005 MIU/L-ACNC: 3.73 UIU/ML (ref 0.27–4.2)
VLDLC SERPL CALC-MCNC: 24 MG/DL
WBC # BLD AUTO: 8.2 K/UL (ref 4–11)

## 2024-04-05 PROCEDURE — 3074F SYST BP LT 130 MM HG: CPT | Performed by: INTERNAL MEDICINE

## 2024-04-05 PROCEDURE — 36415 COLL VENOUS BLD VENIPUNCTURE: CPT

## 2024-04-05 PROCEDURE — 99214 OFFICE O/P EST MOD 30 MIN: CPT | Performed by: INTERNAL MEDICINE

## 2024-04-05 PROCEDURE — 85027 COMPLETE CBC AUTOMATED: CPT

## 2024-04-05 PROCEDURE — 84443 ASSAY THYROID STIM HORMONE: CPT

## 2024-04-05 PROCEDURE — 1124F ACP DISCUSS-NO DSCNMKR DOCD: CPT | Performed by: INTERNAL MEDICINE

## 2024-04-05 PROCEDURE — 3078F DIAST BP <80 MM HG: CPT | Performed by: INTERNAL MEDICINE

## 2024-04-05 PROCEDURE — 80053 COMPREHEN METABOLIC PANEL: CPT

## 2024-04-05 PROCEDURE — 80061 LIPID PANEL: CPT

## 2024-04-05 PROCEDURE — 83036 HEMOGLOBIN GLYCOSYLATED A1C: CPT

## 2024-04-05 RX ORDER — ATORVASTATIN CALCIUM 20 MG/1
TABLET, FILM COATED ORAL
Qty: 90 TABLET | Refills: 3 | Status: SHIPPED | OUTPATIENT
Start: 2024-04-05

## 2024-04-05 RX ORDER — NITROGLYCERIN 0.4 MG/1
0.4 TABLET SUBLINGUAL EVERY 5 MIN PRN
Qty: 25 TABLET | Refills: 4 | Status: SHIPPED | OUTPATIENT
Start: 2024-04-05

## 2024-04-05 RX ORDER — METOPROLOL SUCCINATE 25 MG/1
25 TABLET, EXTENDED RELEASE ORAL DAILY
Qty: 90 TABLET | Refills: 3 | Status: SHIPPED | OUTPATIENT
Start: 2024-04-05

## 2024-04-05 NOTE — TELEPHONE ENCOUNTER
Attempted to call pt, no answer. LMOVM for pt to return call back to office. Will try again at a later time.

## 2024-04-05 NOTE — TELEPHONE ENCOUNTER
----- Message from Ian Tirado MD sent at 4/5/2024 12:23 PM EDT -----  Blood sugar little high. Diabetes test Hemoglobin A!C pending.  Kidney function blood cholesterol and thyroid levels are good.  ----- Message -----  From: [facility] Incoming Lab Results From Soft (Epic Adt)  Sent: 4/5/2024  11:51 AM EDT  To: Ian Tirado MD

## 2024-04-05 NOTE — PATIENT INSTRUCTIONS
PLAN:  Current medications reviewed. Refills given as warranted.   Discontinue doxazosin (Cardura)  Order lab work ~ fasting lipids, CMP, CBC, TSH, and hemoglobin A1c  Follow up in 6 months

## 2024-04-08 ENCOUNTER — TELEPHONE (OUTPATIENT)
Dept: CARDIOLOGY CLINIC | Age: 79
End: 2024-04-08

## 2024-04-08 NOTE — TELEPHONE ENCOUNTER
----- Message from Ian Tirado MD sent at 4/5/2024  8:13 PM EDT -----  Diabetes test is good.  ----- Message -----  From: [facility] Incoming Lab Results From Soft (Epic Adt)  Sent: 4/5/2024  11:51 AM EDT  To: Ian Tirado MD

## 2024-04-19 ENCOUNTER — ANCILLARY PROCEDURE (OUTPATIENT)
Dept: EMERGENCY DEPT | Age: 79
DRG: 189 | End: 2024-04-19
Attending: EMERGENCY MEDICINE
Payer: MEDICARE

## 2024-04-19 ENCOUNTER — HOSPITAL ENCOUNTER (INPATIENT)
Age: 79
LOS: 3 days | Discharge: HOME | DRG: 189 | End: 2024-04-23
Attending: EMERGENCY MEDICINE | Admitting: STUDENT IN AN ORGANIZED HEALTH CARE EDUCATION/TRAINING PROGRAM
Payer: MEDICARE

## 2024-04-19 ENCOUNTER — APPOINTMENT (OUTPATIENT)
Dept: CT IMAGING | Age: 79
DRG: 189 | End: 2024-04-19
Payer: MEDICARE

## 2024-04-19 ENCOUNTER — APPOINTMENT (OUTPATIENT)
Dept: GENERAL RADIOLOGY | Age: 79
DRG: 189 | End: 2024-04-19
Payer: MEDICARE

## 2024-04-19 DIAGNOSIS — R06.00 DYSPNEA AND RESPIRATORY ABNORMALITIES: Primary | ICD-10-CM

## 2024-04-19 DIAGNOSIS — J18.9 PNEUMONIA DUE TO INFECTIOUS ORGANISM, UNSPECIFIED LATERALITY, UNSPECIFIED PART OF LUNG: ICD-10-CM

## 2024-04-19 DIAGNOSIS — R06.89 DYSPNEA AND RESPIRATORY ABNORMALITIES: Primary | ICD-10-CM

## 2024-04-19 DIAGNOSIS — R09.02 HYPOXIA: ICD-10-CM

## 2024-04-19 DIAGNOSIS — J44.9 CHRONIC OBSTRUCTIVE PULMONARY DISEASE, UNSPECIFIED COPD TYPE (HCC): ICD-10-CM

## 2024-04-19 DIAGNOSIS — J84.10 PULMONARY FIBROSIS (HCC): ICD-10-CM

## 2024-04-19 LAB
ANION GAP SERPL CALCULATED.3IONS-SCNC: 14 MMOL/L (ref 3–16)
BASE EXCESS BLDV CALC-SCNC: -1.2 MMOL/L (ref -3–3)
BASOPHILS # BLD: 0.2 K/UL (ref 0–0.2)
BASOPHILS NFR BLD: 1.5 %
BUN SERPL-MCNC: 21 MG/DL (ref 7–20)
CALCIUM SERPL-MCNC: 10 MG/DL (ref 8.3–10.6)
CHLORIDE SERPL-SCNC: 97 MMOL/L (ref 99–110)
CO2 BLDV-SCNC: 25 MMOL/L
CO2 SERPL-SCNC: 24 MMOL/L (ref 21–32)
COHGB MFR BLDV: 2.7 % (ref 0–1.5)
CREAT SERPL-MCNC: 0.8 MG/DL (ref 0.8–1.3)
DEPRECATED RDW RBC AUTO: 15.1 % (ref 12.4–15.4)
EOSINOPHIL # BLD: 0.3 K/UL (ref 0–0.6)
EOSINOPHIL NFR BLD: 3 %
GFR SERPLBLD CREATININE-BSD FMLA CKD-EPI: >90 ML/MIN/{1.73_M2}
GLUCOSE SERPL-MCNC: 143 MG/DL (ref 70–99)
HCO3 BLDV-SCNC: 24 MMOL/L (ref 23–29)
HCT VFR BLD AUTO: 43.1 % (ref 40.5–52.5)
HGB BLD-MCNC: 14.4 G/DL (ref 13.5–17.5)
LYMPHOCYTES # BLD: 1.7 K/UL (ref 1–5.1)
LYMPHOCYTES NFR BLD: 15.4 %
MCH RBC QN AUTO: 31.8 PG (ref 26–34)
MCHC RBC AUTO-ENTMCNC: 33.4 G/DL (ref 31–36)
MCV RBC AUTO: 95.4 FL (ref 80–100)
METHGB MFR BLDV: 0.3 %
MONOCYTES # BLD: 1 K/UL (ref 0–1.3)
MONOCYTES NFR BLD: 9 %
NEUTROPHILS # BLD: 7.7 K/UL (ref 1.7–7.7)
NEUTROPHILS NFR BLD: 71.1 %
NT-PROBNP SERPL-MCNC: 435 PG/ML (ref 0–449)
O2 THERAPY: ABNORMAL
PCO2 BLDV: 41.8 MMHG (ref 40–50)
PH BLDV: 7.38 [PH] (ref 7.35–7.45)
PLATELET # BLD AUTO: 207 K/UL (ref 135–450)
PMV BLD AUTO: 9.2 FL (ref 5–10.5)
PO2 BLDV: 45.1 MMHG (ref 25–40)
POTASSIUM SERPL-SCNC: 4.1 MMOL/L (ref 3.5–5.1)
RBC # BLD AUTO: 4.52 M/UL (ref 4.2–5.9)
SAO2 % BLDV: 79 %
SODIUM SERPL-SCNC: 135 MMOL/L (ref 136–145)
TROPONIN, HIGH SENSITIVITY: 21 NG/L (ref 0–22)
TROPONIN, HIGH SENSITIVITY: 22 NG/L (ref 0–22)
WBC # BLD AUTO: 10.9 K/UL (ref 4–11)

## 2024-04-19 PROCEDURE — 93308 TTE F-UP OR LMTD: CPT

## 2024-04-19 PROCEDURE — 94761 N-INVAS EAR/PLS OXIMETRY MLT: CPT

## 2024-04-19 PROCEDURE — 36415 COLL VENOUS BLD VENIPUNCTURE: CPT

## 2024-04-19 PROCEDURE — 96365 THER/PROPH/DIAG IV INF INIT: CPT

## 2024-04-19 PROCEDURE — 6360000004 HC RX CONTRAST MEDICATION: Performed by: EMERGENCY MEDICINE

## 2024-04-19 PROCEDURE — 71260 CT THORAX DX C+: CPT

## 2024-04-19 PROCEDURE — 93005 ELECTROCARDIOGRAM TRACING: CPT | Performed by: EMERGENCY MEDICINE

## 2024-04-19 PROCEDURE — 80048 BASIC METABOLIC PNL TOTAL CA: CPT

## 2024-04-19 PROCEDURE — 99285 EMERGENCY DEPT VISIT HI MDM: CPT

## 2024-04-19 PROCEDURE — 83880 ASSAY OF NATRIURETIC PEPTIDE: CPT

## 2024-04-19 PROCEDURE — 96375 TX/PRO/DX INJ NEW DRUG ADDON: CPT

## 2024-04-19 PROCEDURE — 71045 X-RAY EXAM CHEST 1 VIEW: CPT

## 2024-04-19 PROCEDURE — 2700000000 HC OXYGEN THERAPY PER DAY

## 2024-04-19 PROCEDURE — 84484 ASSAY OF TROPONIN QUANT: CPT

## 2024-04-19 PROCEDURE — 82803 BLOOD GASES ANY COMBINATION: CPT

## 2024-04-19 PROCEDURE — 85025 COMPLETE CBC W/AUTO DIFF WBC: CPT

## 2024-04-19 PROCEDURE — 76604 US EXAM CHEST: CPT

## 2024-04-19 RX ADMIN — IOPAMIDOL 75 ML: 755 INJECTION, SOLUTION INTRAVENOUS at 23:19

## 2024-04-20 PROBLEM — J96.01 ACUTE RESPIRATORY FAILURE WITH HYPOXIA (HCC): Status: ACTIVE | Noted: 2024-04-20

## 2024-04-20 LAB
EKG ATRIAL RATE: 67 BPM
EKG DIAGNOSIS: NORMAL
EKG P AXIS: 53 DEGREES
EKG P-R INTERVAL: 218 MS
EKG Q-T INTERVAL: 422 MS
EKG QRS DURATION: 110 MS
EKG QTC CALCULATION (BAZETT): 445 MS
EKG R AXIS: -18 DEGREES
EKG T AXIS: 56 DEGREES
EKG VENTRICULAR RATE: 67 BPM
FLUAV RNA RESP QL NAA+PROBE: NOT DETECTED
FLUBV RNA RESP QL NAA+PROBE: NOT DETECTED
PROCALCITONIN SERPL IA-MCNC: 0.05 NG/ML (ref 0–0.15)
SARS-COV-2 RNA RESP QL NAA+PROBE: NOT DETECTED

## 2024-04-20 PROCEDURE — 2700000000 HC OXYGEN THERAPY PER DAY

## 2024-04-20 PROCEDURE — 6360000002 HC RX W HCPCS: Performed by: STUDENT IN AN ORGANIZED HEALTH CARE EDUCATION/TRAINING PROGRAM

## 2024-04-20 PROCEDURE — 2500000003 HC RX 250 WO HCPCS: Performed by: EMERGENCY MEDICINE

## 2024-04-20 PROCEDURE — 6370000000 HC RX 637 (ALT 250 FOR IP): Performed by: INTERNAL MEDICINE

## 2024-04-20 PROCEDURE — 93010 ELECTROCARDIOGRAM REPORT: CPT | Performed by: INTERNAL MEDICINE

## 2024-04-20 PROCEDURE — 94761 N-INVAS EAR/PLS OXIMETRY MLT: CPT

## 2024-04-20 PROCEDURE — 97165 OT EVAL LOW COMPLEX 30 MIN: CPT

## 2024-04-20 PROCEDURE — 97530 THERAPEUTIC ACTIVITIES: CPT

## 2024-04-20 PROCEDURE — 6360000002 HC RX W HCPCS: Performed by: EMERGENCY MEDICINE

## 2024-04-20 PROCEDURE — 87205 SMEAR GRAM STAIN: CPT

## 2024-04-20 PROCEDURE — 2580000003 HC RX 258: Performed by: STUDENT IN AN ORGANIZED HEALTH CARE EDUCATION/TRAINING PROGRAM

## 2024-04-20 PROCEDURE — 36415 COLL VENOUS BLD VENIPUNCTURE: CPT

## 2024-04-20 PROCEDURE — 6370000000 HC RX 637 (ALT 250 FOR IP): Performed by: STUDENT IN AN ORGANIZED HEALTH CARE EDUCATION/TRAINING PROGRAM

## 2024-04-20 PROCEDURE — 99223 1ST HOSP IP/OBS HIGH 75: CPT | Performed by: INTERNAL MEDICINE

## 2024-04-20 PROCEDURE — 2060000000 HC ICU INTERMEDIATE R&B

## 2024-04-20 PROCEDURE — 87070 CULTURE OTHR SPECIMN AEROBIC: CPT

## 2024-04-20 PROCEDURE — 94640 AIRWAY INHALATION TREATMENT: CPT

## 2024-04-20 PROCEDURE — 84145 PROCALCITONIN (PCT): CPT

## 2024-04-20 PROCEDURE — 97161 PT EVAL LOW COMPLEX 20 MIN: CPT

## 2024-04-20 PROCEDURE — 6360000002 HC RX W HCPCS: Performed by: INTERNAL MEDICINE

## 2024-04-20 PROCEDURE — 6370000000 HC RX 637 (ALT 250 FOR IP): Performed by: NURSE PRACTITIONER

## 2024-04-20 PROCEDURE — 87449 NOS EACH ORGANISM AG IA: CPT

## 2024-04-20 PROCEDURE — 1200000000 HC SEMI PRIVATE

## 2024-04-20 PROCEDURE — 87636 SARSCOV2 & INF A&B AMP PRB: CPT

## 2024-04-20 PROCEDURE — 2580000003 HC RX 258: Performed by: EMERGENCY MEDICINE

## 2024-04-20 RX ORDER — FAMOTIDINE 20 MG/1
20 TABLET, FILM COATED ORAL 2 TIMES DAILY
Status: DISCONTINUED | OUTPATIENT
Start: 2024-04-20 | End: 2024-04-23 | Stop reason: HOSPADM

## 2024-04-20 RX ORDER — FLUOXETINE HYDROCHLORIDE 20 MG/1
20 CAPSULE ORAL DAILY
Status: DISCONTINUED | OUTPATIENT
Start: 2024-04-20 | End: 2024-04-23 | Stop reason: HOSPADM

## 2024-04-20 RX ORDER — SODIUM CHLORIDE 0.9 % (FLUSH) 0.9 %
5-40 SYRINGE (ML) INJECTION PRN
Status: DISCONTINUED | OUTPATIENT
Start: 2024-04-20 | End: 2024-04-23 | Stop reason: HOSPADM

## 2024-04-20 RX ORDER — ATORVASTATIN CALCIUM 10 MG/1
20 TABLET, FILM COATED ORAL DAILY
Status: DISCONTINUED | OUTPATIENT
Start: 2024-04-20 | End: 2024-04-23 | Stop reason: HOSPADM

## 2024-04-20 RX ORDER — GABAPENTIN 300 MG/1
300 CAPSULE ORAL 2 TIMES DAILY
Status: DISCONTINUED | OUTPATIENT
Start: 2024-04-20 | End: 2024-04-23 | Stop reason: HOSPADM

## 2024-04-20 RX ORDER — METOPROLOL SUCCINATE 25 MG/1
25 TABLET, EXTENDED RELEASE ORAL DAILY
Status: DISCONTINUED | OUTPATIENT
Start: 2024-04-20 | End: 2024-04-23 | Stop reason: HOSPADM

## 2024-04-20 RX ORDER — LANOLIN ALCOHOL/MO/W.PET/CERES
50 CREAM (GRAM) TOPICAL DAILY
Status: DISCONTINUED | OUTPATIENT
Start: 2024-04-20 | End: 2024-04-23 | Stop reason: HOSPADM

## 2024-04-20 RX ORDER — ALBUTEROL SULFATE 2.5 MG/3ML
2.5 SOLUTION RESPIRATORY (INHALATION) EVERY 4 HOURS PRN
Status: DISCONTINUED | OUTPATIENT
Start: 2024-04-20 | End: 2024-04-23 | Stop reason: HOSPADM

## 2024-04-20 RX ORDER — SODIUM CHLORIDE 9 MG/ML
INJECTION, SOLUTION INTRAVENOUS PRN
Status: DISCONTINUED | OUTPATIENT
Start: 2024-04-20 | End: 2024-04-23 | Stop reason: HOSPADM

## 2024-04-20 RX ORDER — SODIUM CHLORIDE 0.9 % (FLUSH) 0.9 %
5-40 SYRINGE (ML) INJECTION EVERY 12 HOURS SCHEDULED
Status: DISCONTINUED | OUTPATIENT
Start: 2024-04-20 | End: 2024-04-23 | Stop reason: HOSPADM

## 2024-04-20 RX ORDER — GUAIFENESIN 200 MG/10ML
200 LIQUID ORAL EVERY 4 HOURS PRN
Status: DISCONTINUED | OUTPATIENT
Start: 2024-04-20 | End: 2024-04-23 | Stop reason: HOSPADM

## 2024-04-20 RX ORDER — PREDNISONE 20 MG/1
40 TABLET ORAL DAILY
Status: DISCONTINUED | OUTPATIENT
Start: 2024-04-22 | End: 2024-04-23 | Stop reason: HOSPADM

## 2024-04-20 RX ORDER — ARFORMOTEROL TARTRATE 15 UG/2ML
15 SOLUTION RESPIRATORY (INHALATION)
Status: DISCONTINUED | OUTPATIENT
Start: 2024-04-20 | End: 2024-04-23 | Stop reason: HOSPADM

## 2024-04-20 RX ORDER — AZITHROMYCIN 250 MG/1
500 TABLET, FILM COATED ORAL DAILY
Status: COMPLETED | OUTPATIENT
Start: 2024-04-20 | End: 2024-04-22

## 2024-04-20 RX ORDER — M-VIT,TX,IRON,MINS/CALC/FOLIC 27MG-0.4MG
1 TABLET ORAL DAILY
Status: DISCONTINUED | OUTPATIENT
Start: 2024-04-20 | End: 2024-04-23 | Stop reason: HOSPADM

## 2024-04-20 RX ORDER — IPRATROPIUM BROMIDE AND ALBUTEROL SULFATE 2.5; .5 MG/3ML; MG/3ML
1 SOLUTION RESPIRATORY (INHALATION)
Status: DISCONTINUED | OUTPATIENT
Start: 2024-04-20 | End: 2024-04-22

## 2024-04-20 RX ORDER — NITROGLYCERIN 0.4 MG/1
0.4 TABLET SUBLINGUAL EVERY 5 MIN PRN
Status: DISCONTINUED | OUTPATIENT
Start: 2024-04-20 | End: 2024-04-23 | Stop reason: HOSPADM

## 2024-04-20 RX ORDER — ENOXAPARIN SODIUM 100 MG/ML
40 INJECTION SUBCUTANEOUS DAILY
Status: DISCONTINUED | OUTPATIENT
Start: 2024-04-20 | End: 2024-04-23 | Stop reason: HOSPADM

## 2024-04-20 RX ORDER — ACETAMINOPHEN 325 MG/1
650 TABLET ORAL EVERY 6 HOURS PRN
Status: DISCONTINUED | OUTPATIENT
Start: 2024-04-20 | End: 2024-04-23 | Stop reason: HOSPADM

## 2024-04-20 RX ORDER — BUDESONIDE 0.5 MG/2ML
0.5 INHALANT ORAL
Status: DISCONTINUED | OUTPATIENT
Start: 2024-04-20 | End: 2024-04-23 | Stop reason: HOSPADM

## 2024-04-20 RX ORDER — MODAFINIL 200 MG/1
200 TABLET ORAL DAILY
Status: DISCONTINUED | OUTPATIENT
Start: 2024-04-20 | End: 2024-04-23 | Stop reason: HOSPADM

## 2024-04-20 RX ORDER — POLYETHYLENE GLYCOL 3350 17 G/17G
17 POWDER, FOR SOLUTION ORAL DAILY PRN
Status: DISCONTINUED | OUTPATIENT
Start: 2024-04-20 | End: 2024-04-23 | Stop reason: HOSPADM

## 2024-04-20 RX ORDER — ACETAMINOPHEN 650 MG/1
650 SUPPOSITORY RECTAL EVERY 6 HOURS PRN
Status: DISCONTINUED | OUTPATIENT
Start: 2024-04-20 | End: 2024-04-23 | Stop reason: HOSPADM

## 2024-04-20 RX ORDER — LOSARTAN POTASSIUM 25 MG/1
25 TABLET ORAL DAILY
Status: DISCONTINUED | OUTPATIENT
Start: 2024-04-20 | End: 2024-04-23 | Stop reason: HOSPADM

## 2024-04-20 RX ADMIN — FLUOXETINE HYDROCHLORIDE 20 MG: 20 CAPSULE ORAL at 08:48

## 2024-04-20 RX ADMIN — SALINE NASAL SPRAY 1 SPRAY: 1.5 SOLUTION NASAL at 20:49

## 2024-04-20 RX ADMIN — DOXYCYCLINE 100 MG: 100 INJECTION, POWDER, LYOPHILIZED, FOR SOLUTION INTRAVENOUS at 02:14

## 2024-04-20 RX ADMIN — IPRATROPIUM BROMIDE AND ALBUTEROL SULFATE 1 DOSE: 2.5; .5 SOLUTION RESPIRATORY (INHALATION) at 20:58

## 2024-04-20 RX ADMIN — METOPROLOL SUCCINATE 25 MG: 25 TABLET, FILM COATED, EXTENDED RELEASE ORAL at 08:48

## 2024-04-20 RX ADMIN — TICAGRELOR 60 MG: 60 TABLET ORAL at 08:54

## 2024-04-20 RX ADMIN — Medication 10 ML: at 20:03

## 2024-04-20 RX ADMIN — IPRATROPIUM BROMIDE AND ALBUTEROL SULFATE 1 DOSE: 2.5; .5 SOLUTION RESPIRATORY (INHALATION) at 12:14

## 2024-04-20 RX ADMIN — ATORVASTATIN CALCIUM 20 MG: 10 TABLET, FILM COATED ORAL at 08:47

## 2024-04-20 RX ADMIN — WATER 125 MG: 1 INJECTION INTRAMUSCULAR; INTRAVENOUS; SUBCUTANEOUS at 01:36

## 2024-04-20 RX ADMIN — ARFORMOTEROL TARTRATE 15 MCG: 15 SOLUTION RESPIRATORY (INHALATION) at 15:33

## 2024-04-20 RX ADMIN — FAMOTIDINE 20 MG: 20 TABLET, FILM COATED ORAL at 14:08

## 2024-04-20 RX ADMIN — MODAFINIL 200 MG: 200 TABLET ORAL at 14:08

## 2024-04-20 RX ADMIN — METHYLPREDNISOLONE SODIUM SUCCINATE 40 MG: 40 INJECTION, POWDER, LYOPHILIZED, FOR SOLUTION INTRAMUSCULAR; INTRAVENOUS at 14:08

## 2024-04-20 RX ADMIN — CEFTRIAXONE SODIUM 1000 MG: 1 INJECTION, POWDER, FOR SOLUTION INTRAMUSCULAR; INTRAVENOUS at 01:39

## 2024-04-20 RX ADMIN — FAMOTIDINE 20 MG: 20 TABLET, FILM COATED ORAL at 20:01

## 2024-04-20 RX ADMIN — LOSARTAN POTASSIUM 25 MG: 25 TABLET, FILM COATED ORAL at 08:48

## 2024-04-20 RX ADMIN — BUDESONIDE 500 MCG: 0.5 SUSPENSION RESPIRATORY (INHALATION) at 15:25

## 2024-04-20 RX ADMIN — BUDESONIDE 500 MCG: 0.5 SUSPENSION RESPIRATORY (INHALATION) at 20:58

## 2024-04-20 RX ADMIN — METHYLPREDNISOLONE SODIUM SUCCINATE 40 MG: 40 INJECTION, POWDER, LYOPHILIZED, FOR SOLUTION INTRAMUSCULAR; INTRAVENOUS at 08:47

## 2024-04-20 RX ADMIN — METHYLPREDNISOLONE SODIUM SUCCINATE 40 MG: 40 INJECTION, POWDER, LYOPHILIZED, FOR SOLUTION INTRAMUSCULAR; INTRAVENOUS at 18:57

## 2024-04-20 RX ADMIN — AZITHROMYCIN 500 MG: 250 TABLET, FILM COATED ORAL at 08:47

## 2024-04-20 RX ADMIN — GABAPENTIN 300 MG: 300 CAPSULE ORAL at 08:48

## 2024-04-20 RX ADMIN — ARFORMOTEROL TARTRATE 15 MCG: 15 SOLUTION RESPIRATORY (INHALATION) at 20:58

## 2024-04-20 RX ADMIN — ENOXAPARIN SODIUM 40 MG: 100 INJECTION SUBCUTANEOUS at 08:48

## 2024-04-20 RX ADMIN — Medication 50 MG: at 08:47

## 2024-04-20 RX ADMIN — TICAGRELOR 60 MG: 60 TABLET ORAL at 20:01

## 2024-04-20 RX ADMIN — Medication 10 ML: at 08:49

## 2024-04-20 RX ADMIN — Medication 1 TABLET: at 08:48

## 2024-04-20 RX ADMIN — IPRATROPIUM BROMIDE AND ALBUTEROL SULFATE 1 DOSE: 2.5; .5 SOLUTION RESPIRATORY (INHALATION) at 08:04

## 2024-04-20 RX ADMIN — GABAPENTIN 300 MG: 300 CAPSULE ORAL at 20:01

## 2024-04-20 RX ADMIN — ACETAMINOPHEN 650 MG: 325 TABLET ORAL at 14:08

## 2024-04-20 ASSESSMENT — PAIN DESCRIPTION - LOCATION: LOCATION: HEAD

## 2024-04-20 ASSESSMENT — PAIN SCALES - GENERAL
PAINLEVEL_OUTOF10: 0
PAINLEVEL_OUTOF10: 3
PAINLEVEL_OUTOF10: 0

## 2024-04-20 ASSESSMENT — PAIN DESCRIPTION - DESCRIPTORS: DESCRIPTORS: ACHING

## 2024-04-20 NOTE — PROGRESS NOTES
HOSPITAL MEDICINE  - BRIEF NOTE    Seen by my colleague today. History/record reviewed, patient seen and examined.      TOAN BUSTILLOS MD  4/20/2024  12:17 PM

## 2024-04-20 NOTE — PROGRESS NOTES
Physical Therapy  [address]  Physical Therapy Initial Assessment & Treatment    Name: Kishan Elaine  : 1945  MRN: [id number]  Date of Service: 2024    Discharge Recommendations:  Home with assist PRN, No therapy recommended at discharge   PT Equipment Recommendations  Equipment Needed: No  Other: therapist educated on use of rollator      Patient Diagnosis(es): The primary encounter diagnosis was Dyspnea and respiratory abnormalities. Diagnoses of Hypoxia and Pneumonia due to infectious organism, unspecified laterality, unspecified part of lung were also pertinent to this visit.  Past Medical History:  has a past medical history of Arthritis of hand, CAD (coronary artery disease), Chronic rhinitis, COVID, COVID toes, Hyperlipidemia, Hypertension, Ischemic cardiomyopathy, Personal history of COVID-19, STEMI (ST elevation myocardial infarction) (HCC), Tinnitus, and Toe gangrene (HCC).  Past Surgical History:  has a past surgical history that includes Coronary artery bypass graft; Coronary angioplasty (2011); Diagnostic Cardiac Cath Lab Procedure; Coronary angioplasty with stent (2019); Biceps tendon repair; and Toe amputation (Bilateral, 2021).    Assessment   Body Structures, Functions, Activity Limitations Requiring Skilled Therapeutic Intervention: Decreased endurance;Decreased safe awareness  Assessment: Pt is awake and agreeable to therapy session. Pt is limited by decreased activity tolerance. He requires supervision with ambulation without AD, but requires seated rest breaks every ~50ft d/t increased work of breathing. On room air, pt initially 94%, but desats to 89% with talking. Pt initially on 1L O2 with activity, but therapist titrated up to 2L d/t desat to 83% with prolonged recovery period following first bout of ambulation. On subsequent bouts of ambulation, pt continues to desat to 83-85%, but recovery period is shorter on 2L O2. Recommend continued  assistance  Number of Stairs Trained: 4                          AM-PAC - Mobility    AM-PAC Basic Mobility - Inpatient   How much help is needed turning from your back to your side while in a flat bed without using bedrails?: None  How much help is needed moving from lying on your back to sitting on the side of a flat bed without using bedrails?: None  How much help is needed moving to and from a bed to a chair?: None  How much help is needed standing up from a chair using your arms?: None  How much help is needed walking in hospital room?: A Little  How much help is needed climbing 3-5 steps with a railing?: A Little  AM-PAC Inpatient Mobility Raw Score : 22  AM-PAC Inpatient T-Scale Score : 53.28  Mobility Inpatient CMS 0-100% Score: 20.91  Mobility Inpatient CMS G-Code Modifier : CJ         Goals  Short Term Goals  Time Frame for Short Term Goals: 4/27  Short Term Goal 1: Pt will ambulate 150' with independence and with SpO2 >90%  Short Term Goal 2: Pt will navigate 10 stairs with independence and with SpO2 >90%  Long Term Goals  Long Term Goal 1: No long term goals set d/t anticipated return to baseline function with 1-2 follow up therapy sessions  Patient Goals   Patient Goals : \"to go home\"       Education  Patient Education  Education Given To: Patient;Family  Education Provided: Role of Therapy;Plan of Care;Family Education;Energy Conservation;Equipment;Precautions;Transfer Training;Orientation  Education Provided Comments: recovery breathing, checking O2 at home  Education Method: Demonstration;Verbal  Barriers to Learning: None  Education Outcome: Verbalized understanding      Therapy Time   Individual Concurrent Group Co-treatment   Time In 1435         Time Out 1513         Minutes 38         Timed Code Treatment Minutes: 28 Minutes + 10 minute evaluation       NATI MAYA, PT       If pt is unable to be seen after this session, please let this note serve as discharge summary.  Please see case

## 2024-04-20 NOTE — RT PROTOCOL NOTE
RT Inhaler-Nebulizer Bronchodilator Protocol Note    There is a bronchodilator order in the chart from a provider indicating to follow the RT Bronchodilator Protocol and there is an “Initiate RT Inhaler-Nebulizer Bronchodilator Protocol” order as well (see protocol at bottom of note).    CXR Findings:  XR CHEST PORTABLE    Result Date: 4/19/2024  1. No acute cardiopulmonary disease. 2. Stable chronic lung changes.       The findings from the last RT Protocol Assessment were as follows:   History Pulmonary Disease: Chronic pulmonary disease  Respiratory Pattern: Dyspnea on exertion or RR 21-25 bpm  Breath Sounds: Slightly diminished and/or crackles  Cough: Strong, spontaneous, non-productive  Indication for Bronchodilator Therapy: On home bronchodilators, Decreased or absent breath sounds  Bronchodilator Assessment Score: 6    Aerosolized bronchodilator medication orders have been revised according to the RT Inhaler-Nebulizer Bronchodilator Protocol below.    Respiratory Therapist to perform RT Therapy Protocol Assessment initially then follow the protocol.  Repeat RT Therapy Protocol Assessment PRN for score 0-3 or on second treatment, BID, and PRN for scores above 3.    No Indications - adjust the frequency to every 6 hours PRN wheezing or bronchospasm, if no treatments needed after 48 hours then discontinue using Per Protocol order mode.     If indication present, adjust the RT bronchodilator orders based on the Bronchodilator Assessment Score as indicated below.  Use Inhaler orders unless patient has one or more of the following: on home nebulizer, not able to hold breath for 10 seconds, is not alert and oriented, cannot activate and use MDI correctly, or respiratory rate 25 breaths per minute or more, then use the equivalent nebulizer order(s) with same Frequency and PRN reasons based on the score.  If a patient is on this medication at home then do not decrease Frequency below that used at home.    0-3 - enter  or revise RT bronchodilator order(s) to equivalent RT Bronchodilator order with Frequency of every 4 hours PRN for wheezing or increased work of breathing using Per Protocol order mode.        4-6 - enter or revise RT Bronchodilator order(s) to two equivalent RT bronchodilator orders with one order with BID Frequency and one order with Frequency of every 4 hours PRN wheezing or increased work of breathing using Per Protocol order mode.        7-10 - enter or revise RT Bronchodilator order(s) to two equivalent RT bronchodilator orders with one order with TID Frequency and one order with Frequency of every 4 hours PRN wheezing or increased work of breathing using Per Protocol order mode.       11-13 - enter or revise RT Bronchodilator order(s) to one equivalent RT bronchodilator order with QID Frequency and an Albuterol order with Frequency of every 4 hours PRN wheezing or increased work of breathing using Per Protocol order mode.      Greater than 13 - enter or revise RT Bronchodilator order(s) to one equivalent RT bronchodilator order with every 4 hours Frequency and an Albuterol order with Frequency of every 2 hours PRN wheezing or increased work of breathing using Per Protocol order mode.     RT to enter RT Home Evaluation for COPD & MDI Assessment order using Per Protocol order mode.    Electronically signed by Isabel Lake RCP on 4/20/2024 at 4:17 AM

## 2024-04-20 NOTE — PLAN OF CARE
Problem: Discharge Planning  Goal: Discharge to home or other facility with appropriate resources  4/20/2024 1847 by Magnus Baldwin RN  Outcome: Progressing  4/20/2024 0559 by Jacey Chang RN  Outcome: Progressing     Problem: Respiratory - Adult  Goal: Absence of respiratory distress  Description: Absence of respiratory distress  4/20/2024 1847 by Magnus Baldwin RN  Outcome: Progressing  4/20/2024 0559 by Jacey Chang RN  Outcome: Progressing     Problem: Infection - Adult  Goal: *Absence of infection signs and symptoms  4/20/2024 1847 by Magnus Baldwin RN  Outcome: Progressing  4/20/2024 0559 by Jacey Chang RN  Outcome: Progressing     Problem: Metabolic/Fluid and Electrolytes - Adult  Goal: Acid-base balance  Description: Acid-base balance  4/20/2024 1847 by Magnus Baldwin RN  Outcome: Progressing  4/20/2024 0559 by Jacey Chang RN  Outcome: Progressing     Problem: Safety - Adult  Goal: Free from fall injury  Outcome: Progressing

## 2024-04-20 NOTE — PROGRESS NOTES
Occupational Therapy  Facility/Department: [facility] C4 [facility]U  Occupational Therapy Initial Assessment, Treatment, and Discharge    Name: Kishan Elaine  : 1945  MRN: [id number]  Date of Service: 2024    Discharge Recommendations:  Home with assist PRN  OT Equipment Recommendations  Equipment Needed: No     If pt is unable to be seen after this session, please let this note serve as discharge summary.  Please see case management note for discharge disposition.  Thank you.    Patient Diagnosis(es): The primary encounter diagnosis was Dyspnea and respiratory abnormalities. Diagnoses of Hypoxia and Pneumonia due to infectious organism, unspecified laterality, unspecified part of lung were also pertinent to this visit.  Past Medical History:  has a past medical history of Arthritis of hand, CAD (coronary artery disease), Chronic rhinitis, COVID, COVID toes, Hyperlipidemia, Hypertension, Ischemic cardiomyopathy, Personal history of COVID-19, STEMI (ST elevation myocardial infarction) (HCC), Tinnitus, and Toe gangrene (HCC).  Past Surgical History:  has a past surgical history that includes Coronary artery bypass graft; Coronary angioplasty (2011); Diagnostic Cardiac Cath Lab Procedure; Coronary angioplasty with stent (2019); Biceps tendon repair; and Toe amputation (Bilateral, 2021).           Assessment   Assessment: Pt is 79 yo male presenting from home w/  spouse, c/o SOB found ot have acute respiratory failure. PLOF IND with all ADLs and mobility w/o AD. Pt is functioning near baseline this date, performing bed mobility mod I and transfers and mobility w/ SPV and no AD. He performed LB ADLs and grooming tasks w/ SPV. Pt did desat during mobility, requiring extended seated rest breaks (see vitals section). Pt states he has no concerns about returning home w/ O2 if needed and that his wife can provide 24 hr SPV if needed (wife is a home care nurse and can assist). Pt has no further acute OT

## 2024-04-20 NOTE — H&P
V2.0  History and Physical      Name:  Kishan Elaine /Age/Sex: 1945  (78 y.o. male)   MRN & CSN:  [id number] & [id number] Encounter Date/Time: 2024 2:59 AM EDT   Location:   PCP: Rosy Toro MD       Hospital Day: 2    Assessment and Plan:   Patient is a 78-year-old male with a past medical history of pulmonary fibrosis, pulmonary hypertension, COPD, bronchiectasis, CAD status post CABG, CHF, who presents to the ED with complaints of shortness of breath  Hospital Problems             Last Modified POA    * (Principal) Acute respiratory failure with hypoxia (HCC) 2024 Yes       Acute respiratory failure with hypoxia  Concern for community-acquired pneumonia  Concern for pulmonary fibrosis flare  COPD  CAD status post CABG  CHF  Hypertension  Neuropathy  - Fevers, cough, productive sputum.  Chest x-ray shows questionable left lower lobe opacity, CT chest is pending.  New oxygen requirement of 2 L O2 nasal cannula.  - Get sputum culture,  procalcitonin, urine strep/Legionella antigen. Start IV Ceftriaxone/Azithromycin, continue on IV Solu-Medrol with transition to p.o. prednisone as needed albuterol nebs, scheduled DuoNebs, guaifenesin, incentive spirometry.  Consult pulmonology given complicated pulmonary history.  Continuous pulse oximetry, wean O2 nasal cannula as tolerated.  - Continue aspirin, Brilinta, metoprolol, atorvastatin.  Does not appear to be in acute CHF exacerbation.  Monitor intake and output  - Continue metoprolol, losartan  - Continue gabapentin        Disposition:   Current Living situation: Home  Expected Disposition: Home  Estimated D/C: 3 days    Diet Cardiac diet   DVT Prophylaxis [x] Lovenox, []  Heparin, [] SCDs, [] Ambulation,  [] Eliquis, [] Xarelto, [] Coumadin   Code Status Full code   Surrogate Decision Maker/ POA Wife     Personally reviewed Lab Studies and Imaging     Discussed management of the case with patient, family, ED provider    EKG interpreted  LV function:  Depressed (30-50% EF)         RV diameter:  Normal     Interpretation:          Diminished LV function  Electronically signed by Raúl Craig on Friday, April 19, 2024 at 9:59 PM : Attending:     POC US CHEST INCLUDING MEDIASTINUM    Result Date: 4/19/2024  POCUS_Thoracic     Exam Information:          Exam type:  Clinically indicated     Indication(s) for Exam:          The exam was performed with the following indications::  Dyspnea, Hypoxia     Views:          Right anterior / superior thorax:  Adequate         Left anterior / superior thorax:  Adequate     Findings:          Lung sliding:  Present         Interstitium: a-lines:  Present         Interstitium: b-lines:  Present         Anterior / superior region:  present (greater than 3 per view)     Interpretation:          Pneumothorax:  Not present         Alveolar interstitial syndrome:  focal  Electronically signed by Raúl Craig on Friday, April 19, 2024 at 9:58 PM : Attending:         Electronically signed by Ronny Marie MD on 4/20/2024 at 2:59 AM

## 2024-04-20 NOTE — ED NOTES
Mr. Elaine is a 78 y.o. male who had concerns including Shortness of Breath (Pt to ED for SOB, pt was 77% on 5L. Pt put on non-rebreather during triage . ).    Chief Complaint   Patient presents with    Shortness of Breath     Pt to ED for SOB, pt was 77% on 5L. Pt put on non-rebreather during triage .        He is being admitted for:    Acute respiratory failure with hypoxia ([facility])    His ED problem list included:    1. Dyspnea and respiratory abnormalities    2. Hypoxia    3. Pneumonia due to infectious organism, unspecified laterality, unspecified part of lung        Past Medical History:   Diagnosis Date    Arthritis of hand     arthritis in hands and thumbs    CAD (coronary artery disease)     Chronic rhinitis 11/22/2022    COVID     COVID toes     Hyperlipidemia     Hypertension     Ischemic cardiomyopathy 01/2019    Personal history of COVID-19 10/27/2021    STEMI (ST elevation myocardial infarction) ([facility]) 01/03/2019    Tinnitus     Toe gangrene ([facility]) 8/12/2021       Past Surgical History:   Procedure Laterality Date    BICEPS TENDON REPAIR      CORONARY ANGIOPLASTY  01/08/2011    emergency PCI: vein to RCA    CORONARY ANGIOPLASTY WITH STENT PLACEMENT  01/03/2019    PCI to distal SVG-RCA with ELIZABETH    CORONARY ARTERY BYPASS GRAFT      in 1990 CABG x4 and in 2001 CABG x2    DIAGNOSTIC CARDIAC CATH LAB PROCEDURE      TOE AMPUTATION Bilateral 8/13/2021    AMPUTATION OF LEFT 2ND DIGIT LEFT FOOT, RIGHT 3RD DIGIT,  RIGHT PARTIAL 4TH DIGIT RIGHT FOOT performed by Ann Kinney DPM at [facility] OR       His recent abnormal labs were:    Labs Reviewed   BASIC METABOLIC PANEL W/ REFLEX TO MG FOR LOW K - Abnormal; Notable for the following components:       Result Value    Sodium 135 (*)     Chloride 97 (*)     Glucose 143 (*)     BUN 21 (*)     All other components within normal limits   BLOOD GAS, VENOUS - Abnormal; Notable for the following components:    pO2, Fei 45.1 (*)     Carboxyhemoglobin 2.7 (*)     All  other components within normal limits   CBC WITH AUTO DIFFERENTIAL   TROPONIN   TROPONIN   BRAIN NATRIURETIC PEPTIDE       His vital signs for the encounter were:    Patient Vitals for the past 24 hrs:   BP Temp Temp src Pulse Resp SpO2 Height Weight   04/20/24 0145 126/71 -- -- 53 21 96 % -- --   04/20/24 0030 (!) 116/56 -- -- 56 30 96 % -- --   04/19/24 2315 100/69 -- -- 55 14 98 % -- --   04/19/24 2130 114/65 -- -- 63 (!) 37 95 % -- --   04/19/24 2035 139/81 98.1 °F (36.7 °C) Axillary 91 22 99 % 1.829 m (6') 73.5 kg (162 lb)        He has the following lines:    Peripheral IV 04/19/24 Right Forearm (Active)   Site Assessment Clean, dry & intact 04/19/24 2037   Line Status Blood return noted 04/19/24 2037       He has received the following medications:    Medications   doxycycline (VIBRAMYCIN) 100 mg in sodium chloride 0.9 % 100 mL IVPB (100 mg IntraVENous New Bag 4/20/24 0214)   iopamidol (ISOVUE-370) 76 % injection 75 mL (75 mLs IntraVENous Given 4/19/24 2319)   methylPREDNISolone sodium succ (SOLU-MEDROL) 125 mg in sterile water 2 mL injection (125 mg IntraVENous Given 4/20/24 0136)   cefTRIAXone (ROCEPHIN) 1,000 mg in sodium chloride 0.9 % 50 mL IVPB (mini-bag) (0 mg IntraVENous Stopped 4/20/24 0227)       He had the following images with impressions:    CT CHEST PULMONARY EMBOLISM W CONTRAST    Result Date: 4/20/2024  1. No acute pulmonary artery embolism. 2. Emphysema with pulmonary fibrosis. 3. Cardiomegaly with coronary artery atherosclerosis. 4. Ascending aorta measures up to 3.8 cm.     XR CHEST PORTABLE    Result Date: 4/19/2024  EXAMINATION: ONE X-RAY VIEW OF THE CHEST 4/19/2024 9:14 pm COMPARISON: August 12, 2021 HISTORY: ORDERING SYSTEM PROVIDED HISTORY:  Chest Pain TECHNOLOGIST PROVIDED HISTORY: Reason for Exam:  Chest Pain Reason for Exam:  CP FINDINGS: No lines or tubes. Stable cardiomediastinal silhouette. Status post CABG. Stable chronic lung changes.  The lungs are otherwise clear without

## 2024-04-20 NOTE — CONSULTS
bisulfate] and Ciprofloxacin    Social History     Social History       Tobacco History       Smoking Status  Former Smoking Start Date  9/1/1961 Quit Date  1/9/1991 Average Packs/Day  1 pack/day for 29.4 years (29.4 ttl pk-yrs) Smoking Tobacco Type  Cigarettes from 9/1/1961 to 1/9/1991   Pack Year History     Packs/Day From To Years    0 1/9/1991  33.3    1 9/1/1961 1/9/1991 29.4      Smokeless Tobacco Use  Never      Tobacco Comments  Smoked when i was a kid              Alcohol History       Alcohol Use Status  No              Drug Use       Drug Use Status  No              Sexual Activity       Sexually Active  Not Currently Partners  Female                    Family History     Family History   Problem Relation Age of Onset    Cancer Mother         skin cancer    Diabetes Mother     High Blood Pressure Father     Heart Disease Father     No Known Problems Brother     No Known Problems Brother     Heart Disease Paternal Uncle     Heart Disease Paternal Cousin        Review of Systems   Review of Systems   Constitutional: Negative.    HENT: Negative.     Eyes: Negative.    Respiratory:  Positive for shortness of breath and wheezing.    Cardiovascular: Negative.    Gastrointestinal: Negative.    Endocrine: Negative.    Genitourinary: Negative.    Musculoskeletal: Negative.    Skin: Negative.    Allergic/Immunologic: Negative.    Neurological: Negative.    Hematological: Negative.    Psychiatric/Behavioral: Negative.          Physical Exam   BP (!) 149/84   Pulse 81   Temp (!) 94.4 °F (34.7 °C) (Axillary)   Resp 24   Ht 1.829 m (6')   Wt 73.1 kg (161 lb 2.5 oz)   SpO2 94%   BMI 21.86 kg/m²      Physical Exam  Vitals and nursing note reviewed.   Constitutional:       General: He is not in acute distress.     Appearance: Normal appearance. He is not ill-appearing.   HENT:      Head: Normocephalic and atraumatic.      Right Ear: External ear normal.      Left Ear: External ear normal.      Nose: Nose normal.       Mouth/Throat:      Mouth: Mucous membranes are moist.      Pharynx: Oropharynx is clear.      Comments: Mallampati 2  Eyes:      General: No scleral icterus.     Extraocular Movements: Extraocular movements intact.      Conjunctiva/sclera: Conjunctivae normal.      Pupils: Pupils are equal, round, and reactive to light.   Cardiovascular:      Rate and Rhythm: Normal rate and regular rhythm.      Pulses: Normal pulses.      Heart sounds: Normal heart sounds. No murmur heard.     No friction rub.   Pulmonary:      Effort: Respiratory distress present.      Breath sounds: Wheezing present.   Abdominal:      General: Abdomen is flat. Bowel sounds are normal. There is no distension.      Tenderness: There is no abdominal tenderness. There is no guarding.   Musculoskeletal:         General: No swelling or tenderness. Normal range of motion.      Cervical back: Normal range of motion and neck supple. No rigidity.   Skin:     General: Skin is warm and dry.      Coloration: Skin is not jaundiced.   Neurological:      General: No focal deficit present.      Mental Status: He is alert and oriented to person, place, and time. Mental status is at baseline.      Cranial Nerves: No cranial nerve deficit.      Sensory: No sensory deficit.      Motor: No weakness.      Gait: Gait normal.   Psychiatric:         Mood and Affect: Mood normal.         Thought Content: Thought content normal.         Judgment: Judgment normal.         Labs    CBC:  Recent Labs     04/19/24 2114   WBC 10.9   RBC 4.52   HGB 14.4   HCT 43.1   MCV 95.4   RDW 15.1        CHEMISTRIES:  Recent Labs     04/19/24 2114   *   K 4.1   CL 97*   CO2 24   BUN 21*   CREATININE 0.8   GLUCOSE 143*     PT/INR:No results for input(s): \"PROTIME\", \"INR\" in the last 72 hours.  APTT:No results for input(s): \"APTT\" in the last 72 hours.  LIVER PROFILE:No results for input(s): \"AST\", \"ALT\", \"BILIDIR\", \"BILITOT\", \"ALKPHOS\" in the last 72

## 2024-04-20 NOTE — ED PROVIDER NOTES
[facility]  ED  EMERGENCY DEPARTMENT ENCOUNTER        Patient Name: Kishan Elaine  MRN: [id number]  Birthdate 1945  Date of evaluation: 4/19/2024  Provider: Raúl Craig MD  PCP: Rosy Toro MD  Note Started: 12:50 AM EDT 4/20/24    CHIEF COMPLAINT       Shortness of Breath (Pt to ED for SOB, pt was 77% on 5L. Pt put on non-rebreather during triage . )      HISTORY OF PRESENT ILLNESS: 1 or more Elements     History from : Patient    Limitations to history : None    Kishan Elaine is a 78 y.o. male who presents for evaluation of shortness of breath.  He states that has not been feeling well for the past several days.  He was reportedly in the 70% range on on 5 L.  He reports history of COPD does not typically require oxygen.  He denies any new swelling in the legs.  No chest pain.    Nursing Notes were all reviewed and agreed with or any disagreements were addressed in the HPI.    REVIEW OF SYSTEMS :      Review of Systems    Positives and Pertinent negatives as per HPI.     SURGICAL HISTORY     Past Surgical History:   Procedure Laterality Date    BICEPS TENDON REPAIR      CORONARY ANGIOPLASTY  01/08/2011    emergency PCI: vein to RCA    CORONARY ANGIOPLASTY WITH STENT PLACEMENT  01/03/2019    PCI to distal SVG-RCA with ELIZABETH    CORONARY ARTERY BYPASS GRAFT      in 1990 CABG x4 and in 2001 CABG x2    DIAGNOSTIC CARDIAC CATH LAB PROCEDURE      TOE AMPUTATION Bilateral 8/13/2021    AMPUTATION OF LEFT 2ND DIGIT LEFT FOOT, RIGHT 3RD DIGIT,  RIGHT PARTIAL 4TH DIGIT RIGHT FOOT performed by Ann Kinney DPM at [facility] OR       CURRENTMEDICATIONS       Previous Medications    ALBUTEROL SULFATE HFA (PROAIR HFA) 108 (90 BASE) MCG/ACT INHALER    Inhale 2 puffs into the lungs every 6 hours as needed for Wheezing or Shortness of Breath    ATORVASTATIN (LIPITOR) 20 MG TABLET    TAKE ONE TABLET BY MOUTH DAILY    CALCIPOTRIENE (DOVONEX) 0.005 % CREAM    APPLY TO AFFECTED AREA(S) TWO TIMES A DAY

## 2024-04-20 NOTE — PLAN OF CARE
Problem: Discharge Planning  Goal: Discharge to home or other facility with appropriate resources  Outcome: Progressing     Problem: Respiratory - Adult  Goal: Absence of respiratory distress  Description: Absence of respiratory distress  Outcome: Progressing     Problem: Infection - Adult  Goal: *Absence of infection signs and symptoms  Outcome: Progressing     Problem: Metabolic/Fluid and Electrolytes - Adult  Goal: Acid-base balance  Description: Acid-base balance  Outcome: Progressing

## 2024-04-21 LAB
ANION GAP SERPL CALCULATED.3IONS-SCNC: 13 MMOL/L (ref 3–16)
BASOPHILS # BLD: 0 K/UL (ref 0–0.2)
BASOPHILS NFR BLD: 0.3 %
BUN SERPL-MCNC: 25 MG/DL (ref 7–20)
CALCIUM SERPL-MCNC: 9.1 MG/DL (ref 8.3–10.6)
CHLORIDE SERPL-SCNC: 100 MMOL/L (ref 99–110)
CO2 SERPL-SCNC: 24 MMOL/L (ref 21–32)
CREAT SERPL-MCNC: 0.7 MG/DL (ref 0.8–1.3)
DEPRECATED RDW RBC AUTO: 14.6 % (ref 12.4–15.4)
EOSINOPHIL # BLD: 0 K/UL (ref 0–0.6)
EOSINOPHIL NFR BLD: 0.1 %
GFR SERPLBLD CREATININE-BSD FMLA CKD-EPI: >90 ML/MIN/{1.73_M2}
GLUCOSE SERPL-MCNC: 225 MG/DL (ref 70–99)
HCT VFR BLD AUTO: 38.1 % (ref 40.5–52.5)
HGB BLD-MCNC: 12.8 G/DL (ref 13.5–17.5)
LEGIONELLA AG UR QL: NORMAL
LYMPHOCYTES # BLD: 0.8 K/UL (ref 1–5.1)
LYMPHOCYTES NFR BLD: 8.3 %
MCH RBC QN AUTO: 32.1 PG (ref 26–34)
MCHC RBC AUTO-ENTMCNC: 33.8 G/DL (ref 31–36)
MCV RBC AUTO: 95.1 FL (ref 80–100)
MONOCYTES # BLD: 0.3 K/UL (ref 0–1.3)
MONOCYTES NFR BLD: 3 %
NEUTROPHILS # BLD: 8.9 K/UL (ref 1.7–7.7)
NEUTROPHILS NFR BLD: 88.3 %
PLATELET # BLD AUTO: 175 K/UL (ref 135–450)
PMV BLD AUTO: 9.1 FL (ref 5–10.5)
POTASSIUM SERPL-SCNC: 4.7 MMOL/L (ref 3.5–5.1)
RBC # BLD AUTO: 4 M/UL (ref 4.2–5.9)
S PNEUM AG UR QL: NORMAL
SODIUM SERPL-SCNC: 137 MMOL/L (ref 136–145)
WBC # BLD AUTO: 10 K/UL (ref 4–11)

## 2024-04-21 PROCEDURE — 94640 AIRWAY INHALATION TREATMENT: CPT

## 2024-04-21 PROCEDURE — 85025 COMPLETE CBC W/AUTO DIFF WBC: CPT

## 2024-04-21 PROCEDURE — 6370000000 HC RX 637 (ALT 250 FOR IP): Performed by: INTERNAL MEDICINE

## 2024-04-21 PROCEDURE — 6360000002 HC RX W HCPCS: Performed by: INTERNAL MEDICINE

## 2024-04-21 PROCEDURE — 99233 SBSQ HOSP IP/OBS HIGH 50: CPT | Performed by: INTERNAL MEDICINE

## 2024-04-21 PROCEDURE — 2580000003 HC RX 258: Performed by: STUDENT IN AN ORGANIZED HEALTH CARE EDUCATION/TRAINING PROGRAM

## 2024-04-21 PROCEDURE — 2700000000 HC OXYGEN THERAPY PER DAY

## 2024-04-21 PROCEDURE — 1200000000 HC SEMI PRIVATE

## 2024-04-21 PROCEDURE — 6360000002 HC RX W HCPCS: Performed by: STUDENT IN AN ORGANIZED HEALTH CARE EDUCATION/TRAINING PROGRAM

## 2024-04-21 PROCEDURE — 6370000000 HC RX 637 (ALT 250 FOR IP): Performed by: STUDENT IN AN ORGANIZED HEALTH CARE EDUCATION/TRAINING PROGRAM

## 2024-04-21 PROCEDURE — 94761 N-INVAS EAR/PLS OXIMETRY MLT: CPT

## 2024-04-21 PROCEDURE — 80048 BASIC METABOLIC PNL TOTAL CA: CPT

## 2024-04-21 RX ADMIN — Medication 10 ML: at 08:43

## 2024-04-21 RX ADMIN — ATORVASTATIN CALCIUM 20 MG: 10 TABLET, FILM COATED ORAL at 08:42

## 2024-04-21 RX ADMIN — BUDESONIDE 500 MCG: 0.5 SUSPENSION RESPIRATORY (INHALATION) at 07:50

## 2024-04-21 RX ADMIN — Medication 10 ML: at 20:23

## 2024-04-21 RX ADMIN — TICAGRELOR 60 MG: 60 TABLET ORAL at 20:23

## 2024-04-21 RX ADMIN — METHYLPREDNISOLONE SODIUM SUCCINATE 40 MG: 40 INJECTION, POWDER, LYOPHILIZED, FOR SOLUTION INTRAMUSCULAR; INTRAVENOUS at 01:23

## 2024-04-21 RX ADMIN — FLUOXETINE HYDROCHLORIDE 20 MG: 20 CAPSULE ORAL at 08:43

## 2024-04-21 RX ADMIN — TICAGRELOR 60 MG: 60 TABLET ORAL at 08:47

## 2024-04-21 RX ADMIN — IPRATROPIUM BROMIDE AND ALBUTEROL SULFATE 1 DOSE: 2.5; .5 SOLUTION RESPIRATORY (INHALATION) at 12:07

## 2024-04-21 RX ADMIN — IPRATROPIUM BROMIDE AND ALBUTEROL SULFATE 1 DOSE: 2.5; .5 SOLUTION RESPIRATORY (INHALATION) at 20:09

## 2024-04-21 RX ADMIN — METHYLPREDNISOLONE SODIUM SUCCINATE 40 MG: 40 INJECTION, POWDER, LYOPHILIZED, FOR SOLUTION INTRAMUSCULAR; INTRAVENOUS at 13:51

## 2024-04-21 RX ADMIN — CEFTRIAXONE SODIUM 1000 MG: 1 INJECTION, POWDER, FOR SOLUTION INTRAMUSCULAR; INTRAVENOUS at 08:54

## 2024-04-21 RX ADMIN — Medication 1 TABLET: at 08:42

## 2024-04-21 RX ADMIN — GABAPENTIN 300 MG: 300 CAPSULE ORAL at 08:43

## 2024-04-21 RX ADMIN — ENOXAPARIN SODIUM 40 MG: 100 INJECTION SUBCUTANEOUS at 08:43

## 2024-04-21 RX ADMIN — ARFORMOTEROL TARTRATE 15 MCG: 15 SOLUTION RESPIRATORY (INHALATION) at 20:11

## 2024-04-21 RX ADMIN — MODAFINIL 200 MG: 200 TABLET ORAL at 08:43

## 2024-04-21 RX ADMIN — BUDESONIDE 500 MCG: 0.5 SUSPENSION RESPIRATORY (INHALATION) at 20:12

## 2024-04-21 RX ADMIN — ARFORMOTEROL TARTRATE 15 MCG: 15 SOLUTION RESPIRATORY (INHALATION) at 07:48

## 2024-04-21 RX ADMIN — GABAPENTIN 300 MG: 300 CAPSULE ORAL at 20:23

## 2024-04-21 RX ADMIN — FAMOTIDINE 20 MG: 20 TABLET, FILM COATED ORAL at 20:23

## 2024-04-21 RX ADMIN — METHYLPREDNISOLONE SODIUM SUCCINATE 40 MG: 40 INJECTION, POWDER, LYOPHILIZED, FOR SOLUTION INTRAMUSCULAR; INTRAVENOUS at 05:45

## 2024-04-21 RX ADMIN — METOPROLOL SUCCINATE 25 MG: 25 TABLET, FILM COATED, EXTENDED RELEASE ORAL at 08:42

## 2024-04-21 RX ADMIN — IPRATROPIUM BROMIDE AND ALBUTEROL SULFATE 1 DOSE: 2.5; .5 SOLUTION RESPIRATORY (INHALATION) at 07:46

## 2024-04-21 RX ADMIN — METHYLPREDNISOLONE SODIUM SUCCINATE 40 MG: 40 INJECTION, POWDER, LYOPHILIZED, FOR SOLUTION INTRAMUSCULAR; INTRAVENOUS at 18:41

## 2024-04-21 RX ADMIN — FAMOTIDINE 20 MG: 20 TABLET, FILM COATED ORAL at 08:42

## 2024-04-21 RX ADMIN — AZITHROMYCIN 500 MG: 250 TABLET, FILM COATED ORAL at 08:43

## 2024-04-21 RX ADMIN — LOSARTAN POTASSIUM 25 MG: 25 TABLET, FILM COATED ORAL at 08:42

## 2024-04-21 RX ADMIN — Medication 50 MG: at 08:42

## 2024-04-21 ASSESSMENT — PAIN SCALES - GENERAL
PAINLEVEL_OUTOF10: 0
PAINLEVEL_OUTOF10: 0

## 2024-04-21 NOTE — PLAN OF CARE
Problem: Discharge Planning  Goal: Discharge to home or other facility with appropriate resources  4/21/2024 0044 by Cordelia Lozano RN  Outcome: Progressing  4/20/2024 1847 by Magnus Baldwin RN  Outcome: Progressing     Problem: Respiratory - Adult  Goal: Absence of respiratory distress  Description: Absence of respiratory distress  4/21/2024 0044 by Cordelia Lozano RN  Outcome: Progressing  4/20/2024 1847 by Magnus Baldwin RN  Outcome: Progressing     Problem: Infection - Adult  Goal: *Absence of infection signs and symptoms  4/21/2024 0044 by Cordelia Lozano RN  Outcome: Progressing  4/20/2024 1847 by Magnus Baldwin RN  Outcome: Progressing     Problem: Metabolic/Fluid and Electrolytes - Adult  Goal: Acid-base balance  Description: Acid-base balance  4/21/2024 0044 by Cordelia Lozano RN  Outcome: Progressing  4/20/2024 1847 by Magnus Baldwin RN  Outcome: Progressing     Problem: Safety - Adult  Goal: Free from fall injury  4/21/2024 0044 by Cordelia Lozano RN  Outcome: Progressing  4/20/2024 1847 by Magnus Baldwin RN  Outcome: Progressing

## 2024-04-21 NOTE — PROGRESS NOTES
Patient complaining of nasal congestion and asking for nasal spray. Holly Augustine NP notified via perfect serve. Sodium Chloride 0.65% ordered.

## 2024-04-21 NOTE — PLAN OF CARE
Problem: Discharge Planning  Goal: Discharge to home or other facility with appropriate resources  Outcome: Progressing     Problem: Respiratory - Adult  Goal: Absence of respiratory distress  Description: Absence of respiratory distress  Outcome: Progressing     Problem: Infection - Adult  Goal: *Absence of infection signs and symptoms  Outcome: Progressing     Problem: Metabolic/Fluid and Electrolytes - Adult  Goal: Acid-base balance  Description: Acid-base balance  Outcome: Progressing     Problem: Safety - Adult  Goal: Free from fall injury  Outcome: Progressing

## 2024-04-21 NOTE — PROGRESS NOTES
Hospital Medicine Progress Note      Date of Admission: 4/19/2024  Hospital Day: 3    Chief Admission Complaint: Shortness of breath     Subjective: Patient states he is feeling a bit better.  No nausea vomiting.  No fevers or chills.  Patient sitting up in his room.    Presenting Admission History:       Patient is a 78-year-old male with a past medical history of pulmonary fibrosis, pulmonary hypertension, bronchiectasis, CAD status post CABG, CHF, who presents to the ED with complaints of shortness of breath  - Patient was in his usual state of health until 3 days ago when he began to experience worsening cough productive of yellowish sputum, associated with shortness of breath, fevers, chills, malaise, chest pain.  Symptoms progressively worsened until today when he had his oxygen levels checked by a neighbor, it was noted to be 85%, for which reason EMS was called.  On EMS arrival patient was reportedly hypoxic in the 70s.  Patient denies leg swelling, PND, recent travels, recent sick contacts, dysuria, hematuria.     Evaluation in the ED was concerning for hypoxia, patient was given IV Solu-Medrol, breathing treatments, and is being admitted for further evaluation and management.    Assessment/Plan:      Current Principal Problem:  Acute respiratory failure with hypoxia (HCC)    1.  Acute respiratory failure with hypoxia.  Patient still requiring supplemental oxygen.  Will continue antibiotics.  Monitor closely.  Pulmonary following.  2.  Community-acquired pneumonia.  Patient on antibiotics.  Will transition to p.o. prior to discharge.  3.  CHF which is stable.  Diastolic dysfunction.  No acute exacerbation.  Will continue metoprolol.      Physical Exam Performed:      General appearance:  No apparent distress  Respiratory:  Normal respiratory effort.  Prolonged expiratory phase  Cardiovascular:  Regular rate and rhythm.  Abdomen:  Soft, non-tender, non-distended.  Musculoskeletal:  No edema  Neurologic:

## 2024-04-21 NOTE — PROGRESS NOTES
Progressive Care Progress Note    Patient: Kishan Elaine MRN: [id number]  Date of  Admission: 4/19/2024   YOB: 1945  Age: 78 y.o.  Sex: male    Unit: [facility] C4 PCU  Room/Bed: 0436/0436-01 Attending Physician: Yovany Buck MD   Admitting Physician: BOBBY CONCEPCION        Chief Complaint   Patient presents with    Shortness of Breath       Pt to ED for SOB, pt was 77% on 5L. Pt put on non-rebreather during triage .             History Obtained From   patient, electronic medical record     History of Present Illness   This is a 78-year-old gentleman with a past medical history of pulmonary fibrosis, pulm hypertension, COPD, bronchiectasis, CAD status post CABG, CHF who came to the hospital and was admitted on 4/20/2024 for shortness of breath.  3 days prior to admission the patient was started to experience worsening cough with yellowish sputum and shortness of breath with fevers and chills.  Patient had this progressively worsened and when he was checked by his neighbor found to have a low O2 saturation and EMS was called.  Patient's was brought to the ED upon arrival of the EMS patient's O2 saturations were 70%.     Patient reports since the COVID infection in 2021 patient has felt lethargic tired and fatigued.  Patient does have trouble sleeping at night and wakes up quite often.  Does have some weakness snoring.  Patient does have dry mouth on occasion.  Patient does not have any nocturia.  Patient does have insomnia.  Patient has felt that the nebulizer seem to work a little bit better.      Subjective:    Feeling better today  Breathing and wheezing have gotten better with Cassy and the Brovana  Much more awake with the modafinil  Shortness of breath is seeming a little bit better  Patient's oxygen requirements are still needed  No chest pains or palpitation  No nausea vomiting    ROS:A comprehensive review of systems was negative except for: Above    Objective:         Intake and       Cervical back: Normal range of motion. No rigidity.   Skin:     General: Skin is warm and dry.      Coloration: Skin is not jaundiced or pale.   Neurological:      General: No focal deficit present.      Mental Status: He is alert and oriented to person, place, and time. Mental status is at baseline.      Cranial Nerves: No cranial nerve deficit.      Sensory: No sensory deficit.   Psychiatric:         Mood and Affect: Mood normal.         Behavior: Behavior normal.         Thought Content: Thought content normal.             Labs:   Recent Labs     04/19/24 2114 04/21/24 0458   WBC 10.9 10.0   HGB 14.4 12.8*   HCT 43.1 38.1*    175      Recent Labs     04/19/24 2114 04/21/24 0458   * 137   K 4.1 4.7   CL 97* 100   CO2 24 24   BUN 21* 25*   GLUCOSE 143* 225*           Radiology, images personally reviewed. Yes    T CHEST PULMONARY EMBOLISM W CONTRAST [[id number]] Collected: 04/20/24 0109      Order Status: Completed Updated: 04/21/24 0646     Narrative:       EXAMINATION:  CTA OF THE CHEST 4/19/2024 11:18 pm    TECHNIQUE:  CTA of the chest was performed after the administration of intravenous  contrast.  Multiplanar reformatted images are provided for review.  MIP  images are provided for review. Automated exposure control, iterative  reconstruction, and/or weight based adjustment of the mA/kV was utilized to  reduce the radiation dose to as low as reasonably achievable.    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  No evidence of intraluminal filling defect to suggest

## 2024-04-21 NOTE — FLOWSHEET NOTE
04/21/24 0840   Assessment   Charting Type Shift assessment   Psychosocial   Psychosocial (WDL) WDL   Neurological   Neuro (WDL) WDL   Level of Consciousness 0   Ulises Coma Scale   Eye Opening 4   Best Verbal Response 5   Best Motor Response 6   Ulises Coma Scale Score 15   NIHSS Stroke Scale   NIHSS Stroke Scale Assessed No   HEENT (Head, Ears, Eyes, Nose, & Throat)   HEENT (WDL) X   Teeth Dentures upper;Dentures lower   Respiratory   Respiratory (WDL) X   Respiratory Pattern Tachypneic;Regular   Respiratory Depth Normal   Respiratory Quality/Effort Labored;Dyspnea with exertion   Chest Assessment Chest expansion symmetrical   L Breath Sounds Crackles   R Breath Sounds Clear;Diminished   Subcutaneous Air/Crepitus None   Breath Sounds   Right Upper Lobe Clear;Diminished   Right Middle Lobe Clear;Diminished   Right Lower Lobe Clear;Diminished   Left Upper Lobe Crackles   Left Lower Lobe Crackles   Cardiac   Cardiac (WDL) WDL   Cardiac Rhythm Sinus rhythm   Cardiac Monitor   Alarm Audible Centralized cardiac monitoring   Gastrointestinal   Abdominal (WDL) WDL   Abdomen Inspection Soft   RUQ Bowel Sounds Active   LUQ Bowel Sounds Active   RLQ Bowel Sounds Active   LLQ Bowel Sounds Active   Tenderness Soft;Nontender   Genitourinary   Genitourinary (WDL) WDL   Flank Tenderness MAR   Suprapubic Tenderness No   Dysuria (Pain/Burning w/Urination) No   Urine Assessment   Urine Color MAR   Urine Appearance MAR   Urine Odor MAR   Peripheral Vascular   Peripheral Vascular (WDL) WDL   Anti-Embolism   Anti-Embolism Intervention Medication   Skin Integumentary    Skin Integumentary (WDL) X   Musculoskeletal   Musculoskeletal (WDL) X   RL Extremity Amputation   Musculoskeletal Details   R Toes Other (comment);MAR  (has had toes amputated)

## 2024-04-21 NOTE — PLAN OF CARE
CHF Care Plan      Patient's EF (Ejection Fraction) is greater than 40%    Heart Failure Medications:  Diuretics:: None    (One of the following REQUIRED for EF </= 40%/SYSTOLIC FAILURE but MAY be used in EF% >40%/DIASTOLIC FAILURE)        ACE:: None        ARB:: Losartan         ARNI:: None    (Beta Blockers)  NON- Evidenced Based Beta Blocker (for EF% >40%/DIASTOLIC FAILURE): None    Evidenced Based Beta Blocker::(REQUIRED for EF% <40%/SYSTOLIC FAILURE) Metoprolol SUCCinate- Toprol XL  ...................................................................................................................................................    Failed to redirect to the Timeline version of the [employer] SmartLink.      Patient's weights and intake/output reviewed    Daily Weight log at bedside, patient/family participation in use of log: \"yes    Patient's current weight today shows a difference of 1 lbs more than last documented weight.      Intake/Output Summary (Last 24 hours) at 4/21/2024 1556  Last data filed at 4/21/2024 0506  Gross per 24 hour   Intake 720 ml   Output --   Net 720 ml       Education Booklet Provided: yes    Comorbidities Reviewed Yes    Patient has a past medical history of Arthritis of hand, CAD (coronary artery disease), Chronic rhinitis, COVID, COVID toes, Hyperlipidemia, Hypertension, Ischemic cardiomyopathy, Personal history of COVID-19, STEMI (ST elevation myocardial infarction) (HCC), Tinnitus, and Toe gangrene (HCC).     >>For CHF and Comorbidity documentation on Education Time and Topics, please see Education Tab      Pt up in chair at this time on  2 L O2. Pt with complaints of shortness of breath. Pt without lower extremity edema.     Patient and/or Family's stated Goal of Care this Admission: reduce shortness of breath, increase activity tolerance, better understand heart failure and disease management, and be more comfortable prior to discharge        :

## 2024-04-22 PROBLEM — R06.89 DYSPNEA AND RESPIRATORY ABNORMALITIES: Status: ACTIVE | Noted: 2024-04-22

## 2024-04-22 PROBLEM — R06.00 DYSPNEA AND RESPIRATORY ABNORMALITIES: Status: ACTIVE | Noted: 2024-04-22

## 2024-04-22 LAB
ANION GAP SERPL CALCULATED.3IONS-SCNC: 11 MMOL/L (ref 3–16)
BACTERIA SPEC RESP CULT: NORMAL
BASOPHILS # BLD: 0 K/UL (ref 0–0.2)
BASOPHILS NFR BLD: 0.1 %
BUN SERPL-MCNC: 27 MG/DL (ref 7–20)
CALCIUM SERPL-MCNC: 9.1 MG/DL (ref 8.3–10.6)
CHLORIDE SERPL-SCNC: 100 MMOL/L (ref 99–110)
CO2 SERPL-SCNC: 26 MMOL/L (ref 21–32)
CREAT SERPL-MCNC: 0.7 MG/DL (ref 0.8–1.3)
DEPRECATED RDW RBC AUTO: 15 % (ref 12.4–15.4)
EOSINOPHIL # BLD: 0 K/UL (ref 0–0.6)
EOSINOPHIL NFR BLD: 0 %
GFR SERPLBLD CREATININE-BSD FMLA CKD-EPI: >90 ML/MIN/{1.73_M2}
GLUCOSE SERPL-MCNC: 224 MG/DL (ref 70–99)
GRAM STN SPEC: NORMAL
HCT VFR BLD AUTO: 38.6 % (ref 40.5–52.5)
HGB BLD-MCNC: 12.8 G/DL (ref 13.5–17.5)
LYMPHOCYTES # BLD: 0.6 K/UL (ref 1–5.1)
LYMPHOCYTES NFR BLD: 5.4 %
MCH RBC QN AUTO: 31.6 PG (ref 26–34)
MCHC RBC AUTO-ENTMCNC: 33.2 G/DL (ref 31–36)
MCV RBC AUTO: 95.1 FL (ref 80–100)
MONOCYTES # BLD: 0.4 K/UL (ref 0–1.3)
MONOCYTES NFR BLD: 4 %
NEUTROPHILS # BLD: 10.1 K/UL (ref 1.7–7.7)
NEUTROPHILS NFR BLD: 90.5 %
PLATELET # BLD AUTO: 177 K/UL (ref 135–450)
PMV BLD AUTO: 9 FL (ref 5–10.5)
POTASSIUM SERPL-SCNC: 4.8 MMOL/L (ref 3.5–5.1)
RBC # BLD AUTO: 4.07 M/UL (ref 4.2–5.9)
SODIUM SERPL-SCNC: 137 MMOL/L (ref 136–145)
WBC # BLD AUTO: 11.1 K/UL (ref 4–11)

## 2024-04-22 PROCEDURE — 6370000000 HC RX 637 (ALT 250 FOR IP): Performed by: INTERNAL MEDICINE

## 2024-04-22 PROCEDURE — 85025 COMPLETE CBC W/AUTO DIFF WBC: CPT

## 2024-04-22 PROCEDURE — 6370000000 HC RX 637 (ALT 250 FOR IP): Performed by: STUDENT IN AN ORGANIZED HEALTH CARE EDUCATION/TRAINING PROGRAM

## 2024-04-22 PROCEDURE — 80048 BASIC METABOLIC PNL TOTAL CA: CPT

## 2024-04-22 PROCEDURE — 36415 COLL VENOUS BLD VENIPUNCTURE: CPT

## 2024-04-22 PROCEDURE — 6360000002 HC RX W HCPCS: Performed by: INTERNAL MEDICINE

## 2024-04-22 PROCEDURE — 6360000002 HC RX W HCPCS: Performed by: STUDENT IN AN ORGANIZED HEALTH CARE EDUCATION/TRAINING PROGRAM

## 2024-04-22 PROCEDURE — 2580000003 HC RX 258: Performed by: STUDENT IN AN ORGANIZED HEALTH CARE EDUCATION/TRAINING PROGRAM

## 2024-04-22 PROCEDURE — 99233 SBSQ HOSP IP/OBS HIGH 50: CPT | Performed by: INTERNAL MEDICINE

## 2024-04-22 PROCEDURE — 94640 AIRWAY INHALATION TREATMENT: CPT

## 2024-04-22 PROCEDURE — 94761 N-INVAS EAR/PLS OXIMETRY MLT: CPT

## 2024-04-22 PROCEDURE — 1200000000 HC SEMI PRIVATE

## 2024-04-22 PROCEDURE — 2700000000 HC OXYGEN THERAPY PER DAY

## 2024-04-22 RX ORDER — IPRATROPIUM BROMIDE AND ALBUTEROL SULFATE 2.5; .5 MG/3ML; MG/3ML
1 SOLUTION RESPIRATORY (INHALATION)
Status: DISCONTINUED | OUTPATIENT
Start: 2024-04-22 | End: 2024-04-23 | Stop reason: HOSPADM

## 2024-04-22 RX ADMIN — ACETAMINOPHEN 650 MG: 325 TABLET ORAL at 16:29

## 2024-04-22 RX ADMIN — BUDESONIDE 500 MCG: 0.5 SUSPENSION RESPIRATORY (INHALATION) at 07:53

## 2024-04-22 RX ADMIN — Medication 1 TABLET: at 09:06

## 2024-04-22 RX ADMIN — GABAPENTIN 300 MG: 300 CAPSULE ORAL at 20:24

## 2024-04-22 RX ADMIN — LOSARTAN POTASSIUM 25 MG: 25 TABLET, FILM COATED ORAL at 09:06

## 2024-04-22 RX ADMIN — ARFORMOTEROL TARTRATE 15 MCG: 15 SOLUTION RESPIRATORY (INHALATION) at 19:49

## 2024-04-22 RX ADMIN — CEFTRIAXONE SODIUM 1000 MG: 1 INJECTION, POWDER, FOR SOLUTION INTRAMUSCULAR; INTRAVENOUS at 09:08

## 2024-04-22 RX ADMIN — IPRATROPIUM BROMIDE AND ALBUTEROL SULFATE 1 DOSE: 2.5; .5 SOLUTION RESPIRATORY (INHALATION) at 19:46

## 2024-04-22 RX ADMIN — Medication 10 ML: at 09:13

## 2024-04-22 RX ADMIN — FLUOXETINE HYDROCHLORIDE 20 MG: 20 CAPSULE ORAL at 09:06

## 2024-04-22 RX ADMIN — PREDNISONE 40 MG: 20 TABLET ORAL at 09:06

## 2024-04-22 RX ADMIN — FAMOTIDINE 20 MG: 20 TABLET, FILM COATED ORAL at 20:24

## 2024-04-22 RX ADMIN — BUDESONIDE 500 MCG: 0.5 SUSPENSION RESPIRATORY (INHALATION) at 19:50

## 2024-04-22 RX ADMIN — TICAGRELOR 60 MG: 60 TABLET ORAL at 20:24

## 2024-04-22 RX ADMIN — IPRATROPIUM BROMIDE AND ALBUTEROL SULFATE 1 DOSE: 2.5; .5 SOLUTION RESPIRATORY (INHALATION) at 13:35

## 2024-04-22 RX ADMIN — METOPROLOL SUCCINATE 25 MG: 25 TABLET, FILM COATED, EXTENDED RELEASE ORAL at 09:06

## 2024-04-22 RX ADMIN — IPRATROPIUM BROMIDE AND ALBUTEROL SULFATE 1 DOSE: 2.5; .5 SOLUTION RESPIRATORY (INHALATION) at 07:53

## 2024-04-22 RX ADMIN — ENOXAPARIN SODIUM 40 MG: 100 INJECTION SUBCUTANEOUS at 09:07

## 2024-04-22 RX ADMIN — Medication 50 MG: at 09:05

## 2024-04-22 RX ADMIN — ATORVASTATIN CALCIUM 20 MG: 10 TABLET, FILM COATED ORAL at 09:05

## 2024-04-22 RX ADMIN — FAMOTIDINE 20 MG: 20 TABLET, FILM COATED ORAL at 09:05

## 2024-04-22 RX ADMIN — TICAGRELOR 60 MG: 60 TABLET ORAL at 09:14

## 2024-04-22 RX ADMIN — ARFORMOTEROL TARTRATE 15 MCG: 15 SOLUTION RESPIRATORY (INHALATION) at 07:53

## 2024-04-22 RX ADMIN — AZITHROMYCIN 500 MG: 250 TABLET, FILM COATED ORAL at 09:05

## 2024-04-22 RX ADMIN — GABAPENTIN 300 MG: 300 CAPSULE ORAL at 09:06

## 2024-04-22 RX ADMIN — Medication 10 ML: at 20:25

## 2024-04-22 RX ADMIN — MODAFINIL 200 MG: 200 TABLET ORAL at 09:05

## 2024-04-22 RX ADMIN — METHYLPREDNISOLONE SODIUM SUCCINATE 40 MG: 40 INJECTION, POWDER, LYOPHILIZED, FOR SOLUTION INTRAMUSCULAR; INTRAVENOUS at 01:42

## 2024-04-22 ASSESSMENT — PAIN SCALES - GENERAL: PAINLEVEL_OUTOF10: 0

## 2024-04-22 NOTE — PROGRESS NOTES
04/21/24 0458 04/22/24  0533   WBC 10.9 10.0 11.1*   HGB 14.4 12.8* 12.8*    175 177     BMP:    Recent Labs     04/19/24 2114 04/21/24 0458 04/22/24 0533   * 137 137   K 4.1 4.7 4.8   CL 97* 100 100   CO2 24 24 26   BUN 21* 25* 27*   CREATININE 0.8 0.7* 0.7*   GLUCOSE 143* 225* 224*     Hepatic: No results for input(s): \"AST\", \"ALT\", \"ALB\", \"BILITOT\", \"ALKPHOS\" in the last 72 hours.  Lipids:   Lab Results   Component Value Date/Time    CHOL 129 04/05/2024 08:55 AM    HDL 43 04/05/2024 08:55 AM    HDL 33 06/01/2012 08:40 AM    TRIG 119 04/05/2024 08:55 AM     Hemoglobin A1C:   Lab Results   Component Value Date/Time    LABA1C 6.0 04/05/2024 08:55 AM     TSH:   Lab Results   Component Value Date/Time    TSH 2.91 12/12/2016 11:02 AM     Troponin: No results found for: \"TROPONINT\"  Lactic Acid: No results for input(s): \"LACTA\" in the last 72 hours.  BNP:   Recent Labs     04/19/24 2114   PROBNP 435     UA:  Lab Results   Component Value Date/Time    COLORU yellow 07/26/2017 12:00 AM    PHUR 6.0 07/26/2017 12:00 AM    CLARITYU clear 07/26/2017 12:00 AM    SPECGRAV 1.020 07/26/2017 12:00 AM    LEUKOCYTESUR neg 07/26/2017 12:00 AM    BILIRUBINUR neg 07/26/2017 12:00 AM    BLOODU trace-intact 07/26/2017 12:00 AM    GLUCOSEU neg 07/26/2017 12:00 AM    KETUA neg 07/26/2017 12:00 AM     Urine Cultures: No results found for: \"LABURIN\"  Blood Cultures: No results found for: \"BC\"  No results found for: \"BLOODCULT2\"  Organism:   Lab Results   Component Value Date/Time    ORG Staphylococcus aureus 07/29/2021 05:12 PM         Electronically signed by Jenn Marquez DO on 4/22/2024 at 3:17 PM

## 2024-04-22 NOTE — PROGRESS NOTES
with  Legionella pneumophila serogroup 1 nor does it rule out  other microbial-caused respiratory infections or  disease caused by other serogroups of  Legionella pneumophila.  Normal Range: Presumptive Negative      Narrative:      ORDER#: [id number]                          ORDERED BY: BOBBY CONCEPCION  SOURCE: Urine Clean Catch                  COLLECTED:  04/20/24 13:10  ANTIBIOTICS AT NICOLE.:                      RECEIVED :  04/21/24 02:24    Strep Pneumoniae Antigen [[id number]] Collected: 04/20/24 1310    Order Status: Completed Specimen: Urine, clean catch Updated: 04/21/24 0919     STREP PNEUMONIAE ANTIGEN, URINE --     Presumptive Negative  Presumptive negative suggests no current or recent  pneumococcal infection. Infection due to Strep pneumoniae  cannot be ruled out since the antigen present in the sample  may be below the detection limit of the test.  Normal Range:Presumptive Negative      Narrative:      ORDER#: [id number]                          ORDERED BY: BOBBY CONCEPCION  SOURCE: Urine Clean Catch                  COLLECTED:  04/20/24 13:10  ANTIBIOTICS AT NICOLE.:                      RECEIVED :  04/21/24 02:24    COVID-19 & Influenza Combo [[id number]] Collected: 04/20/24 0430    Order Status: Completed Specimen: Nasopharyngeal Swab Updated: 04/20/24 0457     SARS-CoV-2 RNA, RT PCR NOT DETECTED     Comment: Not Detected results do not preclude SARS-CoV-2 infection and  should not be used as the sole basis for patient management  decisions.  Results must be combined with clinical observations,  patient history, and epidemiological information.  Testing was performed using R[employer] SARS-CoV-2 and Influenza A/B  nucleic acid assay. This test is a multiplex Real-Time Reverse  Transcriptase Polymerase Chain Reaction (RT-PCR)-based in vitro  diagnostic test intended for the qualitative detection of nucleic  acids from SARS-CoV-2, influenza A, and influenza B in nasopharyngeal  and nasal swab  specimens for use under the FDA’s Emergency Use  Authorization (EUA) only.    Patient Fact Sheet:  https://www.fda.gov/media/142808/download  Provider Fact Sheet: https://www.fda.gov/media/700580/download  EUA: https://www.fda.gov/media/355711/download  IFU: https://www.fda.gov/media/087831/download    Methodology:  RT-PCR          INFLUENZA A NOT DETECTED     INFLUENZA B NOT DETECTED    COVID-19 & Influenza Combo [[id number]]     Order Status: Canceled Specimen: Nasopharyngeal Swab                ________________  Vitals:    04/22/24 0904 04/22/24 0905 04/22/24 1157 04/22/24 1337   BP: 121/73  114/67    Pulse: 89 86 80    Resp: 18  18    Temp: 97.7 °F (36.5 °C)  97.8 °F (36.6 °C)    TempSrc: Oral      SpO2: 92%  92% 92%   Weight:       Height:              Intake/Output Summary (Last 24 hours) at 4/22/2024 1609  Last data filed at 4/22/2024 0355  Gross per 24 hour   Intake 240 ml   Output 0 ml   Net 240 ml     Net IO Since Admission: 1,440 mL [04/22/24 1609]        Physical Exam:  General appearance: In no apparent distress.  HEENT: Moist mucus membranes.  Tracheostomy scar  Cardiac: Normal S1 and S2.  Lungs: Decreased air entry bilaterally, bilateral crackles  Abdomen: Soft.  Back & Extremities: Symmetric pulses with good perfusion.  Scar of previous reconstructive surgery for his left arm  Neurological: No focal deficit..       ________________________________________________________  Electronically signed by:  Raudel Brown MD,FACP    4/22/2024    4:09 PM.     [facility]  [address], [place] [address]   Phone (office): [phone]

## 2024-04-22 NOTE — RT PROTOCOL NOTE
RT Inhaler-Nebulizer Bronchodilator Protocol Note    There is a bronchodilator order in the chart from a provider indicating to follow the RT Bronchodilator Protocol and there is an “Initiate RT Inhaler-Nebulizer Bronchodilator Protocol” order as well (see protocol at bottom of note).    CXR Findings:  No results found.    The findings from the last RT Protocol Assessment were as follows:   History Pulmonary Disease: Chronic pulmonary disease  Respiratory Pattern: Use of accessory muscles, prolonged exhalation, or RR 26-30 bpm  Breath Sounds: Slightly diminished and/or crackles  Cough: Strong, spontaneous, non-productive  Indication for Bronchodilator Therapy: Decreased or absent breath sounds, Wheezing associated with pulm disorder  Bronchodilator Assessment Score: 10    Aerosolized bronchodilator medication orders have been revised according to the RT Inhaler-Nebulizer Bronchodilator Protocol below.    Respiratory Therapist to perform RT Therapy Protocol Assessment initially then follow the protocol.  Repeat RT Therapy Protocol Assessment PRN for score 0-3 or on second treatment, BID, and PRN for scores above 3.    No Indications - adjust the frequency to every 6 hours PRN wheezing or bronchospasm, if no treatments needed after 48 hours then discontinue using Per Protocol order mode.     If indication present, adjust the RT bronchodilator orders based on the Bronchodilator Assessment Score as indicated below.  Use Inhaler orders unless patient has one or more of the following: on home nebulizer, not able to hold breath for 10 seconds, is not alert and oriented, cannot activate and use MDI correctly, or respiratory rate 25 breaths per minute or more, then use the equivalent nebulizer order(s) with same Frequency and PRN reasons based on the score.  If a patient is on this medication at home then do not decrease Frequency below that used at home.    0-3 - enter or revise RT bronchodilator order(s) to equivalent RT

## 2024-04-22 NOTE — CARE COORDINATION
Advance Care Planning   Advance Care Planning Clinical Specialist  Conversation Note      Date of ACP Conversation: 4/22/2024    Conversation Conducted with:   Patient with Decision Making Capacity   Healthcare Decision Maker: Named in Advance Directive or Healthcare Power of  spouse Elva    ACP Clinical Specialist: Yoana Garza RN      *When Decision Maker makes decisions on behalf of the incapacitated patient: Decision Maker is asked to consider and make decisions based on patient values, known preferences, or best interests.       Current Designated Health Care Decision Maker:   Primary Decision Maker: Elva Elaine - Spouse - [phone]    Secondary Decision Maker: ElaineKishan - Child - [phone]    Secondary Decision Maker: ElaineSteven - Child - [phone]  (as entered in ACP Activity Healthcare Decision Maker field.  Validate  this information as still accurate & up-to-date; edit Healthcare Decision Maker field as needed.)       Hospitalization  If your health were to worsen and it became clear that your chance of recovery was unlikely, what would your preferences be regarding hospitalization?:    Choice:  [x]  The patient would want hospitalization  []  The patient would prefer comfort-focused treatment without hospitalization.    Ventilation  If you were in your present state of health and suddenly became very ill and were unable to breathe on your own, what would your preference be about the use of a ventilator (breathing machine) if it were available to you?      If patient would desire the use of a ventilator (breathing machine), answer \"yes\", if not \"no\":yes    If your health were to worsen and it became clear that your chance of recovery was unlikely, would that change your answer? Yes    Resuscitation  CPR works best to restart the heart when there is a sudden event, like a heart attack, in someone who is otherwise healthy. Unfortunately, CPR does not typically restart the

## 2024-04-22 NOTE — PROGRESS NOTES
Physician Progress Note      PATIENT:               HUNTER BILLS  CSN #:                  [id number]  :                       1945  ADMIT DATE:       2024 8:31 PM  DISCH DATE:  RESPONDING  PROVIDER #:        Daniel NAVARRETE MD          QUERY TEXT:    Pt admitted with Acute respiratory failure with hypoxia. Community-acquired   pneumonia.  Noted documentation of \"May have criteria for sepsis per ED consult.\" If   possible, please document in progress notes and discharge summary:      The medical record reflects the following:  Risk Factors:  pulmonary fibrosis, pulm hypertension, COPD, bronchiectasis  Clinical Indicators:  HR-96,  Temp- 94.1  Pneumonia  resp 35,  3 days prior to   admission the patient was started to experience worsening cough with   yellowish sputum and shortness of breath with fevers and chills.  Treatment:  Azithromycin 500 IV daily  Rocephin 1000 mg every 24 hours,    sputum culture,  procalcitonin, urine strep/Legionella antigen   DuoNebs every   6 hoursSolu-Medrol 40 IV every 6 hours  Will continue antibiotics.  Monitor closely.  Pulmonary following.    Thank-You, Olinda Menendez RN, BSN, CCDS  Options provided:  -- Sepsis due to pneumonia confirmed present on admission  -- Sepsis ruled out  -- Other - I will add my own diagnosis  -- Disagree - Not applicable / Not valid  -- Disagree - Clinically unable to determine / Unknown  -- Refer to Clinical Documentation Reviewer    PROVIDER RESPONSE TEXT:    The diagnosis of sepsis was ruled out.    Query created by: Jeannine Menendez on 2024 1:03 PM      Electronically signed by:  Daniel NAVARRETE MD 2024 2:05 PM

## 2024-04-22 NOTE — PLAN OF CARE
Problem: Discharge Planning  Goal: Discharge to home or other facility with appropriate resources  4/22/2024 1229 by Kailey Eastman RN  Outcome: Progressing  4/22/2024 0236 by Cordelia Lozano RN  Outcome: Progressing     Problem: Respiratory - Adult  Goal: Absence of respiratory distress  Description: Absence of respiratory distress  4/22/2024 1229 by Kailey Eastman RN  Outcome: Progressing  4/22/2024 0236 by Cordelia Lozano RN  Outcome: Progressing     Problem: Infection - Adult  Goal: *Absence of infection signs and symptoms  4/22/2024 1229 by Kailey Eastman RN  Outcome: Progressing  4/22/2024 0236 by Cordelia Lozano RN  Outcome: Progressing     Problem: Metabolic/Fluid and Electrolytes - Adult  Goal: Acid-base balance  Description: Acid-base balance  4/22/2024 1229 by Kailey Eastman RN  Outcome: Progressing  4/22/2024 0236 by Cordelia Lozano RN  Outcome: Progressing     Problem: Safety - Adult  Goal: Free from fall injury  4/22/2024 1229 by Kailey Eastman RN  Outcome: Progressing  4/22/2024 0236 by Cordelia Lozano RN  Outcome: Progressing     Problem: Chronic Conditions and Co-morbidities  Goal: Patient's chronic conditions and co-morbidity symptoms are monitored and maintained or improved  Outcome: Progressing

## 2024-04-22 NOTE — CARE COORDINATION
Case Management Assessment  Initial Evaluation    Date/Time of Evaluation: 4/22/2024 2:43 PM  Assessment Completed by: Yoana Garza RN    If patient is discharged prior to next notation, then this note serves as note for discharge by case management.    Patient Name: Kishan Elanie                   YOB: 1945  Diagnosis: Dyspnea and respiratory abnormalities [R06.00, R06.89]  Hypoxia [R09.02]  Acute respiratory failure with hypoxia (HCC) [J96.01]  Pneumonia due to infectious organism, unspecified laterality, unspecified part of lung [J18.9]                   Date / Time: 4/19/2024  8:31 PM    Patient Admission Status: Inpatient   Readmission Risk (Low < 19, Mod (19-27), High > 27): Readmission Risk Score: 11.9    Current PCP: Rosy Toro MD  PCP verified by CM? Yes    Chart Reviewed: Yes      History Provided by: Patient  Patient Orientation: Alert and Oriented    Patient Cognition: Alert    Hospitalization in the last 30 days (Readmission):  No    If yes, Readmission Assessment in  Navigator will be completed.    Advance Directives:      Code Status: Full Code   Patient's Primary Decision Maker is: Patient Declined (Legal Next of Kin Remains as Decision Maker)    Primary Decision Maker: Elva Elaine - Spouse - [phone]    Secondary Decision Maker: Kishan Elaine - Child - [phone]    Secondary Decision Maker: Steven Elaine - Child - [phone]    Discharge Planning:    Patient lives with: Spouse/Significant Other Type of Home: House  Primary Care Giver: Self  Patient Support Systems include: Spouse/Significant Other, Family Members   Current Financial resources: Medicare  Current community resources: None  Current services prior to admission: None            Current DME:              Type of Home Care services:  None    ADLS  Prior functional level: Independent in ADLs/IADLs  Current functional level: Independent in ADLs/IADLs    PT AM-PAC: 22 /24  OT AM-PAC: 22 /24    Family

## 2024-04-22 NOTE — PLAN OF CARE
Problem: Discharge Planning  Goal: Discharge to home or other facility with appropriate resources  4/22/2024 0236 by Cordelia Lozano RN  Outcome: Progressing  4/21/2024 1554 by Jessica Vance RN  Outcome: Progressing     Problem: Respiratory - Adult  Goal: Absence of respiratory distress  Description: Absence of respiratory distress  4/22/2024 0236 by Cordelia Lozano RN  Outcome: Progressing  4/21/2024 1554 by Jessica Vance RN  Outcome: Progressing     Problem: Infection - Adult  Goal: *Absence of infection signs and symptoms  4/22/2024 0236 by Cordelia Lozano RN  Outcome: Progressing  4/21/2024 1554 by Jessica Vance RN  Outcome: Progressing     Problem: Metabolic/Fluid and Electrolytes - Adult  Goal: Acid-base balance  Description: Acid-base balance  4/22/2024 0236 by Cordelia Lozano RN  Outcome: Progressing  4/21/2024 1554 by Jessica Vance RN  Outcome: Progressing     Problem: Safety - Adult  Goal: Free from fall injury  4/22/2024 0236 by Cordelia Lozano RN  Outcome: Progressing  4/21/2024 1554 by Jessica Vance RN  Outcome: Progressing

## 2024-04-23 ENCOUNTER — TELEPHONE (OUTPATIENT)
Dept: PULMONOLOGY | Age: 79
End: 2024-04-23

## 2024-04-23 VITALS
OXYGEN SATURATION: 92 % | RESPIRATION RATE: 22 BRPM | HEART RATE: 78 BPM | DIASTOLIC BLOOD PRESSURE: 70 MMHG | HEIGHT: 72 IN | SYSTOLIC BLOOD PRESSURE: 118 MMHG | WEIGHT: 163 LBS | BODY MASS INDEX: 22.08 KG/M2 | TEMPERATURE: 97.8 F

## 2024-04-23 LAB
ANION GAP SERPL CALCULATED.3IONS-SCNC: 8 MMOL/L (ref 3–16)
BASOPHILS # BLD: 0 K/UL (ref 0–0.2)
BASOPHILS NFR BLD: 0.1 %
BUN SERPL-MCNC: 27 MG/DL (ref 7–20)
CALCIUM SERPL-MCNC: 8.9 MG/DL (ref 8.3–10.6)
CHLORIDE SERPL-SCNC: 100 MMOL/L (ref 99–110)
CO2 SERPL-SCNC: 29 MMOL/L (ref 21–32)
CREAT SERPL-MCNC: 0.7 MG/DL (ref 0.8–1.3)
DEPRECATED RDW RBC AUTO: 15.1 % (ref 12.4–15.4)
EOSINOPHIL # BLD: 0.1 K/UL (ref 0–0.6)
EOSINOPHIL NFR BLD: 0.8 %
GFR SERPLBLD CREATININE-BSD FMLA CKD-EPI: >90 ML/MIN/{1.73_M2}
GLUCOSE SERPL-MCNC: 147 MG/DL (ref 70–99)
HCT VFR BLD AUTO: 36 % (ref 40.5–52.5)
HGB BLD-MCNC: 12.1 G/DL (ref 13.5–17.5)
LYMPHOCYTES # BLD: 1.6 K/UL (ref 1–5.1)
LYMPHOCYTES NFR BLD: 18.1 %
MCH RBC QN AUTO: 32 PG (ref 26–34)
MCHC RBC AUTO-ENTMCNC: 33.6 G/DL (ref 31–36)
MCV RBC AUTO: 95.3 FL (ref 80–100)
MONOCYTES # BLD: 0.9 K/UL (ref 0–1.3)
MONOCYTES NFR BLD: 9.7 %
NEUTROPHILS # BLD: 6.3 K/UL (ref 1.7–7.7)
NEUTROPHILS NFR BLD: 71.3 %
PLATELET # BLD AUTO: 150 K/UL (ref 135–450)
PMV BLD AUTO: 8.7 FL (ref 5–10.5)
POTASSIUM SERPL-SCNC: 4.6 MMOL/L (ref 3.5–5.1)
RBC # BLD AUTO: 3.78 M/UL (ref 4.2–5.9)
SODIUM SERPL-SCNC: 137 MMOL/L (ref 136–145)
WBC # BLD AUTO: 8.9 K/UL (ref 4–11)

## 2024-04-23 PROCEDURE — 99232 SBSQ HOSP IP/OBS MODERATE 35: CPT | Performed by: INTERNAL MEDICINE

## 2024-04-23 PROCEDURE — 6370000000 HC RX 637 (ALT 250 FOR IP): Performed by: INTERNAL MEDICINE

## 2024-04-23 PROCEDURE — 93306 TTE W/DOPPLER COMPLETE: CPT

## 2024-04-23 PROCEDURE — 80048 BASIC METABOLIC PNL TOTAL CA: CPT

## 2024-04-23 PROCEDURE — 85025 COMPLETE CBC W/AUTO DIFF WBC: CPT

## 2024-04-23 PROCEDURE — 94640 AIRWAY INHALATION TREATMENT: CPT

## 2024-04-23 PROCEDURE — 6370000000 HC RX 637 (ALT 250 FOR IP): Performed by: STUDENT IN AN ORGANIZED HEALTH CARE EDUCATION/TRAINING PROGRAM

## 2024-04-23 PROCEDURE — 94761 N-INVAS EAR/PLS OXIMETRY MLT: CPT

## 2024-04-23 PROCEDURE — 2580000003 HC RX 258: Performed by: STUDENT IN AN ORGANIZED HEALTH CARE EDUCATION/TRAINING PROGRAM

## 2024-04-23 PROCEDURE — 36415 COLL VENOUS BLD VENIPUNCTURE: CPT

## 2024-04-23 PROCEDURE — 6360000002 HC RX W HCPCS: Performed by: INTERNAL MEDICINE

## 2024-04-23 PROCEDURE — 6360000002 HC RX W HCPCS: Performed by: STUDENT IN AN ORGANIZED HEALTH CARE EDUCATION/TRAINING PROGRAM

## 2024-04-23 PROCEDURE — 2700000000 HC OXYGEN THERAPY PER DAY

## 2024-04-23 RX ORDER — ARFORMOTEROL TARTRATE 15 UG/2ML
15 SOLUTION RESPIRATORY (INHALATION)
Qty: 120 ML | Refills: 3 | Status: SHIPPED | OUTPATIENT
Start: 2024-04-23

## 2024-04-23 RX ORDER — BUDESONIDE 0.5 MG/2ML
0.5 INHALANT ORAL
Qty: 60 EACH | Refills: 3 | Status: SHIPPED | OUTPATIENT
Start: 2024-04-23

## 2024-04-23 RX ORDER — PREDNISONE 20 MG/1
40 TABLET ORAL DAILY
Qty: 2 TABLET | Refills: 0 | Status: SHIPPED | OUTPATIENT
Start: 2024-04-24 | End: 2024-04-25

## 2024-04-23 RX ADMIN — ENOXAPARIN SODIUM 40 MG: 100 INJECTION SUBCUTANEOUS at 08:59

## 2024-04-23 RX ADMIN — Medication 1 TABLET: at 08:59

## 2024-04-23 RX ADMIN — PREDNISONE 40 MG: 20 TABLET ORAL at 08:59

## 2024-04-23 RX ADMIN — METOPROLOL SUCCINATE 25 MG: 25 TABLET, FILM COATED, EXTENDED RELEASE ORAL at 08:59

## 2024-04-23 RX ADMIN — IPRATROPIUM BROMIDE AND ALBUTEROL SULFATE 1 DOSE: 2.5; .5 SOLUTION RESPIRATORY (INHALATION) at 07:28

## 2024-04-23 RX ADMIN — Medication 10 ML: at 08:59

## 2024-04-23 RX ADMIN — IPRATROPIUM BROMIDE AND ALBUTEROL SULFATE 1 DOSE: 2.5; .5 SOLUTION RESPIRATORY (INHALATION) at 11:14

## 2024-04-23 RX ADMIN — FLUOXETINE HYDROCHLORIDE 20 MG: 20 CAPSULE ORAL at 08:59

## 2024-04-23 RX ADMIN — MODAFINIL 200 MG: 200 TABLET ORAL at 08:59

## 2024-04-23 RX ADMIN — BUDESONIDE 500 MCG: 0.5 SUSPENSION RESPIRATORY (INHALATION) at 07:28

## 2024-04-23 RX ADMIN — FAMOTIDINE 20 MG: 20 TABLET, FILM COATED ORAL at 08:59

## 2024-04-23 RX ADMIN — IPRATROPIUM BROMIDE AND ALBUTEROL SULFATE 1 DOSE: 2.5; .5 SOLUTION RESPIRATORY (INHALATION) at 15:12

## 2024-04-23 RX ADMIN — CEFTRIAXONE SODIUM 1000 MG: 1 INJECTION, POWDER, FOR SOLUTION INTRAMUSCULAR; INTRAVENOUS at 08:57

## 2024-04-23 RX ADMIN — ATORVASTATIN CALCIUM 20 MG: 10 TABLET, FILM COATED ORAL at 08:59

## 2024-04-23 RX ADMIN — ARFORMOTEROL TARTRATE 15 MCG: 15 SOLUTION RESPIRATORY (INHALATION) at 07:28

## 2024-04-23 RX ADMIN — Medication 50 MG: at 08:59

## 2024-04-23 RX ADMIN — LOSARTAN POTASSIUM 25 MG: 25 TABLET, FILM COATED ORAL at 08:59

## 2024-04-23 RX ADMIN — GABAPENTIN 300 MG: 300 CAPSULE ORAL at 08:59

## 2024-04-23 RX ADMIN — TICAGRELOR 60 MG: 60 TABLET ORAL at 08:59

## 2024-04-23 NOTE — FLOWSHEET NOTE
04/22/24 2023   Vital Signs   Temp 97.6 °F (36.4 °C)   Temp Source Axillary   Pulse 99   Heart Rate Source Monitor   Respirations 22   /76   MAP (Calculated) 96   MAP (mmHg) 95   BP Location Right upper arm   BP Method Automatic   Patient Position Semi fowlers   Pain Assessment   Pain Assessment None - Denies Pain   Oxygen Therapy   SpO2 90 %   O2 Device Nasal cannula   O2 Flow Rate (L/min) 1 L/min

## 2024-04-23 NOTE — DISCHARGE SUMMARY
V2.0  Discharge Summary    Name:  Kishan Elaine /Age/Sex: 1945 (78 y.o. male)   Admit Date: 2024  Discharge Date: 24    MRN & CSN:  [id number] & [id number] Encounter Date and Time 24 2:36 PM EDT    Attending:  Daniel Arriaga MD Discharging Provider: Jenn Marquez DO       Hospital Course:     Brief HPI: Kishan Elaine is a 78 y.o. male with pmh of pulmonary fibrosis s/p complicated COVID infection in  s/p trach, pulmonary hypertension, bronchiectasis, CAD s/p CABG, HFpEF (3/2021 EF 50 to 55%) who presents with complaints of SOB, diagnosed with Acute respiratory failure with hypoxia (HCC). Sx started 3 days PTA when he started having worsening productive cough with yellow sputum. Associated with SOB, fevers/chills, malaise, chest pain. On day of presentation, neighbor checked his O2, found to be 85%. EMS called, noting O2 in 70s.     ED evaluation noted hypoxia. CXR and CTPA negative. Infectious workup unremarkable. Placed on 15L HFNC. Given IV solu-medrol, breathing treatments, admitted to floor.        Brief Problem Based Course:     Acute respiratory failure with hypoxia  History of emphysema with pulmonary fibrosis  -Chronic inability to walk more than 20 ft without rest since COVID in   -Empirically started on ceftriaxone/azithromycin for suspected CAP on , d/c'ed on discharge date  -POC U/S chest showed interstitial A-line and B-lines and focal alveolar interstitial syndrome.   -CXR with no acute process. Stable chronic lung changes  -CTPA negative  -Sputum culture noted 3+ gram-positive cocci, 1+ gram-negative rods  -Pro-Manuel negative  -COVID/flu, urine strep/Legionella negative  -Initially requiring up to 15 L HFNC, weaned to 1L NC  -Walk test  noted need for 1L NC continuous  -Pulm consulted  -D/c home with Brovana and Pulmicort  -Pulm rehab at d/c  -Rec outpatient sleep apnea eval to decide on need for modafinil vs sleep apnea management    CAD status post

## 2024-04-23 NOTE — TELEPHONE ENCOUNTER
----- Message from Raudel Brown MD sent at 4/23/2024  3:18 PM EDT -----  · Patient follows with Dr. Mcwilliams as outpatient.  Will arrange follow-up in the coming few weeks
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
## 2024-04-23 NOTE — PROGRESS NOTES
Oxygen documentation:    O2 saturation at REST on ROOM AIR = __92____%    If saturation is 89% or above please proceed with steps 2 and 3..........    O2 saturation with AMBULATION of ___5__ feet on ROOM AIR = __87___%    Recovered to 92% on 1L nasal cannula after 3 minutes     O2 saturation with AMBULATION on ____1___ liter/min = ___91___%    Back up to 94% on 1L after 2 minutes    DCP notified: ______

## 2024-04-23 NOTE — CARE COORDINATION
Dr Arriaga updated writer, plan for discharge today pending walk test for home oxygen.  YARELY Braxton     2063 Addendum:  Writer aware pt qualifies for new continuous home oxygen, already ordered.  Writer gave referral to [facility].  YARELY Braxton     5610 Addendum:  Writer met with pt and spouse/James bedside, pt already has home concentrator through [facility].  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  Writer spoke with [employer] rep, pt is ideal candidate for portable oxygen concentrator as he is only on 1L/NC.  [facility] can deliver POC to pt's home tomorrow.  Writer faxed notes/order/facesheet to [facility].  YARELY Braxton

## 2024-04-23 NOTE — PROGRESS NOTES
V2.0  Discharge Summary    Name:  Kishan Elaine /Age/Sex: 1945 (78 y.o. male)   Admit Date: 2024  Discharge Date: 24    MRN & CSN:  [id number] & [id number] Encounter Date and Time 24 2:36 PM EDT    Attending:  Daniel Arriaga MD Discharging Provider: Jenn Marquez DO       Hospital Course:     Brief HPI: Kishan Elaine is a 78 y.o. male with pmh of pulmonary fibrosis s/p complicated COVID infection in  s/p trach, pulmonary hypertension, bronchiectasis, CAD s/p CABG, HFpEF (3/2021 EF 50 to 55%) who presents with complaints of SOB, diagnosed with Acute respiratory failure with hypoxia (HCC). Sx started 3 days PTA when he started having worsening productive cough with yellow sputum. Associated with SOB, fevers/chills, malaise, chest pain. On day of presentation, neighbor checked his O2, found to be 85%. EMS called, noting O2 in 70s.     ED evaluation noted hypoxia. CXR and CTPA negative. Infectious workup unremarkable. Placed on 15L HFNC. Given IV solu-medrol, breathing treatments, admitted to floor.        Brief Problem Based Course:     Acute respiratory failure with hypoxia  History of emphysema with pulmonary fibrosis  -Chronic inability to walk more than 20 ft without rest since COVID in   -Empirically started on ceftriaxone/azithromycin for suspected CAP on , d/c'ed on discharge date  -POC U/S chest showed interstitial A-line and B-lines and focal alveolar interstitial syndrome.   -CXR with no acute process. Stable chronic lung changes  -CTPA negative  -Sputum culture noted 3+ gram-positive cocci, 1+ gram-negative rods  -Pro-Manuel negative  -COVID/flu, urine strep/Legionella negative  -Initially requiring up to 15 L HFNC, weaned to 1L NC  -Walk test  noted need for 1L NC continuous  -Pulm consulted  -D/c home with Brovana and Pulmicort  -Pulm rehab at d/c  -Rec outpatient sleep apnea eval to decide on need for modafinil vs sleep apnea management    CAD status post  Cultures: No results found for: \"LABURIN\"  Blood Cultures: No results found for: \"BC\"  No results found for: \"BLOODCULT2\"  Organism:   Lab Results   Component Value Date/Time    ORG Staphylococcus aureus 07/29/2021 05:12 PM       Time Spent Discharging patient 35 minutes    Electronically signed by Jenn Marquez DO on 4/23/2024 at 2:44 PM

## 2024-04-23 NOTE — CARE COORDINATION
CASE MANAGEMENT DISCHARGE SUMMARY    Discharge to: Home with family support    IMM given: 4/20     New Durable Medical Equipment ordered/agency: continuous portable oxygen concentrator with conserving device on 1L/NC, per [facility] as pt already has concentrator at home.  Writer confirmed with [facility], they will deliver POC tomorrow and reach out to pt.  Writer delivered pull behind portable tank on wheels, wife shown how to turn on, she has used before.     Transportation:    Family/car: Yes    Confirmed discharge plan with:   Patient: yes, pt agreeable to wearing continuous 1L/NC, especially agreeable since getting portable to carry around while working     Family:  yes, wife James agrees with plan     RN, name: Ni, she confirmed with pt/wife they can afford all new meds including nebulizer     No home care need noted, wife has pulse ox x2 at home, and pt will f/u with Pulm and PCP.  CRUZ Braxton-RN

## 2024-04-24 ENCOUNTER — CARE COORDINATION (OUTPATIENT)
Dept: CASE MANAGEMENT | Age: 79
End: 2024-04-24

## 2024-04-24 NOTE — CARE COORDINATION
Reece Mcwilliams MD AND PULM [facility]   6/5/2024  8:20 AM Reece Mcwilliams MD AND PULM [facility]   6/25/2024  8:30 AM Rosy Toro MD [facility] [place] - [facility]   9/25/2024  9:30 AM Ian Tirado MD [facility]       Care Transition Nurse provided contact information.  Plan for follow-up call in 5-7 days based on severity of symptoms and risk factors.  Plan for next call: self management-respiratory status    Jessica Bales RN

## 2024-04-25 ENCOUNTER — OFFICE VISIT (OUTPATIENT)
Dept: PULMONOLOGY | Age: 79
End: 2024-04-25

## 2024-04-25 VITALS
DIASTOLIC BLOOD PRESSURE: 70 MMHG | BODY MASS INDEX: 21.94 KG/M2 | TEMPERATURE: 97.6 F | OXYGEN SATURATION: 91 % | WEIGHT: 162 LBS | SYSTOLIC BLOOD PRESSURE: 114 MMHG | HEART RATE: 55 BPM | HEIGHT: 72 IN | RESPIRATION RATE: 16 BRPM

## 2024-04-25 DIAGNOSIS — Z87.891 FORMER SMOKER: ICD-10-CM

## 2024-04-25 DIAGNOSIS — J44.9 CHRONIC OBSTRUCTIVE PULMONARY DISEASE, UNSPECIFIED COPD TYPE (HCC): ICD-10-CM

## 2024-04-25 DIAGNOSIS — I27.20 PULMONARY HTN (HCC): ICD-10-CM

## 2024-04-25 DIAGNOSIS — J98.4 RESTRICTIVE LUNG DISEASE: Primary | ICD-10-CM

## 2024-04-25 DIAGNOSIS — J84.10 PULMONARY FIBROSIS (HCC): ICD-10-CM

## 2024-04-25 DIAGNOSIS — J43.2 CENTRILOBULAR EMPHYSEMA (HCC): ICD-10-CM

## 2024-04-25 DIAGNOSIS — I51.89 GRADE I DIASTOLIC DYSFUNCTION: ICD-10-CM

## 2024-04-25 NOTE — PATIENT INSTRUCTIONS
Remember to bring a list of pulmonary medications and any CPAP or BiPAP machines to your next appointment with the office.     Please keep all of your future appointments scheduled by [facility] office. Out of respect for other patients and providers, you may be asked to reschedule your appointment if you arrive later than your scheduled appointment time. Appointments cancelled less than 24hrs in advance will be considered a no show. Patients with three missed appointments within 1 year or four missed appointments within 2 years can be dismissed from the practice.     Please be aware that our physicians are required to work in the Intensive Care Unit at [facility].  Your appointment may need to be rescheduled if they are designated to work during your appointment time.      You may receive a survey regarding the care you received during your visit.  Your input is valuable to us.  We encourage you to complete and return your survey.  We hope you will choose us in the future for your healthcare needs.     Pt instructed of all future appointment dates & times, including radiology, labs, procedures & referrals. If procedures were scheduled preparation instructions provided. Instructions on future appointments with [facility] were given.

## 2024-04-25 NOTE — PROGRESS NOTES
C/O Pulmonary follow up posthospitalization    Patient was recently hospitalized because of acute on chronic respiratory failure requiring significant supplemental oxygen requirements, patient had various testing done at that time but they were negative but patient was coughing up brown phlegm and patient was given antibiotics along with steroids, patient states that with the supportive care and definitive care patient feels somewhat better, patient still has some dyspnea on exertion, patient has phlegm which is yellow in color, patient states that the phlegm is quite thick in nature, patient does not have any significant chest pain or palpitations, no significant epistaxis or hemoptysis, no odynophagia or dysphagia per se, patient does not have any significant fever or chills, patient does not have any significant nausea vomiting or leg edema, patient does not have any confusion lethargy, patient does not have any other pertinent review of system of concern    Previous HPIPatient has come to the office for a pulmonary follow-up, patient states that he does have some increased coughing along with that patient has phlegm which is yellowish in color, patient does not have any epistaxis or hemoptysis, patient has had some residual ear pain once in a while, patient has some wheezing, no chest pain palpitation diaphoresis, no odynophagia or dysphagia, patient does not have any abdominal symptoms of concern, patient has been using his albuterol inhaler 4 times a day in addition to the Trelegy Ellipta, no increasing leg edema, patient does not take too much salt in diet, no new medications, no other pertinent review of system of concern, patient states that he feels overall better, no other pertinent review of system of concern    Patient has come to the office for a pulmonary follow-up, patient states that he has been having some increasing coughing lately, patient states that he has yellow thick stringy phlegm and

## 2024-04-25 NOTE — PROGRESS NOTES
MA Communication:  The following orders are received by verbal communication from Reece Mcwilliams MD    Orders include:    3 month f/u

## 2024-04-26 PROBLEM — J43.2 CENTRILOBULAR EMPHYSEMA (HCC): Status: ACTIVE | Noted: 2024-04-26

## 2024-04-30 ENCOUNTER — CARE COORDINATION (OUTPATIENT)
Dept: CASE MANAGEMENT | Age: 79
End: 2024-04-30

## 2024-04-30 NOTE — CARE COORDINATION
Care Transitions Follow Up Call    Patient Current Location:  Home: [address] [place] [id number]    Care Transition Nurse contacted the spouse/partner by telephone to follow up after admission.  Verified name and  with spouse/partner as identifiers.    Patient: Kishan Elaine  Patient : 1945   MRN: [id number]  Reason for Admission: dyspnea and respiratory abnormalities   PMH pulmonary fibrosis, pulmonary HTN, COPD, bronchiectasis, CAD s/p CABG, CHF  Discharge Date: 24 RARS: Readmission Risk Score: 12.5      Needs to be reviewed by the provider   Additional needs identified to be addressed with provider: No  none         Method of communication with provider: none.    Patient's wife, James answered call and verified patient's . Stated that patient has returned to work. 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  James confirmed that patient got portable oxygen and wearing O2 at 2l/nc with exertion and at bedtime. When at rest, wife stated that he does not wear oxygen. Monitor's O2 sat and staying >90%.  Patient was seen by pulmonologist since last contact and visit went well. Discussed medication changes and taking all as directed. Referred to pulmonary rehab and scheduled 5/15 for evaluation at [facility].   Denied any acute needs at present time.  Agreeable to f/u calls.  Educated on the use of urgent care or physician’s 24 hr access line if assistance is needed after hours.    Addressed changes since last contact:  none  Discussed follow-up appointments. If no appointment was previously scheduled, appointment scheduling offered: Yes.   Is follow up appointment scheduled within 7 days of discharge? Yes.    Follow Up  Future Appointments   Date Time Provider Department Center   2024  8:30 AM Rosy Toro MD [facility] [place] - [facility]   2024  9:00 AM [facility]

## 2024-05-07 ENCOUNTER — CARE COORDINATION (OUTPATIENT)
Dept: CASE MANAGEMENT | Age: 79
End: 2024-05-07

## 2024-05-07 NOTE — CARE COORDINATION
Care Transitions Follow Up Call    Patient Current Location:  Home: [address] [place] [id number]    Care Transition Nurse contacted the spouse/partner by telephone to follow up after admission.  Verified name and  with spouse/partner as identifiers.    Patient: Kishan Elaine  Patient : 1945   MRN: [id number]  Reason for Admission: dyspnea and respiratory abnormalities   PMH pulmonary fibrosis, pulmonary HTN, COPD, bronchiectasis, CAD s/p CABG, CHF  Discharge Date: 24 RARS: Readmission Risk Score: 12.5      Needs to be reviewed by the provider   Additional needs identified to be addressed with provider: No  none         Method of communication with provider: none.    Patient's wife, James answered call and verified patient's .  Patient is feeling \"fine\" and getting stronger every day. Patient continues to wear portable oxygen with exertion and at bedtime. Wife stated that patient has pulse oximeter and spot checks sat several times thru the day and staying >90%. Wife and patient are eager about starting pulmonary rehab. Evaluation scheduled for Friday of this week.   Taking all medication as directed and denied any acute needs at present time.  Agreeable to f/u calls.  Educated on the use of urgent care or physician’s 24 hr access line if assistance is needed after hours.    Addressed changes since last contact:  none  Discussed follow-up appointments. If no appointment was previously scheduled, appointment scheduling offered: Yes.   Is follow up appointment scheduled within 7 days of discharge? Yes. Seen by pulmonary    Follow Up  Future Appointments   Date Time Provider Department Center   5/10/2024  9:15 AM SCHEDULE, RICARDO EVALUATION  RICARDO ALMEIDA   2024  8:30 AM Rosy Toro MD [facility] [place] [facility]   2024  9:00 AM Reece Mcwilliams MD AND PULM YESI   2024  9:30 AM Ian Tirado MD [facility]     External follow up appointment(s):     Care Transition

## 2024-05-10 ENCOUNTER — HOSPITAL ENCOUNTER (OUTPATIENT)
Dept: CARDIAC REHAB | Age: 79
Setting detail: THERAPIES SERIES
Discharge: HOME | End: 2024-05-10
Payer: MEDICARE

## 2024-05-13 ENCOUNTER — HOSPITAL ENCOUNTER (OUTPATIENT)
Dept: CARDIAC REHAB | Age: 79
Setting detail: THERAPIES SERIES
Discharge: HOME | End: 2024-05-13
Payer: MEDICARE

## 2024-05-13 PROCEDURE — G0239 OTH RESP PROC, GROUP: HCPCS

## 2024-05-14 ENCOUNTER — CARE COORDINATION (OUTPATIENT)
Dept: CASE MANAGEMENT | Age: 79
End: 2024-05-14

## 2024-05-14 NOTE — CARE COORDINATION
PULMONARY  RM SAHARA DE JESUS [facility]   8/28/2024  1:15 PM SCHEDULE, MHAZ PULMONARY  RM [facility] BILL[facility]   8/30/2024  1:15 PM SCHEDULE, MHAZ PULMONARY  RM [facility] BILLHI [facility]   9/4/2024  1:15 PM SCHEDULE, MHAZ PULMONARY  RM [facility] BILLALICIALAURA [facility]   9/25/2024  9:30 AM Ian Tirado MD [facility]      Care Transitions Subsequent and Final Call    Subsequent and Final Calls  Care Transitions Interventions  Other Interventions:           Jessica Bales RN

## 2024-05-15 ENCOUNTER — HOSPITAL ENCOUNTER (OUTPATIENT)
Dept: CARDIAC REHAB | Age: 79
Setting detail: THERAPIES SERIES
Discharge: HOME | End: 2024-05-15
Payer: MEDICARE

## 2024-05-15 PROCEDURE — G0239 OTH RESP PROC, GROUP: HCPCS

## 2024-05-16 ENCOUNTER — CARE COORDINATION (OUTPATIENT)
Dept: CASE MANAGEMENT | Age: 79
End: 2024-05-16

## 2024-05-16 NOTE — CARE COORDINATION
questions related to their healthcare.     Advance Care Planning   The patient has the following advanced directives on file:  Advance Directives       Power of  Living Will ACP-Advance Directive ACP-Power of     Not on File Not on File Not on File Not on File            The patient has appointed the following active healthcare agents:    Primary Decision Maker: Elva Elaine - Spouse - [phone]    Secondary Decision Maker: Kishan Elaine - Child - [phone]    Secondary Decision Maker: ElaineSteven - Child - [phone]          Patients top risk factors for readmission: medical condition-.  Patient Active Problem List   Diagnosis    HTN (hypertension)    Mixed hyperlipidemia    CAD (coronary artery disease)    S/P CABG (coronary artery bypass graft)    CHF (congestive heart failure) (HCC)    Ischemic cardiomyopathy    Psoriasis    Thrombocytopenia (HCC)    Prediabetes    Crushing injury of left forearm    Critical illness myopathy    Restrictive lung disease    H/O tracheostomy    Grade I diastolic dysfunction    Pulmonary fibrosis (HCC)    Former smoker    Bronchiectasis with acute exacerbation (HCC)    Neuralgia of left upper extremity    Reactive depression    Pulmonary HTN (HCC)    Chronic obstructive pulmonary disease, unspecified    Angina pectoris, unspecified    Atherosclerotic heart disease of native coronary artery with unspecified angina pectoris    Chronic systolic (congestive) heart failure    Acute respiratory failure with hypoxia (HCC)    Dyspnea and respiratory abnormalities    Centrilobular emphysema (HCC)       Interventions to address risk factors: Assessment and support for treatment adherence and medication management-.    Offered patient enrollment in the Remote Patient Monitoring (RPM) program for in-home monitoring: offer at a future.     Care Transitions Subsequent and Final Call    Subsequent and Final Calls  Do you have any ongoing symptoms?: No  Have your

## 2024-05-17 ENCOUNTER — HOSPITAL ENCOUNTER (OUTPATIENT)
Dept: CARDIAC REHAB | Age: 79
Setting detail: THERAPIES SERIES
Discharge: HOME | End: 2024-05-17
Payer: MEDICARE

## 2024-05-17 PROCEDURE — G0239 OTH RESP PROC, GROUP: HCPCS

## 2024-05-20 ENCOUNTER — HOSPITAL ENCOUNTER (OUTPATIENT)
Dept: CARDIAC REHAB | Age: 79
Setting detail: THERAPIES SERIES
Discharge: HOME | End: 2024-05-20
Payer: MEDICARE

## 2024-05-20 PROCEDURE — G0239 OTH RESP PROC, GROUP: HCPCS

## 2024-05-22 ENCOUNTER — HOSPITAL ENCOUNTER (OUTPATIENT)
Dept: CARDIAC REHAB | Age: 79
Setting detail: THERAPIES SERIES
Discharge: HOME | End: 2024-05-22
Payer: MEDICARE

## 2024-05-22 PROCEDURE — G0239 OTH RESP PROC, GROUP: HCPCS

## 2024-05-23 ENCOUNTER — CARE COORDINATION (OUTPATIENT)
Dept: CASE MANAGEMENT | Age: 79
End: 2024-05-23

## 2024-05-23 NOTE — CARE COORDINATION
Care Transitions Follow Up Call    Patient: Kishan Elaine  Patient : 1945   MRN: [id number]  Reason for Admission: dyspnea and respiratory abnormalities   PMH pulmonary fibrosis, pulmonary HTN, COPD, bronchiectasis, CAD s/p CABG, CHF  Discharge Date: 24 RARS: Readmission Risk Score: 12.5    Attempted to reach patient via phone for transition call.  VM left stating purpose of call along with my contact information requesting a return call.      Follow Up  Future Appointments   Date Time Provider Department Center   2024  1:15 PM SCHEDULE, MHAZ PULMONARY  RM MHAZ CARDPU [facility]   2024  1:15 PM SCHEDULE, MHAZ PULMONARY  RM MHAZ CARDPU [facility] HOD   2024  1:15 PM SCHEDULE, MHAZ PULMONARY  RM MHAZ [facility]   6/3/2024  1:15 PM SCHEDULE, MHAZ PULMONARY  RM MHAZ [facility]ARD[facility]   2024  1:15 PM SCHEDULE, MHAZ PULMONARY  RM [facility]Z [facility]   2024  1:15 PM SCHEDULE, MHAZ PULMONARY  RM MHAZ CARDPU [facility]   6/10/2024  1:15 PM SCHEDULE, MHAZ PULMONARY  RM MHAZ [facility]   2024  1:15 PM SCHEDULE, MHAZ PULMONARY  RM MHAZ [facility]   2024  1:15 PM SCHEDULE, MHAZ PULMONARY  RM MHAZ [facility]ARD[facility]   2024  1:15 PM SCHEDULE, MHAZ PULMONARY  RM MHAZ [facility]   2024  1:15 PM SCHEDULE, MHAZ PULMONARY  RM MHAZ [facility]   2024  1:15 PM SCHEDULE, MHAZ PULMONARY  RM MHAZ [facility]ARD[facility]   2024  1:15 PM SCHEDULE, MHAZ PULMONARY  RM MHAZ CARD[facility] HOD   2024  8:30 AM Rosy Toro MD [facility] [place] [facility]   2024  1:15 PM SCHEDULE, MHAZ PULMONARY  RM MHAZ CARDPU [facility] HOD   2024  1:15 PM SCHEDULE, MHAZ PULMONARY  RM MHAZ CARDPU [facility] HOD   2024  1:15 PM SCHEDULE, MHAZ PULMONARY  RM MHAZ CARDPU [facility]   7/3/2024  1:15 PM SCHEDULE, [facility] PULMONARY  RM SA[facility]ARA [facility]   2024  1:15 PM SCHEDULE, [facility] PULMONARY  RM SA[facility]ARA [facility]   2024  1:15

## 2024-05-24 ENCOUNTER — HOSPITAL ENCOUNTER (OUTPATIENT)
Dept: CARDIAC REHAB | Age: 79
Setting detail: THERAPIES SERIES
Discharge: HOME | End: 2024-05-24
Payer: MEDICARE

## 2024-05-24 PROCEDURE — G0239 OTH RESP PROC, GROUP: HCPCS

## 2024-05-28 ENCOUNTER — CARE COORDINATION (OUTPATIENT)
Dept: CASE MANAGEMENT | Age: 79
End: 2024-05-28

## 2024-05-28 NOTE — CARE COORDINATION
7/10/2024  1:15 PM SCHEDULE, MHAZ PULMONARY  RM MHAZ CARDPU [facility]   7/12/2024  1:15 PM SCHEDULE, MHAZ PULMONARY  RM MHAZ CARD[facility]   7/15/2024  1:15 PM SCHEDULE, MHAZ PULMONARY  RM MHAZ CARD[facility]   7/17/2024  1:15 PM SCHEDULE, MHAZ PULMONARY  RM MHAZ CARD[facility]   7/19/2024  1:15 PM SCHEDULE, MHAZ PULMONARY  RM MHAZ CARD[facility]   7/22/2024  1:15 PM SCHEDULE, MHAZ PULMONARY  RM MHAZ CARD[facility]   7/23/2024  9:00 AM Reece Mcwilliams MD [facility] PULM [facility]   7/24/2024  1:15 PM SCHEDULE, MHAZ PULMONARY  RM MHAZ CARDPU [facility]   7/26/2024  1:15 PM SCHEDULE, MHAZ PULMONARY  RM MHAZ CARD[facility]   7/29/2024  1:15 PM SCHEDULE, MHAZ PULMONARY  RM MHAZ CARD[facility]   7/31/2024  1:15 PM SCHEDULE, MHAZ PULMONARY  RM MHAZ CARDPU [facility]   8/2/2024  1:15 PM SCHEDULE, MHAZ PULMONARY  RM MHAZ CARD[facility]   8/5/2024  1:15 PM SCHEDULE, MHAZ PULMONARY  RM MHAZ CARD[facility]   8/7/2024  1:15 PM SCHEDULE, MHAZ PULMONARY  RM MHAZ [facility]ARD[facility]   8/9/2024  1:15 PM SCHEDULE, MHAZ PULMONARY  RM MHAZ CARD[facility]   8/12/2024  1:15 PM SCHEDULE, MHAZ PULMONARY  RM MHAZ [facility]ARD[facility]   8/14/2024  1:15 PM SCHEDULE, MHAZ PULMONARY  RM MHAZ CARD[facility]   8/16/2024  1:15 PM SCHEDULE, MHAZ PULMONARY  RM MHAZ CARD[facility]   8/19/2024  1:15 PM SCHEDULE, MHAZ PULMONARY  RM MHAZ [facility]ARD[facility]   8/21/2024  1:15 PM SCHEDULE, MHAZ PULMONARY  RM MHAZ CARD[facility]   8/23/2024  1:15 PM SCHEDULE, MHAZ PULMONARY  RM MHAZ CARD[facility]   8/26/2024  1:15 PM SCHEDULE, MHAZ PULMONARY  RM MHAZ [facility]ARD[facility]   8/28/2024  1:15 PM SCHEDULE, MHAZ PULMONARY  RM [facility]   8/30/2024  1:15 PM SCHEDULE, MHAZ PULMONARY  RM [facility]   9/4/2024  1:15 PM SCHEDULE, MHAZ PULMONARY  RM SA[facility]ARA [facility]   9/25/2024  9:30 AM Ian Tirado MD [facility]        Care Transitions Subsequent and

## 2024-05-29 ENCOUNTER — HOSPITAL ENCOUNTER (OUTPATIENT)
Dept: CARDIAC REHAB | Age: 79
Setting detail: THERAPIES SERIES
Discharge: HOME | End: 2024-05-29
Payer: MEDICARE

## 2024-05-29 PROCEDURE — G0239 OTH RESP PROC, GROUP: HCPCS

## 2024-05-31 ENCOUNTER — HOSPITAL ENCOUNTER (OUTPATIENT)
Dept: CARDIAC REHAB | Age: 79
Setting detail: THERAPIES SERIES
Discharge: HOME | End: 2024-05-31
Payer: MEDICARE

## 2024-05-31 PROCEDURE — G0239 OTH RESP PROC, GROUP: HCPCS

## 2024-06-01 DIAGNOSIS — F32.9 REACTIVE DEPRESSION: ICD-10-CM

## 2024-06-03 ENCOUNTER — HOSPITAL ENCOUNTER (OUTPATIENT)
Dept: CARDIAC REHAB | Age: 79
Setting detail: THERAPIES SERIES
Discharge: HOME | End: 2024-06-03
Payer: MEDICARE

## 2024-06-03 PROCEDURE — G0239 OTH RESP PROC, GROUP: HCPCS

## 2024-06-03 RX ORDER — FLUOXETINE HYDROCHLORIDE 20 MG/1
20 CAPSULE ORAL DAILY
Qty: 90 CAPSULE | Refills: 2 | Status: SHIPPED | OUTPATIENT
Start: 2024-06-03

## 2024-06-05 ENCOUNTER — HOSPITAL ENCOUNTER (OUTPATIENT)
Dept: CARDIAC REHAB | Age: 79
Setting detail: THERAPIES SERIES
Discharge: HOME | End: 2024-06-05
Payer: MEDICARE

## 2024-06-05 PROCEDURE — G0239 OTH RESP PROC, GROUP: HCPCS

## 2024-06-07 ENCOUNTER — HOSPITAL ENCOUNTER (OUTPATIENT)
Dept: CARDIAC REHAB | Age: 79
Setting detail: THERAPIES SERIES
Discharge: HOME | End: 2024-06-07
Payer: MEDICARE

## 2024-06-10 ENCOUNTER — APPOINTMENT (OUTPATIENT)
Dept: CARDIAC REHAB | Age: 79
End: 2024-06-10
Payer: MEDICARE

## 2024-06-12 ENCOUNTER — APPOINTMENT (OUTPATIENT)
Dept: CARDIAC REHAB | Age: 79
End: 2024-06-12
Payer: MEDICARE

## 2024-06-14 ENCOUNTER — APPOINTMENT (OUTPATIENT)
Dept: CARDIAC REHAB | Age: 79
End: 2024-06-14
Payer: MEDICARE

## 2024-06-17 ENCOUNTER — APPOINTMENT (OUTPATIENT)
Dept: CARDIAC REHAB | Age: 79
End: 2024-06-17
Payer: MEDICARE

## 2024-06-19 ENCOUNTER — APPOINTMENT (OUTPATIENT)
Dept: CARDIAC REHAB | Age: 79
End: 2024-06-19
Payer: MEDICARE

## 2024-06-19 DIAGNOSIS — J44.9 CHRONIC OBSTRUCTIVE PULMONARY DISEASE, UNSPECIFIED COPD TYPE (HCC): Primary | ICD-10-CM

## 2024-06-19 RX ORDER — BUDESONIDE 0.5 MG/2ML
INHALANT ORAL
Qty: 120 ML | Refills: 5 | Status: SHIPPED | OUTPATIENT
Start: 2024-06-19

## 2024-06-19 NOTE — TELEPHONE ENCOUNTER
[facility] and pt had script transferred out to [facility] [phone].  [facility]aine whom states that the script they have for pt was dispensed as 60 mL not 60 vials so pt was only getting 2 weeks supply at a time.  Fill dates werer 4/24/24, 5/9/24, 5/23/24, 6/7/24.  New script is required.  Updated the note to pharmacy to dispense a full month-60 vials for pt.

## 2024-06-21 ENCOUNTER — APPOINTMENT (OUTPATIENT)
Dept: CARDIAC REHAB | Age: 79
End: 2024-06-21
Payer: MEDICARE

## 2024-06-25 ENCOUNTER — OFFICE VISIT (OUTPATIENT)
Dept: INTERNAL MEDICINE CLINIC | Age: 79
End: 2024-06-25
Payer: MEDICARE

## 2024-06-25 VITALS
HEIGHT: 72 IN | HEART RATE: 58 BPM | OXYGEN SATURATION: 95 % | DIASTOLIC BLOOD PRESSURE: 70 MMHG | SYSTOLIC BLOOD PRESSURE: 124 MMHG | BODY MASS INDEX: 21.4 KG/M2 | WEIGHT: 158 LBS

## 2024-06-25 DIAGNOSIS — E78.2 MIXED HYPERLIPIDEMIA: ICD-10-CM

## 2024-06-25 DIAGNOSIS — I10 PRIMARY HYPERTENSION: ICD-10-CM

## 2024-06-25 DIAGNOSIS — M79.2 NEURALGIA OF LEFT UPPER EXTREMITY: ICD-10-CM

## 2024-06-25 DIAGNOSIS — G62.9 NEUROPATHY: Primary | ICD-10-CM

## 2024-06-25 DIAGNOSIS — I73.9 PAD (PERIPHERAL ARTERY DISEASE) (HCC): ICD-10-CM

## 2024-06-25 PROCEDURE — 1124F ACP DISCUSS-NO DSCNMKR DOCD: CPT | Performed by: INTERNAL MEDICINE

## 2024-06-25 PROCEDURE — 3078F DIAST BP <80 MM HG: CPT | Performed by: INTERNAL MEDICINE

## 2024-06-25 PROCEDURE — 99214 OFFICE O/P EST MOD 30 MIN: CPT | Performed by: INTERNAL MEDICINE

## 2024-06-25 PROCEDURE — 3074F SYST BP LT 130 MM HG: CPT | Performed by: INTERNAL MEDICINE

## 2024-06-25 RX ORDER — GABAPENTIN 300 MG/1
300 CAPSULE ORAL 2 TIMES DAILY
Qty: 180 CAPSULE | Refills: 0 | Status: SHIPPED | OUTPATIENT
Start: 2024-09-01 | End: 2025-02-28

## 2024-06-25 SDOH — ECONOMIC STABILITY: FOOD INSECURITY: WITHIN THE PAST 12 MONTHS, YOU WORRIED THAT YOUR FOOD WOULD RUN OUT BEFORE YOU GOT MONEY TO BUY MORE.: NEVER TRUE

## 2024-06-25 SDOH — ECONOMIC STABILITY: INCOME INSECURITY: HOW HARD IS IT FOR YOU TO PAY FOR THE VERY BASICS LIKE FOOD, HOUSING, MEDICAL CARE, AND HEATING?: NOT HARD AT ALL

## 2024-06-25 SDOH — ECONOMIC STABILITY: FOOD INSECURITY: WITHIN THE PAST 12 MONTHS, THE FOOD YOU BOUGHT JUST DIDN'T LAST AND YOU DIDN'T HAVE MONEY TO GET MORE.: NEVER TRUE

## 2024-06-25 ASSESSMENT — PATIENT HEALTH QUESTIONNAIRE - PHQ9
7. TROUBLE CONCENTRATING ON THINGS, SUCH AS READING THE NEWSPAPER OR WATCHING TELEVISION: NOT AT ALL
SUM OF ALL RESPONSES TO PHQ QUESTIONS 1-9: 0
8. MOVING OR SPEAKING SO SLOWLY THAT OTHER PEOPLE COULD HAVE NOTICED. OR THE OPPOSITE, BEING SO FIGETY OR RESTLESS THAT YOU HAVE BEEN MOVING AROUND A LOT MORE THAN USUAL: NOT AT ALL
1. LITTLE INTEREST OR PLEASURE IN DOING THINGS: NOT AT ALL
2. FEELING DOWN, DEPRESSED OR HOPELESS: NOT AT ALL
9. THOUGHTS THAT YOU WOULD BE BETTER OFF DEAD, OR OF HURTING YOURSELF: NOT AT ALL
SUM OF ALL RESPONSES TO PHQ QUESTIONS 1-9: 0
SUM OF ALL RESPONSES TO PHQ9 QUESTIONS 1 & 2: 0
SUM OF ALL RESPONSES TO PHQ QUESTIONS 1-9: 0
3. TROUBLE FALLING OR STAYING ASLEEP: NOT AT ALL
10. IF YOU CHECKED OFF ANY PROBLEMS, HOW DIFFICULT HAVE THESE PROBLEMS MADE IT FOR YOU TO DO YOUR WORK, TAKE CARE OF THINGS AT HOME, OR GET ALONG WITH OTHER PEOPLE: NOT DIFFICULT AT ALL
6. FEELING BAD ABOUT YOURSELF - OR THAT YOU ARE A FAILURE OR HAVE LET YOURSELF OR YOUR FAMILY DOWN: NOT AT ALL
4. FEELING TIRED OR HAVING LITTLE ENERGY: NOT AT ALL
5. POOR APPETITE OR OVEREATING: NOT AT ALL
SUM OF ALL RESPONSES TO PHQ QUESTIONS 1-9: 0
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
## 2024-06-26 ENCOUNTER — HOSPITAL ENCOUNTER (OUTPATIENT)
Dept: CARDIAC REHAB | Age: 79
Setting detail: THERAPIES SERIES
Discharge: HOME | End: 2024-06-26
Payer: MEDICARE

## 2024-06-26 PROCEDURE — G0239 OTH RESP PROC, GROUP: HCPCS

## 2024-06-28 ENCOUNTER — HOSPITAL ENCOUNTER (OUTPATIENT)
Dept: CARDIAC REHAB | Age: 79
Setting detail: THERAPIES SERIES
Discharge: HOME | End: 2024-06-28
Payer: MEDICARE

## 2024-06-28 PROCEDURE — G0239 OTH RESP PROC, GROUP: HCPCS

## 2024-07-03 ENCOUNTER — HOSPITAL ENCOUNTER (OUTPATIENT)
Dept: CARDIAC REHAB | Age: 79
Setting detail: THERAPIES SERIES
Discharge: HOME | End: 2024-07-03
Payer: MEDICARE

## 2024-07-03 PROCEDURE — G0239 OTH RESP PROC, GROUP: HCPCS

## 2024-07-05 ENCOUNTER — APPOINTMENT (OUTPATIENT)
Dept: CARDIAC REHAB | Age: 79
End: 2024-07-05
Payer: MEDICARE

## 2024-07-10 ENCOUNTER — HOSPITAL ENCOUNTER (OUTPATIENT)
Dept: CARDIAC REHAB | Age: 79
Setting detail: THERAPIES SERIES
Discharge: HOME | End: 2024-07-10
Payer: MEDICARE

## 2024-07-10 PROCEDURE — G0239 OTH RESP PROC, GROUP: HCPCS

## 2024-07-12 ENCOUNTER — HOSPITAL ENCOUNTER (OUTPATIENT)
Dept: CARDIAC REHAB | Age: 79
Setting detail: THERAPIES SERIES
Discharge: HOME | End: 2024-07-12
Payer: MEDICARE

## 2024-07-12 PROCEDURE — G0239 OTH RESP PROC, GROUP: HCPCS

## 2024-07-17 ENCOUNTER — HOSPITAL ENCOUNTER (OUTPATIENT)
Dept: CARDIAC REHAB | Age: 79
Setting detail: THERAPIES SERIES
Discharge: HOME | End: 2024-07-17
Payer: MEDICARE

## 2024-07-17 PROCEDURE — G0239 OTH RESP PROC, GROUP: HCPCS

## 2024-07-19 ENCOUNTER — HOSPITAL ENCOUNTER (OUTPATIENT)
Dept: CARDIAC REHAB | Age: 79
Setting detail: THERAPIES SERIES
Discharge: HOME | End: 2024-07-19
Payer: MEDICARE

## 2024-07-19 PROCEDURE — G0239 OTH RESP PROC, GROUP: HCPCS

## 2024-07-22 ENCOUNTER — HOSPITAL ENCOUNTER (OUTPATIENT)
Dept: CARDIAC REHAB | Age: 79
Setting detail: THERAPIES SERIES
Discharge: HOME | End: 2024-07-22
Payer: MEDICARE

## 2024-07-22 PROCEDURE — G0239 OTH RESP PROC, GROUP: HCPCS

## 2024-07-23 ENCOUNTER — OFFICE VISIT (OUTPATIENT)
Dept: PULMONOLOGY | Age: 79
End: 2024-07-23
Payer: MEDICARE

## 2024-07-23 VITALS
WEIGHT: 160 LBS | SYSTOLIC BLOOD PRESSURE: 117 MMHG | DIASTOLIC BLOOD PRESSURE: 68 MMHG | TEMPERATURE: 97.4 F | BODY MASS INDEX: 21.67 KG/M2 | HEIGHT: 72 IN | OXYGEN SATURATION: 91 % | RESPIRATION RATE: 16 BRPM | HEART RATE: 63 BPM

## 2024-07-23 DIAGNOSIS — Z87.891 FORMER SMOKER: ICD-10-CM

## 2024-07-23 DIAGNOSIS — I51.89 GRADE I DIASTOLIC DYSFUNCTION: ICD-10-CM

## 2024-07-23 DIAGNOSIS — J84.10 PULMONARY FIBROSIS (HCC): ICD-10-CM

## 2024-07-23 DIAGNOSIS — I27.20 PULMONARY HTN (HCC): Primary | ICD-10-CM

## 2024-07-23 DIAGNOSIS — I25.5 ISCHEMIC CARDIOMYOPATHY: ICD-10-CM

## 2024-07-23 DIAGNOSIS — J98.4 RESTRICTIVE LUNG DISEASE: ICD-10-CM

## 2024-07-23 DIAGNOSIS — J43.2 CENTRILOBULAR EMPHYSEMA (HCC): ICD-10-CM

## 2024-07-23 PROCEDURE — 3078F DIAST BP <80 MM HG: CPT | Performed by: INTERNAL MEDICINE

## 2024-07-23 PROCEDURE — 99214 OFFICE O/P EST MOD 30 MIN: CPT | Performed by: INTERNAL MEDICINE

## 2024-07-23 PROCEDURE — 3074F SYST BP LT 130 MM HG: CPT | Performed by: INTERNAL MEDICINE

## 2024-07-23 PROCEDURE — 1124F ACP DISCUSS-NO DSCNMKR DOCD: CPT | Performed by: INTERNAL MEDICINE

## 2024-07-23 RX ORDER — CEFUROXIME AXETIL 500 MG/1
500 TABLET ORAL 2 TIMES DAILY
Qty: 14 TABLET | Refills: 0 | Status: SHIPPED | OUTPATIENT
Start: 2024-07-23 | End: 2024-07-30

## 2024-07-23 RX ORDER — PREDNISONE 10 MG/1
TABLET ORAL
Qty: 30 TABLET | Refills: 0 | Status: SHIPPED | OUTPATIENT
Start: 2024-07-23

## 2024-07-23 NOTE — PATIENT INSTRUCTIONS
Remember to bring a list of pulmonary medications and any CPAP or BiPAP machines to your next appointment with the office.     Please keep all of your future appointments scheduled by [facility] office. Out of respect for other patients and providers, you may be asked to reschedule your appointment if you arrive later than your scheduled appointment time. Appointments cancelled less than 24hrs in advance will be considered a no show. Patients with three missed appointments within 1 year or four missed appointments within 2 years can be dismissed from the practice.     Please be aware that our physicians are required to work in the Intensive Care Unit at [facility].  Your appointment may need to be rescheduled if they are designated to work during your appointment time.      You may receive a survey regarding the care you received during your visit.  Your input is valuable to us.  We encourage you to complete and return your survey.  We hope you will choose us in the future for your healthcare needs.     Pt instructed of all future appointment dates & times, including radiology, labs, procedures & referrals. If procedures were scheduled preparation instructions provided. Instructions on future appointments with [facility] were given.

## 2024-07-23 NOTE — PROGRESS NOTES
MA Communication:  The following orders are received by verbal communication from Reece Mcwilliams MD    Orders include:    3 month lungs f/u    
fibrosis, characterized by honeycomb lung and   traction bronchiectasis, with a slight upper lobe predominance.  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  Follow-up with CT in   1 year recommended.       ECHO-Conclusions      Summary   Technically difficult examination.   Global left ventricular function is mildly decreased with ejection fraction   estimated at 40%.   EF by Arita's method estimated at 41%.   Global hypokinesis with regional wall variation.   Grade I diastolic dysfunction with normal filling pressure.   The right ventricle is normal in size with reduced function.   The aortic root is normal in size.   The ascending aorta is upper limits of normal at 3.6cm.   Aortic valve appears sclerotic but opens adequately.   Mitral annular calcification is present.   Mild mitral regurgitation.   Mild to moderate tricuspid valve regurgitation.   Systolic pulmonary artery pressure (SPAP) estimated at 49 mmHg (right atrial   pressure 3 mmHg), consistent with mild pulmonary hypertension.      Patient's PFT shows patient to have total lung capacity of 60% which is better than before which was 49%    CTA OF THE CHEST 4/19/2024 11:18 pm     TECHNIQUE:  CTA of the chest was performed after the administration of intravenous  contrast.  Multiplanar reformatted images are provided for review.  MIP  images are provided for review. Automated exposure control, iterative  reconstruction, and/or weight based adjustment of the mA/kV was utilized to  reduce the radiation dose to as low as reasonably achievable.     COMPARISON:  May 19, 2022     HISTORY:  ORDERING SYSTEM PROVIDED HISTORY: new hypoxia, rule out PE  TECHNOLOGIST PROVIDED HISTORY:  Reason for exam:->new hypoxia, rule out PE  Additional Contrast?->1  Reason for Exam: Dyspnea; upper chest pain; new hypoxia  Relevant Medical/Surgical History: hx of cystic fibrosis induced by

## 2024-07-24 ENCOUNTER — HOSPITAL ENCOUNTER (OUTPATIENT)
Dept: CARDIAC REHAB | Age: 79
Setting detail: THERAPIES SERIES
Discharge: HOME | End: 2024-07-24
Payer: MEDICARE

## 2024-07-24 PROCEDURE — G0239 OTH RESP PROC, GROUP: HCPCS

## 2024-07-26 ENCOUNTER — HOSPITAL ENCOUNTER (OUTPATIENT)
Dept: CARDIAC REHAB | Age: 79
Setting detail: THERAPIES SERIES
End: 2024-07-26
Payer: MEDICARE

## 2024-07-29 ENCOUNTER — HOSPITAL ENCOUNTER (OUTPATIENT)
Dept: CARDIAC REHAB | Age: 79
Setting detail: THERAPIES SERIES
Discharge: HOME | End: 2024-07-29
Payer: MEDICARE

## 2024-07-29 PROCEDURE — G0239 OTH RESP PROC, GROUP: HCPCS

## 2024-07-31 ENCOUNTER — HOSPITAL ENCOUNTER (OUTPATIENT)
Dept: CARDIAC REHAB | Age: 79
Setting detail: THERAPIES SERIES
Discharge: HOME | End: 2024-07-31
Payer: MEDICARE

## 2024-07-31 PROCEDURE — G0239 OTH RESP PROC, GROUP: HCPCS

## 2024-08-02 ENCOUNTER — HOSPITAL ENCOUNTER (OUTPATIENT)
Dept: CARDIAC REHAB | Age: 79
Setting detail: THERAPIES SERIES
Discharge: HOME | End: 2024-08-02
Payer: MEDICARE

## 2024-08-02 PROCEDURE — G0239 OTH RESP PROC, GROUP: HCPCS

## 2024-08-05 ENCOUNTER — HOSPITAL ENCOUNTER (OUTPATIENT)
Dept: CARDIAC REHAB | Age: 79
Setting detail: THERAPIES SERIES
Discharge: HOME | End: 2024-08-05
Payer: MEDICARE

## 2024-08-05 PROCEDURE — G0239 OTH RESP PROC, GROUP: HCPCS

## 2024-08-07 ENCOUNTER — HOSPITAL ENCOUNTER (OUTPATIENT)
Dept: CARDIAC REHAB | Age: 79
Setting detail: THERAPIES SERIES
Discharge: HOME | End: 2024-08-07
Payer: MEDICARE

## 2024-08-07 PROCEDURE — G0239 OTH RESP PROC, GROUP: HCPCS

## 2024-08-09 ENCOUNTER — APPOINTMENT (OUTPATIENT)
Dept: CARDIAC REHAB | Age: 79
End: 2024-08-09
Payer: MEDICARE

## 2024-08-12 ENCOUNTER — APPOINTMENT (OUTPATIENT)
Dept: CARDIAC REHAB | Age: 79
End: 2024-08-12
Payer: MEDICARE

## 2024-08-14 ENCOUNTER — APPOINTMENT (OUTPATIENT)
Dept: CARDIAC REHAB | Age: 79
End: 2024-08-14
Payer: MEDICARE

## 2024-08-16 ENCOUNTER — APPOINTMENT (OUTPATIENT)
Dept: CARDIAC REHAB | Age: 79
End: 2024-08-16
Payer: MEDICARE

## 2024-08-19 ENCOUNTER — APPOINTMENT (OUTPATIENT)
Dept: CARDIAC REHAB | Age: 79
End: 2024-08-19
Payer: MEDICARE

## 2024-08-19 RX ORDER — ARFORMOTEROL TARTRATE 15 UG/2ML
SOLUTION RESPIRATORY (INHALATION)
Qty: 120 ML | Refills: 3 | Status: SHIPPED | OUTPATIENT
Start: 2024-08-19

## 2024-08-19 NOTE — TELEPHONE ENCOUNTER
Dr. Toro, thank you for message. As a resident, I took care of Mr. Elaine only from an inpatient perspective, which is when the medications were prescribed. He has never established with our outpatient residency clinic. Thank you,    TC

## 2024-08-21 ENCOUNTER — APPOINTMENT (OUTPATIENT)
Dept: CARDIAC REHAB | Age: 79
End: 2024-08-21
Payer: MEDICARE

## 2024-08-23 ENCOUNTER — APPOINTMENT (OUTPATIENT)
Dept: CARDIAC REHAB | Age: 79
End: 2024-08-23
Payer: MEDICARE

## 2024-08-26 ENCOUNTER — APPOINTMENT (OUTPATIENT)
Dept: CARDIAC REHAB | Age: 79
End: 2024-08-26
Payer: MEDICARE

## 2024-08-28 ENCOUNTER — APPOINTMENT (OUTPATIENT)
Dept: CARDIAC REHAB | Age: 79
End: 2024-08-28
Payer: MEDICARE

## 2024-08-30 ENCOUNTER — APPOINTMENT (OUTPATIENT)
Dept: CARDIAC REHAB | Age: 79
End: 2024-08-30
Payer: MEDICARE

## 2024-09-19 DIAGNOSIS — I25.10 CORONARY ARTERY DISEASE INVOLVING NATIVE CORONARY ARTERY OF NATIVE HEART WITHOUT ANGINA PECTORIS: ICD-10-CM

## 2024-09-19 RX ORDER — TICAGRELOR 60 MG/1
60 TABLET ORAL 2 TIMES DAILY
Qty: 60 TABLET | Refills: 0 | Status: SHIPPED | OUTPATIENT
Start: 2024-09-19

## 2024-09-25 ENCOUNTER — OFFICE VISIT (OUTPATIENT)
Dept: CARDIOLOGY CLINIC | Age: 79
End: 2024-09-25
Payer: MEDICARE

## 2024-09-25 VITALS
HEART RATE: 56 BPM | DIASTOLIC BLOOD PRESSURE: 48 MMHG | WEIGHT: 158.5 LBS | OXYGEN SATURATION: 100 % | BODY MASS INDEX: 21.47 KG/M2 | HEIGHT: 72 IN | SYSTOLIC BLOOD PRESSURE: 108 MMHG

## 2024-09-25 DIAGNOSIS — I50.42 CHRONIC COMBINED SYSTOLIC AND DIASTOLIC CONGESTIVE HEART FAILURE (HCC): ICD-10-CM

## 2024-09-25 DIAGNOSIS — R53.83 OTHER FATIGUE: ICD-10-CM

## 2024-09-25 DIAGNOSIS — E78.2 MIXED HYPERLIPIDEMIA: ICD-10-CM

## 2024-09-25 DIAGNOSIS — I25.10 CORONARY ARTERY DISEASE INVOLVING NATIVE CORONARY ARTERY OF NATIVE HEART WITHOUT ANGINA PECTORIS: Primary | ICD-10-CM

## 2024-09-25 DIAGNOSIS — Z95.1 S/P CABG (CORONARY ARTERY BYPASS GRAFT): ICD-10-CM

## 2024-09-25 DIAGNOSIS — I25.5 ISCHEMIC CARDIOMYOPATHY: ICD-10-CM

## 2024-09-25 DIAGNOSIS — I10 PRIMARY HYPERTENSION: ICD-10-CM

## 2024-09-25 PROCEDURE — 3078F DIAST BP <80 MM HG: CPT | Performed by: INTERNAL MEDICINE

## 2024-09-25 PROCEDURE — 3074F SYST BP LT 130 MM HG: CPT | Performed by: INTERNAL MEDICINE

## 2024-09-25 PROCEDURE — 99214 OFFICE O/P EST MOD 30 MIN: CPT | Performed by: INTERNAL MEDICINE

## 2024-09-25 PROCEDURE — 1124F ACP DISCUSS-NO DSCNMKR DOCD: CPT | Performed by: INTERNAL MEDICINE

## 2024-10-17 DIAGNOSIS — I25.10 CORONARY ARTERY DISEASE INVOLVING NATIVE CORONARY ARTERY OF NATIVE HEART WITHOUT ANGINA PECTORIS: ICD-10-CM

## 2024-10-17 RX ORDER — TICAGRELOR 60 MG/1
60 TABLET ORAL 2 TIMES DAILY
Qty: 60 TABLET | Refills: 3 | Status: SHIPPED | OUTPATIENT
Start: 2024-10-17

## 2024-11-01 ENCOUNTER — OFFICE VISIT (OUTPATIENT)
Dept: PULMONOLOGY | Age: 79
End: 2024-11-01

## 2024-11-01 VITALS
DIASTOLIC BLOOD PRESSURE: 80 MMHG | HEIGHT: 72 IN | TEMPERATURE: 97.5 F | HEART RATE: 75 BPM | OXYGEN SATURATION: 94 % | BODY MASS INDEX: 21.4 KG/M2 | WEIGHT: 158 LBS | RESPIRATION RATE: 20 BRPM | SYSTOLIC BLOOD PRESSURE: 141 MMHG

## 2024-11-01 DIAGNOSIS — Z98.890 H/O TRACHEOSTOMY: ICD-10-CM

## 2024-11-01 DIAGNOSIS — J43.2 CENTRILOBULAR EMPHYSEMA (HCC): Primary | ICD-10-CM

## 2024-11-01 DIAGNOSIS — J47.9 BRONCHIECTASIS WITHOUT COMPLICATION (HCC): ICD-10-CM

## 2024-11-01 DIAGNOSIS — J84.10 POSTINFLAMMATORY PULMONARY FIBROSIS (HCC): ICD-10-CM

## 2024-11-01 DIAGNOSIS — Z87.891 FORMER SMOKER, STOPPED SMOKING IN DISTANT PAST: ICD-10-CM

## 2024-11-01 DIAGNOSIS — I10 PRIMARY HYPERTENSION: ICD-10-CM

## 2024-11-01 RX ORDER — BUDESONIDE, GLYCOPYRROLATE, AND FORMOTEROL FUMARATE 160; 9; 4.8 UG/1; UG/1; UG/1
2 AEROSOL, METERED RESPIRATORY (INHALATION) 2 TIMES DAILY
Qty: 2 EACH | Refills: 0 | Status: SHIPPED | COMMUNITY
Start: 2024-11-01

## 2024-11-01 ASSESSMENT — ENCOUNTER SYMPTOMS
COUGH: 1
ABDOMINAL PAIN: 0
WHEEZING: 0
RHINORRHEA: 0
SORE THROAT: 0
EYE PAIN: 0
CHEST TIGHTNESS: 0
SHORTNESS OF BREATH: 1

## 2024-11-01 NOTE — PATIENT INSTRUCTIONS
Call with worsening symptoms such as increased shortness of breath, productive cough, wheezing or symptoms not responding to treatment plan.     Trial Breztri 2 puffs in the morning and 2 puffs at night. Rinse mouth out after use to prevent hoarseness and thrush. - If helping , will prescribe.     Stop Pulmicort and Brovana nebulizers     May use Mucinex or guaifenesin 600-1200 mg twice a day to help thin secretions. Drink with plenty of water.    Blood pressure today is slightly elevated. Continue to monitor, continue current medication regimen per cardiology.     Return to clinic in 3 months     Remember to bring a list of pulmonary medications and any CPAP or BiPAP machines to your next appointment with the office.     Please keep all of your future appointments scheduled by [facility] office. Out of respect for other patients and providers, you may be asked to reschedule your appointment if you arrive later than your scheduled appointment time. Appointments cancelled less than 24hrs in advance will be considered a no show. Patients with three missed appointments within 1 year or four missed appointments within 2 years can be dismissed from the practice.     Please be aware that our physicians are required to work in the Intensive Care Unit at [facility].  Your appointment may need to be rescheduled if they are designated to work during your appointment time.      You may receive a survey regarding the care you received during your visit.  Your input is valuable to us.  We encourage you to complete and return your survey.  We hope you will choose us in the future for your healthcare needs.     Pt instructed of all future appointment dates & times, including radiology, labs, procedures & referrals. If procedures were scheduled preparation instructions provided. Instructions on future appointments with [facility] were given.     In the next few weeks, you

## 2024-11-01 NOTE — PROGRESS NOTES
MA Communication:  The following orders are received by verbal communication from Jihan Dias CNP    Orders include:        Follow up 3 months   Breztri samples given 2    
capacity  has improved as compared to the previous times along with that the  patient also has slight improvement in FEV1 as compared to last time.   Please correlate clinically.    Physical Exam  Vitals reviewed.   Constitutional:       General: He is not in acute distress.     Appearance: Normal appearance. He is not ill-appearing, toxic-appearing or diaphoretic.   HENT:      Head: Normocephalic and atraumatic.      Nose: Nose normal. No congestion or rhinorrhea.      Mouth/Throat:      Mouth: Mucous membranes are moist.      Pharynx: Oropharynx is clear. No oropharyngeal exudate or posterior oropharyngeal erythema.   Eyes:      Pupils: Pupils are equal, round, and reactive to light.   Cardiovascular:      Rate and Rhythm: Normal rate.   Pulmonary:      Effort: Pulmonary effort is normal. No respiratory distress.      Breath sounds: No stridor. Rales (paramjit faint course) present. No wheezing or rhonchi.   Chest:      Chest wall: No tenderness.   Abdominal:      Palpations: Abdomen is soft.      Tenderness: There is no abdominal tenderness. There is no guarding or rebound.   Musculoskeletal:         General: Normal range of motion.      Cervical back: Neck supple.      Right lower leg: No edema.      Left lower leg: No edema.   Lymphadenopathy:      Cervical: No cervical adenopathy.   Skin:     General: Skin is warm and dry.      Capillary Refill: Capillary refill takes less than 2 seconds.   Neurological:      Mental Status: He is alert and oriented to person, place, and time.   Psychiatric:         Mood and Affect: Mood normal.         Behavior: Behavior normal.         Thought Content: Thought content normal.         Judgment: Judgment normal.           Assessment/Plan:     1. Centrilobular emphysema (HCC)  -     Budeson-Glycopyrrol-Formoterol ([facility]) 160-9-4.8 MCG/ACT AERO; Inhale 2 puffs into the lungs 2 times daily, Disp-2 each, R-0Exp 2/27 Lot [id number]  2. Bronchiectasis without

## 2024-11-18 ENCOUNTER — TELEPHONE (OUTPATIENT)
Dept: INTERNAL MEDICINE CLINIC | Age: 79
End: 2024-11-18

## 2024-11-18 NOTE — TELEPHONE ENCOUNTER
Patient called to let us know that the medications that you gave Are working well for the patient.

## 2024-11-19 ENCOUNTER — TELEPHONE (OUTPATIENT)
Dept: PULMONOLOGY | Age: 79
End: 2024-11-19

## 2024-11-19 DIAGNOSIS — J43.2 CENTRILOBULAR EMPHYSEMA (HCC): ICD-10-CM

## 2024-11-19 RX ORDER — BUDESONIDE, GLYCOPYRROLATE, AND FORMOTEROL FUMARATE 160; 9; 4.8 UG/1; UG/1; UG/1
2 AEROSOL, METERED RESPIRATORY (INHALATION) 2 TIMES DAILY
Qty: 2 EACH | Refills: 5 | Status: SHIPPED | OUTPATIENT
Start: 2024-11-19

## 2024-11-19 NOTE — TELEPHONE ENCOUNTER
Patient's wife called and stated Nehemiah benefited from the breztri and would like a prescription sent to [facility] in [place].

## 2024-11-21 ENCOUNTER — TELEPHONE (OUTPATIENT)
Dept: CARDIOLOGY CLINIC | Age: 79
End: 2024-11-21

## 2024-12-01 SDOH — HEALTH STABILITY: PHYSICAL HEALTH: ON AVERAGE, HOW MANY DAYS PER WEEK DO YOU ENGAGE IN MODERATE TO STRENUOUS EXERCISE (LIKE A BRISK WALK)?: 4 DAYS

## 2024-12-01 SDOH — HEALTH STABILITY: PHYSICAL HEALTH: ON AVERAGE, HOW MANY MINUTES DO YOU ENGAGE IN EXERCISE AT THIS LEVEL?: 20 MIN

## 2024-12-01 ASSESSMENT — PATIENT HEALTH QUESTIONNAIRE - PHQ9
SUM OF ALL RESPONSES TO PHQ QUESTIONS 1-9: 2
3. TROUBLE FALLING OR STAYING ASLEEP: SEVERAL DAYS
SUM OF ALL RESPONSES TO PHQ9 QUESTIONS 1 & 2: 0
2. FEELING DOWN, DEPRESSED OR HOPELESS: NOT AT ALL
6. FEELING BAD ABOUT YOURSELF - OR THAT YOU ARE A FAILURE OR HAVE LET YOURSELF OR YOUR FAMILY DOWN: NOT AT ALL
7. TROUBLE CONCENTRATING ON THINGS, SUCH AS READING THE NEWSPAPER OR WATCHING TELEVISION: NOT AT ALL
1. LITTLE INTEREST OR PLEASURE IN DOING THINGS: NOT AT ALL
9. THOUGHTS THAT YOU WOULD BE BETTER OFF DEAD, OR OF HURTING YOURSELF: NOT AT ALL
SUM OF ALL RESPONSES TO PHQ QUESTIONS 1-9: 2
SUM OF ALL RESPONSES TO PHQ QUESTIONS 1-9: 2
4. FEELING TIRED OR HAVING LITTLE ENERGY: SEVERAL DAYS
8. MOVING OR SPEAKING SO SLOWLY THAT OTHER PEOPLE COULD HAVE NOTICED. OR THE OPPOSITE, BEING SO FIGETY OR RESTLESS THAT YOU HAVE BEEN MOVING AROUND A LOT MORE THAN USUAL: NOT AT ALL
5. POOR APPETITE OR OVEREATING: NOT AT ALL
10. IF YOU CHECKED OFF ANY PROBLEMS, HOW DIFFICULT HAVE THESE PROBLEMS MADE IT FOR YOU TO DO YOUR WORK, TAKE CARE OF THINGS AT HOME, OR GET ALONG WITH OTHER PEOPLE: NOT DIFFICULT AT ALL
SUM OF ALL RESPONSES TO PHQ QUESTIONS 1-9: 2

## 2024-12-01 ASSESSMENT — LIFESTYLE VARIABLES
HOW OFTEN DO YOU HAVE A DRINK CONTAINING ALCOHOL: 1
HOW MANY STANDARD DRINKS CONTAINING ALCOHOL DO YOU HAVE ON A TYPICAL DAY: PATIENT DOES NOT DRINK
HOW OFTEN DO YOU HAVE A DRINK CONTAINING ALCOHOL: NEVER
HOW OFTEN DO YOU HAVE SIX OR MORE DRINKS ON ONE OCCASION: 1
HOW MANY STANDARD DRINKS CONTAINING ALCOHOL DO YOU HAVE ON A TYPICAL DAY: 0

## 2024-12-02 ENCOUNTER — OFFICE VISIT (OUTPATIENT)
Dept: INTERNAL MEDICINE CLINIC | Age: 79
End: 2024-12-02

## 2024-12-02 VITALS
DIASTOLIC BLOOD PRESSURE: 70 MMHG | SYSTOLIC BLOOD PRESSURE: 138 MMHG | OXYGEN SATURATION: 91 % | HEART RATE: 93 BPM | WEIGHT: 162 LBS | HEIGHT: 72 IN | BODY MASS INDEX: 21.94 KG/M2

## 2024-12-02 DIAGNOSIS — F32.9 REACTIVE DEPRESSION: ICD-10-CM

## 2024-12-02 DIAGNOSIS — Z95.1 S/P CABG (CORONARY ARTERY BYPASS GRAFT): ICD-10-CM

## 2024-12-02 DIAGNOSIS — G62.9 NEUROPATHY: ICD-10-CM

## 2024-12-02 DIAGNOSIS — Z00.00 MEDICARE ANNUAL WELLNESS VISIT, SUBSEQUENT: Primary | ICD-10-CM

## 2024-12-02 DIAGNOSIS — E78.2 MIXED HYPERLIPIDEMIA: ICD-10-CM

## 2024-12-02 DIAGNOSIS — I25.10 CORONARY ARTERY DISEASE INVOLVING NATIVE CORONARY ARTERY OF NATIVE HEART WITHOUT ANGINA PECTORIS: ICD-10-CM

## 2024-12-02 PROBLEM — D69.6 THROMBOCYTOPENIA (HCC): Status: RESOLVED | Noted: 2019-08-20 | Resolved: 2024-12-02

## 2024-12-02 NOTE — PATIENT INSTRUCTIONS
F075 to learn more about \"A Healthy Heart: Care Instructions.\"  Current as of: June 24, 2023  Content Version: 14.2  © 2024 [employer].   Care instructions adapted under license by [employer]. If you have questions about a medical condition or this instruction, always ask your healthcare professional. [employer] disclaims any warranty or liability for your use of this information.      Personalized Preventive Plan for Kishan Elaine - 12/2/2024  Medicare offers a range of preventive health benefits. Some of the tests and screenings are paid in full while other may be subject to a deductible, co-insurance, and/or copay.    Some of these benefits include a comprehensive review of your medical history including lifestyle, illnesses that may run in your family, and various assessments and screenings as appropriate.    After reviewing your medical record and screening and assessments performed today your provider may have ordered immunizations, labs, imaging, and/or referrals for you.  A list of these orders (if applicable) as well as your Preventive Care list are included within your After Visit Summary for your review.    Other Preventive Recommendations:    A preventive eye exam performed by an eye specialist is recommended every 1-2 years to screen for glaucoma; cataracts, macular degeneration, and other eye disorders.  A preventive dental visit is recommended every 6 months.  Try to get at least 150 minutes of exercise per week or 10,000 steps per day on a pedometer .  Order or download the FREE \"Exercise & Physical Activity: Your Everyday Guide\" from [employer]. Call 1-491.758.7013 or search [employer] online.  You need 0209-1688 mg of calcium and 3669-3632 IU of vitamin D per day. It is possible to meet your calcium requirement with diet alone, but a vitamin D supplement is usually necessary to meet this goal.  When exposed to the sun, use a
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
## 2024-12-02 NOTE — PROGRESS NOTES
Medicare Annual Wellness Visit    Kishan Elaine is here for Peripheral Neuropathy, neuralgia , and Hyperlipidemia    Assessment & Plan   Medicare annual wellness visit, subsequent    Recommendations for Preventive Services Due: see orders and patient instructions/AVS.  Recommended screening schedule for the next 5-10 years is provided to the patient in written form: see Patient Instructions/AVS.     No follow-ups on file.     Subjective       Patient's complete Health Risk Assessment and screening values have been reviewed and are found in Flowsheets. The following problems were reviewed today and where indicated follow up appointments were made and/or referrals ordered.    Positive Risk Factor Screenings with Interventions:             General HRA Questions:  Select all that apply: (!) New or Increased Fatigue    Interventions Fatigue:  See above              Advanced Directives:  Do you have a Living Will?: (!) No    Intervention:  Full code                 Objective   Vitals:    12/02/24 1133   BP: 138/70   Site: Right Upper Arm   Position: Sitting   Cuff Size: Medium Adult   Pulse: 93   SpO2: 91%   Weight: 73.5 kg (162 lb)   Height: 1.829 m (6')      Body mass index is 21.97 kg/m².                    Allergies   Allergen Reactions    Plavix [Clopidogrel Bisulfate] Hives     Hives    Ciprofloxacin      diarrhea     Prior to Visit Medications    Medication Sig Taking? Authorizing Provider   Budeson-Glycopyrrol-Formoterol (BREZTRI AEROSPHERE) 160-9-4.8 MCG/ACT AERO Inhale 2 puffs into the lungs 2 times daily Yes Jihan Dias, APRN - CNP   ticagrelor (BRILINTA) 60 MG TABS tablet TAKE 1 TABLET BY MOUTH 2 TIMES A DAY Yes Ian Tirado MD   gabapentin (NEURONTIN) 300 MG capsule Take 1 capsule by mouth 2 times daily for 180 days. Yes Rosy Toro MD   FLUoxetine (PROZAC) 20 MG capsule Take 1 capsule by mouth daily Yes Rosy Toro MD   atorvastatin (LIPITOR) 20 MG tablet TAKE ONE TABLET BY MOUTH DAILY Yes

## 2024-12-04 DIAGNOSIS — G62.9 NEUROPATHY: ICD-10-CM

## 2024-12-04 RX ORDER — GABAPENTIN 300 MG/1
300 CAPSULE ORAL EVERY EVENING
Qty: 90 CAPSULE | Refills: 1 | Status: SHIPPED | OUTPATIENT
Start: 2024-12-04 | End: 2025-06-02

## 2024-12-04 NOTE — TELEPHONE ENCOUNTER
Spoke with the patients wife and She said that he had started to only do one a day. He does have about a week or so left, and said that he would need the refill soon.

## 2025-01-08 ENCOUNTER — TELEPHONE (OUTPATIENT)
Dept: PULMONOLOGY | Age: 80
End: 2025-01-08

## 2025-01-08 DIAGNOSIS — J43.2 CENTRILOBULAR EMPHYSEMA (HCC): Primary | ICD-10-CM

## 2025-01-08 RX ORDER — BUDESONIDE 0.5 MG/2ML
1 INHALANT ORAL 2 TIMES DAILY
Qty: 120 ML | Refills: 5 | Status: SHIPPED | OUTPATIENT
Start: 2025-01-08

## 2025-01-08 RX ORDER — ARFORMOTEROL TARTRATE 15 UG/2ML
15 SOLUTION RESPIRATORY (INHALATION) 2 TIMES DAILY
Qty: 120 ML | Refills: 5 | Status: SHIPPED | OUTPATIENT
Start: 2025-01-08

## 2025-01-08 NOTE — TELEPHONE ENCOUNTER
Patient's wife called stating that he does not feel the breztri is helping him and he would like to go back to using the nebulizer. He was using brovana and pulmicort. Please advise.      Laxmi Pryor

## 2025-01-08 NOTE — TELEPHONE ENCOUNTER
Pulmicort and Brovana has been sent to the pharmacy.  Rinse mouth out after use to prevent hoarseness and thrush.   He is to stop the Breztri.

## 2025-01-17 ENCOUNTER — HOSPITAL ENCOUNTER (INPATIENT)
Age: 80
LOS: 5 days | Discharge: HOME | DRG: 190 | End: 2025-01-22
Attending: EMERGENCY MEDICINE | Admitting: STUDENT IN AN ORGANIZED HEALTH CARE EDUCATION/TRAINING PROGRAM
Payer: MEDICARE

## 2025-01-17 ENCOUNTER — APPOINTMENT (OUTPATIENT)
Dept: GENERAL RADIOLOGY | Age: 80
DRG: 190 | End: 2025-01-17
Payer: MEDICARE

## 2025-01-17 DIAGNOSIS — R79.89 ELEVATED TROPONIN: ICD-10-CM

## 2025-01-17 DIAGNOSIS — J96.21 ACUTE ON CHRONIC RESPIRATORY FAILURE WITH HYPOXIA: ICD-10-CM

## 2025-01-17 DIAGNOSIS — R06.02 SHORTNESS OF BREATH: ICD-10-CM

## 2025-01-17 DIAGNOSIS — R06.03 ACUTE RESPIRATORY DISTRESS: Primary | ICD-10-CM

## 2025-01-17 PROBLEM — J44.1 COPD EXACERBATION (HCC): Status: ACTIVE | Noted: 2025-01-17

## 2025-01-17 LAB
ALBUMIN SERPL-MCNC: 4.4 G/DL (ref 3.4–5)
ALBUMIN/GLOB SERPL: 1.5 {RATIO} (ref 1.1–2.2)
ALP SERPL-CCNC: 76 U/L (ref 40–129)
ALT SERPL-CCNC: 24 U/L (ref 10–40)
ANION GAP SERPL CALCULATED.3IONS-SCNC: 12 MMOL/L (ref 3–16)
AST SERPL-CCNC: 26 U/L (ref 15–37)
BASE EXCESS BLDV CALC-SCNC: -1.7 MMOL/L (ref -3–3)
BASOPHILS # BLD: 0.2 K/UL (ref 0–0.2)
BASOPHILS NFR BLD: 1.7 %
BILIRUB SERPL-MCNC: 0.4 MG/DL (ref 0–1)
BUN SERPL-MCNC: 18 MG/DL (ref 7–20)
CALCIUM SERPL-MCNC: 10.2 MG/DL (ref 8.3–10.6)
CHLORIDE SERPL-SCNC: 104 MMOL/L (ref 99–110)
CO2 BLDV-SCNC: 25 MMOL/L
CO2 SERPL-SCNC: 26 MMOL/L (ref 21–32)
COHGB MFR BLDV: 2.8 % (ref 0–1.5)
CREAT SERPL-MCNC: 1 MG/DL (ref 0.8–1.3)
D-DIMER QUANTITATIVE: 0.59 UG/ML FEU (ref 0–0.6)
DEPRECATED RDW RBC AUTO: 14.8 % (ref 12.4–15.4)
EKG ATRIAL RATE: 97 BPM
EKG DIAGNOSIS: NORMAL
EKG P AXIS: 45 DEGREES
EKG P-R INTERVAL: 212 MS
EKG Q-T INTERVAL: 338 MS
EKG QRS DURATION: 106 MS
EKG QTC CALCULATION (BAZETT): 429 MS
EKG R AXIS: 31 DEGREES
EKG T AXIS: 39 DEGREES
EKG VENTRICULAR RATE: 97 BPM
EOSINOPHIL # BLD: 0.2 K/UL (ref 0–0.6)
EOSINOPHIL NFR BLD: 2.5 %
FLUAV RNA RESP QL NAA+PROBE: NOT DETECTED
FLUBV RNA RESP QL NAA+PROBE: NOT DETECTED
GFR SERPLBLD CREATININE-BSD FMLA CKD-EPI: 77 ML/MIN/{1.73_M2}
GLUCOSE SERPL-MCNC: 120 MG/DL (ref 70–99)
HCO3 BLDV-SCNC: 23.7 MMOL/L (ref 23–29)
HCT VFR BLD AUTO: 41.6 % (ref 40.5–52.5)
HGB BLD-MCNC: 13.9 G/DL (ref 13.5–17.5)
LACTATE BLDV-SCNC: 1.5 MMOL/L (ref 0.4–2)
LYMPHOCYTES # BLD: 1.8 K/UL (ref 1–5.1)
LYMPHOCYTES NFR BLD: 19.6 %
MCH RBC QN AUTO: 30.5 PG (ref 26–34)
MCHC RBC AUTO-ENTMCNC: 33.3 G/DL (ref 31–36)
MCV RBC AUTO: 91.6 FL (ref 80–100)
METHGB MFR BLDV: 0.1 %
MONOCYTES # BLD: 1 K/UL (ref 0–1.3)
MONOCYTES NFR BLD: 11.5 %
NEUTROPHILS # BLD: 5.8 K/UL (ref 1.7–7.7)
NEUTROPHILS NFR BLD: 64.7 %
NT-PROBNP SERPL-MCNC: 451 PG/ML (ref 0–449)
O2 THERAPY: ABNORMAL
PCO2 BLDV: 42.6 MMHG (ref 40–50)
PH BLDV: 7.36 [PH] (ref 7.35–7.45)
PLATELET # BLD AUTO: 199 K/UL (ref 135–450)
PMV BLD AUTO: 9.2 FL (ref 5–10.5)
PO2 BLDV: 35.1 MMHG (ref 25–40)
POTASSIUM SERPL-SCNC: 4.1 MMOL/L (ref 3.5–5.1)
PROT SERPL-MCNC: 7.4 G/DL (ref 6.4–8.2)
RBC # BLD AUTO: 4.54 M/UL (ref 4.2–5.9)
RSV AG NOSE QL: NEGATIVE
SAO2 % BLDV: 64 %
SARS-COV-2 RNA RESP QL NAA+PROBE: NOT DETECTED
SODIUM SERPL-SCNC: 142 MMOL/L (ref 136–145)
TROPONIN, HIGH SENSITIVITY: 24 NG/L (ref 0–22)
TROPONIN, HIGH SENSITIVITY: 26 NG/L (ref 0–22)
WBC # BLD AUTO: 9 K/UL (ref 4–11)

## 2025-01-17 PROCEDURE — 94669 MECHANICAL CHEST WALL OSCILL: CPT

## 2025-01-17 PROCEDURE — 83880 ASSAY OF NATRIURETIC PEPTIDE: CPT

## 2025-01-17 PROCEDURE — 6370000000 HC RX 637 (ALT 250 FOR IP): Performed by: NURSE PRACTITIONER

## 2025-01-17 PROCEDURE — 6370000000 HC RX 637 (ALT 250 FOR IP): Performed by: STUDENT IN AN ORGANIZED HEALTH CARE EDUCATION/TRAINING PROGRAM

## 2025-01-17 PROCEDURE — 99285 EMERGENCY DEPT VISIT HI MDM: CPT

## 2025-01-17 PROCEDURE — 94761 N-INVAS EAR/PLS OXIMETRY MLT: CPT

## 2025-01-17 PROCEDURE — 93005 ELECTROCARDIOGRAM TRACING: CPT | Performed by: EMERGENCY MEDICINE

## 2025-01-17 PROCEDURE — 82803 BLOOD GASES ANY COMBINATION: CPT

## 2025-01-17 PROCEDURE — 85025 COMPLETE CBC W/AUTO DIFF WBC: CPT

## 2025-01-17 PROCEDURE — 87636 SARSCOV2 & INF A&B AMP PRB: CPT

## 2025-01-17 PROCEDURE — 71045 X-RAY EXAM CHEST 1 VIEW: CPT

## 2025-01-17 PROCEDURE — 2500000003 HC RX 250 WO HCPCS: Performed by: PHYSICIAN ASSISTANT

## 2025-01-17 PROCEDURE — 96374 THER/PROPH/DIAG INJ IV PUSH: CPT

## 2025-01-17 PROCEDURE — 96375 TX/PRO/DX INJ NEW DRUG ADDON: CPT

## 2025-01-17 PROCEDURE — 87807 RSV ASSAY W/OPTIC: CPT

## 2025-01-17 PROCEDURE — 2580000003 HC RX 258: Performed by: STUDENT IN AN ORGANIZED HEALTH CARE EDUCATION/TRAINING PROGRAM

## 2025-01-17 PROCEDURE — 94640 AIRWAY INHALATION TREATMENT: CPT

## 2025-01-17 PROCEDURE — 93010 ELECTROCARDIOGRAM REPORT: CPT | Performed by: INTERNAL MEDICINE

## 2025-01-17 PROCEDURE — 6360000002 HC RX W HCPCS: Performed by: PHYSICIAN ASSISTANT

## 2025-01-17 PROCEDURE — 6370000000 HC RX 637 (ALT 250 FOR IP): Performed by: PHYSICIAN ASSISTANT

## 2025-01-17 PROCEDURE — 84484 ASSAY OF TROPONIN QUANT: CPT

## 2025-01-17 PROCEDURE — 6360000002 HC RX W HCPCS: Performed by: EMERGENCY MEDICINE

## 2025-01-17 PROCEDURE — 36415 COLL VENOUS BLD VENIPUNCTURE: CPT

## 2025-01-17 PROCEDURE — 2500000003 HC RX 250 WO HCPCS: Performed by: STUDENT IN AN ORGANIZED HEALTH CARE EDUCATION/TRAINING PROGRAM

## 2025-01-17 PROCEDURE — 85379 FIBRIN DEGRADATION QUANT: CPT

## 2025-01-17 PROCEDURE — 83605 ASSAY OF LACTIC ACID: CPT

## 2025-01-17 PROCEDURE — 6370000000 HC RX 637 (ALT 250 FOR IP): Performed by: EMERGENCY MEDICINE

## 2025-01-17 PROCEDURE — 1200000000 HC SEMI PRIVATE

## 2025-01-17 PROCEDURE — 2700000000 HC OXYGEN THERAPY PER DAY

## 2025-01-17 PROCEDURE — 6360000002 HC RX W HCPCS: Performed by: STUDENT IN AN ORGANIZED HEALTH CARE EDUCATION/TRAINING PROGRAM

## 2025-01-17 PROCEDURE — 80053 COMPREHEN METABOLIC PANEL: CPT

## 2025-01-17 RX ORDER — IPRATROPIUM BROMIDE AND ALBUTEROL SULFATE 2.5; .5 MG/3ML; MG/3ML
1 SOLUTION RESPIRATORY (INHALATION)
Status: DISCONTINUED | OUTPATIENT
Start: 2025-01-17 | End: 2025-01-17

## 2025-01-17 RX ORDER — POLYETHYLENE GLYCOL 3350 17 G/17G
17 POWDER, FOR SOLUTION ORAL DAILY PRN
Status: DISCONTINUED | OUTPATIENT
Start: 2025-01-17 | End: 2025-01-22 | Stop reason: HOSPADM

## 2025-01-17 RX ORDER — PREDNISONE 20 MG/1
40 TABLET ORAL DAILY
Status: DISCONTINUED | OUTPATIENT
Start: 2025-01-18 | End: 2025-01-18

## 2025-01-17 RX ORDER — GABAPENTIN 300 MG/1
300 CAPSULE ORAL EVERY EVENING
Status: DISCONTINUED | OUTPATIENT
Start: 2025-01-17 | End: 2025-01-22 | Stop reason: HOSPADM

## 2025-01-17 RX ORDER — ATORVASTATIN CALCIUM 10 MG/1
20 TABLET, FILM COATED ORAL DAILY
Status: DISCONTINUED | OUTPATIENT
Start: 2025-01-17 | End: 2025-01-22 | Stop reason: HOSPADM

## 2025-01-17 RX ORDER — IPRATROPIUM BROMIDE AND ALBUTEROL SULFATE 2.5; .5 MG/3ML; MG/3ML
2 SOLUTION RESPIRATORY (INHALATION) ONCE
Status: COMPLETED | OUTPATIENT
Start: 2025-01-17 | End: 2025-01-17

## 2025-01-17 RX ORDER — BENZONATATE 100 MG/1
100 CAPSULE ORAL 3 TIMES DAILY PRN
Status: DISCONTINUED | OUTPATIENT
Start: 2025-01-17 | End: 2025-01-22 | Stop reason: HOSPADM

## 2025-01-17 RX ORDER — IPRATROPIUM BROMIDE AND ALBUTEROL SULFATE 2.5; .5 MG/3ML; MG/3ML
1 SOLUTION RESPIRATORY (INHALATION) ONCE
Status: DISCONTINUED | OUTPATIENT
Start: 2025-01-17 | End: 2025-01-17

## 2025-01-17 RX ORDER — LORAZEPAM 2 MG/ML
0.5 INJECTION INTRAMUSCULAR ONCE
Status: COMPLETED | OUTPATIENT
Start: 2025-01-17 | End: 2025-01-17

## 2025-01-17 RX ORDER — ENOXAPARIN SODIUM 100 MG/ML
40 INJECTION SUBCUTANEOUS DAILY
Status: DISCONTINUED | OUTPATIENT
Start: 2025-01-18 | End: 2025-01-22 | Stop reason: HOSPADM

## 2025-01-17 RX ORDER — ONDANSETRON 2 MG/ML
4 INJECTION INTRAMUSCULAR; INTRAVENOUS EVERY 6 HOURS PRN
Status: DISCONTINUED | OUTPATIENT
Start: 2025-01-17 | End: 2025-01-22 | Stop reason: HOSPADM

## 2025-01-17 RX ORDER — ACETAMINOPHEN 325 MG/1
650 TABLET ORAL EVERY 6 HOURS PRN
Status: DISCONTINUED | OUTPATIENT
Start: 2025-01-17 | End: 2025-01-22 | Stop reason: HOSPADM

## 2025-01-17 RX ORDER — ACETAMINOPHEN 650 MG/1
650 SUPPOSITORY RECTAL EVERY 6 HOURS PRN
Status: DISCONTINUED | OUTPATIENT
Start: 2025-01-17 | End: 2025-01-22 | Stop reason: HOSPADM

## 2025-01-17 RX ORDER — FUROSEMIDE 10 MG/ML
20 INJECTION INTRAMUSCULAR; INTRAVENOUS ONCE
Status: COMPLETED | OUTPATIENT
Start: 2025-01-17 | End: 2025-01-17

## 2025-01-17 RX ORDER — SODIUM CHLORIDE 0.9 % (FLUSH) 0.9 %
5-40 SYRINGE (ML) INJECTION PRN
Status: DISCONTINUED | OUTPATIENT
Start: 2025-01-17 | End: 2025-01-22 | Stop reason: HOSPADM

## 2025-01-17 RX ORDER — LOSARTAN POTASSIUM 25 MG/1
25 TABLET ORAL DAILY
Status: DISCONTINUED | OUTPATIENT
Start: 2025-01-17 | End: 2025-01-22 | Stop reason: HOSPADM

## 2025-01-17 RX ORDER — IPRATROPIUM BROMIDE AND ALBUTEROL SULFATE 2.5; .5 MG/3ML; MG/3ML
1 SOLUTION RESPIRATORY (INHALATION)
Status: DISCONTINUED | OUTPATIENT
Start: 2025-01-18 | End: 2025-01-22 | Stop reason: HOSPADM

## 2025-01-17 RX ORDER — ONDANSETRON 4 MG/1
4 TABLET, ORALLY DISINTEGRATING ORAL EVERY 8 HOURS PRN
Status: DISCONTINUED | OUTPATIENT
Start: 2025-01-17 | End: 2025-01-22 | Stop reason: HOSPADM

## 2025-01-17 RX ORDER — MECOBALAMIN 5000 MCG
10 TABLET,DISINTEGRATING ORAL NIGHTLY
Status: DISCONTINUED | OUTPATIENT
Start: 2025-01-17 | End: 2025-01-22 | Stop reason: HOSPADM

## 2025-01-17 RX ORDER — SODIUM CHLORIDE 9 MG/ML
INJECTION, SOLUTION INTRAVENOUS PRN
Status: DISCONTINUED | OUTPATIENT
Start: 2025-01-17 | End: 2025-01-22 | Stop reason: HOSPADM

## 2025-01-17 RX ORDER — SODIUM CHLORIDE 0.9 % (FLUSH) 0.9 %
5-40 SYRINGE (ML) INJECTION EVERY 12 HOURS SCHEDULED
Status: DISCONTINUED | OUTPATIENT
Start: 2025-01-17 | End: 2025-01-22 | Stop reason: HOSPADM

## 2025-01-17 RX ADMIN — SODIUM CHLORIDE: 9 INJECTION, SOLUTION INTRAVENOUS at 20:07

## 2025-01-17 RX ADMIN — GABAPENTIN 300 MG: 300 CAPSULE ORAL at 20:00

## 2025-01-17 RX ADMIN — ATORVASTATIN CALCIUM 20 MG: 10 TABLET, FILM COATED ORAL at 20:00

## 2025-01-17 RX ADMIN — FUROSEMIDE 20 MG: 10 INJECTION, SOLUTION INTRAMUSCULAR; INTRAVENOUS at 16:37

## 2025-01-17 RX ADMIN — FLUOXETINE HYDROCHLORIDE 20 MG: 20 CAPSULE ORAL at 20:00

## 2025-01-17 RX ADMIN — ACETAMINOPHEN 650 MG: 325 TABLET ORAL at 19:59

## 2025-01-17 RX ADMIN — Medication 10 MG: at 23:58

## 2025-01-17 RX ADMIN — IPRATROPIUM BROMIDE AND ALBUTEROL SULFATE 1 DOSE: 2.5; .5 SOLUTION RESPIRATORY (INHALATION) at 23:38

## 2025-01-17 RX ADMIN — IPRATROPIUM BROMIDE AND ALBUTEROL SULFATE 2 DOSE: 2.5; .5 SOLUTION RESPIRATORY (INHALATION) at 16:35

## 2025-01-17 RX ADMIN — LORAZEPAM 0.5 MG: 2 INJECTION INTRAMUSCULAR; INTRAVENOUS at 19:54

## 2025-01-17 RX ADMIN — AZITHROMYCIN MONOHYDRATE 500 MG: 500 INJECTION, POWDER, LYOPHILIZED, FOR SOLUTION INTRAVENOUS at 20:09

## 2025-01-17 RX ADMIN — IPRATROPIUM BROMIDE AND ALBUTEROL SULFATE 1 DOSE: 2.5; .5 SOLUTION RESPIRATORY (INHALATION) at 15:32

## 2025-01-17 RX ADMIN — TICAGRELOR 60 MG: 60 TABLET ORAL at 21:47

## 2025-01-17 RX ADMIN — NITROGLYCERIN 0.5 INCH: 20 OINTMENT TOPICAL at 16:37

## 2025-01-17 RX ADMIN — WATER 125 MG: 1 INJECTION INTRAMUSCULAR; INTRAVENOUS; SUBCUTANEOUS at 15:34

## 2025-01-17 RX ADMIN — SODIUM CHLORIDE, PRESERVATIVE FREE 10 ML: 5 INJECTION INTRAVENOUS at 19:57

## 2025-01-17 RX ADMIN — BENZONATATE 100 MG: 100 CAPSULE ORAL at 20:52

## 2025-01-17 RX ADMIN — IPRATROPIUM BROMIDE AND ALBUTEROL SULFATE 1 DOSE: 2.5; .5 SOLUTION RESPIRATORY (INHALATION) at 20:09

## 2025-01-17 ASSESSMENT — PAIN DESCRIPTION - PAIN TYPE
TYPE: ACUTE PAIN
TYPE: ACUTE PAIN

## 2025-01-17 ASSESSMENT — PAIN - FUNCTIONAL ASSESSMENT
PAIN_FUNCTIONAL_ASSESSMENT: 0-10
PAIN_FUNCTIONAL_ASSESSMENT: PREVENTS OR INTERFERES SOME ACTIVE ACTIVITIES AND ADLS

## 2025-01-17 ASSESSMENT — PAIN DESCRIPTION - LOCATION
LOCATION: HEAD
LOCATION: CHEST

## 2025-01-17 ASSESSMENT — PAIN DESCRIPTION - ONSET: ONSET: ON-GOING

## 2025-01-17 ASSESSMENT — PAIN DESCRIPTION - DESCRIPTORS: DESCRIPTORS: ACHING

## 2025-01-17 ASSESSMENT — PAIN SCALES - GENERAL
PAINLEVEL_OUTOF10: 3
PAINLEVEL_OUTOF10: 3
PAINLEVEL_OUTOF10: 6
PAINLEVEL_OUTOF10: 0

## 2025-01-17 ASSESSMENT — LIFESTYLE VARIABLES
HOW OFTEN DO YOU HAVE A DRINK CONTAINING ALCOHOL: NEVER
HOW MANY STANDARD DRINKS CONTAINING ALCOHOL DO YOU HAVE ON A TYPICAL DAY: PATIENT DOES NOT DRINK

## 2025-01-17 ASSESSMENT — PAIN DESCRIPTION - ORIENTATION: ORIENTATION: RIGHT;LEFT

## 2025-01-17 ASSESSMENT — PAIN DESCRIPTION - FREQUENCY: FREQUENCY: CONTINUOUS

## 2025-01-17 NOTE — ED PROVIDER NOTES
THIS IS MY [place] SUPERVISORY AND SHARED VISIT NOTE:    I personally saw the patient and made/approved the management plan and take responsibility for the patient management.      I independently performed a history and physical on Kishan Elaine.   All diagnostic, treatment, and disposition decisions were made by myself in conjunction with the advanced practice provider. I personally saw the patient and performed a substantive portion of the visit including all aspects of the medical decision making.      For further details of Kishan Elaine's emergency department encounter, please see MILAN Pandya's documentation.      History: Patient is a 79-year-old male presenting today due to concern for increased work of breathing over the last few days and needing to use his oxygen at home all the time but normally only needs it occasionally with exertion.  He does have history of long COVID syndrome and was intubated for over 60 days related to this whenever he did initially have COVID.  He denies any chest pain but does not feel like he can get a full breath.  He denies any history of blood clots.  He becomes very out of breath even after taking a couple of steps.  Due to increased shortness of breath, he was brought to the ED by his neighbor for further evaluation.        Physical exam:   Gen: Mild acute distress. AOx3.  Psych: Anxious mood and affect  HEENT: NCAT, MMM  Neck: supple, normal range of motion  Cardiac: RRR, pulses 2+ in all 4 extremities  Lungs: Mild respiratory distress, bilateral breath sounds present with wheezing noted throughout  Abdomen: soft and nontender with no R/D/G  Neuro: no focal neuro deficits with strength and sensation 5/5 in all 4 extremities, GCS equals 15      The Ekg interpreted by me shows  Sinus rhythm with first-degree AV block noted  with a rate of 97  Axis is   Normal  QTc is  normal   ST Segments: no acute change and nonspecific changes  No significant change from prior EKG  on nasal cannula.          Comment: Please note this report has been produced using speech recognition software and may contain errors related to that system including errors in grammar, punctuation, and spelling, as well as words and phrases that may be inappropriate. If there are any questions or concerns please feel free to contact the dictating provider for clarification.                    Franco Martinez MD  01/18/25 [id number]

## 2025-01-17 NOTE — ED NOTES
Kishan Elaine is a 79 y.o. male admitted for  Principal Problem:    COPD exacerbation (HCC)  Resolved Problems:    * No resolved hospital problems. *  .   Patient Home via family with   Chief Complaint   Patient presents with    Shortness of Breath     Increase in work of breathing over last few days, has O2 at home as needed - wearing ATC past two days. Labored breathing in triage.   .  Patient is alert and Person, Place, Time, and Situation  Patient's baseline mobility: Baseline Mobility: Independent   Code Status: Full Code   Cardiac Rhythm: Cardiac Rhythm: Sinus rhythm  O2 Flow Rate (L/min): 4 L/min  Is patient on baseline Oxygen: yes, pulmonary fibrosis, how many Liters4:   Abnormal Assessment Findings:   Patient is tachypenic and reports SOB, these symptoms worsen upon exertion. Pt has a productive cough, thick yellow emmanuelle phelgm.     Isolation: None      NIH Score:    C-SSRS: Risk of Suicide: No Risk  Bedside swallow:        Active LDA's:   Peripheral IV 01/17/25 Right Antecubital (Active)   Site Assessment Clean, dry & intact 01/17/25 1523   Line Status Blood return noted 01/17/25 1523   Line Care Connections checked and tightened 01/17/25 1523   Phlebitis Assessment No symptoms 01/17/25 1523   Infiltration Assessment 0 01/17/25 1523   Dressing Status New dressing applied 01/17/25 1523   Dressing Type Transparent 01/17/25 1523   Dressing Intervention New 01/17/25 1523       Family/Caregiver Present yes Any Concerns: no   Restraints no  Sitter no         Vitals: MEWS Score: 2    Vitals:    01/17/25 1605 01/17/25 1636 01/17/25 1637 01/17/25 1751   BP: 120/68 111/79 111/79 119/68   Pulse: 82 92  (!) 104   Resp: 22 22  30   Temp:       TempSrc:       SpO2: 100% 97%  95%   Weight:       Height:           Last documented pain score (0-10 scale) Pain Level: 3  Pain medication administered No.    Pertinent or High Risk Medications/Drips: No.    Pending Blood Product Administration: no    Abnormal labs:   Abnormal

## 2025-01-17 NOTE — ED NOTES
Writer ambulated patient ~100 feet with pulse oximeter at this time.  -125  O2 Sat 94% on 4 L NC  Patient reports increased SOB with exertion, resps labored/ tachypneic. Provider aware.

## 2025-01-18 LAB
ANION GAP SERPL CALCULATED.3IONS-SCNC: 12 MMOL/L (ref 3–16)
BASOPHILS # BLD: 0 K/UL (ref 0–0.2)
BASOPHILS NFR BLD: 0.3 %
BUN SERPL-MCNC: 24 MG/DL (ref 7–20)
CALCIUM SERPL-MCNC: 9.7 MG/DL (ref 8.3–10.6)
CHLORIDE SERPL-SCNC: 100 MMOL/L (ref 99–110)
CO2 SERPL-SCNC: 25 MMOL/L (ref 21–32)
CREAT SERPL-MCNC: 0.9 MG/DL (ref 0.8–1.3)
DEPRECATED RDW RBC AUTO: 14.3 % (ref 12.4–15.4)
EOSINOPHIL # BLD: 0 K/UL (ref 0–0.6)
EOSINOPHIL NFR BLD: 0 %
GFR SERPLBLD CREATININE-BSD FMLA CKD-EPI: 87 ML/MIN/{1.73_M2}
GLUCOSE SERPL-MCNC: 228 MG/DL (ref 70–99)
HCT VFR BLD AUTO: 35.8 % (ref 40.5–52.5)
HGB BLD-MCNC: 12.1 G/DL (ref 13.5–17.5)
LYMPHOCYTES # BLD: 0.7 K/UL (ref 1–5.1)
LYMPHOCYTES NFR BLD: 9.5 %
MCH RBC QN AUTO: 31 PG (ref 26–34)
MCHC RBC AUTO-ENTMCNC: 33.7 G/DL (ref 31–36)
MCV RBC AUTO: 92 FL (ref 80–100)
MONOCYTES # BLD: 0.4 K/UL (ref 0–1.3)
MONOCYTES NFR BLD: 5.7 %
NEUTROPHILS # BLD: 6 K/UL (ref 1.7–7.7)
NEUTROPHILS NFR BLD: 84.5 %
PLATELET # BLD AUTO: 161 K/UL (ref 135–450)
PMV BLD AUTO: 9.3 FL (ref 5–10.5)
POTASSIUM SERPL-SCNC: 4.6 MMOL/L (ref 3.5–5.1)
RBC # BLD AUTO: 3.89 M/UL (ref 4.2–5.9)
SODIUM SERPL-SCNC: 137 MMOL/L (ref 136–145)
TROPONIN, HIGH SENSITIVITY: 17 NG/L (ref 0–22)
WBC # BLD AUTO: 7 K/UL (ref 4–11)

## 2025-01-18 PROCEDURE — 94640 AIRWAY INHALATION TREATMENT: CPT

## 2025-01-18 PROCEDURE — 6360000002 HC RX W HCPCS

## 2025-01-18 PROCEDURE — 2700000000 HC OXYGEN THERAPY PER DAY

## 2025-01-18 PROCEDURE — 6360000002 HC RX W HCPCS: Performed by: STUDENT IN AN ORGANIZED HEALTH CARE EDUCATION/TRAINING PROGRAM

## 2025-01-18 PROCEDURE — 94761 N-INVAS EAR/PLS OXIMETRY MLT: CPT

## 2025-01-18 PROCEDURE — 2500000003 HC RX 250 WO HCPCS: Performed by: STUDENT IN AN ORGANIZED HEALTH CARE EDUCATION/TRAINING PROGRAM

## 2025-01-18 PROCEDURE — 6370000000 HC RX 637 (ALT 250 FOR IP): Performed by: STUDENT IN AN ORGANIZED HEALTH CARE EDUCATION/TRAINING PROGRAM

## 2025-01-18 PROCEDURE — 2500000003 HC RX 250 WO HCPCS: Performed by: INTERNAL MEDICINE

## 2025-01-18 PROCEDURE — 82103 ALPHA-1-ANTITRYPSIN TOTAL: CPT

## 2025-01-18 PROCEDURE — 80048 BASIC METABOLIC PNL TOTAL CA: CPT

## 2025-01-18 PROCEDURE — 6370000000 HC RX 637 (ALT 250 FOR IP): Performed by: NURSE PRACTITIONER

## 2025-01-18 PROCEDURE — 83036 HEMOGLOBIN GLYCOSYLATED A1C: CPT

## 2025-01-18 PROCEDURE — 6360000002 HC RX W HCPCS: Performed by: INTERNAL MEDICINE

## 2025-01-18 PROCEDURE — 93005 ELECTROCARDIOGRAM TRACING: CPT | Performed by: STUDENT IN AN ORGANIZED HEALTH CARE EDUCATION/TRAINING PROGRAM

## 2025-01-18 PROCEDURE — 99223 1ST HOSP IP/OBS HIGH 75: CPT | Performed by: INTERNAL MEDICINE

## 2025-01-18 PROCEDURE — 36415 COLL VENOUS BLD VENIPUNCTURE: CPT

## 2025-01-18 PROCEDURE — 94669 MECHANICAL CHEST WALL OSCILL: CPT

## 2025-01-18 PROCEDURE — 85025 COMPLETE CBC W/AUTO DIFF WBC: CPT

## 2025-01-18 PROCEDURE — 1200000000 HC SEMI PRIVATE

## 2025-01-18 PROCEDURE — 84484 ASSAY OF TROPONIN QUANT: CPT

## 2025-01-18 PROCEDURE — 2580000003 HC RX 258: Performed by: STUDENT IN AN ORGANIZED HEALTH CARE EDUCATION/TRAINING PROGRAM

## 2025-01-18 RX ORDER — ARFORMOTEROL TARTRATE 15 UG/2ML
15 SOLUTION RESPIRATORY (INHALATION)
Status: DISCONTINUED | OUTPATIENT
Start: 2025-01-18 | End: 2025-01-22 | Stop reason: HOSPADM

## 2025-01-18 RX ORDER — MORPHINE SULFATE 2 MG/ML
2 INJECTION, SOLUTION INTRAMUSCULAR; INTRAVENOUS EVERY 4 HOURS PRN
Status: DISCONTINUED | OUTPATIENT
Start: 2025-01-18 | End: 2025-01-22 | Stop reason: HOSPADM

## 2025-01-18 RX ORDER — BUDESONIDE 0.5 MG/2ML
0.5 INHALANT ORAL
Status: DISCONTINUED | OUTPATIENT
Start: 2025-01-18 | End: 2025-01-22 | Stop reason: HOSPADM

## 2025-01-18 RX ADMIN — IPRATROPIUM BROMIDE AND ALBUTEROL SULFATE 1 DOSE: 2.5; .5 SOLUTION RESPIRATORY (INHALATION) at 20:49

## 2025-01-18 RX ADMIN — BENZONATATE 100 MG: 100 CAPSULE ORAL at 20:41

## 2025-01-18 RX ADMIN — IPRATROPIUM BROMIDE AND ALBUTEROL SULFATE 1 DOSE: 2.5; .5 SOLUTION RESPIRATORY (INHALATION) at 16:08

## 2025-01-18 RX ADMIN — MORPHINE SULFATE 2 MG: 2 INJECTION, SOLUTION INTRAMUSCULAR; INTRAVENOUS at 18:19

## 2025-01-18 RX ADMIN — PREDNISONE 40 MG: 20 TABLET ORAL at 08:16

## 2025-01-18 RX ADMIN — IPRATROPIUM BROMIDE AND ALBUTEROL SULFATE 1 DOSE: 2.5; .5 SOLUTION RESPIRATORY (INHALATION) at 23:58

## 2025-01-18 RX ADMIN — SODIUM CHLORIDE, PRESERVATIVE FREE 10 ML: 5 INJECTION INTRAVENOUS at 20:40

## 2025-01-18 RX ADMIN — IPRATROPIUM BROMIDE AND ALBUTEROL SULFATE 1 DOSE: 2.5; .5 SOLUTION RESPIRATORY (INHALATION) at 07:51

## 2025-01-18 RX ADMIN — ENOXAPARIN SODIUM 40 MG: 100 INJECTION SUBCUTANEOUS at 08:15

## 2025-01-18 RX ADMIN — SODIUM CHLORIDE, PRESERVATIVE FREE 10 ML: 5 INJECTION INTRAVENOUS at 08:15

## 2025-01-18 RX ADMIN — AZITHROMYCIN MONOHYDRATE 500 MG: 500 INJECTION, POWDER, LYOPHILIZED, FOR SOLUTION INTRAVENOUS at 17:10

## 2025-01-18 RX ADMIN — ARFORMOTEROL TARTRATE 15 MCG: 15 SOLUTION RESPIRATORY (INHALATION) at 20:49

## 2025-01-18 RX ADMIN — ATORVASTATIN CALCIUM 20 MG: 10 TABLET, FILM COATED ORAL at 08:16

## 2025-01-18 RX ADMIN — GABAPENTIN 300 MG: 300 CAPSULE ORAL at 17:06

## 2025-01-18 RX ADMIN — IPRATROPIUM BROMIDE AND ALBUTEROL SULFATE 1 DOSE: 2.5; .5 SOLUTION RESPIRATORY (INHALATION) at 03:46

## 2025-01-18 RX ADMIN — WATER 1000 MG: 1 INJECTION INTRAMUSCULAR; INTRAVENOUS; SUBCUTANEOUS at 18:09

## 2025-01-18 RX ADMIN — TICAGRELOR 60 MG: 60 TABLET ORAL at 08:16

## 2025-01-18 RX ADMIN — IPRATROPIUM BROMIDE AND ALBUTEROL SULFATE 1 DOSE: 2.5; .5 SOLUTION RESPIRATORY (INHALATION) at 11:14

## 2025-01-18 RX ADMIN — Medication 10 ML: at 17:07

## 2025-01-18 RX ADMIN — SODIUM CHLORIDE: 9 INJECTION, SOLUTION INTRAVENOUS at 17:10

## 2025-01-18 RX ADMIN — BUDESONIDE 500 MCG: 0.5 SUSPENSION RESPIRATORY (INHALATION) at 20:49

## 2025-01-18 RX ADMIN — METHYLPREDNISOLONE SODIUM SUCCINATE 40 MG: 40 INJECTION, POWDER, LYOPHILIZED, FOR SOLUTION INTRAMUSCULAR; INTRAVENOUS at 23:16

## 2025-01-18 RX ADMIN — LOSARTAN POTASSIUM 25 MG: 25 TABLET, FILM COATED ORAL at 08:16

## 2025-01-18 RX ADMIN — TICAGRELOR 60 MG: 60 TABLET ORAL at 20:40

## 2025-01-18 RX ADMIN — Medication 10 MG: at 20:40

## 2025-01-18 RX ADMIN — ACETAMINOPHEN 650 MG: 325 TABLET ORAL at 17:15

## 2025-01-18 RX ADMIN — FLUOXETINE HYDROCHLORIDE 20 MG: 20 CAPSULE ORAL at 08:16

## 2025-01-18 RX ADMIN — WATER 40 MG: 1 INJECTION INTRAMUSCULAR; INTRAVENOUS; SUBCUTANEOUS at 10:36

## 2025-01-18 ASSESSMENT — PAIN DESCRIPTION - LOCATION: LOCATION: CHEST

## 2025-01-18 ASSESSMENT — PAIN SCALES - GENERAL
PAINLEVEL_OUTOF10: 0
PAINLEVEL_OUTOF10: 0
PAINLEVEL_OUTOF10: 5
PAINLEVEL_OUTOF10: 0
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
## 2025-01-18 NOTE — PLAN OF CARE
Problem: Chronic Conditions and Co-morbidities  Goal: Patient's chronic conditions and co-morbidity symptoms are monitored and maintained or improved  Outcome: Progressing     Problem: Discharge Planning  Goal: Discharge to home or other facility with appropriate resources  Outcome: Progressing     Problem: Pain  Goal: Verbalizes/displays adequate comfort level or baseline comfort level  Outcome: Progressing     Problem: Safety - Adult  Goal: Free from fall injury  Outcome: Progressing     Problem: ABCDS Injury Assessment  Goal: Absence of physical injury  Outcome: Progressing     Problem: Coping  Goal: Pt/Family able to verbalize concerns and demonstrate effective coping strategies  Description: INTERVENTIONS:  1. Assist patient/family to identify coping skills, available support systems and cultural and spiritual values  2. Provide emotional support, including active listening and acknowledgement of concerns of patient and caregivers  3. Reduce environmental stimuli, as able  4. Instruct patient/family in relaxation techniques, as appropriate  5. Assess for spiritual pain/suffering and initiate Spiritual Care, Psychosocial Clinical Specialist consults as needed  Outcome: Progressing     Problem: Anxiety  Goal: Will report anxiety at manageable levels  Description: INTERVENTIONS:  1. Administer medication as ordered  2. Teach and rehearse alternative coping skills  3. Provide emotional support with 1:1 interaction with staff  Outcome: Progressing     Problem: Infection - Adult  Goal: Absence of infection at discharge  Outcome: Progressing     Problem: Musculoskeletal - Adult  Goal: Return mobility to safest level of function  Outcome: Progressing

## 2025-01-18 NOTE — PROGRESS NOTES
01/18/25 1054   Encounter Summary   Encounter Overview/Reason Spiritual/Emotional Needs   Service Provided For Patient   Referral/Consult From Multi-disciplinary team   Last Encounter    (1/18: confirmed Nehemiah was on the Hinduism Communion list.  When we have a volunteer EM rounding he will receive communion. God bless.)   Begin Time 1045   End Time  1054   Total Time Calculated 9 min     Thank you for consulting Spiritual Health    If you would like a 's presence for emotional, spiritual, grief or comfort care,   please dial \"[phone]\" and ask for the  on-call to be paged.    For help with Advanced Care Planning, Power of  for Healthcare or Living Will forms, you may also call us directly:    [phone] Kobi  [phone]) Maggie  [phone]) Outpatient    [facility]

## 2025-01-18 NOTE — RT PROTOCOL NOTE
RT Inhaler-Nebulizer Bronchodilator Protocol Note    There is a bronchodilator order in the chart from a provider indicating to follow the RT Bronchodilator Protocol and there is an “Initiate RT Inhaler-Nebulizer Bronchodilator Protocol” order as well (see protocol at bottom of note).    CXR Findings:  XR CHEST PORTABLE    Result Date: 1/17/2025  Chronic interstitial lung disease again noted. No focal/acute consolidation. Stable cardiomediastinal silhouette status post CABG. Electronically signed by William Khanna      The findings from the last RT Protocol Assessment were as follows:   History Pulmonary Disease: Chronic pulmonary disease  Respiratory Pattern: Mild dyspnea at rest, irregular pattern, or RR 21-25 bpm  Breath Sounds: Slightly diminished and/or crackles  Cough: Strong, productive  Indication for Bronchodilator Therapy: On home bronchodilators  Bronchodilator Assessment Score: 9    Aerosolized bronchodilator medication orders have been revised according to the RT Inhaler-Nebulizer Bronchodilator Protocol below.    Respiratory Therapist to perform RT Therapy Protocol Assessment initially then follow the protocol.  Repeat RT Therapy Protocol Assessment PRN for score 0-3 or on second treatment, BID, and PRN for scores above 3.    No Indications - adjust the frequency to every 6 hours PRN wheezing or bronchospasm, if no treatments needed after 48 hours then discontinue using Per Protocol order mode.     If indication present, adjust the RT bronchodilator orders based on the Bronchodilator Assessment Score as indicated below.  Use Inhaler orders unless patient has one or more of the following: on home nebulizer, not able to hold breath for 10 seconds, is not alert and oriented, cannot activate and use MDI correctly, or respiratory rate 25 breaths per minute or more, then use the equivalent nebulizer order(s) with same Frequency and PRN reasons based on the score.  If a patient is on this medication at home

## 2025-01-18 NOTE — PROGRESS NOTES
4 Eyes Skin Assessment and Patient belongings     The patient is being assess for  Admission    I agree that 2 Nurses have performed a thorough Head to Toe Skin Assessment on the patient. ALL assessment sites listed below have been assessed.       Areas assessed by both nurses:   [x]   Head, Face, and Ears   [x]   Shoulders, Back, and Chest  [x]   Arms, Elbows, and Hands   [x]   Coccyx, Sacrum, and IschIum  [x]   Legs, Feet, and Heels        Does the Patient have Skin Breakdown?  No         Rnajit Prevention initiated:  NA   Wound Care Orders initiated:  NA      [facility] nurse consulted for Pressure Injury (Stage 3,4, Unstageable, DTI, NWPT, and Complex wounds), New and Established Ostomies:  NA      I agree that 2 Nurses have reviewed patient belongings with the patient/family and documented in the flowsheet upon admission or transfer to the unit.     Belongings  Dental Appliances: Uppers, Lowers (lower one is like a plate per wife)  Vision - Corrective Lenses: None  Hearing Aid: None  Clothing: Footwear, Jacket/Coat, Pants, Shirt, Undergarments, Socks  Jewelry: None  Electronic Devices: Other (Comment), Cell Phone,  (inogen)  Weapons (Notify Protective Services/Security): None  Home Medications: None  Valuables Given To: Patient, Family (Comment) (wife has his wallet)  Provide Name(s) of Who Valuable(s) Were Given To: catherine suh       Nurse 1 eSignature: Electronically signed by ALBERT SHAH RN on 1/18/25 at 3:36 AM EST    **SHARE this note so that the co-signing nurse is able to place an eSignature**    Nurse 2 eSignature: Electronically signed by Naheed Conway RN on 1/18/25 at 3:41 AM EST

## 2025-01-18 NOTE — CONSULTS
Consult Placed Perfect Serve    Who: Raudel Brown  Date: 01/18/2025  Time: 1037     Electronically signed by Aminta Marquez RN on 1/18/2025 at 10:37 AM

## 2025-01-18 NOTE — ED PROVIDER NOTES
[facility] - MED SURG  Emergency Department Encounter    Patient Name: Kishan Elaine  MRN: [id number]  YOB: 1945  Date of Evaluation: 1/17/2025  Provider: Rosy Toro MD  Note Started: 3:35 PM EST 1/18/25    CHIEF COMPLAINT  Shortness of Breath (Increase in work of breathing over last few days, has O2 at home as needed - wearing ATC past two days. Labored breathing in triage.)    SHARED SERVICE VISIT  I have seen and evaluated this patient with my supervising physician, Dr. Martinez.     HISTORY OF PRESENT ILLNESS  Kishan Elaine is a 79 y.o. male who presents to the ED for evaluation of shortness of breath.  Patient reports symptoms for past 2 days.  Reports mild cough.  Nonproductive.  No hemoptysis.  Reports history of COPD.  Does have oxygen at home which she wears as needed.  Chest tightness without pain.  Denies fevers chills.  No headaches, lightheadedness or dizziness.  Patient denies sick contacts.  No leg pain or swelling..    No other complaints, modifying factors or associated symptoms.     Nursing notes reviewed were all reviewed and agreed with or any disagreements were addressed in the HPI.    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

## 2025-01-18 NOTE — PROGRESS NOTES
Patient admitted to room 363 from [facility] ED.  Patient oriented to room, call light, bed rails, phone, lights and bathroom.  Patient instructed about the schedule of the day including: vital sign frequency, lab draws, possible tests, frequency of MD and staff rounds, including RN/MD rounding together at bedside, daily weights, and I &O's.  Patient instructed about prescribed diet, how to use 8MENU, and television.   bed alarm in place, patient aware of placement and reason.   Telemetry box  in place, patient aware of placement and reason.  Bed locked, in lowest position, side rails up 2/4, call light within reach.  Will continue to monitor.

## 2025-01-18 NOTE — PROGRESS NOTES
[facility] Medicine Progress Note  V 1.6      Date of Admission: 1/17/2025    Hospital Day: 2      Chief Admission Complaint:  SOB    Subjective: Patient is in hospital bed awake and alert.  No new issues or complaints today.  Patient states he feels \"all right \".  Continues to have left rib pain.  Shortness of breath at rest.  Currently on 4 L supplemental O2 here, on 4-5 L at home.    Presenting Admission History:       79 y.o. male who presented to [facility] with shortness of breath.  PMHx significant for chronic COVID, COPD,, pulmonary fibrosis combined CHF, CAD status post CABG, hypertension.       Patient presents to ED due to worsening shortness of breath.  Patient chronically uses about 1 L nasal cannula oxygen on and off however despite that patient has been having more and more shortness of breath.  Denies any fevers, chills but does have some increased cough without sputum production.  Upon presentation to ED patient required 4 L nasal cannula oxygen laboratory evaluation was nonacute and chest x-ray shows chronic interstitial lung disease with no acute findings.    Assessment/Plan:      Current Principal Problem:  COPD exacerbation (HCC)    Acute on Chronic Respiratory Failure - w/ hypoxia, POArrival.  On baseline home O2 at 4-5L.  Supplemental O2 and wean as tolerated.     Acute Exacerbation of ILD  -History of pulmonary fibrosis and emphysema  -Chest x-ray resulted and reviewed, showed chronic interstitial lung disease  -A1A pending  -Added Pulmicort  -Continue DuoNebs, prednisone, and azithromycin  -Pulmonology consulted    Troponin elevation - c/w myocardial injury 2nd to     [x] Demand ischemia w/out MI     of unclear clinical significance w/out signs and/or symptoms of active ischemia.  Documented and reviewed.      CHF - chronic combined systolic/diastolic failure w/ reduced EF 45% by Echo dated 4/23/24.  Likely due to hypertensive and/or ischemic heart disease.  Patient is

## 2025-01-18 NOTE — H&P
[facility] Medicine History & Physical    V 1.6    Date of Admission: 1/17/2025    Date of Service:  Pt seen/examined on 1/17/2025    [x]Admitted to Inpatient with expected LOS greater than two midnights due to medical therapy.  []Placed in Observation status.    Chief Admission Complaint: Shortness of breath    Presenting Admission History:      79 y.o. male who presented to [facility] with shortness of breath.  PMHx significant for chronic COVID, COPD,, pulmonary fibrosis combined CHF, CAD status post CABG, hypertension.      Patient presents to ED due to worsening shortness of breath.  Patient chronically uses about 1 L nasal cannula oxygen on and off however despite that patient has been having more and more shortness of breath.  Denies any fevers, chills but does have some increased cough without sputum production.  Upon presentation to ED patient required 4 L nasal cannula oxygen laboratory evaluation was nonacute and chest x-ray shows chronic interstitial lung disease with no acute findings.    Assessment/Plan:      Current Principal Problem:  COPD exacerbation (HCC)    Acute on Chronic Respiratory Failure - w/ hypoxia, POArrival.  On baseline home O2 at 1 L.  Supplemental O2 and wean as tolerated.   COPD - w/ chronic hypoxic respiratory failure on baseline home O2 at 1 L per NC.  Controlled on home medication regimen - continued.    Pulmonary fibrosis due to chronic COVID-19 lung-    Presentation consistent with COPD exacerbation without evidence of bacterial pneumonia given lack of infiltrates, leukocytosis or systemic signs.    -Treat with DuoNebs, prednisone, and azithromycin for COPD exacerbation without bacterial pneumonia.  -If no improvement in clinical status in couple of days will consult pulmonology.    CHF - chronic combined  failure w/ r improved EF 45% by Echo dated 4/23/2024.  Likely due to hypertensive and/or ischemic heart disease.  Patient is euvolemic w/ no evidence of     Provider, MD Sandra       Labs: Personally reviewed and interpreted for clinical significance.   Recent Labs     01/17/25  1527   WBC 9.0   HGB 13.9   HCT 41.6        Recent Labs     01/17/25  1527      K 4.1      CO2 26   BUN 18   CREATININE 1.0   CALCIUM 10.2     Recent Labs     01/17/25  1527 01/17/25  1641   PROBNP 451*  --    TROPHS 26* 24*     No results for input(s): \"LABA1C\" in the last 72 hours.  Recent Labs     01/17/25  1527   AST 26   ALT 24   BILITOT 0.4   ALKPHOS 76     Recent Labs     01/17/25  1641   LACTA 1.5        Harvinder Manzo DO

## 2025-01-19 LAB
EKG ATRIAL RATE: 118 BPM
EKG DIAGNOSIS: NORMAL
EKG P AXIS: 83 DEGREES
EKG P-R INTERVAL: 208 MS
EKG Q-T INTERVAL: 328 MS
EKG QRS DURATION: 114 MS
EKG QTC CALCULATION (BAZETT): 459 MS
EKG R AXIS: 2 DEGREES
EKG T AXIS: -5 DEGREES
EKG VENTRICULAR RATE: 118 BPM
EST. AVERAGE GLUCOSE BLD GHB EST-MCNC: 134.1 MG/DL
HBA1C MFR BLD: 6.3 %

## 2025-01-19 PROCEDURE — 6370000000 HC RX 637 (ALT 250 FOR IP): Performed by: NURSE PRACTITIONER

## 2025-01-19 PROCEDURE — 94669 MECHANICAL CHEST WALL OSCILL: CPT

## 2025-01-19 PROCEDURE — 6360000002 HC RX W HCPCS: Performed by: INTERNAL MEDICINE

## 2025-01-19 PROCEDURE — 6360000002 HC RX W HCPCS: Performed by: STUDENT IN AN ORGANIZED HEALTH CARE EDUCATION/TRAINING PROGRAM

## 2025-01-19 PROCEDURE — 99233 SBSQ HOSP IP/OBS HIGH 50: CPT | Performed by: INTERNAL MEDICINE

## 2025-01-19 PROCEDURE — 2500000003 HC RX 250 WO HCPCS: Performed by: STUDENT IN AN ORGANIZED HEALTH CARE EDUCATION/TRAINING PROGRAM

## 2025-01-19 PROCEDURE — 94640 AIRWAY INHALATION TREATMENT: CPT

## 2025-01-19 PROCEDURE — 93010 ELECTROCARDIOGRAM REPORT: CPT | Performed by: INTERNAL MEDICINE

## 2025-01-19 PROCEDURE — 6370000000 HC RX 637 (ALT 250 FOR IP): Performed by: STUDENT IN AN ORGANIZED HEALTH CARE EDUCATION/TRAINING PROGRAM

## 2025-01-19 PROCEDURE — 1200000000 HC SEMI PRIVATE

## 2025-01-19 PROCEDURE — 2700000000 HC OXYGEN THERAPY PER DAY

## 2025-01-19 PROCEDURE — 94761 N-INVAS EAR/PLS OXIMETRY MLT: CPT

## 2025-01-19 PROCEDURE — 2500000003 HC RX 250 WO HCPCS: Performed by: INTERNAL MEDICINE

## 2025-01-19 PROCEDURE — 2580000003 HC RX 258: Performed by: STUDENT IN AN ORGANIZED HEALTH CARE EDUCATION/TRAINING PROGRAM

## 2025-01-19 RX ORDER — HYDROXYZINE PAMOATE 25 MG/1
25 CAPSULE ORAL 3 TIMES DAILY PRN
Status: DISCONTINUED | OUTPATIENT
Start: 2025-01-19 | End: 2025-01-22 | Stop reason: HOSPADM

## 2025-01-19 RX ADMIN — IPRATROPIUM BROMIDE AND ALBUTEROL SULFATE 1 DOSE: 2.5; .5 SOLUTION RESPIRATORY (INHALATION) at 09:23

## 2025-01-19 RX ADMIN — SODIUM CHLORIDE: 9 INJECTION, SOLUTION INTRAVENOUS at 17:01

## 2025-01-19 RX ADMIN — TICAGRELOR 60 MG: 60 TABLET ORAL at 20:05

## 2025-01-19 RX ADMIN — BUDESONIDE 500 MCG: 0.5 SUSPENSION RESPIRATORY (INHALATION) at 19:52

## 2025-01-19 RX ADMIN — IPRATROPIUM BROMIDE AND ALBUTEROL SULFATE 1 DOSE: 2.5; .5 SOLUTION RESPIRATORY (INHALATION) at 11:22

## 2025-01-19 RX ADMIN — IPRATROPIUM BROMIDE AND ALBUTEROL SULFATE 1 DOSE: 2.5; .5 SOLUTION RESPIRATORY (INHALATION) at 03:55

## 2025-01-19 RX ADMIN — BUDESONIDE 500 MCG: 0.5 SUSPENSION RESPIRATORY (INHALATION) at 09:23

## 2025-01-19 RX ADMIN — AZITHROMYCIN MONOHYDRATE 500 MG: 500 INJECTION, POWDER, LYOPHILIZED, FOR SOLUTION INTRAVENOUS at 17:02

## 2025-01-19 RX ADMIN — BENZONATATE 100 MG: 100 CAPSULE ORAL at 12:23

## 2025-01-19 RX ADMIN — IPRATROPIUM BROMIDE AND ALBUTEROL SULFATE 1 DOSE: 2.5; .5 SOLUTION RESPIRATORY (INHALATION) at 19:52

## 2025-01-19 RX ADMIN — METHYLPREDNISOLONE SODIUM SUCCINATE 40 MG: 40 INJECTION, POWDER, LYOPHILIZED, FOR SOLUTION INTRAMUSCULAR; INTRAVENOUS at 10:48

## 2025-01-19 RX ADMIN — METHYLPREDNISOLONE SODIUM SUCCINATE 40 MG: 40 INJECTION, POWDER, LYOPHILIZED, FOR SOLUTION INTRAMUSCULAR; INTRAVENOUS at 23:02

## 2025-01-19 RX ADMIN — Medication 10 MG: at 20:05

## 2025-01-19 RX ADMIN — FLUOXETINE HYDROCHLORIDE 20 MG: 20 CAPSULE ORAL at 07:55

## 2025-01-19 RX ADMIN — TICAGRELOR 60 MG: 60 TABLET ORAL at 07:55

## 2025-01-19 RX ADMIN — IPRATROPIUM BROMIDE AND ALBUTEROL SULFATE 1 DOSE: 2.5; .5 SOLUTION RESPIRATORY (INHALATION) at 15:11

## 2025-01-19 RX ADMIN — HYDROXYZINE PAMOATE 25 MG: 25 CAPSULE ORAL at 12:23

## 2025-01-19 RX ADMIN — WATER 1000 MG: 1 INJECTION INTRAMUSCULAR; INTRAVENOUS; SUBCUTANEOUS at 18:06

## 2025-01-19 RX ADMIN — ATORVASTATIN CALCIUM 20 MG: 10 TABLET, FILM COATED ORAL at 07:55

## 2025-01-19 RX ADMIN — ENOXAPARIN SODIUM 40 MG: 100 INJECTION SUBCUTANEOUS at 07:55

## 2025-01-19 RX ADMIN — LOSARTAN POTASSIUM 25 MG: 25 TABLET, FILM COATED ORAL at 07:55

## 2025-01-19 RX ADMIN — ARFORMOTEROL TARTRATE 15 MCG: 15 SOLUTION RESPIRATORY (INHALATION) at 19:52

## 2025-01-19 RX ADMIN — SODIUM CHLORIDE, PRESERVATIVE FREE 10 ML: 5 INJECTION INTRAVENOUS at 07:55

## 2025-01-19 RX ADMIN — Medication 10 ML: at 17:00

## 2025-01-19 RX ADMIN — GABAPENTIN 300 MG: 300 CAPSULE ORAL at 17:00

## 2025-01-19 RX ADMIN — SODIUM CHLORIDE, PRESERVATIVE FREE 10 ML: 5 INJECTION INTRAVENOUS at 20:05

## 2025-01-19 RX ADMIN — IPRATROPIUM BROMIDE AND ALBUTEROL SULFATE 1 DOSE: 2.5; .5 SOLUTION RESPIRATORY (INHALATION) at 23:54

## 2025-01-19 ASSESSMENT — PAIN SCALES - GENERAL
PAINLEVEL_OUTOF10: 0
PAINLEVEL_OUTOF10: 0
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
## 2025-01-19 NOTE — PLAN OF CARE
Problem: Respiratory - Adult  Goal: Achieves optimal ventilation and oxygenation  Outcome: Progressing  Flowsheets (Taken 1/18/2025 2125)  Achieves optimal ventilation and oxygenation:   Assess for changes in respiratory status   Assess for changes in mentation and behavior   Position to facilitate oxygenation and minimize respiratory effort   Oxygen supplementation based on oxygen saturation or arterial blood gases   Encourage broncho-pulmonary hygiene including cough, deep breathe, incentive spirometry   Assess the need for suctioning and aspirate as needed   Assess and instruct to report shortness of breath or any respiratory difficulty   Respiratory therapy support as indicated

## 2025-01-19 NOTE — PROGRESS NOTES
[facility] Medicine Progress Note  V 1.6      Date of Admission: 1/17/2025    Hospital Day: 3      Chief Admission Complaint:  SOB    Subjective: Patient is in hospital chair awake and alert.  No new issues or complaints today.  Patient states he feels \"good \".  Urinating okay.  Patient has not had a bowel movement 2 to 3 days.  Shortness of breath at rest.  No pain.  Currently on 4 L supplemental O2 here, on 4-5 L at home.    Presenting Admission History:       79 y.o. male who presented to [facility] with shortness of breath.  PMHx significant for chronic COVID, COPD, pulmonary fibrosis combined CHF, CAD status post CABG, hypertension.       Patient presents to ED due to worsening shortness of breath.  Patient chronically uses about 1 L nasal cannula oxygen on and off however despite that patient has been having more and more shortness of breath.  Denies any fevers, chills but does have some increased cough without sputum production.  Upon presentation to ED patient required 4 L nasal cannula oxygen laboratory evaluation was nonacute and chest x-ray shows chronic interstitial lung disease with no acute findings.    Assessment/Plan:      Current Principal Problem:  COPD exacerbation (HCC)    Acute on Chronic Respiratory Failure - w/ hypoxia, POArrival.  On baseline home O2 at 4-5L.  Supplemental O2 and wean as tolerated.     Acute Exacerbation of ILD  -History of pulmonary fibrosis and emphysema  -Chest x-ray resulted and reviewed, showed chronic interstitial lung disease  -A1A pending  -Added Pulmicort  -Continue DuoNebs, prednisone, and azithromycin  -Pulmonology consulted, note reviewed on 1/19/2025 with recommendations to finish 5-day course of antibiotics, continue follow-up on micro workup, continue steroids, Brovana, Pulmicort, and DuoNebs, follow-up echo results  -Added hydroxyzine for anxiety induced dyspnea    Troponin elevation - c/w myocardial injury 2nd to     [x] Demand ischemia

## 2025-01-20 ENCOUNTER — APPOINTMENT (OUTPATIENT)
Age: 80
DRG: 190 | End: 2025-01-20
Attending: INTERNAL MEDICINE
Payer: MEDICARE

## 2025-01-20 ENCOUNTER — CARE COORDINATION (OUTPATIENT)
Dept: CASE MANAGEMENT | Age: 80
End: 2025-01-20

## 2025-01-20 LAB
ANION GAP SERPL CALCULATED.3IONS-SCNC: 9 MMOL/L (ref 3–16)
BUN SERPL-MCNC: 21 MG/DL (ref 7–20)
CALCIUM SERPL-MCNC: 8.2 MG/DL (ref 8.3–10.6)
CHLORIDE SERPL-SCNC: 101 MMOL/L (ref 99–110)
CO2 SERPL-SCNC: 28 MMOL/L (ref 21–32)
CREAT SERPL-MCNC: 0.7 MG/DL (ref 0.8–1.3)
DEPRECATED RDW RBC AUTO: 14.8 % (ref 12.4–15.4)
ECHO AO ASC DIAM: 3.8 CM
ECHO AO ASCENDING AORTA INDEX: 1.96 CM/M2
ECHO AO ROOT DIAM: 3.6 CM
ECHO AO ROOT INDEX: 1.86 CM/M2
ECHO BSA: 1.92 M2
ECHO EST RA PRESSURE: 8 MMHG
ECHO LA VOL MOD A2C: 35 ML (ref 18–58)
ECHO LA VOL MOD A4C: 37 ML (ref 18–58)
ECHO LA VOLUME INDEX MOD A2C: 18 ML/M2 (ref 16–34)
ECHO LA VOLUME INDEX MOD A4C: 19 ML/M2 (ref 16–34)
ECHO LV EJECTION FRACTION BIPLANE: 54 % (ref 55–100)
ECHO LV FRACTIONAL SHORTENING: 27 % (ref 28–44)
ECHO LV INTERNAL DIMENSION DIASTOLE INDEX: 3.09 CM/M2
ECHO LV INTERNAL DIMENSION DIASTOLIC: 6 CM (ref 4.2–5.9)
ECHO LV INTERNAL DIMENSION SYSTOLIC INDEX: 2.27 CM/M2
ECHO LV INTERNAL DIMENSION SYSTOLIC: 4.4 CM
ECHO LV IVSD: 0.9 CM (ref 0.6–1)
ECHO LV MASS 2D: 215.7 G (ref 88–224)
ECHO LV MASS INDEX 2D: 111.2 G/M2 (ref 49–115)
ECHO LV POSTERIOR WALL DIASTOLIC: 0.9 CM (ref 0.6–1)
ECHO LV RELATIVE WALL THICKNESS RATIO: 0.3
ECHO LVOT AREA: 2.3 CM2
ECHO LVOT DIAM: 1.7 CM
ECHO RA AREA 4C: 21.8 CM2
ECHO RA VOLUME BIPLANE METHOD OF DISKS: 69 ML
ECHO RA VOLUME INDEX BP: 36 ML/M2
ECHO RV BASAL DIMENSION: 4.2 CM
ECHO RV FREE WALL PEAK S': 8.7 CM/S
ECHO RV LONGITUDINAL DIMENSION: 7.8 CM
ECHO RV MID DIMENSION: 3.5 CM
ECHO RV TAPSE: 16 CM (ref 1.7–?)
ECHO TV MAX VELOCITY: 3.5 M/S
GFR SERPLBLD CREATININE-BSD FMLA CKD-EPI: >90 ML/MIN/{1.73_M2}
GLUCOSE SERPL-MCNC: 78 MG/DL (ref 70–99)
HCT VFR BLD AUTO: 33.8 % (ref 40.5–52.5)
HGB BLD-MCNC: 11.5 G/DL (ref 13.5–17.5)
MAGNESIUM SERPL-MCNC: 2.11 MG/DL (ref 1.8–2.4)
MCH RBC QN AUTO: 30.9 PG (ref 26–34)
MCHC RBC AUTO-ENTMCNC: 34.1 G/DL (ref 31–36)
MCV RBC AUTO: 90.6 FL (ref 80–100)
PLATELET # BLD AUTO: 148 K/UL (ref 135–450)
PMV BLD AUTO: 9.1 FL (ref 5–10.5)
POTASSIUM SERPL-SCNC: 4.2 MMOL/L (ref 3.5–5.1)
RBC # BLD AUTO: 3.73 M/UL (ref 4.2–5.9)
SODIUM SERPL-SCNC: 138 MMOL/L (ref 136–145)
WBC # BLD AUTO: 8.8 K/UL (ref 4–11)

## 2025-01-20 PROCEDURE — 93306 TTE W/DOPPLER COMPLETE: CPT | Performed by: INTERNAL MEDICINE

## 2025-01-20 PROCEDURE — 2580000003 HC RX 258: Performed by: STUDENT IN AN ORGANIZED HEALTH CARE EDUCATION/TRAINING PROGRAM

## 2025-01-20 PROCEDURE — 94761 N-INVAS EAR/PLS OXIMETRY MLT: CPT

## 2025-01-20 PROCEDURE — 99233 SBSQ HOSP IP/OBS HIGH 50: CPT | Performed by: INTERNAL MEDICINE

## 2025-01-20 PROCEDURE — 1200000000 HC SEMI PRIVATE

## 2025-01-20 PROCEDURE — 80048 BASIC METABOLIC PNL TOTAL CA: CPT

## 2025-01-20 PROCEDURE — 94640 AIRWAY INHALATION TREATMENT: CPT

## 2025-01-20 PROCEDURE — 6370000000 HC RX 637 (ALT 250 FOR IP): Performed by: NURSE PRACTITIONER

## 2025-01-20 PROCEDURE — 97530 THERAPEUTIC ACTIVITIES: CPT

## 2025-01-20 PROCEDURE — 2500000003 HC RX 250 WO HCPCS: Performed by: INTERNAL MEDICINE

## 2025-01-20 PROCEDURE — 83735 ASSAY OF MAGNESIUM: CPT

## 2025-01-20 PROCEDURE — 97166 OT EVAL MOD COMPLEX 45 MIN: CPT

## 2025-01-20 PROCEDURE — 97116 GAIT TRAINING THERAPY: CPT

## 2025-01-20 PROCEDURE — 6360000002 HC RX W HCPCS: Performed by: STUDENT IN AN ORGANIZED HEALTH CARE EDUCATION/TRAINING PROGRAM

## 2025-01-20 PROCEDURE — 6360000002 HC RX W HCPCS: Performed by: INTERNAL MEDICINE

## 2025-01-20 PROCEDURE — 6370000000 HC RX 637 (ALT 250 FOR IP): Performed by: STUDENT IN AN ORGANIZED HEALTH CARE EDUCATION/TRAINING PROGRAM

## 2025-01-20 PROCEDURE — 85027 COMPLETE CBC AUTOMATED: CPT

## 2025-01-20 PROCEDURE — 93306 TTE W/DOPPLER COMPLETE: CPT

## 2025-01-20 PROCEDURE — 36415 COLL VENOUS BLD VENIPUNCTURE: CPT

## 2025-01-20 PROCEDURE — 2700000000 HC OXYGEN THERAPY PER DAY

## 2025-01-20 PROCEDURE — 2500000003 HC RX 250 WO HCPCS: Performed by: STUDENT IN AN ORGANIZED HEALTH CARE EDUCATION/TRAINING PROGRAM

## 2025-01-20 PROCEDURE — 94669 MECHANICAL CHEST WALL OSCILL: CPT

## 2025-01-20 PROCEDURE — 97535 SELF CARE MNGMENT TRAINING: CPT

## 2025-01-20 PROCEDURE — 97162 PT EVAL MOD COMPLEX 30 MIN: CPT

## 2025-01-20 RX ORDER — FUROSEMIDE 10 MG/ML
20 INJECTION INTRAMUSCULAR; INTRAVENOUS ONCE
Status: COMPLETED | OUTPATIENT
Start: 2025-01-20 | End: 2025-01-20

## 2025-01-20 RX ADMIN — ARFORMOTEROL TARTRATE 15 MCG: 15 SOLUTION RESPIRATORY (INHALATION) at 07:53

## 2025-01-20 RX ADMIN — BUDESONIDE 500 MCG: 0.5 SUSPENSION RESPIRATORY (INHALATION) at 07:53

## 2025-01-20 RX ADMIN — ARFORMOTEROL TARTRATE 15 MCG: 15 SOLUTION RESPIRATORY (INHALATION) at 19:28

## 2025-01-20 RX ADMIN — FLUOXETINE HYDROCHLORIDE 20 MG: 20 CAPSULE ORAL at 08:34

## 2025-01-20 RX ADMIN — IPRATROPIUM BROMIDE AND ALBUTEROL SULFATE 1 DOSE: 2.5; .5 SOLUTION RESPIRATORY (INHALATION) at 04:02

## 2025-01-20 RX ADMIN — IPRATROPIUM BROMIDE AND ALBUTEROL SULFATE 1 DOSE: 2.5; .5 SOLUTION RESPIRATORY (INHALATION) at 07:53

## 2025-01-20 RX ADMIN — METHYLPREDNISOLONE SODIUM SUCCINATE 40 MG: 40 INJECTION, POWDER, LYOPHILIZED, FOR SOLUTION INTRAMUSCULAR; INTRAVENOUS at 20:35

## 2025-01-20 RX ADMIN — IPRATROPIUM BROMIDE AND ALBUTEROL SULFATE 1 DOSE: 2.5; .5 SOLUTION RESPIRATORY (INHALATION) at 19:28

## 2025-01-20 RX ADMIN — WATER 1000 MG: 1 INJECTION INTRAMUSCULAR; INTRAVENOUS; SUBCUTANEOUS at 18:15

## 2025-01-20 RX ADMIN — IPRATROPIUM BROMIDE AND ALBUTEROL SULFATE 1 DOSE: 2.5; .5 SOLUTION RESPIRATORY (INHALATION) at 23:22

## 2025-01-20 RX ADMIN — SODIUM CHLORIDE, PRESERVATIVE FREE 10 ML: 5 INJECTION INTRAVENOUS at 08:34

## 2025-01-20 RX ADMIN — TICAGRELOR 60 MG: 60 TABLET ORAL at 08:34

## 2025-01-20 RX ADMIN — TICAGRELOR 60 MG: 60 TABLET ORAL at 20:35

## 2025-01-20 RX ADMIN — AZITHROMYCIN MONOHYDRATE 500 MG: 500 INJECTION, POWDER, LYOPHILIZED, FOR SOLUTION INTRAVENOUS at 18:21

## 2025-01-20 RX ADMIN — SODIUM CHLORIDE, PRESERVATIVE FREE 10 ML: 5 INJECTION INTRAVENOUS at 20:35

## 2025-01-20 RX ADMIN — ATORVASTATIN CALCIUM 20 MG: 10 TABLET, FILM COATED ORAL at 08:34

## 2025-01-20 RX ADMIN — ENOXAPARIN SODIUM 40 MG: 100 INJECTION SUBCUTANEOUS at 08:34

## 2025-01-20 RX ADMIN — LOSARTAN POTASSIUM 25 MG: 25 TABLET, FILM COATED ORAL at 08:34

## 2025-01-20 RX ADMIN — FUROSEMIDE 20 MG: 10 INJECTION, SOLUTION INTRAMUSCULAR; INTRAVENOUS at 11:43

## 2025-01-20 RX ADMIN — IPRATROPIUM BROMIDE AND ALBUTEROL SULFATE 1 DOSE: 2.5; .5 SOLUTION RESPIRATORY (INHALATION) at 11:41

## 2025-01-20 RX ADMIN — METHYLPREDNISOLONE SODIUM SUCCINATE 40 MG: 40 INJECTION, POWDER, LYOPHILIZED, FOR SOLUTION INTRAMUSCULAR; INTRAVENOUS at 08:35

## 2025-01-20 RX ADMIN — BUDESONIDE 500 MCG: 0.5 SUSPENSION RESPIRATORY (INHALATION) at 19:28

## 2025-01-20 RX ADMIN — Medication 10 MG: at 20:34

## 2025-01-20 RX ADMIN — SODIUM CHLORIDE 25 ML: 9 INJECTION, SOLUTION INTRAVENOUS at 18:20

## 2025-01-20 RX ADMIN — GABAPENTIN 300 MG: 300 CAPSULE ORAL at 18:17

## 2025-01-20 RX ADMIN — POLYETHYLENE GLYCOL 3350 17 G: 17 POWDER, FOR SOLUTION ORAL at 08:43

## 2025-01-20 RX ADMIN — IPRATROPIUM BROMIDE AND ALBUTEROL SULFATE 1 DOSE: 2.5; .5 SOLUTION RESPIRATORY (INHALATION) at 16:32

## 2025-01-20 ASSESSMENT — PAIN SCALES - GENERAL
PAINLEVEL_OUTOF10: 0
PAINLEVEL_OUTOF10: 0

## 2025-01-20 NOTE — PLAN OF CARE
Problem: Chronic Conditions and Co-morbidities  Goal: Patient's chronic conditions and co-morbidity symptoms are monitored and maintained or improved  Outcome: Progressing  Flowsheets (Taken 1/20/2025 0843)  Care Plan - Patient's Chronic Conditions and Co-Morbidity Symptoms are Monitored and Maintained or Improved: Monitor and assess patient's chronic conditions and comorbid symptoms for stability, deterioration, or improvement     Problem: Discharge Planning  Goal: Discharge to home or other facility with appropriate resources  Outcome: Progressing  Flowsheets (Taken 1/20/2025 0843)  Discharge to home or other facility with appropriate resources: Identify barriers to discharge with patient and caregiver     Problem: Pain  Goal: Verbalizes/displays adequate comfort level or baseline comfort level  Outcome: Progressing  Flowsheets (Taken 1/20/2025 0815)  Verbalizes/displays adequate comfort level or baseline comfort level: Encourage patient to monitor pain and request assistance     Problem: Safety - Adult  Goal: Free from fall injury  Outcome: Progressing     Problem: ABCDS Injury Assessment  Goal: Absence of physical injury  Outcome: Progressing     Problem: Respiratory - Adult  Goal: Achieves optimal ventilation and oxygenation  Outcome: Progressing  Flowsheets (Taken 1/20/2025 0843)  Achieves optimal ventilation and oxygenation: Assess for changes in respiratory status     Problem: Skin/Tissue Integrity - Adult  Goal: Skin integrity remains intact  Outcome: Progressing  Flowsheets  Taken 1/20/2025 1203  Skin Integrity Remains Intact: Monitor for areas of redness and/or skin breakdown  Taken 1/20/2025 0843  Skin Integrity Remains Intact: Monitor for areas of redness and/or skin breakdown     Problem: Musculoskeletal - Adult  Goal: Return mobility to safest level of function  Outcome: Progressing  Flowsheets (Taken 1/20/2025 0843)  Return Mobility to Safest Level of Function: Assess patient stability and activity

## 2025-01-20 NOTE — CARE COORDINATION
Case Management Assessment  Initial Evaluation    Date/Time of Evaluation: 1/20/2025 3:22 PM  Assessment Completed by: Rhona Singletary RN    If patient is discharged prior to next notation, then this note serves as note for discharge by case management.    Patient Name: Kishan Elaine                   YOB: 1945  Diagnosis: Acute respiratory distress [R06.03]  Elevated troponin [R79.89]  COPD exacerbation (HCC) [J44.1]  Acute on chronic respiratory failure with hypoxia [J96.21]                   Date / Time: 1/17/2025  3:13 PM    Patient Admission Status: Inpatient   Readmission Risk (Low < 19, Mod (19-27), High > 27): Readmission Risk Score: 15.6    Current PCP: Rosy Toro MD  PCP verified by CM? Yes    Chart Reviewed: Yes      History Provided by: Patient  Patient Orientation: Alert and Oriented, Person, Place, Situation, Self    Patient Cognition: Alert    Hospitalization in the last 30 days (Readmission):  No    If yes, Readmission Assessment in  Navigator will be completed.    Advance Directives:      Code Status: Full Code   Patient's Primary Decision Maker is: Legal Next of Kin    Primary Decision Maker: Elva Elaine - Spouse - [phone]    Secondary Decision Maker: Kishan Elaine - Child - [phone]    Secondary Decision Maker: Steven Elaine - Child - [phone]    Discharge Planning:    Patient lives with: Spouse/Significant Other Type of Home: House  Primary Care Giver: Self  Patient Support Systems include: Spouse/Significant Other, Children   Current Financial resources: Medicare  Current community resources: None  Current services prior to admission: Durable Medical Equipment, Oxygen Therapy ([facility] 4-5L)            Current DME: Walker, Cane            Type of Home Care services:  None    ADLS  Prior functional level: Independent in ADLs/IADLs  Current functional level: Assistance with the following:, Cooking, Housework, Shopping    PT AM-PAC: 22 /24  OT AM-PAC: 22
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
## 2025-01-20 NOTE — PROGRESS NOTES
Occupational Therapy  Facility/Department: [place] [id number] - MED SURG  Occupational Therapy Initial Assessment/Treatment Note    Name: Kishan Elaine  : 1945  MRN: [id number]  Date of Service: 2025    Discharge Recommendations:  Home with assist PRN  OT Equipment Recommendations  Equipment Needed: No       Patient Diagnosis(es): The primary encounter diagnosis was Acute respiratory distress. Diagnoses of Acute on chronic respiratory failure with hypoxia, Elevated troponin, and Shortness of breath were also pertinent to this visit.  Past Medical History:  has a past medical history of Arthritis of hand, CAD (coronary artery disease), Chronic rhinitis, COVID, COVID toes, Hyperlipidemia, Hypertension, Ischemic cardiomyopathy, Personal history of COVID-19, STEMI (ST elevation myocardial infarction) (HCC), Tinnitus, and Toe gangrene (HCC).  Past Surgical History:  has a past surgical history that includes Coronary artery bypass graft; Coronary angioplasty (2011); Diagnostic Cardiac Cath Lab Procedure; Coronary angioplasty with stent (2019); Biceps tendon repair; and Toe amputation (Bilateral, 2021).           Assessment  Performance deficits / Impairments: Decreased endurance;Decreased functional mobility ;Decreased strength  Assessment: 80 y/o male presenting to [facility] with COPD exacerbation. Prior to admission pt living at home with spouse and grandson in single-story home and independent with ADLs and functional mobility. Baseline O2 2-5L at home. Today, pt requiring mod I/set-up with seated grooming ADLs d/t fatigue and SOB. Pt SBA with functional mobility/transfers and mod I with bed mobility. Pt requiring frequent seated and standing rest breaks throughout session d/t fatigue and SOB. Pt desat to 84% on 2L O2 following bed mobility with inc to 3L and ~1 min recovery to 90%. Pt maintained 90% or greater O2 sat during hallway mobility on 4L O2 with delayed desat to 88% upon return to room seated

## 2025-01-20 NOTE — PROGRESS NOTES
Physical Therapy  Facility/Department: [place] [address] - MED SURG  Physical Therapy Initial Assessment/Treatment     Name: Kishan Elaine  : 1945  MRN: [id number]  Date of Service: 2025    Discharge Recommendations:  Home with assist PRN   PT Equipment Recommendations  Equipment Needed: No      Patient Diagnosis(es): The primary encounter diagnosis was Acute respiratory distress. Diagnoses of Acute on chronic respiratory failure with hypoxia, Elevated troponin, and Shortness of breath were also pertinent to this visit.  Past Medical History:  has a past medical history of Arthritis of hand, CAD (coronary artery disease), Chronic rhinitis, COVID, COVID toes, Hyperlipidemia, Hypertension, Ischemic cardiomyopathy, Personal history of COVID-19, STEMI (ST elevation myocardial infarction) (HCC), Tinnitus, and Toe gangrene (HCC).  Past Surgical History:  has a past surgical history that includes Coronary artery bypass graft; Coronary angioplasty (2011); Diagnostic Cardiac Cath Lab Procedure; Coronary angioplasty with stent (2019); Biceps tendon repair; and Toe amputation (Bilateral, 2021).    Assessment  Body Structures, Functions, Activity Limitations Requiring Skilled Therapeutic Intervention: Decreased endurance;Decreased functional mobility ;Decreased balance  Assessment: Pt to [facility] with diagnosis of COPD exacerbation. PTA pt lives with his wife and grandson in a single story house with 3 CHERISE. Pt was independent with transfers and ambulation with no AD. Pt currently presents below baseline function and is limited by SOB and decreased SpO2 with mobility (see vitals). Pt completes bed mobility with mod I, transfers with SBA, and ambulates with no AD and SBA with cues for safety awareness with line managment. Pt will continue to benefit from skilled PT services to address above deficits. Pt is anticipated pt will be safe to DC home with assist PRN when medically appropriate.  Therapy Prognosis:

## 2025-01-20 NOTE — CARE COORDINATION
CTN contacted [facility] CM C5 and LVM at [phone] regarding RPM eligibility.    Ivelisse Zepeda, MSN, RN, [facility]  Care Transition Nurse  [phone] mobile

## 2025-01-21 LAB — A1AT SERPL-MCNC: 164 MG/DL (ref 90–200)

## 2025-01-21 PROCEDURE — 6370000000 HC RX 637 (ALT 250 FOR IP): Performed by: STUDENT IN AN ORGANIZED HEALTH CARE EDUCATION/TRAINING PROGRAM

## 2025-01-21 PROCEDURE — 2700000000 HC OXYGEN THERAPY PER DAY

## 2025-01-21 PROCEDURE — 94640 AIRWAY INHALATION TREATMENT: CPT

## 2025-01-21 PROCEDURE — 6360000002 HC RX W HCPCS: Performed by: STUDENT IN AN ORGANIZED HEALTH CARE EDUCATION/TRAINING PROGRAM

## 2025-01-21 PROCEDURE — 94761 N-INVAS EAR/PLS OXIMETRY MLT: CPT

## 2025-01-21 PROCEDURE — 2580000003 HC RX 258: Performed by: STUDENT IN AN ORGANIZED HEALTH CARE EDUCATION/TRAINING PROGRAM

## 2025-01-21 PROCEDURE — 6360000002 HC RX W HCPCS: Performed by: INTERNAL MEDICINE

## 2025-01-21 PROCEDURE — 2500000003 HC RX 250 WO HCPCS: Performed by: INTERNAL MEDICINE

## 2025-01-21 PROCEDURE — 2500000003 HC RX 250 WO HCPCS: Performed by: STUDENT IN AN ORGANIZED HEALTH CARE EDUCATION/TRAINING PROGRAM

## 2025-01-21 PROCEDURE — 94669 MECHANICAL CHEST WALL OSCILL: CPT

## 2025-01-21 PROCEDURE — 1200000000 HC SEMI PRIVATE

## 2025-01-21 PROCEDURE — 99233 SBSQ HOSP IP/OBS HIGH 50: CPT | Performed by: INTERNAL MEDICINE

## 2025-01-21 PROCEDURE — 6370000000 HC RX 637 (ALT 250 FOR IP): Performed by: NURSE PRACTITIONER

## 2025-01-21 RX ORDER — FUROSEMIDE 10 MG/ML
20 INJECTION INTRAMUSCULAR; INTRAVENOUS ONCE
Status: COMPLETED | OUTPATIENT
Start: 2025-01-21 | End: 2025-01-21

## 2025-01-21 RX ADMIN — TICAGRELOR 60 MG: 60 TABLET ORAL at 08:38

## 2025-01-21 RX ADMIN — IPRATROPIUM BROMIDE AND ALBUTEROL SULFATE 1 DOSE: 2.5; .5 SOLUTION RESPIRATORY (INHALATION) at 03:20

## 2025-01-21 RX ADMIN — METHYLPREDNISOLONE SODIUM SUCCINATE 40 MG: 40 INJECTION, POWDER, LYOPHILIZED, FOR SOLUTION INTRAMUSCULAR; INTRAVENOUS at 21:26

## 2025-01-21 RX ADMIN — Medication 10 MG: at 21:21

## 2025-01-21 RX ADMIN — GABAPENTIN 300 MG: 300 CAPSULE ORAL at 18:26

## 2025-01-21 RX ADMIN — SODIUM CHLORIDE, PRESERVATIVE FREE 10 ML: 5 INJECTION INTRAVENOUS at 21:26

## 2025-01-21 RX ADMIN — IPRATROPIUM BROMIDE AND ALBUTEROL SULFATE 1 DOSE: 2.5; .5 SOLUTION RESPIRATORY (INHALATION) at 23:54

## 2025-01-21 RX ADMIN — SODIUM CHLORIDE 25 ML: 9 INJECTION, SOLUTION INTRAVENOUS at 18:31

## 2025-01-21 RX ADMIN — METHYLPREDNISOLONE SODIUM SUCCINATE 40 MG: 40 INJECTION, POWDER, LYOPHILIZED, FOR SOLUTION INTRAMUSCULAR; INTRAVENOUS at 08:38

## 2025-01-21 RX ADMIN — WATER 1000 MG: 1 INJECTION INTRAMUSCULAR; INTRAVENOUS; SUBCUTANEOUS at 18:26

## 2025-01-21 RX ADMIN — IPRATROPIUM BROMIDE AND ALBUTEROL SULFATE 1 DOSE: 2.5; .5 SOLUTION RESPIRATORY (INHALATION) at 11:35

## 2025-01-21 RX ADMIN — TICAGRELOR 60 MG: 60 TABLET ORAL at 21:22

## 2025-01-21 RX ADMIN — LOSARTAN POTASSIUM 25 MG: 25 TABLET, FILM COATED ORAL at 08:38

## 2025-01-21 RX ADMIN — BUDESONIDE 500 MCG: 0.5 SUSPENSION RESPIRATORY (INHALATION) at 07:54

## 2025-01-21 RX ADMIN — AZITHROMYCIN MONOHYDRATE 500 MG: 500 INJECTION, POWDER, LYOPHILIZED, FOR SOLUTION INTRAVENOUS at 18:32

## 2025-01-21 RX ADMIN — ATORVASTATIN CALCIUM 20 MG: 10 TABLET, FILM COATED ORAL at 08:38

## 2025-01-21 RX ADMIN — BUDESONIDE 500 MCG: 0.5 SUSPENSION RESPIRATORY (INHALATION) at 19:25

## 2025-01-21 RX ADMIN — ARFORMOTEROL TARTRATE 15 MCG: 15 SOLUTION RESPIRATORY (INHALATION) at 07:54

## 2025-01-21 RX ADMIN — ARFORMOTEROL TARTRATE 15 MCG: 15 SOLUTION RESPIRATORY (INHALATION) at 19:25

## 2025-01-21 RX ADMIN — FUROSEMIDE 20 MG: 10 INJECTION, SOLUTION INTRAMUSCULAR; INTRAVENOUS at 12:42

## 2025-01-21 RX ADMIN — IPRATROPIUM BROMIDE AND ALBUTEROL SULFATE 1 DOSE: 2.5; .5 SOLUTION RESPIRATORY (INHALATION) at 07:54

## 2025-01-21 RX ADMIN — IPRATROPIUM BROMIDE AND ALBUTEROL SULFATE 1 DOSE: 2.5; .5 SOLUTION RESPIRATORY (INHALATION) at 19:24

## 2025-01-21 RX ADMIN — ENOXAPARIN SODIUM 40 MG: 100 INJECTION SUBCUTANEOUS at 08:39

## 2025-01-21 RX ADMIN — SODIUM CHLORIDE, PRESERVATIVE FREE 10 ML: 5 INJECTION INTRAVENOUS at 08:38

## 2025-01-21 RX ADMIN — IPRATROPIUM BROMIDE AND ALBUTEROL SULFATE 1 DOSE: 2.5; .5 SOLUTION RESPIRATORY (INHALATION) at 15:40

## 2025-01-21 RX ADMIN — FLUOXETINE HYDROCHLORIDE 20 MG: 20 CAPSULE ORAL at 08:38

## 2025-01-21 ASSESSMENT — PAIN SCALES - GENERAL
PAINLEVEL_OUTOF10: 0

## 2025-01-21 NOTE — PROGRESS NOTES
[facility] Medicine Progress Note  V 1.6      Date of Admission: 1/17/2025    Hospital Day: 4      Chief Admission Complaint:  SOB    Subjective: Patient is in hospital bed awake and alert.  No new issues or complaints today.  Patient states that cough is productive.  Breathing is much better.  Patient continues to have shortness of breath with ambulation.  No pain.  Currently on 2 L supplemental O2 here, on 4-5 L at home.    Presenting Admission History:       79 y.o. male who presented to [facility] with shortness of breath.  PMHx significant for chronic COVID, COPD, pulmonary fibrosis combined CHF, CAD status post CABG, hypertension.       Patient presents to ED due to worsening shortness of breath.  Patient chronically uses about 1 L nasal cannula oxygen on and off however despite that patient has been having more and more shortness of breath.  Denies any fevers, chills but does have some increased cough without sputum production.  Upon presentation to ED patient required 4 L nasal cannula oxygen laboratory evaluation was nonacute and chest x-ray shows chronic interstitial lung disease with no acute findings.    Assessment/Plan:      Current Principal Problem:  COPD exacerbation (HCC)    Acute on Chronic Respiratory Failure - w/ hypoxia, POArrival.  On baseline home O2 at 4-5L.  Supplemental O2 and wean as tolerated.     Acute Exacerbation of ILD  -History of pulmonary fibrosis and emphysema  -Chest x-ray resulted and reviewed, showed chronic interstitial lung disease  -A1A pending  -Added Pulmicort  -Continue DuoNebs, prednisone, and azithromycin  -Pulmonology consulted, note reviewed on 1/20/2025 with recommendations to finish 5-day course of antibiotics, continue follow-up on micro workup, continue steroids, Brovana, Pulmicort, and DuoNebs, follow-up echo results  -Added hydroxyzine for anxiety induced dyspnea    Troponin elevation - c/w myocardial injury 2nd to     [x] Demand ischemia w/out  of Treatment (any 1)    [x] Drugs/treatments that require intensive monitoring for toxicity    [x] IV ABX (Vancomycin, Aminoglycosides, etc)     [] Post-Cath/Contrast study requiring serial monitoring    [] IV Narcotic analgesia    [] Aggressive IV diuresis    [] Hypertonic Saline    [] Critical electrolyte abnormalities requiring IV replacement    [] Insulin - Scheduled/SSI or Insulin gtt    [] Anticoagulation (Heparin gtt or Coumadin - other anticoagulants in special circumstances)    [] Cardiac Medications (IV Amiodarone/Diltiazem, Tikosyn, etc)    [] Hemodialysis    [] Other -    [] Change in code status    [] Decision to escalate care    [] Major surgery/procedure with associated risk factors    --------------------------------------------------  C. Data (any 2)    [x] Data Review (any 3)    [x] Consultant notes from yesterday/today    [x] All available current labs reviewed interpreted for clinical significance    [x] Appropriate follow-up labs were ordered  [] Collateral history obtained     [x] Independent Interpretation of tests (any 1)    [x] Telemetry (Rhythm Strip) personally reviewed and interpreted        [] Imaging personally reviewed and interpreted     [] Discussion (any 1)  [] Multi-Disciplinary Rounds with Case Management  [] Discussed management of the case with           Labs:  Personally reviewed on 1/20/2025 and interpreted for clinical significance as documented above.     Recent Labs     01/18/25  0652 01/20/25  0856   WBC 7.0 8.8   HGB 12.1* 11.5*   HCT 35.8* 33.8*    148     Recent Labs     01/18/25  0652 01/20/25  0856    138   K 4.6 4.2    101   CO2 25 28   BUN 24* 21*   CREATININE 0.9 0.7*   CALCIUM 9.7 8.2*   MG  --  2.11     Recent Labs     01/18/25  1810   TROPHS 17     Recent Labs     01/18/25  1125   LABA1C 6.3     No results for input(s): \"AST\", \"ALT\", \"BILIDIR\", \"BILITOT\", \"ALKPHOS\" in the last 72 hours.    No results for input(s): \"INR\", \"LACTA\", \"TSH\" in the

## 2025-01-21 NOTE — PLAN OF CARE
Problem: Respiratory - Adult  Goal: Achieves optimal ventilation and oxygenation  1/20/2025 2242 by Yaa Jang RN  Outcome: Progressing  Flowsheets (Taken 1/20/2025 2242)  Achieves optimal ventilation and oxygenation:   Assess for changes in respiratory status   Assess for changes in mentation and behavior   Position to facilitate oxygenation and minimize respiratory effort   Oxygen supplementation based on oxygen saturation or arterial blood gases   Encourage broncho-pulmonary hygiene including cough, deep breathe, incentive spirometry   Respiratory therapy support as indicated   Assess and instruct to report shortness of breath or any respiratory difficulty
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
## 2025-01-21 NOTE — PLAN OF CARE
Problem: Chronic Conditions and Co-morbidities  Goal: Patient's chronic conditions and co-morbidity symptoms are monitored and maintained or improved  Outcome: Progressing  Flowsheets (Taken 1/21/2025 0844)  Care Plan - Patient's Chronic Conditions and Co-Morbidity Symptoms are Monitored and Maintained or Improved: Monitor and assess patient's chronic conditions and comorbid symptoms for stability, deterioration, or improvement     Problem: Discharge Planning  Goal: Discharge to home or other facility with appropriate resources  Outcome: Progressing  Flowsheets (Taken 1/21/2025 0844)  Discharge to home or other facility with appropriate resources: Identify barriers to discharge with patient and caregiver     Problem: Pain  Goal: Verbalizes/displays adequate comfort level or baseline comfort level  Outcome: Progressing     Problem: Safety - Adult  Goal: Free from fall injury  Outcome: Progressing     Problem: ABCDS Injury Assessment  Goal: Absence of physical injury  Outcome: Progressing     Problem: Respiratory - Adult  Goal: Achieves optimal ventilation and oxygenation  1/21/2025 1016 by Krystina Proctor RN  Outcome: Progressing  Flowsheets (Taken 1/21/2025 0844)  Achieves optimal ventilation and oxygenation: Assess for changes in respiratory status  1/20/2025 2242 by Yaa Jang, RN  Outcome: Progressing  Flowsheets (Taken 1/20/2025 2242)  Achieves optimal ventilation and oxygenation:   Assess for changes in respiratory status   Assess for changes in mentation and behavior   Position to facilitate oxygenation and minimize respiratory effort   Oxygen supplementation based on oxygen saturation or arterial blood gases   Encourage broncho-pulmonary hygiene including cough, deep breathe, incentive spirometry   Respiratory therapy support as indicated   Assess and instruct to report shortness of breath or any respiratory difficulty     Problem: Skin/Tissue Integrity - Adult  Goal: Skin integrity remains

## 2025-01-22 ENCOUNTER — TELEPHONE (OUTPATIENT)
Dept: PULMONOLOGY | Age: 80
End: 2025-01-22

## 2025-01-22 VITALS
BODY MASS INDEX: 21.67 KG/M2 | OXYGEN SATURATION: 98 % | HEART RATE: 81 BPM | RESPIRATION RATE: 22 BRPM | TEMPERATURE: 97.2 F | SYSTOLIC BLOOD PRESSURE: 107 MMHG | WEIGHT: 160 LBS | DIASTOLIC BLOOD PRESSURE: 65 MMHG | HEIGHT: 72 IN

## 2025-01-22 LAB
ANION GAP SERPL CALCULATED.3IONS-SCNC: 12 MMOL/L (ref 3–16)
BUN SERPL-MCNC: 28 MG/DL (ref 7–20)
CALCIUM SERPL-MCNC: 8.8 MG/DL (ref 8.3–10.6)
CHLORIDE SERPL-SCNC: 97 MMOL/L (ref 99–110)
CO2 SERPL-SCNC: 28 MMOL/L (ref 21–32)
CREAT SERPL-MCNC: 0.8 MG/DL (ref 0.8–1.3)
DEPRECATED RDW RBC AUTO: 14.5 % (ref 12.4–15.4)
GFR SERPLBLD CREATININE-BSD FMLA CKD-EPI: >90 ML/MIN/{1.73_M2}
GLUCOSE SERPL-MCNC: 211 MG/DL (ref 70–99)
HCT VFR BLD AUTO: 38.6 % (ref 40.5–52.5)
HGB BLD-MCNC: 13 G/DL (ref 13.5–17.5)
MCH RBC QN AUTO: 30.6 PG (ref 26–34)
MCHC RBC AUTO-ENTMCNC: 33.8 G/DL (ref 31–36)
MCV RBC AUTO: 90.6 FL (ref 80–100)
PLATELET # BLD AUTO: 171 K/UL (ref 135–450)
PMV BLD AUTO: 8.8 FL (ref 5–10.5)
POTASSIUM SERPL-SCNC: 4.3 MMOL/L (ref 3.5–5.1)
RBC # BLD AUTO: 4.26 M/UL (ref 4.2–5.9)
SODIUM SERPL-SCNC: 137 MMOL/L (ref 136–145)
WBC # BLD AUTO: 10.3 K/UL (ref 4–11)

## 2025-01-22 PROCEDURE — 2500000003 HC RX 250 WO HCPCS: Performed by: INTERNAL MEDICINE

## 2025-01-22 PROCEDURE — 6360000002 HC RX W HCPCS: Performed by: STUDENT IN AN ORGANIZED HEALTH CARE EDUCATION/TRAINING PROGRAM

## 2025-01-22 PROCEDURE — 6370000000 HC RX 637 (ALT 250 FOR IP): Performed by: STUDENT IN AN ORGANIZED HEALTH CARE EDUCATION/TRAINING PROGRAM

## 2025-01-22 PROCEDURE — 85027 COMPLETE CBC AUTOMATED: CPT

## 2025-01-22 PROCEDURE — 99233 SBSQ HOSP IP/OBS HIGH 50: CPT | Performed by: INTERNAL MEDICINE

## 2025-01-22 PROCEDURE — 94640 AIRWAY INHALATION TREATMENT: CPT

## 2025-01-22 PROCEDURE — 80048 BASIC METABOLIC PNL TOTAL CA: CPT

## 2025-01-22 PROCEDURE — 2500000003 HC RX 250 WO HCPCS: Performed by: STUDENT IN AN ORGANIZED HEALTH CARE EDUCATION/TRAINING PROGRAM

## 2025-01-22 PROCEDURE — 36415 COLL VENOUS BLD VENIPUNCTURE: CPT

## 2025-01-22 PROCEDURE — 6360000002 HC RX W HCPCS: Performed by: INTERNAL MEDICINE

## 2025-01-22 PROCEDURE — 94669 MECHANICAL CHEST WALL OSCILL: CPT

## 2025-01-22 RX ORDER — FUROSEMIDE 20 MG/1
40 TABLET ORAL SEE ADMIN INSTRUCTIONS
Qty: 15 TABLET | Refills: 0 | Status: SHIPPED | OUTPATIENT
Start: 2025-01-22 | End: 2025-02-21

## 2025-01-22 RX ORDER — HYDROXYZINE PAMOATE 25 MG/1
25 CAPSULE ORAL 3 TIMES DAILY PRN
Qty: 42 CAPSULE | Refills: 0 | Status: SHIPPED | OUTPATIENT
Start: 2025-01-22 | End: 2025-02-05

## 2025-01-22 RX ADMIN — TICAGRELOR 60 MG: 60 TABLET ORAL at 08:09

## 2025-01-22 RX ADMIN — ATORVASTATIN CALCIUM 20 MG: 10 TABLET, FILM COATED ORAL at 08:09

## 2025-01-22 RX ADMIN — SODIUM CHLORIDE, PRESERVATIVE FREE 10 ML: 5 INJECTION INTRAVENOUS at 08:09

## 2025-01-22 RX ADMIN — FLUOXETINE HYDROCHLORIDE 20 MG: 20 CAPSULE ORAL at 08:09

## 2025-01-22 RX ADMIN — ENOXAPARIN SODIUM 40 MG: 100 INJECTION SUBCUTANEOUS at 08:09

## 2025-01-22 RX ADMIN — IPRATROPIUM BROMIDE AND ALBUTEROL SULFATE 1 DOSE: 2.5; .5 SOLUTION RESPIRATORY (INHALATION) at 11:50

## 2025-01-22 RX ADMIN — IPRATROPIUM BROMIDE AND ALBUTEROL SULFATE 1 DOSE: 2.5; .5 SOLUTION RESPIRATORY (INHALATION) at 03:34

## 2025-01-22 RX ADMIN — BUDESONIDE 500 MCG: 0.5 SUSPENSION RESPIRATORY (INHALATION) at 08:39

## 2025-01-22 RX ADMIN — ARFORMOTEROL TARTRATE 15 MCG: 15 SOLUTION RESPIRATORY (INHALATION) at 08:39

## 2025-01-22 RX ADMIN — IPRATROPIUM BROMIDE AND ALBUTEROL SULFATE 1 DOSE: 2.5; .5 SOLUTION RESPIRATORY (INHALATION) at 08:39

## 2025-01-22 RX ADMIN — METHYLPREDNISOLONE SODIUM SUCCINATE 40 MG: 40 INJECTION, POWDER, LYOPHILIZED, FOR SOLUTION INTRAMUSCULAR; INTRAVENOUS at 08:19

## 2025-01-22 RX ADMIN — LOSARTAN POTASSIUM 25 MG: 25 TABLET, FILM COATED ORAL at 08:09

## 2025-01-22 ASSESSMENT — PAIN SCALES - GENERAL: PAINLEVEL_OUTOF10: 0

## 2025-01-22 NOTE — PROGRESS NOTES
Patient discharged to home. IV removed with no complications, telemetry removed [facility] notified. Discharge education complete with verbal understanding. All belongings sent home with patient. Patient transported via wheelchair, refused RN to take to vehicle. Family wheeling patient to vehicle.

## 2025-01-22 NOTE — DISCHARGE SUMMARY
Hospital Medicine Discharge Summary    Patient: Kishan Elaine   : 1945     Hospital:  [facility]  Admit Date: 2025   Discharge Date:   25  Disposition:  [x]Home   []HHC  []SNF  []Acute Rehab  []LTAC  []Hospice  Code status:  [x]Full  []DNR/CCA  []Limited (DNR/CCA with Do Not Intubate)  []DNRCC  Condition at Discharge: Stable  Primary Care Provider: Rosy Toro MD    Admitting Provider: Harvinder Manzo DO  Discharge Provider: Eddie Peralta DO     Discharge Diagnoses:      Active Hospital Problems    Diagnosis     COPD exacerbation (HCC) [J44.1]        Presenting Admission History:      79 y.o. male who presented to [facility] with shortness of breath.  PMHx significant for chronic COVID, COPD, pulmonary fibrosis combined CHF, CAD status post CABG, hypertension.       Patient presents to ED due to worsening shortness of breath.  Patient chronically uses about 1 L nasal cannula oxygen on and off however despite that patient has been having more and more shortness of breath.  Denies any fevers, chills but does have some increased cough without sputum production.  Upon presentation to ED patient required 4 L nasal cannula oxygen laboratory evaluation was nonacute and chest x-ray shows chronic interstitial lung disease with no acute findings.     Assessment/Plan:      Acute on Chronic Respiratory Failure - w/ hypoxia, POArrival.  On baseline home O2 at 4-5L.  Supplemental O2 and wean as tolerated.      Acute Exacerbation of ILD  -History of pulmonary fibrosis and emphysema  -Chest x-ray resulted and reviewed, showed chronic interstitial lung disease  -A1A normal  -Added Pulmicort  -Continue DuoNebs, prednisone, and azithromycin  -Pulmonology consulted, note reviewed on 2025 with recommendations to finish 5-day course of antibiotics, continue follow-up on micro workup, continue steroids, Brovana, Pulmicort, and DuoNebs, follow-up echo results  -Added
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
## 2025-01-22 NOTE — PROGRESS NOTES
[facility] Medicine Progress Note  V 1.6      Date of Admission: 1/17/2025    Hospital Day: 5      Chief Admission Complaint:  SOB    Subjective: Patient is in hospital bed awake and alert.  No new issues or complaints today.  Patient states he feels \"great \".  Breathing is better.  No pain.  Currently on 2-3 L supplemental O2 via nasal cannula.    Presenting Admission History:       79 y.o. male who presented to [facility] with shortness of breath.  PMHx significant for chronic COVID, COPD, pulmonary fibrosis combined CHF, CAD status post CABG, hypertension.       Patient presents to ED due to worsening shortness of breath.  Patient chronically uses about 1 L nasal cannula oxygen on and off however despite that patient has been having more and more shortness of breath.  Denies any fevers, chills but does have some increased cough without sputum production.  Upon presentation to ED patient required 4 L nasal cannula oxygen laboratory evaluation was nonacute and chest x-ray shows chronic interstitial lung disease with no acute findings.    Assessment/Plan:      Current Principal Problem:  COPD exacerbation (HCC)    Acute on Chronic Respiratory Failure - w/ hypoxia, POArrival.  On baseline home O2 at 4-5L.  Supplemental O2 and wean as tolerated.     Acute Exacerbation of ILD  -History of pulmonary fibrosis and emphysema  -Chest x-ray resulted and reviewed, showed chronic interstitial lung disease  -A1A normal  -Added Pulmicort  -Continue DuoNebs, prednisone, and azithromycin  -Pulmonology consulted, note reviewed on 1/20/2025 with recommendations to finish 5-day course of antibiotics, continue follow-up on micro workup, continue steroids, Brovana, Pulmicort, and DuoNebs, follow-up echo results  -Added hydroxyzine for anxiety induced dyspnea    Troponin elevation - c/w myocardial injury 2nd to     [x] Demand ischemia w/out MI     of unclear clinical significance w/out signs and/or symptoms of active

## 2025-01-22 NOTE — PLAN OF CARE
Problem: Chronic Conditions and Co-morbidities  Goal: Patient's chronic conditions and co-morbidity symptoms are monitored and maintained or improved  Outcome: Adequate for Discharge  Flowsheets (Taken 1/22/2025 0814)  Care Plan - Patient's Chronic Conditions and Co-Morbidity Symptoms are Monitored and Maintained or Improved: Monitor and assess patient's chronic conditions and comorbid symptoms for stability, deterioration, or improvement     Problem: Discharge Planning  Goal: Discharge to home or other facility with appropriate resources  Outcome: Adequate for Discharge  Flowsheets (Taken 1/22/2025 0814)  Discharge to home or other facility with appropriate resources: Identify barriers to discharge with patient and caregiver     Problem: Pain  Goal: Verbalizes/displays adequate comfort level or baseline comfort level  Outcome: Adequate for Discharge  Flowsheets (Taken 1/22/2025 0807)  Verbalizes/displays adequate comfort level or baseline comfort level: Encourage patient to monitor pain and request assistance     Problem: Safety - Adult  Goal: Free from fall injury  Outcome: Adequate for Discharge     Problem: ABCDS Injury Assessment  Goal: Absence of physical injury  Outcome: Adequate for Discharge     Problem: Respiratory - Adult  Goal: Achieves optimal ventilation and oxygenation  Outcome: Adequate for Discharge  Flowsheets (Taken 1/22/2025 0814)  Achieves optimal ventilation and oxygenation: Assess for changes in respiratory status     Problem: Skin/Tissue Integrity - Adult  Goal: Skin integrity remains intact  Outcome: Adequate for Discharge  Flowsheets  Taken 1/22/2025 1342  Skin Integrity Remains Intact: Monitor for areas of redness and/or skin breakdown  Taken 1/22/2025 0814  Skin Integrity Remains Intact: Monitor for areas of redness and/or skin breakdown     Problem: Musculoskeletal - Adult  Goal: Return mobility to safest level of function  Outcome: Adequate for Discharge  Flowsheets (Taken 1/22/2025

## 2025-01-22 NOTE — CARE COORDINATION
CASE MANAGEMENT DISCHARGE SUMMARY      Discharge to: Home    IMM given: 1/20/2025    Transportation:    Family/car: private     Confirmed discharge plan with:     Patient: yes     Family:  yes     RN, name: Krystina Mota RN

## 2025-01-22 NOTE — TELEPHONE ENCOUNTER
----- Message from Dr. Raudel Brown MD sent at 1/22/2025 10:48 AM EST -----  · Follow-up in the pulmonary clinic in 4 weeks with a chest x-ray

## 2025-01-22 NOTE — PLAN OF CARE
Problem: Chronic Conditions and Co-morbidities  Goal: Patient's chronic conditions and co-morbidity symptoms are monitored and maintained or improved  1/21/2025 2305 by Ba Farmer RN  Outcome: Progressing  1/21/2025 1016 by Krystina Proctor RN  Outcome: Progressing  Flowsheets (Taken 1/21/2025 0844)  Care Plan - Patient's Chronic Conditions and Co-Morbidity Symptoms are Monitored and Maintained or Improved: Monitor and assess patient's chronic conditions and comorbid symptoms for stability, deterioration, or improvement     Problem: Discharge Planning  Goal: Discharge to home or other facility with appropriate resources  1/21/2025 2305 by Ba Farmer RN  Outcome: Progressing  1/21/2025 1016 by Krystina Proctor RN  Outcome: Progressing  Flowsheets (Taken 1/21/2025 0844)  Discharge to home or other facility with appropriate resources: Identify barriers to discharge with patient and caregiver     Problem: Pain  Goal: Verbalizes/displays adequate comfort level or baseline comfort level  1/21/2025 2305 by Ba Farmer RN  Outcome: Progressing  1/21/2025 1016 by Krystina Proctor RN  Outcome: Progressing     Problem: Safety - Adult  Goal: Free from fall injury  1/21/2025 2305 by Ba Farmer RN  Outcome: Progressing  1/21/2025 1016 by Krystina Proctor RN  Outcome: Progressing     Problem: Coping  Goal: Pt/Family able to verbalize concerns and demonstrate effective coping strategies  Description: INTERVENTIONS:  1. Assist patient/family to identify coping skills, available support systems and cultural and spiritual values  2. Provide emotional support, including active listening and acknowledgement of concerns of patient and caregivers  3. Reduce environmental stimuli, as able  4. Instruct patient/family in relaxation techniques, as appropriate  5. Assess for spiritual pain/suffering and initiate Spiritual Care, Psychosocial Clinical Specialist consults as needed  1/21/2025 2305 by Ba Farmer RN  Outcome: Progressing  1/21/2025 1016 by

## 2025-01-23 ENCOUNTER — TELEPHONE (OUTPATIENT)
Dept: INTERNAL MEDICINE CLINIC | Age: 80
End: 2025-01-23

## 2025-01-23 ENCOUNTER — CARE COORDINATION (OUTPATIENT)
Dept: CASE MANAGEMENT | Age: 80
End: 2025-01-23

## 2025-01-23 NOTE — CARE COORDINATION
Care Transitions Note    Initial Call - Call within 2 business days of discharge: Yes    Attempted to reach patient for transitions of care follow up. Unable to reach patient.    Outreach Attempts:   HIPAA compliant voicemail left for spouse/partner .     Patient: Kishan Elaine    Patient : 1945   MRN: [id number]    Reason for Admission: ARF  Discharge Date: 25  RURS: Readmission Risk Score: 13.1    Last Discharge Facility       Date Complaint Diagnosis Description Type Department Provider    25 Shortness of Breath Acute respiratory distress ... ED to Hosp-Admission (Discharged) (ADMITTED) [facility] Eddie Peralta, DO; Sinan Martinez...              Future Appointments         Provider Specialty Dept Phone    2025 9:30 AM Jihan Dias APRN - [facility] Pulmonology [phone]    2025 9:15 AM Ian Tirado MD Cardiology [phone]    4/15/2025 9:30 AM Rosy Toro MD Internal Medicine [phone]            Plan for follow-up on next business day.      Jessica Bales RN

## 2025-01-23 NOTE — TELEPHONE ENCOUNTER
I s/w wife and pt and rescheduled his appt for 1/19/25.  He will get CXR done about 2 days prior to appt.

## 2025-01-23 NOTE — TELEPHONE ENCOUNTER
Care Transitions Initial Follow Up Call    Outreach made within 2 business days of discharge: Yes    Patient: Kishan Elaine Patient : 1945   MRN: [id number]  Reason for Admission: 25  Discharge Date: 25       Spoke with: wife Elva    Discharge department/facility: [facility] Interactive Patient Contact:  Was patient able to fill all prescriptions: Yes  Was patient instructed to bring all medications to the follow-up visit: Yes  Is patient taking all medications as directed in the discharge summary? Yes  Does patient understand their discharge instructions: Yes  Does patient have questions or concerns that need addressed prior to 7-14 day follow up office visit:     Additional needs identified to be addressed with provider  No needs identified  Appt scheduled 25           Scheduled appointment with PCP within 7-14 days    Follow Up  Future Appointments   Date Time Provider Department Center   2025 11:00 AM Jihan Dias APRN - CNP AND PULM [facility]   2025  9:15 AM Ian Tiraod MD [facility]obi [facility]   4/15/2025  9:30 AM Rosy Toro MD [facility] DEP       Steffi Ackerman MA     Pt fell 2-3 feet onto cement floor, no LOC, child crying in triage denies vomiting

## 2025-01-23 NOTE — TELEPHONE ENCOUNTER
Pt is already scheduled to see Jihan on 2/4/25.  Should patient keep this appointment or move it out further?

## 2025-01-24 ENCOUNTER — CARE COORDINATION (OUTPATIENT)
Dept: CASE MANAGEMENT | Age: 80
End: 2025-01-24

## 2025-01-24 NOTE — CARE COORDINATION
Care Transitions Note    Initial Call - Call within 2 business days of discharge: Yes    Attempted to reach patient for transitions of care follow up. Unable to reach patient.    Outreach Attempts:   Multiple attempts to contact patient, spouse/partner  at phone numbers on file.     Patient: Kishan Elaine    Patient : 1945   MRN: [id number]    Reason for Admission: SOB  Discharge Date: 25  RURS: Readmission Risk Score: 13.1    Last Discharge Facility       Date Complaint Diagnosis Description Type Department Provider    25 Shortness of Breath Acute respiratory distress ... ED to Hosp-Admission (Discharged) (ADMITTED) [place] [facility] Eddie Peralta, DO; Sinan Martinez...              Future Appointments         Provider Specialty Dept Phone    2025 10:10 AM Rosy Toro MD Internal Medicine [phone]    2025 11:00 AM Jihan Dias, APRN - [facility] Pulmonology [phone]    2025 9:15 AM Ian Tirado MD Cardiology [phone]    4/15/2025 9:30 AM Rosy Toro MD Internal Medicine [phone]            No further follow-up call indicated     Jessica Bales RN

## 2025-01-27 DIAGNOSIS — I25.10 CORONARY ARTERY DISEASE INVOLVING NATIVE CORONARY ARTERY OF NATIVE HEART WITHOUT ANGINA PECTORIS: ICD-10-CM

## 2025-01-27 DIAGNOSIS — E78.2 MIXED HYPERLIPIDEMIA: ICD-10-CM

## 2025-01-27 DIAGNOSIS — I25.5 ISCHEMIC CARDIOMYOPATHY: ICD-10-CM

## 2025-01-27 LAB
ALBUMIN SERPL-MCNC: 4.3 G/DL (ref 3.4–5)
ALBUMIN/GLOB SERPL: 1.6 {RATIO} (ref 1.1–2.2)
ALP SERPL-CCNC: 70 U/L (ref 40–129)
ALT SERPL-CCNC: 29 U/L (ref 10–40)
ANION GAP SERPL CALCULATED.3IONS-SCNC: 9 MMOL/L (ref 3–16)
AST SERPL-CCNC: 26 U/L (ref 15–37)
BASOPHILS # BLD: 0.1 K/UL (ref 0–0.2)
BASOPHILS NFR BLD: 0.8 %
BILIRUB SERPL-MCNC: 0.9 MG/DL (ref 0–1)
BUN SERPL-MCNC: 19 MG/DL (ref 7–20)
CALCIUM SERPL-MCNC: 9.9 MG/DL (ref 8.3–10.6)
CHLORIDE SERPL-SCNC: 101 MMOL/L (ref 99–110)
CHOLEST SERPL-MCNC: 130 MG/DL (ref 0–199)
CO2 SERPL-SCNC: 27 MMOL/L (ref 21–32)
CREAT SERPL-MCNC: 0.9 MG/DL (ref 0.8–1.3)
DEPRECATED RDW RBC AUTO: 14.5 % (ref 12.4–15.4)
EOSINOPHIL # BLD: 0.3 K/UL (ref 0–0.6)
EOSINOPHIL NFR BLD: 2.7 %
GFR SERPLBLD CREATININE-BSD FMLA CKD-EPI: 87 ML/MIN/{1.73_M2}
GLUCOSE SERPL-MCNC: 130 MG/DL (ref 70–99)
HCT VFR BLD AUTO: 43.2 % (ref 40.5–52.5)
HDLC SERPL-MCNC: 52 MG/DL (ref 40–60)
HGB BLD-MCNC: 14.6 G/DL (ref 13.5–17.5)
LDL CHOLESTEROL: 57 MG/DL
LYMPHOCYTES # BLD: 1.8 K/UL (ref 1–5.1)
LYMPHOCYTES NFR BLD: 18.9 %
MCH RBC QN AUTO: 30.1 PG (ref 26–34)
MCHC RBC AUTO-ENTMCNC: 33.7 G/DL (ref 31–36)
MCV RBC AUTO: 89.2 FL (ref 80–100)
MONOCYTES # BLD: 0.8 K/UL (ref 0–1.3)
MONOCYTES NFR BLD: 8.2 %
NEUTROPHILS # BLD: 6.5 K/UL (ref 1.7–7.7)
NEUTROPHILS NFR BLD: 69.4 %
PLATELET # BLD AUTO: 174 K/UL (ref 135–450)
PMV BLD AUTO: 9.9 FL (ref 5–10.5)
POTASSIUM SERPL-SCNC: 4.7 MMOL/L (ref 3.5–5.1)
PROT SERPL-MCNC: 7 G/DL (ref 6.4–8.2)
RBC # BLD AUTO: 4.84 M/UL (ref 4.2–5.9)
SODIUM SERPL-SCNC: 137 MMOL/L (ref 136–145)
TRIGL SERPL-MCNC: 106 MG/DL (ref 0–150)
TSH SERPL DL<=0.005 MIU/L-ACNC: 3.29 UIU/ML (ref 0.27–4.2)
VLDLC SERPL CALC-MCNC: 21 MG/DL
WBC # BLD AUTO: 9.4 K/UL (ref 4–11)

## 2025-01-29 ENCOUNTER — OFFICE VISIT (OUTPATIENT)
Dept: INTERNAL MEDICINE CLINIC | Age: 80
End: 2025-01-29

## 2025-01-29 VITALS
HEIGHT: 72 IN | DIASTOLIC BLOOD PRESSURE: 60 MMHG | SYSTOLIC BLOOD PRESSURE: 100 MMHG | BODY MASS INDEX: 20.78 KG/M2 | HEART RATE: 80 BPM | WEIGHT: 153.4 LBS | OXYGEN SATURATION: 95 %

## 2025-01-29 DIAGNOSIS — J44.1 COPD EXACERBATION (HCC): Primary | ICD-10-CM

## 2025-01-29 DIAGNOSIS — E78.2 MIXED HYPERLIPIDEMIA: ICD-10-CM

## 2025-01-29 DIAGNOSIS — Z09 HOSPITAL DISCHARGE FOLLOW-UP: ICD-10-CM

## 2025-01-29 DIAGNOSIS — G62.9 NEUROPATHY: ICD-10-CM

## 2025-01-29 DIAGNOSIS — J84.10 PULMONARY FIBROSIS (HCC): ICD-10-CM

## 2025-01-29 DIAGNOSIS — F32.9 REACTIVE DEPRESSION: ICD-10-CM

## 2025-01-29 ASSESSMENT — PATIENT HEALTH QUESTIONNAIRE - PHQ9
2. FEELING DOWN, DEPRESSED OR HOPELESS: NOT AT ALL
3. TROUBLE FALLING OR STAYING ASLEEP: NOT AT ALL
SUM OF ALL RESPONSES TO PHQ QUESTIONS 1-9: 3
9. THOUGHTS THAT YOU WOULD BE BETTER OFF DEAD, OR OF HURTING YOURSELF: NOT AT ALL
SUM OF ALL RESPONSES TO PHQ QUESTIONS 1-9: 3
5. POOR APPETITE OR OVEREATING: NOT AT ALL
8. MOVING OR SPEAKING SO SLOWLY THAT OTHER PEOPLE COULD HAVE NOTICED. OR THE OPPOSITE, BEING SO FIGETY OR RESTLESS THAT YOU HAVE BEEN MOVING AROUND A LOT MORE THAN USUAL: NOT AT ALL
SUM OF ALL RESPONSES TO PHQ QUESTIONS 1-9: 3
7. TROUBLE CONCENTRATING ON THINGS, SUCH AS READING THE NEWSPAPER OR WATCHING TELEVISION: NOT AT ALL
10. IF YOU CHECKED OFF ANY PROBLEMS, HOW DIFFICULT HAVE THESE PROBLEMS MADE IT FOR YOU TO DO YOUR WORK, TAKE CARE OF THINGS AT HOME, OR GET ALONG WITH OTHER PEOPLE: NOT DIFFICULT AT ALL
SUM OF ALL RESPONSES TO PHQ QUESTIONS 1-9: 3
4. FEELING TIRED OR HAVING LITTLE ENERGY: NEARLY EVERY DAY
6. FEELING BAD ABOUT YOURSELF - OR THAT YOU ARE A FAILURE OR HAVE LET YOURSELF OR YOUR FAMILY DOWN: NOT AT ALL
SUM OF ALL RESPONSES TO PHQ9 QUESTIONS 1 & 2: 0
1. LITTLE INTEREST OR PLEASURE IN DOING THINGS: NOT AT ALL
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
## 2025-02-03 RX ORDER — FUROSEMIDE 20 MG/1
TABLET ORAL
Qty: 15 TABLET | Refills: 0 | Status: SHIPPED | OUTPATIENT
Start: 2025-02-03

## 2025-02-03 NOTE — TELEPHONE ENCOUNTER
Last OV:  11/1/24  Next OV:  2/19/25  Last Refill:  1/22/25    It looks like pt has been taking it every other day.  May need more to get to appointment on 2/19/25.

## 2025-02-12 DIAGNOSIS — I25.10 CORONARY ARTERY DISEASE INVOLVING NATIVE CORONARY ARTERY OF NATIVE HEART WITHOUT ANGINA PECTORIS: ICD-10-CM

## 2025-02-12 RX ORDER — TICAGRELOR 60 MG/1
60 TABLET ORAL 2 TIMES DAILY
Qty: 60 TABLET | Refills: 3 | Status: SHIPPED | OUTPATIENT
Start: 2025-02-12

## 2025-02-12 NOTE — TELEPHONE ENCOUNTER
Last Office Visit: 9/25/24 Provider: JAIRON  **Is provider OOT? No    Next Office Visit: 2/25/25 Provider: JAIRON  **If no OV, when does pt need to be seen? N/a  **Has patient already had 30 day supply? No    Lab orders needed? no   Encounter provider correct? Yes If not, change provider  Script changes since last refill? no    LAST LABS:   CBC:   Lab Results   Component Value Date    WBC 9.4 01/27/2025    HGB 14.6 01/27/2025    HCT 43.2 01/27/2025    MCV 89.2 01/27/2025     01/27/2025     CMP:    Lab Results   Component Value Date     01/27/2025    K 4.7 01/27/2025     01/27/2025    CO2 27 01/27/2025    BUN 19 01/27/2025    CREATININE 0.9 01/27/2025    GLUCOSE 130 (H) 01/27/2025    CALCIUM 9.9 01/27/2025    BILITOT 0.9 01/27/2025    ALKPHOS 70 01/27/2025    AST 26 01/27/2025    ALT 29 01/27/2025    LABGLOM 87 01/27/2025    GFRAA >60 07/06/2022    AGRATIO 1.6 01/27/2025    GLOB 2.6 09/02/2020

## 2025-02-17 ENCOUNTER — HOSPITAL ENCOUNTER (OUTPATIENT)
Age: 80
Discharge: HOME | End: 2025-02-17
Payer: MEDICARE

## 2025-02-17 ENCOUNTER — HOSPITAL ENCOUNTER (OUTPATIENT)
Dept: GENERAL RADIOLOGY | Age: 80
Discharge: HOME | End: 2025-02-17
Payer: MEDICARE

## 2025-02-17 DIAGNOSIS — J96.01 ACUTE RESPIRATORY FAILURE WITH HYPOXIA (HCC): ICD-10-CM

## 2025-02-17 DIAGNOSIS — J84.10 PULMONARY FIBROSIS (HCC): ICD-10-CM

## 2025-02-17 PROCEDURE — 71046 X-RAY EXAM CHEST 2 VIEWS: CPT
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
## 2025-02-19 ENCOUNTER — TELEPHONE (OUTPATIENT)
Dept: SLEEP CENTER | Age: 80
End: 2025-02-19

## 2025-02-19 ENCOUNTER — OFFICE VISIT (OUTPATIENT)
Dept: PULMONOLOGY | Age: 80
End: 2025-02-19
Payer: MEDICARE

## 2025-02-19 VITALS
TEMPERATURE: 97.9 F | DIASTOLIC BLOOD PRESSURE: 78 MMHG | HEART RATE: 87 BPM | RESPIRATION RATE: 20 BRPM | OXYGEN SATURATION: 88 % | BODY MASS INDEX: 20.99 KG/M2 | WEIGHT: 155 LBS | SYSTOLIC BLOOD PRESSURE: 126 MMHG | HEIGHT: 72 IN

## 2025-02-19 DIAGNOSIS — Z98.890 H/O TRACHEOSTOMY: ICD-10-CM

## 2025-02-19 DIAGNOSIS — J96.11 CHRONIC RESPIRATORY FAILURE WITH HYPOXIA (HCC): ICD-10-CM

## 2025-02-19 DIAGNOSIS — J43.2 CENTRILOBULAR EMPHYSEMA (HCC): ICD-10-CM

## 2025-02-19 DIAGNOSIS — Z87.891 FORMER SMOKER, STOPPED SMOKING IN DISTANT PAST: ICD-10-CM

## 2025-02-19 DIAGNOSIS — J84.10 PULMONARY FIBROSIS (HCC): Primary | ICD-10-CM

## 2025-02-19 DIAGNOSIS — R06.02 SOB (SHORTNESS OF BREATH): ICD-10-CM

## 2025-02-19 DIAGNOSIS — G47.33 OSA (OBSTRUCTIVE SLEEP APNEA): ICD-10-CM

## 2025-02-19 PROCEDURE — 1159F MED LIST DOCD IN RCRD: CPT | Performed by: NURSE PRACTITIONER

## 2025-02-19 PROCEDURE — 3074F SYST BP LT 130 MM HG: CPT | Performed by: NURSE PRACTITIONER

## 2025-02-19 PROCEDURE — 1160F RVW MEDS BY RX/DR IN RCRD: CPT | Performed by: NURSE PRACTITIONER

## 2025-02-19 PROCEDURE — 3078F DIAST BP <80 MM HG: CPT | Performed by: NURSE PRACTITIONER

## 2025-02-19 PROCEDURE — 99214 OFFICE O/P EST MOD 30 MIN: CPT | Performed by: NURSE PRACTITIONER

## 2025-02-19 PROCEDURE — 1123F ACP DISCUSS/DSCN MKR DOCD: CPT | Performed by: NURSE PRACTITIONER

## 2025-02-19 RX ORDER — ALBUTEROL SULFATE 90 UG/1
2 INHALANT RESPIRATORY (INHALATION) EVERY 6 HOURS PRN
Qty: 1 EACH | Refills: 2 | Status: SHIPPED | OUTPATIENT
Start: 2025-02-19

## 2025-02-19 ASSESSMENT — SLEEP AND FATIGUE QUESTIONNAIRES
HOW LIKELY ARE YOU TO NOD OFF OR FALL ASLEEP WHILE WATCHING TV: SLIGHT CHANCE OF DOZING
HOW LIKELY ARE YOU TO NOD OFF OR FALL ASLEEP WHILE SITTING AND TALKING TO SOMEONE: WOULD NEVER DOZE
HOW LIKELY ARE YOU TO NOD OFF OR FALL ASLEEP WHILE LYING DOWN TO REST IN THE AFTERNOON WHEN CIRCUMSTANCES PERMIT: HIGH CHANCE OF DOZING
ESS TOTAL SCORE: 6
HOW LIKELY ARE YOU TO NOD OFF OR FALL ASLEEP WHILE SITTING AND READING: MODERATE CHANCE OF DOZING
HOW LIKELY ARE YOU TO NOD OFF OR FALL ASLEEP WHEN YOU ARE A PASSENGER IN A CAR FOR AN HOUR WITHOUT A BREAK: WOULD NEVER DOZE
HOW LIKELY ARE YOU TO NOD OFF OR FALL ASLEEP WHILE SITTING QUIETLY AFTER LUNCH WITHOUT ALCOHOL: WOULD NEVER DOZE
HOW LIKELY ARE YOU TO NOD OFF OR FALL ASLEEP IN A CAR, WHILE STOPPED FOR A FEW MINUTES IN TRAFFIC: WOULD NEVER DOZE
HOW LIKELY ARE YOU TO NOD OFF OR FALL ASLEEP WHILE SITTING INACTIVE IN A PUBLIC PLACE: WOULD NEVER DOZE

## 2025-02-19 ASSESSMENT — ENCOUNTER SYMPTOMS
SHORTNESS OF BREATH: 1
SORE THROAT: 0
ABDOMINAL PAIN: 0
WHEEZING: 0
COUGH: 1
EYE PAIN: 0
CHEST TIGHTNESS: 0
RHINORRHEA: 0

## 2025-02-19 NOTE — PROGRESS NOTES
MA Communication:  The following orders are received by verbal communication from Jihan Dias CNP    Orders include:  repeat CT, 6MW, sleep study, fu after testing complete

## 2025-02-19 NOTE — PATIENT INSTRUCTIONS
Call with worsening symptoms such as increased shortness of breath, productive cough, wheezing or symptoms not responding to treatment plan.     Repeat CT chest with continued SOB    6 minute walk testing for oxygen therapy     Continue oxygen therapy, benefiting from use.     Your current pulmonary medications are controlling/treating your breathing.  Stay compliant.  Reviewed present pulmonary medications and side effects.  Reviewed proper inhaler usage.    He will be scheduled for polysomnography in order to evaluate for the presence and severity of obstructive sleep apnea. He was given a discussion of the pathophysiology, evaluation and treatment of apnea.     Advised to avoid driving when too sleepy to function safely. Discussed the risks of untreated apnea such as accidents, cognitive impairment, mood impairment, high blood pressure, various cardiac diseases and stroke. Regarding weight, recommend trying a formal program and increase physical activity by adding a 30 minute walk to daily routine.      Address to Sleep Center:  [facility] [address], [address], [place] [id number]            Phone: [phone] Fax: [phone]    If you should need to cancel or reschedule your appointment, please call the Sleep Center at [phone] as soon as possible.     We ask that you please phone the [facility] Pre-Services ([phone]) at least 3-5 days prior to your sleep study to pre-register. Failing to pre-register may ultimately cause your insurance to not pay for this procedure.     Please refrain from or reduce the use of caffeine and/or alcohol prior to your sleep study and avoid napping the day of your study as these will affect the accuracy of your test results.                  Return to clinic after testing completed  Remember to bring a list of pulmonary medications and any CPAP or BiPAP machines to your next appointment with the office.     Please keep all of your

## 2025-02-24 RX ORDER — FUROSEMIDE 20 MG/1
TABLET ORAL
Qty: 15 TABLET | Refills: 0 | Status: ON HOLD | OUTPATIENT
Start: 2025-02-24

## 2025-02-24 NOTE — TELEPHONE ENCOUNTER
Lasix was ordered on 2/3 by Kevin mckinley RF until seen in clinic. Was seen 2/19 by NP, no mention of lasix. No FU office visit scheduled.

## 2025-02-24 NOTE — PROGRESS NOTES
aortic valve. This density is suspicious for a vegetation.   2. Normal LV systolic function (EF 50-55%).   3. Trace MR, severe TR/pulmonary hypertension, trace AI.   4. Compared to images from the complete study on 2/5/21, the aortic valve   thickening/density is now more prominent.   5. If indicated, consider MALATHI for further clinical correlation. I notified   Dr. William Glover (ID) about the aortic valve findings.   6. Other findings as below.     Echo 02/05/2021:  Left ventricular ejection fraction is 50-55%.   Inferolateral wall motion abnormality suspected but could not be confirmed   due to limited views presented.   The right ventricular systolic function is mildly reduced.   There is mild tricuspid regurgitation and moderate pulmonary hypertension; RV   systolic pressure is elevated at 50-60mmHg.     [facility] 1/4/19  LEFT HEART CATH  LM: luminals  LAD: proximal 90%  LCX: Luminals into small LCx                OM1- occluded  RCA: dominant, 100% proximal occlusion  1. Successful PCi to distal SVG-RCA using 1 drug eluting stent   please see full report in epic  2. Jolanta graft to LAD open with excellent flow    ECHO 1/4/19  Summary   The left ventricular systolic function is mildly reduced with an ejection   fraction of 40-45 %.   There is hypokinesis of the basal inferior and inferolateral walls.   There is mild concentric left ventricular hypertrophy.   Grade I diastolic dysfunction with normal left ventricular filling pressure.   The right ventricle is mildly enlarged.   The right atrium is mildly dilated.   Mild mitral regurgitation.   Systolic pulmonary artery pressure (SPAP) is normal estimated at 26 mmHg   (Right atrial pressure of 8 mmHg).      CTA abdomen 8/19/18  FINDINGS: Lung bases:  Visualized lung bases are well aerated without focal airspace consolidation, lung nodule or lung mass.  No pleural or pericardial effusion. Heart size appears within normal limits.   Organs: The liver has normal size and

## 2025-02-25 ENCOUNTER — OFFICE VISIT (OUTPATIENT)
Dept: CARDIOLOGY CLINIC | Age: 80
End: 2025-02-25
Payer: MEDICARE

## 2025-02-25 VITALS
SYSTOLIC BLOOD PRESSURE: 128 MMHG | WEIGHT: 155 LBS | BODY MASS INDEX: 20.99 KG/M2 | HEIGHT: 72 IN | HEART RATE: 80 BPM | DIASTOLIC BLOOD PRESSURE: 68 MMHG | OXYGEN SATURATION: 95 %

## 2025-02-25 DIAGNOSIS — J84.9 ILD (INTERSTITIAL LUNG DISEASE) (HCC): ICD-10-CM

## 2025-02-25 DIAGNOSIS — Z95.1 S/P CABG (CORONARY ARTERY BYPASS GRAFT): ICD-10-CM

## 2025-02-25 DIAGNOSIS — E78.2 MIXED HYPERLIPIDEMIA: ICD-10-CM

## 2025-02-25 DIAGNOSIS — I25.5 ISCHEMIC CARDIOMYOPATHY: Primary | ICD-10-CM

## 2025-02-25 DIAGNOSIS — I10 PRIMARY HYPERTENSION: ICD-10-CM

## 2025-02-25 DIAGNOSIS — I25.10 CORONARY ARTERY DISEASE INVOLVING NATIVE CORONARY ARTERY OF NATIVE HEART WITHOUT ANGINA PECTORIS: ICD-10-CM

## 2025-02-25 PROCEDURE — 3074F SYST BP LT 130 MM HG: CPT | Performed by: INTERNAL MEDICINE

## 2025-02-25 PROCEDURE — 3078F DIAST BP <80 MM HG: CPT | Performed by: INTERNAL MEDICINE

## 2025-02-25 PROCEDURE — 1123F ACP DISCUSS/DSCN MKR DOCD: CPT | Performed by: INTERNAL MEDICINE

## 2025-02-25 PROCEDURE — 99214 OFFICE O/P EST MOD 30 MIN: CPT | Performed by: INTERNAL MEDICINE

## 2025-02-25 PROCEDURE — 1160F RVW MEDS BY RX/DR IN RCRD: CPT | Performed by: INTERNAL MEDICINE

## 2025-02-25 PROCEDURE — 1159F MED LIST DOCD IN RCRD: CPT | Performed by: INTERNAL MEDICINE

## 2025-02-25 RX ORDER — LOSARTAN POTASSIUM 25 MG/1
25 TABLET ORAL DAILY
Qty: 90 TABLET | Refills: 3 | Status: ON HOLD | OUTPATIENT
Start: 2025-02-25

## 2025-02-25 RX ORDER — ATORVASTATIN CALCIUM 20 MG/1
TABLET, FILM COATED ORAL
Qty: 90 TABLET | Refills: 3 | Status: ON HOLD | OUTPATIENT
Start: 2025-02-25

## 2025-02-25 RX ORDER — NITROGLYCERIN 0.4 MG/1
0.4 TABLET SUBLINGUAL EVERY 5 MIN PRN
Qty: 25 TABLET | Refills: 2 | Status: ON HOLD | OUTPATIENT
Start: 2025-02-25

## 2025-02-25 NOTE — PATIENT INSTRUCTIONS
Plan:  Labs reviewed in epic and discussed with patient.   Great job on not smoking. Continue to avoid as this is risk factor for heart attack, stroke, and/or cancer.    Medication reviewed. Refills as warranted.   Proceed with sleep study as scheduled, recommend oxygenation during the night time.   Recommend physical exercise regimen with stationary bike ~ lack of energy   Follow up in 6 months

## 2025-02-26 ENCOUNTER — HOSPITAL ENCOUNTER (OUTPATIENT)
Dept: PULMONOLOGY | Age: 80
Discharge: HOME | End: 2025-02-26
Payer: MEDICARE

## 2025-02-26 DIAGNOSIS — J84.10 PULMONARY FIBROSIS (HCC): ICD-10-CM

## 2025-02-26 DIAGNOSIS — J43.2 CENTRILOBULAR EMPHYSEMA (HCC): ICD-10-CM

## 2025-02-26 DIAGNOSIS — J96.11 CHRONIC RESPIRATORY FAILURE WITH HYPOXIA (HCC): ICD-10-CM

## 2025-02-26 PROCEDURE — 94618 PULMONARY STRESS TESTING: CPT

## 2025-02-26 NOTE — PROCEDURES
PROCEDURE NOTE  Date: 2/26/2025   Name: Kishan Elaine  YOB: 1945    Procedures  [facility] Pulmonary Function Lab - Six Minute Walk    Test Performed on:   Room Air___X___   Oxygen at _1-3____ lpm via N/C- continuous Oxygen at _____ lpm via N/C- OCD  Assist Device Used During Test:  None___X___ Cane______ Walker______    Modified Radha's Scale  0 Nothing at all 5 Strong    0.5 Just noticeable 6 Stronger (Hard)    1 Very Light 7 Very Strong   2 Light 8 Very Very Strong   3 Moderate 9 Extremely Strong   4 Somewhat Strong 10 Maximum All out      Time SpO2 Heart Rate Respiratory Rate Dyspnea-  Modified Radha’s Scale Fatigue- Modified Radha’s Scale Other Symptoms   Baseline                     90%room air@rest 89 18 3 3      1 minute                     93% 90 19 3 3    2 minutes                     87%  19 4 4 Placed on 1L O2, increased in 1L increments to keep sat 88% and above.     3 minutes                     91% 3L 101 20 5 5    4 minutes                     89%3L 101 20 5 5    5 minutes                     93%3L 110 21 6 6    6 minutes                     90%3L 112 21 7 7    Recovery x 1 minute                     90%3L 99 20 6 6    Recovery x 2 Minute                     93%3L 96 20 4 4       Number of Laps_____7__ X 120 feet + _________ additional feet = Total Distance __840___feet     Total expected 6 MW distance is ___1838_feet. Patient achieved _46___% of expected distance.   Pre Blood Pressure:  Post Blood Pressure:  Other symptoms at the end of exercise: sob, chest pressure/pain, little lightheaded/dizzy

## 2025-02-27 ENCOUNTER — HOSPITAL ENCOUNTER (OUTPATIENT)
Dept: CT IMAGING | Age: 80
Discharge: HOME | End: 2025-02-27
Payer: MEDICARE

## 2025-02-27 ENCOUNTER — HOSPITAL ENCOUNTER (OUTPATIENT)
Dept: PULMONOLOGY | Age: 80
End: 2025-02-27
Payer: MEDICARE

## 2025-02-27 DIAGNOSIS — J43.2 CENTRILOBULAR EMPHYSEMA (HCC): ICD-10-CM

## 2025-02-27 DIAGNOSIS — J96.11 CHRONIC RESPIRATORY FAILURE WITH HYPOXIA (HCC): ICD-10-CM

## 2025-02-27 DIAGNOSIS — J84.10 PULMONARY FIBROSIS (HCC): ICD-10-CM

## 2025-02-27 PROCEDURE — 71250 CT THORAX DX C-: CPT

## 2025-02-28 ENCOUNTER — APPOINTMENT (OUTPATIENT)
Dept: GENERAL RADIOLOGY | Age: 80
DRG: 378 | End: 2025-02-28
Payer: MEDICARE

## 2025-02-28 ENCOUNTER — HOSPITAL ENCOUNTER (INPATIENT)
Age: 80
LOS: 5 days | Discharge: HOME | DRG: 378 | End: 2025-03-05
Attending: EMERGENCY MEDICINE | Admitting: STUDENT IN AN ORGANIZED HEALTH CARE EDUCATION/TRAINING PROGRAM
Payer: MEDICARE

## 2025-02-28 ENCOUNTER — APPOINTMENT (OUTPATIENT)
Dept: CT IMAGING | Age: 80
DRG: 378 | End: 2025-02-28
Payer: MEDICARE

## 2025-02-28 DIAGNOSIS — Z79.01 CURRENT USE OF LONG TERM ANTICOAGULATION: ICD-10-CM

## 2025-02-28 DIAGNOSIS — R19.5 OCCULT BLOOD POSITIVE STOOL: ICD-10-CM

## 2025-02-28 DIAGNOSIS — R10.9 ABDOMINAL PAIN, UNSPECIFIED ABDOMINAL LOCATION: Primary | ICD-10-CM

## 2025-02-28 PROBLEM — K92.2 GI BLEED: Status: ACTIVE | Noted: 2025-02-28

## 2025-02-28 LAB
ABO + RH BLD: NORMAL
ALBUMIN SERPL-MCNC: 4.6 G/DL (ref 3.4–5)
ALBUMIN/GLOB SERPL: 1.4 {RATIO} (ref 1.1–2.2)
ALP SERPL-CCNC: 78 U/L (ref 40–129)
ALT SERPL-CCNC: 25 U/L (ref 10–40)
ANION GAP SERPL CALCULATED.3IONS-SCNC: 12 MMOL/L (ref 3–16)
AST SERPL-CCNC: 26 U/L (ref 15–37)
BASE EXCESS BLDV CALC-SCNC: -2.1 MMOL/L (ref -3–3)
BASOPHILS # BLD: 0.1 K/UL (ref 0–0.2)
BASOPHILS NFR BLD: 0.8 %
BILIRUB SERPL-MCNC: 0.6 MG/DL (ref 0–1)
BLD GP AB SCN SERPL QL: NORMAL
BUN SERPL-MCNC: 18 MG/DL (ref 7–20)
CALCIUM SERPL-MCNC: 10 MG/DL (ref 8.3–10.6)
CHLORIDE SERPL-SCNC: 99 MMOL/L (ref 99–110)
CO2 BLDV-SCNC: 25 MMOL/L
CO2 SERPL-SCNC: 26 MMOL/L (ref 21–32)
COHGB MFR BLDV: 2.8 % (ref 0–1.5)
CREAT SERPL-MCNC: 0.7 MG/DL (ref 0.8–1.3)
DEPRECATED RDW RBC AUTO: 16 % (ref 12.4–15.4)
EKG ATRIAL RATE: 85 BPM
EKG DIAGNOSIS: NORMAL
EKG P AXIS: 62 DEGREES
EKG P-R INTERVAL: 204 MS
EKG Q-T INTERVAL: 388 MS
EKG QRS DURATION: 118 MS
EKG QTC CALCULATION (BAZETT): 461 MS
EKG R AXIS: 0 DEGREES
EKG T AXIS: 16 DEGREES
EKG VENTRICULAR RATE: 85 BPM
EOSINOPHIL # BLD: 0.2 K/UL (ref 0–0.6)
EOSINOPHIL NFR BLD: 1.8 %
GFR SERPLBLD CREATININE-BSD FMLA CKD-EPI: >90 ML/MIN/{1.73_M2}
GLUCOSE SERPL-MCNC: 121 MG/DL (ref 70–99)
HCO3 BLDV-SCNC: 23.8 MMOL/L (ref 23–29)
HCT VFR BLD AUTO: 31 % (ref 40.5–52.5)
HCT VFR BLD AUTO: 36.9 % (ref 40.5–52.5)
HEMOCCULT SP1 STL QL: ABNORMAL
HGB BLD-MCNC: 10.3 G/DL (ref 13.5–17.5)
HGB BLD-MCNC: 12.3 G/DL (ref 13.5–17.5)
INR PPP: 1.05 (ref 0.85–1.15)
LACTATE BLDV-SCNC: 2.3 MMOL/L (ref 0.4–2)
LIPASE SERPL-CCNC: 33 U/L (ref 13–60)
LYMPHOCYTES # BLD: 1.6 K/UL (ref 1–5.1)
LYMPHOCYTES NFR BLD: 12.7 %
MCH RBC QN AUTO: 29.4 PG (ref 26–34)
MCHC RBC AUTO-ENTMCNC: 33.3 G/DL (ref 31–36)
MCV RBC AUTO: 88.3 FL (ref 80–100)
METHGB MFR BLDV: 0.3 %
MONOCYTES # BLD: 0.9 K/UL (ref 0–1.3)
MONOCYTES NFR BLD: 7.1 %
NEUTROPHILS # BLD: 9.5 K/UL (ref 1.7–7.7)
NEUTROPHILS NFR BLD: 77.6 %
O2 THERAPY: ABNORMAL
PCO2 BLDV: 45.1 MMHG (ref 40–50)
PH BLDV: 7.34 [PH] (ref 7.35–7.45)
PLATELET # BLD AUTO: 215 K/UL (ref 135–450)
PMV BLD AUTO: 8.7 FL (ref 5–10.5)
PO2 BLDV: 26.1 MMHG (ref 25–40)
POTASSIUM SERPL-SCNC: 4 MMOL/L (ref 3.5–5.1)
PROCALCITONIN SERPL IA-MCNC: 0.04 NG/ML (ref 0–0.15)
PROT SERPL-MCNC: 7.8 G/DL (ref 6.4–8.2)
PROTHROMBIN TIME: 13.9 SEC (ref 11.9–14.9)
RBC # BLD AUTO: 4.18 M/UL (ref 4.2–5.9)
SAO2 % BLDV: 44 %
SODIUM SERPL-SCNC: 137 MMOL/L (ref 136–145)
WBC # BLD AUTO: 12.3 K/UL (ref 4–11)

## 2025-02-28 PROCEDURE — 85610 PROTHROMBIN TIME: CPT

## 2025-02-28 PROCEDURE — 36415 COLL VENOUS BLD VENIPUNCTURE: CPT

## 2025-02-28 PROCEDURE — 82803 BLOOD GASES ANY COMBINATION: CPT

## 2025-02-28 PROCEDURE — 6360000002 HC RX W HCPCS: Performed by: STUDENT IN AN ORGANIZED HEALTH CARE EDUCATION/TRAINING PROGRAM

## 2025-02-28 PROCEDURE — 84145 PROCALCITONIN (PCT): CPT

## 2025-02-28 PROCEDURE — 85014 HEMATOCRIT: CPT

## 2025-02-28 PROCEDURE — 86850 RBC ANTIBODY SCREEN: CPT

## 2025-02-28 PROCEDURE — 82270 OCCULT BLOOD FECES: CPT

## 2025-02-28 PROCEDURE — 94761 N-INVAS EAR/PLS OXIMETRY MLT: CPT

## 2025-02-28 PROCEDURE — 86901 BLOOD TYPING SEROLOGIC RH(D): CPT

## 2025-02-28 PROCEDURE — 83605 ASSAY OF LACTIC ACID: CPT

## 2025-02-28 PROCEDURE — 71045 X-RAY EXAM CHEST 1 VIEW: CPT

## 2025-02-28 PROCEDURE — 86900 BLOOD TYPING SEROLOGIC ABO: CPT

## 2025-02-28 PROCEDURE — 85025 COMPLETE CBC W/AUTO DIFF WBC: CPT

## 2025-02-28 PROCEDURE — 80053 COMPREHEN METABOLIC PANEL: CPT

## 2025-02-28 PROCEDURE — 2580000003 HC RX 258: Performed by: STUDENT IN AN ORGANIZED HEALTH CARE EDUCATION/TRAINING PROGRAM

## 2025-02-28 PROCEDURE — 83690 ASSAY OF LIPASE: CPT

## 2025-02-28 PROCEDURE — 93005 ELECTROCARDIOGRAM TRACING: CPT | Performed by: PHYSICIAN ASSISTANT

## 2025-02-28 PROCEDURE — 85018 HEMOGLOBIN: CPT

## 2025-02-28 PROCEDURE — 99285 EMERGENCY DEPT VISIT HI MDM: CPT

## 2025-02-28 PROCEDURE — 94640 AIRWAY INHALATION TREATMENT: CPT

## 2025-02-28 PROCEDURE — 2700000000 HC OXYGEN THERAPY PER DAY

## 2025-02-28 PROCEDURE — 1200000000 HC SEMI PRIVATE

## 2025-02-28 PROCEDURE — 6370000000 HC RX 637 (ALT 250 FOR IP): Performed by: STUDENT IN AN ORGANIZED HEALTH CARE EDUCATION/TRAINING PROGRAM

## 2025-02-28 PROCEDURE — 93010 ELECTROCARDIOGRAM REPORT: CPT | Performed by: INTERNAL MEDICINE

## 2025-02-28 RX ORDER — CALCIPOTRIENE 50 UG/G
CREAM TOPICAL 2 TIMES DAILY
Status: DISCONTINUED | OUTPATIENT
Start: 2025-02-28 | End: 2025-02-28 | Stop reason: ALTCHOICE

## 2025-02-28 RX ORDER — SODIUM CHLORIDE 9 MG/ML
INJECTION, SOLUTION INTRAVENOUS PRN
Status: DISCONTINUED | OUTPATIENT
Start: 2025-02-28 | End: 2025-03-05 | Stop reason: HOSPADM

## 2025-02-28 RX ORDER — SODIUM CHLORIDE 9 MG/ML
INJECTION, SOLUTION INTRAVENOUS CONTINUOUS
Status: ACTIVE | OUTPATIENT
Start: 2025-02-28 | End: 2025-03-01

## 2025-02-28 RX ORDER — CLOBETASOL PROPIONATE 0.5 MG/G
CREAM TOPICAL 2 TIMES DAILY
Status: DISCONTINUED | OUTPATIENT
Start: 2025-02-28 | End: 2025-02-28 | Stop reason: ALTCHOICE

## 2025-02-28 RX ORDER — GABAPENTIN 300 MG/1
300 CAPSULE ORAL EVERY EVENING
Status: DISCONTINUED | OUTPATIENT
Start: 2025-02-28 | End: 2025-03-05 | Stop reason: HOSPADM

## 2025-02-28 RX ORDER — ONDANSETRON 2 MG/ML
4 INJECTION INTRAMUSCULAR; INTRAVENOUS EVERY 6 HOURS PRN
Status: DISCONTINUED | OUTPATIENT
Start: 2025-02-28 | End: 2025-03-05 | Stop reason: HOSPADM

## 2025-02-28 RX ORDER — ACETAMINOPHEN 650 MG/1
650 SUPPOSITORY RECTAL EVERY 6 HOURS PRN
Status: DISCONTINUED | OUTPATIENT
Start: 2025-02-28 | End: 2025-03-05 | Stop reason: HOSPADM

## 2025-02-28 RX ORDER — AZITHROMYCIN 250 MG/1
500 TABLET, FILM COATED ORAL DAILY
Status: DISCONTINUED | OUTPATIENT
Start: 2025-02-28 | End: 2025-02-28

## 2025-02-28 RX ORDER — ARFORMOTEROL TARTRATE 15 UG/2ML
15 SOLUTION RESPIRATORY (INHALATION) 2 TIMES DAILY
Status: DISCONTINUED | OUTPATIENT
Start: 2025-02-28 | End: 2025-03-05 | Stop reason: HOSPADM

## 2025-02-28 RX ORDER — PANTOPRAZOLE SODIUM 40 MG/10ML
40 INJECTION, POWDER, LYOPHILIZED, FOR SOLUTION INTRAVENOUS ONCE
Status: DISCONTINUED | OUTPATIENT
Start: 2025-02-28 | End: 2025-02-28

## 2025-02-28 RX ORDER — BUDESONIDE 0.5 MG/2ML
500 INHALANT ORAL 2 TIMES DAILY
Status: DISCONTINUED | OUTPATIENT
Start: 2025-02-28 | End: 2025-03-05 | Stop reason: HOSPADM

## 2025-02-28 RX ORDER — SODIUM CHLORIDE 0.9 % (FLUSH) 0.9 %
5-40 SYRINGE (ML) INJECTION EVERY 12 HOURS SCHEDULED
Status: DISCONTINUED | OUTPATIENT
Start: 2025-02-28 | End: 2025-03-05 | Stop reason: HOSPADM

## 2025-02-28 RX ORDER — SODIUM CHLORIDE 0.9 % (FLUSH) 0.9 %
5-40 SYRINGE (ML) INJECTION PRN
Status: DISCONTINUED | OUTPATIENT
Start: 2025-02-28 | End: 2025-03-05 | Stop reason: HOSPADM

## 2025-02-28 RX ORDER — ATORVASTATIN CALCIUM 10 MG/1
20 TABLET, FILM COATED ORAL DAILY
Status: DISCONTINUED | OUTPATIENT
Start: 2025-02-28 | End: 2025-03-05 | Stop reason: HOSPADM

## 2025-02-28 RX ORDER — ACETAMINOPHEN 325 MG/1
650 TABLET ORAL EVERY 6 HOURS PRN
Status: DISCONTINUED | OUTPATIENT
Start: 2025-02-28 | End: 2025-03-05 | Stop reason: HOSPADM

## 2025-02-28 RX ORDER — LOSARTAN POTASSIUM 25 MG/1
25 TABLET ORAL DAILY
Status: DISCONTINUED | OUTPATIENT
Start: 2025-02-28 | End: 2025-03-01

## 2025-02-28 RX ORDER — VANCOMYCIN 1.25 G/250ML
1750 INJECTION, SOLUTION INTRAVENOUS ONCE
Status: COMPLETED | OUTPATIENT
Start: 2025-02-28 | End: 2025-02-28

## 2025-02-28 RX ORDER — ONDANSETRON 4 MG/1
4 TABLET, ORALLY DISINTEGRATING ORAL EVERY 8 HOURS PRN
Status: DISCONTINUED | OUTPATIENT
Start: 2025-02-28 | End: 2025-03-05 | Stop reason: HOSPADM

## 2025-02-28 RX ADMIN — LOSARTAN POTASSIUM 25 MG: 25 TABLET, FILM COATED ORAL at 20:00

## 2025-02-28 RX ADMIN — Medication 40 MG: at 15:34

## 2025-02-28 RX ADMIN — CEFEPIME 2000 MG: 2 INJECTION, POWDER, FOR SOLUTION INTRAVENOUS at 16:40

## 2025-02-28 RX ADMIN — BUDESONIDE 500 MCG: 0.5 SUSPENSION RESPIRATORY (INHALATION) at 19:33

## 2025-02-28 RX ADMIN — FLUOXETINE HYDROCHLORIDE 20 MG: 20 CAPSULE ORAL at 20:00

## 2025-02-28 RX ADMIN — GABAPENTIN 300 MG: 300 CAPSULE ORAL at 20:00

## 2025-02-28 RX ADMIN — ATORVASTATIN CALCIUM 20 MG: 10 TABLET, FILM COATED ORAL at 19:59

## 2025-02-28 RX ADMIN — VANCOMYCIN 1750 MG: 1.25 INJECTION, SOLUTION INTRAVENOUS at 20:59

## 2025-02-28 RX ADMIN — SODIUM CHLORIDE: 0.9 INJECTION, SOLUTION INTRAVENOUS at 16:47

## 2025-02-28 RX ADMIN — ARFORMOTEROL TARTRATE 15 MCG: 15 SOLUTION RESPIRATORY (INHALATION) at 19:33

## 2025-02-28 ASSESSMENT — PAIN SCALES - GENERAL
PAINLEVEL_OUTOF10: 2
PAINLEVEL_OUTOF10: 0

## 2025-02-28 ASSESSMENT — PAIN - FUNCTIONAL ASSESSMENT
PAIN_FUNCTIONAL_ASSESSMENT: 0-10
PAIN_FUNCTIONAL_ASSESSMENT: NONE - DENIES PAIN

## 2025-02-28 NOTE — ED NOTES
Kishan Elaine is a 79 y.o. male admitted for  Principal Problem:    GI bleed  Resolved Problems:    * No resolved hospital problems. *  .   Patient Home via self with   Chief Complaint   Patient presents with    Abdominal Pain     Abdominal pain - says he had two bowel movements last night and one today with \"lots of blood in it.\"   .  Patient is alert and Person, Place, Time, and Situation  Patient's baseline mobility: Baseline Mobility: Independent   Code Status: Full Code   Cardiac Rhythm:    O2 Flow Rate (L/min): 2 L/min  Is patient on baseline Oxygen: yes, 2  how many Liters2:   Abnormal Assessment Findings:     Isolation: None      NIH Score:    C-SSRS: Risk of Suicide: No Risk  Bedside swallow:        Active LDA's:   Peripheral IV 02/28/25 Right Antecubital (Active)     Patient admitted with a palomino:  If the palomino is chronic was it exchanged:  Reason for palomino:   Patient admitted with Central Line:  . PICC line placement confirmed: YES OR NO:[id number]}   Reason for Central line:   Was central line Inserted from an outside facility:        Family/Caregiver Present yes Any Concerns: no   Restraints no  Sitter no         Vitals: MEWS Score: 4    Vitals:    02/28/25 1452 02/28/25 1505 02/28/25 1537 02/28/25 1539   BP: 129/71 137/79 (!) 123/96    Pulse: 65 72 73    Resp: 24 26 20    Temp:       TempSrc:       SpO2: 99% 100% 99% 100%   Weight:       Height:           Last documented pain score (0-10 scale) Pain Level: 2  Pain medication administered No.    Pertinent or High Risk Medications/Drips: yes, vancomycin     Pending Blood Product Administration: no    Abnormal labs:   Abnormal Labs Reviewed   CBC WITH AUTO DIFFERENTIAL - Abnormal; Notable for the following components:       Result Value    WBC 12.3 (*)     RBC 4.18 (*)     Hemoglobin 12.3 (*)     Hematocrit 36.9 (*)     RDW 16.0 (*)     Neutrophils Absolute 9.5 (*)     All other components within normal limits   COMPREHENSIVE METABOLIC PANEL - Abnormal;

## 2025-02-28 NOTE — ED PROVIDER NOTES
[facility] - MED SURG/ORTHO  Emergency Department Encounter    Patient Name: Kishan Elaine  MRN: [id number]  YOB: 1945  Date of Evaluation: 2/28/2025  Provider: Rosy Toro MD  Note Started: 1:14 PM EST 2/28/25    CHIEF COMPLAINT  Abdominal Pain (Abdominal pain - says he had two bowel movements last night and one today with \"lots of blood in it.\")    SHARED SERVICE VISIT  I have seen and evaluated this patient with my supervising physician, Dr. Smith.     HISTORY OF PRESENT ILLNESS  Kishan Elaine is a 79 y.o. male who presents to the ED evaluation of bloody stool.  Patient states that he noticed some blood in the stool yesterday.  Reports that it appears to be getting worse.  Did have episode of abdominal pain and route which has resolved.  He denies nausea or vomiting.  No fevers chills.  Reports stool fairly well-formed.  He is currently on blood thinners.  He denies pain in the back or flanks.  He denies chest pain.  Chronic shortness of breath unchanged.  He denies headache, lightheadedness, dizziness or confusion..    No other complaints, modifying factors or associated symptoms.     Nursing notes reviewed were all reviewed and agreed with or any disagreements were addressed in the HPI.    PMH:  Past Medical History:   Diagnosis Date    CAD (coronary artery disease)     Chronic rhinitis 11/22/2022    COVID     COVID toes     Hyperlipidemia     Hypertension     Ischemic cardiomyopathy 01/2019    Personal history of COVID-19 10/27/2021    STEMI (ST elevation myocardial infarction) ([facility]) 01/03/2019    Tinnitus     Toe gangrene ([facility]) 08/12/2021     Surgical History:  Past Surgical History:   Procedure Laterality Date    BICEPS TENDON REPAIR      CORONARY ANGIOPLASTY  01/08/2011    emergency PCI: vein to RCA    CORONARY ANGIOPLASTY WITH STENT PLACEMENT  01/03/2019    PCI to distal SVG-RCA with ELIZABETH    CORONARY ARTERY BYPASS GRAFT      in 1990 CABG x4 and in 2001 CABG x2    DIAGNOSTIC

## 2025-02-28 NOTE — H&P
Hospital Medicine History & Physical    V 1.6    Date of Admission: 2/28/2025    Date of Service:  Pt seen/examined on 02/28/25     [x]Admitted to Inpatient with expected LOS greater than two midnights due to medical therapy.  []Placed in Observation status.    Chief Admission Complaint:  GIB    Presenting Admission History:      79 y.o. male who presented to [facility] with GIB.  PMHx significant for CAD, HTN, HLD.    Patient states that he started to have a GI bleed couple days ago.  Patient endorses the bleeding progressively worsening.  Patient has prior episodes that he has been admitted for GI bleeds.  Patient on antiplatelet therapy via Brilinta.  Patient denies fever, chills, sweats.  Denies chest pain or palpitations.  Denies shortness of breath.  Endorses cough with mucus that is yellow and green.  Endorses nausea without vomiting.  No diarrhea.  No change in bowel or bladder habits.    Assessment/Plan:      Current Principal Problem:  GI bleed    Anemia - 2nd to acute GI blood loss w/out evidence of ongoing hemodynamically active bleeding and/or hemolysis.  Asymptomatic w/out indication for transfusion. Followed serial Hgb, personally reviewed and documented in this note. Transfused PRN.    -GI consulted     Lactic acidosis  -Etiology unclear at this time, cannot rule out infection versus area of ischemia  -Lactic acid 2.3  -Will repeat lactic acid after 24 hours of normal saline    Possible pneumonia  -Etiology unclear at this time, cannot rule out nosocomial given recent hospital admissions  -Chest x-ray showed possible pneumonia  -CT chest without contrast on 2/27/2025 showed nonspecific interstitial lung disease with diffuse reticular abnormality, bronchiectasis, groundglass attenuation, and cystic changes throughout bilateral lungs  -Procalcitonin pending  -Will start cefepime and vancomycin at this time; will DC antibiotic use if procalcitonin is negative    COPD - w/ chronic  60 MG TABS tablet TAKE 1 TABLET BY MOUTH 2 TIMES A DAY 2/12/25   Ian Tirado MD   arformoterol tartrate (BROVANA) 15 MCG/2ML NEBU Take 2 mLs by nebulization 2 times daily Run under Medicare Part B, j44.9 1/8/25   Jihan Dias APRN - CNP   budesonide (PULMICORT) 0.5 MG/2ML nebulizer suspension Take 2 mLs by nebulization 2 times daily Run under Medicare Part B, j44.9 1/8/25   Jihan Dias APRN - CNP   gabapentin (NEURONTIN) 300 MG capsule Take 1 capsule by mouth every evening for 180 days. 12/4/24 6/2/25  Rosy Toro MD   FLUoxetine (PROZAC) 20 MG capsule Take 1 capsule by mouth daily 6/3/24   Rosy Toro MD   halobetasol (ULTRAVATE) 0.05 % cream APPLY TO AFFECTED AREA(S) TWO TIMES A DAY AS NEEDED 7/6/22   Zhane Paul DO   calcipotriene (DOVONEX) 0.005 % cream APPLY TO AFFECTED AREA(S) TWO TIMES A DAY AS NEEDED 7/6/22   Zhane Paul DO   thiamine 100 MG tablet Take 0.5 tablets by mouth daily 3/31/22   Ian Tirado MD   Multiple Vitamins-Iron TABS Take 1 tablet by mouth daily    ProviderSandra MD   Omega-3 Fatty Acids (OMEGA-3 FISH OIL PO) Take 1,000 mg by mouth 2 times daily     ProviderSandra MD       Labs: Personally reviewed and interpreted for clinical significance.   Recent Labs     02/28/25  1304   WBC 12.3*   HGB 12.3*   HCT 36.9*        Recent Labs     02/28/25  1304      K 4.0   CL 99   CO2 26   BUN 18   CREATININE 0.7*   CALCIUM 10.0     No results for input(s): \"PROBNP\", \"TROPHS\" in the last 72 hours.  No results for input(s): \"LABA1C\" in the last 72 hours.  Recent Labs     02/28/25  1304   AST 26   ALT 25   BILITOT 0.6   ALKPHOS 78     Recent Labs     02/28/25  1304   INR 1.05   LACTA 2.3*        Eddie Peralta, DO

## 2025-02-28 NOTE — ED PROVIDER NOTES
Emergency Department Attending Provider Note  Location: [facility] EMERGENCY DEPARTMENT  2/28/2025     Patient Identification  Kishan Elaine is a 79 y.o. male      Kishan Elaine was evaluated in the Emergency Department for bloody stool described as both melanic and with bright red blood.  Denies any ongoing abdominal pain.  He is on antiplatelet medication..     HPI: as noted above    Physical Exam: Well-appearing no apparent distress.  Vital signs reassuring.  Soft nontender abdomen      EKG Interpretation  Rhythm is sinus rhythm with occasional PVCs  Rate is 80  Axis is normal  No STEMI criteria  Qtc is 461    Bedside Ultrasound  No results found.     Patient seen and evaluated.  Relevant records reviewed.  Patient presents with symptoms noted above.  Workup here does not show any profound anemia.  He is at an increased risk of bleeding given his antiplatelet medication.  Description of stool sounds most likely upper GI bleed but with bright red blood also described may be more concerning for brisk upper GI bleed versus other location.  Given this, started on Protonix and will would benefit from admission further observation and repeat H&H trending and GI consult    Although initial history and physical exam information was obtained by [place]/NPP/MD/DO (who also dictated a record of this visit), I personally saw the patient and made/approved the management plan and take responsibility for patient management. I, Dr. Smith, am the primary clinician of record.     I personally saw the patient and independently provided 10 minutes of non-concurrent critical care out of the total shared critical care time excluding separately billed procedures.    This chart was generated in part by using Dragon Dictation system and may contain errors related to that system including errors in grammar, punctuation, and spelling, as well as words and phrases that may be inappropriate. If there are any questions or concerns

## 2025-02-28 NOTE — CONSULTS
Pharmacy Note  Vancomycin Consult    Kishan Elaine is a 79 y.o. male started on Vancomycin for Pneumonia x 7 days; consult received from Dr. Eddie Peralta to manage therapy. Also receiving the following antibiotics: Cefepime.    Allergies:  Plavix [clopidogrel bisulfate] and Ciprofloxacin     Tmax: 97.9    Micro: na    Recent Labs     02/28/25  1304   CREATININE 0.7*       Recent Labs     02/28/25  1304   WBC 12.3*       Estimated Creatinine Clearance: 87 mL/min (A) (based on SCr of 0.7 mg/dL (L)).    No intake or output data in the 24 hours ending 02/28/25 1628    Wt Readings from Last 1 Encounters:   02/28/25 71 kg (156 lb 8 oz)         Body mass index is 21.23 kg/m².    Loading dose (critically ill or in ICU, require dialysis or renal replacement therapy): Vancomycin 25 mg/kg IVPB x 1 (maximum 2500 mg).  Maintenance dose: 10-20 mg/kg (maximum: 2000 mg/dose and 4500 mg/day) starting at the next dosing interval determined by renal function  Pulse dose: fluctuating renal function, EMMANUEL, ESRD  CRRT: 7.5-10 mg/kg q12h   Goal Vancomycin trough: 15-20 mcg/mL   Goal Vancomycin AUC: 400-600     Assessment/Plan:  Will initiate Vancomycin with a one time loading dose of 1750 mg x1, followed by 1000 mg IV every 12 hours. Calculated Vancomycin AUC = 580 mg/L*h with an estimated steady-state vancomycin trough = 19.3 mcg/mL. Vancomycin level ordered for 3/1 @ 0600. Timing of trough level will be determined based on culture results, renal function, and clinical response.     Thank you for the consult.    Kevin Sánchez, Dick 2/28/2025 4:29 PM    Vancomycin Level, Random [[id number]]    Collected: 03/01/25 0454    Updated: 03/01/25 0604    Order Status: Completed    Specimen Type: Blood     Vancomycin Rm 12.1 ug/mL     Recent Labs     02/28/25  1304 03/01/25  0454   CREATININE 0.7* 0.7*       Estimated Creatinine Clearance: 87 mL/min (A) (based on SCr of 0.7 mg/dL (L)).  Vanc monitor  Lico Leblanc RPH 3/1/2025 6:17 AM

## 2025-02-28 NOTE — PROGRESS NOTES
Patient admitted to room C544 from ED. Patient oriented to room, call light, bed rails, phone, lights and bathroom. Bed alarm in place, patient aware of placement and reason. Bed locked, in lowest position, side rails up 2/4, call light within reach.

## 2025-03-01 LAB
ANION GAP SERPL CALCULATED.3IONS-SCNC: 8 MMOL/L (ref 3–16)
APTT BLD: 30.6 SEC (ref 22.1–36.4)
BUN SERPL-MCNC: 13 MG/DL (ref 7–20)
CALCIUM SERPL-MCNC: 8.5 MG/DL (ref 8.3–10.6)
CHLORIDE SERPL-SCNC: 107 MMOL/L (ref 99–110)
CO2 SERPL-SCNC: 24 MMOL/L (ref 21–32)
CREAT SERPL-MCNC: 0.7 MG/DL (ref 0.8–1.3)
GFR SERPLBLD CREATININE-BSD FMLA CKD-EPI: >90 ML/MIN/{1.73_M2}
GLUCOSE SERPL-MCNC: 101 MG/DL (ref 70–99)
HCT VFR BLD AUTO: 29.6 % (ref 40.5–52.5)
HCT VFR BLD AUTO: 31.6 % (ref 40.5–52.5)
HCT VFR BLD AUTO: 34.1 % (ref 40.5–52.5)
HCT VFR BLD AUTO: 35.7 % (ref 40.5–52.5)
HGB BLD-MCNC: 10.8 G/DL (ref 13.5–17.5)
HGB BLD-MCNC: 11.3 G/DL (ref 13.5–17.5)
HGB BLD-MCNC: 11.7 G/DL (ref 13.5–17.5)
HGB BLD-MCNC: 9.9 G/DL (ref 13.5–17.5)
IRON SATN MFR SERPL: 20 % (ref 20–50)
IRON SERPL-MCNC: 59 UG/DL (ref 59–158)
LACTATE BLDV-SCNC: 3.4 MMOL/L (ref 0.4–2)
POTASSIUM SERPL-SCNC: 4.3 MMOL/L (ref 3.5–5.1)
SODIUM SERPL-SCNC: 139 MMOL/L (ref 136–145)
TIBC SERPL-MCNC: 293 UG/DL (ref 260–445)
VANCOMYCIN SERPL-MCNC: 12.1 UG/ML

## 2025-03-01 PROCEDURE — 2580000003 HC RX 258: Performed by: STUDENT IN AN ORGANIZED HEALTH CARE EDUCATION/TRAINING PROGRAM

## 2025-03-01 PROCEDURE — 82607 VITAMIN B-12: CPT

## 2025-03-01 PROCEDURE — 85730 THROMBOPLASTIN TIME PARTIAL: CPT

## 2025-03-01 PROCEDURE — 6370000000 HC RX 637 (ALT 250 FOR IP): Performed by: STUDENT IN AN ORGANIZED HEALTH CARE EDUCATION/TRAINING PROGRAM

## 2025-03-01 PROCEDURE — 2500000003 HC RX 250 WO HCPCS: Performed by: STUDENT IN AN ORGANIZED HEALTH CARE EDUCATION/TRAINING PROGRAM

## 2025-03-01 PROCEDURE — 2700000000 HC OXYGEN THERAPY PER DAY

## 2025-03-01 PROCEDURE — 82746 ASSAY OF FOLIC ACID SERUM: CPT

## 2025-03-01 PROCEDURE — 6370000000 HC RX 637 (ALT 250 FOR IP): Performed by: NURSE PRACTITIONER

## 2025-03-01 PROCEDURE — 83550 IRON BINDING TEST: CPT

## 2025-03-01 PROCEDURE — 83605 ASSAY OF LACTIC ACID: CPT

## 2025-03-01 PROCEDURE — 82728 ASSAY OF FERRITIN: CPT

## 2025-03-01 PROCEDURE — 80048 BASIC METABOLIC PNL TOTAL CA: CPT

## 2025-03-01 PROCEDURE — 80202 ASSAY OF VANCOMYCIN: CPT

## 2025-03-01 PROCEDURE — 6360000002 HC RX W HCPCS: Performed by: STUDENT IN AN ORGANIZED HEALTH CARE EDUCATION/TRAINING PROGRAM

## 2025-03-01 PROCEDURE — 85014 HEMATOCRIT: CPT

## 2025-03-01 PROCEDURE — 94761 N-INVAS EAR/PLS OXIMETRY MLT: CPT

## 2025-03-01 PROCEDURE — 83540 ASSAY OF IRON: CPT

## 2025-03-01 PROCEDURE — 85018 HEMOGLOBIN: CPT

## 2025-03-01 PROCEDURE — 94640 AIRWAY INHALATION TREATMENT: CPT

## 2025-03-01 PROCEDURE — 1200000000 HC SEMI PRIVATE

## 2025-03-01 PROCEDURE — 36415 COLL VENOUS BLD VENIPUNCTURE: CPT

## 2025-03-01 RX ORDER — ALBUTEROL SULFATE 90 UG/1
2 INHALANT RESPIRATORY (INHALATION) EVERY 6 HOURS PRN
Status: DISCONTINUED | OUTPATIENT
Start: 2025-03-01 | End: 2025-03-05 | Stop reason: HOSPADM

## 2025-03-01 RX ORDER — LOSARTAN POTASSIUM 25 MG/1
12.5 TABLET ORAL DAILY
Status: DISCONTINUED | OUTPATIENT
Start: 2025-03-02 | End: 2025-03-05 | Stop reason: HOSPADM

## 2025-03-01 RX ORDER — LANOLIN ALCOHOL/MO/W.PET/CERES
50 CREAM (GRAM) TOPICAL DAILY
Status: DISCONTINUED | OUTPATIENT
Start: 2025-03-01 | End: 2025-03-05 | Stop reason: HOSPADM

## 2025-03-01 RX ADMIN — BUDESONIDE 500 MCG: 0.5 SUSPENSION RESPIRATORY (INHALATION) at 07:24

## 2025-03-01 RX ADMIN — Medication 40 MG: at 14:57

## 2025-03-01 RX ADMIN — Medication 10 ML: at 20:34

## 2025-03-01 RX ADMIN — ATORVASTATIN CALCIUM 20 MG: 10 TABLET, FILM COATED ORAL at 20:34

## 2025-03-01 RX ADMIN — CEFEPIME 2000 MG: 2 INJECTION, POWDER, FOR SOLUTION INTRAVENOUS at 00:02

## 2025-03-01 RX ADMIN — GABAPENTIN 300 MG: 300 CAPSULE ORAL at 18:39

## 2025-03-01 RX ADMIN — ARFORMOTEROL TARTRATE 15 MCG: 15 SOLUTION RESPIRATORY (INHALATION) at 20:03

## 2025-03-01 RX ADMIN — Medication 40 MG: at 04:00

## 2025-03-01 RX ADMIN — BUDESONIDE 500 MCG: 0.5 SUSPENSION RESPIRATORY (INHALATION) at 20:05

## 2025-03-01 RX ADMIN — Medication 50 MG: at 14:54

## 2025-03-01 RX ADMIN — Medication 10 ML: at 09:16

## 2025-03-01 RX ADMIN — ARFORMOTEROL TARTRATE 15 MCG: 15 SOLUTION RESPIRATORY (INHALATION) at 07:24

## 2025-03-01 RX ADMIN — LOSARTAN POTASSIUM 25 MG: 25 TABLET, FILM COATED ORAL at 09:14

## 2025-03-01 RX ADMIN — FLUOXETINE HYDROCHLORIDE 20 MG: 20 CAPSULE ORAL at 20:34

## 2025-03-01 RX ADMIN — CEFEPIME 2000 MG: 2 INJECTION, POWDER, FOR SOLUTION INTRAVENOUS at 09:13

## 2025-03-01 ASSESSMENT — PAIN SCALES - GENERAL
PAINLEVEL_OUTOF10: 2
PAINLEVEL_OUTOF10: 0

## 2025-03-01 ASSESSMENT — PAIN DESCRIPTION - ORIENTATION: ORIENTATION: RIGHT

## 2025-03-01 ASSESSMENT — PAIN DESCRIPTION - LOCATION: LOCATION: ABDOMEN

## 2025-03-01 ASSESSMENT — PAIN DESCRIPTION - DESCRIPTORS: DESCRIPTORS: CRAMPING

## 2025-03-01 ASSESSMENT — PAIN - FUNCTIONAL ASSESSMENT: PAIN_FUNCTIONAL_ASSESSMENT: ACTIVITIES ARE NOT PREVENTED

## 2025-03-01 NOTE — PROGRESS NOTES
[facility] Medicine Progress Note  V 1.6      Date of Admission: 2/28/2025    Hospital Day: 2      Chief Admission Complaint:    Abdominal Pain (Abdominal pain - says he had two bowel movements last night and one today with \"lots of blood in it.\")    Subjective:      Patient is eating lunch when I arrive. Son at bedside. He has no complaints today and has seen no further blood in stool as he has not had a stool since admission.     Presenting Admission History:       This is a 79 y.o. male who presented to [facility] with GIB.  PMHx significant for CAD, HTN, HLD.     Patient states that he started to have a GI bleed couple days ago.  Patient endorses the bleeding progressively worsening.  Patient has prior episodes that he has been admitted for GI bleeds.  Patient on antiplatelet therapy via Brilinta.  Patient denies fever, chills, sweats.  Denies chest pain or palpitations.  Denies shortness of breath.  Endorses cough with mucus that is yellow and green.  Endorses nausea without vomiting.  No diarrhea.  No change in bowel or bladder habits.     Assessment/Plan:       Current Principal Problem:  GI bleed     Anemia - 2nd to acute GI blood loss w/out evidence of ongoing hemodynamically active bleeding and/or hemolysis.  Asymptomatic w/out indication for transfusion. Follow serial Hgb, personally reviewed and documented in this note. Transfused PRN for Hgb < 7.    Baseline Hgb 12.1-14.6  Hgb 03/01/25 11.7  GI consulted and appreciated.  Brillinta and ASA on hold   Iron and mineral studies ordered    Lactic acidosis  Etiology unclear at this time, cannot rule out infection versus area of ischemia  Lactic acid 2.3 on presentation. Repeat lactic acid 03/01/25 ordered     Possible pneumonia ruled out   Etiology unclear at this time, cannot rule out nosocomial given recent hospital admissions. Chest x-ray showed possible pneumonia. CT chest without contrast on 2/27/2025 showed nonspecific interstitial  lung disease with diffuse reticular abnormality, bronchiectasis, groundglass attenuation, and cystic changes throughout bilateral lungs  Pro-calcitonin 0.04, hemodynamically stable, afebrile  Stop cefepime and vancomycin at this time as procalcitonin is negative. Most likely 2/2 ILD with pulmonary fibrosis.     Chronic Respiratory Failure - w/ hypoxia at baseline.  On baseline home O2 at 5L/NC with ambulation. No O2 needed at rest. Supplemental O2 and wean as tolerated.      CHF - acute on chronic combined systolic/diastolic failure w/ reduced EF 50-50% by Echo dated 1/20/25.  Likely due to hypertensive and/or ischemic heart disease. Continue diuresis as currently ordered w/ close monitoring of BUN/Creatinine and electrolytes for ADR.  Continue current medical management and follow input and output as well as daily weights as recorded and clinical response. Consider GDMT at discharge unless contraindicated.  (ACEi/ARB/ARNI, SGLT2i, Aldactone, BBlocker).    On ARB and lasix 40 mg PO Every other day only at home.No BB d/t bradycardia and fatiue    ICM  CAD~s/p CABG 2011~PCI to distal SVG RCA 1/2019   On Asa and Brillinta at home, held d/t possible GIB  Cardiology recently stopped his beta blockers due to fatigue and low heart rate      COPD - w/ chronic hypoxic respiratory failure on baseline home O2 at 3-4 L per NC.  Controlled on home medication regimen - continued.       HTN w/ CAD - but no evidence of active signs and/or symptoms of ischemia and/or failure. Currently controlled on home meds w/ vitals documented and reviewed. Decreased Losartan as BP prior and during admission borderline low.      HyperLipidemia - normally controlled on home Statin. Continued.  Follow up w/ PCP outpatient for medication initiation and/or adjustment as needed.      Ongoing threat to life and/or bodily function without ongoing treatment due to:  Possible GIB    Consults:      IP CONSULT TO GI  IP CONSULT TO PHARMACY      GI consulted

## 2025-03-01 NOTE — PROGRESS NOTES
Patient noted to have crackling on auscultation. Patient given IS and instructed on use. Patient demonstrated proficient use. RN notified of this finding and asked about fluid status monitoring.     Electronically signed by Zoie Alberto RCP, RRT, RRT-ACCS on 3/1/2025 at 7:42 AM

## 2025-03-01 NOTE — CONSULTS
1990     Years since quittin.1    Smokeless tobacco: Never    Tobacco comments:     Smoked when i was a kid   Substance Use Topics    Alcohol use: No     Family:   Family History   Problem Relation Age of Onset    Cancer Mother         skin cancer    Diabetes Mother     High Blood Pressure Father     Heart Disease Father     No Known Problems Brother     No Known Problems Brother     Heart Disease Paternal Uncle     Heart Disease Paternal Cousin        Scheduled Medications:    [START ON 3/2/2025] losartan  12.5 mg Oral Daily    thiamine  50 mg Oral Daily    sodium chloride flush  5-40 mL IntraVENous 2 times per day    pantoprazole (PROTONIX) 40 mg in sodium chloride (PF) 0.9 % 10 mL injection  40 mg IntraVENous Q12H    arformoterol tartrate  15 mcg Nebulization BID    atorvastatin  20 mg Oral Daily    budesonide  500 mcg Nebulization BID    FLUoxetine  20 mg Oral Daily    gabapentin  300 mg Oral QPM     Infusions:    sodium chloride       PRN Medications: albuterol sulfate HFA, sodium chloride flush, sodium chloride, ondansetron **OR** ondansetron, acetaminophen **OR** acetaminophen  Allergies:   Allergies   Allergen Reactions    Plavix [Clopidogrel Bisulfate] Hives     Hives    Ciprofloxacin      diarrhea       ROS:   Review of Systems    Objective:     Physical Exam:   Vitals:    25 1115   BP: (!) 100/57   Pulse:    Resp:    Temp:    SpO2:      I/O last 3 completed shifts:  In: -   Out: 250 [Urine:250]   General appearance: alert, appears stated age, and cooperative  HEENT: PERRLA  Neck: no adenopathy, no carotid bruit, no JVD, supple, symmetrical, trachea midline, and thyroid not enlarged, symmetric, no tenderness/mass/nodules  Lungs: clear to auscultation bilaterally  Heart: regular rate and rhythm, S1, S2 normal, no murmur, click, rub or gallop  Abdomen: soft, non-tender; bowel sounds normal; no masses,  no organomegaly  Extremities: extremities normal, atraumatic, no cyanosis or edema     Lab  and Imaging Review   Labs:  CBC:   Recent Labs     02/28/25  1304 02/28/25  2133 03/01/25  0300 03/01/25  0901   WBC 12.3*  --   --   --    HGB 12.3* 10.3* 10.8* 11.7*   HCT 36.9* 31.0* 31.6* 35.7*   MCV 88.3  --   --   --      --   --   --      BMP:   Recent Labs     02/28/25  1304 03/01/25  0454    139   K 4.0 4.3   CL 99 107   CO2 26 24   BUN 18 13   CREATININE 0.7* 0.7*     LIVER PROFILE:   Recent Labs     02/28/25  1304   AST 26   ALT 25   LIPASE 33.0   BILITOT 0.6   ALKPHOS 78     PT/INR:   Recent Labs     02/28/25  1304   INR 1.05       IMAGING:  XR CHEST PORTABLE    Result Date: 2/28/2025  1 view chest HISTORY: Short of breath COMPARISON: February 17, 2025 FINDINGS: There has been median sternotomy. The cardiomediastinal contours are normal. Ill-defined airspace disease is present bilaterally, left greater than right. No pleural abnormality is evident.     Ill-defined airspace disease is present bilaterally, left greater than right. This could reflect pneumonia. Follow-up is recommended to document resolution. Electronically signed by [facility] Problems             Last Modified POA    * (Principal) GI bleed 2/28/2025 Yes     Assessment:   78 yo male with history of CAD, Htn, ICM, hyperlipidemia presents with complaints of GI bleeding.     Plan:   Continue to monitor for active bleeding  Clear liquid diet tomorrow      Rylan Alford DO  3:32 PM 3/1/2025            [place] [facility]   [address] [place] [address]    [address]      [place] [address]     Phone: [phone]     Fax: [phone]

## 2025-03-01 NOTE — RT PROTOCOL NOTE
RT Inhaler-Nebulizer Bronchodilator Protocol Note    There is a bronchodilator order in the chart from a provider indicating to follow the RT Bronchodilator Protocol and there is an “Initiate RT Inhaler-Nebulizer Bronchodilator Protocol” order as well (see protocol at bottom of note).    CXR Findings:  XR CHEST PORTABLE    Result Date: 2/28/2025  Ill-defined airspace disease is present bilaterally, left greater than right. This could reflect pneumonia. Follow-up is recommended to document resolution. Electronically signed by Miller Naranjo      The findings from the last RT Protocol Assessment were as follows:   History Pulmonary Disease: Smoker 15 pack years or more  Respiratory Pattern: Dyspnea on exertion or RR 21-25 bpm  Breath Sounds: Slightly diminished and/or crackles  Cough: Strong, spontaneous, non-productive  Indication for Bronchodilator Therapy:    Bronchodilator Assessment Score: 5    Aerosolized bronchodilator medication orders have been revised according to the RT Inhaler-Nebulizer Bronchodilator Protocol below.    Respiratory Therapist to perform RT Therapy Protocol Assessment initially then follow the protocol.  Repeat RT Therapy Protocol Assessment PRN for score 0-3 or on second treatment, BID, and PRN for scores above 3.    No Indications - adjust the frequency to every 6 hours PRN wheezing or bronchospasm, if no treatments needed after 48 hours then discontinue using Per Protocol order mode.     If indication present, adjust the RT bronchodilator orders based on the Bronchodilator Assessment Score as indicated below.  Use Inhaler orders unless patient has one or more of the following: on home nebulizer, not able to hold breath for 10 seconds, is not alert and oriented, cannot activate and use MDI correctly, or respiratory rate 25 breaths per minute or more, then use the equivalent nebulizer order(s) with same Frequency and PRN reasons based on the score.  If a patient is on this medication at  home then do not decrease Frequency below that used at home.    0-3 - enter or revise RT bronchodilator order(s) to equivalent RT Bronchodilator order with Frequency of every 4 hours PRN for wheezing or increased work of breathing using Per Protocol order mode.        4-6 - enter or revise RT Bronchodilator order(s) to two equivalent RT bronchodilator orders with one order with BID Frequency and one order with Frequency of every 4 hours PRN wheezing or increased work of breathing using Per Protocol order mode.        7-10 - enter or revise RT Bronchodilator order(s) to two equivalent RT bronchodilator orders with one order with TID Frequency and one order with Frequency of every 4 hours PRN wheezing or increased work of breathing using Per Protocol order mode.       11-13 - enter or revise RT Bronchodilator order(s) to one equivalent RT bronchodilator order with QID Frequency and an Albuterol order with Frequency of every 4 hours PRN wheezing or increased work of breathing using Per Protocol order mode.      Greater than 13 - enter or revise RT Bronchodilator order(s) to one equivalent RT bronchodilator order with every 4 hours Frequency and an Albuterol order with Frequency of every 2 hours PRN wheezing or increased work of breathing using Per Protocol order mode.     RT to enter RT Home Evaluation for COPD & MDI Assessment order using Per Protocol order mode.    Electronically signed by Dianna Wagner RCP on 2/28/2025 at 7:47 PM

## 2025-03-01 NOTE — PROGRESS NOTES
Pt resting in bed quietly. Bed in lowest position, wheels locked, side rails up X2, non skid socks on. Bed check alarm engaged. Pt instructed not to get out of bed on own, to use call light for staff assistance when ambulating or other needs. Pt verbalizes understanding. Call light within reach. Nursing plan of care ongoing.

## 2025-03-01 NOTE — PROGRESS NOTES
4 Eyes Skin Assessment     NAME:  Kishan Elaine  YOB: 1945  MEDICAL RECORD NUMBER:  [id number]    The patient is being assessed for  Admission    I agree that at least one RN has performed a thorough Head to Toe Skin Assessment on the patient. ALL assessment sites listed below have been assessed.      Areas assessed by both nurses:    Head, Face, Ears, Shoulders, Back, Chest, Arms, Elbows, Hands, Sacrum. Buttock, Coccyx, Ischium, and Legs. Feet and Heels        Does the Patient have a Wound? 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

## 2025-03-02 LAB
ANION GAP SERPL CALCULATED.3IONS-SCNC: 8 MMOL/L (ref 3–16)
BUN SERPL-MCNC: 12 MG/DL (ref 7–20)
CALCIUM SERPL-MCNC: 8.5 MG/DL (ref 8.3–10.6)
CHLORIDE SERPL-SCNC: 104 MMOL/L (ref 99–110)
CO2 SERPL-SCNC: 26 MMOL/L (ref 21–32)
CREAT SERPL-MCNC: 0.8 MG/DL (ref 0.8–1.3)
FERRITIN SERPL IA-MCNC: 32 NG/ML (ref 30–400)
FOLATE SERPL-MCNC: 16.5 NG/ML (ref 4.78–24.2)
GFR SERPLBLD CREATININE-BSD FMLA CKD-EPI: >90 ML/MIN/{1.73_M2}
GLUCOSE SERPL-MCNC: 118 MG/DL (ref 70–99)
HCT VFR BLD AUTO: 30.4 % (ref 40.5–52.5)
HCT VFR BLD AUTO: 32.1 % (ref 40.5–52.5)
HCT VFR BLD AUTO: 33.4 % (ref 40.5–52.5)
HCT VFR BLD AUTO: 34.4 % (ref 40.5–52.5)
HGB BLD-MCNC: 10.2 G/DL (ref 13.5–17.5)
HGB BLD-MCNC: 10.5 G/DL (ref 13.5–17.5)
HGB BLD-MCNC: 11.1 G/DL (ref 13.5–17.5)
HGB BLD-MCNC: 11.4 G/DL (ref 13.5–17.5)
POTASSIUM SERPL-SCNC: 3.9 MMOL/L (ref 3.5–5.1)
SODIUM SERPL-SCNC: 138 MMOL/L (ref 136–145)
VIT B12 SERPL-MCNC: 1238 PG/ML (ref 211–911)

## 2025-03-02 PROCEDURE — 94640 AIRWAY INHALATION TREATMENT: CPT

## 2025-03-02 PROCEDURE — 97530 THERAPEUTIC ACTIVITIES: CPT

## 2025-03-02 PROCEDURE — 94761 N-INVAS EAR/PLS OXIMETRY MLT: CPT

## 2025-03-02 PROCEDURE — 6360000002 HC RX W HCPCS: Performed by: STUDENT IN AN ORGANIZED HEALTH CARE EDUCATION/TRAINING PROGRAM

## 2025-03-02 PROCEDURE — 36415 COLL VENOUS BLD VENIPUNCTURE: CPT

## 2025-03-02 PROCEDURE — 80048 BASIC METABOLIC PNL TOTAL CA: CPT

## 2025-03-02 PROCEDURE — 1200000000 HC SEMI PRIVATE

## 2025-03-02 PROCEDURE — 6370000000 HC RX 637 (ALT 250 FOR IP): Performed by: INTERNAL MEDICINE

## 2025-03-02 PROCEDURE — 6370000000 HC RX 637 (ALT 250 FOR IP): Performed by: NURSE PRACTITIONER

## 2025-03-02 PROCEDURE — 85018 HEMOGLOBIN: CPT

## 2025-03-02 PROCEDURE — 2580000003 HC RX 258: Performed by: STUDENT IN AN ORGANIZED HEALTH CARE EDUCATION/TRAINING PROGRAM

## 2025-03-02 PROCEDURE — 85014 HEMATOCRIT: CPT

## 2025-03-02 PROCEDURE — 6370000000 HC RX 637 (ALT 250 FOR IP): Performed by: STUDENT IN AN ORGANIZED HEALTH CARE EDUCATION/TRAINING PROGRAM

## 2025-03-02 PROCEDURE — 2700000000 HC OXYGEN THERAPY PER DAY

## 2025-03-02 PROCEDURE — 97165 OT EVAL LOW COMPLEX 30 MIN: CPT

## 2025-03-02 PROCEDURE — 97161 PT EVAL LOW COMPLEX 20 MIN: CPT

## 2025-03-02 PROCEDURE — 2500000003 HC RX 250 WO HCPCS: Performed by: STUDENT IN AN ORGANIZED HEALTH CARE EDUCATION/TRAINING PROGRAM

## 2025-03-02 RX ORDER — POLYETHYLENE GLYCOL 3350 17 G/17G
119 POWDER, FOR SOLUTION ORAL ONCE
Status: DISCONTINUED | OUTPATIENT
Start: 2025-03-03 | End: 2025-03-05 | Stop reason: HOSPADM

## 2025-03-02 RX ORDER — BISACODYL 5 MG/1
20 TABLET, DELAYED RELEASE ORAL ONCE
Status: COMPLETED | OUTPATIENT
Start: 2025-03-02 | End: 2025-03-02

## 2025-03-02 RX ORDER — BISACODYL 5 MG/1
10 TABLET, DELAYED RELEASE ORAL EVERY 8 HOURS
Status: COMPLETED | OUTPATIENT
Start: 2025-03-03 | End: 2025-03-03

## 2025-03-02 RX ORDER — POLYETHYLENE GLYCOL 3350 17 G/17G
119 POWDER, FOR SOLUTION ORAL ONCE
Status: COMPLETED | OUTPATIENT
Start: 2025-03-02 | End: 2025-03-02

## 2025-03-02 RX ORDER — POLYETHYLENE GLYCOL 3350 17 G/17G
119 POWDER, FOR SOLUTION ORAL ONCE
Status: DISCONTINUED | OUTPATIENT
Start: 2025-03-02 | End: 2025-03-05 | Stop reason: HOSPADM

## 2025-03-02 RX ORDER — POLYETHYLENE GLYCOL 3350 17 G/17G
119 POWDER, FOR SOLUTION ORAL ONCE
Status: COMPLETED | OUTPATIENT
Start: 2025-03-03 | End: 2025-03-03

## 2025-03-02 RX ORDER — BISACODYL 5 MG/1
20 TABLET, DELAYED RELEASE ORAL ONCE
Status: DISCONTINUED | OUTPATIENT
Start: 2025-03-02 | End: 2025-03-05 | Stop reason: HOSPADM

## 2025-03-02 RX ORDER — BISACODYL 5 MG/1
10 TABLET, DELAYED RELEASE ORAL EVERY 8 HOURS
Status: ACTIVE | OUTPATIENT
Start: 2025-03-03 | End: 2025-03-03

## 2025-03-02 RX ADMIN — POLYETHYLENE GLYCOL 3350 119 G: 17 POWDER, FOR SOLUTION ORAL at 17:50

## 2025-03-02 RX ADMIN — BISACODYL 20 MG: 5 TABLET, COATED ORAL at 17:48

## 2025-03-02 RX ADMIN — Medication 10 ML: at 20:21

## 2025-03-02 RX ADMIN — BUDESONIDE 500 MCG: 0.5 SUSPENSION RESPIRATORY (INHALATION) at 19:32

## 2025-03-02 RX ADMIN — ARFORMOTEROL TARTRATE 15 MCG: 15 SOLUTION RESPIRATORY (INHALATION) at 19:35

## 2025-03-02 RX ADMIN — GABAPENTIN 300 MG: 300 CAPSULE ORAL at 17:49

## 2025-03-02 RX ADMIN — Medication 40 MG: at 03:08

## 2025-03-02 RX ADMIN — BUDESONIDE 500 MCG: 0.5 SUSPENSION RESPIRATORY (INHALATION) at 07:29

## 2025-03-02 RX ADMIN — ACETAMINOPHEN 650 MG: 325 TABLET ORAL at 19:16

## 2025-03-02 RX ADMIN — ATORVASTATIN CALCIUM 20 MG: 10 TABLET, FILM COATED ORAL at 20:22

## 2025-03-02 RX ADMIN — Medication 40 MG: at 14:28

## 2025-03-02 RX ADMIN — Medication 10 ML: at 08:58

## 2025-03-02 RX ADMIN — Medication 10 ML: at 03:09

## 2025-03-02 RX ADMIN — ARFORMOTEROL TARTRATE 15 MCG: 15 SOLUTION RESPIRATORY (INHALATION) at 07:29

## 2025-03-02 RX ADMIN — FLUOXETINE HYDROCHLORIDE 20 MG: 20 CAPSULE ORAL at 20:22

## 2025-03-02 RX ADMIN — Medication 50 MG: at 08:56

## 2025-03-02 RX ADMIN — LOSARTAN POTASSIUM 12.5 MG: 25 TABLET, FILM COATED ORAL at 08:56

## 2025-03-02 ASSESSMENT — PAIN DESCRIPTION - ORIENTATION: ORIENTATION: ANTERIOR

## 2025-03-02 ASSESSMENT — PAIN SCALES - GENERAL
PAINLEVEL_OUTOF10: 0
PAINLEVEL_OUTOF10: 6

## 2025-03-02 ASSESSMENT — PAIN DESCRIPTION - LOCATION: LOCATION: HEAD

## 2025-03-02 ASSESSMENT — PAIN DESCRIPTION - DESCRIPTORS: DESCRIPTORS: ACHING

## 2025-03-02 NOTE — PROGRESS NOTES
Physical Therapy  Facility/Department: [facility] [address] - MED SURG/ORTHO  Physical Therapy Initial Assessment/discharge summary.    Name: Kishan Elaine  : 1945  MRN: [id number]  Date of Service: 3/2/2025    Discharge Recommendations:  Home with assist PRN   PT Equipment Recommendations  Equipment Needed: No      Patient Diagnosis(es): The primary encounter diagnosis was Abdominal pain, unspecified abdominal location. Diagnoses of Occult blood positive stool and Current use of long term anticoagulation were also pertinent to this visit.  Past Medical History:  has a past medical history of CAD (coronary artery disease), Chronic rhinitis, COVID, COVID toes, Hyperlipidemia, Hypertension, Ischemic cardiomyopathy, Personal history of COVID-19, STEMI (ST elevation myocardial infarction) (HCC), Tinnitus, and Toe gangrene (HCC).  Past Surgical History:  has a past surgical history that includes Coronary artery bypass graft; Coronary angioplasty (2011); Diagnostic Cardiac Cath Lab Procedure; Coronary angioplasty with stent (2019); Biceps tendon repair; Toe amputation (Bilateral, 2021); and vascular surgery.    Assessment  Assessment: Pt seen in conjunction with OT to maximize pt safety and mobility and safely assess pt maximal level of mobility.  Pt presents to [facility] with primary diagnosis of GI bleed.  PTA pt lived at home with spouse and 25 YO grandson with 3 CHERISE, reports IND with mobility without AD.  Currently pt demonstrates IND for bed mobility, IND for functional transfers without AD, IND for ambulation up to 75 feet without AD.  pt has no notable deficits, no balance deficits, no overt LOB, appears very knowledgable about safety, HEP performance, benefits of activity.  pt and wife state they have no needs and this is the pt's baseline.  Pt demonstrates no notable deficits to be addressed during their stay in the acute care setting.  Recommend DC to home with assist PRN.  Treatment Diagnosis: no  notable deficits to be addressed in the acute care setting.  Therapy Prognosis: Good  Decision Making: Low Complexity  Requires PT Follow-Up: Yes  Activity Tolerance  Activity Tolerance: Patient tolerated evaluation without incident;Patient tolerated treatment well    Plan  Physical Therapy Plan  General Plan: Discharge with evaluation only  Safety Devices  Type of Devices: Call light within reach, Nurse notified, Left in bed, Gait belt (per RN no need for alarm.)  Restraints  Restraints Initially in Place: No    Restrictions  Restrictions/Precautions  Restrictions/Precautions: General Precautions  Position Activity Restriction  Other Position/Activity Restrictions: 2 L O 2, telemetry with continuous pulse ox     Subjective  General  Chart Reviewed: Yes  Patient assessed for rehabilitation services?: Yes  Family/Caregiver Present: Yes (wife.)  Referring Practitioner: Nicky Cowan APRN - CNP  Referral Date : 03/01/25  Diagnosis: GI bleed  Follows Commands: Within Functional Limits  Subjective  Subjective: pt found in bed, agreeable to PT intiial evalaution, cleared for eval by RN.     Social/Functional History  Social/Functional History  Lives With: Family (spouse & 26 y. o grandson(temporarily))  Type of Home: House  Home Layout: One level  Home Access: Stairs to enter with rails  Entrance Stairs - Number of Steps: 3  Entrance Stairs - Rails: Right  Bathroom Shower/Tub: Walk-in shower  Bathroom Toilet: Standard  Bathroom Equipment: Built-in shower seat, Grab bars around toilet, Grab bars in shower  Home Equipment: Wheelchair - Manual, Cane - Quad, Walker - 4-Wheeled, Oxygen (2 L O 2, increased to 5 L O 2 with activity)  Has the patient had two or more falls in the past year or any fall with injury in the past year?: No  Prior Level of Assist for ADLs: Independent  Prior Level of Assist for Ambulation: Independent household ambulator, with or without device (without AD)  Active : Yes  Mode of

## 2025-03-02 NOTE — PROGRESS NOTES
[facility] Medicine Progress Note  V 1.6      Date of Admission: 2/28/2025    Hospital Day: 3      Chief Admission Complaint:    Abdominal Pain (Abdominal pain - says he had two bowel movements last night and one today with \"lots of blood in it.\")    Subjective:      Patient up in chair eating breakfast. He has no complaints. No BM since admission. Plan to EGD/Colonoscopy in am.    Presenting Admission History:       This is a 79 y.o. male who presented to [facility] with GIB.  PMHx significant for CAD, HTN, HLD.     Patient states that he started to have a GI bleed couple days ago.  Patient endorses the bleeding progressively worsening.  Patient has prior episodes that he has been admitted for GI bleeds.  Patient on antiplatelet therapy via Brilinta.  Patient denies fever, chills, sweats.  Denies chest pain or palpitations.  Denies shortness of breath.  Endorses cough with mucus that is yellow and green.  Endorses nausea without vomiting.  No diarrhea.  No change in bowel or bladder habits.     Assessment/Plan:       Current Principal Problem:  GI bleed     Anemia - 2nd to acute GI blood loss w/out evidence of ongoing hemodynamically active bleeding and/or hemolysis.  Asymptomatic w/out indication for transfusion. Follow serial Hgb, personally reviewed and documented in this note. Transfused PRN for Hgb < 7.    Baseline Hgb 12.1-14.6  Hgb 03/02/25 11.4  GI consulted and appreciated.  Brillinta and ASA on hold   Iron and mineral studies all wnl    Lactic acidosis  Etiology unclear at this time, cannot rule out infection versus area of ischemia  Lactic acid 2.3 on presentation. Repeat lactic acid 03/02/25 3.4. Hemodynamically stable, afebrile.   Repeat lactic acid in am.     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 Chest x-ray showed possible pneumonia. CT chest without contrast on  clinical significance as documented above.     Recent Labs     02/28/25  1304 02/28/25  2133 03/01/25  1529 03/01/25  2118 03/02/25  0303   WBC 12.3*  --   --   --   --    HGB 12.3*   < > 11.3* 9.9* 10.2*   HCT 36.9*   < > 34.1* 29.6* 30.4*     --   --   --   --     < > = values in this interval not displayed.     Recent Labs     02/28/25  1304 03/01/25  0454 03/02/25  0303    139 138   K 4.0 4.3 3.9   CL 99 107 104   CO2 26 24 26   BUN 18 13 12   CREATININE 0.7* 0.7* 0.8   CALCIUM 10.0 8.5 8.5     No results for input(s): \"PROBNP\", \"TROPHS\" in the last 72 hours.  No results for input(s): \"LABA1C\" in the last 72 hours.  Recent Labs     02/28/25  1304   AST 26   ALT 25   BILITOT 0.6   ALKPHOS 78     Recent Labs     02/28/25  1304 03/01/25  1528   INR 1.05  --    LACTA 2.3* 3.4*       Urine Cultures: No results found for: \"LABURIN\"  Blood Cultures: No results found for: \"BC\"  No results found for: \"BLOODCULT2\"  Organism:   Lab Results   Component Value Date/Time    ORG Staphylococcus aureus 07/29/2021 05:12 PM         Nicky Cowan, MARCELL - CNP

## 2025-03-02 NOTE — PLAN OF CARE
Problem: Chronic Conditions and Co-morbidities  Goal: Patient's chronic conditions and co-morbidity symptoms are monitored and maintained or improved  Outcome: Progressing     Problem: Discharge Planning  Goal: Discharge to home or other facility with appropriate resources  Outcome: Progressing     Problem: Safety - Adult  Goal: Free from fall injury  Outcome: Progressing     Problem: Respiratory - Adult  Goal: Achieves optimal ventilation and oxygenation  Outcome: Progressing  Flowsheets (Taken 3/1/2025 2146)  Achieves optimal ventilation and oxygenation:   Oxygen supplementation based on oxygen saturation or arterial blood gases   Position to facilitate oxygenation and minimize respiratory effort   Assess for changes in respiratory status   Assess for changes in mentation and behavior   Respiratory therapy support as indicated   Encourage broncho-pulmonary hygiene including cough, deep breathe, incentive spirometry     Problem: Musculoskeletal - Adult  Goal: Return mobility to safest level of function  Outcome: Progressing     Problem: Gastrointestinal - Adult  Goal: Minimal or absence of nausea and vomiting  Outcome: Progressing  Goal: Maintains or returns to baseline bowel function  Outcome: Progressing  Flowsheets (Taken 3/1/2025 2146)  Maintains or returns to baseline bowel function: Assess bowel function  Goal: Maintains adequate nutritional intake  Outcome: Progressing

## 2025-03-02 NOTE — PROGRESS NOTES
Occupational Therapy  Facility/Department: [place] [address] - MED SURG/ORTHO  Occupational Therapy Initial/discharge Assessment    Name: Kishan Elaine  : 1945  MRN: [id number]  Date of Service: 3/2/2025    Discharge Recommendations:  Home with assist PRN (No driving until cleared by physician)          Patient Diagnosis(es): The primary encounter diagnosis was Abdominal pain, unspecified abdominal location. Diagnoses of Occult blood positive stool and Current use of long term anticoagulation were also pertinent to this visit.  Past Medical History:  has a past medical history of CAD (coronary artery disease), Chronic rhinitis, COVID, COVID toes, Hyperlipidemia, Hypertension, Ischemic cardiomyopathy, Personal history of COVID-19, STEMI (ST elevation myocardial infarction) (HCC), Tinnitus, and Toe gangrene (HCC).  Past Surgical History:  has a past surgical history that includes Coronary artery bypass graft; Coronary angioplasty (2011); Diagnostic Cardiac Cath Lab Procedure; Coronary angioplasty with stent (2019); Biceps tendon repair; Toe amputation (Bilateral, 2021); and vascular surgery.           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; admitted for lower GI bleed; pt demonstrates independence with BADL's & functional mobility with stable O 2 sats  REQUIRES OT FOLLOW-UP: No  Activity Tolerance  Activity Tolerance: Patient Tolerated treatment well  Activity Tolerance Comments: vitals stable on 2-4 L O 2 with functional mobility     Plan  Occupational Therapy Plan  Times Per Week: OT eval & treat    Restrictions  Restrictions/Precautions  Restrictions/Precautions: General Precautions  Position Activity Restriction  Other Position/Activity Restrictions: 2 L O 2, telemetry with continuous pulse ox    Subjective  General  Chart Reviewed: Yes  Patient assessed for rehabilitation  mobility  Supine to Sit: Independent  Sit to Supine: Independent  Transfers  Sit to stand: Independent  Stand to sit: Independent  Transfer Comments: independent in room on 4 L O 2 without AD     Cognition  Overall Cognitive Status: WNL  Orientation  Overall Orientation Status: Within Normal Limits                  Education Given To: Patient;Family  Education Provided: Role of Therapy;Precautions  Education Provided Comments: disease specific: importance of use of RED/nurse call light for assist with ADL needs, positioning; energy conservation/safety upon discharge home  Education Method: Verbal  Barriers to Learning: None  Education Outcome: Verbalized understanding            AM-PAC - ADL  AM-PAC Daily Activity - Inpatient   How much help is needed for putting on and taking off regular lower body clothing?: None  How much help is needed for bathing (which includes washing, rinsing, drying)?: A Little  How much help is needed for toileting (which includes using toilet, bedpan, or urinal)?: None  How much help is needed for putting on and taking off regular upper body clothing?: A Little  How much help is needed for taking care of personal grooming?: None  How much help for eating meals?: None  AM-PAC Inpatient Daily Activity Raw Score: 22  AM-PAC Inpatient ADL T-Scale Score : 47.1  ADL Inpatient CMS 0-100% Score: 25.8  ADL Inpatient CMS G-Code Modifier : CJ    Tinneti Score       Goals  Patient Goals   Patient goals : \"home when able\"      Therapy Time   Individual Concurrent Group Co-treatment   Time In       1350   Time Out       1420   Minutes       30           Gena Arana OT

## 2025-03-02 NOTE — PROGRESS NOTES
PROGRESS NOTE  S:79 yrs Patient  admitted on 2/28/2025 with GI bleed [K92.2]  Occult blood positive stool [R19.5]  Current use of long term anticoagulation [Z79.01]  Abdominal pain, unspecified abdominal location [R10.9] .    No major overnight events    Current Hospital Schedued Meds   [START ON 3/3/2025] bisacodyl  10 mg Oral Q8H    [START ON 3/3/2025] polyethylene glycol  119 g Oral Once    [START ON 3/3/2025] bisacodyl  10 mg Oral Q8H    bisacodyl  20 mg Oral Once    polyethylene glycol  119 g Oral Once    [START ON 3/3/2025] polyethylene glycol  119 g Oral Once    losartan  12.5 mg Oral Daily    thiamine  50 mg Oral Daily    sodium chloride flush  5-40 mL IntraVENous 2 times per day    pantoprazole (PROTONIX) 40 mg in sodium chloride (PF) 0.9 % 10 mL injection  40 mg IntraVENous Q12H    arformoterol tartrate  15 mcg Nebulization BID    atorvastatin  20 mg Oral Daily    budesonide  500 mcg Nebulization BID    FLUoxetine  20 mg Oral Daily    gabapentin  300 mg Oral QPM     Current Hospital IV Meds   sodium chloride       Current Hospital PRN Meds  albuterol sulfate HFA, sodium chloride flush, sodium chloride, ondansetron **OR** ondansetron, acetaminophen **OR** acetaminophen    Exam:   Vitals:    03/02/25 1633   BP: (!) 117/51   Pulse: 75   Resp:    Temp: 98.4 °F (36.9 °C)   SpO2: 97%     I/O last 3 completed shifts:  In: -   Out: 250 [Urine:250]   General appearance: alert, appears stated age, and cooperative  HEENT: PERRLA  Neck: no adenopathy, no carotid bruit, no JVD, supple, symmetrical, trachea midline, and thyroid not enlarged, symmetric, no tenderness/mass/nodules  Lungs: clear to auscultation bilaterally  Heart: regular rate and rhythm, S1, S2 normal, no murmur, click, rub or gallop  Abdomen: soft, non-tender; bowel sounds normal; no masses,  no organomegaly  Extremities: extremities normal, atraumatic, no cyanosis or edema     Labs:  CBC:   Recent Labs      02/28/25  1304 02/28/25  2133 03/02/25  0303 03/02/25  0920 03/02/25  1513   WBC 12.3*  --   --   --   --    HGB 12.3*   < > 10.2* 11.4* 11.1*   HCT 36.9*   < > 30.4* 34.4* 33.4*   MCV 88.3  --   --   --   --      --   --   --   --     < > = values in this interval not displayed.     BMP:   Recent Labs     02/28/25  1304 03/01/25  0454 03/02/25  0303    139 138   K 4.0 4.3 3.9   CL 99 107 104   CO2 26 24 26   BUN 18 13 12   CREATININE 0.7* 0.7* 0.8     LIVER PROFILE:   Recent Labs     02/28/25  1304   AST 26   ALT 25   LIPASE 33.0   BILITOT 0.6   ALKPHOS 78     PT/INR:   Recent Labs     02/28/25  1304   INR 1.05       IMAGING:  No results found.     Hospital Problems             Last Modified POA    * (Principal) GI bleed 2/28/2025 Yes      Impression:  78 yo male with history of CAD, Htn, ICM, hyperlipidemia presents with complaints of GI bleeding.     Recommendation:  Plan EGD/Colonoscopy Monday      Rylan Alford DO  6:47 PM 3/2/2025            [facility]   [address] [place] [address]    [address] [place], [place] [id number]     Phone: [phone]     Fax: [phone]

## 2025-03-03 ENCOUNTER — ANESTHESIA EVENT (OUTPATIENT)
Dept: ENDOSCOPY | Age: 80
DRG: 378 | End: 2025-03-03
Payer: MEDICARE

## 2025-03-03 ENCOUNTER — CARE COORDINATION (OUTPATIENT)
Dept: CASE MANAGEMENT | Age: 80
End: 2025-03-03

## 2025-03-03 ENCOUNTER — ANESTHESIA (OUTPATIENT)
Dept: ENDOSCOPY | Age: 80
DRG: 378 | End: 2025-03-03
Payer: MEDICARE

## 2025-03-03 LAB
ANION GAP SERPL CALCULATED.3IONS-SCNC: 7 MMOL/L (ref 3–16)
BUN SERPL-MCNC: 6 MG/DL (ref 7–20)
CALCIUM SERPL-MCNC: 9.2 MG/DL (ref 8.3–10.6)
CHLORIDE SERPL-SCNC: 100 MMOL/L (ref 99–110)
CO2 SERPL-SCNC: 28 MMOL/L (ref 21–32)
CREAT SERPL-MCNC: 0.8 MG/DL (ref 0.8–1.3)
GFR SERPLBLD CREATININE-BSD FMLA CKD-EPI: >90 ML/MIN/{1.73_M2}
GLUCOSE SERPL-MCNC: 132 MG/DL (ref 70–99)
HCT VFR BLD AUTO: 30 % (ref 40.5–52.5)
HCT VFR BLD AUTO: 31.4 % (ref 40.5–52.5)
HCT VFR BLD AUTO: 34.3 % (ref 40.5–52.5)
HCT VFR BLD AUTO: 35.1 % (ref 40.5–52.5)
HGB BLD-MCNC: 10.1 G/DL (ref 13.5–17.5)
HGB BLD-MCNC: 10.6 G/DL (ref 13.5–17.5)
HGB BLD-MCNC: 11.2 G/DL (ref 13.5–17.5)
HGB BLD-MCNC: 11.7 G/DL (ref 13.5–17.5)
LACTATE BLDV-SCNC: 1.2 MMOL/L (ref 0.4–2)
POTASSIUM SERPL-SCNC: 3.7 MMOL/L (ref 3.5–5.1)
SODIUM SERPL-SCNC: 135 MMOL/L (ref 136–145)

## 2025-03-03 PROCEDURE — 2580000003 HC RX 258: Performed by: STUDENT IN AN ORGANIZED HEALTH CARE EDUCATION/TRAINING PROGRAM

## 2025-03-03 PROCEDURE — C1889 IMPLANT/INSERT DEVICE, NOC: HCPCS | Performed by: INTERNAL MEDICINE

## 2025-03-03 PROCEDURE — 6370000000 HC RX 637 (ALT 250 FOR IP): Performed by: NURSE PRACTITIONER

## 2025-03-03 PROCEDURE — 2720000010 HC SURG SUPPLY STERILE: Performed by: INTERNAL MEDICINE

## 2025-03-03 PROCEDURE — 3700000001 HC ADD 15 MINUTES (ANESTHESIA): Performed by: INTERNAL MEDICINE

## 2025-03-03 PROCEDURE — 7100000010 HC PHASE II RECOVERY - FIRST 15 MIN: Performed by: INTERNAL MEDICINE

## 2025-03-03 PROCEDURE — 7100000011 HC PHASE II RECOVERY - ADDTL 15 MIN: Performed by: INTERNAL MEDICINE

## 2025-03-03 PROCEDURE — 83605 ASSAY OF LACTIC ACID: CPT

## 2025-03-03 PROCEDURE — 3609017100 HC EGD: Performed by: INTERNAL MEDICINE

## 2025-03-03 PROCEDURE — 0DJ08ZZ INSPECTION OF UPPER INTESTINAL TRACT, VIA NATURAL OR ARTIFICIAL OPENING ENDOSCOPIC: ICD-10-PCS | Performed by: INTERNAL MEDICINE

## 2025-03-03 PROCEDURE — 85018 HEMOGLOBIN: CPT

## 2025-03-03 PROCEDURE — 6370000000 HC RX 637 (ALT 250 FOR IP): Performed by: STUDENT IN AN ORGANIZED HEALTH CARE EDUCATION/TRAINING PROGRAM

## 2025-03-03 PROCEDURE — 94640 AIRWAY INHALATION TREATMENT: CPT

## 2025-03-03 PROCEDURE — 6370000000 HC RX 637 (ALT 250 FOR IP): Performed by: INTERNAL MEDICINE

## 2025-03-03 PROCEDURE — 85014 HEMATOCRIT: CPT

## 2025-03-03 PROCEDURE — 2709999900 HC NON-CHARGEABLE SUPPLY: Performed by: INTERNAL MEDICINE

## 2025-03-03 PROCEDURE — 80048 BASIC METABOLIC PNL TOTAL CA: CPT

## 2025-03-03 PROCEDURE — 3609009900 HC COLONOSCOPY W/CONTROL BLEEDING ANY METHOD: Performed by: INTERNAL MEDICINE

## 2025-03-03 PROCEDURE — 94761 N-INVAS EAR/PLS OXIMETRY MLT: CPT

## 2025-03-03 PROCEDURE — 6360000002 HC RX W HCPCS: Performed by: STUDENT IN AN ORGANIZED HEALTH CARE EDUCATION/TRAINING PROGRAM

## 2025-03-03 PROCEDURE — 6360000002 HC RX W HCPCS

## 2025-03-03 PROCEDURE — 3700000000 HC ANESTHESIA ATTENDED CARE: Performed by: INTERNAL MEDICINE

## 2025-03-03 PROCEDURE — 2500000003 HC RX 250 WO HCPCS: Performed by: STUDENT IN AN ORGANIZED HEALTH CARE EDUCATION/TRAINING PROGRAM

## 2025-03-03 PROCEDURE — 2700000000 HC OXYGEN THERAPY PER DAY

## 2025-03-03 PROCEDURE — 36415 COLL VENOUS BLD VENIPUNCTURE: CPT

## 2025-03-03 PROCEDURE — 1200000000 HC SEMI PRIVATE

## 2025-03-03 PROCEDURE — 0W3P8ZZ CONTROL BLEEDING IN GASTROINTESTINAL TRACT, VIA NATURAL OR ARTIFICIAL OPENING ENDOSCOPIC: ICD-10-PCS | Performed by: INTERNAL MEDICINE

## 2025-03-03 DEVICE — WORKING LENGTH 235CM, WORKING CHANNEL 2.8MM
Type: IMPLANTABLE DEVICE | Site: CECUM | Status: FUNCTIONAL
Brand: RESOLUTION 360 CLIP

## 2025-03-03 RX ORDER — LIDOCAINE HYDROCHLORIDE 20 MG/ML
INJECTION, SOLUTION INFILTRATION; PERINEURAL
Status: DISCONTINUED | OUTPATIENT
Start: 2025-03-03 | End: 2025-03-03 | Stop reason: SDUPTHER

## 2025-03-03 RX ORDER — PHENYLEPHRINE HCL IN 0.9% NACL 1 MG/10 ML
SYRINGE (ML) INTRAVENOUS
Status: DISCONTINUED | OUTPATIENT
Start: 2025-03-03 | End: 2025-03-03 | Stop reason: SDUPTHER

## 2025-03-03 RX ORDER — ONDANSETRON 2 MG/ML
4 INJECTION INTRAMUSCULAR; INTRAVENOUS EVERY 30 MIN PRN
Status: DISCONTINUED | OUTPATIENT
Start: 2025-03-03 | End: 2025-03-03 | Stop reason: HOSPADM

## 2025-03-03 RX ORDER — PROPOFOL 10 MG/ML
INJECTION, EMULSION INTRAVENOUS
Status: DISCONTINUED | OUTPATIENT
Start: 2025-03-03 | End: 2025-03-03 | Stop reason: SDUPTHER

## 2025-03-03 RX ORDER — SODIUM CHLORIDE 0.9 % (FLUSH) 0.9 %
5-40 SYRINGE (ML) INJECTION EVERY 12 HOURS SCHEDULED
Status: DISCONTINUED | OUTPATIENT
Start: 2025-03-03 | End: 2025-03-03 | Stop reason: HOSPADM

## 2025-03-03 RX ORDER — SODIUM CHLORIDE 0.9 % (FLUSH) 0.9 %
5-40 SYRINGE (ML) INJECTION PRN
Status: DISCONTINUED | OUTPATIENT
Start: 2025-03-03 | End: 2025-03-03 | Stop reason: HOSPADM

## 2025-03-03 RX ORDER — SODIUM CHLORIDE 9 MG/ML
INJECTION, SOLUTION INTRAVENOUS PRN
Status: DISCONTINUED | OUTPATIENT
Start: 2025-03-03 | End: 2025-03-03 | Stop reason: HOSPADM

## 2025-03-03 RX ORDER — NALOXONE HYDROCHLORIDE 0.4 MG/ML
INJECTION, SOLUTION INTRAMUSCULAR; INTRAVENOUS; SUBCUTANEOUS PRN
Status: DISCONTINUED | OUTPATIENT
Start: 2025-03-03 | End: 2025-03-03 | Stop reason: HOSPADM

## 2025-03-03 RX ADMIN — Medication 40 MG: at 03:59

## 2025-03-03 RX ADMIN — Medication 50 MCG: at 11:36

## 2025-03-03 RX ADMIN — Medication 1 LOZENGE: at 18:34

## 2025-03-03 RX ADMIN — FLUOXETINE HYDROCHLORIDE 20 MG: 20 CAPSULE ORAL at 21:58

## 2025-03-03 RX ADMIN — Medication 10 ML: at 09:19

## 2025-03-03 RX ADMIN — Medication 40 MG: at 14:42

## 2025-03-03 RX ADMIN — LOSARTAN POTASSIUM 12.5 MG: 25 TABLET, FILM COATED ORAL at 09:18

## 2025-03-03 RX ADMIN — ATORVASTATIN CALCIUM 20 MG: 10 TABLET, FILM COATED ORAL at 21:58

## 2025-03-03 RX ADMIN — BISACODYL 10 MG: 5 TABLET, COATED ORAL at 09:18

## 2025-03-03 RX ADMIN — ARFORMOTEROL TARTRATE 15 MCG: 15 SOLUTION RESPIRATORY (INHALATION) at 20:04

## 2025-03-03 RX ADMIN — Medication 100 MCG: at 11:22

## 2025-03-03 RX ADMIN — Medication 150 MCG: at 11:46

## 2025-03-03 RX ADMIN — BUDESONIDE 500 MCG: 0.5 SUSPENSION RESPIRATORY (INHALATION) at 20:04

## 2025-03-03 RX ADMIN — ARFORMOTEROL TARTRATE 15 MCG: 15 SOLUTION RESPIRATORY (INHALATION) at 07:48

## 2025-03-03 RX ADMIN — GABAPENTIN 300 MG: 300 CAPSULE ORAL at 18:08

## 2025-03-03 RX ADMIN — Medication 50 MCG: at 11:27

## 2025-03-03 RX ADMIN — BUDESONIDE 500 MCG: 0.5 SUSPENSION RESPIRATORY (INHALATION) at 07:49

## 2025-03-03 RX ADMIN — Medication 100 MCG: at 11:32

## 2025-03-03 RX ADMIN — SODIUM CHLORIDE: 9 INJECTION, SOLUTION INTRAVENOUS at 11:14

## 2025-03-03 RX ADMIN — POLYETHYLENE GLYCOL 3350 119 G: 17 POWDER, FOR SOLUTION ORAL at 03:59

## 2025-03-03 RX ADMIN — PROPOFOL 50 MG: 10 INJECTION, EMULSION INTRAVENOUS at 11:20

## 2025-03-03 RX ADMIN — Medication 10 ML: at 21:59

## 2025-03-03 RX ADMIN — PROPOFOL 10 MG: 10 INJECTION, EMULSION INTRAVENOUS at 11:53

## 2025-03-03 RX ADMIN — Medication 50 MCG: at 11:40

## 2025-03-03 RX ADMIN — BISACODYL 10 MG: 5 TABLET, COATED ORAL at 00:52

## 2025-03-03 RX ADMIN — PROPOFOL 120 MCG/KG/MIN: 10 INJECTION, EMULSION INTRAVENOUS at 11:19

## 2025-03-03 RX ADMIN — LIDOCAINE HYDROCHLORIDE 50 MG: 20 INJECTION, SOLUTION INFILTRATION; PERINEURAL at 11:19

## 2025-03-03 ASSESSMENT — PAIN SCALES - GENERAL: PAINLEVEL_OUTOF10: 0

## 2025-03-03 ASSESSMENT — PAIN - FUNCTIONAL ASSESSMENT: PAIN_FUNCTIONAL_ASSESSMENT: 0-10

## 2025-03-03 ASSESSMENT — PAIN DESCRIPTION - LOCATION: LOCATION: THROAT

## 2025-03-03 NOTE — BRIEF OP NOTE
EGD    Indications:  Anemia  Medications:  Monitored Anesthesia Care  See the Anesthesia note for documentation of the administered medications  Complications:  No immediate complications.  Estimated Blood Loss:  Estimated blood loss was minimal.      Procedure:  The gastroscope was introduced through the mouth and advanced to the second part of the duodenum.  The upper GI endoscopy was accomplished without dif  ficulty.  The patient tolerated the procedure well.      Findings:  The examined duodenum was normal.  A medium-sized hiatal hernia was present.  The examined esophagus was normal.  Impression:  Normal examined duodenum.  Medium-sized hiatal hernia.  Normal esophagus.  No specimens collected.    Recommendation:  No findings to explain anemia  See colonoscopy report for further recommendations    COLONOSCOPY      Indications:  Anemia  Medications:  Monitored Anesthesia Care  Complications:  No immediate complications.  Estimated Blood Loss:  Estimated blood loss was minimal.  Procedure:  The Pedicolonoscope was introduced through the anus and advanced to the cecum, identified by appendiceal orifice and ileocecal valve.  The [id number] was introduced through the anus and advanced to the cecum, identified by appendiceal orifice and ileocecal valve.  The colonoscopy was performed without dif  ficulty.  The patient tolerated the procedure well.  The quality of the bowel preparation was good.      Findings:  A single large localized angiodysplastic lesion with bleeding on contact was found in the cecum.   Coagulation for hemostasis using argon  plasma was unsuccessful.   To prevent bleeding post-intervention, one hemostatic clip was successfully placed.   There was no bleeding at  the end of the procedure.  A medium (7-9 mm) polyp was found in the ascending colon.   The polyp was flat.   Polypectomy not attempted due to recent Brilinta use  The exam was otherwise without abnormality on direct and retroflexion

## 2025-03-03 NOTE — H&P
Gastroenterology Preop Assessment    Patient:   Kishan Elaine   :    1945   Facility:   [facility]  Referring/PCP: Rosy Toro MD  Date:     3/3/2025    Subjective:   Procedure: EGD/colonoscopy    HPI/Reason for procedure:  Anemia    Past Medical History:   Diagnosis Date    CAD (coronary artery disease)     Chronic rhinitis 2022    COVID     COVID toes     Hyperlipidemia     Hypertension     Ischemic cardiomyopathy 2019    Personal history of COVID-19 10/27/2021    STEMI (ST elevation myocardial infarction) (Formerly [facility]) 2019    Tinnitus     Toe gangrene ([facility]) 2021     Past Surgical History:   Procedure Laterality Date    BICEPS TENDON REPAIR      CORONARY ANGIOPLASTY  2011    emergency PCI: vein to RCA    CORONARY ANGIOPLASTY WITH STENT PLACEMENT  2019    PCI to distal SVG-RCA with ELIZABETH    CORONARY ARTERY BYPASS GRAFT      in  CABG x4 and in  CABG x2    DIAGNOSTIC CARDIAC CATH LAB PROCEDURE      TOE AMPUTATION Bilateral 2021    AMPUTATION OF LEFT 2ND DIGIT LEFT FOOT, RIGHT 3RD DIGIT,  RIGHT PARTIAL 4TH DIGIT RIGHT FOOT performed by Ann Kinney DPM at [facility] OR    VASCULAR SURGERY         Social:   Social History     Tobacco Use    Smoking status: Former     Current packs/day: 0.00     Average packs/day: 1 pack/day for 29.4 years (29.4 ttl pk-yrs)     Types: Cigarettes     Start date: 1961     Quit date: 1991     Years since quittin.1    Smokeless tobacco: Never    Tobacco comments:     Smoked when i was a kid   Substance Use Topics    Alcohol use: No     Family:   Family History   Problem Relation Age of Onset    Cancer Mother         skin cancer    Diabetes Mother     High Blood Pressure Father     Heart Disease Father     No Known Problems Brother     No Known Problems Brother     Heart Disease Paternal Uncle     Heart Disease Paternal Cousin        Scheduled Medications:    polyethylene glycol  119 g Oral Once    bisacodyl  not displayed.     BMP:   Recent Labs     03/01/25  0454 03/02/25  0303 03/03/25  0537    138 135*   K 4.3 3.9 3.7    104 100   CO2 24 26 28   BUN 13 12 6*   CREATININE 0.7* 0.8 0.8     LIVER PROFILE:   Recent Labs     02/28/25  1304   AST 26   ALT 25   LIPASE 33.0   BILITOT 0.6   ALKPHOS 78     PT/INR:   Recent Labs     02/28/25  1304   INR 1.05       Pre-Procedure Assessment / Plan:  ASA: Class 3 - A patient with severe systemic disease that limits activity but is not incapacitating  Airway: Mallampati: III (soft palate, base of uvula visible)  Level of Sedation Plan:Moderate sedation  Post Procedure plan: Return to same level of care      Plan:   EGD/colonoscopy    I assessed the patient and find that the patient is in satisfactory condition to proceed with the planned procedure and sedation plan.    I have explained the risk, benefits, and alternatives to the procedure; the patient understands and agrees to proceed.       Julia Mcintyre MD  3/3/2025

## 2025-03-03 NOTE — PROGRESS NOTES
[facility] Medicine Progress Note  V 1.6      Date of Admission: 2/28/2025    Hospital Day: 4      Chief Admission Complaint:    Abdominal Pain (Abdominal pain - says he had two bowel movements last night and one today with \"lots of blood in it.\")    Subjective:      I saw patient after his scopes today. He is in good spirits and tolerated procedure well. His only complaint is he feels cold. Wife at bedside.     Presenting Admission History:       This is a 79 y.o. male who presented to [facility] with GIB.  PMHx significant for CAD, HTN, HLD.     Patient states that he started to have a GI bleed couple days ago.  Patient endorses the bleeding progressively worsening.  Patient has prior episodes that he has been admitted for GI bleeds.  Patient on antiplatelet therapy via Brilinta.  Patient denies fever, chills, sweats.  Denies chest pain or palpitations.  Denies shortness of breath.  Endorses cough with mucus that is yellow and green.  Endorses nausea without vomiting.  No diarrhea.  No change in bowel or bladder habits.     Assessment/Plan:       Current Principal Problem:  GI bleed     Anemia - 2nd to acute GI blood loss w/out evidence of ongoing hemodynamically active bleeding and/or hemolysis.  Asymptomatic w/out indication for transfusion. Follow serial Hgb, personally reviewed and documented in this note. Transfused PRN for Hgb < 7.    Baseline Hgb 12.1-14.6  Hgb 03/03/25 11.7  GI consulted and appreciated.POC: EGD and Colonoscopy today revealed normal esophagus and duodenum with a single colonic angiodysplastic lesion. Treated with argon plasma coagulation (APC). Treatment not successful. Clip was placed. Needs follow up colonoscopy in 3 months. I spoke with Dr. Mcintyre and he states it is okay to restart Brillinta in am.   Iron and mineral studies all wnl    Lactic acidosis  Etiology unclear at this time, cannot rule out infection versus area of ischemia  Lactic acid 2.3 on  presentation. Repeat lactic acid 03/03/25 3.4. Hemodynamically stable, afebrile.   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 Chest x-ray showed possible pneumonia. CT chest without contrast on 2/27/2025 showed nonspecific interstitial lung disease with diffuse reticular abnormality, bronchiectasis, groundglass attenuation, and cystic changes throughout bilateral lungs  Pro-calcitonin 0.04, hemodynamically stable, afebrile  Stop cefepime and vancomycin at this time as procalcitonin is negative. Most likely 2/2 ILD with pulmonary fibrosis.     Chronic Respiratory Failure - w/ hypoxia at baseline.  On baseline home O2 at 3-5L/NC with ambulation. No O2 at home at rest. Continue supplemental O2 and wean as tolerated.      CHF - acute on chronic combined systolic/diastolic failure w/ reduced EF 50-50% by Echo dated 1/20/25.  Likely due to hypertensive and/or ischemic heart disease. Continue diuresis as currently ordered w/ close monitoring of BUN/Creatinine and electrolytes for ADR.  Continue current medical management and follow input and output as well as daily weights as recorded and clinical response. Consider GDMT at discharge unless contraindicated.  (ACEi/ARB/ARNI, SGLT2i, Aldactone, BBlocker).    On ARB and lasix 40 mg PO Every other day only at home. No BB d/t bradycardia and fatigue  CHF protocol in place    ICM  CAD~s/p CABG 2011~PCI to distal SVG RCA 1/2019   On Asa and Brillinta at home, held d/t possible GIB  Cardiology recently stopped his beta blockers due to fatigue and low heart rate      COPD - w/ chronic hypoxic respiratory failure on baseline home O2 at 3-5 L per NC with ambulation and none to 1L at rest.  Controlled on home medication regimen - continued.       HTN w/ CAD - but no evidence of active signs and/or symptoms of ischemia and/or failure.

## 2025-03-03 NOTE — CARE COORDINATION
CTN contacted [facility] inpatient  on A1 and l/m on [phone] regarding patient eligibility for RPM.     Jessica Bales RN   [phone]

## 2025-03-03 NOTE — CARE COORDINATION
Case Management Assessment  Initial Evaluation    Date/Time of Evaluation: 3/3/2025 2:50 PM  Assessment Completed by: Cailin Castelan RN    If patient is discharged prior to next notation, then this note serves as note for discharge by case management.    Patient Name: Kishan Elaine                   YOB: 1945  Diagnosis: GI bleed [K92.2]  Occult blood positive stool [R19.5]  Current use of long term anticoagulation [Z79.01]  Abdominal pain, unspecified abdominal location [R10.9]                   Date / Time: 2/28/2025 12:51 PM    Patient Admission Status: Inpatient   Readmission Risk (Low < 19, Mod (19-27), High > 27): Readmission Risk Score: 17.8    Current PCP: Rosy Toro MD  PCP verified by CM? Yes    Chart Reviewed: Yes      History Provided by: Patient  Patient Orientation: Alert and Oriented    Patient Cognition: Alert    Hospitalization in the last 30 days (Readmission):  No    If yes, Readmission Assessment in  Navigator will be completed.    Advance Directives:      Code Status: Full Code   Patient's Primary Decision Maker is: Legal Next of Kin    Primary Decision Maker: Elva Elaine - Spouse - [phone]    Secondary Decision Maker: Kishan Elaine - Child - [phone]    Secondary Decision Maker: ElaineSteven - Child - [phone]    Discharge Planning:    Patient lives with: Spouse/Significant Other Type of Home: House  Primary Care Giver: Self  Patient Support Systems include: Spouse/Significant Other, Children, Family Members, Friends/Neighbors   Current Financial resources: Medicare  Current community resources: None  Current services prior to admission: Oxygen Therapy            Current DME:              Type of Home Care services:  None    ADLS  Prior functional level: Independent in ADLs/IADLs  Current functional level: Independent in ADLs/IADLs    PT AM-PAC: 24 /24  OT AM-PAC: 22 /24    Family can provide assistance at DC: Yes  Would you like Case Management to  care/goals and shares the quality data associated with the providers was provided to: Patient   Patient Representative Name:       The Patient and/or Patient Representative Agree with the Discharge Plan? Yes    Cailin Castelan RN  Case Management Department  Ph: [phone] Fax: [phone]

## 2025-03-03 NOTE — DISCHARGE INSTRUCTIONS
Heart Failure Resources:  Heart Failure Interactive Workbook:  Go to https://[employer]Portero[employer]/publication/?a=208511 for a Free Heart Failure Interactive Workbook provided by The American Heart Association. This interactive workbook will provide information on Healthier Living with Heart Failure. Please copy and paste link into search bar. Use your mouse to scroll through the pages.    HF Descanso alejandro:   Heart Failure Free smart phone alejandro available for iPhone and Android download. Use your phone to track sodium intake, fluid intake, symptoms, and weight.     Low Sodium Diet / Recipes:  Go to www.[employer] website for “renal” diet which is Low Sodium! [employer] is a dialysis company, but this website offers free seasonal cookbooks. Each quarter, they will release 25-30 new recipes with a breakdown of calories, sodium, and glucose. You can also go to www.[employer]de[employer]/recipes website for free recipes.     Home Exercise Program:   Identification of Green/Yellow/Red zones:  You should be able to identify when you feel good (green zone), if you have 1-2 symptoms of HF (yellow zone), or if you are in need of medical attention (red zone).  In your CHF education folder you were provided a “stop light tool” to outline this information.     We want to you to rate your exertion levels:    Our therapy team has discussed means of identification with you such as the \"Radha scale.\"  The Radha rating scale ranges from 6 to 20, where 6 means \"no exertion at all\" and 20 means \"maximal exertion.\" The goal is to use this to gauge how much effort it is taking for you to do your normal daily tasks.   You should be able to recognize when too much exertion is being expended.    Elements of Energy Conservation:   Prioritize/Plan: Decide what needs to be done today, and what can wait for a later date, write to do lists, plan ahead to avoid extra trips, and gather supplies and equipment needed before starting an activity.   Position:

## 2025-03-03 NOTE — ANESTHESIA POSTPROCEDURE EVALUATION
Department of Anesthesiology  Postprocedure Note    Patient: Kishan Elaine  MRN: [id number]  YOB: 1945  Date of evaluation: 3/3/2025    Procedure Summary       Date: 03/03/25 Room / Location: [place] [address] / [facility]    Anesthesia Start: 1114 Anesthesia Stop: 1208    Procedures:       ESOPHAGOGASTRODUODENOSCOPY      COLONOSCOPY CONTROL HEMORRHAGE Diagnosis:       Gastrointestinal hemorrhage, unspecified gastrointestinal hemorrhage type      (Gastrointestinal hemorrhage, unspecified gastrointestinal hemorrhage type [K92.2])    Surgeons: Julia Mcintyre MD Responsible Provider: Bhupinder Hickey MD    Anesthesia Type: MAC ASA Status: 3            Anesthesia Type: No value filed.    Rabia Phase I: Rabia Score: 9    Rabia Phase II: Rabia Score: 8    Anesthesia Post Evaluation    Patient location during evaluation: PACU  Level of consciousness: awake  Airway patency: patent  Nausea & Vomiting: no vomiting  Cardiovascular status: blood pressure returned to baseline  Respiratory status: acceptable  Hydration status: stable  Pain management: adequate    No notable events documented.

## 2025-03-03 NOTE — PLAN OF CARE
Problem: Safety - Adult  Goal: Free from fall injury  3/3/2025 1032 by Kimberly Juárez, RN  Outcome: Progressing

## 2025-03-03 NOTE — PLAN OF CARE
Problem: Chronic Conditions and Co-morbidities  Goal: Patient's chronic conditions and co-morbidity symptoms are monitored and maintained or improved  Outcome: Progressing     Problem: Discharge Planning  Goal: Discharge to home or other facility with appropriate resources  Outcome: Progressing     Problem: Safety - Adult  Goal: Free from fall injury  Outcome: Progressing     Problem: Pain  Goal: Verbalizes/displays adequate comfort level or baseline comfort level  Outcome: Progressing     Problem: Respiratory - Adult  Goal: Achieves optimal ventilation and oxygenation  Outcome: Progressing     Problem: Musculoskeletal - Adult  Goal: Return mobility to safest level of function  Outcome: Progressing     Problem: Gastrointestinal - Adult  Goal: Minimal or absence of nausea and vomiting  Outcome: Progressing  Goal: Maintains or returns to baseline bowel function  Outcome: Progressing  Goal: Maintains adequate nutritional intake  Outcome: Progressing

## 2025-03-03 NOTE — ANESTHESIA PRE PROCEDURE
Department of Anesthesiology  Preprocedure Note       Name:  Kishan Elaine   Age:  79 y.o.  :  1945                                          MRN:  [id number]         Date:  3/3/2025      Surgeon: Surgeon(s):  Julia Mcintyre MD    Procedure: Procedure(s):  ESOPHAGOGASTRODUODENOSCOPY  COLONOSCOPY    Medications prior to admission:   Prior to Admission medications    Medication Sig Start Date End Date Taking? Authorizing Provider   atorvastatin (LIPITOR) 20 MG tablet TAKE ONE TABLET BY MOUTH DAILY 25  Yes Ian Tirado MD   losartan (COZAAR) 25 MG tablet Take 1 tablet by mouth daily 25  Yes Ian Tirado MD   nitroGLYCERIN (NITROSTAT) 0.4 MG SL tablet Place 1 tablet under the tongue every 5 minutes as needed for Chest pain As needed 25  Yes Ian Tirado MD   furosemide (LASIX) 20 MG tablet TAKE 2 TABLETS BY MOUTH EVERY OTHER DAY AS NEEDED FOR INCREASING FOR SHORTNESS OF BREATH, LEG EDEMA UNTIL SEEN IN THE PULMONARY CLINIC 25  Yes Raudel Brown MD   albuterol sulfate HFA (PROAIR HFA) 108 (90 Base) MCG/ACT inhaler Inhale 2 puffs into the lungs every 6 hours as needed for Wheezing or Shortness of Breath 25  Yes Jihan Dias APRN - CNP   BRILINTA 60 MG TABS tablet TAKE 1 TABLET BY MOUTH 2 TIMES A DAY 25  Yes Ian Tirado MD   arformoterol tartrate (BROVANA) 15 MCG/2ML NEBU Take 2 mLs by nebulization 2 times daily Run under Medicare Part B, j44.9 25  Yes Jihan Dias APRN - CNP   budesonide (PULMICORT) 0.5 MG/2ML nebulizer suspension Take 2 mLs by nebulization 2 times daily Run under Medicare Part B, j44.9 25  Yes Jihan Dias APRN - CNP   gabapentin (NEURONTIN) 300 MG capsule Take 1 capsule by mouth every evening for 180 days. 24 Yes Rosy Toro MD   FLUoxetine (PROZAC) 20 MG capsule Take 1 capsule by mouth daily 6/3/24  Yes Rosy Toro MD   halobetasol (ULTRAVATE) 0.05 % cream APPLY TO AFFECTED AREA(S) TWO TIMES A DAY

## 2025-03-04 LAB
ALBUMIN SERPL-MCNC: 3.6 G/DL (ref 3.4–5)
ALP SERPL-CCNC: 64 U/L (ref 40–129)
ALT SERPL-CCNC: 21 U/L (ref 10–40)
ANION GAP SERPL CALCULATED.3IONS-SCNC: 9 MMOL/L (ref 3–16)
AST SERPL-CCNC: 23 U/L (ref 15–37)
BASOPHILS # BLD: 0.1 K/UL (ref 0–0.2)
BASOPHILS NFR BLD: 1.8 %
BILIRUB DIRECT SERPL-MCNC: 0.2 MG/DL (ref 0–0.3)
BILIRUB INDIRECT SERPL-MCNC: 0.1 MG/DL (ref 0–1)
BILIRUB SERPL-MCNC: 0.3 MG/DL (ref 0–1)
BUN SERPL-MCNC: 9 MG/DL (ref 7–20)
CALCIUM SERPL-MCNC: 8.9 MG/DL (ref 8.3–10.6)
CHLORIDE SERPL-SCNC: 105 MMOL/L (ref 99–110)
CO2 SERPL-SCNC: 26 MMOL/L (ref 21–32)
CREAT SERPL-MCNC: 1 MG/DL (ref 0.8–1.3)
DEPRECATED RDW RBC AUTO: 15.7 % (ref 12.4–15.4)
EOSINOPHIL # BLD: 0.2 K/UL (ref 0–0.6)
EOSINOPHIL NFR BLD: 3.7 %
GFR SERPLBLD CREATININE-BSD FMLA CKD-EPI: 77 ML/MIN/{1.73_M2}
GLUCOSE SERPL-MCNC: 128 MG/DL (ref 70–99)
HCT VFR BLD AUTO: 29 % (ref 40.5–52.5)
HCT VFR BLD AUTO: 29.7 % (ref 40.5–52.5)
HCT VFR BLD AUTO: 32.5 % (ref 40.5–52.5)
HGB BLD-MCNC: 10.9 G/DL (ref 13.5–17.5)
HGB BLD-MCNC: 9.8 G/DL (ref 13.5–17.5)
HGB BLD-MCNC: 9.9 G/DL (ref 13.5–17.5)
LYMPHOCYTES # BLD: 1.2 K/UL (ref 1–5.1)
LYMPHOCYTES NFR BLD: 18.1 %
MAGNESIUM SERPL-MCNC: 1.92 MG/DL (ref 1.8–2.4)
MCH RBC QN AUTO: 29.2 PG (ref 26–34)
MCHC RBC AUTO-ENTMCNC: 33.4 G/DL (ref 31–36)
MCV RBC AUTO: 87.6 FL (ref 80–100)
MONOCYTES # BLD: 0.7 K/UL (ref 0–1.3)
MONOCYTES NFR BLD: 10.1 %
NEUTROPHILS # BLD: 4.3 K/UL (ref 1.7–7.7)
NEUTROPHILS NFR BLD: 66.3 %
PLATELET # BLD AUTO: 145 K/UL (ref 135–450)
PMV BLD AUTO: 8.3 FL (ref 5–10.5)
POTASSIUM SERPL-SCNC: 3.7 MMOL/L (ref 3.5–5.1)
PROT SERPL-MCNC: 6.1 G/DL (ref 6.4–8.2)
RBC # BLD AUTO: 3.39 M/UL (ref 4.2–5.9)
SODIUM SERPL-SCNC: 140 MMOL/L (ref 136–145)
WBC # BLD AUTO: 6.6 K/UL (ref 4–11)

## 2025-03-04 PROCEDURE — 83735 ASSAY OF MAGNESIUM: CPT

## 2025-03-04 PROCEDURE — 6370000000 HC RX 637 (ALT 250 FOR IP): Performed by: STUDENT IN AN ORGANIZED HEALTH CARE EDUCATION/TRAINING PROGRAM

## 2025-03-04 PROCEDURE — 2500000003 HC RX 250 WO HCPCS: Performed by: STUDENT IN AN ORGANIZED HEALTH CARE EDUCATION/TRAINING PROGRAM

## 2025-03-04 PROCEDURE — 1200000000 HC SEMI PRIVATE

## 2025-03-04 PROCEDURE — 85025 COMPLETE CBC W/AUTO DIFF WBC: CPT

## 2025-03-04 PROCEDURE — 6360000002 HC RX W HCPCS: Performed by: STUDENT IN AN ORGANIZED HEALTH CARE EDUCATION/TRAINING PROGRAM

## 2025-03-04 PROCEDURE — 2700000000 HC OXYGEN THERAPY PER DAY

## 2025-03-04 PROCEDURE — 2580000003 HC RX 258: Performed by: STUDENT IN AN ORGANIZED HEALTH CARE EDUCATION/TRAINING PROGRAM

## 2025-03-04 PROCEDURE — 80048 BASIC METABOLIC PNL TOTAL CA: CPT

## 2025-03-04 PROCEDURE — 36415 COLL VENOUS BLD VENIPUNCTURE: CPT

## 2025-03-04 PROCEDURE — 85018 HEMOGLOBIN: CPT

## 2025-03-04 PROCEDURE — 6370000000 HC RX 637 (ALT 250 FOR IP): Performed by: NURSE PRACTITIONER

## 2025-03-04 PROCEDURE — 85014 HEMATOCRIT: CPT

## 2025-03-04 PROCEDURE — 94640 AIRWAY INHALATION TREATMENT: CPT

## 2025-03-04 PROCEDURE — 94761 N-INVAS EAR/PLS OXIMETRY MLT: CPT

## 2025-03-04 PROCEDURE — 80076 HEPATIC FUNCTION PANEL: CPT

## 2025-03-04 RX ORDER — ASPIRIN 81 MG/1
81 TABLET, CHEWABLE ORAL DAILY
Status: DISCONTINUED | OUTPATIENT
Start: 2025-03-04 | End: 2025-03-05

## 2025-03-04 RX ADMIN — Medication 50 MG: at 08:34

## 2025-03-04 RX ADMIN — BUDESONIDE 500 MCG: 0.5 SUSPENSION RESPIRATORY (INHALATION) at 19:52

## 2025-03-04 RX ADMIN — BUDESONIDE 500 MCG: 0.5 SUSPENSION RESPIRATORY (INHALATION) at 08:05

## 2025-03-04 RX ADMIN — ASPIRIN 81 MG: 81 TABLET, CHEWABLE ORAL at 11:28

## 2025-03-04 RX ADMIN — TICAGRELOR 60 MG: 60 TABLET ORAL at 08:38

## 2025-03-04 RX ADMIN — Medication 40 MG: at 14:55

## 2025-03-04 RX ADMIN — TICAGRELOR 60 MG: 60 TABLET ORAL at 22:07

## 2025-03-04 RX ADMIN — Medication 10 ML: at 08:37

## 2025-03-04 RX ADMIN — ARFORMOTEROL TARTRATE 15 MCG: 15 SOLUTION RESPIRATORY (INHALATION) at 19:52

## 2025-03-04 RX ADMIN — Medication 1 LOZENGE: at 22:07

## 2025-03-04 RX ADMIN — ATORVASTATIN CALCIUM 20 MG: 10 TABLET, FILM COATED ORAL at 22:07

## 2025-03-04 RX ADMIN — GABAPENTIN 300 MG: 300 CAPSULE ORAL at 18:15

## 2025-03-04 RX ADMIN — Medication 1 LOZENGE: at 18:16

## 2025-03-04 RX ADMIN — LOSARTAN POTASSIUM 12.5 MG: 25 TABLET, FILM COATED ORAL at 08:34

## 2025-03-04 RX ADMIN — FLUOXETINE HYDROCHLORIDE 20 MG: 20 CAPSULE ORAL at 22:07

## 2025-03-04 RX ADMIN — ARFORMOTEROL TARTRATE 15 MCG: 15 SOLUTION RESPIRATORY (INHALATION) at 08:05

## 2025-03-04 ASSESSMENT — PAIN DESCRIPTION - LOCATION: LOCATION: THROAT

## 2025-03-04 ASSESSMENT — PAIN SCALES - GENERAL
PAINLEVEL_OUTOF10: 2
PAINLEVEL_OUTOF10: 4

## 2025-03-04 NOTE — PROGRESS NOTES
[facility] Medicine Progress Note  V 1.6      Date of Admission: 2/28/2025    Hospital Day: 5      Chief Admission Complaint:    Abdominal Pain (Abdominal pain - says he had two bowel movements last night and one today with \"lots of blood in it.\")    Subjective:      Patient up in the chair finishing breakfast. He has no complaints. We discuss restarting Brillinta today and monitoring Hgb closely and he remains stable we can plan to d/c in am. He agrees to this POC.       Presenting Admission History:       This is a 79 y.o. male who presented to [facility] with GIB.  PMHx significant for CAD, HTN, HLD.     Patient states that he started to have a GI bleed couple days ago.  Patient endorses the bleeding progressively worsening.  Patient has prior episodes that he has been admitted for GI bleeds.  Patient on antiplatelet therapy via Brilinta.  Patient denies fever, chills, sweats.  Denies chest pain or palpitations.  Denies shortness of breath.  Endorses cough with mucus that is yellow and green.  Endorses nausea without vomiting.  No diarrhea.  No change in bowel or bladder habits.     Assessment/Plan:       Current Principal Problem:  GI bleed     Anemia - 2nd to acute GIB and blood loss w/out evidence of ongoing hemodynamically active bleeding and/or hemolysis.  Asymptomatic w/out indication for transfusion. Follow serial Hgb, personally reviewed and documented in this note. Transfused PRN for Hgb < 7.    Baseline Hgb 12.1-14.6  Hgb 03/04/25 9.9  Iron and mineral studies all wnl  GI consulted and appreciated.POC: EGD and Colonoscopy today revealed normal esophagus and duodenum with a single colonic angiodysplastic lesion. Treated with argon plasma coagulation (APC). Treatment not successful. Clip was placed. Needs follow up colonoscopy in 3 months. I spoke with Dr. Mcintyre and he states it is okay to restart Brillinta in am.   Restart Brillinta today    Lactic acidosis  Etiology unclear at  medication regimen - continued.       HTN w/ CAD - but no evidence of active signs and/or symptoms of ischemia and/or failure. Currently controlled on home meds w/ vitals documented and reviewed. Decreased Losartan to 12.5 mg PO QD, as BP prior and during admission borderline low.      HyperLipidemia - normally controlled on home Statin. Continued.  Follow up w/ PCP outpatient for medication initiation and/or adjustment as needed.      Ongoing threat to life and/or bodily function without ongoing treatment due to:  GIB    Consults:      IP CONSULT TO GI  IP CONSULT TO PHARMACY  IP CONSULT TO HEART FAILURE NURSE/COORDINATOR      GI consulted and appreciated.  Available consultant notes from yesterday and today were reviewed on 3/4/2025, w/ plan for continued inpatient w/up and management - see above     --------------------------------------------------      Medications:        Infusion Medications    sodium chloride Stopped (03/03/25 1207)     Scheduled Medications    ticagrelor  60 mg Oral BID    polyethylene glycol  119 g Oral Once    bisacodyl  20 mg Oral Once    polyethylene glycol  119 g Oral Once    losartan  12.5 mg Oral Daily    thiamine  50 mg Oral Daily    sodium chloride flush  5-40 mL IntraVENous 2 times per day    pantoprazole (PROTONIX) 40 mg in sodium chloride (PF) 0.9 % 10 mL injection  40 mg IntraVENous Q12H    arformoterol tartrate  15 mcg Nebulization BID    atorvastatin  20 mg Oral Daily    budesonide  500 mcg Nebulization BID    FLUoxetine  20 mg Oral Daily    gabapentin  300 mg Oral QPM     PRN Meds: benzocaine-menthol, albuterol sulfate HFA, sodium chloride flush, sodium chloride, ondansetron **OR** ondansetron, acetaminophen **OR** acetaminophen      Physical Exam Performed:      General appearance:  No apparent distress  Respiratory:  Normal respiratory effort.   Cardiovascular:  Regular rate and rhythm.  Abdomen:  Soft, non-tender, non-distended.  Musculoskeletal:  No edema  Neurologic:

## 2025-03-04 NOTE — CARE COORDINATION
Writer reviewed chart, and spoke with RN, and NP patient will ikley DC to home w/ spouse and NN 3/5 if H & H stays improved.     Cailin Castelan RN

## 2025-03-04 NOTE — PROGRESS NOTES
PROGRESS NOTE    HPI: Kishan Elaine is a(n)79 y.o. male admitted for work-up and treatment for GI bleed [K92.2]  Occult blood positive stool [R19.5]  Current use of long term anticoagulation [Z79.01]  Abdominal pain, unspecified abdominal location [R10.9].     We are following for anemia.    Subjective:     No GI complaints today.      Objective:     I/O last 3 completed shifts:  In: 710 [I.V.:710]  Out: -       /67   Pulse 91   Temp 98.6 °F (37 °C) (Oral)   Resp 18   Ht 1.829 m (6')   Wt 67.8 kg (149 lb 8 oz)   SpO2 100%   BMI 20.28 kg/m²     Physical Exam:  HEENT: anicteric sclera, oropharyngeal membranes pink and moist.  Cor: RRR  Lungs: non-labored, no respiratory distress, 3 L O2  Abdomen: soft, NT. No ascites.  No hepatomegaly or splenomegaly  Extremities: no edema  Neuro: alert and oriented x 3      Results:   Lab Results   Component Value Date    ALT 21 03/04/2025    AST 23 03/04/2025    ALKPHOS 64 03/04/2025    BILIDIR 0.2 03/04/2025    INR 1.05 02/28/2025    LIPASE 33.0 02/28/2025     Lab Results   Component Value Date    WBC 6.6 03/04/2025    HGB 10.9 (L) 03/04/2025    HCT 32.5 (L) 03/04/2025    MCV 87.6 03/04/2025     03/04/2025     BUN/Cr/glu/ALT/AST/amyl/lip:  9/1.0/--/21/23/--/-- (03/04 0321)  CT CHEST WO CONTRAST    Result Date: 2/28/2025  1.  Findings of nonspecific interstitial lung disease with diffuse reticular abnormality, bronchiectasis, groundglass attenuation, and cystic changes throughout the bilateral lungs with slight upper lung zone predominance. No significant change compared to 4/19/2024, but there has been progression compared to more remote prior studies. 2.  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 Electronically signed by John Barksdale    XR CHEST PORTABLE    Result Date: 2/28/2025  Ill-defined airspace disease is present bilaterally, left greater than right. This could reflect  pneumonia. Follow-up is recommended to document resolution. Electronically signed by Miller Naranjo        Impression:  80 yo male with history of CAD on brilinta, Htn, ICM, hyperlipidemia presenting with anemia.    Normocytic anemia without overt GIB.  S/p EGD and colonoscopy 3/3 - hiatal hernia but no etiology on EGD, colonic AVM s/p APC and clip placed, 2 colon polyps not removed because of brilitna.  Hgb is stable.    2. Acute on chronic CHF.    3. COPD.  Chronic respiratory failure.  ILD.    Plan:  Needs outpatient colonoscopy after anti platelets are held. Office will arrange.  GI will sign off.    Please do not hesitate to call with questions or concerns.      Electronically signed by: MARCELL Kramer 3/4/2025 1:24 PM     (Office) [phone]  (Fax) [phone]  Available via perfect serve

## 2025-03-05 VITALS
SYSTOLIC BLOOD PRESSURE: 123 MMHG | RESPIRATION RATE: 18 BRPM | DIASTOLIC BLOOD PRESSURE: 62 MMHG | TEMPERATURE: 97.5 F | OXYGEN SATURATION: 91 % | WEIGHT: 149.5 LBS | HEART RATE: 67 BPM | HEIGHT: 72 IN | BODY MASS INDEX: 20.25 KG/M2

## 2025-03-05 DIAGNOSIS — F32.9 REACTIVE DEPRESSION: ICD-10-CM

## 2025-03-05 LAB
ANION GAP SERPL CALCULATED.3IONS-SCNC: 8 MMOL/L (ref 3–16)
BASOPHILS # BLD: 0.1 K/UL (ref 0–0.2)
BASOPHILS NFR BLD: 1.4 %
BUN SERPL-MCNC: 11 MG/DL (ref 7–20)
CALCIUM SERPL-MCNC: 8.7 MG/DL (ref 8.3–10.6)
CHLORIDE SERPL-SCNC: 103 MMOL/L (ref 99–110)
CO2 SERPL-SCNC: 27 MMOL/L (ref 21–32)
CREAT SERPL-MCNC: 0.7 MG/DL (ref 0.8–1.3)
DEPRECATED RDW RBC AUTO: 15.7 % (ref 12.4–15.4)
EOSINOPHIL # BLD: 0.3 K/UL (ref 0–0.6)
EOSINOPHIL NFR BLD: 4.3 %
GFR SERPLBLD CREATININE-BSD FMLA CKD-EPI: >90 ML/MIN/{1.73_M2}
GLUCOSE SERPL-MCNC: 122 MG/DL (ref 70–99)
HCT VFR BLD AUTO: 28 % (ref 40.5–52.5)
HCT VFR BLD AUTO: 28.7 % (ref 40.5–52.5)
HGB BLD-MCNC: 9.6 G/DL (ref 13.5–17.5)
HGB BLD-MCNC: 9.7 G/DL (ref 13.5–17.5)
LYMPHOCYTES # BLD: 1.4 K/UL (ref 1–5.1)
LYMPHOCYTES NFR BLD: 19.8 %
MAGNESIUM SERPL-MCNC: 1.73 MG/DL (ref 1.8–2.4)
MCH RBC QN AUTO: 29.8 PG (ref 26–34)
MCHC RBC AUTO-ENTMCNC: 34.3 G/DL (ref 31–36)
MCV RBC AUTO: 87 FL (ref 80–100)
MONOCYTES # BLD: 0.7 K/UL (ref 0–1.3)
MONOCYTES NFR BLD: 10.1 %
NEUTROPHILS # BLD: 4.6 K/UL (ref 1.7–7.7)
NEUTROPHILS NFR BLD: 64.4 %
NT-PROBNP SERPL-MCNC: 193 PG/ML (ref 0–449)
PLATELET # BLD AUTO: 146 K/UL (ref 135–450)
PMV BLD AUTO: 8.3 FL (ref 5–10.5)
POTASSIUM SERPL-SCNC: 3.5 MMOL/L (ref 3.5–5.1)
RBC # BLD AUTO: 3.22 M/UL (ref 4.2–5.9)
SODIUM SERPL-SCNC: 138 MMOL/L (ref 136–145)
WBC # BLD AUTO: 7.2 K/UL (ref 4–11)

## 2025-03-05 PROCEDURE — 2700000000 HC OXYGEN THERAPY PER DAY

## 2025-03-05 PROCEDURE — 6360000002 HC RX W HCPCS: Performed by: STUDENT IN AN ORGANIZED HEALTH CARE EDUCATION/TRAINING PROGRAM

## 2025-03-05 PROCEDURE — 83735 ASSAY OF MAGNESIUM: CPT

## 2025-03-05 PROCEDURE — 2580000003 HC RX 258: Performed by: STUDENT IN AN ORGANIZED HEALTH CARE EDUCATION/TRAINING PROGRAM

## 2025-03-05 PROCEDURE — 94640 AIRWAY INHALATION TREATMENT: CPT

## 2025-03-05 PROCEDURE — 6370000000 HC RX 637 (ALT 250 FOR IP): Performed by: NURSE PRACTITIONER

## 2025-03-05 PROCEDURE — 85014 HEMATOCRIT: CPT

## 2025-03-05 PROCEDURE — 36415 COLL VENOUS BLD VENIPUNCTURE: CPT

## 2025-03-05 PROCEDURE — 83880 ASSAY OF NATRIURETIC PEPTIDE: CPT

## 2025-03-05 PROCEDURE — 80048 BASIC METABOLIC PNL TOTAL CA: CPT

## 2025-03-05 PROCEDURE — 85018 HEMOGLOBIN: CPT

## 2025-03-05 PROCEDURE — 2500000003 HC RX 250 WO HCPCS: Performed by: STUDENT IN AN ORGANIZED HEALTH CARE EDUCATION/TRAINING PROGRAM

## 2025-03-05 PROCEDURE — 94761 N-INVAS EAR/PLS OXIMETRY MLT: CPT

## 2025-03-05 PROCEDURE — 85025 COMPLETE CBC W/AUTO DIFF WBC: CPT

## 2025-03-05 RX ORDER — FUROSEMIDE 20 MG/1
TABLET ORAL
Qty: 15 TABLET | Refills: 0 | OUTPATIENT
Start: 2025-03-05

## 2025-03-05 RX ORDER — ASPIRIN 81 MG/1
81 TABLET, CHEWABLE ORAL DAILY
Status: CANCELLED | COMMUNITY
Start: 2025-03-06

## 2025-03-05 RX ADMIN — Medication 40 MG: at 08:02

## 2025-03-05 RX ADMIN — LOSARTAN POTASSIUM 12.5 MG: 25 TABLET, FILM COATED ORAL at 08:00

## 2025-03-05 RX ADMIN — TICAGRELOR 60 MG: 60 TABLET ORAL at 08:01

## 2025-03-05 RX ADMIN — Medication 10 ML: at 08:05

## 2025-03-05 RX ADMIN — Medication 50 MG: at 07:59

## 2025-03-05 RX ADMIN — ASPIRIN 81 MG: 81 TABLET, CHEWABLE ORAL at 08:00

## 2025-03-05 RX ADMIN — BUDESONIDE 500 MCG: 0.5 SUSPENSION RESPIRATORY (INHALATION) at 08:42

## 2025-03-05 RX ADMIN — ARFORMOTEROL TARTRATE 15 MCG: 15 SOLUTION RESPIRATORY (INHALATION) at 08:40

## 2025-03-05 NOTE — PLAN OF CARE
Problem: Chronic Conditions and Co-morbidities  Goal: Patient's chronic conditions and co-morbidity symptoms are monitored and maintained or improved  Outcome: Progressing     Problem: Discharge Planning  Goal: Discharge to home or other facility with appropriate resources  Outcome: Progressing     Problem: Safety - Adult  Goal: Free from fall injury  Outcome: Progressing     Problem: Pain  Goal: Verbalizes/displays adequate comfort level or baseline comfort level  Outcome: Progressing  Flowsheets (Taken 3/4/2025 2200 by Argelia Ulloa)  Verbalizes/displays adequate comfort level or baseline comfort level: Encourage patient to monitor pain and request assistance     Problem: Respiratory - Adult  Goal: Achieves optimal ventilation and oxygenation  Outcome: Progressing     Problem: Musculoskeletal - Adult  Goal: Return mobility to safest level of function  Outcome: Progressing     Problem: Gastrointestinal - Adult  Goal: Minimal or absence of nausea and vomiting  Outcome: Progressing  Goal: Maintains or returns to baseline bowel function  Outcome: Progressing  Goal: Maintains adequate nutritional intake  Outcome: Progressing

## 2025-03-05 NOTE — CARE COORDINATION
CASE MANAGEMENT DISCHARGE SUMMARY      Discharge to: Home w/ spouse    Precertification completed: N/A  Hospital Exemption Notification (HENS) completed: N/A    IMM given: (date) 3/5/25  Follow-Up copy of Important Message from Medicare (IMM2) has been explained to patient and/or designated healthcare decision maker (HCDM). Pt and/or HCDM aware that patient is permitted to stay an additional 4 hours prior to discharge to consider an appeal if they feel as though they are being discharged too soon. Patient may discharge as planned if chooses to do so.  Patient/HCDM voice no other concerns or questions regarding this process.      New Durable Medical Equipment ordered/agency: Has home O2    Transportation:    Family/car: Personal      Confirmed discharge plan with:     Patient: yes     Family:  yes           RN, name: Elvi    Note: Discharging nurse to complete CATIE, reconcile AVS, and place final copy with patient's discharge packet. RN to ensure that written prescriptions for  Level II medications are sent with patient to the facility as per protocol.      Cailin Castelan RN

## 2025-03-05 NOTE — TELEPHONE ENCOUNTER
LAST REFILL 6/3/24  AMOUNT 90     2 REFILLS  LAST VISIT 1/29/25 (Hospital Followup) 12/2/24 (AWV)  NEXT VISIT 7/16/25

## 2025-03-05 NOTE — DISCHARGE SUMMARY
Hospital Medicine Discharge Summary    Patient: Kishan Elaine   : 1945     Hospital:  [facility]  Admit Date: 2025   Discharge Date: 3/5/2025    Disposition:  [x]Home   []HHC  []SNF  []Acute Rehab  []LTAC  []Hospice  Code status:  [x]Full  []DNR/CCA  []Limited (DNR/CCA with Do Not Intubate)  []DNRCC  Condition at Discharge: Stable  Primary Care Provider: Rosy Toro MD    Admitting Provider: Eddie Peralta DO  Discharge Provider: Nicky Cowan, APRN - CNP     Discharge Diagnoses:      Active Hospital Problems    Diagnosis     GI bleed [K92.2]        Presenting Admission History:      his is a 79 y.o. male who presented to [facility] with GIB.  PMHx significant for CAD, HTN, HLD.     Patient states that he started to have a GI bleed couple days ago.  Patient endorses the bleeding progressively worsening.  Patient has prior episodes that he has been admitted for GI bleeds.  Patient on antiplatelet therapy via Brilinta.  Patient denies fever, chills, sweats.  Denies chest pain or palpitations.  Denies shortness of breath.  Endorses cough with mucus that is yellow and green.  Endorses nausea without vomiting.  No diarrhea.  No change in bowel or bladder habits.     Assessment/Plan:       Current Principal Problem:  GI bleed     Anemia - 2nd to acute GIB and blood loss w/out evidence of ongoing hemodynamically active bleeding and/or hemolysis.  Asymptomatic w/out indication for transfusion. Follow serial Hgb, personally reviewed and documented in this note. Transfused PRN for Hgb < 7.    Baseline Hgb 12.1-14.6  Hgb 25 9.9  - Iron and mineral studies all wnl  - GI consulted and appreciated.POC: EGD and Colonoscopy 3/4/25 revealed normal esophagus and duodenum with a single colonic angiodysplastic lesion. Treated with argon plasma coagulation (APC). Treatment not successful. Clip was placed.   - He needs follow up colonoscopy in 3 months with   results found for: \"LABURIN\"  Blood Cultures: No results found for: \"BC\"  No results found for: \"BLOODCULT2\"  Organism:   Lab Results   Component Value Date/Time    ORG Staphylococcus aureus 07/29/2021 05:12 PM       Signed:    MARCELL Diane - CNP

## 2025-03-06 ENCOUNTER — CARE COORDINATION (OUTPATIENT)
Dept: CASE MANAGEMENT | Age: 80
End: 2025-03-06

## 2025-03-06 DIAGNOSIS — K92.2 GASTROINTESTINAL HEMORRHAGE, UNSPECIFIED GASTROINTESTINAL HEMORRHAGE TYPE: Primary | ICD-10-CM

## 2025-03-06 PROCEDURE — 1111F DSCHRG MED/CURRENT MED MERGE: CPT | Performed by: INTERNAL MEDICINE

## 2025-03-06 NOTE — CARE COORDINATION
but did not enroll at this time: already monitoring with home equipment.    Assessments:  Care Transitions 24 Hour Call    Do you have a copy of your discharge instructions?: Yes  Do you have all of your prescriptions and are they filled?: Yes  Have you been contacted by a [facility] Pharmacist?: No  Have you scheduled your follow up appointment?: Yes  How are you going to get to your appointment?: Car - family or friend to transport  Post Acute Services: Home Health (Comment: [facility])  Do you have support at home?: Partner/Spouse/SO  Do you feel like you have everything you need to keep you well at home?: Yes  Are you an active caregiver in your home?: No  Care Transitions Interventions          Follow Up Appointment:   Discussed follow up appointments. Patient has hospital follow up appointment scheduled within 7 days of discharge.   Future Appointments         Provider Specialty Dept Phone    3/11/2025 9:10 AM Rosy Toro MD Internal Medicine [phone]    7/16/2025 9:50 AM Rosy Toro MD Internal Medicine [phone]    8/22/2025 9:00 AM Ian Tirado MD Cardiology [phone]            Care Transition Nurse provided contact information.  Plan for follow-up call in 6-10 days based on severity of symptoms and risk factors.  Plan for next call: symptom management-.  self management-.  follow-up appointment-.      DOMITILA WARE RN

## 2025-03-07 ENCOUNTER — TELEPHONE (OUTPATIENT)
Dept: CARDIOLOGY CLINIC | Age: 80
End: 2025-03-07

## 2025-03-07 NOTE — TELEPHONE ENCOUNTER
Kishan Elaine, 1945    Cardiac Risk Assessment    What type of procedure are you having?  Colonoscopy    When is your procedure scheduled for?  TBD    Medications to be stopped.  Brilinta 2-4 days prior to procedure    What physician is performing your procedure?  Dr. Goncalves    Phone Number:   [phone] ext [phone]    Fax number to send the letter:   [phone] Attn: Radha    Cardiologist:   sandro    Last Appointment:   2.2.25    Next Appointment:   8.22.25
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
## 2025-03-09 NOTE — PROGRESS NOTES
Physician Progress Note      PATIENT:               HUNTER BILLS  CSN #:                  [id number]  :                       1945  ADMIT DATE:       2025 12:51 PM  DISCH DATE:        3/5/2025 1:26 PM  RESPONDING  PROVIDER #:        CHEPE MCKINLEY          QUERY TEXT:    Pt admitted with GIB.  Pt has a past history of covid-19 documented throughout   the chart.  Per Discharge Summary Pneumonia ruled out.  ILD 2/2 Covid.    Please document in progress notes and discharge summary if  Covid is   considered an active dx during this admission or not an active dx.    The medical record reflects the following:  Risk Factors: 79 y.o. male with PMH of Covid-19, CAD, HTN, HLD  Clinical Indicators: Documentation of PMH of Covid-19, Presented with GIB   starting 2 days prior to admission progressively worsening.  Anemia 2nd to   acute blood loss.  Treatment:  GI Consult, Imaging, Azithromycin, Vancomycin, Cefepime, Rocephin   for suspected PNA, however PNA was ruled out in DC Summary.  Options provided:  -- COVID-19 is ruled out as an active dx during this admission  -- COVID-19 is considered an active dx during this admission as evidenced by,   Please specify evidence and treatment.  -- Other - I will add my own diagnosis  -- Disagree - Not applicable / Not valid  -- Disagree - Clinically unable to determine / Unknown  -- Refer to Clinical Documentation Reviewer    PROVIDER RESPONSE TEXT:    COVID-19 is NOT considered an active dx during this admission as evidenced by   Patient with long Covid and ILD is a sequela of previous Covid-19 infection. He did not have active covid and was not tetsed for it this admission    Query created by: Aj Worthington on 3/7/2025 5:19 PM      Electronically signed by:  CHEPE MCKINLEY 3/9/2025 9:56 AM

## 2025-03-11 ENCOUNTER — OFFICE VISIT (OUTPATIENT)
Dept: INTERNAL MEDICINE CLINIC | Age: 80
End: 2025-03-11

## 2025-03-11 VITALS
HEIGHT: 72 IN | HEART RATE: 104 BPM | SYSTOLIC BLOOD PRESSURE: 112 MMHG | BODY MASS INDEX: 21.13 KG/M2 | DIASTOLIC BLOOD PRESSURE: 62 MMHG | WEIGHT: 156 LBS | OXYGEN SATURATION: 89 %

## 2025-03-11 DIAGNOSIS — Z09 HOSPITAL DISCHARGE FOLLOW-UP: Primary | ICD-10-CM

## 2025-03-11 DIAGNOSIS — J84.10 PULMONARY FIBROSIS (HCC): ICD-10-CM

## 2025-03-11 DIAGNOSIS — D62 ANEMIA DUE TO ACUTE BLOOD LOSS: ICD-10-CM

## 2025-03-11 DIAGNOSIS — Z99.81 CHRONIC RESPIRATORY FAILURE WITH HYPOXIA, ON HOME OXYGEN THERAPY (HCC): ICD-10-CM

## 2025-03-11 DIAGNOSIS — J96.11 CHRONIC RESPIRATORY FAILURE WITH HYPOXIA, ON HOME OXYGEN THERAPY (HCC): ICD-10-CM
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
## 2025-03-12 ENCOUNTER — OFFICE VISIT (OUTPATIENT)
Dept: PULMONOLOGY | Age: 80
End: 2025-03-12
Payer: MEDICARE

## 2025-03-12 VITALS
WEIGHT: 155.6 LBS | RESPIRATION RATE: 16 BRPM | HEART RATE: 100 BPM | BODY MASS INDEX: 21.08 KG/M2 | DIASTOLIC BLOOD PRESSURE: 73 MMHG | SYSTOLIC BLOOD PRESSURE: 125 MMHG | HEIGHT: 72 IN | TEMPERATURE: 97.3 F | OXYGEN SATURATION: 94 %

## 2025-03-12 DIAGNOSIS — J84.9 ILD (INTERSTITIAL LUNG DISEASE) (HCC): ICD-10-CM

## 2025-03-12 DIAGNOSIS — Z93.0 TRACHEOSTOMY STATUS (HCC): ICD-10-CM

## 2025-03-12 DIAGNOSIS — I50.42 CHRONIC COMBINED SYSTOLIC (CONGESTIVE) AND DIASTOLIC (CONGESTIVE) HEART FAILURE (HCC): ICD-10-CM

## 2025-03-12 DIAGNOSIS — J47.1 BRONCHIECTASIS WITH ACUTE EXACERBATION (HCC): Primary | ICD-10-CM

## 2025-03-12 PROCEDURE — 3074F SYST BP LT 130 MM HG: CPT | Performed by: INTERNAL MEDICINE

## 2025-03-12 PROCEDURE — 1123F ACP DISCUSS/DSCN MKR DOCD: CPT | Performed by: INTERNAL MEDICINE

## 2025-03-12 PROCEDURE — 99215 OFFICE O/P EST HI 40 MIN: CPT | Performed by: INTERNAL MEDICINE

## 2025-03-12 PROCEDURE — G2211 COMPLEX E/M VISIT ADD ON: HCPCS | Performed by: INTERNAL MEDICINE

## 2025-03-12 PROCEDURE — 1159F MED LIST DOCD IN RCRD: CPT | Performed by: INTERNAL MEDICINE

## 2025-03-12 PROCEDURE — 3078F DIAST BP <80 MM HG: CPT | Performed by: INTERNAL MEDICINE

## 2025-03-12 RX ORDER — PREDNISONE 20 MG/1
TABLET ORAL
Qty: 10 TABLET | Refills: 0 | Status: SHIPPED | OUTPATIENT
Start: 2025-03-12

## 2025-03-12 RX ORDER — IPRATROPIUM BROMIDE AND ALBUTEROL SULFATE 2.5; .5 MG/3ML; MG/3ML
1 SOLUTION RESPIRATORY (INHALATION) 3 TIMES DAILY
Qty: 810 ML | Refills: 5 | Status: SHIPPED | OUTPATIENT
Start: 2025-03-12

## 2025-03-12 NOTE — PATIENT INSTRUCTIONS
Due to extensive bronchiectasis, extensive persistent cough with mucus production that has been lasting for more than 6 months with exacerbations and admissions to the hospital that failed flutter valve, Mucinex, nebulizers will order a vibrating vest  Meanwhile continue flutter valve 3 times daily combined with nebulizers  Will DC Brovana and Pulmicort due to lack of benefit and add DuoNebs to do 3 times daily  Continue oxygen 3 L/min.  Okay to increase flow if short of breath  Patient has been on Brilinta for a while and unlikely the reason for his shortness of breath  Patient did not benefit from pulmonary rehab and will not send again  Will give 5-day course of steroid to see if that helps  Will provide breathing exercises to practice at home  If patient is still interested in Ofev we can prescribe next visit  If patient does not improve by 6 weeks we will consider VQ scan or CTA chest to rule out PE.  Right now patient just recovered from GI bleed  Follow-up with MEÑO Courtney in 6 weeks      Health Maintenance/Preventive measures:        >>  Avoid smoking, vaping or secondhand exposure.  Avoid exposure to irritants, allergens as possible as well as contact with patients with infectious respiratory illness.        >>  Stay up-to-date with influenza & pneumonia vaccines, RSV, & COVID-19 vaccine as recommended by the Advisory Committee on Immunization Practices (ACIP)        >>  Healthy diet and activity as able.        >>  Acid reflux precautions: Head of bed elevation, avoiding tight clothes, avoiding big meals or snacking 3 hours before bedtime, targeting healthy weight.        >>  Practice sleep hygiene measures. Avoid driving or operating heavy machines if tired or sleepy.

## 2025-03-12 NOTE — TELEPHONE ENCOUNTER
Low cardiac risk for colonoscopy   hold Brilinta for 4 days before the procedure and resume as soon as safe.     Martha Daley, RN routed conversation to Ian Tirado MD       Faxed

## 2025-03-13 ENCOUNTER — CARE COORDINATION (OUTPATIENT)
Dept: CASE MANAGEMENT | Age: 80
End: 2025-03-13

## 2025-03-13 NOTE — CARE COORDINATION
Care Transitions Note    Follow Up Call     Patient: Kishan Elaine                             Patient : 1945   MRN: [id number]                             Reason for Admission: INTERSTITIAL LUNG DISEASE, Anemia, GI bleed, EGD, colonoscopy, f/u with colonoscopy in 3 months([facility] will reach out to schedule), resp failure, CHF, COPD(Home O2 3-5 Lpm)   Discharge Date: 3/5/25         RURS: Readmission Risk Score: 19.6    Patient Current Location:  Home: [address] [place] [address]    Care Transition Nurse contacted the spouse/partner  by telephone. Verified name and  as identifiers.    Additional needs identified to be addressed with provider   No needs identified                 Method of communication with provider: none.    Care Summary Note: Wife & patient reports that he was \"not doing well\" and followed up with Dr. Brown yesterday.  He was having increase in difficulty breathing and was prescribed breathing treatments & steroids.  His Hb is down and he may need an iron infusion.  He was referred to a hematologist who he will see on 3/25/35.  He denies any swelling and is not SOB at this time.  Wife denies any questions and CTN will continue with outreach follow up calls.    Plan of care updates since last contact:  Education: .  Review of patient management of conditions/medications: .       Advance Care Planning:   Does patient have an Advance Directive: not on file; education provided - . .    Medication Review:  No changes since last call.     Remote Patient Monitoring:  Offered patient enrollment in the Remote Patient Monitoring (RPM) program for in-home monitoring: previously declined.    Assessments:  Care Transitions Subsequent and Final Call    Subsequent and Final Calls  Do you have any ongoing symptoms?: No  Have your medications changed?: No  Do you have any questions related to your medications?: No  Do you currently have any active services?: No  Are you currently active

## 2025-03-14 NOTE — PROGRESS NOTES
[place] Communication:  The following orders are received by verbal communication from Raudel Brown MD    Orders include:    Order faxed to [facility] for vest  Follow up scheduled with NP in 6 weeks

## 2025-03-17 NOTE — TELEPHONE ENCOUNTER
Patient cancelled PSG scheduled on 3/5/25 at 8:30pm at the [facility].     I called the patient to see if he was interested in rescheduling the appointment and was told by his wife that they are not interested in rescheduling until after he sees his pulmonary doctor again and they will call us back.

## 2025-03-19 NOTE — TELEPHONE ENCOUNTER
Refill Request     CONFIRM preferrred pharmacy with the patient.    If Mail Order Rx - Pend for 90 day refill.      Last Seen: Last Seen Department: 3/12/2025  Last Seen by PCP: 3/12/2025    Last Written: 3.12.25    If no future appointment scheduled, route STAFF MESSAGE with patient name to the  Pool for scheduling.      Next Appointment:   Future Appointments   Date Time Provider Department Center   4/23/2025 10:30 AM Jihan Dias APRN - [facility]   7/16/2025  9:50 AM Rosy Toro MD [facility]   8/22/2025  9:00 AM Ian Tirado MD [facility]       Message sent to  to schedule appt with patient?  NO    Patient's spouse stated Dr. Brown gave pt 5 pills to try and told him to call if it was helping and he would prescribe more for him.  Spouse stated the prednisone is helping and pt would like more.  Please advise when sent to pharmacy.    predniSONE (DELTASONE) 20 MG tablet [[id number]]

## 2025-03-20 ENCOUNTER — CARE COORDINATION (OUTPATIENT)
Dept: CASE MANAGEMENT | Age: 80
End: 2025-03-20

## 2025-03-20 RX ORDER — PREDNISONE 20 MG/1
TABLET ORAL
Qty: 10 TABLET | Refills: 0 | OUTPATIENT
Start: 2025-03-20

## 2025-03-20 RX ORDER — PREDNISONE 10 MG/1
10 TABLET ORAL DAILY
Qty: 10 TABLET | Refills: 0 | Status: SHIPPED | OUTPATIENT
Start: 2025-03-20 | End: 2025-03-30

## 2025-03-20 NOTE — CARE COORDINATION
Monitoring:  Offered patient enrollment in the Remote Patient Monitoring (RPM) program for in-home monitoring: Deferred at this time because previously declined; will discuss at next outreach.    Assessments:  Care Transitions 24 Hour Call    Do you have any ongoing symptoms?: No  Do you have all of your prescriptions and are they filled?: Yes  Were you discharged with any Home Care or Post Acute Services: No  Post Acute Services: Home Health (Comment: [place])  Do you have support at home?: Partner/Spouse/SO  Are you an active caregiver in your home?: No  Care Transitions Interventions          Follow Up Appointment:   Reviewed upcoming appointment(s).  Future Appointments         Provider Specialty Dept Phone    4/23/2025 10:30 AM Jihan Dias APRN - [facility] Pulmonology [phone]    7/16/2025 9:50 AM Rosy Toro MD Internal Medicine [phone]    8/22/2025 9:00 AM Ian Tirado MD Cardiology [phone]            Care Transition Nurse provided contact information.  Plan for follow-up call in 6-10 days based on severity of symptoms and risk factors.  Plan for next call: symptom management-.  self management-.  follow-up appointment-.      DOMITILA WARE RN

## 2025-03-21 ENCOUNTER — TELEPHONE (OUTPATIENT)
Dept: PULMONOLOGY | Age: 80
End: 2025-03-21

## 2025-03-21 NOTE — TELEPHONE ENCOUNTER
Kortney Mercado called to state needing signature on the HIGH FREQUENCY CHEST WALL OSCILLATION THERAPY [[id number]]     (Order #: [id number])  please advise

## 2025-03-27 ENCOUNTER — CARE COORDINATION (OUTPATIENT)
Dept: CASE MANAGEMENT | Age: 80
End: 2025-03-27

## 2025-03-27 NOTE — CARE COORDINATION
Care Transitions Note    Follow Up Call     Patient: Kishan Elaine                             Patient : 1945   MRN: [id number]                             Reason for Admission: INTERSTITIAL LUNG DISEASE, Anemia, GI bleed, EGD, colonoscopy, f/u with colonoscopy in 3 months([facility] will reach out to schedule), resp failure, CHF, COPD(Home O2 3-5 Lpm)   Discharge Date: 3/5/25         RURS: Readmission Risk Score: 19.6    Attempted to reach patient for transitions of care follow up.  Unable to reach patient.      Outreach Attempts:   HIPAA compliant voicemail left for patient.     Care Summary Note: Follow up outreach call attempt, no answer.  CTN left VM with contact information and request for return call.  CTN will continue with outreach call attempts.      Follow Up Appointment:   Future Appointments         Provider Specialty Dept Phone    2025 10:30 AM Jihan Dias APRN - [facility] Pulmonology [phone]    2025 9:50 AM Rosy Toro MD Internal Medicine [phone]    2025 9:00 AM Ian Tirado MD Cardiology [phone]            Plan for follow-up call in 2-5 days based on severity of symptoms and risk factors. Plan for next call: symptom management-.  self management-.  follow-up appointment-.    DOMITILA WARE RN

## 2025-04-01 ENCOUNTER — CARE COORDINATION (OUTPATIENT)
Dept: CASE MANAGEMENT | Age: 80
End: 2025-04-01

## 2025-04-01 NOTE — CARE COORDINATION
Care Transitions Note    Follow Up Call     Patient Current Location:  Home: [address] [address]    Care Transition Nurse contacted the patient, spouse/partner  by telephone. Verified name and  as identifiers.    Additional needs identified to be addressed with provider   No needs identified                 Method of communication with provider: none.    Care Summary Note:     SUMMARY  CTN spoke to the pt and spouse / Elva who reports the following:      -C/O fatigue and weakness and hx of anemia:  continues and has iron infusion setup    -Hx of CHF: has as scale and monitors daily - Today and yesterday's weight was around 150 lbs -  149.8 lbs day of  d/c 3/3 - denies LE edema - Will continue to monitor and f/u with physician as recommended  -Hx of COPD / pulmonary fibrosis: c/o wheezing an SOB at his baseline -  completed low dose prednisone today/ 10 days - Will continue to monitor and f/u with physician as recommended  -Hx of HTN:  Has BP cuff - BP today \"fine\"  - Will continue to monitor and f/u with physician as recommended.  -Assistive device:  does not use assistive device - will exercise caution when ambulating to prevent fall / injury    -Denies fever, chills, nausea, vomiting, cough, congestion, DB, chest pain, LE edema / pain, feeling lightheaded / dizziness, heart palpitations / flutters,  and s/s of stroke.  -Denies issues with fluid intake (advised to follow fluid restriction recommended by physician d/t hx of HF), appetite, urination, and LBM \"daily\"  -Meds:  Confirms taking all meds as prescribed - prednisone / completed   -HFU with pcp - completed   -HFU with pulmonary 3/12 completed - n/a      Plan of care updates since last contact:  Education:    Review of patient management of conditions/medications:    DME:       Medication Review:  Medications changed since last call, reviewed today.     Remote Patient Monitoring:  Offered patient enrollment in the Remote

## 2025-04-07 ENCOUNTER — CARE COORDINATION (OUTPATIENT)
Dept: CASE MANAGEMENT | Age: 80
End: 2025-04-07

## 2025-04-07 NOTE — CARE COORDINATION
Care Transitions Note    Final Call     Attempted to reach patient, spouse/partner  for transitions of care follow up.  Unable to reach patient.      Outreach Attempts:   HIPAA compliant voicemail left for patient.     Patient graduated from the Care Transitions program on 4/7/25.  Patient/family  UTR .      Handoff:   Patient was not referred to the ACM team due to unable to contact patient.      Care Summary Note: Final follow up outreach call attempt, no answer.  CTN left VM with contact information and request for return call.  CTN will close program & remain available.      Assessments:  Care Transitions Subsequent and Final Call    Subsequent and Final Calls  Are you currently active with any services?: Home Health  Care Transitions Interventions  Other Interventions:              Upcoming Appointments:    Future Appointments         Provider Specialty Dept Phone    4/23/2025 10:30 AM Jihan Dias APRN - [facility] Pulmonology [phone]    7/16/2025 9:50 AM Rosy Toro MD Internal Medicine [phone]    8/22/2025 9:00 AM Ian Tirado MD Cardiology [phone]            DOMITILA WARE RN

## 2025-04-23 ENCOUNTER — OFFICE VISIT (OUTPATIENT)
Dept: PULMONOLOGY | Age: 80
End: 2025-04-23
Payer: MEDICARE

## 2025-04-23 VITALS
WEIGHT: 156 LBS | BODY MASS INDEX: 21.13 KG/M2 | HEART RATE: 77 BPM | RESPIRATION RATE: 16 BRPM | SYSTOLIC BLOOD PRESSURE: 126 MMHG | DIASTOLIC BLOOD PRESSURE: 76 MMHG | HEIGHT: 72 IN | TEMPERATURE: 97.6 F | OXYGEN SATURATION: 90 %

## 2025-04-23 DIAGNOSIS — Z87.891 FORMER SMOKER, STOPPED SMOKING IN DISTANT PAST: ICD-10-CM

## 2025-04-23 DIAGNOSIS — J98.4 RESTRICTIVE LUNG DISEASE: ICD-10-CM

## 2025-04-23 DIAGNOSIS — J84.10 PULMONARY FIBROSIS (HCC): ICD-10-CM

## 2025-04-23 DIAGNOSIS — J43.2 CENTRILOBULAR EMPHYSEMA (HCC): ICD-10-CM

## 2025-04-23 DIAGNOSIS — J47.9 BRONCHIECTASIS WITHOUT COMPLICATION (HCC): ICD-10-CM

## 2025-04-23 DIAGNOSIS — J96.11 CHRONIC RESPIRATORY FAILURE WITH HYPOXIA (HCC): ICD-10-CM

## 2025-04-23 DIAGNOSIS — Z98.890 H/O TRACHEOSTOMY: ICD-10-CM

## 2025-04-23 DIAGNOSIS — J84.9 ILD (INTERSTITIAL LUNG DISEASE) (HCC): Primary | ICD-10-CM

## 2025-04-23 PROCEDURE — 3078F DIAST BP <80 MM HG: CPT | Performed by: NURSE PRACTITIONER

## 2025-04-23 PROCEDURE — 1160F RVW MEDS BY RX/DR IN RCRD: CPT | Performed by: NURSE PRACTITIONER

## 2025-04-23 PROCEDURE — 99214 OFFICE O/P EST MOD 30 MIN: CPT | Performed by: NURSE PRACTITIONER

## 2025-04-23 PROCEDURE — 1123F ACP DISCUSS/DSCN MKR DOCD: CPT | Performed by: NURSE PRACTITIONER

## 2025-04-23 PROCEDURE — 3074F SYST BP LT 130 MM HG: CPT | Performed by: NURSE PRACTITIONER

## 2025-04-23 PROCEDURE — 1159F MED LIST DOCD IN RCRD: CPT | Performed by: NURSE PRACTITIONER

## 2025-04-23 RX ORDER — PREDNISONE 10 MG/1
10 TABLET ORAL DAILY
Qty: 90 TABLET | Refills: 1 | Status: SHIPPED | OUTPATIENT
Start: 2025-04-23

## 2025-04-23 ASSESSMENT — ENCOUNTER SYMPTOMS
SHORTNESS OF BREATH: 1
WHEEZING: 0
EYE PAIN: 0
CHEST TIGHTNESS: 0
SORE THROAT: 0
ABDOMINAL PAIN: 0
RHINORRHEA: 0
COUGH: 1

## 2025-04-23 NOTE — PATIENT INSTRUCTIONS
Call with worsening symptoms such as increased shortness of breath, productive cough, wheezing or symptoms not responding to treatment plan.     Continue vest therapy twice a day and oxygen,  benefiting from use.     Start prednisone 10 mg daily - Take with food.     Return to clinic in 3 months

## 2025-04-23 NOTE — PROGRESS NOTES
MA Communication:  The following orders are received by verbal communication from Jihan Dias CNP    Orders include:    3 month follow up richard/ Kevin    
predicted,  which is normal.  There is no significant bronchodilator response.  TLC  is 49% predicted, which is decreased.  The RV is 66%, which is  decreased.  The diffusion is severely reduced.  Flow-volume loop appears  abnormal.     IMPRESSION:  Severe restrictive defect with no significant  bronchodilator response and severely reduced diffusion.     Procedures 2/26/25   The patient walked for 6 min without assistance throughout the entire walk. Patient stopped due to end of test.  Oxygen saturation at rest was 90% on RA with a Radha score of 3 and heart rate of 89 bpm.  Lowest oxygen saturation was 87% with a Radha score of 7 and heart rate of 112 bpm, requiring 3 L/min supplemental oxygen.  The patient walked total of 840 feet, which is 40% of predicted.        IMPRESSION:   Moderately to severely reduced exercise capacity and walking distance.  Patient qualified for supplemental oxygen 3 L/min with activity.    Physical Exam  Vitals reviewed.   Constitutional:       General: He is not in acute distress.     Appearance: Normal appearance. He is not ill-appearing, toxic-appearing or diaphoretic.   HENT:      Head: Normocephalic and atraumatic.      Nose: Nose normal. No congestion or rhinorrhea.      Mouth/Throat:      Mouth: Mucous membranes are moist.      Pharynx: Oropharynx is clear. No oropharyngeal exudate or posterior oropharyngeal erythema.   Eyes:      Pupils: Pupils are equal, round, and reactive to light.   Cardiovascular:      Rate and Rhythm: Normal rate.   Pulmonary:      Effort: Pulmonary effort is normal. No respiratory distress.      Breath sounds: No stridor. Rales (exp coarse paramjit) present. No wheezing or rhonchi.   Chest:      Chest wall: No tenderness.   Abdominal:      Palpations: Abdomen is soft.      Tenderness: There is no abdominal tenderness. There is no guarding or rebound.   Musculoskeletal:         General: Normal range of motion.      Cervical back: Neck supple.      Right lower leg:

## 2025-05-14 SDOH — HEALTH STABILITY: PHYSICAL HEALTH: ON AVERAGE, HOW MANY DAYS PER WEEK DO YOU ENGAGE IN MODERATE TO STRENUOUS EXERCISE (LIKE A BRISK WALK)?: 0 DAYS

## 2025-05-15 ENCOUNTER — OFFICE VISIT (OUTPATIENT)
Dept: FAMILY MEDICINE CLINIC | Age: 80
End: 2025-05-15
Payer: MEDICARE

## 2025-05-15 VITALS
BODY MASS INDEX: 21.54 KG/M2 | SYSTOLIC BLOOD PRESSURE: 126 MMHG | HEIGHT: 72 IN | HEART RATE: 89 BPM | DIASTOLIC BLOOD PRESSURE: 66 MMHG | OXYGEN SATURATION: 93 % | WEIGHT: 159 LBS

## 2025-05-15 DIAGNOSIS — I20.9 ANGINA PECTORIS, UNSPECIFIED: ICD-10-CM

## 2025-05-15 DIAGNOSIS — I25.10 CORONARY ARTERY DISEASE INVOLVING NATIVE CORONARY ARTERY OF NATIVE HEART WITHOUT ANGINA PECTORIS: ICD-10-CM

## 2025-05-15 DIAGNOSIS — J84.10 PULMONARY FIBROSIS (HCC): ICD-10-CM

## 2025-05-15 DIAGNOSIS — M79.2 NEURALGIA OF LEFT UPPER EXTREMITY: ICD-10-CM

## 2025-05-15 DIAGNOSIS — F32.9 REACTIVE DEPRESSION: Primary | ICD-10-CM

## 2025-05-15 DIAGNOSIS — I10 PRIMARY HYPERTENSION: ICD-10-CM

## 2025-05-15 DIAGNOSIS — G62.9 NEUROPATHY: ICD-10-CM

## 2025-05-15 DIAGNOSIS — J43.2 CENTRILOBULAR EMPHYSEMA (HCC): ICD-10-CM

## 2025-05-15 DIAGNOSIS — I50.42 CHRONIC COMBINED SYSTOLIC (CONGESTIVE) AND DIASTOLIC (CONGESTIVE) HEART FAILURE (HCC): ICD-10-CM

## 2025-05-15 DIAGNOSIS — D50.9 IRON DEFICIENCY ANEMIA, UNSPECIFIED IRON DEFICIENCY ANEMIA TYPE: ICD-10-CM

## 2025-05-15 DIAGNOSIS — L40.9 PSORIASIS: ICD-10-CM

## 2025-05-15 DIAGNOSIS — R73.03 PREDIABETES: ICD-10-CM

## 2025-05-15 DIAGNOSIS — J47.9 BRONCHIECTASIS WITHOUT COMPLICATION (HCC): ICD-10-CM

## 2025-05-15 DIAGNOSIS — E78.2 MIXED HYPERLIPIDEMIA: ICD-10-CM

## 2025-05-15 DIAGNOSIS — J84.9 ILD (INTERSTITIAL LUNG DISEASE) (HCC): ICD-10-CM

## 2025-05-15 DIAGNOSIS — J96.11 CHRONIC RESPIRATORY FAILURE WITH HYPOXIA (HCC): ICD-10-CM

## 2025-05-15 DIAGNOSIS — I27.20 PULMONARY HTN (HCC): ICD-10-CM

## 2025-05-15 PROBLEM — R06.89 DYSPNEA AND RESPIRATORY ABNORMALITIES: Status: RESOLVED | Noted: 2024-04-22 | Resolved: 2025-05-15

## 2025-05-15 PROBLEM — J44.9 CHRONIC OBSTRUCTIVE PULMONARY DISEASE, UNSPECIFIED (HCC): Status: RESOLVED | Noted: 2023-12-21 | Resolved: 2025-05-15

## 2025-05-15 PROBLEM — J44.1 COPD EXACERBATION (HCC): Status: RESOLVED | Noted: 2025-01-17 | Resolved: 2025-05-15

## 2025-05-15 PROBLEM — I50.9 CHF (CONGESTIVE HEART FAILURE) (HCC): Status: RESOLVED | Noted: 2019-01-03 | Resolved: 2025-05-15

## 2025-05-15 PROBLEM — J96.01 ACUTE RESPIRATORY FAILURE WITH HYPOXIA (HCC): Status: RESOLVED | Noted: 2024-04-20 | Resolved: 2025-05-15

## 2025-05-15 PROBLEM — G72.81 CRITICAL ILLNESS MYOPATHY: Status: RESOLVED | Noted: 2021-06-04 | Resolved: 2025-05-15

## 2025-05-15 PROBLEM — S57.82XA: Status: RESOLVED | Noted: 2021-06-04 | Resolved: 2025-05-15

## 2025-05-15 PROBLEM — K92.2 GI BLEED: Status: RESOLVED | Noted: 2025-02-28 | Resolved: 2025-05-15

## 2025-05-15 PROBLEM — J98.4 RESTRICTIVE LUNG DISEASE: Status: RESOLVED | Noted: 2021-10-27 | Resolved: 2025-05-15

## 2025-05-15 PROBLEM — R06.00 DYSPNEA AND RESPIRATORY ABNORMALITIES: Status: RESOLVED | Noted: 2024-04-22 | Resolved: 2025-05-15

## 2025-05-15 PROCEDURE — G2211 COMPLEX E/M VISIT ADD ON: HCPCS | Performed by: STUDENT IN AN ORGANIZED HEALTH CARE EDUCATION/TRAINING PROGRAM

## 2025-05-15 PROCEDURE — 3078F DIAST BP <80 MM HG: CPT | Performed by: STUDENT IN AN ORGANIZED HEALTH CARE EDUCATION/TRAINING PROGRAM

## 2025-05-15 PROCEDURE — 99214 OFFICE O/P EST MOD 30 MIN: CPT | Performed by: STUDENT IN AN ORGANIZED HEALTH CARE EDUCATION/TRAINING PROGRAM

## 2025-05-15 PROCEDURE — 3074F SYST BP LT 130 MM HG: CPT | Performed by: STUDENT IN AN ORGANIZED HEALTH CARE EDUCATION/TRAINING PROGRAM

## 2025-05-15 PROCEDURE — 1159F MED LIST DOCD IN RCRD: CPT | Performed by: STUDENT IN AN ORGANIZED HEALTH CARE EDUCATION/TRAINING PROGRAM

## 2025-05-15 PROCEDURE — 1123F ACP DISCUSS/DSCN MKR DOCD: CPT | Performed by: STUDENT IN AN ORGANIZED HEALTH CARE EDUCATION/TRAINING PROGRAM

## 2025-05-15 RX ORDER — CALCIPOTRIENE 50 UG/G
CREAM TOPICAL
Qty: 1 EACH | Refills: 5 | Status: SHIPPED | OUTPATIENT
Start: 2025-05-15

## 2025-05-15 RX ORDER — HALOBETASOL PROPIONATE 0.5 MG/G
CREAM TOPICAL
Qty: 1 EACH | Refills: 5 | Status: SHIPPED | OUTPATIENT
Start: 2025-05-15

## 2025-05-15 RX ORDER — FERROUS SULFATE 325(65) MG
325 TABLET ORAL
COMMUNITY
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
## 2025-05-23 ENCOUNTER — HOSPITAL ENCOUNTER (EMERGENCY)
Age: 80
Discharge: HOME | End: 2025-05-23
Attending: EMERGENCY MEDICINE
Payer: MEDICARE

## 2025-05-23 VITALS
BODY MASS INDEX: 21.54 KG/M2 | WEIGHT: 159 LBS | DIASTOLIC BLOOD PRESSURE: 71 MMHG | HEART RATE: 76 BPM | TEMPERATURE: 97.8 F | SYSTOLIC BLOOD PRESSURE: 130 MMHG | OXYGEN SATURATION: 94 % | HEIGHT: 72 IN | RESPIRATION RATE: 17 BRPM

## 2025-05-23 DIAGNOSIS — K62.5 RECTAL BLEEDING: Primary | ICD-10-CM

## 2025-05-23 DIAGNOSIS — Z79.02 ANTIPLATELET OR ANTITHROMBOTIC LONG-TERM USE: ICD-10-CM

## 2025-05-23 LAB
ABO/RH: NORMAL
ALBUMIN SERPL-MCNC: 4.8 G/DL (ref 3.4–5)
ALBUMIN/GLOB SERPL: 1.8 {RATIO} (ref 1.1–2.2)
ALP SERPL-CCNC: 57 U/L (ref 40–129)
ALT SERPL-CCNC: 32 U/L (ref 10–40)
ANION GAP SERPL CALCULATED.3IONS-SCNC: 12 MMOL/L (ref 3–16)
ANTIBODY SCREEN: NORMAL
AST SERPL-CCNC: 25 U/L (ref 15–37)
BASOPHILS # BLD: 0.1 K/UL (ref 0–0.2)
BASOPHILS NFR BLD: 0.7 %
BILIRUB SERPL-MCNC: 0.4 MG/DL (ref 0–1)
BUN SERPL-MCNC: 18 MG/DL (ref 7–20)
CALCIUM SERPL-MCNC: 10.4 MG/DL (ref 8.3–10.6)
CHLORIDE SERPL-SCNC: 97 MMOL/L (ref 99–110)
CO2 SERPL-SCNC: 28 MMOL/L (ref 21–32)
CREAT SERPL-MCNC: 0.8 MG/DL (ref 0.8–1.3)
DEPRECATED RDW RBC AUTO: 19.4 % (ref 12.4–15.4)
EOSINOPHIL # BLD: 0.1 K/UL (ref 0–0.6)
EOSINOPHIL NFR BLD: 1 %
GFR SERPLBLD CREATININE-BSD FMLA CKD-EPI: >90 ML/MIN/{1.73_M2}
GLUCOSE SERPL-MCNC: 164 MG/DL (ref 70–99)
HCT VFR BLD AUTO: 46.8 % (ref 40.5–52.5)
HGB BLD-MCNC: 15.8 G/DL (ref 13.5–17.5)
LYMPHOCYTES # BLD: 0.8 K/UL (ref 1–5.1)
LYMPHOCYTES NFR BLD: 7 %
MCH RBC QN AUTO: 31 PG (ref 26–34)
MCHC RBC AUTO-ENTMCNC: 33.8 G/DL (ref 31–36)
MCV RBC AUTO: 91.6 FL (ref 80–100)
MONOCYTES # BLD: 0.5 K/UL (ref 0–1.3)
MONOCYTES NFR BLD: 4.2 %
NEUTROPHILS # BLD: 9.5 K/UL (ref 1.7–7.7)
NEUTROPHILS NFR BLD: 87.1 %
PLATELET # BLD AUTO: 144 K/UL (ref 135–450)
PMV BLD AUTO: 8.8 FL (ref 5–10.5)
POTASSIUM SERPL-SCNC: 4.2 MMOL/L (ref 3.5–5.1)
PROT SERPL-MCNC: 7.5 G/DL (ref 6.4–8.2)
RBC # BLD AUTO: 5.11 M/UL (ref 4.2–5.9)
SODIUM SERPL-SCNC: 137 MMOL/L (ref 136–145)
WBC # BLD AUTO: 10.9 K/UL (ref 4–11)

## 2025-05-23 PROCEDURE — 86850 RBC ANTIBODY SCREEN: CPT

## 2025-05-23 PROCEDURE — 99284 EMERGENCY DEPT VISIT MOD MDM: CPT

## 2025-05-23 PROCEDURE — 36415 COLL VENOUS BLD VENIPUNCTURE: CPT

## 2025-05-23 PROCEDURE — 86900 BLOOD TYPING SEROLOGIC ABO: CPT

## 2025-05-23 PROCEDURE — 86901 BLOOD TYPING SEROLOGIC RH(D): CPT

## 2025-05-23 PROCEDURE — 80053 COMPREHEN METABOLIC PANEL: CPT

## 2025-05-23 PROCEDURE — 85025 COMPLETE CBC W/AUTO DIFF WBC: CPT

## 2025-05-23 RX ORDER — DOCUSATE SODIUM 100 MG/1
100 CAPSULE, LIQUID FILLED ORAL 2 TIMES DAILY
Qty: 60 CAPSULE | Refills: 0 | Status: SHIPPED | OUTPATIENT
Start: 2025-05-23 | End: 2025-06-22

## 2025-05-23 RX ORDER — HYDROCORTISONE ACETATE 25 MG/1
25 SUPPOSITORY RECTAL 2 TIMES DAILY
Qty: 14 SUPPOSITORY | Refills: 0 | Status: SHIPPED | OUTPATIENT
Start: 2025-05-23 | End: 2025-05-30

## 2025-05-23 ASSESSMENT — PAIN - FUNCTIONAL ASSESSMENT: PAIN_FUNCTIONAL_ASSESSMENT: NONE - DENIES PAIN

## 2025-05-23 NOTE — ED NOTES
Called [facility] @0503.  Per; MILAN Solis.  RE; rectal bleeding.  Maryana Eastman CNP responded @8644.

## 2025-05-23 NOTE — ED PROVIDER NOTES
[facility] EMERGENCY DEPARTMENT  EMERGENCY DEPARTMENT ENCOUNTER        Pt Name: Kishan Elaine  MRN: [id number]  Birthdate 1945  Date of evaluation: 5/23/2025  Provider: MILAN Solis  PCP: Jayro Eric MD  Note Started: 11:13 AM EDT 5/23/25       I have seen and evaluated this patient with my supervising physician Kel Berg MD.      CHIEF COMPLAINT       Chief Complaint   Patient presents with    Rectal Bleeding     Patient started to have blood in stools about a week ago.        HISTORY OF PRESENT ILLNESS: 1 or more Elements     History From: patient and wife at bedside      Kishan Elaine is a 79 y.o. male who presents to the emergency department via private vehicle complaining of blood in his stool.  He states for the past 3 to 4 days he has noticed a little bit of bright red blood when he would have bowel movements but that this was related to hemorrhoids.  Beginning today he had an extensive amount of bright red blood in the toilet with his bowel movement.  He does report his stool is dark but he is on iron supplementation.  His wife reports he was hospitalized in February for the same presentation, at that time they did an EGD and colonoscopy and he was found to have a pretty significant tear in his colon which was repaired using clips.  He does have follow-up with his GI doctor on May 30, however given the sudden increase in bleeding they decided to come to the emergency department.  He has never needed a blood transfusion but has received iron transfusions.    Nursing Notes were all reviewed and agreed with or any disagreements were addressed in the HPI.    REVIEW OF SYSTEMS :      Review of Systems    Positives and Pertinent negatives as per HPI.     SURGICAL HISTORY     Past Surgical History:   Procedure Laterality Date    BICEPS TENDON REPAIR      COLONOSCOPY N/A 3/3/2025    COLONOSCOPY CONTROL HEMORRHAGE performed by Julia Mcintyre MD at [facility] ENDOSCOPY

## 2025-05-23 NOTE — ED PROVIDER NOTES
[facility] EMERGENCY DEPARTMENT     EMERGENCY DEPARTMENT ENCOUNTER     Location: [facility] EMERGENCY DEPARTMENT  5/23/2025  Note Started: 3:14 PM EDT 5/23/25      Patient Identification  Kishan Elaine is a 79 y.o. male  Chief Complaint   Patient presents with    Rectal Bleeding     Patient started to have blood in stools about a week ago.        HPI:Kishan Elaine was evaluated in the Emergency Department for bright red rectal bleeding.  Patient is on Brilinta and had bright red rectal bleeding today.  No abdominal pain.  No chest pain.. Although initial history and physical exam information was obtained by YK/NPP/MD/DO (who also dictated a record of this visit), I personally saw the patient and performed and made/approved the management plan and take responsibility for the patient management.  KY report to me: Brbpr, today pouring out, 2d of a litte in the toilet bowl, acute on chonic, gets Fe infusions, [facility] h/o clips    PHYSICAL EXAM:  Patient in no apparent distress abdomen benign.    No orders to display     Labs Reviewed   CBC WITH AUTO DIFFERENTIAL - Abnormal; Notable for the following components:       Result Value    RDW 19.4 (*)     Neutrophils Absolute 9.5 (*)     Lymphocytes Absolute 0.8 (*)     All other components within normal limits   COMPREHENSIVE METABOLIC PANEL W/ REFLEX TO MG FOR LOW K - Abnormal; Notable for the following components:    Chloride 97 (*)     Glucose 164 (*)     All other components within normal limits   TYPE AND SCREEN       Patient seen and evaluated.  Relevant records reviewed.  MDM        Patient's hemoglobin is stable and he is hemodynamically stable as well.  Evaluated by GI and they recommend discharge and follow-up.  They recommended also restarting Brilinta and placing him on a stool softener and Anusol suppositories which I have written for.          CLINICAL IMPRESSION  1. Rectal bleeding    2. Antiplatelet or antithrombotic long-term use           I, Kel Berg MD, am the primary clinician of record.  At the time of this note, I personally saw the patient and made/approved the management plan and take responsibility for the patient management.    This chart was generated in part by using Dragon Dictation system and may contain errors related to that system including errors in grammar, punctuation, and spelling, as well as words and phrases that may be inappropriate. If there are any questions or concerns please feel free to contact the dictating provider for clarification.     Kel Berg MD   Acute [facility]       Kel Berg MD  05/23/25 2958

## 2025-05-23 NOTE — CONSULTS
bruising    All other review of systems negative, except for those noted.      PHYSICAL EXAM:   VITAL SIGNS: /71   Pulse 76   Temp 97.8 °F (36.6 °C) (Oral)   Resp 17   Ht 1.829 m (6')   Wt 72.1 kg (159 lb)   SpO2 94%   BMI 21.56 kg/m²       Constitutional: Well developed.  Well nourished.  Non-toxic appearance.  No acute distress.  HENT: Normocephalic.  Atraumatic.   Bilateral external ears normal, Oropharynx moist.  No oral exudate.  Nose normal.   Eyes: No  Scleral icterus   Neck: No  Cervical or  supraclavicular  nodes   Lymphatic: No lymphadenopathy noted.   Cardiovascular: NRRR  Thorax & Lungs: Non labored at rest, no respiratory distress, 2L 2 NC  Abdomen: + BS, soft, NT. No guarding, rebound tenderness. No hepatomegaly.  No splenomegaly.  No ascites  Rectal:  BHAVANA with RN chaperone, internal hemorrhoids, dark blood smear  Skin: Warm, dry.  No erythema.  No rash.   Extremities: Intact distal pulses, No deformity.  No edema.  Neurologic: Alert & oriented x 3, No Asterixis    RESULTS    Lab Results   Component Value Date    ALT 32 05/23/2025    AST 25 05/23/2025    ALKPHOS 57 05/23/2025    BILIDIR 0.2 03/04/2025    INR 1.05 02/28/2025    LIPASE 33.0 02/28/2025     Lab Results   Component Value Date    WBC 10.9 05/23/2025    HGB 15.8 05/23/2025    HCT 46.8 05/23/2025    MCV 91.6 05/23/2025     05/23/2025     Lab Results   Component Value Date    CREATININE 0.8 05/23/2025    BUN 18 05/23/2025     05/23/2025    K 4.2 05/23/2025    CL 97 (L) 05/23/2025    CO2 28 05/23/2025       IMAGES  No results found.        Assessment:  79-year-old gentleman with previous medical history of CAD, hypertension, Pulmonary HTN, hyperlipidemia, pulmonary fibrosis 2-4 L of oxygen ATC presents to the emergency room with rectal bleeding.  Anticoagulated on Brilinta.  1 week ago started having smears of bright red blood per rectum daily and around 4 AM this morning, this escalated and he had a large episode of  [phone]  Available via perfect serve

## 2025-05-27 ENCOUNTER — CARE COORDINATION (OUTPATIENT)
Dept: CARE COORDINATION | Age: 80
End: 2025-05-27

## 2025-05-27 NOTE — CARE COORDINATION
Ambulatory Care Coordination Note     5/27/2025 10:25 AM     Patient outreach attempt by this ACM today to offer care management services. ACM was unable to reach the patient by telephone today;   left voice message requesting a return phone call to this ACM.  Routed update to PCP for consideration and assistance with EDFU visit     ACM: Gia Gann, RN     Care Summary Note: Ref- ED 5/23 rectal bleed - PMH- CHF, COPD/Emphysema, HTN - UTRx1    PCP/Specialist follow up:   Future Appointments         Provider Specialty Dept Phone    7/16/2025 9:50 AM Rosy Toro MD Internal Medicine [phone]    7/31/2025 9:00 AM Raudel Brown MD Pulmonology [phone]    8/22/2025 9:00 AM Ian Tirado MD Cardiology [phone]    11/18/2025 9:00 AM Jayro Eric MD Family Medicine [phone]            Follow Up:   Plan for next ACM outreach in approximately 1 week to complete:  - outreach attempt to offer care management services  - EDFU .           Gia PRESTON, RN     Ambulatory Care Manager    [facility]    Phone: [phone]    [email or web address]

## 2025-05-27 NOTE — TELEPHONE ENCOUNTER
Called and spoke with patient's wife Elva, She states he has an appointment with NGUYEN QURESHI Friday 5/30/25 for follow up. Already scheduled prior to ER visit

## 2025-06-03 ENCOUNTER — TELEPHONE (OUTPATIENT)
Dept: CARDIOLOGY CLINIC | Age: 80
End: 2025-06-03

## 2025-06-03 NOTE — TELEPHONE ENCOUNTER
Kishan Elaine, 1945    Cardiac Risk Assessment    What type of procedure are you having?  Colonoscopy    When is your procedure scheduled for?  7/7/25    Medications to be stopped.  Brilinta x 4 days prior to procedure    What physician is performing your procedure?  Dr Mcitnyre    Phone Number:   [phone]    Fax number to send the letter:   [phone]    Cardiologist:   Dr Tirado    Last Appointment:   2/25/25    Next Appointment:   8/22/25

## 2025-06-04 ENCOUNTER — CARE COORDINATION (OUTPATIENT)
Dept: CARE COORDINATION | Age: 80
End: 2025-06-04

## 2025-06-04 NOTE — TELEPHONE ENCOUNTER
Ian Tirado MD  P [facility] Cardio Practice Staff  Caller: Unspecified (Yesterday,  3:35 PM)  Intermediate cardiac and pulmonary risk. H/o pulmonary fibrosis due to COVID 19 recommend procedure in hosp setting. May hold Brilinta 4 days before procedure and resume when safe.

## 2025-06-06 DIAGNOSIS — G62.9 NEUROPATHY: ICD-10-CM

## 2025-06-06 RX ORDER — GABAPENTIN 300 MG/1
300 CAPSULE ORAL EVERY MORNING
Qty: 90 CAPSULE | Refills: 1 | Status: SHIPPED | OUTPATIENT
Start: 2025-06-06 | End: 2025-12-03

## 2025-06-12 ENCOUNTER — CARE COORDINATION (OUTPATIENT)
Dept: CARE COORDINATION | Age: 80
End: 2025-06-12

## 2025-06-12 NOTE — CARE COORDINATION
Ambulatory Care Coordination Note     6/12/2025 8:42 AM     patient outreach attempt by this ACM today to offer care management services. ACM was unable to reach the patient by telephone today;   left voice message requesting a return phone call to this ACM. UTR, [id number]     Patient closed (unable to reach patient) from the High Risk Care Management program on 6/12/2025.  No further Ambulatory Care Manager follow up scheduled.     Nell PRESTON, RN  Respecting Choices® Advanced Steps ACP Facilitator  Ambulatory Care Manager  [facility]  [phone]

## 2025-06-14 DIAGNOSIS — I25.10 CORONARY ARTERY DISEASE INVOLVING NATIVE CORONARY ARTERY OF NATIVE HEART WITHOUT ANGINA PECTORIS: ICD-10-CM

## 2025-06-16 RX ORDER — TICAGRELOR 60 MG/1
60 TABLET ORAL 2 TIMES DAILY
Qty: 60 TABLET | Refills: 3 | Status: SHIPPED | OUTPATIENT
Start: 2025-06-16

## 2025-06-16 NOTE — TELEPHONE ENCOUNTER
LOV 2/25/25  NOV 8/22/25    Lab Results   Component Value Date    WBC 10.9 05/23/2025    HGB 15.8 05/23/2025    HCT 46.8 05/23/2025    MCV 91.6 05/23/2025     05/23/2025

## 2025-07-02 ENCOUNTER — TELEPHONE (OUTPATIENT)
Dept: FAMILY MEDICINE CLINIC | Age: 80
End: 2025-07-02

## 2025-07-02 DIAGNOSIS — J84.9 ILD (INTERSTITIAL LUNG DISEASE) (HCC): Primary | ICD-10-CM

## 2025-07-02 NOTE — TELEPHONE ENCOUNTER
Wife is requesting a referral to  Pulmonology . Fax to [phone]  Wife states patients Pulmonary Fibrosis is getting worse     Ph: [phone]

## 2025-07-06 ENCOUNTER — APPOINTMENT (OUTPATIENT)
Dept: GENERAL RADIOLOGY | Age: 80
DRG: 189 | End: 2025-07-06
Payer: MEDICARE

## 2025-07-06 ENCOUNTER — HOSPITAL ENCOUNTER (INPATIENT)
Age: 80
LOS: 4 days | Discharge: HOME | DRG: 189 | End: 2025-07-10
Attending: EMERGENCY MEDICINE | Admitting: INTERNAL MEDICINE
Payer: MEDICARE

## 2025-07-06 DIAGNOSIS — R06.00 DYSPNEA, UNSPECIFIED TYPE: ICD-10-CM

## 2025-07-06 DIAGNOSIS — R09.02 HYPOXIA: ICD-10-CM

## 2025-07-06 DIAGNOSIS — J44.1 COPD EXACERBATION (HCC): Primary | ICD-10-CM

## 2025-07-06 PROBLEM — J96.01 ACUTE HYPOXIC RESPIRATORY FAILURE (HCC): Status: ACTIVE | Noted: 2025-07-06

## 2025-07-06 LAB
ALBUMIN SERPL-MCNC: 4.2 G/DL (ref 3.4–5)
ALBUMIN/GLOB SERPL: 1.3 {RATIO} (ref 1.1–2.2)
ALP SERPL-CCNC: 75 U/L (ref 40–129)
ALT SERPL-CCNC: 40 U/L (ref 10–40)
ANION GAP SERPL CALCULATED.3IONS-SCNC: 13 MMOL/L (ref 3–16)
ANISOCYTOSIS BLD QL SMEAR: ABNORMAL
AST SERPL-CCNC: 25 U/L (ref 15–37)
BASE EXCESS BLDV CALC-SCNC: 1.9 MMOL/L (ref -3–3)
BASOPHILS # BLD: 0 K/UL (ref 0–0.2)
BASOPHILS NFR BLD: 0 %
BILIRUB SERPL-MCNC: 0.7 MG/DL (ref 0–1)
BUN SERPL-MCNC: 21 MG/DL (ref 7–20)
CALCIUM SERPL-MCNC: 10.1 MG/DL (ref 8.3–10.6)
CHLORIDE SERPL-SCNC: 96 MMOL/L (ref 99–110)
CO2 BLDV-SCNC: 29 MMOL/L
CO2 SERPL-SCNC: 26 MMOL/L (ref 21–32)
COHGB MFR BLDV: 2.5 % (ref 0–1.5)
CREAT SERPL-MCNC: 0.9 MG/DL (ref 0.8–1.3)
DEPRECATED RDW RBC AUTO: 16.1 % (ref 12.4–15.4)
EKG ATRIAL RATE: 93 BPM
EKG DIAGNOSIS: NORMAL
EKG P-R INTERVAL: 166 MS
EKG Q-T INTERVAL: 364 MS
EKG QRS DURATION: 104 MS
EKG QTC CALCULATION (BAZETT): 450 MS
EKG R AXIS: -4 DEGREES
EKG T AXIS: 45 DEGREES
EKG VENTRICULAR RATE: 92 BPM
EOSINOPHIL # BLD: 0.1 K/UL (ref 0–0.6)
EOSINOPHIL NFR BLD: 1 %
FLUAV RNA RESP QL NAA+PROBE: NOT DETECTED
FLUBV RNA RESP QL NAA+PROBE: NOT DETECTED
GFR SERPLBLD CREATININE-BSD FMLA CKD-EPI: 87 ML/MIN/{1.73_M2}
GLUCOSE SERPL-MCNC: 277 MG/DL (ref 70–99)
HCO3 BLDV-SCNC: 27.4 MMOL/L (ref 23–29)
HCT VFR BLD AUTO: 45.3 % (ref 40.5–52.5)
HGB BLD-MCNC: 15.6 G/DL (ref 13.5–17.5)
LYMPHOCYTES # BLD: 1 K/UL (ref 1–5.1)
LYMPHOCYTES NFR BLD: 8 %
MCH RBC QN AUTO: 31.5 PG (ref 26–34)
MCHC RBC AUTO-ENTMCNC: 34.4 G/DL (ref 31–36)
MCV RBC AUTO: 91.7 FL (ref 80–100)
METAMYELOCYTES NFR BLD MANUAL: 2 %
METHGB MFR BLDV: 0.1 %
MONOCYTES # BLD: 0.3 K/UL (ref 0–1.3)
MONOCYTES NFR BLD: 3 %
NEUTROPHILS # BLD: 9.6 K/UL (ref 1.7–7.7)
NEUTROPHILS NFR BLD: 81 %
NEUTS BAND NFR BLD MANUAL: 4 % (ref 0–7)
NT-PROBNP SERPL-MCNC: 299 PG/ML (ref 0–449)
O2 THERAPY: ABNORMAL
PCO2 BLDV: 45.8 MMHG (ref 40–50)
PH BLDV: 7.39 [PH] (ref 7.35–7.45)
PLATELET # BLD AUTO: 165 K/UL (ref 135–450)
PLATELET BLD QL SMEAR: ADEQUATE
PMV BLD AUTO: 8.4 FL (ref 5–10.5)
PO2 BLDV: 25.6 MMHG (ref 25–40)
POTASSIUM SERPL-SCNC: 4.6 MMOL/L (ref 3.5–5.1)
PROT SERPL-MCNC: 7.4 G/DL (ref 6.4–8.2)
RBC # BLD AUTO: 4.94 M/UL (ref 4.2–5.9)
SAO2 % BLDV: 47 %
SARS-COV-2 RNA RESP QL NAA+PROBE: NOT DETECTED
SLIDE REVIEW: ABNORMAL
SODIUM SERPL-SCNC: 135 MMOL/L (ref 136–145)
TROPONIN, HIGH SENSITIVITY: 22 NG/L (ref 0–22)
TROPONIN, HIGH SENSITIVITY: 26 NG/L (ref 0–22)
VARIANT LYMPHS NFR BLD MANUAL: 1 % (ref 0–6)
WBC # BLD AUTO: 11 K/UL (ref 4–11)

## 2025-07-06 PROCEDURE — 87636 SARSCOV2 & INF A&B AMP PRB: CPT

## 2025-07-06 PROCEDURE — 6370000000 HC RX 637 (ALT 250 FOR IP): Performed by: INTERNAL MEDICINE

## 2025-07-06 PROCEDURE — 2700000000 HC OXYGEN THERAPY PER DAY

## 2025-07-06 PROCEDURE — 80053 COMPREHEN METABOLIC PANEL: CPT

## 2025-07-06 PROCEDURE — 82803 BLOOD GASES ANY COMBINATION: CPT

## 2025-07-06 PROCEDURE — 84484 ASSAY OF TROPONIN QUANT: CPT

## 2025-07-06 PROCEDURE — 99285 EMERGENCY DEPT VISIT HI MDM: CPT

## 2025-07-06 PROCEDURE — 2060000000 HC ICU INTERMEDIATE R&B

## 2025-07-06 PROCEDURE — 93010 ELECTROCARDIOGRAM REPORT: CPT | Performed by: INTERNAL MEDICINE

## 2025-07-06 PROCEDURE — 6360000002 HC RX W HCPCS: Performed by: EMERGENCY MEDICINE

## 2025-07-06 PROCEDURE — 93005 ELECTROCARDIOGRAM TRACING: CPT | Performed by: EMERGENCY MEDICINE

## 2025-07-06 PROCEDURE — 36415 COLL VENOUS BLD VENIPUNCTURE: CPT

## 2025-07-06 PROCEDURE — 94640 AIRWAY INHALATION TREATMENT: CPT

## 2025-07-06 PROCEDURE — 2580000003 HC RX 258: Performed by: INTERNAL MEDICINE

## 2025-07-06 PROCEDURE — 2500000003 HC RX 250 WO HCPCS: Performed by: INTERNAL MEDICINE

## 2025-07-06 PROCEDURE — 96374 THER/PROPH/DIAG INJ IV PUSH: CPT

## 2025-07-06 PROCEDURE — 71045 X-RAY EXAM CHEST 1 VIEW: CPT

## 2025-07-06 PROCEDURE — 5A09457 ASSISTANCE WITH RESPIRATORY VENTILATION, 24-96 CONSECUTIVE HOURS, CONTINUOUS POSITIVE AIRWAY PRESSURE: ICD-10-PCS | Performed by: INTERNAL MEDICINE

## 2025-07-06 PROCEDURE — 2500000003 HC RX 250 WO HCPCS: Performed by: EMERGENCY MEDICINE

## 2025-07-06 PROCEDURE — 6360000002 HC RX W HCPCS: Performed by: INTERNAL MEDICINE

## 2025-07-06 PROCEDURE — 85025 COMPLETE CBC W/AUTO DIFF WBC: CPT

## 2025-07-06 PROCEDURE — 94761 N-INVAS EAR/PLS OXIMETRY MLT: CPT

## 2025-07-06 PROCEDURE — 83880 ASSAY OF NATRIURETIC PEPTIDE: CPT

## 2025-07-06 PROCEDURE — 6370000000 HC RX 637 (ALT 250 FOR IP): Performed by: EMERGENCY MEDICINE

## 2025-07-06 RX ORDER — TICAGRELOR 60 MG/1
60 TABLET, FILM COATED ORAL 2 TIMES DAILY
Status: DISCONTINUED | OUTPATIENT
Start: 2025-07-06 | End: 2025-07-10 | Stop reason: HOSPADM

## 2025-07-06 RX ORDER — SODIUM CHLORIDE 9 MG/ML
INJECTION, SOLUTION INTRAVENOUS PRN
Status: DISCONTINUED | OUTPATIENT
Start: 2025-07-06 | End: 2025-07-10 | Stop reason: HOSPADM

## 2025-07-06 RX ORDER — POLYETHYLENE GLYCOL 3350 17 G/17G
17 POWDER, FOR SOLUTION ORAL DAILY PRN
Status: DISCONTINUED | OUTPATIENT
Start: 2025-07-06 | End: 2025-07-10 | Stop reason: HOSPADM

## 2025-07-06 RX ORDER — ALBUTEROL SULFATE 90 UG/1
2 INHALANT RESPIRATORY (INHALATION) EVERY 6 HOURS PRN
Status: DISCONTINUED | OUTPATIENT
Start: 2025-07-06 | End: 2025-07-10 | Stop reason: HOSPADM

## 2025-07-06 RX ORDER — IPRATROPIUM BROMIDE AND ALBUTEROL SULFATE 2.5; .5 MG/3ML; MG/3ML
1 SOLUTION RESPIRATORY (INHALATION) ONCE
Status: COMPLETED | OUTPATIENT
Start: 2025-07-06 | End: 2025-07-06

## 2025-07-06 RX ORDER — DOXYCYCLINE HYCLATE 100 MG
100 TABLET ORAL ONCE
Status: COMPLETED | OUTPATIENT
Start: 2025-07-06 | End: 2025-07-06

## 2025-07-06 RX ORDER — IPRATROPIUM BROMIDE AND ALBUTEROL SULFATE 2.5; .5 MG/3ML; MG/3ML
1 SOLUTION RESPIRATORY (INHALATION) EVERY 4 HOURS PRN
Status: DISCONTINUED | OUTPATIENT
Start: 2025-07-06 | End: 2025-07-08

## 2025-07-06 RX ORDER — GABAPENTIN 300 MG/1
300 CAPSULE ORAL EVERY MORNING
Status: DISCONTINUED | OUTPATIENT
Start: 2025-07-07 | End: 2025-07-10 | Stop reason: HOSPADM

## 2025-07-06 RX ORDER — M-VIT,TX,IRON,MINS/CALC/FOLIC 27MG-0.4MG
1 TABLET ORAL DAILY
Status: DISCONTINUED | OUTPATIENT
Start: 2025-07-07 | End: 2025-07-10 | Stop reason: HOSPADM

## 2025-07-06 RX ORDER — ACETAMINOPHEN 325 MG/1
650 TABLET ORAL EVERY 6 HOURS PRN
Status: DISCONTINUED | OUTPATIENT
Start: 2025-07-06 | End: 2025-07-10 | Stop reason: HOSPADM

## 2025-07-06 RX ORDER — SODIUM CHLORIDE 0.9 % (FLUSH) 0.9 %
5-40 SYRINGE (ML) INJECTION PRN
Status: DISCONTINUED | OUTPATIENT
Start: 2025-07-06 | End: 2025-07-10 | Stop reason: HOSPADM

## 2025-07-06 RX ORDER — ENOXAPARIN SODIUM 100 MG/ML
40 INJECTION SUBCUTANEOUS DAILY
Status: DISCONTINUED | OUTPATIENT
Start: 2025-07-06 | End: 2025-07-10 | Stop reason: HOSPADM

## 2025-07-06 RX ORDER — NITROGLYCERIN 0.4 MG/1
0.4 TABLET SUBLINGUAL EVERY 5 MIN PRN
Status: DISCONTINUED | OUTPATIENT
Start: 2025-07-06 | End: 2025-07-10 | Stop reason: HOSPADM

## 2025-07-06 RX ORDER — CALCIPOTRIENE 50 UG/G
CREAM TOPICAL 2 TIMES DAILY PRN
Status: DISCONTINUED | OUTPATIENT
Start: 2025-07-06 | End: 2025-07-10 | Stop reason: HOSPADM

## 2025-07-06 RX ORDER — ACETAMINOPHEN 650 MG/1
650 SUPPOSITORY RECTAL EVERY 6 HOURS PRN
Status: DISCONTINUED | OUTPATIENT
Start: 2025-07-06 | End: 2025-07-10 | Stop reason: HOSPADM

## 2025-07-06 RX ORDER — LANOLIN ALCOHOL/MO/W.PET/CERES
50 CREAM (GRAM) TOPICAL DAILY
Status: DISCONTINUED | OUTPATIENT
Start: 2025-07-07 | End: 2025-07-10 | Stop reason: HOSPADM

## 2025-07-06 RX ORDER — ONDANSETRON 2 MG/ML
4 INJECTION INTRAMUSCULAR; INTRAVENOUS EVERY 6 HOURS PRN
Status: DISCONTINUED | OUTPATIENT
Start: 2025-07-06 | End: 2025-07-10 | Stop reason: HOSPADM

## 2025-07-06 RX ORDER — ONDANSETRON 4 MG/1
4 TABLET, ORALLY DISINTEGRATING ORAL EVERY 8 HOURS PRN
Status: DISCONTINUED | OUTPATIENT
Start: 2025-07-06 | End: 2025-07-10 | Stop reason: HOSPADM

## 2025-07-06 RX ORDER — SODIUM CHLORIDE 9 MG/ML
INJECTION, SOLUTION INTRAVENOUS CONTINUOUS
Status: ACTIVE | OUTPATIENT
Start: 2025-07-06 | End: 2025-07-07

## 2025-07-06 RX ORDER — ATORVASTATIN CALCIUM 10 MG/1
20 TABLET, FILM COATED ORAL NIGHTLY
Status: DISCONTINUED | OUTPATIENT
Start: 2025-07-06 | End: 2025-07-10 | Stop reason: HOSPADM

## 2025-07-06 RX ORDER — SODIUM CHLORIDE 0.9 % (FLUSH) 0.9 %
5-40 SYRINGE (ML) INJECTION EVERY 12 HOURS SCHEDULED
Status: DISCONTINUED | OUTPATIENT
Start: 2025-07-06 | End: 2025-07-10 | Stop reason: HOSPADM

## 2025-07-06 RX ORDER — LOSARTAN POTASSIUM 25 MG/1
25 TABLET ORAL DAILY
Status: DISCONTINUED | OUTPATIENT
Start: 2025-07-07 | End: 2025-07-10 | Stop reason: HOSPADM

## 2025-07-06 RX ADMIN — AZITHROMYCIN MONOHYDRATE 500 MG: 500 INJECTION, POWDER, LYOPHILIZED, FOR SOLUTION INTRAVENOUS at 21:25

## 2025-07-06 RX ADMIN — WATER 40 MG: 1 INJECTION INTRAMUSCULAR; INTRAVENOUS; SUBCUTANEOUS at 22:28

## 2025-07-06 RX ADMIN — Medication 10 ML: at 21:12

## 2025-07-06 RX ADMIN — SODIUM CHLORIDE: 0.9 INJECTION, SOLUTION INTRAVENOUS at 21:26

## 2025-07-06 RX ADMIN — ATORVASTATIN CALCIUM 20 MG: 10 TABLET, FILM COATED ORAL at 21:10

## 2025-07-06 RX ADMIN — ACETAMINOPHEN 650 MG: 325 TABLET ORAL at 17:29

## 2025-07-06 RX ADMIN — WATER 125 MG: 1 INJECTION INTRAMUSCULAR; INTRAVENOUS; SUBCUTANEOUS at 14:17

## 2025-07-06 RX ADMIN — FLUOXETINE HYDROCHLORIDE 20 MG: 20 CAPSULE ORAL at 21:10

## 2025-07-06 RX ADMIN — IPRATROPIUM BROMIDE AND ALBUTEROL SULFATE 1 DOSE: 2.5; .5 SOLUTION RESPIRATORY (INHALATION) at 14:22

## 2025-07-06 RX ADMIN — WATER 1000 MG: 1 INJECTION INTRAMUSCULAR; INTRAVENOUS; SUBCUTANEOUS at 21:06

## 2025-07-06 RX ADMIN — ENOXAPARIN SODIUM 40 MG: 100 INJECTION SUBCUTANEOUS at 21:11

## 2025-07-06 RX ADMIN — DOXYCYCLINE HYCLATE 100 MG: 100 TABLET, FILM COATED ORAL at 17:28

## 2025-07-06 RX ADMIN — IPRATROPIUM BROMIDE AND ALBUTEROL SULFATE 1 DOSE: 2.5; .5 SOLUTION RESPIRATORY (INHALATION) at 14:21

## 2025-07-06 RX ADMIN — TICAGRELOR 60 MG: 60 TABLET, FILM COATED ORAL at 21:17

## 2025-07-06 ASSESSMENT — PAIN SCALES - GENERAL: PAINLEVEL_OUTOF10: 0

## 2025-07-06 NOTE — H&P
[facility] Medicine History & Physical    V 5.1    Date of Admission: 7/6/2025    Date of Service:  Pt seen/examined on 7/6/25     [x]Admitted to Inpatient with expected LOS greater than two midnights due to medical therapy.  []Placed in Observation status.    Chief Admission Complaint:   SOB    Presenting Admission History:      79 y.o. male with PMH significant COPD on home oxygen 2 to 3 L, ILD,  hyperlipidemia, depression, hypertension, CHF, CAD, he is status post CABG also status post stent placements.    He presented to [facility] ED with c/o SOB.  He gives history that for the past 2 to 3 days having increased shortness of breath and wheezing.  He also reported productive cough of yellow sputum.  The symptoms continued  to worsen and on the day of admission he was very short of breath therefore presented to the ED.  On his presentation he was in significant respiratory distress, he was having difficulty speaking and he was using  accessory muscles to breathe.  He was desaturating on pulse oximetry therefore placed on BiPAP.  He was also given IV steroids and bronchodilators.  He stayed on the BiPAP for several hours and his respiratory status did stabilize.  He is now being admitted for further evaluation and management      Assessment/Plan:      Acute hypoxic respiratory failure: This was present at time of admission.  He is on baseline oxygen 2 to 3 L at home.  At time of admission he did require BiPAP in the ED due to significant respiratory distress.  He was having difficulty speaking and was using accessory muscles to breathe.  Respiratory status stabilized on BiPAP will try to remove at this time and use supplemental oxygen.  Continue bronchodilators supplemental oxygen as needed.    COPD acute exacerbation: Noted significant wheezing on exam.  Will continue bronchodilators.  IV Solu-Medrol and will switch over to oral steroid  as his condition stabilizes.  Will treat with

## 2025-07-06 NOTE — ED NOTES
Kishan Elaine is a 79 y.o. male admitted for  Principal Problem:    Acute hypoxic respiratory failure (HCC)  Resolved Problems:    * No resolved hospital problems. *  .   Patient Home via family with   Chief Complaint   Patient presents with    Shortness of Breath     3L baseline. SOB. Started 4 yrs ago w/ COVID. Increased difficulty breathing 4 days ago. Endorses cough w/ CP.    .  Patient is alert and Person, Place, Time, and Situation  Patient's baseline mobility: Baseline Mobility: has not ambulated in the ER  Code Status: Full Code   Cardiac Rhythm: Cardiac Rhythm: Sinus tachy  O2 Flow Rate (L/min): 6 L/min  Is patient on baseline Oxygen: yes,  how many Liters3:   Abnormal Assessment Findings: SOB, hypoxia, COPD exacerbation    Isolation: None      NIH Score:    C-SSRS: Risk of Suicide: No Risk  Bedside swallow:        Active LDA's:   Peripheral IV 07/06/25 Right Forearm (Active)           Family/Caregiver Present yes Any Concerns: no   Restraints no  Sitter no         Vitals:      Vitals:    07/06/25 1615 07/06/25 1627 07/06/25 1645 07/06/25 1659   BP: 104/71 125/71 108/84 106/72   Pulse: 77 93 82 87   Resp: 29 30 27 19   Temp:       TempSrc:       SpO2: 96% 95% 95% 95%       Last documented pain score (0-10 scale) Pain Level: 0  Pain medication administered No.    Pertinent or High Risk Medications/Drips: No.    Pending Blood Product Administration: no    Abnormal labs:   Abnormal Labs Reviewed   CBC WITH AUTO DIFFERENTIAL - Abnormal; Notable for the following components:       Result Value    RDW 16.1 (*)     Neutrophils Absolute 9.6 (*)     Metamyelocytes Relative 2 (*)     Anisocytosis 1+ (*)     All other components within normal limits   COMPREHENSIVE METABOLIC PANEL W/ REFLEX TO MG FOR LOW K - Abnormal; Notable for the following components:    Sodium 135 (*)     Chloride 96 (*)     Glucose 277 (*)     BUN 21 (*)     All other components within normal limits   TROPONIN - Abnormal; Notable for the

## 2025-07-06 NOTE — ED PROVIDER NOTES
symptoms initially, direct bedside care, reevaluation, review of records, and consultation.  There was a high probability of clinically significant life-threatening deterioration in the patient's condition, which required my urgent intervention.       Clinical Impression:  1. COPD exacerbation (HCC)    2. Dyspnea, unspecified type    3. Hypoxia          Disposition / Plan:  Decision To Admit  Condition: stable  Blood pressure 104/71, pulse 77, temperature 97.1 °F (36.2 °C), temperature source Axillary, resp. rate 29, SpO2 96%.    Patient was given the following medications. I counseled patient how to take these medications.   New Prescriptions    No medications on file       Follow Up / Referral (if applicable):  No follow-up provider specified.    I, Lico Spain, am the primary attending of record and contributed the majority of evaluation and treatment of emergent care for this encounter.     This chart was generated in part by using Dragon Dictation system and may contain errors related to that system including errors in grammar, punctuation, and spelling, as well as words and phrases that may be inappropriate. If there are any questions or concerns please feel free to contact the dictating provider for clarification.     Lico Spain MD   [facility] [facility]       Lico Spain MD  07/06/25 6808

## 2025-07-07 ENCOUNTER — CARE COORDINATION (OUTPATIENT)
Dept: CASE MANAGEMENT | Age: 80
End: 2025-07-07

## 2025-07-07 LAB
ANION GAP SERPL CALCULATED.3IONS-SCNC: 8 MMOL/L (ref 3–16)
BASOPHILS # BLD: 0 K/UL (ref 0–0.2)
BASOPHILS NFR BLD: 0.2 %
BUN SERPL-MCNC: 24 MG/DL (ref 7–20)
CALCIUM SERPL-MCNC: 9.6 MG/DL (ref 8.3–10.6)
CHLORIDE SERPL-SCNC: 101 MMOL/L (ref 99–110)
CO2 SERPL-SCNC: 28 MMOL/L (ref 21–32)
CREAT SERPL-MCNC: 0.8 MG/DL (ref 0.8–1.3)
DEPRECATED RDW RBC AUTO: 15.8 % (ref 12.4–15.4)
EOSINOPHIL # BLD: 0 K/UL (ref 0–0.6)
EOSINOPHIL NFR BLD: 0.1 %
GFR SERPLBLD CREATININE-BSD FMLA CKD-EPI: >90 ML/MIN/{1.73_M2}
GLUCOSE BLD-MCNC: 149 MG/DL (ref 70–99)
GLUCOSE BLD-MCNC: 230 MG/DL (ref 70–99)
GLUCOSE BLD-MCNC: 413 MG/DL (ref 70–99)
GLUCOSE SERPL-MCNC: 322 MG/DL (ref 70–99)
HCT VFR BLD AUTO: 37.7 % (ref 40.5–52.5)
HGB BLD-MCNC: 13.1 G/DL (ref 13.5–17.5)
LYMPHOCYTES # BLD: 0.6 K/UL (ref 1–5.1)
LYMPHOCYTES NFR BLD: 8.2 %
MCH RBC QN AUTO: 31.7 PG (ref 26–34)
MCHC RBC AUTO-ENTMCNC: 34.7 G/DL (ref 31–36)
MCV RBC AUTO: 91.3 FL (ref 80–100)
MONOCYTES # BLD: 0.1 K/UL (ref 0–1.3)
MONOCYTES NFR BLD: 1.6 %
NEUTROPHILS # BLD: 6.5 K/UL (ref 1.7–7.7)
NEUTROPHILS NFR BLD: 89.9 %
PERFORMED ON: ABNORMAL
PLATELET # BLD AUTO: 134 K/UL (ref 135–450)
PMV BLD AUTO: 8.5 FL (ref 5–10.5)
POTASSIUM SERPL-SCNC: 5.3 MMOL/L (ref 3.5–5.1)
RBC # BLD AUTO: 4.13 M/UL (ref 4.2–5.9)
SODIUM SERPL-SCNC: 137 MMOL/L (ref 136–145)
WBC # BLD AUTO: 7.3 K/UL (ref 4–11)

## 2025-07-07 PROCEDURE — 97530 THERAPEUTIC ACTIVITIES: CPT

## 2025-07-07 PROCEDURE — 94660 CPAP INITIATION&MGMT: CPT

## 2025-07-07 PROCEDURE — 2580000003 HC RX 258: Performed by: INTERNAL MEDICINE

## 2025-07-07 PROCEDURE — 2500000003 HC RX 250 WO HCPCS: Performed by: INTERNAL MEDICINE

## 2025-07-07 PROCEDURE — 2060000000 HC ICU INTERMEDIATE R&B

## 2025-07-07 PROCEDURE — 94640 AIRWAY INHALATION TREATMENT: CPT

## 2025-07-07 PROCEDURE — 6370000000 HC RX 637 (ALT 250 FOR IP): Performed by: INTERNAL MEDICINE

## 2025-07-07 PROCEDURE — 36415 COLL VENOUS BLD VENIPUNCTURE: CPT

## 2025-07-07 PROCEDURE — 51798 US URINE CAPACITY MEASURE: CPT

## 2025-07-07 PROCEDURE — 97116 GAIT TRAINING THERAPY: CPT

## 2025-07-07 PROCEDURE — 94761 N-INVAS EAR/PLS OXIMETRY MLT: CPT

## 2025-07-07 PROCEDURE — 97162 PT EVAL MOD COMPLEX 30 MIN: CPT

## 2025-07-07 PROCEDURE — 80048 BASIC METABOLIC PNL TOTAL CA: CPT

## 2025-07-07 PROCEDURE — 2700000000 HC OXYGEN THERAPY PER DAY

## 2025-07-07 PROCEDURE — 6360000002 HC RX W HCPCS: Performed by: INTERNAL MEDICINE

## 2025-07-07 PROCEDURE — 85025 COMPLETE CBC W/AUTO DIFF WBC: CPT

## 2025-07-07 PROCEDURE — 97166 OT EVAL MOD COMPLEX 45 MIN: CPT

## 2025-07-07 RX ORDER — INSULIN LISPRO 100 [IU]/ML
0-16 INJECTION, SOLUTION INTRAVENOUS; SUBCUTANEOUS
Status: DISCONTINUED | OUTPATIENT
Start: 2025-07-07 | End: 2025-07-10 | Stop reason: HOSPADM

## 2025-07-07 RX ORDER — INSULIN LISPRO 100 [IU]/ML
0-4 INJECTION, SOLUTION INTRAVENOUS; SUBCUTANEOUS
Status: DISCONTINUED | OUTPATIENT
Start: 2025-07-07 | End: 2025-07-07

## 2025-07-07 RX ORDER — GLUCAGON 1 MG/ML
1 KIT INJECTION PRN
Status: DISCONTINUED | OUTPATIENT
Start: 2025-07-07 | End: 2025-07-10 | Stop reason: HOSPADM

## 2025-07-07 RX ORDER — BUDESONIDE AND FORMOTEROL FUMARATE DIHYDRATE 160; 4.5 UG/1; UG/1
2 AEROSOL RESPIRATORY (INHALATION)
Status: DISCONTINUED | OUTPATIENT
Start: 2025-07-07 | End: 2025-07-10 | Stop reason: HOSPADM

## 2025-07-07 RX ORDER — DEXTROSE MONOHYDRATE 100 MG/ML
INJECTION, SOLUTION INTRAVENOUS CONTINUOUS PRN
Status: DISCONTINUED | OUTPATIENT
Start: 2025-07-07 | End: 2025-07-10 | Stop reason: HOSPADM

## 2025-07-07 RX ADMIN — TICAGRELOR 60 MG: 60 TABLET, FILM COATED ORAL at 20:54

## 2025-07-07 RX ADMIN — Medication 10 ML: at 20:54

## 2025-07-07 RX ADMIN — WATER 40 MG: 1 INJECTION INTRAMUSCULAR; INTRAVENOUS; SUBCUTANEOUS at 17:04

## 2025-07-07 RX ADMIN — WATER 1000 MG: 1 INJECTION INTRAMUSCULAR; INTRAVENOUS; SUBCUTANEOUS at 17:03

## 2025-07-07 RX ADMIN — Medication 50 MG: at 09:49

## 2025-07-07 RX ADMIN — GABAPENTIN 300 MG: 300 CAPSULE ORAL at 09:49

## 2025-07-07 RX ADMIN — FLUOXETINE HYDROCHLORIDE 20 MG: 20 CAPSULE ORAL at 20:53

## 2025-07-07 RX ADMIN — ATORVASTATIN CALCIUM 20 MG: 10 TABLET, FILM COATED ORAL at 20:54

## 2025-07-07 RX ADMIN — Medication 2 PUFF: at 19:09

## 2025-07-07 RX ADMIN — LOSARTAN POTASSIUM 25 MG: 25 TABLET, FILM COATED ORAL at 09:48

## 2025-07-07 RX ADMIN — ENOXAPARIN SODIUM 40 MG: 100 INJECTION SUBCUTANEOUS at 09:49

## 2025-07-07 RX ADMIN — INSULIN LISPRO 4 UNITS: 100 INJECTION, SOLUTION INTRAVENOUS; SUBCUTANEOUS at 20:54

## 2025-07-07 RX ADMIN — Medication 1 TABLET: at 09:48

## 2025-07-07 RX ADMIN — Medication 10 ML: at 09:49

## 2025-07-07 RX ADMIN — WATER 40 MG: 1 INJECTION INTRAMUSCULAR; INTRAVENOUS; SUBCUTANEOUS at 05:15

## 2025-07-07 RX ADMIN — TICAGRELOR 60 MG: 60 TABLET, FILM COATED ORAL at 09:53

## 2025-07-07 RX ADMIN — INSULIN LISPRO 16 UNITS: 100 INJECTION, SOLUTION INTRAVENOUS; SUBCUTANEOUS at 12:37

## 2025-07-07 RX ADMIN — AZITHROMYCIN MONOHYDRATE 500 MG: 500 INJECTION, POWDER, LYOPHILIZED, FOR SOLUTION INTRAVENOUS at 17:07

## 2025-07-07 NOTE — CARE COORDINATION
CTN contacted [facility] inpatient  on A2 and l/m on [phone] regarding patient eligibility for RPM.     Jessica Bales RN

## 2025-07-07 NOTE — PLAN OF CARE
Problem: Chronic Conditions and Co-morbidities  Goal: Patient's chronic conditions and co-morbidity symptoms are monitored and maintained or improved  7/7/2025 1050 by Trini Madden RN  Outcome: Progressing  7/7/2025 0307 by Holly Hansen RN  Outcome: Progressing     Problem: Discharge Planning  Goal: Discharge to home or other facility with appropriate resources  7/7/2025 1050 by Trini Madden RN  Outcome: Progressing  7/7/2025 0307 by Holly Hansen RN  Outcome: Progressing     Problem: Pain  Goal: Verbalizes/displays adequate comfort level or baseline comfort level  7/7/2025 1050 by Trini Madden RN  Outcome: Progressing  7/7/2025 0307 by Holly Hansen, RN  Outcome: Progressing

## 2025-07-07 NOTE — CARE COORDINATION
Case Management Assessment  Initial Evaluation    Date/Time of Evaluation: 7/7/2025 4:43 PM  Assessment Completed by: Preeti Grace RN    If patient is discharged prior to next notation, then this note serves as note for discharge by case management.    Patient Name: Kishan Elaine                   YOB: 1945  Diagnosis: Hypoxia [R09.02]  COPD exacerbation (HCC) [J44.1]  Dyspnea, unspecified type [R06.00]  Acute hypoxic respiratory failure (HCC) [J96.01]                   Date / Time: 7/6/2025  2:05 PM    Patient Admission Status: Inpatient   Readmission Risk (Low < 19, Mod (19-27), High > 27): Readmission Risk Score: 16.4    Current PCP: Jayro Eric MD  PCP verified by CM? Yes    Chart Reviewed: Yes      History Provided by: Patient  Patient Orientation: Alert and Oriented    Patient Cognition: Alert    Hospitalization in the last 30 days (Readmission):  No    If yes, Readmission Assessment in  Navigator will be completed.    Advance Directives:      Code Status: Full Code   Patient's Primary Decision Maker is: Legal Next of Kin    Primary Decision Maker: Elva Elaine - Spouse - [phone]    Secondary Decision Maker: Kishan Elaine - Child - [phone]    Secondary Decision Maker: Steven Elaine - Child - [phone]    Discharge Planning:    Patient lives with: Spouse/Significant Other Type of Home: House  Primary Care Giver: Self  Patient Support Systems include: Spouse/Significant Other   Current Financial resources: Medicare  Current community resources: None  Current services prior to admission: Durable Medical Equipment, Oxygen Therapy            Current DME: Wheelchair, Walker, Cane, Oxygen Therapy (Comment)            Type of Home Care services:  None    ADLS  Prior functional level: Independent in ADLs/IADLs  Current functional level: Independent in ADLs/IADLs    PT AM-PAC: 18 /24  OT AM-PAC: 19 /24    Family can provide assistance at DC: Yes  Would you like Case Management

## 2025-07-08 LAB
ANION GAP SERPL CALCULATED.3IONS-SCNC: 9 MMOL/L (ref 3–16)
BUN SERPL-MCNC: 26 MG/DL (ref 7–20)
CALCIUM SERPL-MCNC: 9.5 MG/DL (ref 8.3–10.6)
CHLORIDE SERPL-SCNC: 99 MMOL/L (ref 99–110)
CO2 SERPL-SCNC: 28 MMOL/L (ref 21–32)
CREAT SERPL-MCNC: 0.7 MG/DL (ref 0.8–1.3)
DEPRECATED RDW RBC AUTO: 15.4 % (ref 12.4–15.4)
EST. AVERAGE GLUCOSE BLD GHB EST-MCNC: 208.7 MG/DL
GFR SERPLBLD CREATININE-BSD FMLA CKD-EPI: >90 ML/MIN/{1.73_M2}
GLUCOSE BLD-MCNC: 207 MG/DL (ref 70–99)
GLUCOSE BLD-MCNC: 239 MG/DL (ref 70–99)
GLUCOSE BLD-MCNC: 314 MG/DL (ref 70–99)
GLUCOSE BLD-MCNC: 332 MG/DL (ref 70–99)
GLUCOSE SERPL-MCNC: 234 MG/DL (ref 70–99)
HBA1C MFR BLD: 8.9 %
HCT VFR BLD AUTO: 37.7 % (ref 40.5–52.5)
HGB BLD-MCNC: 12.9 G/DL (ref 13.5–17.5)
MAGNESIUM SERPL-MCNC: 2.28 MG/DL (ref 1.8–2.4)
MCH RBC QN AUTO: 31.5 PG (ref 26–34)
MCHC RBC AUTO-ENTMCNC: 34.2 G/DL (ref 31–36)
MCV RBC AUTO: 92 FL (ref 80–100)
PERFORMED ON: ABNORMAL
PLATELET # BLD AUTO: 152 K/UL (ref 135–450)
PMV BLD AUTO: 8.4 FL (ref 5–10.5)
POTASSIUM SERPL-SCNC: 4.4 MMOL/L (ref 3.5–5.1)
RBC # BLD AUTO: 4.1 M/UL (ref 4.2–5.9)
SODIUM SERPL-SCNC: 136 MMOL/L (ref 136–145)
WBC # BLD AUTO: 14.2 K/UL (ref 4–11)

## 2025-07-08 PROCEDURE — 94640 AIRWAY INHALATION TREATMENT: CPT

## 2025-07-08 PROCEDURE — 2500000003 HC RX 250 WO HCPCS: Performed by: INTERNAL MEDICINE

## 2025-07-08 PROCEDURE — 94761 N-INVAS EAR/PLS OXIMETRY MLT: CPT

## 2025-07-08 PROCEDURE — 85027 COMPLETE CBC AUTOMATED: CPT

## 2025-07-08 PROCEDURE — 83036 HEMOGLOBIN GLYCOSYLATED A1C: CPT

## 2025-07-08 PROCEDURE — 2580000003 HC RX 258: Performed by: INTERNAL MEDICINE

## 2025-07-08 PROCEDURE — 80048 BASIC METABOLIC PNL TOTAL CA: CPT

## 2025-07-08 PROCEDURE — 36415 COLL VENOUS BLD VENIPUNCTURE: CPT

## 2025-07-08 PROCEDURE — 83735 ASSAY OF MAGNESIUM: CPT

## 2025-07-08 PROCEDURE — 51798 US URINE CAPACITY MEASURE: CPT

## 2025-07-08 PROCEDURE — 99223 1ST HOSP IP/OBS HIGH 75: CPT | Performed by: INTERNAL MEDICINE

## 2025-07-08 PROCEDURE — 6370000000 HC RX 637 (ALT 250 FOR IP): Performed by: INTERNAL MEDICINE

## 2025-07-08 PROCEDURE — 94669 MECHANICAL CHEST WALL OSCILL: CPT

## 2025-07-08 PROCEDURE — 6360000002 HC RX W HCPCS: Performed by: INTERNAL MEDICINE

## 2025-07-08 PROCEDURE — 2700000000 HC OXYGEN THERAPY PER DAY

## 2025-07-08 PROCEDURE — 94660 CPAP INITIATION&MGMT: CPT

## 2025-07-08 PROCEDURE — 2060000000 HC ICU INTERMEDIATE R&B

## 2025-07-08 RX ORDER — IPRATROPIUM BROMIDE AND ALBUTEROL SULFATE 2.5; .5 MG/3ML; MG/3ML
1 SOLUTION RESPIRATORY (INHALATION)
Status: DISCONTINUED | OUTPATIENT
Start: 2025-07-08 | End: 2025-07-10 | Stop reason: HOSPADM

## 2025-07-08 RX ORDER — SODIUM CHLORIDE FOR INHALATION 3 %
4 VIAL, NEBULIZER (ML) INHALATION EVERY 8 HOURS
Status: DISCONTINUED | OUTPATIENT
Start: 2025-07-08 | End: 2025-07-10 | Stop reason: HOSPADM

## 2025-07-08 RX ORDER — FUROSEMIDE 10 MG/ML
20 INJECTION INTRAMUSCULAR; INTRAVENOUS ONCE
Status: COMPLETED | OUTPATIENT
Start: 2025-07-08 | End: 2025-07-08

## 2025-07-08 RX ADMIN — INSULIN LISPRO 4 UNITS: 100 INJECTION, SOLUTION INTRAVENOUS; SUBCUTANEOUS at 17:46

## 2025-07-08 RX ADMIN — GABAPENTIN 300 MG: 300 CAPSULE ORAL at 08:55

## 2025-07-08 RX ADMIN — WATER 40 MG: 1 INJECTION INTRAMUSCULAR; INTRAVENOUS; SUBCUTANEOUS at 06:04

## 2025-07-08 RX ADMIN — ATORVASTATIN CALCIUM 20 MG: 10 TABLET, FILM COATED ORAL at 20:28

## 2025-07-08 RX ADMIN — Medication 10 ML: at 20:31

## 2025-07-08 RX ADMIN — INSULIN LISPRO 12 UNITS: 100 INJECTION, SOLUTION INTRAVENOUS; SUBCUTANEOUS at 20:29

## 2025-07-08 RX ADMIN — TICAGRELOR 60 MG: 60 TABLET, FILM COATED ORAL at 20:29

## 2025-07-08 RX ADMIN — ENOXAPARIN SODIUM 40 MG: 100 INJECTION SUBCUTANEOUS at 08:56

## 2025-07-08 RX ADMIN — Medication 50 MG: at 08:55

## 2025-07-08 RX ADMIN — WATER 40 MG: 1 INJECTION INTRAMUSCULAR; INTRAVENOUS; SUBCUTANEOUS at 17:47

## 2025-07-08 RX ADMIN — SODIUM CHLORIDE SOLN NEBU 3% 4 ML: 3 NEBU SOLN at 23:00

## 2025-07-08 RX ADMIN — FUROSEMIDE 20 MG: 10 INJECTION, SOLUTION INTRAMUSCULAR; INTRAVENOUS at 12:47

## 2025-07-08 RX ADMIN — WATER 1000 MG: 1 INJECTION INTRAMUSCULAR; INTRAVENOUS; SUBCUTANEOUS at 17:46

## 2025-07-08 RX ADMIN — LOSARTAN POTASSIUM 25 MG: 25 TABLET, FILM COATED ORAL at 08:55

## 2025-07-08 RX ADMIN — INSULIN LISPRO 4 UNITS: 100 INJECTION, SOLUTION INTRAVENOUS; SUBCUTANEOUS at 08:55

## 2025-07-08 RX ADMIN — Medication 10 ML: at 08:56

## 2025-07-08 RX ADMIN — FLUOXETINE HYDROCHLORIDE 20 MG: 20 CAPSULE ORAL at 20:28

## 2025-07-08 RX ADMIN — AZITHROMYCIN MONOHYDRATE 500 MG: 500 INJECTION, POWDER, LYOPHILIZED, FOR SOLUTION INTRAVENOUS at 17:52

## 2025-07-08 RX ADMIN — INSULIN LISPRO 12 UNITS: 100 INJECTION, SOLUTION INTRAVENOUS; SUBCUTANEOUS at 12:47

## 2025-07-08 RX ADMIN — TICAGRELOR 60 MG: 60 TABLET, FILM COATED ORAL at 08:54

## 2025-07-08 RX ADMIN — Medication 1 TABLET: at 08:55

## 2025-07-08 RX ADMIN — Medication 2 PUFF: at 07:47

## 2025-07-08 RX ADMIN — IPRATROPIUM BROMIDE AND ALBUTEROL SULFATE 1 DOSE: .5; 2.5 SOLUTION RESPIRATORY (INHALATION) at 19:08

## 2025-07-08 RX ADMIN — Medication 2 PUFF: at 19:12

## 2025-07-08 ASSESSMENT — PAIN SCALES - GENERAL
PAINLEVEL_OUTOF10: 0
PAINLEVEL_OUTOF10: 0

## 2025-07-08 NOTE — CONSULTS
Consult Placed     Who: Pulmonology- Dr. Brown  Date: 7/7/25  Time: 1732     Electronically signed by Krystle Whitaker RN on 7/7/2025 at 5:32 PM     
  Recent Labs     07/06/25  1415 07/07/25  0417 07/08/25  0425   * 137 136   K 4.6 5.3* 4.4   CL 96* 101 99   CO2 26 28 28   BUN 21* 24* 26*   CREATININE 0.9 0.8 0.7*       LIVER PROFILE:   Recent Labs     07/06/25  1415   AST 25   ALT 40   BILITOT 0.7   ALKPHOS 75       Acid-Base:   Recent Labs     07/06/25  1415 07/07/25  0417 07/08/25  0425   CO2 26 28 28   ALBUMIN 4.2  --   --    CREATININE 0.9 0.8 0.7*   GLUCOSE 277* 322* 234*       Cardiac:   Lab Results   Component Value Date    TROPONINI 0.60 (HH) 01/04/2019          Vitals:    07/08/25 0400   BP: 124/70   Pulse: 67   Resp: 24   Temp: 97.7 °F (36.5 °C)   SpO2: 93%       Physical Exam:  General appearance: In no apparent distress.  HEENT: Anicteric.  Cardiac: No loud murmur.  Lungs: Bilateral crackles  Abdomen: Soft.  Back & Extremities: No clubbing.  No signs of acute ischemia.  Neurological: No focal deficit.      ________________________________________________________  Electronically signed by:  Raudel Brown MD,FACP    7/8/2025    7:12 AM.     [facility]  [address], [place] [address]   Available on [employer]  Phone (office): [phone]

## 2025-07-08 NOTE — PLAN OF CARE
Problem: Chronic Conditions and Co-morbidities  Goal: Patient's chronic conditions and co-morbidity symptoms are monitored and maintained or improved  7/8/2025 1622 by Krystle Whitaker RN  Outcome: Progressing  Note: Pt will have accuchecks before meals and at bedtime with sliding scale insulin in place for coverage. Will continue to monitor for signs and symptoms of hypoglycemia and hyperglycemia throughout shift.      Problem: Pain  Goal: Verbalizes/displays adequate comfort level or baseline comfort level  7/8/2025 1622 by Krystle Whitaker RN  Outcome: Progressing  Note: Pt will be satisfied with pain control. Pt uses numeric pain rating scale with reassessments after pain med administration. Will continue to monitor progression throughout shift.      Problem: Safety - Adult  Goal: Free from fall injury  7/8/2025 1622 by Krystle Whitaker RN  Outcome: Progressing  Note: Pt will remain free from falls throughout hospital stay. Fall precautions in place, bed in lowest position with wheels locked and side rails 2/4 up. Room door open and hourly rounding completed. Will continue to monitor throughout shift.

## 2025-07-09 ENCOUNTER — TELEPHONE (OUTPATIENT)
Dept: PULMONOLOGY | Age: 80
End: 2025-07-09

## 2025-07-09 LAB
ANION GAP SERPL CALCULATED.3IONS-SCNC: 9 MMOL/L (ref 3–16)
BUN SERPL-MCNC: 26 MG/DL (ref 7–20)
CALCIUM SERPL-MCNC: 9.5 MG/DL (ref 8.3–10.6)
CHLORIDE SERPL-SCNC: 97 MMOL/L (ref 99–110)
CO2 SERPL-SCNC: 30 MMOL/L (ref 21–32)
CREAT SERPL-MCNC: 0.7 MG/DL (ref 0.8–1.3)
EST. AVERAGE GLUCOSE BLD GHB EST-MCNC: 208.7 MG/DL
GFR SERPLBLD CREATININE-BSD FMLA CKD-EPI: >90 ML/MIN/{1.73_M2}
GLUCOSE BLD-MCNC: 226 MG/DL (ref 70–99)
GLUCOSE BLD-MCNC: 239 MG/DL (ref 70–99)
GLUCOSE BLD-MCNC: 348 MG/DL (ref 70–99)
GLUCOSE BLD-MCNC: 360 MG/DL (ref 70–99)
GLUCOSE SERPL-MCNC: 239 MG/DL (ref 70–99)
HBA1C MFR BLD: 8.9 %
MAGNESIUM SERPL-MCNC: 2.27 MG/DL (ref 1.8–2.4)
PERFORMED ON: ABNORMAL
POTASSIUM SERPL-SCNC: 4.3 MMOL/L (ref 3.5–5.1)
SODIUM SERPL-SCNC: 136 MMOL/L (ref 136–145)

## 2025-07-09 PROCEDURE — 6370000000 HC RX 637 (ALT 250 FOR IP): Performed by: INTERNAL MEDICINE

## 2025-07-09 PROCEDURE — 2060000000 HC ICU INTERMEDIATE R&B

## 2025-07-09 PROCEDURE — 83735 ASSAY OF MAGNESIUM: CPT

## 2025-07-09 PROCEDURE — 94669 MECHANICAL CHEST WALL OSCILL: CPT

## 2025-07-09 PROCEDURE — 6360000002 HC RX W HCPCS: Performed by: INTERNAL MEDICINE

## 2025-07-09 PROCEDURE — 83036 HEMOGLOBIN GLYCOSYLATED A1C: CPT

## 2025-07-09 PROCEDURE — 94660 CPAP INITIATION&MGMT: CPT

## 2025-07-09 PROCEDURE — 94640 AIRWAY INHALATION TREATMENT: CPT

## 2025-07-09 PROCEDURE — 2580000003 HC RX 258: Performed by: INTERNAL MEDICINE

## 2025-07-09 PROCEDURE — 97530 THERAPEUTIC ACTIVITIES: CPT

## 2025-07-09 PROCEDURE — 94761 N-INVAS EAR/PLS OXIMETRY MLT: CPT

## 2025-07-09 PROCEDURE — 80048 BASIC METABOLIC PNL TOTAL CA: CPT

## 2025-07-09 PROCEDURE — 2700000000 HC OXYGEN THERAPY PER DAY

## 2025-07-09 PROCEDURE — 36415 COLL VENOUS BLD VENIPUNCTURE: CPT

## 2025-07-09 PROCEDURE — 2500000003 HC RX 250 WO HCPCS: Performed by: INTERNAL MEDICINE

## 2025-07-09 PROCEDURE — 97110 THERAPEUTIC EXERCISES: CPT

## 2025-07-09 RX ORDER — PREDNISONE 20 MG/1
60 TABLET ORAL DAILY
Status: DISCONTINUED | OUTPATIENT
Start: 2025-07-10 | End: 2025-07-10 | Stop reason: HOSPADM

## 2025-07-09 RX ADMIN — WATER 40 MG: 1 INJECTION INTRAMUSCULAR; INTRAVENOUS; SUBCUTANEOUS at 06:14

## 2025-07-09 RX ADMIN — INSULIN LISPRO 16 UNITS: 100 INJECTION, SOLUTION INTRAVENOUS; SUBCUTANEOUS at 20:40

## 2025-07-09 RX ADMIN — TICAGRELOR 60 MG: 60 TABLET, FILM COATED ORAL at 20:38

## 2025-07-09 RX ADMIN — FLUOXETINE HYDROCHLORIDE 20 MG: 20 CAPSULE ORAL at 20:38

## 2025-07-09 RX ADMIN — ENOXAPARIN SODIUM 40 MG: 100 INJECTION SUBCUTANEOUS at 08:24

## 2025-07-09 RX ADMIN — SODIUM CHLORIDE SOLN NEBU 3% 4 ML: 3 NEBU SOLN at 07:43

## 2025-07-09 RX ADMIN — INSULIN LISPRO 12 UNITS: 100 INJECTION, SOLUTION INTRAVENOUS; SUBCUTANEOUS at 11:39

## 2025-07-09 RX ADMIN — Medication 10 ML: at 20:40

## 2025-07-09 RX ADMIN — IPRATROPIUM BROMIDE AND ALBUTEROL SULFATE 1 DOSE: .5; 2.5 SOLUTION RESPIRATORY (INHALATION) at 07:43

## 2025-07-09 RX ADMIN — SODIUM CHLORIDE SOLN NEBU 3% 4 ML: 3 NEBU SOLN at 23:06

## 2025-07-09 RX ADMIN — ATORVASTATIN CALCIUM 20 MG: 10 TABLET, FILM COATED ORAL at 20:38

## 2025-07-09 RX ADMIN — Medication 1 TABLET: at 08:24

## 2025-07-09 RX ADMIN — GABAPENTIN 300 MG: 300 CAPSULE ORAL at 08:24

## 2025-07-09 RX ADMIN — IPRATROPIUM BROMIDE AND ALBUTEROL SULFATE 1 DOSE: .5; 2.5 SOLUTION RESPIRATORY (INHALATION) at 19:24

## 2025-07-09 RX ADMIN — Medication 2 PUFF: at 19:27

## 2025-07-09 RX ADMIN — WATER 40 MG: 1 INJECTION INTRAMUSCULAR; INTRAVENOUS; SUBCUTANEOUS at 16:12

## 2025-07-09 RX ADMIN — INSULIN LISPRO 4 UNITS: 100 INJECTION, SOLUTION INTRAVENOUS; SUBCUTANEOUS at 16:48

## 2025-07-09 RX ADMIN — LOSARTAN POTASSIUM 25 MG: 25 TABLET, FILM COATED ORAL at 08:24

## 2025-07-09 RX ADMIN — Medication 2 PUFF: at 07:43

## 2025-07-09 RX ADMIN — IPRATROPIUM BROMIDE AND ALBUTEROL SULFATE 1 DOSE: .5; 2.5 SOLUTION RESPIRATORY (INHALATION) at 12:08

## 2025-07-09 RX ADMIN — WATER 1000 MG: 1 INJECTION INTRAMUSCULAR; INTRAVENOUS; SUBCUTANEOUS at 16:16

## 2025-07-09 RX ADMIN — Medication 50 MG: at 08:24

## 2025-07-09 RX ADMIN — Medication 10 ML: at 08:28

## 2025-07-09 RX ADMIN — TICAGRELOR 60 MG: 60 TABLET, FILM COATED ORAL at 08:27

## 2025-07-09 RX ADMIN — INSULIN LISPRO 4 UNITS: 100 INJECTION, SOLUTION INTRAVENOUS; SUBCUTANEOUS at 08:24

## 2025-07-09 ASSESSMENT — PAIN SCALES - GENERAL: PAINLEVEL_OUTOF10: 0

## 2025-07-09 NOTE — CARE COORDINATION
Spoke with therapy who just worked with patient and states they have no concerns about patient discharging to home and no longer thinks he qualifies for home care as he is high functioning and does not plan on being \"home bound\".  Therapy recommending outpatient therapy and patient verbalized his wishes to pursue outpatient therapy.     Patient is from home with his wife and was independent prior to admission.    He has home O2 through [facility].    Please notify CM should any needs arise.

## 2025-07-09 NOTE — PLAN OF CARE
Problem: Chronic Conditions and Co-morbidities  Goal: Patient's chronic conditions and co-morbidity symptoms are monitored and maintained or improved  7/9/2025 0922 by Sean Cummins, RN  Outcome: Progressing     Problem: Discharge Planning  Goal: Discharge to home or other facility with appropriate resources  7/9/2025 0922 by Sean Cummins, RN  Outcome: Progressing     Problem: Pain  Goal: Verbalizes/displays adequate comfort level or baseline comfort level  7/9/2025 0922 by Sean Cummins, RN  Outcome: Progressing     Problem: Safety - Adult  Goal: Free from fall injury  7/9/2025 0922 by Sean Cummins, RN  Outcome: Progressing

## 2025-07-09 NOTE — PLAN OF CARE
Problem: Chronic Conditions and Co-morbidities  Goal: Patient's chronic conditions and co-morbidity symptoms are monitored and maintained or improved  7/9/2025 0148 by Holly Hansen, RN  Outcome: Progressing     Problem: Discharge Planning  Goal: Discharge to home or other facility with appropriate resources  Outcome: Progressing     Problem: Pain  Goal: Verbalizes/displays adequate comfort level or baseline comfort level  7/9/2025 0148 by Holly Hansen, RN  Outcome: Progressing     Problem: Safety - Adult  Goal: Free from fall injury  7/9/2025 0148 by Holly Hansen, RN  Outcome: Progressing

## 2025-07-09 NOTE — TELEPHONE ENCOUNTER
----- Message from Dr. Raudel Brown MD sent at 7/9/2025  8:31 AM EDT -----  · Follow-up in the pulmonary clinic in 4 weeks

## 2025-07-10 ENCOUNTER — APPOINTMENT (OUTPATIENT)
Dept: GENERAL RADIOLOGY | Age: 80
DRG: 189 | End: 2025-07-10
Payer: MEDICARE

## 2025-07-10 VITALS
SYSTOLIC BLOOD PRESSURE: 107 MMHG | OXYGEN SATURATION: 94 % | BODY MASS INDEX: 19.72 KG/M2 | WEIGHT: 145.6 LBS | DIASTOLIC BLOOD PRESSURE: 65 MMHG | RESPIRATION RATE: 20 BRPM | TEMPERATURE: 98.1 F | HEIGHT: 72 IN | HEART RATE: 72 BPM

## 2025-07-10 LAB
ANION GAP SERPL CALCULATED.3IONS-SCNC: 11 MMOL/L (ref 3–16)
BUN SERPL-MCNC: 26 MG/DL (ref 7–20)
CALCIUM SERPL-MCNC: 9.8 MG/DL (ref 8.3–10.6)
CHLORIDE SERPL-SCNC: 97 MMOL/L (ref 99–110)
CO2 SERPL-SCNC: 29 MMOL/L (ref 21–32)
CREAT SERPL-MCNC: 0.7 MG/DL (ref 0.8–1.3)
DEPRECATED RDW RBC AUTO: 15.7 % (ref 12.4–15.4)
GFR SERPLBLD CREATININE-BSD FMLA CKD-EPI: >90 ML/MIN/{1.73_M2}
GLUCOSE BLD-MCNC: 180 MG/DL (ref 70–99)
GLUCOSE BLD-MCNC: 282 MG/DL (ref 70–99)
GLUCOSE SERPL-MCNC: 214 MG/DL (ref 70–99)
HCT VFR BLD AUTO: 39.9 % (ref 40.5–52.5)
HGB BLD-MCNC: 13.8 G/DL (ref 13.5–17.5)
MAGNESIUM SERPL-MCNC: 2.44 MG/DL (ref 1.8–2.4)
MCH RBC QN AUTO: 31.7 PG (ref 26–34)
MCHC RBC AUTO-ENTMCNC: 34.6 G/DL (ref 31–36)
MCV RBC AUTO: 91.6 FL (ref 80–100)
PERFORMED ON: ABNORMAL
PERFORMED ON: ABNORMAL
PLATELET # BLD AUTO: 153 K/UL (ref 135–450)
PMV BLD AUTO: 8 FL (ref 5–10.5)
POTASSIUM SERPL-SCNC: 4.2 MMOL/L (ref 3.5–5.1)
RBC # BLD AUTO: 4.36 M/UL (ref 4.2–5.9)
SODIUM SERPL-SCNC: 137 MMOL/L (ref 136–145)
WBC # BLD AUTO: 12.4 K/UL (ref 4–11)

## 2025-07-10 PROCEDURE — 71045 X-RAY EXAM CHEST 1 VIEW: CPT

## 2025-07-10 PROCEDURE — 80048 BASIC METABOLIC PNL TOTAL CA: CPT

## 2025-07-10 PROCEDURE — 85027 COMPLETE CBC AUTOMATED: CPT

## 2025-07-10 PROCEDURE — 94660 CPAP INITIATION&MGMT: CPT

## 2025-07-10 PROCEDURE — 6360000002 HC RX W HCPCS: Performed by: INTERNAL MEDICINE

## 2025-07-10 PROCEDURE — 6370000000 HC RX 637 (ALT 250 FOR IP): Performed by: INTERNAL MEDICINE

## 2025-07-10 PROCEDURE — 2700000000 HC OXYGEN THERAPY PER DAY

## 2025-07-10 PROCEDURE — 2500000003 HC RX 250 WO HCPCS: Performed by: INTERNAL MEDICINE

## 2025-07-10 PROCEDURE — 94640 AIRWAY INHALATION TREATMENT: CPT

## 2025-07-10 PROCEDURE — 83735 ASSAY OF MAGNESIUM: CPT

## 2025-07-10 PROCEDURE — 36415 COLL VENOUS BLD VENIPUNCTURE: CPT

## 2025-07-10 PROCEDURE — 2580000003 HC RX 258: Performed by: INTERNAL MEDICINE

## 2025-07-10 PROCEDURE — 94669 MECHANICAL CHEST WALL OSCILL: CPT

## 2025-07-10 PROCEDURE — 94761 N-INVAS EAR/PLS OXIMETRY MLT: CPT

## 2025-07-10 RX ORDER — PREDNISONE 20 MG/1
40 TABLET ORAL DAILY
Qty: 8 TABLET | Refills: 0 | Status: SHIPPED | OUTPATIENT
Start: 2025-07-11 | End: 2025-07-15

## 2025-07-10 RX ORDER — SULFAMETHOXAZOLE AND TRIMETHOPRIM 800; 160 MG/1; MG/1
1 TABLET ORAL 2 TIMES DAILY
Qty: 6 TABLET | Refills: 0 | Status: SHIPPED | OUTPATIENT
Start: 2025-07-10 | End: 2025-07-13

## 2025-07-10 RX ADMIN — GABAPENTIN 300 MG: 300 CAPSULE ORAL at 08:38

## 2025-07-10 RX ADMIN — Medication 50 MG: at 08:38

## 2025-07-10 RX ADMIN — TICAGRELOR 60 MG: 60 TABLET, FILM COATED ORAL at 09:06

## 2025-07-10 RX ADMIN — Medication 10 ML: at 08:39

## 2025-07-10 RX ADMIN — IPRATROPIUM BROMIDE AND ALBUTEROL SULFATE 1 DOSE: .5; 2.5 SOLUTION RESPIRATORY (INHALATION) at 07:27

## 2025-07-10 RX ADMIN — Medication 2 PUFF: at 07:29

## 2025-07-10 RX ADMIN — Medication 1 TABLET: at 08:38

## 2025-07-10 RX ADMIN — PREDNISONE 60 MG: 20 TABLET ORAL at 08:38

## 2025-07-10 RX ADMIN — INSULIN LISPRO 4 UNITS: 100 INJECTION, SOLUTION INTRAVENOUS; SUBCUTANEOUS at 08:38

## 2025-07-10 RX ADMIN — SODIUM CHLORIDE SOLN NEBU 3% 4 ML: 3 NEBU SOLN at 07:28

## 2025-07-10 RX ADMIN — ENOXAPARIN SODIUM 40 MG: 100 INJECTION SUBCUTANEOUS at 08:38

## 2025-07-10 RX ADMIN — LOSARTAN POTASSIUM 25 MG: 25 TABLET, FILM COATED ORAL at 08:38

## 2025-07-10 ASSESSMENT — PAIN SCALES - GENERAL: PAINLEVEL_OUTOF10: 0

## 2025-07-10 NOTE — CARE COORDINATION
Discharge order noted.   Spoke with patient and wife at bedside.    Patient has his home O2 at bedside for the drive home.    They deny any  needs from CM.

## 2025-07-10 NOTE — PLAN OF CARE
Problem: Chronic Conditions and Co-morbidities  Goal: Patient's chronic conditions and co-morbidity symptoms are monitored and maintained or improved  7/9/2025 2307 by Virgilio Estrada, RN  Outcome: Progressing   Continuing to monitor blood glucose levels and administer ordered insulin as appropriate.

## 2025-07-10 NOTE — PLAN OF CARE
CHF Care Plan      Patient's EF (Ejection Fraction) is greater than 40%    Heart Failure Medications:  Diuretics:: None    (One of the following REQUIRED for EF </= 40%/SYSTOLIC FAILURE but MAY be used in EF% >40%/DIASTOLIC FAILURE)        ACE:: None        ARB:: None         ARNI:: None    (Beta Blockers)  NON- Evidenced Based Beta Blocker (for EF% >40%/DIASTOLIC FAILURE): None    Evidenced Based Beta Blocker::(REQUIRED for EF% <40%/SYSTOLIC FAILURE) None  ...................................................................................................................................................    Failed to redirect to the Timeline version of the [employer] SmartLink.      Patient's weights and intake/output reviewed    Daily Weight log at bedside, patient/family participation in use of log: \"yes    Patient's current weight today shows a difference of 1 lbs less than last documented weight.      Intake/Output Summary (Last 24 hours) at 7/10/2025 1301  Last data filed at 7/10/2025 1106  Gross per 24 hour   Intake 1090 ml   Output 1050 ml   Net 40 ml       Education Booklet Provided: yes    Comorbidities Reviewed Yes    Patient has a past medical history of CAD (coronary artery disease), Chronic rhinitis, COVID, COVID toes, Crushing injury of left forearm, Hyperlipidemia, Hypertension, Ischemic cardiomyopathy, Personal history of COVID-19, Pulmonary fibrosis (HCC), STEMI (ST elevation myocardial infarction) (HCC), Tinnitus, and Toe gangrene (HCC).     >>For CHF and Comorbidity documentation on Education Time and Topics, please see Education Tab      CHF Education    Learners:  Patient  Readineess:   Eager  Method:   Explanation  Response:   Verbalizes Understanding  Comments: Educated on monitoring low sodium diet.     Time Spent: 5 minutes      Pt resting in bed at this time on 3 L O2. Pt denies shortness of breath. Pt with nonpitting lower extremity edema.     Patient and/or Family's stated Goal of Care this

## 2025-07-10 NOTE — DISCHARGE INSTRUCTIONS
Heart Failure Resources:  Heart Failure Interactive Workbook:  Go to [email or web address].iTagged/[email or web address]=725635 for a Free Heart Failure Interactive Workbook provided by The American [employer]. This interactive workbook will provide information on Healthier Living with Heart Failure. Please copy and paste link into search bar. Use your mouse to scroll through the pages.    HF [place] alejandro:   Heart Failure Free smart phone alejandro available for iPhone and Android download. Use your phone to track sodium intake, fluid intake, symptoms, and weight.     Low Sodium Diet / Recipes:  Go to www.[employer]PackLink website for “renal” diet which is Low Sodium! [employer] is a dialysis company, but this website offers free seasonal cookbooks. Each quarter, they will release 25-30 new recipes with a breakdown of calories, sodium, and glucose. You can also go to www.[employer]/recipes website for free recipes.     Home Exercise Program:   Identification of Green/Yellow/Red zones:  You should be able to identify when you feel good (green zone), if you have 1-2 symptoms of HF (yellow zone), or if you are in need of medical attention (red zone).  In your CHF education folder you were provided a “stop light tool” to outline this information.     We want to you to rate your exertion levels:    Our therapy team has discussed means of identification with you such as the \"Radha scale.\"  The Radha rating scale ranges from 6 to 20, where 6 means \"no exertion at all\" and 20 means \"maximal exertion.\" The goal is to use this to gauge how much effort it is taking for you to do your normal daily tasks.   You should be able to recognize when too much exertion is being expended.    Elements of Energy Conservation:   Prioritize/Plan: Decide what needs to be done today, and what can wait for a later date, write to do lists, plan ahead to avoid extra trips, and gather supplies and equipment needed before starting an activity.   Position:

## 2025-07-10 NOTE — PLAN OF CARE
Problem: Chronic Conditions and Co-morbidities  Goal: Patient's chronic conditions and co-morbidity symptoms are monitored and maintained or improved  7/10/2025 1257 by Alana Grover, RN  Outcome: Adequate for Discharge  7/9/2025 2304 by Virgilio Estrada, RN  Outcome: Progressing     Problem: Discharge Planning  Goal: Discharge to home or other facility with appropriate resources  Outcome: Adequate for Discharge     Problem: Pain  Goal: Verbalizes/displays adequate comfort level or baseline comfort level  Outcome: Adequate for Discharge     Problem: Safety - Adult  Goal: Free from fall injury  Outcome: Adequate for Discharge
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
## 2025-07-11 ENCOUNTER — CARE COORDINATION (OUTPATIENT)
Dept: CASE MANAGEMENT | Age: 80
End: 2025-07-11

## 2025-07-11 DIAGNOSIS — J96.11 CHRONIC RESPIRATORY FAILURE WITH HYPOXIA (HCC): Primary | ICD-10-CM

## 2025-07-11 PROCEDURE — 1111F DSCHRG MED/CURRENT MED MERGE: CPT | Performed by: STUDENT IN AN ORGANIZED HEALTH CARE EDUCATION/TRAINING PROGRAM

## 2025-07-11 NOTE — CARE COORDINATION
Care Transitions Note    Initial Call - Call within 2 business days of discharge: Yes    Patient Current Location:  Home: [address] [place], [place] [id number]    Care Transition Nurse contacted the patient by telephone to perform post hospital discharge assessment, verified name and  as identifiers.  Provided introduction to self, and explanation of the Care Transition Nurse role.    Patient: Kishan Elaine    Patient : 1945   MRN: [id number]    Reason for Admission: COPD  Discharge Date: 7/10/25  RURS: Readmission Risk Score: 15.1      Last Discharge Facility       Date Complaint Diagnosis Description Type Department Provider    25 Shortness of Breath COPD exacerbation (HCC) ... ED to Hosp-Admission (Discharged) (ADMITTED) Bolivar Busby MD; [place]...            Was this an external facility discharge? No    Additional needs identified to be addressed with provider   No needs identified             Method of communication with provider: none.    Patients top risk factors for readmission: functional physical ability    Interventions to address risk factors:   Education: COPD management    Care Summary Note: Patient's wife, James answered call and verified . HIPAA verified. Pleasant and agreeable to transition call. Patient is doing \"better\" since hospitalization. Confirmed she has AVS and full medication review completed. Taking as directed. HFU w/ PCP noted in system. Reviewed oxygen sat and running >90% per wife. Patient is wearing oxygen as directed. Patient to reschedule sleep study d/t being inpatient when he was previously scheduled. Denied any acute needs at present time.  Agreeable to f/u calls.  Educated on the use of urgent care or physician’s 24 hr access line if assistance is needed after hours.    Care Transition Nurse reviewed discharge instructions, medical action plan, and red flags with spouse/partner. The spouse/partner was given an opportunity to ask questions;

## 2025-07-11 NOTE — TELEPHONE ENCOUNTER
Appointment was already for 7/30 wife wanted to leave it scheduled. Referral sent back to sleep lab for HST.

## 2025-07-16 ENCOUNTER — OFFICE VISIT (OUTPATIENT)
Dept: FAMILY MEDICINE CLINIC | Age: 80
End: 2025-07-16

## 2025-07-16 VITALS
OXYGEN SATURATION: 86 % | BODY MASS INDEX: 19.77 KG/M2 | HEIGHT: 72 IN | DIASTOLIC BLOOD PRESSURE: 60 MMHG | WEIGHT: 146 LBS | SYSTOLIC BLOOD PRESSURE: 122 MMHG | HEART RATE: 105 BPM

## 2025-07-16 DIAGNOSIS — Z09 HOSPITAL DISCHARGE FOLLOW-UP: Primary | ICD-10-CM

## 2025-07-16 DIAGNOSIS — J84.9 ILD (INTERSTITIAL LUNG DISEASE) (HCC): ICD-10-CM

## 2025-07-16 NOTE — PROGRESS NOTES
Post-Discharge Transitional Care  Follow Up      Kishan Elaine   YOB: 1945    Date of Office Visit:  7/16/2025  Date of Hospital Admission: 7/6/25  Date of Hospital Discharge: 7/10/25  Risk of hospital readmission (high >=14%. Medium >=10%) :Readmission Risk Score: 15.1      Care management risk score Rising risk (score 2-5) and Complex Care (Scores >=6): No Risk Score On File     Non face to face  following discharge, date last encounter closed (first attempt may have been earlier): 07/11/2025    Call initiated 2 business days of discharge: Yes    ASSESSMENT/PLAN:   Hospital discharge follow-up  -     OR DISCHARGE MEDS RECONCILED W/ CURRENT OUTPATIENT MED LIST  ILD (interstitial lung disease) (HCC)  -     Non [facility] - External Referral To Pulmonology  Patient has been doing fairly since discharge from the hospital.  He is on oxygen and has a chronic progressive interstitial lung disease.  Patient also has a high complexity of complicating medical factors.  Patient encouraged to follow-up with his pulmonologist and ask about medications that they have been researching.  No other concerns at this time.     Medical Decision Making: high complexity  No follow-ups on file.           Subjective:   HPI:  Follow up of Hospital problems/diagnosis(es): Patient is a 79-year-old male, was recently admitted to the hospital for a COPD exacerbation.  This is complicated by patient's history of COVID interstitial lung disease/pulmonary fibrosis.  Patient was discharged on a course of steroids and antibiotics after being the hospital for 4 days.    Inpatient course: Discharge summary reviewed- see chart.    Interval history/Current status: Patient states that he has been doing fairly since discharge from the hospital.  He states that he feels much better than when he went to the hospital originally.  Patient is mostly asking questions about his pulm logical disease prognosis and what new and cutting edge options are  Person calling (if not the patient, is medical info able to be given?): Daisy-patient's daughter     Callback phone number: [phone]     Permission to leave detailed message:no    Detailed reason for call: Patient's daughter wants to know if Veronica can have an early appointment at [facility] or if her 08/19/20 visit can be a phone visit. Daisy stated she works in the evening, and she can only take her mother to her appointments in the morning. I offered an appointment at [facility], but Daisy stated location doesn't work for her. Please advice.

## 2025-07-18 ENCOUNTER — CARE COORDINATION (OUTPATIENT)
Dept: CASE MANAGEMENT | Age: 80
End: 2025-07-18

## 2025-07-18 NOTE — CARE COORDINATION
Care Transitions Note    Follow Up Call   Patient: Kishan Elaine                             Patient : 1945   MRN: [id number]                             Reason for Admission: COPD  Discharge Date: 7/10/25       RURS: Readmission Risk Score: 15.1     Attempted to reach patient, spouse/partner  for transitions of care follow up.  Unable to reach patient, spouse/partner .      Outreach Attempts:   HIPAA compliant voicemail left for patient, spouse/partner .     Care Summary Note:     Follow Up Appointment:   Future Appointments         Provider Specialty Dept Phone    2025 9:00 AM Raudel Brown MD Pulmonology [phone]    2025 9:00 AM Ian Tirado MD Cardiology [phone]    2025 9:00 AM Jayro Eric MD Family Medicine [phone]            Plan for follow-up call in 2-5 days based on severity of symptoms and risk factors. Plan for next call: symptom management-   self management-     Chela Sanchez LPN

## 2025-07-22 ENCOUNTER — CARE COORDINATION (OUTPATIENT)
Dept: CASE MANAGEMENT | Age: 80
End: 2025-07-22

## 2025-07-22 NOTE — CARE COORDINATION
Care Transitions Note    Follow Up Call     Patient Current Location:  Home: [address] [place] [id number]    Care Transition Nurse contacted the spouse/partner  by telephone. Verified name and  as identifiers.    Additional needs identified to be addressed with provider   No needs identified         Method of communication with provider: none.    Care Summary Note: Patient's wife, James answered call and verified patient's . Pleasant and agreeable to transition call. Patient doing \"alright\" but continues with breathing issues with his pulmonary fibrosis diagnosis. Patient is staying in the air conditioning. Wearing oxygen as directed. Denied any acute needs at present time.  Agreeable to f/u calls.  Educated on the use of urgent care or physician’s 24 hr access line if assistance is needed after hours.    Plan of care updates since last contact:  Review of patient management of conditions/medications:         Advance Care Planning:   Does patient have an Advance Directive: reviewed during previous call, see note. .    Medication Review:  Full medication review completed during previous call.    Remote Patient Monitoring:  Offered patient enrollment in the Remote Patient Monitoring (RPM) program for in-home monitoring: Yes, but did not enroll at this time: already monitoring with home equipment.    Assessments:  Care Transitions Subsequent and Final Call    Subsequent and Final Calls  Care Transitions Interventions  Other Interventions:              Follow Up Appointment:   Reviewed upcoming appointment(s). and MIRELLA appointment attended as scheduled   Future Appointments         Provider Specialty Dept Phone    2025 9:00 AM Raudel Brown MD Pulmonology [phone]    2025 9:00 AM Ian Tirado MD Cardiology [phone]    2025 9:00 AM Jayro Eric MD Family Medicine [phone]            Care Transition Nurse provided contact information.  Plan for follow-up call in 6-10
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
## 2025-07-29 ENCOUNTER — CARE COORDINATION (OUTPATIENT)
Dept: CARE COORDINATION | Age: 80
End: 2025-07-29

## 2025-07-29 NOTE — CARE COORDINATION
Care Transitions Note    Follow Up Call     Patient Current Location:  Home: [address] [place] [id number]    Care Transition Nurse contacted the spouse/partner  by telephone. Verified name and  as identifiers.    Additional needs identified to be addressed with provider   No needs identified         Method of communication with provider: none.    Care Summary Note: Patient's wife, James answered call and verified . Pleasant and agreeable to transition call. Patient is doing \"about the same\" and difficulty to get outside because of the hot/humid weather. Staying in the air conditioned home. Patient has pulmonary f/u later this week and confirmed transportation to appt. Continues to monitor vital signs with home equipment and declined RPM enrollment. Denied any acute needs at present time.  Agreeable to f/u calls.  Educated on the use of urgent care or physician’s 24 hr access line if assistance is needed after hours.    Plan of care updates since last contact:  Education: oxygen safety and RPM       Advance Care Planning:   Does patient have an Advance Directive: reviewed during previous call, see note. .    Medication Review:  Full medication review completed during previous call.    Remote Patient Monitoring:  Offered patient enrollment in the Remote Patient Monitoring (RPM) program for in-home monitoring: Yes, but did not enroll at this time: controlled chronic disease management and already monitoring with home equipment.    Assessments:  Care Transitions Subsequent and Final Call    Subsequent and Final Calls  Are you currently active with any services?: Home Health  Care Transitions Interventions  Other Interventions:              Follow Up Appointment:   Reviewed upcoming appointment(s). and MIRELLA appointment attended as scheduled   Future Appointments         Provider Specialty Dept Phone    2025 9:00 AM Raudel Brown MD Pulmonology [phone]    2025 9:00 AM Ian Tirado MD

## 2025-07-31 ENCOUNTER — OFFICE VISIT (OUTPATIENT)
Dept: PULMONOLOGY | Age: 80
End: 2025-07-31
Payer: MEDICARE

## 2025-07-31 VITALS
BODY MASS INDEX: 21.26 KG/M2 | HEIGHT: 72 IN | DIASTOLIC BLOOD PRESSURE: 83 MMHG | RESPIRATION RATE: 20 BRPM | OXYGEN SATURATION: 95 % | WEIGHT: 157 LBS | SYSTOLIC BLOOD PRESSURE: 135 MMHG | HEART RATE: 86 BPM | TEMPERATURE: 97.3 F

## 2025-07-31 DIAGNOSIS — J84.9 ILD (INTERSTITIAL LUNG DISEASE) (HCC): ICD-10-CM

## 2025-07-31 DIAGNOSIS — J84.9 ILD (INTERSTITIAL LUNG DISEASE) (HCC): Primary | ICD-10-CM

## 2025-07-31 LAB
ALBUMIN SERPL-MCNC: 4.5 G/DL (ref 3.4–5)
ALP SERPL-CCNC: 61 U/L (ref 40–129)
ALT SERPL-CCNC: 42 U/L (ref 10–40)
AST SERPL-CCNC: 24 U/L (ref 15–37)
BILIRUB DIRECT SERPL-MCNC: 0.4 MG/DL (ref 0–0.3)
BILIRUB INDIRECT SERPL-MCNC: 0.4 MG/DL (ref 0–1)
BILIRUB SERPL-MCNC: 0.8 MG/DL (ref 0–1)
PROT SERPL-MCNC: 7.2 G/DL (ref 6.4–8.2)

## 2025-07-31 PROCEDURE — 3075F SYST BP GE 130 - 139MM HG: CPT | Performed by: INTERNAL MEDICINE

## 2025-07-31 PROCEDURE — 1123F ACP DISCUSS/DSCN MKR DOCD: CPT | Performed by: INTERNAL MEDICINE

## 2025-07-31 PROCEDURE — G2211 COMPLEX E/M VISIT ADD ON: HCPCS | Performed by: INTERNAL MEDICINE

## 2025-07-31 PROCEDURE — 1159F MED LIST DOCD IN RCRD: CPT | Performed by: INTERNAL MEDICINE

## 2025-07-31 PROCEDURE — 3078F DIAST BP <80 MM HG: CPT | Performed by: INTERNAL MEDICINE

## 2025-07-31 PROCEDURE — 99215 OFFICE O/P EST HI 40 MIN: CPT | Performed by: INTERNAL MEDICINE

## 2025-07-31 RX ORDER — PREDNISONE 5 MG/1
5 TABLET ORAL DAILY
Qty: 30 TABLET | Refills: 0 | Status: SHIPPED | OUTPATIENT
Start: 2025-07-31 | End: 2025-08-30

## 2025-07-31 RX ORDER — AZITHROMYCIN 250 MG/1
TABLET, FILM COATED ORAL
Qty: 30 TABLET | Refills: 5 | Status: SHIPPED | OUTPATIENT
Start: 2025-07-31

## 2025-07-31 NOTE — PROGRESS NOTES
MA Communication:  The following orders are received by verbal communication from Raudel Borwn MD    Orders include:    VV August 29  Bloodwork

## 2025-07-31 NOTE — PATIENT INSTRUCTIONS
Continue DuoNebs 2 times a day  Continue vibrating vest at least once daily but preferably twice a day  Will give prednisone 5 mg p.o. to start alternating with 10 mg for 2 weeks.  If tolerated will ask patient to stay on 5 mg p.o. daily until reevaluated end of August in the virtual visit.  Will add azithromycin Monday Wednesday and Friday to help with bronchiectasis and prevent exacerbations  Liver function test to see if the patient can start pirfenidone.  Side effects and monitoring discussed.  Ofev is not an option with risk of perforation with his GI bleed  Continue oxygen 3 L/min at rest.  Okay to increase flow if short of breath with exertion to 4-5  Patient has been on Brilinta for a while and unlikely the reason for his shortness of breath  Patient did not benefit from pulmonary rehab and will not send again  Follow-up virtual visit on August 29, 2025      Health Maintenance/Preventive measures:        >>  Avoid smoking, vaping or secondhand exposure.  Avoid exposure to irritants, allergens as possible as well as contact with patients with infectious respiratory illness.        >>  Stay up-to-date with influenza & pneumonia vaccines, RSV, & COVID-19 vaccine as recommended by the Advisory Committee on Immunization Practices (ACIP)        >>  Healthy diet and activity as able.        >>  Acid reflux precautions: Head of bed elevation, avoiding tight clothes, avoiding big meals or snacking 3 hours before bedtime, targeting healthy weight.        >>  Practice sleep hygiene measures. Avoid driving or operating heavy machines if tired or sleepy.

## 2025-08-05 ENCOUNTER — CARE COORDINATION (OUTPATIENT)
Dept: CARE COORDINATION | Age: 80
End: 2025-08-05

## 2025-08-05 ENCOUNTER — TELEPHONE (OUTPATIENT)
Dept: PULMONOLOGY | Age: 80
End: 2025-08-05

## 2025-08-05 DIAGNOSIS — J96.11 CHRONIC RESPIRATORY FAILURE WITH HYPOXIA (HCC): ICD-10-CM

## 2025-08-05 DIAGNOSIS — J84.10 PULMONARY FIBROSIS (HCC): ICD-10-CM

## 2025-08-05 DIAGNOSIS — J84.9 ILD (INTERSTITIAL LUNG DISEASE) (HCC): Primary | ICD-10-CM

## 2025-08-05 DIAGNOSIS — J84.9 ILD (INTERSTITIAL LUNG DISEASE) (HCC): ICD-10-CM

## 2025-08-05 DIAGNOSIS — J43.2 CENTRILOBULAR EMPHYSEMA (HCC): ICD-10-CM

## 2025-08-05 RX ORDER — PIRFENIDONE 267 MG/1
CAPSULE ORAL
Qty: 90 CAPSULE | Refills: 0 | Status: SHIPPED | OUTPATIENT
Start: 2025-08-05 | End: 2025-08-26

## 2025-08-05 RX ORDER — PIRFENIDONE 267 MG/1
CAPSULE ORAL
Qty: 90 CAPSULE | Refills: 0 | Status: SHIPPED | OUTPATIENT
Start: 2025-08-05 | End: 2025-08-05

## 2025-08-05 RX ORDER — PIRFENIDONE 801 MG/1
801 TABLET, FILM COATED ORAL 3 TIMES DAILY
Qty: 90 TABLET | Refills: 3 | Status: SHIPPED | OUTPATIENT
Start: 2025-08-05 | End: 2025-09-04

## 2025-08-06 ENCOUNTER — TELEPHONE (OUTPATIENT)
Dept: PULMONOLOGY | Age: 80
End: 2025-08-06

## 2025-08-14 DIAGNOSIS — J84.9 ILD (INTERSTITIAL LUNG DISEASE) (HCC): Primary | ICD-10-CM

## 2025-08-20 ENCOUNTER — HOSPITAL ENCOUNTER (OUTPATIENT)
Dept: PULMONOLOGY | Age: 80
Discharge: HOME | End: 2025-08-20
Payer: MEDICARE

## 2025-08-20 VITALS — OXYGEN SATURATION: 92 % | RESPIRATION RATE: 20 BRPM | HEART RATE: 65 BPM

## 2025-08-20 DIAGNOSIS — J84.9 ILD (INTERSTITIAL LUNG DISEASE) (HCC): ICD-10-CM

## 2025-08-20 LAB
DLCO %PRED: 90 %
DLCO PRED: NORMAL
DLCO/VA %PRED: NORMAL
DLCO/VA PRED: NORMAL
DLCO/VA: NORMAL
DLCO: NORMAL
EXPIRATORY TIME-POST: NORMAL
EXPIRATORY TIME: NORMAL
FEF 25-75 %CHNG: NORMAL
FEF 25-75 POST %PRED: NORMAL
FEF 25-75% %PRED-PRE: NORMAL
FEF 25-75% PRED: NORMAL
FEF 25-75-POST: NORMAL
FEF 25-75-PRE: NORMAL
FEV1 %PRED-POST: 75 %
FEV1 %PRED-PRE: 73 %
FEV1 PRED: NORMAL
FEV1-POST: NORMAL
FEV1-PRE: NORMAL
FEV1/FVC %PRED-POST: 110 %
FEV1/FVC %PRED-PRE: 105 %
FEV1/FVC PRED: NORMAL
FEV1/FVC-POST: NORMAL
FEV1/FVC-PRE: NORMAL
FVC %PRED-POST: 66 L
FVC %PRED-PRE: 67 %
FVC PRED: NORMAL
FVC-POST: NORMAL
FVC-PRE: NORMAL
GAW %PRED: NORMAL
GAW PRED: NORMAL
GAW: NORMAL
IC PRE %PRED: NORMAL
IC PRED: NORMAL
IC: NORMAL
MEP: NORMAL
MIP: NORMAL
MVV %PRED-PRE: NORMAL
MVV PRED: NORMAL
MVV-PRE: NORMAL
PEF %PRED-POST: NORMAL
PEF %PRED-PRE: NORMAL
PEF PRED: NORMAL
PEF%CHNG: NORMAL
PEF-POST: NORMAL
PEF-PRE: NORMAL
RAW %PRED: NORMAL
RAW PRED: NORMAL
RAW: NORMAL
RV PRE %PRED: NORMAL
RV PRED: NORMAL
RV: NORMAL
SVC %PRED: NORMAL
SVC PRED: NORMAL
SVC: NORMAL
TLC PRE %PRED: 81 %
TLC PRED: NORMAL
TLC: NORMAL
VA %PRED: NORMAL
VA PRED: NORMAL
VA: NORMAL
VTG %PRED: NORMAL
VTG PRED: NORMAL
VTG: NORMAL

## 2025-08-20 PROCEDURE — 94729 DIFFUSING CAPACITY: CPT

## 2025-08-20 PROCEDURE — 94060 EVALUATION OF WHEEZING: CPT

## 2025-08-20 PROCEDURE — 94726 PLETHYSMOGRAPHY LUNG VOLUMES: CPT

## 2025-08-20 PROCEDURE — 6370000000 HC RX 637 (ALT 250 FOR IP): Performed by: INTERNAL MEDICINE

## 2025-08-20 PROCEDURE — 94618 PULMONARY STRESS TESTING: CPT

## 2025-08-20 RX ORDER — ALBUTEROL SULFATE 90 UG/1
4 INHALANT RESPIRATORY (INHALATION) ONCE
Status: COMPLETED | OUTPATIENT
Start: 2025-08-20 | End: 2025-08-20

## 2025-08-20 RX ADMIN — Medication 4 PUFF: at 12:47

## 2025-08-20 ASSESSMENT — PULMONARY FUNCTION TESTS
FEV1_PERCENT_PREDICTED_POST: 75
FEV1/FVC_PERCENT_PREDICTED_PRE: 105
FVC_PERCENT_PREDICTED_POST: 66
FVC_PERCENT_PREDICTED_PRE: 67
FEV1/FVC_PERCENT_PREDICTED_POST: 110
FEV1_PERCENT_PREDICTED_PRE: 73

## 2025-08-22 ENCOUNTER — OFFICE VISIT (OUTPATIENT)
Dept: CARDIOLOGY CLINIC | Age: 80
End: 2025-08-22
Payer: MEDICARE

## 2025-08-22 VITALS
HEART RATE: 70 BPM | DIASTOLIC BLOOD PRESSURE: 70 MMHG | WEIGHT: 176.5 LBS | HEIGHT: 72 IN | BODY MASS INDEX: 23.91 KG/M2 | OXYGEN SATURATION: 97 % | SYSTOLIC BLOOD PRESSURE: 110 MMHG

## 2025-08-22 DIAGNOSIS — J84.9 ILD (INTERSTITIAL LUNG DISEASE) (HCC): ICD-10-CM

## 2025-08-22 DIAGNOSIS — Z95.1 S/P CABG (CORONARY ARTERY BYPASS GRAFT): ICD-10-CM

## 2025-08-22 DIAGNOSIS — J96.11 CHRONIC RESPIRATORY FAILURE WITH HYPOXIA (HCC): ICD-10-CM

## 2025-08-22 DIAGNOSIS — I10 PRIMARY HYPERTENSION: ICD-10-CM

## 2025-08-22 DIAGNOSIS — E78.2 MIXED HYPERLIPIDEMIA: ICD-10-CM

## 2025-08-22 DIAGNOSIS — I27.20 PULMONARY HTN (HCC): ICD-10-CM

## 2025-08-22 DIAGNOSIS — I25.5 ISCHEMIC CARDIOMYOPATHY: ICD-10-CM

## 2025-08-22 DIAGNOSIS — J43.2 CENTRILOBULAR EMPHYSEMA (HCC): ICD-10-CM

## 2025-08-22 DIAGNOSIS — I50.42 CHRONIC COMBINED SYSTOLIC (CONGESTIVE) AND DIASTOLIC (CONGESTIVE) HEART FAILURE (HCC): Primary | ICD-10-CM

## 2025-08-22 PROBLEM — M79.2 NEURALGIA OF LEFT UPPER EXTREMITY: Status: RESOLVED | Noted: 2022-12-21 | Resolved: 2025-08-22

## 2025-08-22 PROBLEM — I50.22 CHRONIC SYSTOLIC (CONGESTIVE) HEART FAILURE (HCC): Status: RESOLVED | Noted: 2023-12-21 | Resolved: 2025-08-22

## 2025-08-22 PROBLEM — J96.01 ACUTE HYPOXIC RESPIRATORY FAILURE (HCC): Status: RESOLVED | Noted: 2025-07-06 | Resolved: 2025-08-22

## 2025-08-22 PROBLEM — I20.9 ANGINA PECTORIS, UNSPECIFIED: Status: RESOLVED | Noted: 2023-12-21 | Resolved: 2025-08-22

## 2025-08-22 PROBLEM — I51.89 GRADE I DIASTOLIC DYSFUNCTION: Status: RESOLVED | Noted: 2021-10-27 | Resolved: 2025-08-22

## 2025-08-22 PROCEDURE — 1160F RVW MEDS BY RX/DR IN RCRD: CPT | Performed by: INTERNAL MEDICINE

## 2025-08-22 PROCEDURE — 3078F DIAST BP <80 MM HG: CPT | Performed by: INTERNAL MEDICINE

## 2025-08-22 PROCEDURE — 3074F SYST BP LT 130 MM HG: CPT | Performed by: INTERNAL MEDICINE

## 2025-08-22 PROCEDURE — 99214 OFFICE O/P EST MOD 30 MIN: CPT | Performed by: INTERNAL MEDICINE

## 2025-08-22 PROCEDURE — 1123F ACP DISCUSS/DSCN MKR DOCD: CPT | Performed by: INTERNAL MEDICINE

## 2025-08-22 PROCEDURE — 1159F MED LIST DOCD IN RCRD: CPT | Performed by: INTERNAL MEDICINE

## 2025-08-29 ENCOUNTER — TELEMEDICINE (OUTPATIENT)
Dept: PULMONOLOGY | Age: 80
End: 2025-08-29
Payer: MEDICARE

## 2025-08-29 DIAGNOSIS — J84.9 ILD (INTERSTITIAL LUNG DISEASE) (HCC): Primary | ICD-10-CM

## 2025-08-29 PROCEDURE — 99215 OFFICE O/P EST HI 40 MIN: CPT | Performed by: INTERNAL MEDICINE

## 2025-08-29 PROCEDURE — G2211 COMPLEX E/M VISIT ADD ON: HCPCS | Performed by: INTERNAL MEDICINE

## 2025-08-29 PROCEDURE — 1159F MED LIST DOCD IN RCRD: CPT | Performed by: INTERNAL MEDICINE

## 2025-08-29 PROCEDURE — 1123F ACP DISCUSS/DSCN MKR DOCD: CPT | Performed by: INTERNAL MEDICINE

## 2025-09-02 ENCOUNTER — TELEPHONE (OUTPATIENT)
Dept: PULMONOLOGY | Age: 80
End: 2025-09-02

## (undated) DEVICE — ELECTRODE PT RET AD L9FT HI MOIST COND ADH HYDRGEL CORDED

## (undated) DEVICE — SUTURE VCRL SZ 3-0 L18IN ABSRB UD L26MM SH 1/2 CIR J864D

## (undated) DEVICE — PODIATRY PK

## (undated) DEVICE — CONMED SCOPE SAVER BITE BLOCK, 20X27 MM: Brand: SCOPE SAVER

## (undated) DEVICE — CANNULA NSL AD TBNG L7FT PVC STR NONFLARED PRNG O2 DEL W STD

## (undated) DEVICE — ELECTRODE,ECG,STRESS,FOAM,3PK: Brand: MEDLINE

## (undated) DEVICE — SUTURE ETHLN SZ 4-0 L18IN NONABSORBABLE BLK L13MM P-3 3/8 699G

## (undated) DEVICE — COVER LT HNDL BLU PLAS

## (undated) DEVICE — TRAY PREP DRY W/ PREM GLV 2 APPL 6 SPNG 2 UNDPD 1 OVERWRAP

## (undated) DEVICE — GOWN,SIRUS,POLYRNF,BRTHSLV,XL,30/CS: Brand: MEDLINE

## (undated) DEVICE — PLATE ES AD W 9FT CRD 2

## (undated) DEVICE — PADDING CAST CRIMPED FINISH STD 4 YDX3 IN COTTON WBRL II

## (undated) DEVICE — FIAPC® PROBE W/ FILTER 2200 A OD 2.3MM/6.9FR; L 2.2M/7.2FT: Brand: ERBE

## (undated) DEVICE — E-Z CLEAN, NON-STICK, PTFE COATED, ELECTROSURGICAL BLADE ELECTRODE, 2.5 INCH (6.35 CM): Brand: EZ CLEAN

## (undated) DEVICE — BANDAGE COBAN 4 IN COMPR W4INXL5YD FOAM COHESIVE QUIK STK SELF ADH SFT

## (undated) DEVICE — TOWEL,STOP FLAG GOLD N-W: Brand: MEDLINE

## (undated) DEVICE — SUTURE ETHLN SZ 3-0 L18IN NONABSORBABLE BLK PS-2 L19MM 3/8 1669H

## (undated) DEVICE — JEWISH HOSPITAL TURNOVER KIT: Brand: MEDLINE INDUSTRIES, INC.

## (undated) DEVICE — COVER,MAYO STAND,XL,STERILE: Brand: MEDLINE

## (undated) DEVICE — SUTURE VCRL SZ 2-0 L18IN ABSRB VLT L26MM SH 1/2 CIR J775D

## (undated) DEVICE — ZIMMER® STERILE DISPOSABLE TOURNIQUET CUFF WITH PLC, DUAL PORT, SINGLE BLADDER, 18 IN. (46 CM)

## (undated) DEVICE — SOLUTION IV 1000ML 0.9% SOD CHL

## (undated) DEVICE — ENDOSCOPIC KIT 2 12 FT OP4 DE2 GWN SYR